# Patient Record
Sex: FEMALE | Race: WHITE | NOT HISPANIC OR LATINO | Employment: UNEMPLOYED | URBAN - NONMETROPOLITAN AREA
[De-identification: names, ages, dates, MRNs, and addresses within clinical notes are randomized per-mention and may not be internally consistent; named-entity substitution may affect disease eponyms.]

---

## 2017-05-22 RX ORDER — CLOPIDOGREL BISULFATE 75 MG/1
TABLET ORAL
Qty: 30 TABLET | Refills: 6 | Status: SHIPPED | OUTPATIENT
Start: 2017-05-22 | End: 2017-12-05 | Stop reason: SDUPTHER

## 2017-08-14 ENCOUNTER — OFFICE VISIT (OUTPATIENT)
Dept: CARDIOLOGY | Facility: CLINIC | Age: 47
End: 2017-08-14

## 2017-08-14 VITALS
WEIGHT: 194 LBS | DIASTOLIC BLOOD PRESSURE: 80 MMHG | BODY MASS INDEX: 32.32 KG/M2 | HEIGHT: 65 IN | HEART RATE: 87 BPM | SYSTOLIC BLOOD PRESSURE: 138 MMHG

## 2017-08-14 DIAGNOSIS — E78.2 MIXED HYPERLIPIDEMIA: ICD-10-CM

## 2017-08-14 DIAGNOSIS — I10 ESSENTIAL HYPERTENSION: ICD-10-CM

## 2017-08-14 DIAGNOSIS — R06.09 DYSPNEA ON EXERTION: ICD-10-CM

## 2017-08-14 DIAGNOSIS — I25.10 CORONARY ARTERY DISEASE INVOLVING NATIVE CORONARY ARTERY OF NATIVE HEART WITHOUT ANGINA PECTORIS: Primary | ICD-10-CM

## 2017-08-14 DIAGNOSIS — I25.2 MYOCARDIAL INFARCTION, OLD: ICD-10-CM

## 2017-08-14 PROBLEM — E10.9 TYPE 1 DIABETES MELLITUS: Status: ACTIVE | Noted: 2017-08-14

## 2017-08-14 PROBLEM — J44.9 COPD (CHRONIC OBSTRUCTIVE PULMONARY DISEASE) (HCC): Status: ACTIVE | Noted: 2017-08-14

## 2017-08-14 PROBLEM — Z90.5 S/P NEPHRECTOMY: Status: ACTIVE | Noted: 2017-08-14

## 2017-08-14 PROCEDURE — 99213 OFFICE O/P EST LOW 20 MIN: CPT | Performed by: INTERNAL MEDICINE

## 2017-08-14 RX ORDER — ZOLPIDEM TARTRATE 10 MG/1
TABLET ORAL
Refills: 0 | COMMUNITY
Start: 2017-08-07 | End: 2019-12-03

## 2017-08-14 RX ORDER — ASPIRIN 325 MG
325 TABLET, DELAYED RELEASE (ENTERIC COATED) ORAL DAILY
Refills: 1 | COMMUNITY
Start: 2017-05-23 | End: 2019-06-03

## 2017-08-14 RX ORDER — POLYETHYLENE GLYCOL 3350 17 G/17G
POWDER, FOR SOLUTION ORAL
Refills: 0 | COMMUNITY
Start: 2017-07-08 | End: 2019-12-03

## 2017-08-14 RX ORDER — NICOTINE POLACRILEX 4 MG/1
GUM, CHEWING ORAL
Refills: 3 | COMMUNITY
Start: 2017-05-25 | End: 2019-12-03

## 2017-08-14 RX ORDER — METOCLOPRAMIDE 10 MG/1
TABLET ORAL
Refills: 1 | COMMUNITY
Start: 2017-07-13 | End: 2018-02-14

## 2017-08-14 RX ORDER — HYDROCODONE BITARTRATE AND ACETAMINOPHEN 7.5; 325 MG/1; MG/1
TABLET ORAL
Refills: 0 | COMMUNITY
Start: 2017-06-06 | End: 2019-06-03 | Stop reason: DRUGHIGH

## 2017-08-14 RX ORDER — ALPRAZOLAM 0.5 MG/1
TABLET ORAL
Refills: 0 | Status: ON HOLD | COMMUNITY
Start: 2017-08-07 | End: 2019-11-02

## 2017-08-14 RX ORDER — CITALOPRAM 40 MG/1
TABLET ORAL
Refills: 3 | Status: ON HOLD | COMMUNITY
Start: 2017-08-02 | End: 2019-11-02

## 2017-08-14 RX ORDER — PEN NEEDLE, DIABETIC 31 GX5/16"
NEEDLE, DISPOSABLE MISCELLANEOUS
Refills: 3 | COMMUNITY
Start: 2017-06-19 | End: 2020-07-02 | Stop reason: SDUPTHER

## 2017-08-14 RX ORDER — ALBUTEROL SULFATE 90 UG/1
2 AEROSOL, METERED RESPIRATORY (INHALATION) 4 TIMES DAILY
Refills: 1 | COMMUNITY
Start: 2017-06-05 | End: 2020-07-02 | Stop reason: SDUPTHER

## 2017-08-14 RX ORDER — FENOFIBRATE 145 MG/1
TABLET, COATED ORAL
Refills: 1 | COMMUNITY
Start: 2017-07-18 | End: 2018-02-14

## 2017-08-14 RX ORDER — GABAPENTIN 300 MG/1
CAPSULE ORAL
Refills: 0 | COMMUNITY
Start: 2017-08-07 | End: 2019-12-03

## 2017-08-14 RX ORDER — ATORVASTATIN CALCIUM 40 MG/1
40 TABLET, FILM COATED ORAL NIGHTLY
Refills: 3 | COMMUNITY
Start: 2017-07-18 | End: 2020-06-17 | Stop reason: DRUGHIGH

## 2017-08-14 RX ORDER — LACTULOSE 10 G/15ML
SOLUTION ORAL
Refills: 0 | COMMUNITY
Start: 2017-07-13 | End: 2018-02-14

## 2017-08-14 RX ORDER — SYRINGE-NEEDLE,INSULIN,0.5 ML 28GX1/2"
SYRINGE, EMPTY DISPOSABLE MISCELLANEOUS
Refills: 3 | COMMUNITY
Start: 2017-05-23 | End: 2020-07-02 | Stop reason: SDUPTHER

## 2017-08-14 RX ORDER — RANOLAZINE 500 MG/1
TABLET, FILM COATED, EXTENDED RELEASE ORAL
Refills: 5 | COMMUNITY
Start: 2017-05-23 | End: 2018-02-14 | Stop reason: SDUPTHER

## 2017-08-14 NOTE — PROGRESS NOTES
Yudi West  46 y.o. female    08/14/2017  1. Coronary artery disease involving native coronary artery of native heart without angina pectoris    2. Myocardial infarction, old    3. Essential hypertension    4. Mixed hyperlipidemia    5. Dyspnea on exertion        History of Present Illness    Mrs. West is being seen by me after very long interval.  I've seen her back in October 2016 when she presented with an episode of chest pain and ruled out for myocardial infarction.  Lexiscan Cardiolite stress test showed no evidence of reversible ischemia.  The patient has done reasonably well on medical management.  Predominant complaint is generalized fatigue and increased somlonence.  She wondered if her cardiac status will was responsible for this.  She denied any chest pain but does have achiness in the back of the neck from time to time.  Blood pressure was in the normal range no signs of congestive heart failure was noted.  Her lipid profiles were checked recently by her primary care physician and understand that data in the normal range.        SUBJECTIVE    Allergies   Allergen Reactions   • Sulfa Antibiotics          Past Medical History:   Diagnosis Date   • Anxiety and depression    • Asthma    • Cancer    • Chronic kidney disease    • COPD (chronic obstructive pulmonary disease)    • Diabetes mellitus    • Hypertension    • Myocardial infarction          Past Surgical History:   Procedure Laterality Date   • CORONARY STENT PLACEMENT     • GALLBLADDER SURGERY     • HYSTERECTOMY     • KIDNEY SURGERY     • REPLACEMENT TOTAL KNEE     • TONSILLECTOMY           Family History   Problem Relation Age of Onset   • Heart disease Mother    • Hypertension Mother    • Heart disease Father    • Hypertension Father          Social History     Social History   • Marital status:      Spouse name: N/A   • Number of children: N/A   • Years of education: N/A     Occupational History   • Not on file.     Social  "History Main Topics   • Smoking status: Current Every Day Smoker     Packs/day: 1.50     Types: Cigarettes   • Smokeless tobacco: Never Used   • Alcohol use No   • Drug use: No   • Sexual activity: Defer     Other Topics Concern   • Not on file     Social History Narrative   • No narrative on file         Current Outpatient Prescriptions   Medication Sig Dispense Refill   • ALPRAZolam (XANAX) 0.5 MG tablet TAKE 1 TABLET BY MOUTH THREE TIMES DAILY  0   • aspirin  MG tablet Take 325 mg by mouth Daily.  1   • atorvastatin (LIPITOR) 40 MG tablet Take 40 mg by mouth Daily.  3   • citalopram (CeleXA) 40 MG tablet TAKE 1 TABLET BY MOUTH ONCE DAILY  3   • clopidogrel (PLAVIX) 75 MG tablet TAKE 1 TABLET BY MOUTH ONCE DAILY 30 tablet 6   • EASY TOUCH INSULIN SYRINGE 28G X 1/2\" 0.5 ML misc USE 4 TIMES DAILY  3   • fenofibrate (TRICOR) 145 MG tablet TAKE 1 TABLET BY MOUTH DAILY  1   • gabapentin (NEURONTIN) 600 MG tablet TAKE 1 TABLET BY MOUTH FOUR TIMES DAILY  0   • HYDROcodone-acetaminophen (NORCO) 7.5-325 MG per tablet TAKE 1 TABLET BY MOUTH EVERY 8 HOURS AS NEEDED FOR PAIN.  0   • lactulose (CHRONULAC) 10 GM/15ML solution TAKE 30 MILLILITERS BY MOUTH ONCE A DAY  0   • metoclopramide (REGLAN) 10 MG tablet TAKE 1 TABLET BY MOUTH BEFORE MEALS AND AT BEDTIME  1   • metoprolol tartrate (LOPRESSOR) 25 MG tablet TAKE 1 TABLET BY MOUTH TWICE A DAY  1   • Omeprazole 20 MG tablet delayed-release TAKE 1 TABLET BY MOUTH ONCE DAILY  3   • polyethylene glycol (MIRALAX) powder MIX 17 GRAMS INTO 4-8 OUNCES OF ANY BEVERAGE TWICE DAILY FOR 7 DAYS  0   • RANEXA 500 MG 12 hr tablet TAKE 1 TABLET BY MOUTH TWICE DAILY  5   • UNIFINE PENTIPS 31G X 8 MM misc USE AS DIRECTED WITH SOLOSTAR ONCE DAILY  3   • VENTOLIN  (90 BASE) MCG/ACT inhaler 2 puffs 4 (Four) Times a Day.  1   • zolpidem (AMBIEN) 10 MG tablet TAKE 1 TABLET BY MOUTH AT BEDTIME  0     No current facility-administered medications for this visit.          OBJECTIVE    BP " "138/80  Pulse 87  Ht 65\" (165.1 cm)  Wt 194 lb (88 kg)  BMI 32.28 kg/m2        Review of Systems     Constitutional:  Denies recent weight loss, weight gain, fever or chills,Fatigue     HENT:  Denies any hearing loss, epistaxis, hoarseness, or difficulty speaking.     Eyes: Wears eyeglasses or contact lenses     Respiratory:  Dyspnea with exertion,no cough, wheezing, or hemoptysis.     Cardiovascular: Negative for palpations, chest pain,  peripheral edema, syncope, or claudication.     Gastrointestinal:  Denies change in bowel habits, dyspepsia, ulcer disease, hematochezia, or melena.     Endocrine: Negative for cold intolerance, heat intolerance, polydipsia, polyphagia and polyuria. Denies any history of weight change, heat/cold intolerance, polydipsia, polyuria     Genitourinary: Negative.      Musculoskeletal: Denies any history of arthritic symptoms or back problems     Skin:  Denies any change in hair or nails, rashes, or skin lesions.     Allergic/Immunologic: Negative.  Negative for environmental allergies, food allergies and immunocompromised state.     Neurological:  Denies any history of recurrent headaches, strokes, TIA, or seizure disorder.     Hematological: Denies any food allergies, seasonal allergies, bleeding disorders, or lymphadenopathy.     Psychiatric/Behavioral: Chronic tobacco use.      Physical Exam     Constitutional: Cooperative, alert and oriented, well-developed, chronically ill-appearing     HENT:   Head: Normocephalic, normal hair patterns, no masses or tenderness.  Ears, Nose, and Throat: No gross abnormalities. No pallor or cyanosis.   Eyes: EOMS intact, PERRL, conjunctivae and lids unremarkable. Fundoscopic exam and visual fields not performed.   Neck: No palpable masses or adenopathy, no thyromegaly, no JVD, carotid pulses are full and equal bilaterally and without  Bruits.     Cardiovascular: Regular rhythm, S1 and S2 normal, no S3 or S4. No murmurs, gallops, or rubs detected. "     Pulmonary/Chest: Chest: normal symmetry, no tenderness to palpation, normal respiratory excursion, no use of accessory muscles.            Pulmonary: Normal breath sounds. No rales or ronchi.    Abdominal: Abdomen soft, bowel sounds normoactive, no masses, no hepatosplenomegaly, non-tender, no bruits.     Musculoskeletal: No deformities, clubbing, cyanosis, erythema, or edema observed.  Normal muscle strength and tone. Pulses full and equal in all extremities, no bruits auscultated.     Neurological: No gross motor or sensory deficits noted, affect appropriate, oriented to time, person, place.     Skin: Warm and dry to the touch, no apparent skin lesions or masses noted.     Psychiatric: She has a normal mood and affect. Her behavior is normal. Judgment and thought content normal.         Procedures      Lab Results   Component Value Date    WBC 7.7 10/28/2016    HGB 14.3 10/28/2016    HCT 40.5 10/28/2016    MCV 82.8 10/28/2016     10/28/2016     Lab Results   Component Value Date    GLUCOSE 141 (H) 10/28/2016    BUN 8 10/28/2016    CREATININE 0.7 10/28/2016    CO2 29 10/28/2016    CALCIUM 8.9 10/28/2016    ALBUMIN 3.5 10/28/2016    AST 25 10/28/2016    ALT 36 10/28/2016     No results found for: CHOL  Lab Results   Component Value Date    TRIG 141 10/28/2016    TRIG 114 05/07/2015     Lab Results   Component Value Date    HDL 31 (L) 10/28/2016    HDL 35 (L) 05/07/2015     Lab Results   Component Value Date    LDLCALC 88 10/28/2016    LDLCALC 137 (H) 05/07/2015     No results found for: LDL  No results found for: HDLLDLRATIO  No components found for: CHOLHDL  Lab Results   Component Value Date    HGBA1C 12.4 (H) 05/08/2015     Lab Results   Component Value Date    TSH 1.36 10/28/2016           ASSESSMENT AND PLAN    Ms. West has no definite evidence of angina and arrhythmia or congestive heart failure.  To further evaluate her dyspnea and fatigue and echocardiogram is being arranged.  I suspect that her  increased somnolence could be related to medications that she is on especially Celexa, Xanax, gabapentin.  She will discuss this should with her primary care physician.  I have continued her present anti-platelet therapy with aspirin and Plavix, lipid-lowering therapy with Lipitor and fenofibrate, antianginal therapy with Ranexa and antihypertensive therapy with metoprolol tartrate have been continued.  Suspicion for ongoing ischemia is low.    Diagnoses and all orders for this visit:    Coronary artery disease involving native coronary artery of native heart without angina pectoris  -     Adult Transthoracic Echo Complete; Future    Myocardial infarction, old  -     Adult Transthoracic Echo Complete; Future    Essential hypertension  -     Adult Transthoracic Echo Complete; Future    Mixed hyperlipidemia  -     Adult Transthoracic Echo Complete; Future    Dyspnea on exertion  -     Adult Transthoracic Echo Complete; Future        Lewis Johnson MD  8/14/2017  4:14 PM

## 2017-12-05 RX ORDER — CLOPIDOGREL BISULFATE 75 MG/1
TABLET ORAL
Qty: 30 TABLET | Refills: 6 | Status: SHIPPED | OUTPATIENT
Start: 2017-12-05 | End: 2018-06-20 | Stop reason: SDUPTHER

## 2018-02-14 ENCOUNTER — OFFICE VISIT (OUTPATIENT)
Dept: CARDIOLOGY | Facility: CLINIC | Age: 48
End: 2018-02-14

## 2018-02-14 VITALS
BODY MASS INDEX: 31.16 KG/M2 | SYSTOLIC BLOOD PRESSURE: 126 MMHG | DIASTOLIC BLOOD PRESSURE: 66 MMHG | HEART RATE: 66 BPM | HEIGHT: 65 IN | WEIGHT: 187 LBS

## 2018-02-14 DIAGNOSIS — I25.10 CORONARY ARTERY DISEASE INVOLVING NATIVE CORONARY ARTERY OF NATIVE HEART WITHOUT ANGINA PECTORIS: Primary | ICD-10-CM

## 2018-02-14 DIAGNOSIS — I25.2 MYOCARDIAL INFARCTION, OLD: ICD-10-CM

## 2018-02-14 DIAGNOSIS — E78.2 MIXED HYPERLIPIDEMIA: ICD-10-CM

## 2018-02-14 DIAGNOSIS — I10 ESSENTIAL HYPERTENSION: ICD-10-CM

## 2018-02-14 PROCEDURE — 93000 ELECTROCARDIOGRAM COMPLETE: CPT | Performed by: INTERNAL MEDICINE

## 2018-02-14 PROCEDURE — 99214 OFFICE O/P EST MOD 30 MIN: CPT | Performed by: INTERNAL MEDICINE

## 2018-02-14 RX ORDER — INSULIN GLARGINE 100 [IU]/ML
50 INJECTION, SOLUTION SUBCUTANEOUS DAILY
COMMUNITY
End: 2020-07-02

## 2018-02-14 RX ORDER — RANOLAZINE 500 MG/1
500 TABLET, FILM COATED, EXTENDED RELEASE ORAL EVERY 12 HOURS SCHEDULED
Qty: 60 TABLET | Refills: 6 | Status: SHIPPED | OUTPATIENT
Start: 2018-02-14 | End: 2018-09-18

## 2018-02-15 ENCOUNTER — TELEPHONE (OUTPATIENT)
Dept: CARDIOLOGY | Facility: CLINIC | Age: 48
End: 2018-02-15

## 2018-02-15 NOTE — PROGRESS NOTES
Yudi West  47 y.o. female    02/14/2018  1. Coronary artery disease involving native coronary artery of native heart without angina pectoris    2. Essential hypertension    3. Mixed hyperlipidemia    4. Myocardial infarction, old        History of Present Illness    Ms. West is here for follow-up of her above stated problems.  She denied any exertional chest discomfort but has had twinges in the left side of the chest from time to time and some degree of pain in the back of the neck.  Stress Cardiolite in November 2016 showed no reversible ischemia.  The patient unfortunately continues to smoke and compliance with medications have not been consistent.  EKG today showed sinus rhythm with no ST-T wave changes diagnostic of ischemia.        SUBJECTIVE    Allergies   Allergen Reactions   • Sulfa Antibiotics          Past Medical History:   Diagnosis Date   • Anxiety and depression    • Asthma    • Cancer    • Chronic kidney disease    • COPD (chronic obstructive pulmonary disease)    • Diabetes mellitus    • Hypertension    • Myocardial infarction          Past Surgical History:   Procedure Laterality Date   • CORONARY STENT PLACEMENT     • GALLBLADDER SURGERY     • HYSTERECTOMY     • KIDNEY SURGERY     • REPLACEMENT TOTAL KNEE     • TONSILLECTOMY           Family History   Problem Relation Age of Onset   • Heart disease Mother    • Hypertension Mother    • Heart disease Father    • Hypertension Father          Social History     Social History   • Marital status:      Spouse name: N/A   • Number of children: N/A   • Years of education: N/A     Occupational History   • Not on file.     Social History Main Topics   • Smoking status: Current Every Day Smoker     Packs/day: 1.50     Types: Cigarettes   • Smokeless tobacco: Never Used   • Alcohol use No   • Drug use: No   • Sexual activity: Defer     Other Topics Concern   • Not on file     Social History Narrative         Current Outpatient  "Prescriptions   Medication Sig Dispense Refill   • ALPRAZolam (XANAX) 0.5 MG tablet TAKE 1 TABLET BY MOUTH THREE TIMES DAILY  0   • aspirin  MG tablet Take 325 mg by mouth Daily.  1   • atorvastatin (LIPITOR) 40 MG tablet Take 40 mg by mouth Daily.  3   • citalopram (CeleXA) 40 MG tablet TAKE 1 TABLET BY MOUTH ONCE DAILY  3   • clopidogrel (PLAVIX) 75 MG tablet TAKE 1 TABLET BY MOUTH ONCE DAILY 30 tablet 6   • EASY TOUCH INSULIN SYRINGE 28G X 1/2\" 0.5 ML misc USE 4 TIMES DAILY  3   • gabapentin (NEURONTIN) 600 MG tablet TAKE 1 TABLET BY MOUTH FOUR TIMES DAILY  0   • HYDROcodone-acetaminophen (NORCO) 7.5-325 MG per tablet TAKE 1 TABLET BY MOUTH EVERY 8 HOURS AS NEEDED FOR PAIN.  0   • insulin glargine (LANTUS) 100 UNIT/ML injection Inject  under the skin Daily.     • metoprolol tartrate (LOPRESSOR) 25 MG tablet TAKE 1 TABLET BY MOUTH TWICE A DAY  1   • Omeprazole 20 MG tablet delayed-release TAKE 1 TABLET BY MOUTH ONCE DAILY  3   • polyethylene glycol (MIRALAX) powder MIX 17 GRAMS INTO 4-8 OUNCES OF ANY BEVERAGE TWICE DAILY FOR 7 DAYS  0   • RANEXA 500 MG 12 hr tablet Take 1 tablet by mouth Every 12 (Twelve) Hours. 60 tablet 6   • UNIFINE PENTIPS 31G X 8 MM misc USE AS DIRECTED WITH SOLOSTAR ONCE DAILY  3   • VENTOLIN  (90 BASE) MCG/ACT inhaler 2 puffs 4 (Four) Times a Day.  1   • zolpidem (AMBIEN) 10 MG tablet TAKE 1 TABLET BY MOUTH AT BEDTIME  0     No current facility-administered medications for this visit.          OBJECTIVE    /66  Pulse 66  Ht 165.1 cm (65\")  Wt 84.8 kg (187 lb)  BMI 31.12 kg/m2        Review of Systems     Constitutional:  Denies recent weight loss, weight gain, fever or chills     HENT:  Denies any hearing loss, epistaxis, hoarseness, or difficulty speaking.     Eyes: Wears eyeglasses or contact lenses     Respiratory:  Denies dyspnea with exertion,no cough, wheezing, or hemoptysis.     Cardiovascular: Negative for palpations. Has atypical chest " pain    Gastrointestinal:  Denies change in bowel habits, dyspepsia, ulcer disease, hematochezia, or melena.     Endocrine: Negative for cold intolerance, heat intolerance, polydipsia, polyphagia and polyuria.    Genitourinary: Negative.      Musculoskeletal: DJD    Skin:  Denies any change in hair or nails, rashes, or skin lesions.     Allergic/Immunologic: Negative.  Negative for environmental allergies, food allergies and immunocompromised state.     Neurological:  Denies any history of recurrent headaches, strokes, TIA, or seizure disorder.     Hematological: Denies any food allergies, seasonal allergies, bleeding disorders, or lymphadenopathy.       Physical Exam     Constitutional: Cooperative, alert and oriented,  in no acute distress.     HENT:   Head: Normocephalic, normal hair patterns, no masses or tenderness.  Ears, Nose, and Throat: No gross abnormalities. No pallor or cyanosis.   Eyes: EOMS intact, PERRL, conjunctivae and lids unremarkable. Fundoscopic exam and visual fields not performed.   Neck: No palpable masses or adenopathy, no thyromegaly, no JVD, carotid pulses are full and equal bilaterally and without  Bruits.     Cardiovascular: Regular rhythm, S1 and S2 normal, no S3 or S4. No murmurs, gallops, or rubs detected.     Pulmonary/Chest: Chest: normal symmetry, no tenderness to palpation,  no use of accessory muscles.            Pulmonary: Normal breath sounds. No rales or ronchi.    Abdominal: Abdomen soft, bowel sounds normoactive, no masses, no hepatosplenomegaly, non-tender, no bruits.     Musculoskeletal: No deformities, clubbing, cyanosis, erythema, or edema observed. There are no spinal abnormalities noted.     Neurological: No gross motor or sensory deficits noted, affect appropriate, oriented to time, person, place.     Skin: Warm and dry to the touch, no apparent skin lesions or masses noted.     Psychiatric: She has a normal mood and affect. Her behavior is normal. Judgment and  thought content normal.           ECG 12 Lead  Date/Time: 2/14/2018 8:18 PM  Performed by: CYRIL NAVARRO  Authorized by: CYRIL NAVARRO   Comparison: not compared with previous ECG   Rhythm: sinus rhythm  Rate: normal  QRS axis: normal  Comments: Sinus rhythm with heart rate of 67 bpm.  Nonspecific ST-T changes.              Lab Results   Component Value Date    WBC 7.7 10/28/2016    HGB 14.3 10/28/2016    HCT 40.5 10/28/2016    MCV 82.8 10/28/2016     10/28/2016     Lab Results   Component Value Date    GLUCOSE 141 (H) 10/28/2016    BUN 8 10/28/2016    CREATININE 0.7 10/28/2016    CO2 29 10/28/2016    CALCIUM 8.9 10/28/2016    ALBUMIN 3.5 10/28/2016    AST 25 10/28/2016    ALT 36 10/28/2016     No results found for: CHOL  Lab Results   Component Value Date    TRIG 141 10/28/2016    TRIG 114 05/07/2015     Lab Results   Component Value Date    HDL 31 (L) 10/28/2016    HDL 35 (L) 05/07/2015     No components found for: LDLCALC  Lab Results   Component Value Date    LDL 88 10/28/2016     (H) 05/07/2015     No results found for: HDLLDLRATIO  No components found for: CHOLHDL  Lab Results   Component Value Date    HGBA1C 12.4 (H) 05/08/2015     Lab Results   Component Value Date    TSH 1.36 10/28/2016           ASSESSMENT AND PLAN  Ms. West has multiple medical issues but no definite evidence of progression of coronary artery disease or angina.  I have restarted Ranexa 500 mg twice a day.  Compliance with medication was once again stressed.  Antiplatelet therapy with aspirin and Plavix, lipid-lowering therapy with atorvastatin, antihypertensive therapy with metoprolol tartrate have been continued.  Smoking cessation was once again stressed.    Addendum on 7/3/2018  I understand that the patient is being considered for epidural injection.  From a cardiac standpoint, she will be okay to go off Plavix for a period of 7 days prior to the procedure.  The medication may be restarted when  appropriate, post procedure.    Yudi was seen today for follow-up.    Diagnoses and all orders for this visit:    Coronary artery disease involving native coronary artery of native heart without angina pectoris    Essential hypertension    Mixed hyperlipidemia    Myocardial infarction, old    Other orders  -     RANEXA 500 MG 12 hr tablet; Take 1 tablet by mouth Every 12 (Twelve) Hours.        Lewis Johnson MD  2/14/2018  8:12 PM

## 2018-04-06 ENCOUNTER — TELEPHONE (OUTPATIENT)
Dept: CARDIOLOGY | Facility: CLINIC | Age: 48
End: 2018-04-06

## 2018-06-20 RX ORDER — CLOPIDOGREL BISULFATE 75 MG/1
TABLET ORAL
Qty: 30 TABLET | Refills: 6 | Status: SHIPPED | OUTPATIENT
Start: 2018-06-20 | End: 2019-12-03

## 2018-08-23 ENCOUNTER — HOSPITAL ENCOUNTER (EMERGENCY)
Facility: HOSPITAL | Age: 48
Discharge: HOME | End: 2018-08-23
Admitting: EMERGENCY MEDICINE

## 2018-08-23 ENCOUNTER — APPOINTMENT (OUTPATIENT)
Dept: GENERAL RADIOLOGY | Facility: HOSPITAL | Age: 48
End: 2018-08-23

## 2018-08-23 VITALS
OXYGEN SATURATION: 96 % | WEIGHT: 190 LBS | BODY MASS INDEX: 31.65 KG/M2 | SYSTOLIC BLOOD PRESSURE: 107 MMHG | DIASTOLIC BLOOD PRESSURE: 80 MMHG | TEMPERATURE: 97.8 F | HEART RATE: 82 BPM | HEIGHT: 65 IN | RESPIRATION RATE: 18 BRPM

## 2018-08-23 DIAGNOSIS — R07.81 RIB PAIN ON RIGHT SIDE: Primary | ICD-10-CM

## 2018-08-23 DIAGNOSIS — S29.011A MUSCLE STRAIN OF CHEST WALL, INITIAL ENCOUNTER: ICD-10-CM

## 2018-08-23 DIAGNOSIS — M94.0 COSTOCHONDRITIS, ACUTE: ICD-10-CM

## 2018-08-23 PROCEDURE — 93005 ELECTROCARDIOGRAM TRACING: CPT

## 2018-08-23 PROCEDURE — 85610 PROTHROMBIN TIME: CPT

## 2018-08-23 PROCEDURE — 25010000002 MORPHINE PER 10 MG: Performed by: NURSE PRACTITIONER

## 2018-08-23 PROCEDURE — 85025 COMPLETE CBC W/AUTO DIFF WBC: CPT

## 2018-08-23 PROCEDURE — 93010 ELECTROCARDIOGRAM REPORT: CPT | Performed by: INTERNAL MEDICINE

## 2018-08-23 PROCEDURE — 71045 X-RAY EXAM CHEST 1 VIEW: CPT

## 2018-08-23 PROCEDURE — 80053 COMPREHEN METABOLIC PANEL: CPT

## 2018-08-23 PROCEDURE — 96375 TX/PRO/DX INJ NEW DRUG ADDON: CPT

## 2018-08-23 PROCEDURE — 99284 EMERGENCY DEPT VISIT MOD MDM: CPT

## 2018-08-23 PROCEDURE — 96374 THER/PROPH/DIAG INJ IV PUSH: CPT

## 2018-08-23 PROCEDURE — 83880 ASSAY OF NATRIURETIC PEPTIDE: CPT

## 2018-08-23 PROCEDURE — 25010000002 KETOROLAC TROMETHAMINE PER 15 MG: Performed by: NURSE PRACTITIONER

## 2018-08-23 PROCEDURE — 84484 ASSAY OF TROPONIN QUANT: CPT

## 2018-08-23 RX ORDER — CYCLOBENZAPRINE HCL 10 MG
10 TABLET ORAL 3 TIMES DAILY PRN
Qty: 15 TABLET | Refills: 0 | Status: SHIPPED | OUTPATIENT
Start: 2018-08-23 | End: 2018-08-28

## 2018-08-23 RX ORDER — SODIUM CHLORIDE 0.9 % (FLUSH) 0.9 %
10 SYRINGE (ML) INJECTION AS NEEDED
Status: DISCONTINUED | OUTPATIENT
Start: 2018-08-23 | End: 2018-08-23 | Stop reason: HOSPADM

## 2018-08-23 RX ORDER — OXYCODONE HYDROCHLORIDE AND ACETAMINOPHEN 5; 325 MG/1; MG/1
1 TABLET ORAL EVERY 6 HOURS PRN
Qty: 15 TABLET | Refills: 0 | Status: SHIPPED | OUTPATIENT
Start: 2018-08-23 | End: 2018-08-26

## 2018-08-23 RX ORDER — KETOROLAC TROMETHAMINE 30 MG/ML
30 INJECTION, SOLUTION INTRAMUSCULAR; INTRAVENOUS ONCE
Status: COMPLETED | OUTPATIENT
Start: 2018-08-23 | End: 2018-08-23

## 2018-08-23 RX ORDER — IBUPROFEN 800 MG/1
800 TABLET ORAL
Qty: 15 TABLET | Refills: 0 | Status: SHIPPED | OUTPATIENT
Start: 2018-08-23 | End: 2018-08-28

## 2018-08-23 RX ORDER — ASPIRIN 325 MG
325 TABLET ORAL ONCE
Status: COMPLETED | OUTPATIENT
Start: 2018-08-23 | End: 2018-08-23

## 2018-08-23 RX ADMIN — MORPHINE SULFATE 4 MG: 4 INJECTION INTRAVENOUS at 15:45

## 2018-08-23 RX ADMIN — KETOROLAC TROMETHAMINE 30 MG: 30 INJECTION, SOLUTION INTRAMUSCULAR at 15:45

## 2018-08-23 RX ADMIN — ASPIRIN 325 MG: 325 TABLET ORAL at 15:44

## 2018-09-18 ENCOUNTER — OFFICE VISIT (OUTPATIENT)
Dept: CARDIOLOGY | Facility: CLINIC | Age: 48
End: 2018-09-18

## 2018-09-18 VITALS
HEART RATE: 98 BPM | HEIGHT: 65 IN | DIASTOLIC BLOOD PRESSURE: 70 MMHG | SYSTOLIC BLOOD PRESSURE: 126 MMHG | BODY MASS INDEX: 31.65 KG/M2 | WEIGHT: 190 LBS | OXYGEN SATURATION: 98 %

## 2018-09-18 DIAGNOSIS — I10 ESSENTIAL HYPERTENSION: ICD-10-CM

## 2018-09-18 DIAGNOSIS — I25.10 CORONARY ARTERY DISEASE INVOLVING NATIVE CORONARY ARTERY OF NATIVE HEART WITHOUT ANGINA PECTORIS: Primary | ICD-10-CM

## 2018-09-18 DIAGNOSIS — E78.2 MIXED HYPERLIPIDEMIA: ICD-10-CM

## 2018-09-18 PROCEDURE — 99214 OFFICE O/P EST MOD 30 MIN: CPT | Performed by: INTERNAL MEDICINE

## 2018-09-18 RX ORDER — CARVEDILOL 6.25 MG/1
6.25 TABLET ORAL 2 TIMES DAILY WITH MEALS
COMMUNITY
End: 2020-06-17 | Stop reason: DRUGHIGH

## 2018-09-18 NOTE — PROGRESS NOTES
Yudi West  47 y.o. female    09/18/2018  1. Coronary artery disease involving native coronary artery of native heart without angina pectoris    2. Essential hypertension    3. Mixed hyperlipidemia        History of Present Illness  Mrs. West is here for follow-up of her above stated problems.  She presented with acute inferior wall microinfarction in May 2015 and underwent cardiac catheterization which showed the following findings:    1.    Critical lesion noted in the right coronary artery which was the        infarct vessel and successful PCI with balloon angioplasty and        subsequent placement of a 3.5 x 23 mm Xience Xpedition stent and        reduction of stenosis to 0%.  2.    Critical lesion noted in one of the small subbranches of the ramus        intermedius vessel. This will be treated medically.  3.    Noncritical disease noted in the left anterior descending coronary        artery and left circumflex coronary artery.  4.    Overall normal contractility of the left ventricle with an        ejection fraction of 50% to 55% with mild inferolateral        hypokinesis.     Patient has generalized aches and pains and has been under a lot of psychological stress secondary to family issues.  She claims compliant with her medications.  She was unable to afford Ranexa.  Blood pressure was in the normal range.  No signs of congestive heart failure was noted.  She has what smoking about 4 months prio    SUBJECTIVE    Allergies   Allergen Reactions   • Sulfa Antibiotics          Past Medical History:   Diagnosis Date   • Anxiety and depression    • Asthma    • Cancer (CMS/[facility])    • Chronic kidney disease    • COPD (chronic obstructive pulmonary disease) (CMS/[facility])    • Diabetes mellitus (CMS/[facility])    • Hypertension    • Myocardial infarction          Past Surgical History:   Procedure Laterality Date   • CORONARY STENT PLACEMENT     • GALLBLADDER SURGERY     • HYSTERECTOMY     • KIDNEY SURGERY     •  "REPLACEMENT TOTAL KNEE     • TONSILLECTOMY           Family History   Problem Relation Age of Onset   • Heart disease Mother    • Hypertension Mother    • Heart disease Father    • Hypertension Father          Social History     Social History   • Marital status:      Spouse name: N/A   • Number of children: N/A   • Years of education: N/A     Occupational History   • Not on file.     Social History Main Topics   • Smoking status: Former Smoker     Packs/day: 1.50     Types: Cigarettes   • Smokeless tobacco: Never Used      Comment: patient states she quit 3.5 months ago.   • Alcohol use No   • Drug use: No   • Sexual activity: Defer     Other Topics Concern   • Not on file     Social History Narrative   • No narrative on file         Current Outpatient Prescriptions   Medication Sig Dispense Refill   • aspirin  MG tablet Take 325 mg by mouth Daily.  1   • atorvastatin (LIPITOR) 40 MG tablet Take 40 mg by mouth Daily.  3   • carvedilol (COREG) 6.25 MG tablet Take 6.25 mg by mouth 2 (Two) Times a Day With Meals.     • citalopram (CeleXA) 40 MG tablet TAKE 1 TABLET BY MOUTH ONCE DAILY  3   • clopidogrel (PLAVIX) 75 MG tablet TAKE 1 TABLET BY MOUTH ONCE DAILY 30 tablet 6   • EASY TOUCH INSULIN SYRINGE 28G X 1/2\" 0.5 ML misc USE 4 TIMES DAILY  3   • gabapentin (NEURONTIN) 600 MG tablet TAKE 1 TABLET BY MOUTH FOUR TIMES DAILY  0   • insulin glargine (LANTUS) 100 UNIT/ML injection Inject  under the skin Daily.     • polyethylene glycol (MIRALAX) powder MIX 17 GRAMS INTO 4-8 OUNCES OF ANY BEVERAGE TWICE DAILY FOR 7 DAYS  0   • UNIFINE PENTIPS 31G X 8 MM misc USE AS DIRECTED WITH SOLOSTAR ONCE DAILY  3   • VENTOLIN  (90 BASE) MCG/ACT inhaler 2 puffs 4 (Four) Times a Day.  1   • ALPRAZolam (XANAX) 0.5 MG tablet TAKE 1 TABLET BY MOUTH THREE TIMES DAILY  0   • HYDROcodone-acetaminophen (NORCO) 7.5-325 MG per tablet TAKE 1 TABLET BY MOUTH EVERY 8 HOURS AS NEEDED FOR PAIN.  0   • Omeprazole 20 MG tablet " "delayed-release TAKE 1 TABLET BY MOUTH ONCE DAILY  3   • RANEXA 500 MG 12 hr tablet Take 1 tablet by mouth Every 12 (Twelve) Hours. 60 tablet 6   • zolpidem (AMBIEN) 10 MG tablet TAKE 1 TABLET BY MOUTH AT BEDTIME  0     No current facility-administered medications for this visit.          OBJECTIVE    /70   Pulse 98   Ht 165.1 cm (65\")   Wt 86.2 kg (190 lb)   SpO2 98%   BMI 31.62 kg/m²         Review of Systems     Constitutional:  Denies recent weight loss, weight gain     HENT:  Denies any hearing loss, epistaxis, hoarseness, or difficulty speaking.     Eyes: Wears eyeglasses or contact lenses     Respiratory:  Denies dyspnea with exertion,no cough, wheezing, or hemoptysis.     Cardiovascular: Negative for palpations, chest pain,     Gastrointestinal:  Denies change in bowel habits, dyspepsia, ulcer disease, hematochezia, or melena.     Endocrine: Negative for cold intolerance, heat intolerance, polydipsia, polyphagia and polyuria.    Genitourinary: s/p nephrectomy      Musculoskeletal: Generalized body ache      Physical Exam     Constitutional: Cooperative, alert and oriented,  in no acute distress.     HENT:   Head: Normocephalic, normal hair patterns, no masses or tenderness.  Ears, Nose, and Throat: No gross abnormalities. No pallor or cyanosis.   Eyes: EOMS intact, PERRL, conjunctivae and lids unremarkable. Fundoscopic exam and visual fields not performed.   Neck: No palpable masses or adenopathy, no thyromegaly, no JVD, carotid pulses are full and equal bilaterally and without  Bruits.     Cardiovascular: Regular rhythm, S1 and S2 normal, no S3 or S4.  No murmurs, gallops, or rubs detected.     Pulmonary/Chest: Chest: normal symmetry, normal respiratory excursion, no intercostal retraction, no use of accessory muscles.            Pulmonary: Normal breath sounds. No rales or ronchi.    Abdominal: Abdomen soft, bowel sounds normoactive, no masses, no hepatosplenomegaly, non-tender, no bruits. "     Musculoskeletal: No deformities, clubbing, cyanosis, erythema, or edema observed.    Neurological: No gross motor or sensory deficits noted, affect appropriate, oriented to time, person, place.     Skin: Warm and dry to the touch, no apparent skin lesions or masses noted.     Psychiatric: She has a normal mood and affect. Her behavior is normal. Judgment and thought content normal.         Procedures      Lab Results   Component Value Date    WBC 8.86 08/23/2018    HGB 14.4 08/23/2018    HCT 41.4 08/23/2018    MCV 85.2 08/23/2018     08/23/2018     Lab Results   Component Value Date    GLUCOSE 293 (H) 08/23/2018    BUN 15 08/23/2018    CREATININE 0.84 08/23/2018    EGFRIFNONA 73 08/23/2018    BCR 17.9 08/23/2018    CO2 25.0 08/23/2018    CALCIUM 9.3 08/23/2018    ALBUMIN 4.30 08/23/2018    AST 21 08/23/2018    ALT 35 08/23/2018     No results found for: CHOL  Lab Results   Component Value Date    TRIG 141 10/28/2016    TRIG 114 05/07/2015     Lab Results   Component Value Date    HDL 31 (L) 10/28/2016    HDL 35 (L) 05/07/2015     No components found for: LDLCALC  Lab Results   Component Value Date    LDL 88 10/28/2016     (H) 05/07/2015     No results found for: HDLLDLRATIO  No components found for: CHOLHDL  Lab Results   Component Value Date    HGBA1C 12.4 (H) 05/08/2015     Lab Results   Component Value Date    TSH 1.36 10/28/2016           ASSESSMENT AND PLAN  Mrs. West is stable regards to her heart with no definite evidence of angina, arrhythmia or congestive heart failure.  Antiplatelet therapy with aspirin and Plavix, antihypertensive therapy with Coreg, and lipid-lowering therapy with atorvastatin has been continued.    Yudi was seen today for follow-up.    Diagnoses and all orders for this visit:    Coronary artery disease involving native coronary artery of native heart without angina pectoris    Essential hypertension    Mixed hyperlipidemia        Lewis Johnson,  MD  9/18/2018  9:20 AM

## 2019-05-15 ENCOUNTER — TRANSCRIBE ORDERS (OUTPATIENT)
Dept: MRI IMAGING | Facility: HOSPITAL | Age: 49
End: 2019-05-15

## 2019-05-15 DIAGNOSIS — M54.5 LOW BACK PAIN, UNSPECIFIED BACK PAIN LATERALITY, UNSPECIFIED CHRONICITY, WITH SCIATICA PRESENCE UNSPECIFIED: Primary | ICD-10-CM

## 2019-05-15 DIAGNOSIS — M43.06 SPONDYLOLYSIS, LUMBAR REGION: ICD-10-CM

## 2019-06-03 ENCOUNTER — HOSPITAL ENCOUNTER (OUTPATIENT)
Dept: MRI IMAGING | Facility: HOSPITAL | Age: 49
Discharge: HOME | End: 2019-06-03
Admitting: PAIN MEDICINE

## 2019-06-03 ENCOUNTER — OFFICE VISIT (OUTPATIENT)
Dept: CARDIOLOGY | Facility: CLINIC | Age: 49
End: 2019-06-03

## 2019-06-03 VITALS
SYSTOLIC BLOOD PRESSURE: 100 MMHG | HEIGHT: 65 IN | WEIGHT: 193 LBS | HEART RATE: 100 BPM | DIASTOLIC BLOOD PRESSURE: 60 MMHG | BODY MASS INDEX: 32.15 KG/M2 | OXYGEN SATURATION: 98 %

## 2019-06-03 DIAGNOSIS — I25.10 CORONARY ARTERY DISEASE INVOLVING NATIVE CORONARY ARTERY OF NATIVE HEART WITHOUT ANGINA PECTORIS: Primary | ICD-10-CM

## 2019-06-03 DIAGNOSIS — M43.06 SPONDYLOLYSIS, LUMBAR REGION: ICD-10-CM

## 2019-06-03 DIAGNOSIS — R06.09 DYSPNEA ON EXERTION: ICD-10-CM

## 2019-06-03 DIAGNOSIS — E78.2 MIXED HYPERLIPIDEMIA: ICD-10-CM

## 2019-06-03 DIAGNOSIS — R07.2 PRECORDIAL PAIN: ICD-10-CM

## 2019-06-03 DIAGNOSIS — M54.5 LOW BACK PAIN, UNSPECIFIED BACK PAIN LATERALITY, UNSPECIFIED CHRONICITY, WITH SCIATICA PRESENCE UNSPECIFIED: ICD-10-CM

## 2019-06-03 DIAGNOSIS — I10 ESSENTIAL HYPERTENSION: ICD-10-CM

## 2019-06-03 PROCEDURE — 72148 MRI LUMBAR SPINE W/O DYE: CPT

## 2019-06-03 PROCEDURE — 99214 OFFICE O/P EST MOD 30 MIN: CPT | Performed by: INTERNAL MEDICINE

## 2019-06-03 RX ORDER — PANTOPRAZOLE SODIUM 20 MG/1
20 TABLET, DELAYED RELEASE ORAL DAILY
Status: ON HOLD | COMMUNITY
End: 2019-11-02

## 2019-06-03 RX ORDER — ASPIRIN 81 MG/1
81 TABLET ORAL DAILY
Status: ON HOLD | COMMUNITY
End: 2019-11-04 | Stop reason: SDUPTHER

## 2019-06-03 RX ORDER — HYDROCODONE BITARTRATE AND ACETAMINOPHEN 5; 325 MG/1; MG/1
1 TABLET ORAL EVERY 6 HOURS PRN
COMMUNITY
End: 2019-11-04 | Stop reason: HOSPADM

## 2019-06-03 RX ORDER — RANOLAZINE 500 MG/1
500 TABLET, EXTENDED RELEASE ORAL 2 TIMES DAILY
Qty: 60 TABLET | Refills: 6 | Status: SHIPPED | OUTPATIENT
Start: 2019-06-03 | End: 2020-07-02 | Stop reason: SDUPTHER

## 2019-06-03 NOTE — PROGRESS NOTES
Yudi West  48 y.o. female    06/03/2019  1. Coronary artery disease involving native coronary artery of native heart without angina pectoris    2. Essential hypertension    3. Mixed hyperlipidemia    4. Dyspnea on exertion    5. Precordial pain        History of Present Illness    Ms. West is here for follow-up of her above-stated problems. She presented with acute inferior wall microinfarction in May 2015 and underwent cardiac catheterization which showed the following findings:     1.    Critical lesion noted in the right coronary artery which was the        infarct vessel and successful PCI with balloon angioplasty and        subsequent placement of a 3.5 x 23 mm Xience Xpedition stent and        reduction of stenosis to 0%.  2.    Critical lesion noted in one of the small subbranches of the ramus        intermedius vessel. This will be treated medically.  3.    Noncritical disease noted in the left anterior descending coronary        artery and left circumflex coronary artery.  4.    Overall normal contractility of the left ventricle with an        ejection fraction of 50% to 55% with mild inferolateral        hypokinesis.     She reports intermittent episodes of sharp chest pain lasting for a few seconds which is unrelated to exertion.  This has been going on for several months.  She has been under a lot of stress.  She quit smoking for a few months but looks like she has restarted.  We had a discussion about smoking cessation.  Clinical exam is unremarkable with no signs of bronchospasm or congestive heart failure.        SUBJECTIVE    Allergies   Allergen Reactions   • Sulfa Antibiotics          Past Medical History:   Diagnosis Date   • Anxiety and depression    • Asthma    • Cancer (CMS/[facility])    • Chronic kidney disease    • COPD (chronic obstructive pulmonary disease) (CMS/[facility])    • Diabetes mellitus (CMS/[facility])    • Hypertension    • Myocardial infarction (CMS/[facility])          Past Surgical  "History:   Procedure Laterality Date   • CORONARY STENT PLACEMENT     • GALLBLADDER SURGERY     • HYSTERECTOMY     • KIDNEY SURGERY     • REPLACEMENT TOTAL KNEE     • TONSILLECTOMY           Family History   Problem Relation Age of Onset   • Heart disease Mother    • Hypertension Mother    • Heart disease Father    • Hypertension Father          Social History     Socioeconomic History   • Marital status:      Spouse name: Not on file   • Number of children: Not on file   • Years of education: Not on file   • Highest education level: Not on file   Tobacco Use   • Smoking status: Former Smoker     Packs/day: 1.50     Types: Cigarettes   • Smokeless tobacco: Never Used   • Tobacco comment: patient states she quit 3.5 months ago.   Substance and Sexual Activity   • Alcohol use: No   • Drug use: No   • Sexual activity: Defer         Current Outpatient Medications   Medication Sig Dispense Refill   • aspirin 81 MG EC tablet Take 81 mg by mouth Daily.     • atorvastatin (LIPITOR) 40 MG tablet Take 40 mg by mouth Daily.  3   • carvedilol (COREG) 6.25 MG tablet Take 6.25 mg by mouth 2 (Two) Times a Day With Meals.     • clopidogrel (PLAVIX) 75 MG tablet TAKE 1 TABLET BY MOUTH ONCE DAILY 30 tablet 6   • EASY TOUCH INSULIN SYRINGE 28G X 1/2\" 0.5 ML misc USE 4 TIMES DAILY  3   • gabapentin (NEURONTIN) 600 MG tablet TAKE 1 TABLET BY MOUTH FOUR TIMES DAILY  0   • HYDROcodone-acetaminophen (NORCO) 5-325 MG per tablet Take 1 tablet by mouth Every 6 (Six) Hours As Needed.     • insulin glargine (LANTUS) 100 UNIT/ML injection Inject  under the skin Daily.     • pantoprazole (PROTONIX) 20 MG EC tablet Take 20 mg by mouth Daily.     • polyethylene glycol (MIRALAX) powder MIX 17 GRAMS INTO 4-8 OUNCES OF ANY BEVERAGE TWICE DAILY FOR 7 DAYS  0   • sertraline (ZOLOFT) 50 MG tablet Take 50 mg by mouth Daily.     • UNIFINE PENTIPS 31G X 8 MM misc USE AS DIRECTED WITH SOLOSTAR ONCE DAILY  3   • VENTOLIN  (90 BASE) MCG/ACT " "inhaler 2 puffs 4 (Four) Times a Day.  1   • zolpidem (AMBIEN) 10 MG tablet TAKE 1 TABLET BY MOUTH AT BEDTIME  0   • ALPRAZolam (XANAX) 0.5 MG tablet TAKE 1 TABLET BY MOUTH THREE TIMES DAILY  0   • citalopram (CeleXA) 40 MG tablet TAKE 1 TABLET BY MOUTH ONCE DAILY  3   • Omeprazole 20 MG tablet delayed-release TAKE 1 TABLET BY MOUTH ONCE DAILY  3   • ranolazine (RANEXA) 500 MG 12 hr tablet Take 1 tablet by mouth 2 (Two) Times a Day. 60 tablet 6     No current facility-administered medications for this visit.          OBJECTIVE    /60   Pulse 100   Ht 165.1 cm (65\")   Wt 87.5 kg (193 lb)   SpO2 98%   BMI 32.12 kg/m²         Review of Systems     Constitutional:  Denies recent weight loss, weight gain, fever or chills     HENT:  Denies any hearing loss, epistaxis, hoarseness, or difficulty speaking.     Eyes: Wears eyeglasses or contact lenses     Respiratory:  Dyspnea with exertion,no cough, wheezing, or hemoptysis.     Cardiovascular: Occasional sharp chest pain, no orthopnea, PND, peripheral edema, syncope, or claudication.     Gastrointestinal:  Denies change in bowel habits, dyspepsia, ulcer disease, hematochezia, or melena.     Endocrine: Negative for cold intolerance, heat intolerance, polydipsia, polyphagia and polyuria.    Genitourinary: History of nephrectomy in the past      Musculoskeletal: DJD    Skin:  Denies any change in hair or nails, rashes, or skin lesions.     Allergic/Immunologic: Negative.  Negative for environmental allergies, food allergies and immunocompromised state.     Neurological:  Denies any history of recurrent headaches, strokes, TIA, or seizure disorder.     Hematological: Denies any food allergies, seasonal allergies, bleeding disorders, or lymphadenopathy.     Psychiatric/Behavioral: history of depression/ anxiety, no substance abuse, or change in cognitive function.         Physical Exam     Constitutional: Cooperative, alert and oriented, in no acute distress.     HENT: "   Head: Normocephalic, normal hair patterns, no masses or tenderness.  Ears, Nose, and Throat: No gross abnormalities. No pallor or cyanosis.   Eyes: EOMS intact, PERRL, conjunctivae and lids unremarkable. Fundoscopic exam and visual fields not performed.   Neck: No palpable masses or adenopathy, no thyromegaly, no JVD, carotid pulses are full and equal bilaterally and without  Bruits.     Cardiovascular: Regular rhythm, S1 and S2 normal, no S3 or S4.  No murmurs, gallops, or rubs detected.     Pulmonary/Chest: Chest: normal symmetry,  normal respiratory excursion, no intercostal retraction, no use of accessory muscles.            Pulmonary: Normal breath sounds. No rales or ronchi.    Abdominal: Abdomen soft, bowel sounds normoactive, no masses, no hepatosplenomegaly, non-tender, no bruits.     Musculoskeletal: No deformities, clubbing, cyanosis, erythema, or edema observed.     Neurological: No gross motor or sensory deficits noted, affect appropriate, oriented to time, person, place.     Skin: Warm and dry to the touch, no apparent skin lesions or masses noted.     Psychiatric: She has a normal mood and affect. Her behavior is normal. Judgment and thought content normal.         Procedures      Lab Results   Component Value Date    WBC 8.86 08/23/2018    HGB 14.4 08/23/2018    HCT 41.4 08/23/2018    MCV 85.2 08/23/2018     08/23/2018     Lab Results   Component Value Date    GLUCOSE 293 (H) 08/23/2018    BUN 15 08/23/2018    CREATININE 0.84 08/23/2018    EGFRIFNONA 73 08/23/2018    BCR 17.9 08/23/2018    CO2 25.0 08/23/2018    CALCIUM 9.3 08/23/2018    ALBUMIN 4.30 08/23/2018    AST 21 08/23/2018    ALT 35 08/23/2018     No results found for: CHOL  Lab Results   Component Value Date    TRIG 141 10/28/2016    TRIG 114 05/07/2015     Lab Results   Component Value Date    HDL 31 (L) 10/28/2016    HDL 35 (L) 05/07/2015     No components found for: LDLCALC  Lab Results   Component Value Date    LDL 88  10/28/2016     (H) 05/07/2015     No results found for: HDLLDLRATIO  No components found for: CHOLHDL  Lab Results   Component Value Date    HGBA1C 12.4 (H) 05/08/2015     Lab Results   Component Value Date    TSH 1.36 10/28/2016           ASSESSMENT AND PLAN  Yudi West has multiple medical issues and her chest pain has atypical features.  Her dyspnea is multifactorial with history of COPD.  LV systolic function has been normal in the past.  She has had nephrectomy in the past.  To assess myocardial perfusion and LV function a Lexiscan Cardiolite stress test and an echocardiogram are being arranged.  I have started her on Ranexa 500 mg twice a day.  Her present medicines including antiplatelet therapy with aspirin and Plavix, lipid-lowering therapy with atorvastatin, antihypertensive therapy with Coreg have been continued.  Smoking cessation was once again stressed.    Yudi was seen today for follow-up.    Diagnoses and all orders for this visit:    Coronary artery disease involving native coronary artery of native heart without angina pectoris  -     Stress Test With Myocardial Perfusion One Day; Future  -     Adult Transthoracic Echo Complete W/ Cont if Necessary Per Protocol; Future    Essential hypertension  -     Stress Test With Myocardial Perfusion One Day; Future  -     Adult Transthoracic Echo Complete W/ Cont if Necessary Per Protocol; Future    Mixed hyperlipidemia  -     Stress Test With Myocardial Perfusion One Day; Future  -     Adult Transthoracic Echo Complete W/ Cont if Necessary Per Protocol; Future    Dyspnea on exertion  -     Stress Test With Myocardial Perfusion One Day; Future  -     Adult Transthoracic Echo Complete W/ Cont if Necessary Per Protocol; Future    Precordial pain  -     Stress Test With Myocardial Perfusion One Day; Future  -     Adult Transthoracic Echo Complete W/ Cont if Necessary Per Protocol; Future    Other orders  -     ranolazine (RANEXA) 500 MG 12 hr  tablet; Take 1 tablet by mouth 2 (Two) Times a Day.        Patient's Body mass index is 32.12 kg/m². BMI is above normal parameters. Recommendations include: exercise counseling and nutrition counseling.      I advised Yudi of the risks of continuing to use tobacco, and I provided her with tobacco cessation educational materials in the After Visit Summary.     During this visit, I spent 3 minutes counseling the patient regarding tobacco cessation.      Lewis Johnson MD  6/3/2019  11:21 AM

## 2019-06-07 ENCOUNTER — HOSPITAL ENCOUNTER (OUTPATIENT)
Dept: NUCLEAR MEDICINE | Facility: HOSPITAL | Age: 49
Discharge: HOME | End: 2019-06-07

## 2019-06-07 ENCOUNTER — HOSPITAL ENCOUNTER (OUTPATIENT)
Dept: CARDIOLOGY | Facility: HOSPITAL | Age: 49
Discharge: HOME | End: 2019-06-07

## 2019-06-07 DIAGNOSIS — I10 ESSENTIAL HYPERTENSION: ICD-10-CM

## 2019-06-07 DIAGNOSIS — R07.2 PRECORDIAL PAIN: ICD-10-CM

## 2019-06-07 DIAGNOSIS — E78.2 MIXED HYPERLIPIDEMIA: ICD-10-CM

## 2019-06-07 DIAGNOSIS — I25.10 CORONARY ARTERY DISEASE INVOLVING NATIVE CORONARY ARTERY OF NATIVE HEART WITHOUT ANGINA PECTORIS: ICD-10-CM

## 2019-06-07 DIAGNOSIS — R06.09 DYSPNEA ON EXERTION: ICD-10-CM

## 2019-06-07 LAB
BH CV STRESS BP STAGE 1: NORMAL
BH CV STRESS COMMENTS STAGE 1: NORMAL
BH CV STRESS DOSE REGADENOSON STAGE 1: 0.4
BH CV STRESS DURATION MIN STAGE 1: 0
BH CV STRESS DURATION SEC STAGE 1: 10
BH CV STRESS HR STAGE 1: 75
BH CV STRESS PROTOCOL 1: NORMAL
BH CV STRESS RECOVERY BP: NORMAL MMHG
BH CV STRESS RECOVERY HR: 102 BPM
BH CV STRESS STAGE 1: 1
LV EF NUC BP: 72 %
MAXIMAL PREDICTED HEART RATE: 172 BPM
PERCENT MAX PREDICTED HR: 62.21 %
STRESS BASELINE BP: NORMAL MMHG
STRESS BASELINE HR: 74 BPM
STRESS PERCENT HR: 73 %
STRESS POST ESTIMATED WORKLOAD: 1 METS
STRESS POST PEAK BP: NORMAL MMHG
STRESS POST PEAK HR: 107 BPM
STRESS TARGET HR: 146 BPM

## 2019-06-07 PROCEDURE — A9500 TC99M SESTAMIBI: HCPCS | Performed by: INTERNAL MEDICINE

## 2019-06-07 PROCEDURE — 78452 HT MUSCLE IMAGE SPECT MULT: CPT | Performed by: INTERNAL MEDICINE

## 2019-06-07 PROCEDURE — 0 TECHNETIUM SESTAMIBI: Performed by: INTERNAL MEDICINE

## 2019-06-07 PROCEDURE — 25010000002 REGADENOSON 0.4 MG/5ML SOLUTION: Performed by: INTERNAL MEDICINE

## 2019-06-07 PROCEDURE — 93017 CV STRESS TEST TRACING ONLY: CPT

## 2019-06-07 PROCEDURE — 93016 CV STRESS TEST SUPVJ ONLY: CPT | Performed by: INTERNAL MEDICINE

## 2019-06-07 PROCEDURE — 78452 HT MUSCLE IMAGE SPECT MULT: CPT

## 2019-06-07 PROCEDURE — 93018 CV STRESS TEST I&R ONLY: CPT | Performed by: INTERNAL MEDICINE

## 2019-06-07 RX ORDER — 0.9 % SODIUM CHLORIDE 0.9 %
10 VIAL (ML) INJECTION AS NEEDED
Status: DISCONTINUED | OUTPATIENT
Start: 2019-06-07 | End: 2019-06-08 | Stop reason: HOSPADM

## 2019-06-07 RX ADMIN — REGADENOSON 0.4 MG: 0.08 INJECTION, SOLUTION INTRAVENOUS at 08:52

## 2019-06-07 RX ADMIN — TECHNETIUM TC 99M SESTAMIBI 1 DOSE: 1 INJECTION INTRAVENOUS at 08:52

## 2019-06-07 RX ADMIN — TECHNETIUM TC 99M SESTAMIBI 1 DOSE: 1 INJECTION INTRAVENOUS at 07:20

## 2019-06-07 RX ADMIN — SODIUM CHLORIDE, PRESERVATIVE FREE 10 ML: 5 INJECTION INTRAVENOUS at 08:52

## 2019-06-11 ENCOUNTER — DOCUMENTATION (OUTPATIENT)
Dept: CARDIOLOGY | Facility: CLINIC | Age: 49
End: 2019-06-11

## 2019-06-21 ENCOUNTER — DOCUMENTATION (OUTPATIENT)
Dept: CARDIOLOGY | Facility: CLINIC | Age: 49
End: 2019-06-21

## 2019-06-24 ENCOUNTER — DOCUMENTATION (OUTPATIENT)
Dept: CARDIOLOGY | Facility: CLINIC | Age: 49
End: 2019-06-24

## 2019-11-02 ENCOUNTER — APPOINTMENT (OUTPATIENT)
Dept: GENERAL RADIOLOGY | Facility: HOSPITAL | Age: 49
End: 2019-11-02

## 2019-11-02 ENCOUNTER — HOSPITAL ENCOUNTER (INPATIENT)
Facility: HOSPITAL | Age: 49
LOS: 2 days | Discharge: HOME | End: 2019-11-04
Attending: FAMILY MEDICINE | Admitting: FAMILY MEDICINE

## 2019-11-02 DIAGNOSIS — Z74.09 IMPAIRED PHYSICAL MOBILITY: ICD-10-CM

## 2019-11-02 DIAGNOSIS — S72.452A CLOSED DISPLACED SUPRACONDYLAR FRACTURE OF DISTAL END OF LEFT FEMUR WITHOUT INTRACONDYLAR EXTENSION, INITIAL ENCOUNTER (HCC): Primary | ICD-10-CM

## 2019-11-02 DIAGNOSIS — Z74.09 IMPAIRED MOBILITY AND ADLS: ICD-10-CM

## 2019-11-02 DIAGNOSIS — Z78.9 IMPAIRED MOBILITY AND ADLS: ICD-10-CM

## 2019-11-02 PROBLEM — S72.90XA FEMUR FRACTURE: Status: ACTIVE | Noted: 2019-11-02

## 2019-11-02 LAB
ALBUMIN SERPL-MCNC: 4 G/DL (ref 3.5–5.2)
ALBUMIN/GLOB SERPL: 1.4 G/DL
ALP SERPL-CCNC: 113 U/L (ref 39–117)
ALT SERPL W P-5'-P-CCNC: 16 U/L (ref 1–33)
ANION GAP SERPL CALCULATED.3IONS-SCNC: 13 MMOL/L (ref 5–15)
AST SERPL-CCNC: 12 U/L (ref 1–32)
BASOPHILS # BLD AUTO: 0.08 10*3/MM3 (ref 0–0.2)
BASOPHILS NFR BLD AUTO: 0.5 % (ref 0–1.5)
BILIRUB SERPL-MCNC: 0.3 MG/DL (ref 0.2–1.2)
BUN BLD-MCNC: 14 MG/DL (ref 6–20)
BUN/CREAT SERPL: 14.3 (ref 7–25)
CALCIUM SPEC-SCNC: 8.5 MG/DL (ref 8.6–10.5)
CHLORIDE SERPL-SCNC: 100 MMOL/L (ref 98–107)
CO2 SERPL-SCNC: 24 MMOL/L (ref 22–29)
CREAT BLD-MCNC: 0.98 MG/DL (ref 0.57–1)
DEPRECATED RDW RBC AUTO: 37.7 FL (ref 37–54)
EOSINOPHIL # BLD AUTO: 0.31 10*3/MM3 (ref 0–0.4)
EOSINOPHIL NFR BLD AUTO: 2 % (ref 0.3–6.2)
ERYTHROCYTE [DISTWIDTH] IN BLOOD BY AUTOMATED COUNT: 11.8 % (ref 12.3–15.4)
GFR SERPL CREATININE-BSD FRML MDRD: 60 ML/MIN/1.73
GLOBULIN UR ELPH-MCNC: 2.8 GM/DL
GLUCOSE BLD-MCNC: 391 MG/DL (ref 65–99)
GLUCOSE BLD-MCNC: 449 MG/DL (ref 65–99)
HCT VFR BLD AUTO: 36.1 % (ref 34–46.6)
HGB BLD-MCNC: 12 G/DL (ref 12–15.9)
IMM GRANULOCYTES # BLD AUTO: 0.08 10*3/MM3 (ref 0–0.05)
IMM GRANULOCYTES NFR BLD AUTO: 0.5 % (ref 0–0.5)
LYMPHOCYTES # BLD AUTO: 2.12 10*3/MM3 (ref 0.7–3.1)
LYMPHOCYTES NFR BLD AUTO: 13.3 % (ref 19.6–45.3)
MCH RBC QN AUTO: 29.5 PG (ref 26.6–33)
MCHC RBC AUTO-ENTMCNC: 33.2 G/DL (ref 31.5–35.7)
MCV RBC AUTO: 88.7 FL (ref 79–97)
MONOCYTES # BLD AUTO: 1.3 10*3/MM3 (ref 0.1–0.9)
MONOCYTES NFR BLD AUTO: 8.2 % (ref 5–12)
NEUTROPHILS # BLD AUTO: 12 10*3/MM3 (ref 1.7–7)
NEUTROPHILS NFR BLD AUTO: 75.5 % (ref 42.7–76)
NRBC BLD AUTO-RTO: 0 /100 WBC (ref 0–0.2)
PLATELET # BLD AUTO: 241 10*3/MM3 (ref 140–450)
PMV BLD AUTO: 10.7 FL (ref 6–12)
POTASSIUM BLD-SCNC: 4.3 MMOL/L (ref 3.5–5.2)
PROT SERPL-MCNC: 6.8 G/DL (ref 6–8.5)
RBC # BLD AUTO: 4.07 10*6/MM3 (ref 3.77–5.28)
SODIUM BLD-SCNC: 137 MMOL/L (ref 136–145)
WBC NRBC COR # BLD: 15.89 10*3/MM3 (ref 3.4–10.8)

## 2019-11-02 PROCEDURE — 82947 ASSAY GLUCOSE BLOOD QUANT: CPT | Performed by: FAMILY MEDICINE

## 2019-11-02 PROCEDURE — 99024 POSTOP FOLLOW-UP VISIT: CPT | Performed by: ORTHOPAEDIC SURGERY

## 2019-11-02 PROCEDURE — 25010000002 ONDANSETRON PER 1 MG: Performed by: FAMILY MEDICINE

## 2019-11-02 PROCEDURE — 80053 COMPREHEN METABOLIC PANEL: CPT | Performed by: FAMILY MEDICINE

## 2019-11-02 PROCEDURE — 86900 BLOOD TYPING SEROLOGIC ABO: CPT | Performed by: ORTHOPAEDIC SURGERY

## 2019-11-02 PROCEDURE — 82962 GLUCOSE BLOOD TEST: CPT

## 2019-11-02 PROCEDURE — 86901 BLOOD TYPING SEROLOGIC RH(D): CPT | Performed by: ORTHOPAEDIC SURGERY

## 2019-11-02 PROCEDURE — 94640 AIRWAY INHALATION TREATMENT: CPT

## 2019-11-02 PROCEDURE — 63710000001 INSULIN DETEMIR PER 5 UNITS: Performed by: FAMILY MEDICINE

## 2019-11-02 PROCEDURE — 73552 X-RAY EXAM OF FEMUR 2/>: CPT

## 2019-11-02 PROCEDURE — 86850 RBC ANTIBODY SCREEN: CPT | Performed by: ORTHOPAEDIC SURGERY

## 2019-11-02 PROCEDURE — 85025 COMPLETE CBC W/AUTO DIFF WBC: CPT | Performed by: FAMILY MEDICINE

## 2019-11-02 PROCEDURE — 63710000001 INSULIN ASPART PER 5 UNITS: Performed by: INTERNAL MEDICINE

## 2019-11-02 RX ORDER — LIDOCAINE HYDROCHLORIDE 10 MG/ML
10 INJECTION, SOLUTION INFILTRATION; PERINEURAL ONCE
Status: COMPLETED | OUTPATIENT
Start: 2019-11-02 | End: 2019-11-02

## 2019-11-02 RX ORDER — RANOLAZINE 500 MG/1
500 TABLET, EXTENDED RELEASE ORAL 2 TIMES DAILY
Status: DISCONTINUED | OUTPATIENT
Start: 2019-11-02 | End: 2019-11-04 | Stop reason: HOSPADM

## 2019-11-02 RX ORDER — CARVEDILOL 6.25 MG/1
6.25 TABLET ORAL 2 TIMES DAILY WITH MEALS
Status: DISCONTINUED | OUTPATIENT
Start: 2019-11-02 | End: 2019-11-04 | Stop reason: HOSPADM

## 2019-11-02 RX ORDER — ATORVASTATIN CALCIUM 40 MG/1
40 TABLET, FILM COATED ORAL NIGHTLY
Status: DISCONTINUED | OUTPATIENT
Start: 2019-11-02 | End: 2019-11-04 | Stop reason: HOSPADM

## 2019-11-02 RX ORDER — PANTOPRAZOLE SODIUM 40 MG/1
40 TABLET, DELAYED RELEASE ORAL EVERY MORNING
Status: DISCONTINUED | OUTPATIENT
Start: 2019-11-03 | End: 2019-11-04 | Stop reason: HOSPADM

## 2019-11-02 RX ORDER — SODIUM CHLORIDE 0.9 % (FLUSH) 0.9 %
10 SYRINGE (ML) INJECTION EVERY 12 HOURS SCHEDULED
Status: DISCONTINUED | OUTPATIENT
Start: 2019-11-02 | End: 2019-11-04 | Stop reason: HOSPADM

## 2019-11-02 RX ORDER — MORPHINE SULFATE 2 MG/ML
1 INJECTION, SOLUTION INTRAMUSCULAR; INTRAVENOUS EVERY 4 HOURS PRN
Status: DISCONTINUED | OUTPATIENT
Start: 2019-11-02 | End: 2019-11-03

## 2019-11-02 RX ORDER — NALOXONE HCL 0.4 MG/ML
0.4 VIAL (ML) INJECTION
Status: DISCONTINUED | OUTPATIENT
Start: 2019-11-02 | End: 2019-11-03 | Stop reason: SDUPTHER

## 2019-11-02 RX ORDER — NICOTINE POLACRILEX 4 MG
15 LOZENGE BUCCAL
Status: DISCONTINUED | OUTPATIENT
Start: 2019-11-02 | End: 2019-11-04 | Stop reason: HOSPADM

## 2019-11-02 RX ORDER — IPRATROPIUM BROMIDE AND ALBUTEROL SULFATE 2.5; .5 MG/3ML; MG/3ML
3 SOLUTION RESPIRATORY (INHALATION)
Status: DISCONTINUED | OUTPATIENT
Start: 2019-11-02 | End: 2019-11-04 | Stop reason: HOSPADM

## 2019-11-02 RX ORDER — HYDROCODONE BITARTRATE AND ACETAMINOPHEN 5; 325 MG/1; MG/1
1 TABLET ORAL EVERY 6 HOURS PRN
Status: DISCONTINUED | OUTPATIENT
Start: 2019-11-02 | End: 2019-11-03

## 2019-11-02 RX ORDER — BUPIVACAINE HYDROCHLORIDE 5 MG/ML
10 INJECTION, SOLUTION EPIDURAL; INTRACAUDAL ONCE
Status: COMPLETED | OUTPATIENT
Start: 2019-11-02 | End: 2019-11-02

## 2019-11-02 RX ORDER — DEXTROSE MONOHYDRATE 25 G/50ML
25 INJECTION, SOLUTION INTRAVENOUS
Status: DISCONTINUED | OUTPATIENT
Start: 2019-11-02 | End: 2019-11-04 | Stop reason: HOSPADM

## 2019-11-02 RX ORDER — BUPIVACAINE HCL/0.9 % NACL/PF 0.1 %
2 PLASTIC BAG, INJECTION (ML) EPIDURAL ONCE
Status: COMPLETED | OUTPATIENT
Start: 2019-11-03 | End: 2019-11-03

## 2019-11-02 RX ORDER — ZOLPIDEM TARTRATE 5 MG/1
10 TABLET ORAL NIGHTLY PRN
Status: DISCONTINUED | OUTPATIENT
Start: 2019-11-02 | End: 2019-11-04 | Stop reason: HOSPADM

## 2019-11-02 RX ORDER — SODIUM CHLORIDE 0.9 % (FLUSH) 0.9 %
10 SYRINGE (ML) INJECTION AS NEEDED
Status: DISCONTINUED | OUTPATIENT
Start: 2019-11-02 | End: 2019-11-04 | Stop reason: HOSPADM

## 2019-11-02 RX ORDER — ONDANSETRON 2 MG/ML
4 INJECTION INTRAMUSCULAR; INTRAVENOUS EVERY 6 HOURS PRN
Status: DISCONTINUED | OUTPATIENT
Start: 2019-11-02 | End: 2019-11-03 | Stop reason: SDUPTHER

## 2019-11-02 RX ORDER — TRANEXAMIC ACID 650 MG/1
1300 TABLET ORAL ONCE
Status: COMPLETED | OUTPATIENT
Start: 2019-11-03 | End: 2019-11-03

## 2019-11-02 RX ADMIN — BUPIVACAINE HYDROCHLORIDE 10 ML: 5 INJECTION, SOLUTION EPIDURAL; INTRACAUDAL; PERINEURAL at 18:46

## 2019-11-02 RX ADMIN — HYDROCODONE BITARTRATE AND ACETAMINOPHEN 1 TABLET: 5; 325 TABLET ORAL at 19:00

## 2019-11-02 RX ADMIN — LIDOCAINE HYDROCHLORIDE 10 ML: 10 INJECTION, SOLUTION EPIDURAL; INFILTRATION; INTRACAUDAL; PERINEURAL at 18:45

## 2019-11-02 RX ADMIN — ATORVASTATIN CALCIUM 40 MG: 40 TABLET, FILM COATED ORAL at 21:14

## 2019-11-02 RX ADMIN — INSULIN ASPART 20 UNITS: 100 INJECTION, SOLUTION INTRAVENOUS; SUBCUTANEOUS at 21:14

## 2019-11-02 RX ADMIN — CARVEDILOL 6.25 MG: 6.25 TABLET, FILM COATED ORAL at 21:14

## 2019-11-02 RX ADMIN — INSULIN DETEMIR 50 UNITS: 100 INJECTION, SOLUTION SUBCUTANEOUS at 21:12

## 2019-11-02 RX ADMIN — ZOLPIDEM TARTRATE 10 MG: 5 TABLET ORAL at 21:14

## 2019-11-02 RX ADMIN — IPRATROPIUM BROMIDE AND ALBUTEROL SULFATE 3 ML: 2.5; .5 SOLUTION RESPIRATORY (INHALATION) at 20:02

## 2019-11-02 RX ADMIN — ONDANSETRON 4 MG: 2 INJECTION INTRAMUSCULAR; INTRAVENOUS at 21:14

## 2019-11-02 RX ADMIN — RANOLAZINE 500 MG: 500 TABLET, FILM COATED, EXTENDED RELEASE ORAL at 21:14

## 2019-11-02 NOTE — PROGRESS NOTES
ORTHOPAEDIC CONSULT     Patient Name:  Yudi West  Admit Date:  11/2/2019  Consult Date:  11/2/2019    Referring Provider: Dr. Grant  Reason for Consultation: Fracture left femur.    Patient Care Team:  Ibeth Masters APRN as PCP - General (Nurse Practitioner)    History of present illness: Mrs. West is 49 years old.  She was descending some stairs earlier today when she sustained a twisting injury to her left leg.  She had prompt onset of pain.  She was seen in the emergency room in [place] and x-rays were performed showing a fracture of the distal shaft of the femur.  Past history is remarkable for a total knee replacement on the left about 9 years ago by Dr. Arrington.  She said she was doing well with regard to her knee prior to her most recent injury.  Patient requested to have care for the fracture in [place].  I spoke by phone with Dr. Ritter and agreed to accept him in transfer.  This was an isolated injury.    Medications include Plavix carvedilol, Neurontin, insulin, Tagamet, sertraline, 10 mg hydrocodone.  She is allergic to sulfa drugs.  She smokes 2 pack/day of cigarettes and denies use of alcohol.    Past medical history is remarkable for coronary artery disease COPD and diabetes.  Previous surgeries include nephrectomy for carcinoma, hysterectomy, tonsillectomy, and cholecystectomy.  There is been no history of anesthetic complication.  She also has a history of chronic back pain.    Family history she is .    Social history she lives in [place] with her  and son.  She is disabled.    Review of Systems is remarkable for pain well localized to the the left knee and distal thigh.  There is been no numbness or tingling in the limb.  Otherwise complete ROS is negative except as mentioned above.    Prior to Admission medications    Medication Sig Start Date End Date Taking? Authorizing Provider   aspirin 81 MG EC tablet Take 81 mg by mouth Daily.   Yes  "Sara Benoit MD   atorvastatin (LIPITOR) 40 MG tablet Take 40 mg by mouth Every Night. 7/18/17  Yes Sara Benoit MD   carvedilol (COREG) 6.25 MG tablet Take 6.25 mg by mouth 2 (Two) Times a Day With Meals.   Yes Sara Benoit MD   clopidogrel (PLAVIX) 75 MG tablet TAKE 1 TABLET BY MOUTH ONCE DAILY 6/20/18  Yes Lewis Johnson MD   gabapentin (NEURONTIN) 300 MG capsule TAKE 1 TABLET BY MOUTH FOUR TIMES DAILY 8/7/17  Yes Sara Benoit MD   HYDROcodone-acetaminophen (NORCO) 5-325 MG per tablet Take 1 tablet by mouth Every 6 (Six) Hours As Needed.   Yes Sara Benoit MD   insulin glargine (LANTUS) 100 UNIT/ML injection Inject 50 Units under the skin into the appropriate area as directed Daily.   Yes Sara Benoit MD   Omeprazole 20 MG tablet delayed-release TAKE 1 TABLET BY MOUTh twice daily 5/25/17  Yes Sara Benoit MD   ranolazine (RANEXA) 500 MG 12 hr tablet Take 1 tablet by mouth 2 (Two) Times a Day. 6/3/19  Yes Lewis Johnson MD   sertraline (ZOLOFT) 50 MG tablet Take 50 mg by mouth Daily.   Yes Sara Benoit MD   VENTOLIN  (90 BASE) MCG/ACT inhaler 2 puffs 4 (Four) Times a Day. 6/5/17  Yes Sara Benoit MD   zolpidem (AMBIEN) 10 MG tablet TAKE 1 TABLET BY MOUTH AT BEDTIME 8/7/17  Yes Sara Benoit MD   EASY TOUCH INSULIN SYRINGE 28G X 1/2\" 0.5 ML misc USE 4 TIMES DAILY 5/23/17   Sara Benoit MD   polyethylene glycol (MIRALAX) powder MIX 17 GRAMS INTO 4-8 OUNCES OF ANY BEVERAGE TWICE DAILY FOR 7 DAYS 7/8/17   Sara Benoit MD UNIFINFRANCESCA PENTIPS 31G X 8 MM misc USE AS DIRECTED WITH SOLOSTAR ONCE DAILY 6/19/17   Sara Benoit MD   ALPRAZolam (XANAX) 0.5 MG tablet TAKE 1 TABLET BY MOUTH THREE TIMES DAILY 8/7/17 11/2/19  Sara Benoit MD   citalopram (CeleXA) 40 MG tablet TAKE 1 TABLET BY MOUTH ONCE DAILY 8/2/17 11/2/19  Provider, MD Sara   pantoprazole (PROTONIX) 20 MG " EC tablet Take 20 mg by mouth Daily.  11/2/19  Provider, Sara, MD     Allergies   Allergen Reactions   • Sulfa Antibiotics Itching     Social History     Socioeconomic History   • Marital status:      Spouse name: Not on file   • Number of children: Not on file   • Years of education: Not on file   • Highest education level: Not on file   Tobacco Use   • Smoking status: Current Every Day Smoker     Packs/day: 2.00     Years: 36.00     Pack years: 72.00     Types: Cigarettes   • Smokeless tobacco: Never Used   Substance and Sexual Activity   • Alcohol use: No   • Drug use: No   • Sexual activity: Defer     Past Medical History:   Diagnosis Date   • Anxiety and depression    • Asthma    • Cancer (CMS/HCC)    • Chronic kidney disease    • COPD (chronic obstructive pulmonary disease) (CMS/HCC)    • Diabetes mellitus (CMS/HCC)    • Hypertension    • Myocardial infarction (CMS/HCC)      Past Surgical History:   Procedure Laterality Date   • CORONARY STENT PLACEMENT     • GALLBLADDER SURGERY     • HYSTERECTOMY     • KIDNEY SURGERY     • NEPHRECTOMY Left    • REPLACEMENT TOTAL KNEE     • TONSILLECTOMY       Family History   Problem Relation Age of Onset   • Heart disease Mother    • Hypertension Mother    • Heart disease Father    • Hypertension Father        Vital Signs   Temp:  [97.4 °F (36.3 °C)] 97.4 °F (36.3 °C)  Heart Rate:  [76] 76  Resp:  [18] 18  BP: (106)/(69) 106/69      11/02/19  1346   Weight: 83 kg (182 lb 15.7 oz)     Body mass index is 30.45 kg/m².    Physical Exam in general she is alert and in intermittent moderate discomfort.  She responds appropriately to questions and commands.  She appears older than her stated age.      HEENT exam showed extraocular movements are intact.  Oropharynx shows teeth are in poor repair.    Exam of the lungs shows scattered inspiratory wheezes.    Cardiac exam shows a regular rhythm normal rate no murmur.    The abdomen is soft obese and nontender with active  bowel sounds.  Genitourinary and rectal exams were not done.    Exam of the extremities is directed to the] left lower extremity.  There are anterior and posterior splints crossing the knee.  Planes were loosened.  There is a well-healed midline anterior scar across the knee.  There is moderate swelling diffusely about the distal thigh and knee but no ecchymosis.  Posterior tibial pulse is strong.  Sensory exam is intact to soft touch.    Radiographs of the knee done earlier today show a slightly comminuted fracture of the distal shaft of the femur with anterior angulation of proximal approaching 45 degrees.  There is a total knee arthroplasty in satisfactory position with no evidence of loosening.      Results Review:  Imaging Results (Last 24 Hours)     ** No results found for the last 24 hours. **        Lab Results (last 24 hours)     ** No results found for the last 24 hours. **          Assessment/Plan 1 fracture distal shaft left femur.  2 well-functioning left knee arthroplasty.  3 diabetes.  4 COPD.    The natural history of the disorder and treatment options were reviewed with the patient and her family.  Discussed both nonoperative and surgical treatment.  I recommended reduction and stabilization of the fracture with a retrograde intramedullary nail.  I think this would result in more certain healing of the fracture and also will allow earlier mobilization and weightbearing.        Anticipated benefits of surgery were reviewed in detail.  Potential complications of surgery and anesthetic were reviewed in detail including but not limited to infection, blood loss, sore throat, pneumonia, brain damage and even death.  Postoperative immobilization and rehabilitation were discussed.  They understand that even with prompt healing of her fracture the knee replacement may not function as well as it did prior to her injury..  The patient and family appear to understand all matters discussed and wish to proceed.       The left thigh was prepped. A hematoma block was performed with a mixture of Marcaine and xylocaine. There were no complications and she reported improvement in her pain.     Howie Campoverde MD  11/02/19  6:53 PM

## 2019-11-02 NOTE — H&P (VIEW-ONLY)
ORTHOPAEDIC CONSULT     Patient Name:  Yudi West  Admit Date:  11/2/2019  Consult Date:  11/2/2019    Referring Provider: Dr. Grant  Reason for Consultation: Fracture left femur.    Patient Care Team:  Ibeth Masters APRN as PCP - General (Nurse Practitioner)    History of present illness: Mrs. West is 49 years old.  She was descending some stairs earlier today when she sustained a twisting injury to her left leg.  She had prompt onset of pain.  She was seen in the emergency room in [place] and x-rays were performed showing a fracture of the distal shaft of the femur.  Past history is remarkable for a total knee replacement on the left about 9 years ago by Dr. Arrington.  She said she was doing well with regard to her knee prior to her most recent injury.  Patient requested to have care for the fracture in [place].  I spoke by phone with Dr. Ritter and agreed to accept him in transfer.  This was an isolated injury.    Medications include Plavix carvedilol, Neurontin, insulin, Tagamet, sertraline, 10 mg hydrocodone.  She is allergic to sulfa drugs.  She smokes 2 pack/day of cigarettes and denies use of alcohol.    Past medical history is remarkable for coronary artery disease COPD and diabetes.  Previous surgeries include nephrectomy for carcinoma, hysterectomy, tonsillectomy, and cholecystectomy.  There is been no history of anesthetic complication.  She also has a history of chronic back pain.    Family history she is .    Social history she lives in [place] with her  and son.  She is disabled.    Review of Systems is remarkable for pain well localized to the the left knee and distal thigh.  There is been no numbness or tingling in the limb.  Otherwise complete ROS is negative except as mentioned above.    Prior to Admission medications    Medication Sig Start Date End Date Taking? Authorizing Provider   aspirin 81 MG EC tablet Take 81 mg by mouth Daily.   Yes  "Sara Benoit MD   atorvastatin (LIPITOR) 40 MG tablet Take 40 mg by mouth Every Night. 7/18/17  Yes Sara Benoit MD   carvedilol (COREG) 6.25 MG tablet Take 6.25 mg by mouth 2 (Two) Times a Day With Meals.   Yes Sara Benoit MD   clopidogrel (PLAVIX) 75 MG tablet TAKE 1 TABLET BY MOUTH ONCE DAILY 6/20/18  Yes Lewis Johnson MD   gabapentin (NEURONTIN) 300 MG capsule TAKE 1 TABLET BY MOUTH FOUR TIMES DAILY 8/7/17  Yes Sara Benoit MD   HYDROcodone-acetaminophen (NORCO) 5-325 MG per tablet Take 1 tablet by mouth Every 6 (Six) Hours As Needed.   Yes Sara Benoit MD   insulin glargine (LANTUS) 100 UNIT/ML injection Inject 50 Units under the skin into the appropriate area as directed Daily.   Yes Sara Benoit MD   Omeprazole 20 MG tablet delayed-release TAKE 1 TABLET BY MOUTh twice daily 5/25/17  Yes Sara Benoit MD   ranolazine (RANEXA) 500 MG 12 hr tablet Take 1 tablet by mouth 2 (Two) Times a Day. 6/3/19  Yes Lewis Johnson MD   sertraline (ZOLOFT) 50 MG tablet Take 50 mg by mouth Daily.   Yes Sara Benoit MD   VENTOLIN  (90 BASE) MCG/ACT inhaler 2 puffs 4 (Four) Times a Day. 6/5/17  Yes Sara Benoit MD   zolpidem (AMBIEN) 10 MG tablet TAKE 1 TABLET BY MOUTH AT BEDTIME 8/7/17  Yes Sara Benoit MD   EASY TOUCH INSULIN SYRINGE 28G X 1/2\" 0.5 ML misc USE 4 TIMES DAILY 5/23/17   Sara Benoit MD   polyethylene glycol (MIRALAX) powder MIX 17 GRAMS INTO 4-8 OUNCES OF ANY BEVERAGE TWICE DAILY FOR 7 DAYS 7/8/17   Sara Benoit MD UNIFINFRANCESCA PENTIPS 31G X 8 MM misc USE AS DIRECTED WITH SOLOSTAR ONCE DAILY 6/19/17   Sara Benoit MD   ALPRAZolam (XANAX) 0.5 MG tablet TAKE 1 TABLET BY MOUTH THREE TIMES DAILY 8/7/17 11/2/19  Sara Benoit MD   citalopram (CeleXA) 40 MG tablet TAKE 1 TABLET BY MOUTH ONCE DAILY 8/2/17 11/2/19  Provider, MD Sara   pantoprazole (PROTONIX) 20 MG " EC tablet Take 20 mg by mouth Daily.  11/2/19  Provider, Sara, MD     Allergies   Allergen Reactions   • Sulfa Antibiotics Itching     Social History     Socioeconomic History   • Marital status:      Spouse name: Not on file   • Number of children: Not on file   • Years of education: Not on file   • Highest education level: Not on file   Tobacco Use   • Smoking status: Current Every Day Smoker     Packs/day: 2.00     Years: 36.00     Pack years: 72.00     Types: Cigarettes   • Smokeless tobacco: Never Used   Substance and Sexual Activity   • Alcohol use: No   • Drug use: No   • Sexual activity: Defer     Past Medical History:   Diagnosis Date   • Anxiety and depression    • Asthma    • Cancer (CMS/HCC)    • Chronic kidney disease    • COPD (chronic obstructive pulmonary disease) (CMS/HCC)    • Diabetes mellitus (CMS/HCC)    • Hypertension    • Myocardial infarction (CMS/HCC)      Past Surgical History:   Procedure Laterality Date   • CORONARY STENT PLACEMENT     • GALLBLADDER SURGERY     • HYSTERECTOMY     • KIDNEY SURGERY     • NEPHRECTOMY Left    • REPLACEMENT TOTAL KNEE     • TONSILLECTOMY       Family History   Problem Relation Age of Onset   • Heart disease Mother    • Hypertension Mother    • Heart disease Father    • Hypertension Father        Vital Signs   Temp:  [97.4 °F (36.3 °C)] 97.4 °F (36.3 °C)  Heart Rate:  [76] 76  Resp:  [18] 18  BP: (106)/(69) 106/69      11/02/19  1346   Weight: 83 kg (182 lb 15.7 oz)     Body mass index is 30.45 kg/m².    Physical Exam in general she is alert and in intermittent moderate discomfort.  She responds appropriately to questions and commands.  She appears older than her stated age.      HEENT exam showed extraocular movements are intact.  Oropharynx shows teeth are in poor repair.    Exam of the lungs shows scattered inspiratory wheezes.    Cardiac exam shows a regular rhythm normal rate no murmur.    The abdomen is soft obese and nontender with active  bowel sounds.  Genitourinary and rectal exams were not done.    Exam of the extremities is directed to the] left lower extremity.  There are anterior and posterior splints crossing the knee.  Planes were loosened.  There is a well-healed midline anterior scar across the knee.  There is moderate swelling diffusely about the distal thigh and knee but no ecchymosis.  Posterior tibial pulse is strong.  Sensory exam is intact to soft touch.    Radiographs of the knee done earlier today show a slightly comminuted fracture of the distal shaft of the femur with anterior angulation of proximal approaching 45 degrees.  There is a total knee arthroplasty in satisfactory position with no evidence of loosening.      Results Review:  Imaging Results (Last 24 Hours)     ** No results found for the last 24 hours. **        Lab Results (last 24 hours)     ** No results found for the last 24 hours. **          Assessment/Plan 1 fracture distal shaft left femur.  2 well-functioning left knee arthroplasty.  3 diabetes.  4 COPD.    The natural history of the disorder and treatment options were reviewed with the patient and her family.  Discussed both nonoperative and surgical treatment.  I recommended reduction and stabilization of the fracture with a retrograde intramedullary nail.  I think this would result in more certain healing of the fracture and also will allow earlier mobilization and weightbearing.        Anticipated benefits of surgery were reviewed in detail.  Potential complications of surgery and anesthetic were reviewed in detail including but not limited to infection, blood loss, sore throat, pneumonia, brain damage and even death.  Postoperative immobilization and rehabilitation were discussed.  They understand that even with prompt healing of her fracture the knee replacement may not function as well as it did prior to her injury..  The patient and family appear to understand all matters discussed and wish to proceed.       The left thigh was prepped. A hematoma block was performed with a mixture of Marcaine and xylocaine. There were no complications and she reported improvement in her pain.     Howie Campoverde MD  11/02/19  6:53 PM

## 2019-11-02 NOTE — H&P
[facility] Medicine Admission      Date of Admission: 11/2/2019      Primary Care Physician: Ibeth Masters APRN      Chief Complaint: left leg pain    HPI:    This is a 49-year-old female with concurrent medical history of COPD diabetes coronary artery disease status post stenting hypertension presenting to the ER after she fell off the stairs and hurt her left leg and she reported having pain in her left knee mainly.  She does have a history of knee replacement done about 9 years ago.  She was at [facility] and she opted to come to [place] and she was transferred here.  Patient reports that she does not have any chest pain but does have some intermittent difficulty breathing and has a history of COPD.    Past Medical History:  has a past medical history of Anxiety and depression, Asthma, Cancer (CMS/[facility]), Chronic kidney disease, COPD (chronic obstructive pulmonary disease) (CMS/[facility]), Diabetes mellitus (CMS/[facility]), Hypertension, and Myocardial infarction (CMS/[facility]).    Past Surgical History:  has a past surgical history that includes Kidney surgery; Replacement total knee; Hysterectomy; Tonsillectomy; Gallbladder surgery; Coronary stent placement; and Nephrectomy (Left).    Family History: family history includes Heart disease in her father and mother; Hypertension in her father and mother.    Social History:  reports that she has been smoking cigarettes.  She has a 72.00 pack-year smoking history. She has never used smokeless tobacco. She reports that she does not drink alcohol or use drugs.    Allergies:   Allergies   Allergen Reactions   • Sulfa Antibiotics Itching       Medications: Scheduled Meds:  bupivacaine (PF) 10 mL Injection Once   ipratropium-albuterol 3 mL Nebulization 4x Daily - RT   lidocaine 10 mL Subcutaneous Once   sodium chloride 10 mL Intravenous Q12H     Continuous Infusions:   PRN Meds:.Morphine **AND** naloxone  •  ondansetron  •   "sodium chloride  No current facility-administered medications on file prior to encounter.      Current Outpatient Medications on File Prior to Encounter   Medication Sig Dispense Refill   • aspirin 81 MG EC tablet Take 81 mg by mouth Daily.     • atorvastatin (LIPITOR) 40 MG tablet Take 40 mg by mouth Every Night.  3   • carvedilol (COREG) 6.25 MG tablet Take 6.25 mg by mouth 2 (Two) Times a Day With Meals.     • clopidogrel (PLAVIX) 75 MG tablet TAKE 1 TABLET BY MOUTH ONCE DAILY 30 tablet 6   • gabapentin (NEURONTIN) 300 MG capsule TAKE 1 TABLET BY MOUTH FOUR TIMES DAILY  0   • HYDROcodone-acetaminophen (NORCO) 5-325 MG per tablet Take 1 tablet by mouth Every 6 (Six) Hours As Needed.     • insulin glargine (LANTUS) 100 UNIT/ML injection Inject 50 Units under the skin into the appropriate area as directed Daily.     • Omeprazole 20 MG tablet delayed-release TAKE 1 TABLET BY MOUTh twice daily  3   • ranolazine (RANEXA) 500 MG 12 hr tablet Take 1 tablet by mouth 2 (Two) Times a Day. 60 tablet 6   • sertraline (ZOLOFT) 50 MG tablet Take 50 mg by mouth Daily.     • VENTOLIN  (90 BASE) MCG/ACT inhaler 2 puffs 4 (Four) Times a Day.  1   • zolpidem (AMBIEN) 10 MG tablet TAKE 1 TABLET BY MOUTH AT BEDTIME  0   • EASY TOUCH INSULIN SYRINGE 28G X 1/2\" 0.5 ML misc USE 4 TIMES DAILY  3   • polyethylene glycol (MIRALAX) powder MIX 17 GRAMS INTO 4-8 OUNCES OF ANY BEVERAGE TWICE DAILY FOR 7 DAYS  0   • UNIFINE PENTIPS 31G X 8 MM misc USE AS DIRECTED WITH SOLOSTAR ONCE DAILY  3   • [DISCONTINUED] ALPRAZolam (XANAX) 0.5 MG tablet TAKE 1 TABLET BY MOUTH THREE TIMES DAILY  0   • [DISCONTINUED] citalopram (CeleXA) 40 MG tablet TAKE 1 TABLET BY MOUTH ONCE DAILY  3   • [DISCONTINUED] pantoprazole (PROTONIX) 20 MG EC tablet Take 20 mg by mouth Daily.         Review of Systems:  Review of Systems   HENT: Positive for congestion.    Respiratory: Positive for shortness of breath.    Cardiovascular: Negative for chest pain. "   Gastrointestinal: Negative for diarrhea.      Otherwise complete ROS is negative except as mentioned above.    Physical Exam:   Temp:  [97.4 °F (36.3 °C)] 97.4 °F (36.3 °C)  Heart Rate:  [76] 76  Resp:  [18] 18  BP: (106)/(69) 106/69  Physical Exam   Constitutional: She appears well-developed and well-nourished. No distress.   HENT:   Head: Normocephalic and atraumatic.   Cardiovascular: Normal rate.   Pulmonary/Chest: Effort normal. No respiratory distress. She has no wheezes.   Abdominal: Soft. She exhibits no distension.   Musculoskeletal: Normal range of motion. She exhibits no edema.   Left knee brace in place   Neurological: She is alert. No cranial nerve deficit.   Skin: Skin is warm and dry. She is not diaphoretic.   Psychiatric: She has a normal mood and affect. Her behavior is normal. Judgment and thought content normal.   Vitals reviewed.        Results Reviewed:  I have personally reviewed current lab, radiology, and data and agree with results.  Lab Results (last 24 hours)     ** No results found for the last 24 hours. **        Imaging Results (Last 24 Hours)     ** No results found for the last 24 hours. **            Assessment:    Active Hospital Problems    Diagnosis   • Femur fracture (CMS/[facility])     Left femur fracture- orthopedic services has been consulted and they are planning on doing surgery in the a.m.  We will continue with pain management    Pain-morphine has been ordered    Hypertension-continue to monitor    Diabetes mellitus-sliding scale insulin    Coronary artery disease-Plavix will be held    COPD- patient does not seem to be in acute exacerbation, nebulizer treatments will be ordered, will order ABG in the morning    DVT prophylaxis-SCD            Nicolas Grant MD  11/02/19  6:33 PM

## 2019-11-03 ENCOUNTER — APPOINTMENT (OUTPATIENT)
Dept: GENERAL RADIOLOGY | Facility: HOSPITAL | Age: 49
End: 2019-11-03

## 2019-11-03 ENCOUNTER — ANESTHESIA EVENT (OUTPATIENT)
Dept: PERIOP | Facility: HOSPITAL | Age: 49
End: 2019-11-03

## 2019-11-03 ENCOUNTER — ANESTHESIA (OUTPATIENT)
Dept: PERIOP | Facility: HOSPITAL | Age: 49
End: 2019-11-03

## 2019-11-03 LAB
ABO GROUP BLD: NORMAL
ANION GAP SERPL CALCULATED.3IONS-SCNC: 10 MMOL/L (ref 5–15)
ARTERIAL PATENCY WRIST A: ABNORMAL
ATMOSPHERIC PRESS: 754 MMHG
BASE EXCESS BLDA CALC-SCNC: -0.4 MMOL/L (ref 0–2)
BASOPHILS # BLD AUTO: 0.04 10*3/MM3 (ref 0–0.2)
BASOPHILS NFR BLD AUTO: 0.4 % (ref 0–1.5)
BDY SITE: ABNORMAL
BILIRUB UR QL STRIP: NEGATIVE
BLD GP AB SCN SERPL QL: NEGATIVE
BUN BLD-MCNC: 12 MG/DL (ref 6–20)
BUN/CREAT SERPL: 14.6 (ref 7–25)
CALCIUM SPEC-SCNC: 8.3 MG/DL (ref 8.6–10.5)
CHLORIDE SERPL-SCNC: 101 MMOL/L (ref 98–107)
CLARITY UR: ABNORMAL
CO2 SERPL-SCNC: 26 MMOL/L (ref 22–29)
COLOR UR: YELLOW
CREAT BLD-MCNC: 0.82 MG/DL (ref 0.57–1)
DEPRECATED RDW RBC AUTO: 37.4 FL (ref 37–54)
EOSINOPHIL # BLD AUTO: 0.16 10*3/MM3 (ref 0–0.4)
EOSINOPHIL NFR BLD AUTO: 1.5 % (ref 0.3–6.2)
ERYTHROCYTE [DISTWIDTH] IN BLOOD BY AUTOMATED COUNT: 11.7 % (ref 12.3–15.4)
GAS FLOW AIRWAY: 3.5 LPM
GFR SERPL CREATININE-BSD FRML MDRD: 74 ML/MIN/1.73
GLUCOSE BLD-MCNC: 193 MG/DL (ref 65–99)
GLUCOSE BLDC GLUCOMTR-MCNC: 160 MG/DL (ref 70–130)
GLUCOSE BLDC GLUCOMTR-MCNC: 186 MG/DL (ref 70–130)
GLUCOSE BLDC GLUCOMTR-MCNC: 204 MG/DL (ref 70–130)
GLUCOSE BLDC GLUCOMTR-MCNC: 209 MG/DL (ref 70–130)
GLUCOSE BLDC GLUCOMTR-MCNC: 236 MG/DL (ref 70–130)
GLUCOSE BLDC GLUCOMTR-MCNC: 353 MG/DL (ref 70–130)
GLUCOSE UR STRIP-MCNC: ABNORMAL MG/DL
HCO3 BLDA-SCNC: 24.6 MMOL/L (ref 20–26)
HCT VFR BLD AUTO: 29 % (ref 34–46.6)
HGB BLD-MCNC: 10 G/DL (ref 12–15.9)
HGB UR QL STRIP.AUTO: NEGATIVE
HOLD SPECIMEN: NORMAL
IMM GRANULOCYTES # BLD AUTO: 0.05 10*3/MM3 (ref 0–0.05)
IMM GRANULOCYTES NFR BLD AUTO: 0.5 % (ref 0–0.5)
KETONES UR QL STRIP: NEGATIVE
LEUKOCYTE ESTERASE UR QL STRIP.AUTO: NEGATIVE
LYMPHOCYTES # BLD AUTO: 2.21 10*3/MM3 (ref 0.7–3.1)
LYMPHOCYTES NFR BLD AUTO: 20.1 % (ref 19.6–45.3)
Lab: ABNORMAL
Lab: NORMAL
MCH RBC QN AUTO: 30 PG (ref 26.6–33)
MCHC RBC AUTO-ENTMCNC: 34.5 G/DL (ref 31.5–35.7)
MCV RBC AUTO: 87.1 FL (ref 79–97)
MODALITY: ABNORMAL
MONOCYTES # BLD AUTO: 1.03 10*3/MM3 (ref 0.1–0.9)
MONOCYTES NFR BLD AUTO: 9.4 % (ref 5–12)
NEUTROPHILS # BLD AUTO: 7.48 10*3/MM3 (ref 1.7–7)
NEUTROPHILS NFR BLD AUTO: 68.1 % (ref 42.7–76)
NITRITE UR QL STRIP: NEGATIVE
NRBC BLD AUTO-RTO: 0 /100 WBC (ref 0–0.2)
PCO2 BLDA: 41.3 MM HG (ref 35–45)
PH BLDA: 7.38 PH UNITS (ref 7.35–7.45)
PH UR STRIP.AUTO: 6 [PH] (ref 5–9)
PLATELET # BLD AUTO: 194 10*3/MM3 (ref 140–450)
PMV BLD AUTO: 10.9 FL (ref 6–12)
PO2 BLDA: 94.2 MM HG (ref 83–108)
POTASSIUM BLD-SCNC: 3.7 MMOL/L (ref 3.5–5.2)
PROT UR QL STRIP: NEGATIVE
RBC # BLD AUTO: 3.33 10*6/MM3 (ref 3.77–5.28)
RH BLD: POSITIVE
SAO2 % BLDCOA: 98.2 % (ref 94–99)
SODIUM BLD-SCNC: 137 MMOL/L (ref 136–145)
SP GR UR STRIP: 1.01 (ref 1–1.03)
T&S EXPIRATION DATE: NORMAL
UROBILINOGEN UR QL STRIP: ABNORMAL
VENTILATOR MODE: ABNORMAL
WBC NRBC COR # BLD: 10.97 10*3/MM3 (ref 3.4–10.8)
WHOLE BLOOD HOLD SPECIMEN: NORMAL
WHOLE BLOOD HOLD SPECIMEN: NORMAL

## 2019-11-03 PROCEDURE — 94799 UNLISTED PULMONARY SVC/PX: CPT

## 2019-11-03 PROCEDURE — 80048 BASIC METABOLIC PNL TOTAL CA: CPT | Performed by: FAMILY MEDICINE

## 2019-11-03 PROCEDURE — 76000 FLUOROSCOPY <1 HR PHYS/QHP: CPT

## 2019-11-03 PROCEDURE — 94760 N-INVAS EAR/PLS OXIMETRY 1: CPT

## 2019-11-03 PROCEDURE — 25010000002 VANCOMYCIN 1 G RECONSTITUTED SOLUTION: Performed by: ORTHOPAEDIC SURGERY

## 2019-11-03 PROCEDURE — 25010000002 HYDROMORPHONE 1 MG/ML SOLUTION: Performed by: NURSE ANESTHETIST, CERTIFIED REGISTERED

## 2019-11-03 PROCEDURE — 25010000002 PHENYLEPHRINE PER 1 ML: Performed by: NURSE ANESTHETIST, CERTIFIED REGISTERED

## 2019-11-03 PROCEDURE — 97162 PT EVAL MOD COMPLEX 30 MIN: CPT

## 2019-11-03 PROCEDURE — 85025 COMPLETE CBC W/AUTO DIFF WBC: CPT | Performed by: FAMILY MEDICINE

## 2019-11-03 PROCEDURE — 25010000002 CEFAZOLIN PER 500 MG: Performed by: ORTHOPAEDIC SURGERY

## 2019-11-03 PROCEDURE — 25010000002 PROPOFOL 10 MG/ML EMULSION: Performed by: NURSE ANESTHETIST, CERTIFIED REGISTERED

## 2019-11-03 PROCEDURE — 25010000002 MORPHINE PER 10 MG: Performed by: FAMILY MEDICINE

## 2019-11-03 PROCEDURE — 25010000002 ONDANSETRON PER 1 MG: Performed by: FAMILY MEDICINE

## 2019-11-03 PROCEDURE — 25010000002 ONDANSETRON PER 1 MG: Performed by: NURSE ANESTHETIST, CERTIFIED REGISTERED

## 2019-11-03 PROCEDURE — C1713 ANCHOR/SCREW BN/BN,TIS/BN: HCPCS | Performed by: ORTHOPAEDIC SURGERY

## 2019-11-03 PROCEDURE — 63710000001 INSULIN DETEMIR PER 5 UNITS: Performed by: FAMILY MEDICINE

## 2019-11-03 PROCEDURE — 82962 GLUCOSE BLOOD TEST: CPT

## 2019-11-03 PROCEDURE — 63710000001 INSULIN ASPART PER 5 UNITS: Performed by: FAMILY MEDICINE

## 2019-11-03 PROCEDURE — 73552 X-RAY EXAM OF FEMUR 2/>: CPT

## 2019-11-03 PROCEDURE — C1769 GUIDE WIRE: HCPCS | Performed by: ORTHOPAEDIC SURGERY

## 2019-11-03 PROCEDURE — 81003 URINALYSIS AUTO W/O SCOPE: CPT | Performed by: ORTHOPAEDIC SURGERY

## 2019-11-03 PROCEDURE — 25010000002 MIDAZOLAM PER 1 MG: Performed by: NURSE ANESTHETIST, CERTIFIED REGISTERED

## 2019-11-03 PROCEDURE — 36600 WITHDRAWAL OF ARTERIAL BLOOD: CPT

## 2019-11-03 PROCEDURE — 25010000002 FENTANYL CITRATE (PF) 100 MCG/2ML SOLUTION: Performed by: NURSE ANESTHETIST, CERTIFIED REGISTERED

## 2019-11-03 PROCEDURE — 0QH936Z INSERTION OF INTRAMEDULLARY INTERNAL FIXATION DEVICE INTO LEFT FEMORAL SHAFT, PERCUTANEOUS APPROACH: ICD-10-PCS | Performed by: ORTHOPAEDIC SURGERY

## 2019-11-03 PROCEDURE — 27511 TREATMENT OF THIGH FRACTURE: CPT | Performed by: ORTHOPAEDIC SURGERY

## 2019-11-03 PROCEDURE — 82803 BLOOD GASES ANY COMBINATION: CPT

## 2019-11-03 PROCEDURE — 97166 OT EVAL MOD COMPLEX 45 MIN: CPT

## 2019-11-03 DEVICE — SCRW LK STRDRV TI 5X40M STRL: Type: IMPLANTABLE DEVICE | Status: FUNCTIONAL

## 2019-11-03 DEVICE — IMPLANTABLE DEVICE: Type: IMPLANTABLE DEVICE | Status: FUNCTIONAL

## 2019-11-03 DEVICE — SCRW LK STRDRV TI 5X70MM STRL: Type: IMPLANTABLE DEVICE | Status: FUNCTIONAL

## 2019-11-03 RX ORDER — SODIUM CHLORIDE 0.9 % (FLUSH) 0.9 %
3 SYRINGE (ML) INJECTION EVERY 12 HOURS SCHEDULED
Status: DISCONTINUED | OUTPATIENT
Start: 2019-11-03 | End: 2019-11-04 | Stop reason: HOSPADM

## 2019-11-03 RX ORDER — HYDROCODONE BITARTRATE AND ACETAMINOPHEN 7.5; 325 MG/1; MG/1
2 TABLET ORAL EVERY 4 HOURS PRN
Status: DISCONTINUED | OUTPATIENT
Start: 2019-11-03 | End: 2019-11-03 | Stop reason: SDUPTHER

## 2019-11-03 RX ORDER — MORPHINE SULFATE 2 MG/ML
6 INJECTION, SOLUTION INTRAMUSCULAR; INTRAVENOUS
Status: CANCELLED | OUTPATIENT
Start: 2019-11-03 | End: 2019-11-13

## 2019-11-03 RX ORDER — HYDROCODONE BITARTRATE AND ACETAMINOPHEN 7.5; 325 MG/1; MG/1
1 TABLET ORAL EVERY 4 HOURS PRN
Status: DISCONTINUED | OUTPATIENT
Start: 2019-11-03 | End: 2019-11-04 | Stop reason: HOSPADM

## 2019-11-03 RX ORDER — OXYCODONE AND ACETAMINOPHEN 7.5; 325 MG/1; MG/1
2 TABLET ORAL EVERY 4 HOURS PRN
Status: DISCONTINUED | OUTPATIENT
Start: 2019-11-03 | End: 2019-11-04 | Stop reason: HOSPADM

## 2019-11-03 RX ORDER — BUPIVACAINE HYDROCHLORIDE 5 MG/ML
INJECTION, SOLUTION EPIDURAL; INTRACAUDAL AS NEEDED
Status: DISCONTINUED | OUTPATIENT
Start: 2019-11-03 | End: 2019-11-03 | Stop reason: HOSPADM

## 2019-11-03 RX ORDER — TRANEXAMIC ACID 100 MG/ML
INJECTION, SOLUTION INTRAVENOUS AS NEEDED
Status: DISCONTINUED | OUTPATIENT
Start: 2019-11-03 | End: 2019-11-03 | Stop reason: HOSPADM

## 2019-11-03 RX ORDER — MIDAZOLAM HYDROCHLORIDE 1 MG/ML
INJECTION INTRAMUSCULAR; INTRAVENOUS AS NEEDED
Status: DISCONTINUED | OUTPATIENT
Start: 2019-11-03 | End: 2019-11-03 | Stop reason: SURG

## 2019-11-03 RX ORDER — LIDOCAINE HYDROCHLORIDE 20 MG/ML
INJECTION, SOLUTION INFILTRATION; PERINEURAL AS NEEDED
Status: DISCONTINUED | OUTPATIENT
Start: 2019-11-03 | End: 2019-11-03 | Stop reason: SURG

## 2019-11-03 RX ORDER — ONDANSETRON 2 MG/ML
4 INJECTION INTRAMUSCULAR; INTRAVENOUS EVERY 6 HOURS PRN
Status: DISCONTINUED | OUTPATIENT
Start: 2019-11-03 | End: 2019-11-04 | Stop reason: HOSPADM

## 2019-11-03 RX ORDER — PROPOFOL 10 MG/ML
VIAL (ML) INTRAVENOUS AS NEEDED
Status: DISCONTINUED | OUTPATIENT
Start: 2019-11-03 | End: 2019-11-03 | Stop reason: SURG

## 2019-11-03 RX ORDER — EPHEDRINE SULFATE 50 MG/ML
INJECTION, SOLUTION INTRAVENOUS AS NEEDED
Status: DISCONTINUED | OUTPATIENT
Start: 2019-11-03 | End: 2019-11-03 | Stop reason: SURG

## 2019-11-03 RX ORDER — DOCUSATE SODIUM 100 MG/1
200 CAPSULE, LIQUID FILLED ORAL 2 TIMES DAILY PRN
Status: DISCONTINUED | OUTPATIENT
Start: 2019-11-03 | End: 2019-11-04 | Stop reason: HOSPADM

## 2019-11-03 RX ORDER — FENTANYL CITRATE 50 UG/ML
INJECTION, SOLUTION INTRAMUSCULAR; INTRAVENOUS AS NEEDED
Status: DISCONTINUED | OUTPATIENT
Start: 2019-11-03 | End: 2019-11-03 | Stop reason: SURG

## 2019-11-03 RX ORDER — ACETAMINOPHEN 500 MG
500 TABLET ORAL EVERY 4 HOURS PRN
Status: DISCONTINUED | OUTPATIENT
Start: 2019-11-03 | End: 2019-11-03 | Stop reason: SDUPTHER

## 2019-11-03 RX ORDER — ONDANSETRON 2 MG/ML
INJECTION INTRAMUSCULAR; INTRAVENOUS AS NEEDED
Status: DISCONTINUED | OUTPATIENT
Start: 2019-11-03 | End: 2019-11-03 | Stop reason: SURG

## 2019-11-03 RX ORDER — VANCOMYCIN HYDROCHLORIDE 1 G/20ML
INJECTION, POWDER, LYOPHILIZED, FOR SOLUTION INTRAVENOUS AS NEEDED
Status: DISCONTINUED | OUTPATIENT
Start: 2019-11-03 | End: 2019-11-03 | Stop reason: HOSPADM

## 2019-11-03 RX ORDER — NALOXONE HCL 0.4 MG/ML
0.4 VIAL (ML) INJECTION
Status: CANCELLED | OUTPATIENT
Start: 2019-11-03

## 2019-11-03 RX ORDER — NALOXONE HCL 0.4 MG/ML
0.4 VIAL (ML) INJECTION
Status: DISCONTINUED | OUTPATIENT
Start: 2019-11-03 | End: 2019-11-04 | Stop reason: HOSPADM

## 2019-11-03 RX ORDER — ONDANSETRON 2 MG/ML
4 INJECTION INTRAMUSCULAR; INTRAVENOUS EVERY 6 HOURS PRN
Status: CANCELLED | OUTPATIENT
Start: 2019-11-03

## 2019-11-03 RX ORDER — SODIUM CHLORIDE 9 MG/ML
100 INJECTION, SOLUTION INTRAVENOUS CONTINUOUS
Status: DISCONTINUED | OUTPATIENT
Start: 2019-11-03 | End: 2019-11-04 | Stop reason: HOSPADM

## 2019-11-03 RX ORDER — TRANEXAMIC ACID 650 MG/1
1300 TABLET ORAL EVERY 4 HOURS
Status: COMPLETED | OUTPATIENT
Start: 2019-11-03 | End: 2019-11-03

## 2019-11-03 RX ORDER — ONDANSETRON 2 MG/ML
4 INJECTION INTRAMUSCULAR; INTRAVENOUS ONCE AS NEEDED
Status: COMPLETED | OUTPATIENT
Start: 2019-11-03 | End: 2019-11-03

## 2019-11-03 RX ORDER — ONDANSETRON 4 MG/1
4 TABLET, FILM COATED ORAL EVERY 6 HOURS PRN
Status: CANCELLED | OUTPATIENT
Start: 2019-11-03

## 2019-11-03 RX ORDER — BUPIVACAINE HCL/0.9 % NACL/PF 0.1 %
2 PLASTIC BAG, INJECTION (ML) EPIDURAL EVERY 8 HOURS
Status: COMPLETED | OUTPATIENT
Start: 2019-11-03 | End: 2019-11-04

## 2019-11-03 RX ORDER — PROMETHAZINE HYDROCHLORIDE 12.5 MG/1
12.5 TABLET ORAL EVERY 6 HOURS PRN
Status: DISCONTINUED | OUTPATIENT
Start: 2019-11-03 | End: 2019-11-03 | Stop reason: SDUPTHER

## 2019-11-03 RX ORDER — SODIUM CHLORIDE, SODIUM GLUCONATE, SODIUM ACETATE, POTASSIUM CHLORIDE, AND MAGNESIUM CHLORIDE 526; 502; 368; 37; 30 MG/100ML; MG/100ML; MG/100ML; MG/100ML; MG/100ML
INJECTION, SOLUTION INTRAVENOUS CONTINUOUS PRN
Status: DISCONTINUED | OUTPATIENT
Start: 2019-11-03 | End: 2019-11-03 | Stop reason: SURG

## 2019-11-03 RX ORDER — MORPHINE SULFATE 2 MG/ML
6 INJECTION, SOLUTION INTRAMUSCULAR; INTRAVENOUS
Status: DISCONTINUED | OUTPATIENT
Start: 2019-11-03 | End: 2019-11-04 | Stop reason: HOSPADM

## 2019-11-03 RX ORDER — SODIUM CHLORIDE 0.9 % (FLUSH) 0.9 %
3-10 SYRINGE (ML) INJECTION AS NEEDED
Status: DISCONTINUED | OUTPATIENT
Start: 2019-11-03 | End: 2019-11-04 | Stop reason: HOSPADM

## 2019-11-03 RX ORDER — PROMETHAZINE HYDROCHLORIDE 12.5 MG/1
12.5 TABLET ORAL EVERY 6 HOURS PRN
Status: DISCONTINUED | OUTPATIENT
Start: 2019-11-03 | End: 2019-11-04 | Stop reason: HOSPADM

## 2019-11-03 RX ORDER — SODIUM CHLORIDE 9 MG/ML
100 INJECTION, SOLUTION INTRAVENOUS CONTINUOUS
Status: DISCONTINUED | OUTPATIENT
Start: 2019-11-03 | End: 2019-11-03 | Stop reason: SDUPTHER

## 2019-11-03 RX ORDER — ONDANSETRON 4 MG/1
4 TABLET, FILM COATED ORAL EVERY 6 HOURS PRN
Status: DISCONTINUED | OUTPATIENT
Start: 2019-11-03 | End: 2019-11-04 | Stop reason: HOSPADM

## 2019-11-03 RX ORDER — ACETAMINOPHEN 500 MG
500 TABLET ORAL EVERY 4 HOURS PRN
Status: DISCONTINUED | OUTPATIENT
Start: 2019-11-03 | End: 2019-11-04 | Stop reason: HOSPADM

## 2019-11-03 RX ADMIN — TRANEXAMIC ACID 1300 MG: 650 TABLET ORAL at 16:26

## 2019-11-03 RX ADMIN — Medication 2 G: at 10:24

## 2019-11-03 RX ADMIN — PHENYLEPHRINE HYDROCHLORIDE 100 MCG: 10 INJECTION INTRAVENOUS at 11:50

## 2019-11-03 RX ADMIN — CARVEDILOL 6.25 MG: 6.25 TABLET, FILM COATED ORAL at 18:11

## 2019-11-03 RX ADMIN — IPRATROPIUM BROMIDE AND ALBUTEROL SULFATE 3 ML: 2.5; .5 SOLUTION RESPIRATORY (INHALATION) at 07:08

## 2019-11-03 RX ADMIN — RANOLAZINE 500 MG: 500 TABLET, FILM COATED, EXTENDED RELEASE ORAL at 20:57

## 2019-11-03 RX ADMIN — HYDROMORPHONE HYDROCHLORIDE 0.5 MG: 1 INJECTION, SOLUTION INTRAMUSCULAR; INTRAVENOUS; SUBCUTANEOUS at 13:10

## 2019-11-03 RX ADMIN — PHENYLEPHRINE HYDROCHLORIDE 100 MCG: 10 INJECTION INTRAVENOUS at 11:10

## 2019-11-03 RX ADMIN — ONDANSETRON 4 MG: 2 INJECTION INTRAMUSCULAR; INTRAVENOUS at 11:50

## 2019-11-03 RX ADMIN — FENTANYL CITRATE 50 MCG: 50 INJECTION, SOLUTION INTRAMUSCULAR; INTRAVENOUS at 10:19

## 2019-11-03 RX ADMIN — SODIUM CHLORIDE 100 ML/HR: 9 INJECTION, SOLUTION INTRAVENOUS at 16:26

## 2019-11-03 RX ADMIN — EPHEDRINE SULFATE 7.5 MG: 50 INJECTION INTRAVENOUS at 11:00

## 2019-11-03 RX ADMIN — SODIUM CHLORIDE, SODIUM GLUCONATE, SODIUM ACETATE, POTASSIUM CHLORIDE, AND MAGNESIUM CHLORIDE: 526; 502; 368; 37; 30 INJECTION, SOLUTION INTRAVENOUS at 11:45

## 2019-11-03 RX ADMIN — INSULIN ASPART 5 UNITS: 100 INJECTION, SOLUTION INTRAVENOUS; SUBCUTANEOUS at 20:57

## 2019-11-03 RX ADMIN — OXYCODONE HYDROCHLORIDE AND ACETAMINOPHEN 2 TABLET: 7.5; 325 TABLET ORAL at 14:36

## 2019-11-03 RX ADMIN — ATORVASTATIN CALCIUM 40 MG: 40 TABLET, FILM COATED ORAL at 20:57

## 2019-11-03 RX ADMIN — PHENYLEPHRINE HYDROCHLORIDE 100 MCG: 10 INJECTION INTRAVENOUS at 10:34

## 2019-11-03 RX ADMIN — SODIUM CHLORIDE 2 G: 9 INJECTION, SOLUTION INTRAVENOUS at 18:11

## 2019-11-03 RX ADMIN — SODIUM CHLORIDE, SODIUM GLUCONATE, SODIUM ACETATE, POTASSIUM CHLORIDE, AND MAGNESIUM CHLORIDE: 526; 502; 368; 37; 30 INJECTION, SOLUTION INTRAVENOUS at 10:19

## 2019-11-03 RX ADMIN — PROPOFOL 150 MG: 10 INJECTION, EMULSION INTRAVENOUS at 10:21

## 2019-11-03 RX ADMIN — PHENYLEPHRINE HYDROCHLORIDE 100 MCG: 10 INJECTION INTRAVENOUS at 10:42

## 2019-11-03 RX ADMIN — FENTANYL CITRATE 50 MCG: 50 INJECTION, SOLUTION INTRAMUSCULAR; INTRAVENOUS at 12:23

## 2019-11-03 RX ADMIN — CARVEDILOL 6.25 MG: 6.25 TABLET, FILM COATED ORAL at 08:10

## 2019-11-03 RX ADMIN — HYDROMORPHONE HYDROCHLORIDE 0.5 MG: 1 INJECTION, SOLUTION INTRAMUSCULAR; INTRAVENOUS; SUBCUTANEOUS at 12:40

## 2019-11-03 RX ADMIN — GLYCOPYRROLATE 0.1 MG: 0.2 INJECTION, SOLUTION INTRAMUSCULAR; INTRAVITREAL at 11:00

## 2019-11-03 RX ADMIN — TRANEXAMIC ACID 1300 MG: 650 TABLET ORAL at 20:57

## 2019-11-03 RX ADMIN — ONDANSETRON 4 MG: 2 INJECTION INTRAMUSCULAR; INTRAVENOUS at 12:40

## 2019-11-03 RX ADMIN — PROPOFOL 20 MG: 10 INJECTION, EMULSION INTRAVENOUS at 11:15

## 2019-11-03 RX ADMIN — MORPHINE SULFATE 1 MG: 2 INJECTION, SOLUTION INTRAMUSCULAR; INTRAVENOUS at 03:45

## 2019-11-03 RX ADMIN — INSULIN ASPART 5 UNITS: 100 INJECTION, SOLUTION INTRAVENOUS; SUBCUTANEOUS at 18:10

## 2019-11-03 RX ADMIN — MORPHINE SULFATE 1 MG: 2 INJECTION, SOLUTION INTRAMUSCULAR; INTRAVENOUS at 08:10

## 2019-11-03 RX ADMIN — OXYCODONE HYDROCHLORIDE AND ACETAMINOPHEN 2 TABLET: 7.5; 325 TABLET ORAL at 18:11

## 2019-11-03 RX ADMIN — PHENYLEPHRINE HYDROCHLORIDE 100 MCG: 10 INJECTION INTRAVENOUS at 10:44

## 2019-11-03 RX ADMIN — ONDANSETRON 4 MG: 2 INJECTION INTRAMUSCULAR; INTRAVENOUS at 08:14

## 2019-11-03 RX ADMIN — FENTANYL CITRATE 50 MCG: 50 INJECTION, SOLUTION INTRAMUSCULAR; INTRAVENOUS at 11:32

## 2019-11-03 RX ADMIN — OXYCODONE HYDROCHLORIDE AND ACETAMINOPHEN 2 TABLET: 7.5; 325 TABLET ORAL at 21:34

## 2019-11-03 RX ADMIN — FENTANYL CITRATE 50 MCG: 50 INJECTION, SOLUTION INTRAMUSCULAR; INTRAVENOUS at 10:21

## 2019-11-03 RX ADMIN — MIDAZOLAM HYDROCHLORIDE 2 MG: 2 INJECTION, SOLUTION INTRAMUSCULAR; INTRAVENOUS at 10:15

## 2019-11-03 RX ADMIN — LIDOCAINE HYDROCHLORIDE 60 MG: 20 INJECTION, SOLUTION INFILTRATION; PERINEURAL at 10:21

## 2019-11-03 RX ADMIN — TRANEXAMIC ACID 1300 MG: 650 TABLET ORAL at 08:09

## 2019-11-03 RX ADMIN — INSULIN DETEMIR 50 UNITS: 100 INJECTION, SOLUTION SUBCUTANEOUS at 20:56

## 2019-11-03 RX ADMIN — HYDROMORPHONE HYDROCHLORIDE 0.5 MG: 1 INJECTION, SOLUTION INTRAMUSCULAR; INTRAVENOUS; SUBCUTANEOUS at 12:55

## 2019-11-03 RX ADMIN — FENTANYL CITRATE 50 MCG: 50 INJECTION, SOLUTION INTRAMUSCULAR; INTRAVENOUS at 11:15

## 2019-11-03 RX ADMIN — ONDANSETRON HYDROCHLORIDE 4 MG: 4 TABLET, FILM COATED ORAL at 18:11

## 2019-11-03 NOTE — PLAN OF CARE
Problem: Patient Care Overview  Goal: Plan of Care Review  Outcome: Ongoing (interventions implemented as appropriate)   11/03/19 0749   Coping/Psychosocial   Plan of Care Reviewed With patient   Plan of Care Review   Progress no change   OTHER   Outcome Summary Sx today; no other changes; will continue to monitor.     Goal: Individualization and Mutuality  Outcome: Ongoing (interventions implemented as appropriate)    Goal: Discharge Needs Assessment  Outcome: Ongoing (interventions implemented as appropriate)    Goal: Interprofessional Rounds/Family Conf  Outcome: Ongoing (interventions implemented as appropriate)      Problem: Fall Risk (Adult)  Goal: Identify Related Risk Factors and Signs and Symptoms  Outcome: Outcome(s) achieved Date Met: 11/03/19    Goal: Absence of Fall  Outcome: Ongoing (interventions implemented as appropriate)      Problem: Skin Injury Risk (Adult)  Goal: Identify Related Risk Factors and Signs and Symptoms  Outcome: Outcome(s) achieved Date Met: 11/03/19    Goal: Skin Health and Integrity  Outcome: Ongoing (interventions implemented as appropriate)      Problem: Pain, Chronic (Adult)  Goal: Identify Related Risk Factors and Signs and Symptoms  Outcome: Outcome(s) achieved Date Met: 11/03/19    Goal: Acceptable Pain/Comfort Level and Functional Ability  Outcome: Ongoing (interventions implemented as appropriate)      Problem: Fracture Orthopaedic (Adult)  Goal: Signs and Symptoms of Listed Potential Problems Will be Absent, Minimized or Managed (Fracture Orthopaedic)  Outcome: Ongoing (interventions implemented as appropriate)

## 2019-11-03 NOTE — ANESTHESIA POSTPROCEDURE EVALUATION
Patient: Yudi West    Procedure Summary     Date:  11/03/19 Room / Location:  [facility] OR 11 / [facility] OR    Anesthesia Start:  1017 Anesthesia Stop:  1228    Procedure:  FEMUR INTRAMEDULLARY NAILING RETROGRADE (Left Thigh) Diagnosis:       Closed displaced supracondylar fracture of distal end of left femur without intracondylar extension, initial encounter (CMS/[facility])      (Closed displaced supracondylar fracture of distal end of left femur without intracondylar extension, initial encounter (CMS/[facility]) [S72.625K])    Surgeon:  Howie Campoverde MD Provider:  Ernesto Riley MD    Anesthesia Type:  general ASA Status:  3          Anesthesia Type: general  Last vitals  BP   130/86 (11/03/19 0345)   Temp   97.6 °F (36.4 °C) (11/03/19 0422)   Pulse   92 (11/03/19 0719)   Resp   20 (11/03/19 0708)     SpO2   96 % (11/03/19 0708)     Post Anesthesia Care and Evaluation    Patient location during evaluation: PACU  Patient participation: complete - patient participated  Level of consciousness: awake  Pain score: 0  Pain management: adequate  Airway patency: patent  Anesthetic complications: No anesthetic complications  PONV Status: none  Cardiovascular status: acceptable and hemodynamically stable  Respiratory status: acceptable  Hydration status: acceptable  Post Neuraxial Block status: Motor and sensory function returned to baseline

## 2019-11-03 NOTE — ANESTHESIA PROCEDURE NOTES
Airway  Urgency: elective    Date/Time: 11/3/2019 10:23 AM  Airway not difficult    General Information and Staff    Patient location during procedure: OR    Indications and Patient Condition  Indications for airway management: airway protection    Preoxygenated: yes  Mask difficulty assessment: 0 - not attempted    Final Airway Details  Final airway type: supraglottic airway      Successful airway: I-gel  Size 4    Number of attempts at approach: 1  Assessment: lips, teeth, and gum same as pre-op and atraumatic intubation

## 2019-11-03 NOTE — PLAN OF CARE
Problem: Patient Care Overview  Goal: Plan of Care Review  Outcome: Ongoing (interventions implemented as appropriate)   11/03/19 9384   Coping/Psychosocial   Plan of Care Reviewed With patient   OTHER   Outcome Summary OT connie completed this date, co-eval with PT. Pt was alert and cooperative but in pain. Pt was min assist for sup to sit. Pt min assist for sit to stand t/f off bed and for mobiltiy with RW with L knee immobilzer present. Pt mod assist for toilet t/f. Pt did not bear much weight through LLE with activity. Pt O2 94% on RA. Pt total assist to attend to clothing for toilet t/f, CGA to stand at sink and wash face. Pt total assist to don left sock and set up in supine HOB up for don right sock. Pt could benefit from further skilled OT to address independence and safety with ADL. REcommend 24/7 care and further OT and PT therapy at d/c.

## 2019-11-03 NOTE — THERAPY EVALUATION
Acute Care - Physical Therapy Initial Evaluation  [facility]     Patient Name: Yudi West  : 1970  MRN: [id number]  Today's Date: 11/3/2019   Onset of Illness/Injury or Date of Surgery: 19  Date of Referral to PT: 19  Referring Physician: Dr. Campoverde       Admit Date: 2019    Visit Dx:     ICD-10-CM ICD-9-CM   1. Closed displaced supracondylar fracture of distal end of left femur without intracondylar extension, initial encounter (CMS/[facility]) S72.452A 821.23   2. Impaired physical mobility Z74.09 781.99     Patient Active Problem List   Diagnosis   • Coronary artery disease involving native coronary artery of native heart without angina pectoris   • Myocardial infarction, old   • Essential hypertension   • Type 1 diabetes mellitus (CMS/[facility])   • Mixed hyperlipidemia   • COPD (chronic obstructive pulmonary disease) (CMS/[facility])   • Dyspnea on exertion   • S/p nephrectomy   • Precordial pain   • Femur fracture (CMS/[facility])   • Closed displaced supracondylar fracture without intracondylar extension of lower end of left femur (CMS/[facility])     Past Medical History:   Diagnosis Date   • Anxiety and depression    • Asthma    • Cancer (CMS/[facility])    • Chronic kidney disease    • COPD (chronic obstructive pulmonary disease) (CMS/[facility])    • Diabetes mellitus (CMS/[facility])    • Hypertension    • Myocardial infarction (CMS/[facility])      Past Surgical History:   Procedure Laterality Date   • CORONARY STENT PLACEMENT     • GALLBLADDER SURGERY     • HYSTERECTOMY     • KIDNEY SURGERY     • NEPHRECTOMY Left    • REPLACEMENT TOTAL KNEE     • TONSILLECTOMY          PT ASSESSMENT (last 12 hours)      Physical Therapy Evaluation     Row Name 19 1515          PT Evaluation Time/Intention    Subjective Information  no complaints  -CZ     Document Type  evaluation  -CZ     Mode of Treatment  co-treatment;physical therapy;occupational therapy  -CZ     Patient Effort  good  -CZ     Symptoms Noted During/After Treatment   increased pain  -CZ     Row Name 11/03/19 1515          General Information    Patient Profile Reviewed?  yes  -CZ     Onset of Illness/Injury or Date of Surgery  11/02/19  -CZ     Referring Physician  CARMINA Jacob MD.   -CZ     Patient Observations  alert;cooperative;agree to therapy  -CZ     Patient/Family Observations  Son's ex-girlfrient present.   -CZ     General Observations of Patient  Supine in bed, O2/CO2, IV, mccoy, L knee immobilizer, no apparent distress.   -CZ     Prior Level of Function  independent:;all household mobility;community mobility  -CZ     Equipment Currently Used at Home  none  -CZ     Pertinent History of Current Functional Problem  To ED after fall on steps at friend's house: L distal femur fx, previous TKA intact; repaired with IM abbi, WBAT.   -CZ     Existing Precautions/Restrictions  fall  -CZ     Benefits Reviewed  patient:;improve function;increase independence;increase strength  -CZ     Row Name 11/03/19 1515          Relationship/Environment    Lives With  spouse;child(rob), adult  -CZ     Concerns About Impact on Relationships  Standard toilet, non-slip coating in tub, tub/shower with steps to enter, no DME. (I) with ADL's.   -CZ     Row Name 11/03/19 1515          Resource/Environmental Concerns    Current Living Arrangements  home/apartment/condo  -CZ     Row Name 11/03/19 1515          Living Environment    Home Accessibility  stairs to enter home  -CZ     Row Name 11/03/19 1515          Home Main Entrance    Number of Stairs, Main Entrance  five  -CZ     Stair Railings, Main Entrance  railings on both sides of stairs  -CZ     Row Name 11/03/19 1515          Cognitive Assessment/Intervention- PT/OT    Affect/Mental Status (Cognitive)  WFL  -CZ     Orientation Status (Cognition)  oriented x 4  -CZ     Row Name 11/03/19 1515          Bed Mobility Assessment/Treatment    Bed Mobility Assessment/Treatment  supine-sit  -CZ     Supine-Sit [place] (Bed Mobility)  minimum assist  (75% patient effort)  -     Assistive Device (Bed Mobility)  bed rails;head of bed elevated  -     Row Name 11/03/19 1515          Transfer Assessment/Treatment    Transfer Assessment/Treatment  sit-stand transfer;stand-sit transfer;toilet transfer  -     Sit-Stand [place] (Transfers)  minimum assist (75% patient effort)  -     Row Name 11/03/19 1515          Gait/Stairs Assessment/Training    [place] Level (Gait)  minimum assist (75% patient effort)  -     Assistive Device (Gait)  walker, front-wheeled  -     Distance in Feet (Gait)  10'x1, 20'x1  -CZ     Comment (Gait/Stairs)  L knee immobilizer, allowed WBAT but does not bear much weight.   -     Row Name 11/03/19 1515          General ROM    GENERAL ROM COMMENTS  RLE: WFL, LLE immobilized.   -     Row Name 11/03/19 1515          MMT (Manual Muscle Testing)    General MMT Comments  RLE: 4/5, LLE: 3/5 grossly  -[facility] Name 11/03/19 1515          Pain Assessment    Additional Documentation  Pain Scale: Numbers Pre/Post-Treatment (Group)  -     Row Name 11/03/19 1515          Pain Scale: Numbers Pre/Post-Treatment    Pain Scale: Numbers, Pretreatment  7/10  -CZ     Pain Scale: Numbers, Post-Treatment  9/10  -CZ     Pain Location - Side  Left  -CZ     Pain Location  knee  -CZ     Pain Intervention(s)  Repositioned;Ambulation/increased activity;Distraction  -     Row Name             Wound 11/03/19 1141 Left leg Incision    Wound - Properties Group Date first assessed: 11/03/19  -NAWAF Time first assessed: 1141  -NAWAF Present on Hospital Admission: N  -NAWAF Side: Left  -NAWAF Location: leg  -NAWAF Primary Wound Type: Incision  -NAWAF    Row Name 11/03/19 1515          Plan of Care Review    Plan of Care Reviewed With  patient  -     Row Name 11/03/19 1515          Physical Therapy Clinical Impression    Date of Referral to PT  11/03/19  -     PT Diagnosis (PT Clinical Impression)  impaired physical mobility  -     Prognosis (PT Clinical  Impression)  good  -CZ     Criteria for Skilled Interventions Met (PT Clinical Impression)  yes;treatment indicated  -CZ     Pathology/Pathophysiology Noted (Describe Specifically for Each System)  musculoskeletal  -CZ     Impairments Found (describe specific impairments)  aerobic capacity/endurance;gait, locomotion, and balance;ventilation and respiration/gas exchange  -CZ     Rehab Potential (PT Clinical Summary)  good, to achieve stated therapy goals  -CZ     Predicted Duration of Therapy (PT)  1-3 days  -CZ     Patient/Family Concerns, Anticipated Discharge Disposition (PT)  Discharging to sister's house so patient will have assistance.   -CZ     Row Name 11/03/19 1515          Vital Signs    Pre Systolic BP Rehab  94  -CZ     Pre Treatment Diastolic BP  60  -CZ     Post Systolic BP Rehab  98  -CZ     Post Treatment Diastolic BP  59  -CZ     Pretreatment Heart Rate (beats/min)  94  -CZ     Posttreatment Heart Rate (beats/min)  101  -CZ     Pre SpO2 (%)  95  -CZ     O2 Delivery Pre Treatment  supplemental O2 3.5 LPM  -CZ     Intra SpO2 (%)  94  -CZ     O2 Delivery Intra Treatment  room air Patient removed supplemental O2, refused to wear.   -CZ     Post SpO2 (%)  94  -CZ     O2 Delivery Post Treatment  room air  -CZ     Pre Patient Position  Supine  -CZ     Post Patient Position  Sitting  -CZ     Row Name 11/03/19 1515          Physical Therapy Goals    Bed Mobility Goal Selection (PT)  bed mobility, PT goal 1  -CZ     Transfer Goal Selection (PT)  transfer, PT goal 1  -CZ     Gait Training Goal Selection (PT)  gait training, PT goal 1  -CZ     Stairs Goal Selection (PT)  stairs, PT goal 1  -CZ     Additional Documentation  Stairs Goal Selection (PT) (Row)  -CZ     Row Name 11/03/19 1515          Bed Mobility Goal 1 (PT)    Activity/Assistive Device (Bed Mobility Goal 1, PT)  sit to supine/supine to sit  -CZ     [place] Level/Cues Needed (Bed Mobility Goal 1, PT)  conditional independence  -CZ     Time Frame  (Bed Mobility Goal 1, PT)  long term goal (LTG);by discharge  -CZ     Barriers (Bed Mobility Goal 1, PT)  L knee immobilizer.   -CZ     Progress/Outcomes (Bed Mobility Goal 1, PT)  goal not met  -CZ     Row Name 11/03/19 1515          Transfer Goal 1 (PT)    Activity/Assistive Device (Transfer Goal 1, PT)  walker, rolling  -CZ     [place] Level/Cues Needed (Transfer Goal 1, PT)  conditional independence  -CZ     Time Frame (Transfer Goal 1, PT)  long term goal (LTG);by discharge  -CZ     Barriers (Transfers Goal 1, PT)  L knee immobilizer.   -CZ     Progress/Outcome (Transfer Goal 1, PT)  goal not met  -CZ     Row Name 11/03/19 1515          Gait Training Goal 1 (PT)    Activity/Assistive Device (Gait Training Goal 1, PT)  walker, rolling  -CZ     [place] Level (Gait Training Goal 1, PT)  conditional independence  -CZ     Distance (Gait Goal 1, PT)  100'x1  -CZ     Time Frame (Gait Training Goal 1, PT)  long term goal (LTG);by discharge  -CZ     Barriers (Gait Training Goal 1, PT)  LLE: WBAT, knee immobilizer.   -CZ     Progress/Outcome (Gait Training Goal 1, PT)  goal not met  -CZ     Row Name 11/03/19 1515          Stairs Goal 1 (PT)    Activity/Assistive Device (Stairs Goal 1, PT)  using handrail, right;using handrail, left  -CZ     [place] Level/Cues Needed (Stairs Goal 1, PT)  supervision required  -CZ     Number of Stairs (Stairs Goal 1, PT)  5 steps, B rails.   -CZ     Time Frame (Stairs Goal 1, PT)  long term goal (LTG);by discharge  -CZ     Barriers (Stairs Goal 1, PT)  LLE: WBAT, knee immobilizer.   -CZ     Progress/Outcome (Stairs Goal 1, PT)  goal not met  -CZ     Row Name 11/03/19 1515          Positioning and Restraints    Pre-Treatment Position  in bed  -CZ     Post Treatment Position  chair  -CZ     In Chair  reclined;call light within reach;encouraged to call for assist;exit alarm on;legs elevated;compression device  -CZ       User Key  (r) = Recorded By, (t) = Taken By, (c) =  "Cosigned By    Initials Name Provider Type    Julienne Goins, RN Registered Nurse    Saurabh Goff, PT Physical Therapist        Physical Therapy Education     Title: PT OT SLP Therapies (In Progress)     Topic: Physical Therapy (In Progress)     Point: Mobility training (Done)     Learning Progress Summary           Patient Acceptance, E, VU by JOEY at 11/3/2019  4:21 PM    Comment:  Educated on proper hand placement with transfers, proper use of FWW and WBAT.                               User Key     Initials Effective Dates Name Provider Type Discipline     04/03/18 -  Saurabh Clifford, PT Physical Therapist PT              PT Recommendation and Plan  Anticipated Discharge Disposition (PT): home with home health  Planned Therapy Interventions (PT Eval): balance training, bed mobility training, gait training, home exercise program, patient/family education, stair training, strengthening, stretching, transfer training  Therapy Frequency (PT Clinical Impression): 2 times/day  Outcome Summary/Treatment Plan (PT)  Anticipated Equipment Needs at Discharge (PT): two wheeled walker  Anticipated Discharge Disposition (PT): home with home health  Patient/Family Concerns, Anticipated Discharge Disposition (PT): Discharging to [facility]'s [place] so patient will have assistance.   Plan of Care Reviewed With: patient  Outcome Summary: Initial PT evaluation complete; co-evaluation with OT.  Patient is alert and cooperative.  Patient reqires min Ax1 with bed mobility, transfers and gait.  She ambulates 10'x1, 20'x1 with FWW, L knee immobilizer; she is allowed WBAT but does not bear much weight through LLE.  SpO2: 94% on RA. Patient would benefit from HHPT upon discharge home to [facility]'s [place]. She will need a FWW with 5\" wheels.  Goals established, continued skilled PT.   Outcome Measures     Row Name 11/03/19 151             How much help from another person do you currently need...    Turning from your back to your " side while in flat bed without using bedrails?  3  -CZ      Moving from lying on back to sitting on the side of a flat bed without bedrails?  3  -CZ      Moving to and from a bed to a chair (including a wheelchair)?  3  -CZ      Standing up from a chair using your arms (e.g., wheelchair, bedside chair)?  3  -CZ      Climbing 3-5 steps with a railing?  3  -CZ      To walk in hospital room?  3  -CZ      AM-PAC 6 Clicks Score (PT)  18  -CZ         Functional Assessment    Outcome Measure Options  AM-PAC 6 Clicks Basic Mobility (PT)  -CZ        User Key  (r) = Recorded By, (t) = Taken By, (c) = Cosigned By    Initials Name Provider Type    Saurabh Goff, PT Physical Therapist         Time Calculation:   PT Charges     Row Name 11/03/19 1629             Time Calculation    Start Time  1515  -CZ      Stop Time  1603  -CZ      Time Calculation (min)  48 min  -CZ      PT Received On  11/03/19  -CZ      PT Goal Re-Cert Due Date  11/16/19  -CZ        User Key  (r) = Recorded By, (t) = Taken By, (c) = Cosigned By    Initials Name Provider Type    Saurabh Goff, PT Physical Therapist        Therapy Charges for Today     Code Description Service Date Service Provider Modifiers Qty    [id number] HC PT EVAL MOD COMPLEXITY 3 11/3/2019 Saurabh Clifford, PT GP 1        Non-skid socks and gait belt in place. Toileting offered. Call light and needs within reach. Patient advised to not get up alone and to call the nurse for assistance.  Chair alarm on.       PT G-Codes  Outcome Measure Options: AM-PAC 6 Clicks Basic Mobility (PT)  AM-PAC 6 Clicks Score (PT): 18      Saurabh Clifford PT  11/3/2019

## 2019-11-03 NOTE — PLAN OF CARE
Problem: Patient Care Overview  Goal: Plan of Care Review  Outcome: Ongoing (interventions implemented as appropriate)      Problem: Fall Risk (Adult)  Goal: Identify Related Risk Factors and Signs and Symptoms  Outcome: Ongoing (interventions implemented as appropriate)    Goal: Absence of Fall  Outcome: Ongoing (interventions implemented as appropriate)      Problem: Pain, Chronic (Adult)  Goal: Identify Related Risk Factors and Signs and Symptoms  Outcome: Ongoing (interventions implemented as appropriate)      Problem: Fracture Orthopaedic (Adult)  Goal: Signs and Symptoms of Listed Potential Problems Will be Absent, Minimized or Managed (Fracture Orthopaedic)  Outcome: Ongoing (interventions implemented as appropriate)

## 2019-11-03 NOTE — THERAPY EVALUATION
Acute Care - Occupational Therapy Initial Evaluation  [facility]     Patient Name: Yudi West  : 1970  MRN: [id number]  Today's Date: 11/3/2019  Onset of Illness/Injury or Date of Surgery: 19  Date of Referral to OT: 19  Referring Physician: Dr. Campoverde     Admit Date: 2019       ICD-10-CM ICD-9-CM   1. Closed displaced supracondylar fracture of distal end of left femur without intracondylar extension, initial encounter (CMS/Formerly [facility]) S72.452A 821.23   2. Impaired physical mobility Z74.09 781.99   3. Impaired mobility and ADLs Z74.09 799.89     Patient Active Problem List   Diagnosis   • Coronary artery disease involving native coronary artery of native heart without angina pectoris   • Myocardial infarction, old   • Essential hypertension   • Type 1 diabetes mellitus (CMS/Formerly [facility])   • Mixed hyperlipidemia   • COPD (chronic obstructive pulmonary disease) (CMS/Formerly [facility])   • Dyspnea on exertion   • S/p nephrectomy   • Precordial pain   • Femur fracture (CMS/Formerly [facility])   • Closed displaced supracondylar fracture without intracondylar extension of lower end of left femur (CMS/Formerly [facility])     Past Medical History:   Diagnosis Date   • Anxiety and depression    • Asthma    • Cancer (CMS/Formerly [facility])    • Chronic kidney disease    • COPD (chronic obstructive pulmonary disease) (CMS/Formerly [facility])    • Diabetes mellitus (CMS/Formerly [facility])    • Hypertension    • Myocardial infarction (CMS/Formerly [facility])      Past Surgical History:   Procedure Laterality Date   • CORONARY STENT PLACEMENT     • GALLBLADDER SURGERY     • HYSTERECTOMY     • KIDNEY SURGERY     • NEPHRECTOMY Left    • REPLACEMENT TOTAL KNEE     • TONSILLECTOMY            OT ASSESSMENT FLOWSHEET (last 12 hours)      Occupational Therapy Evaluation     Row Name 19 1516                   OT Evaluation Time/Intention    Subjective Information  complains of;pain  -        Document Type  evaluation  -        Mode of Treatment  co-treatment;physical therapy;occupational therapy  -         Total Evaluation Minutes, Occupational Therapy  47  -        Patient Effort  good  -        Symptoms Noted During/After Treatment  increased pain  -        Comment  RN informed about session and O2 levels  -           General Information    Patient Profile Reviewed?  yes  -        Onset of Illness/Injury or Date of Surgery  11/02/19  -        Referring Physician  Dr. Campoverde   -        Patient Observations  alert;cooperative;agree to therapy  -        Patient/Family Observations  Son's ex-girlfrient present.   -        General Observations of Patient  pt supine in bed on O2/CO2, IV, mccoy, L knee immobilzer, bed alarm   -        Prior Level of Function  independent:;all household mobility;community mobility;transfer;bed mobility;ADL's;home management  -        Equipment Currently Used at Home  none  -        Pertinent History of Current Functional Problem  To ED after fall on steps at friend's house: L distal femur fx, previous TKA intact; repaired with IM abbi, WBAT.   -        Existing Precautions/Restrictions  fall knee immobilzer until clarification from MD  -        Limitations/Impairments  safety/cognitive  -        Risks Reviewed  patient:;LOB;dizziness;increased discomfort;change in vital signs  -        Benefits Reviewed  patient:;improve function;increase independence;increase strength;increase balance;increase knowledge  -           Relationship/Environment    Lives With  spouse;child(rob), adult son   -        Concerns About Impact on Relationships  Standard toilet, non-slip coating in tub, tub/shower with steps to enter, no DME. (I) with ADL's.   -           Resource/Environmental Concerns    Current Living Arrangements  home/apartment/condo  -           Home Main Entrance    Number of Stairs, Main Entrance  five  -        Stair Railings, Main Entrance  railings on both sides of stairs  -           Cognitive Assessment/Interventions    Additional  Documentation  Cognitive Assessment/Intervention (Group)  -           Cognitive Assessment/Intervention- PT/OT    Affect/Mental Status (Cognitive)  WFL  -        Orientation Status (Cognition)  oriented x 4  -        Follows Commands (Cognition)  follows one step commands;over 90% accuracy;75-90% accuracy;delayed response/completion;increased processing time needed;physical/tactile prompts required;repetition of directions required;verbal cues/prompting required  -        Safety Deficit (Cognitive)  mild deficit;moderate deficit  -        Personal Safety Interventions  fall prevention program maintained;gait belt;nonskid shoes/slippers when out of bed;supervised activity  -           Safety Issues, Functional Mobility    Safety Issues Affecting Function (Mobility)  ability to follow commands;awareness of need for assistance;insight into deficits/self awareness;judgment;positioning of assistive device;problem solving;safety precaution awareness;safety precautions follow-through/compliance;sequencing abilities  -        Impairments Affecting Function (Mobility)  balance;coordination;endurance/activity tolerance;motor control;motor planning;pain;postural/trunk control;range of motion (ROM);shortness of breath;strength  -           Mobility Assessment/Treatment    Extremity Weight-bearing Status  left lower extremity  -        Left Lower Extremity (Weight-bearing Status)  weight-bearing as tolerated (WBAT)  -           Bed Mobility Assessment/Treatment    Bed Mobility Assessment/Treatment  supine-sit  -        Supine-Sit [place] (Bed Mobility)  minimum assist (75% patient effort);nonverbal cues (demo/gesture);verbal cues  -        Bed Mobility, Safety Issues  decreased use of legs for bridging/pushing  -        Assistive Device (Bed Mobility)  bed rails;head of bed elevated  -           Functional Mobility    Functional Mobility- Ind. Level  minimum assist (75% patient effort);nonverbal  cues required (demo/gesture);verbal cues required  -        Functional Mobility- Device  rolling walker  -        Functional Mobility- Comment  pt unsteady, slow pace, decreased stepping pattern and safety   -           Transfer Assessment/Treatment    Transfer Assessment/Treatment  stand-sit transfer;sit-stand transfer;toilet transfer  -        Comment (Transfers)  cues for hand placement   -           Sit-Stand Transfer    Sit-Stand [place] (Transfers)  minimum assist (75% patient effort);nonverbal cues (demo/gesture);verbal cues  -        Assistive Device (Sit-Stand Transfers)  walker, front-wheeled  -           Stand-Sit Transfer    Stand-Sit [place] (Transfers)  minimum assist (75% patient effort);nonverbal cues (demo/gesture);verbal cues  -        Assistive Device (Stand-Sit Transfers)  walker, front-wheeled  -           Toilet Transfer    Type (Toilet Transfer)  stand-sit;sit-stand  -        [place] Level (Toilet Transfer)  moderate assist (50% patient effort);nonverbal cues (demo/gesture);verbal cues  -        Assistive Device (Toilet Transfer)  grab bars/safety frame;walker, front-wheeled  -           ADL Assessment/Intervention    BADL Assessment/Intervention  lower body dressing;toileting;grooming;bathing  -           Bathing Assessment/Intervention    Comment (Bathing)  pt educated that tomorrow OT will work on dressing/bathing   -           Lower Body Dressing Assessment/Training    Comment (Lower Body Dressing)  pt dependent to don left sock, pt set up assist with right sock in bed   -           Grooming Assessment/Training    Comment (Grooming)  pt CGA to wash face at the sink   -           Toileting Assessment/Training    Comment (Toileting)  pt total assist to attend to clothing for toilet t/f   -           BADL Safety/Performance    Impairments, BADL Safety/Performance  balance;endurance/activity tolerance;coordination;pain;range of  motion;strength;trunk/postural control  -           General ROM    GENERAL ROM COMMENTS  BUE WFL fair+ digit to thumb   -           MMT (Manual Muscle Testing)    General MMT Comments  BUE  grossly 4+/5; BUE grossly 4+/5   -           Sensory Assessment/Intervention    Additional Documentation  Hearing Assessment (Group);Vision Assessment/Intervention (Group)  -           Light Touch Sensation Assessment    Left Upper Extremity: Light Touch Sensation Assessment  intact  -        Right Upper Extremity: Light Touch Sensation Assessment  intact  -           Hearing Assessment    Hearing Status  WFL  -           Vision Assessment/Intervention    Visual Impairment/Limitations  corrective lenses for reading  -           Positioning and Restraints    Pre-Treatment Position  in bed  -        Post Treatment Position  chair  -        In Chair  notified nsg;reclined;sitting;call light within reach;encouraged to call for assist;exit alarm on;with family/caregiver  -           Pain Assessment    Additional Documentation  Pain Scale: Numbers Pre/Post-Treatment (Group)  [facility]           Pain Scale: Numbers Pre/Post-Treatment    Pain Scale: Numbers, Pretreatment  7/10  [facility]        Pain Scale: Numbers, Post-Treatment  9/10  [facility]        Pain Location - Side  Left  -        Pain Location - Orientation  lower  -        Pain Location  extremity  -        Pre/Post Treatment Pain Comment  RN stated she had pain meds 15 min before eval   [facility]        Pain Intervention(s)  Repositioned;Ambulation/increased activity;Distraction;Emotional support;Rest  -           Wound 11/03/19 1141 Left leg Incision    Wound - Properties Group Date first assessed: 11/03/19  - Time first assessed: 1141  -NAWAF Present on Hospital Admission: N  -NAWAF Side: Left  - Location: leg  - Primary Wound Type: Incision  -       Plan of Care Review    Plan of Care Reviewed With  patient  -           Clinical Impression (OT)    Date of  Referral to OT  11/03/19  -        OT Diagnosis  Impaired mobility and ADL   -BH        Prognosis (OT Eval)  fair  -BH        Functional Level at Time of Evaluation (OT Eval)  pt decreased strength, balance, endurance, safety and independence with ADL   -BH        Patient/Family Goals Statement (OT Eval)  to go home, pt reports will have 24/7 assist   -        Criteria for Skilled Therapeutic Interventions Met (OT Eval)  yes;treatment indicated  -BH        Rehab Potential (OT Eval)  fair, will monitor progress closely  -        Therapy Frequency (OT Eval)  daily  -BH        Predicted Duration of Therapy Intervention (Therapy Eval)  until d/c   -BH        Care Plan Review (OT)  evaluation/treatment results reviewed;care plan/treatment goals reviewed;risks/benefits reviewed;current/potential barriers reviewed;patient/other agree to care plan  -        Anticipated Discharge Disposition (OT)  home with 24/7 care;home with home health;anticipate therapy at next level of care;inpatient rehabilitation facility  -BH           Vital Signs    Pre Systolic BP Rehab  94  -BH        Pre Treatment Diastolic BP  60  -BH        Post Systolic BP Rehab  95  -BH        Post Treatment Diastolic BP  59  -BH        Pretreatment Heart Rate (beats/min)  94  -BH        Posttreatment Heart Rate (beats/min)  101  -BH        Pre SpO2 (%)  95  -BH        O2 Delivery Pre Treatment  supplemental O2 3.5  -BH        Intra SpO2 (%)  94  -BH        O2 Delivery Intra Treatment  room air Patient removed supplemental O2, refused to wear.   -BH        Post SpO2 (%)  94  -BH        O2 Delivery Post Treatment  room air  -BH        Pre Patient Position  Supine  -BH        Post Patient Position  Sitting  -BH           Planned OT Interventions    Planned Therapy Interventions (OT Eval)  activity tolerance training;adaptive equipment training;BADL retraining;functional balance retraining;IADL retraining;neuromuscular control/coordination  retraining;occupation/activity based interventions;passive ROM/stretching;patient/caregiver education/training;prosthetic fitting/training;strengthening exercise;transfer/mobility retraining  -           OT Goals    Transfer Goal Selection (OT)  transfer, OT goal 1  -        Bathing Goal Selection (OT)  bathing, OT goal 1  -        Dressing Goal Selection (OT)  dressing, OT goal 1  -        Toileting Goal Selection (OT)  toileting, OT goal 1  -        Functional Mobility Goal Selection (OT)  functional mobility, OT goal 1  -        Additional Documentation  Functional Mobility Selection (OT) (Row)  -           Transfer Goal 1 (OT)    Activity/Assistive Device (Transfer Goal 1, OT)  toilet  -        [place] Level/Cues Needed (Transfer Goal 1, OT)  standby assist  -        Time Frame (Transfer Goal 1, OT)  long term goal (LTG);by discharge  -        Progress/Outcome (Transfer Goal 1, OT)  goal not met  -           Bathing Goal 1 (OT)    Activity/Assistive Device (Bathing Goal 1, OT)  bathing skills, all  -        [place] Level/Cues Needed (Bathing Goal 1, OT)  set-up required;verbal cues required;standby assist  -        Time Frame (Bathing Goal 1, OT)  long term goal (LTG);by discharge  -        Progress/Outcomes (Bathing Goal 1, OT)  goal not met  -           Dressing Goal 1 (OT)    Activity/Assistive Device (Dressing Goal 1, OT)  dressing skills, all  -        [place]/Cues Needed (Dressing Goal 1, OT)  set-up required;verbal cues required;standby assist  -        Time Frame (Dressing Goal 1, OT)  long term goal (LTG);by discharge  -        Progress/Outcome (Dressing Goal 1, OT)  goal not met  -           Toileting Goal 1 (OT)    Activity/Device (Toileting Goal 1, OT)  toileting skills, all  -        [place] Level/Cues Needed (Toileting Goal 1, OT)  standby assist  -        Time Frame (Toileting Goal 1, OT)  long term goal (LTG);by discharge  [facility]         Progress/Outcome (Toileting Goal 1, OT)  goal not met  -           Functional Mobility Goal 1 (OT)    Activity/Assistive Device (Functional Mobility Goal 1, OT)  walker, rolling  -        [place] Level/Cues Needed (Functional Mobility Goal 1, OT)  standby assist  -        Distance Goal 1 (Functional Mobility, OT)  for ADL tasks no LOB and good safety   -        Time Frame (Functional Mobility Goal 1, OT)  long term goal (LTG);by discharge  -        Progress/Outcome (Functional Mobility Goal 1, OT)  goal not met  -           Patient Education Goal (OT)    Activity (Patient Education Goal, OT)  Pt will communicate good home safety awareness.   -        [place]/Cues/Accuracy (Memory Goal 2, OT)  verbalizes understanding  -        Time Frame (Patient Education Goal, OT)  long term goal (LTG);by discharge  -        Progress/Outcome (Patient Education Goal, OT)  goal not met  -           Living Environment    Home Accessibility  stairs to enter home;tub/shower is not walk in 2 steps to get into tub   -          User Key  (r) = Recorded By, (t) = Taken By, (c) = Cosigned By    Initials Name Effective Dates     Kassie Riddle, OTR/L 06/08/18 -     Julienne Goins RN 05/09/18 -          Occupational Therapy Education     Title: PT OT SLP Therapies (In Progress)     Topic: Occupational Therapy (In Progress)     Point: ADL training (Done)     Description: Instruct learner(s) on proper safety adaptation and remediation techniques during self care or transfers.   Instruct in proper use of assistive devices.    Learning Progress Summary           Patient Acceptance, E, VU,NR by  at 11/3/2019  4:33 PM    Comment:  Educated about OT and POC. Educated on safety throughout including weight bear status and safety with t/f and mobility. Educated to call for assist for staff to assist pt.                   Point: Precautions (Done)     Description: Instruct learner(s) on prescribed precautions  during self-care and functional transfers.    Learning Progress Summary           Patient Acceptance, E, VU,NR by  at 11/3/2019  4:33 PM    Comment:  Educated about OT and POC. Educated on safety throughout including weight bear status and safety with t/f and mobility. Educated to call for assist for staff to assist pt.                               User Key     Initials Effective Dates Name Provider Type Discipline     06/08/18 -  Kassie Riddle, OTR/L Occupational Therapist OT                  OT Recommendation and Plan  Outcome Summary/Treatment Plan (OT)  Anticipated Discharge Disposition (OT): home with 24/7 care, home with home health, anticipate therapy at next level of care, inpatient rehabilitation facility  Planned Therapy Interventions (OT Eval): activity tolerance training, adaptive equipment training, BADL retraining, functional balance retraining, IADL retraining, neuromuscular control/coordination retraining, occupation/activity based interventions, passive ROM/stretching, patient/caregiver education/training, prosthetic fitting/training, strengthening exercise, transfer/mobility retraining  Therapy Frequency (OT Eval): daily  Plan of Care Review  Plan of Care Reviewed With: patient  Plan of Care Reviewed With: patient  Outcome Summary: OT eval completed this date, co-eval with PT. Pt was alert and cooperative but in pain. Pt was min assist for sup to sit. Pt min assist for sit to stand t/f off bed and for mobiltiy with RW with L knee immobilzer present. Pt mod assist for toilet t/f. Pt did not bear much weight through LLE with activity. Pt O2 94% on RA. Pt total assist to attend to clothing for toilet t/f, CGA to stand at sink and wash face. Pt total assist to don left sock and set up in supine HOB up for don right sock. Pt could benefit from further skilled OT to address independence and safety with ADL. REcommend 24/7 care and further OT and PT therapy at d/c.     Outcome Measures     Row Name  11/03/19 1516 11/03/19 1515          How much help from another person do you currently need...    Turning from your back to your side while in flat bed without using bedrails?  --  3  -CZ     Moving from lying on back to sitting on the side of a flat bed without bedrails?  --  3  -CZ     Moving to and from a bed to a chair (including a wheelchair)?  --  3  -CZ     Standing up from a chair using your arms (e.g., wheelchair, bedside chair)?  --  3  -CZ     Climbing 3-5 steps with a railing?  --  3  -CZ     To walk in hospital room?  --  3  -CZ     AM-PAC 6 Clicks Score (PT)  --  18  -        How much help from another is currently needed...    Putting on and taking off regular lower body clothing?  2  -  --     Bathing (including washing, rinsing, and drying)  2  -  --     Toileting (which includes using toilet bed pan or urinal)  2  -  --     Putting on and taking off regular upper body clothing  3  -  --     Taking care of personal grooming (such as brushing teeth)  3  -  --     Eating meals  4  -  --     AM-PAC 6 Clicks Score (OT)  16  -  --        Functional Assessment    Outcome Measure Options  AM-PAC 6 Clicks Daily Activity (OT)  -[facility]-PAC 6 Clicks Basic Mobility (PT)  -       User Key  (r) = Recorded By, (t) = Taken By, (c) = Cosigned By    Initials Name Provider Type     Kassie Riddle, OTR/L Occupational Therapist    CZ Saurabh Clifford, PT Physical Therapist          Time Calculation:   Time Calculation- OT     Row Name 11/03/19 1636             Time Calculation-     OT Start Time  1515  -      OT Stop Time  1602  -      OT Time Calculation (min)  47 min  -      OT Received On  11/03/19  -      OT Goal Re-Cert Due Date  11/16/19  -        User Key  (r) = Recorded By, (t) = Taken By, (c) = Cosigned By    Initials Name Provider Type     Kassie Riddle, OTR/L Occupational Therapist        Therapy Charges for Today     Code Description Service Date Service Provider  Modifiers Qty    [id number] HC OT EVAL MOD COMPLEXITY 3 11/3/2019 Kassie Riddle, OTR/L GO 1               TADEO Zuñiga/TILA  11/3/2019

## 2019-11-03 NOTE — PLAN OF CARE
"Problem: Patient Care Overview  Goal: Plan of Care Review  Outcome: Ongoing (interventions implemented as appropriate)   11/03/19 1515   Coping/Psychosocial   Plan of Care Reviewed With patient   OTHER   Outcome Summary Initial PT evaluation complete; co-evaluation with OT. Patient is alert and cooperative. Patient reqires min Ax1 with bed mobility, transfers and gait. She ambulates 10'x1, 20'x1 with FWW, L knee immobilizer; she is allowed WBAT but does not bear much weight through LLE. SpO2: 94% on RA. Patient would benefit from HHPT upon discharge home to sister's house. She will need a FWW with 5\" wheels. Goals established, continued skilled PT.      Goal: Discharge Needs Assessment  Outcome: Ongoing (interventions implemented as appropriate)   11/03/19 1515   Discharge Needs Assessment   Equipment Needed After Discharge walker, rolling  (FWW with 5\" wheels)         "

## 2019-11-03 NOTE — INTERVAL H&P NOTE
Hemoglobin today is 10.0.  Radiographs of the left femur show no abnormality proximal to the fracture site.    The history and physical is otherwise unchanged from previous note.

## 2019-11-03 NOTE — ANESTHESIA PREPROCEDURE EVALUATION
Anesthesia Evaluation     Patient summary reviewed   NPO Solid Status: > 8 hours  NPO Liquid Status: > 8 hours           Airway   Mallampati: II  TM distance: <3 FB  Neck ROM: full  Large neck circumference  Dental    (+) poor dentition    Pulmonary - normal exam   (+) COPD, asthma,shortness of breath,   Cardiovascular - normal exam    NYHA Classification: III  ECG reviewed  PT is on anticoagulation therapy    (+) hypertension, past MI , CAD, hyperlipidemia,       Neuro/Psych  (+) psychiatric history Anxiety,     GI/Hepatic/Renal/Endo    (+) obesity,  GERD,  renal disease, diabetes mellitus,     Musculoskeletal     (+) back pain,   Abdominal    Substance History      OB/GYN          Other      history of cancer                    Anesthesia Plan    ASA 3     general     intravenous induction   Anesthetic plan, all risks, benefits, and alternatives have been provided, discussed and informed consent has been obtained with: patient.

## 2019-11-03 NOTE — PROGRESS NOTES
[facility] Medicine Services  INPATIENT PROGRESS NOTE    Length of Stay: 1  Date of Admission: 11/2/2019  Primary Care Physician: Ibeth Masters APRN    Subjective   Chief Complaint: Femur fracture  HPI:    Patient has been admitted for left femur fracture status post repair postop day #1.  She is doing fine after the surgery although she is having some pain.  Reports no difficulty breathing    Review of Systems   Respiratory: Negative for shortness of breath.    Cardiovascular: Negative for chest pain.        All pertinent negatives and positives are as above. All other systems have been reviewed and are negative unless otherwise stated.     Objective    Temp:  [96 °F (35.6 °C)-98.8 °F (37.1 °C)] 96.4 °F (35.8 °C)  Heart Rate:  [] 93  Resp:  [16-22] 18  BP: ()/(60-88) 94/60  Physical Exam   Constitutional: She appears well-developed and well-nourished. No distress.   HENT:   Head: Normocephalic and atraumatic.   Cardiovascular: Normal rate.   Pulmonary/Chest: Effort normal. No respiratory distress. She has no wheezes.   Abdominal: Soft. She exhibits no distension.   Musculoskeletal: Normal range of motion. She exhibits no edema.   Neurological: She is alert. No cranial nerve deficit.   Skin: Skin is warm and dry. She is not diaphoretic.   Psychiatric: She has a normal mood and affect. Her behavior is normal. Judgment and thought content normal.   Vitals reviewed.          Results Review:  I have reviewed the labs, radiology results, and diagnostic studies.    Laboratory Data:   Lab Results (last 24 hours)     Procedure Component Value Units Date/Time    POC Glucose Once [[id number]]  (Abnormal) Collected:  11/03/19 1235    Specimen:  Blood Updated:  11/03/19 1247     Glucose 160 mg/dL      Comment: RN NotifiedOperator: [id number] CEM PATRICIAMeter ID: [id number]       Urinalysis With Culture If Indicated - Urine, Catheter [[id number]]  (Abnormal)  Collected:  11/03/19 0529    Specimen:  Urine, Catheter Updated:  11/03/19 0756     Color, UA Yellow     Appearance, UA Slightly Cloudy     pH, UA 6.0     Specific Gravity, UA 1.008     Comment: Result obtained by Refractometer        Glucose,  mg/dL (1+)     Ketones, UA Negative     Bilirubin, UA Negative     Blood, UA Negative     Protein, UA Negative     Leuk Esterase, UA Negative     Nitrite, UA Negative     Urobilinogen, UA 0.2 E.U./dL    Narrative:       Urine microscopic not indicated.    POC Glucose Once [[id number]]  (Abnormal) Collected:  11/03/19 0733    Specimen:  Blood Updated:  11/03/19 0751     Glucose 204 mg/dL      Comment: RN NotifiedOperator: [id number] [facility]: [id number]       POC Glucose Once [[id number]]  (Abnormal) Collected:  11/02/19 2332    Specimen:  Blood Updated:  11/03/19 0700     Glucose 353 mg/dL      Comment: RN NotifiedOperator: [id number] [facility] ID: [id number]       Basic Metabolic Panel [[id number]]  (Abnormal) Collected:  11/03/19 0516    Specimen:  Blood Updated:  11/03/19 0620     Glucose 193 mg/dL      BUN 12 mg/dL      Creatinine 0.82 mg/dL      Sodium 137 mmol/L      Potassium 3.7 mmol/L      Chloride 101 mmol/L      CO2 26.0 mmol/L      Calcium 8.3 mg/dL      eGFR Non African Amer 74 mL/min/1.73      BUN/Creatinine Ratio 14.6     Anion Gap 10.0 mmol/L     Narrative:       GFR Normal >60  Chronic Kidney Disease <60  Kidney Failure <15    CBC & Differential [[id number]] Collected:  11/03/19 0516    Specimen:  Blood Updated:  11/03/19 0611    Narrative:       The following orders were created for panel order CBC & Differential.  Procedure                               Abnormality         Status                     ---------                               -----------         ------                     CBC Auto Differential[[id number]]        Abnormal            Final result                 Please view results for these tests on the  individual orders.    CBC Auto Differential [[id number]]  (Abnormal) Collected:  11/03/19 0516    Specimen:  Blood Updated:  11/03/19 0611     WBC 10.97 10*3/mm3      RBC 3.33 10*6/mm3      Hemoglobin 10.0 g/dL      Hematocrit 29.0 %      MCV 87.1 fL      MCH 30.0 pg      MCHC 34.5 g/dL      RDW 11.7 %      RDW-SD 37.4 fl      MPV 10.9 fL      Platelets 194 10*3/mm3      Neutrophil % 68.1 %      Lymphocyte % 20.1 %      Monocyte % 9.4 %      Eosinophil % 1.5 %      Basophil % 0.4 %      Immature Grans % 0.5 %      Neutrophils, Absolute 7.48 10*3/mm3      Lymphocytes, Absolute 2.21 10*3/mm3      Monocytes, Absolute 1.03 10*3/mm3      Eosinophils, Absolute 0.16 10*3/mm3      Basophils, Absolute 0.04 10*3/mm3      Immature Grans, Absolute 0.05 10*3/mm3      nRBC 0.0 /100 WBC     Blood Gas, Arterial [[id number]]  (Abnormal) Collected:  11/03/19 0416    Specimen:  Arterial Blood Updated:  11/03/19 0428     Site Left Radial     Miguel's Test N/A     pH, Arterial 7.384 pH units      pCO2, Arterial 41.3 mm Hg      pO2, Arterial 94.2 mm Hg      HCO3, Arterial 24.6 mmol/L      Base Excess, Arterial -0.4 mmol/L      Comment: 84 Value below reference range        O2 Saturation, Arterial 98.2 %      Barometric Pressure for Blood Gas 754 mmHg      Modality Nasal Cannula     Flow Rate 3.5 lpm      Ventilator Mode NA     Collected by KATJA DOMINGUEZ     Comment: Meter: [id number]     :  [id number]       Extra Tubes [[id number]] Collected:  11/02/19 2356    Specimen:  Blood, Venous Line Updated:  11/03/19 0100    Narrative:       The following orders were created for panel order Extra Tubes.  Procedure                               Abnormality         Status                     ---------                               -----------         ------                     Light Blue Top[[id number]]                                   Final result               Lavender Top[[id number]]                                     Final result                Green Top (Gel)[[id number]]                                  Final result                 Please view results for these tests on the individual orders.    Light Blue Top [[id number]] Collected:  11/02/19 2356    Specimen:  Blood Updated:  11/03/19 0100     Extra Tube hold for add-on     Comment: Auto resulted       Lavender Top [[id number]] Collected:  11/02/19 2356    Specimen:  Blood Updated:  11/03/19 0100     Extra Tube hold for add-on     Comment: Auto resulted       Green Top (Gel) [[id number]] Collected:  11/02/19 2356    Specimen:  Blood Updated:  11/03/19 0100     Extra Tube Hold for add-ons.     Comment: Auto resulted.       Glucose, Random [[id number]]  (Abnormal) Collected:  11/02/19 2021    Specimen:  Blood Updated:  11/02/19 2053     Glucose 449 mg/dL     Comprehensive Metabolic Panel [[id number]]  (Abnormal) Collected:  11/02/19 1931    Specimen:  Blood Updated:  11/02/19 1951     Glucose 391 mg/dL      BUN 14 mg/dL      Creatinine 0.98 mg/dL      Sodium 137 mmol/L      Potassium 4.3 mmol/L      Chloride 100 mmol/L      CO2 24.0 mmol/L      Calcium 8.5 mg/dL      Total Protein 6.8 g/dL      Albumin 4.00 g/dL      ALT (SGPT) 16 U/L      AST (SGOT) 12 U/L      Alkaline Phosphatase 113 U/L      Total Bilirubin 0.3 mg/dL      eGFR Non African Amer 60 mL/min/1.73      Globulin 2.8 gm/dL      A/G Ratio 1.4 g/dL      BUN/Creatinine Ratio 14.3     Anion Gap 13.0 mmol/L     Narrative:       GFR Normal >60  Chronic Kidney Disease <60  Kidney Failure <15    CBC & Differential [[id number]] Collected:  11/02/19 1931    Specimen:  Blood Updated:  11/02/19 1934    Narrative:       The following orders were created for panel order CBC & Differential.  Procedure                               Abnormality         Status                     ---------                               -----------         ------                     CBC Auto Differential[[id number]]        Abnormal            Final result                 Please view  results for these tests on the individual orders.    CBC Auto Differential [[id number]]  (Abnormal) Collected:  11/02/19 1931    Specimen:  Blood Updated:  11/02/19 1934     WBC 15.89 10*3/mm3      RBC 4.07 10*6/mm3      Hemoglobin 12.0 g/dL      Hematocrit 36.1 %      MCV 88.7 fL      MCH 29.5 pg      MCHC 33.2 g/dL      RDW 11.8 %      RDW-SD 37.7 fl      MPV 10.7 fL      Platelets 241 10*3/mm3      Neutrophil % 75.5 %      Lymphocyte % 13.3 %      Monocyte % 8.2 %      Eosinophil % 2.0 %      Basophil % 0.5 %      Immature Grans % 0.5 %      Neutrophils, Absolute 12.00 10*3/mm3      Lymphocytes, Absolute 2.12 10*3/mm3      Monocytes, Absolute 1.30 10*3/mm3      Eosinophils, Absolute 0.31 10*3/mm3      Basophils, Absolute 0.08 10*3/mm3      Immature Grans, Absolute 0.08 10*3/mm3      nRBC 0.0 /100 WBC           Culture Data:   No results found for: BLOODCX  No results found for: URINECX  No results found for: RESPCX  No results found for: WOUNDCX  No results found for: STOOLCX  No components found for: BODYFLD    Radiology Data:   Imaging Results (Last 24 Hours)     Procedure Component Value Units Date/Time    XR Femur 2 View Left [[id number]] Collected:  11/03/19 1254     Updated:  11/03/19 1319    Narrative:         Portable two view left femur    HISTORY: Postoperative study. Internal fixation.    Portable AP and crosstable lateral views of the left femur  obtained at 12:44 PM.    COMPARISON: November 2, 2019    FINDINGS:   Intramedullary abbi with one proximal interlocking screw and two  distal interlocking screws now in place transversing the  comminuted minimally displaced fracture distal femoral  diametaphyseal region.  Immediate postoperative intra-articular air.  Surgical skin staples in place.  Previously demonstrated total left knee prosthesis.  No dislocation.        Impression:       CONCLUSION:  Internal fixation of the comminuted fracture of the distal femur.    [id number]    Electronically signed by:   Juve Rico MD  11/3/2019 1:18 PM CST  Workstation: [id number]    FL C Arm During Surgery [[id number]] Updated:  11/03/19 1202    XR Femur 2 View Left [[id number]] Collected:  11/02/19 2055     Updated:  11/02/19 2115    Narrative:       Pain with injury.    Left femur, four views.    Examination revealed severe comminuted fracture of the distal  femur. There is knee prosthesis with femoral and tibial  components.      Impression:       CONCLUSION: Severe comminuted fracture of the distal femur.    Electronically signed by:  Ernesto Sol MD  11/2/2019 9:14  PM CDT Workstation: [email or web address]          I have reviewed the patient's current medications.     Assessment/Plan     Active Hospital Problems    Diagnosis   • **Closed displaced supracondylar fracture without intracondylar extension of lower end of left femur (CMS/HCC)     Added automatically from request for surgery [id number]     • Femur fracture (CMS/HCC)       Left femur fracture-status post repair postop day #1- continue management as per orthopedic surgery    Pain-continue with pain management    Hypertension-continue to monitor    Diabetes mellitus-continue with sliding scale insulin    Coronary artery disease- Plavix will be resumed when orthopedic surgery is okay with that    COPD-continue with nebulizer as needed    DVT prophylaxis-SCD          Nicolas Grant MD   11/03/19   4:49 PM

## 2019-11-03 NOTE — OP NOTE
Procedure(s):  FEMUR INTRAMEDULLARY NAILING RETROGRADE  Procedure Note    Yudi West  11/3/2019    Pre-op Diagnosis: Displaced supracondylar/distal shaft fracture left femur  Closed displaced supracondylar fracture of distal end of left femur without intracondylar extension, initial encounter (CMS/[facility]) [S72.452A]    Post-op Diagnosis:   Same  Post-Op Diagnosis Codes:     * Closed displaced supracondylar fracture of distal end of left femur without intracondylar extension, initial encounter (CMS/[facility]) [S72.300K]    Procedure/CPT® Codes: Closed reduction left femur with retrograde interlocking intramedullary nailing (CPT code 22281).      Procedure(s):  FEMUR INTRAMEDULLARY NAILING RETROGRADE    Surgeon(s):  Howie Campoverde MD    Anesthesia: Choice    Staff:   Circulator: Julienne Weeks RN  Scrub Person: Jenny Florence  Assistant: Radha Mireles CSA    Estimated Blood Loss: 50 to 75 cc.    Specimens: None                     Drains: None   Urethral Catheter 16 Fr. (Active)   Daily Indications Required activity restriction from trauma, surgery, (e.g. unstable spine, fracture, hemodynamics) 11/3/2019  7:28 AM   Site Assessment Clean;Skin intact 11/3/2019  7:28 AM   Collection Container Standard drainage bag 11/3/2019  7:28 AM   Securement Method Securing device 11/3/2019  7:28 AM   Catheter care complete Yes 11/3/2019  7:28 AM   Output (mL) 500 mL 11/3/2019  3:45 AM       Findings: Comminuted displaced fracture distal shaft left humerus.    Complications: None    INDICATIONS : Patient is a 49-year-old female who sustained a fracture of the distal shaft of the left femur above a well-functioning knee arthroplasty.  Treatment options were reviewed and I recommended the above procedure.  Anticipated benefits of surgery as well as potential complications of surgery and anesthetic were reviewed in detail and she and her family appear to understand all matters discussed and wished to  proceed.      Operative Report: Patient was brought to the operating room.  The timeout procedure was followed.  General anesthesia was induced.  C arm was brought into position and the fracture manipulated under fluoroscopic control.  I was not able to completely reduce the posterior displacement the angulation was acted adequately.  The left lower extremity was prepped and drapes applied.  Straight longitudinal incision was made over the anterior aspect of the knee.  Bleeding points were cauterized.  The end of the femoral component was identified.  A plug of bone cement was debrided.  A guidewire was advanced into the distal femur under fluoroscopic control.  Actual reamers were advanced over the guidewire up to a 13 mm in diameter.  A reaming abbi was then advanced across the fracture to the proximal femur.  Fracture was again manipulated with traction.  Bumps have been placed below the thigh prior to prepping to assist in reduction.  A small stab wound was made over the lateral aspect of the distal thigh and an elevator was advanced posterior to the distal fragment to assist in reduction.  Satisfactory fracture alignment was achieved.  I chose as the implant for fixation a retrograde intramedullary nail manufactured by the [employer].  The nail was 11 mm in diameter and 320 mm in length.  All maintaining reduction in the nail was advanced over the guidewire crossing the fracture site.  Distal locking was performed with 2 screws.  A cap Screw was placed at the end of the nail.  Proximal locking was performed using a single screw and a freehand technique.  The final position of the fracture and hardware were satisfactory C arm in multiple planes.  The distal end of the nail was flush with the femoral component.  Multiple cycles of irrigation were performed.  The patellar tendon was repaired using 0 Ethibond suture.  Subcutaneous tissues were closed with Vicryl.  Skin closure was with staples.  Wounds were  infiltrated with Marcaine.  Fracture hematoma was injected with a mixture of tranexamic acid and saline.  This injection also included 1 g of vancomycin and 5 cc of Marcaine.  A portion of this injection was also injected into the knee joint.  A bulky soft dressing was applied followed by the knee immobilizer.    The patient was transported to the recovery room in stable condition.  There were no intraoperative or immediate postoperative complications.  Estimated blood loss was 50 to 100 cc.  Howie Campoverde MD  11/03/19  12:28 PM

## 2019-11-04 VITALS
OXYGEN SATURATION: 93 % | HEIGHT: 65 IN | TEMPERATURE: 98.3 F | SYSTOLIC BLOOD PRESSURE: 98 MMHG | HEART RATE: 96 BPM | RESPIRATION RATE: 20 BRPM | DIASTOLIC BLOOD PRESSURE: 57 MMHG | WEIGHT: 182.4 LBS | BODY MASS INDEX: 30.39 KG/M2

## 2019-11-04 PROBLEM — I10 HYPERTENSION: Chronic | Status: ACTIVE | Noted: 2017-08-14

## 2019-11-04 PROBLEM — D62 ANEMIA, POSTHEMORRHAGIC, ACUTE: Status: ACTIVE | Noted: 2019-11-04

## 2019-11-04 PROBLEM — E10.9 TYPE 1 DIABETES MELLITUS (HCC): Chronic | Status: ACTIVE | Noted: 2017-08-14

## 2019-11-04 PROBLEM — J44.9 COPD (CHRONIC OBSTRUCTIVE PULMONARY DISEASE): Chronic | Status: ACTIVE | Noted: 2017-08-14

## 2019-11-04 LAB
ANION GAP SERPL CALCULATED.3IONS-SCNC: 8 MMOL/L (ref 5–15)
BASOPHILS # BLD AUTO: 0.03 10*3/MM3 (ref 0–0.2)
BASOPHILS NFR BLD AUTO: 0.2 % (ref 0–1.5)
BUN BLD-MCNC: 10 MG/DL (ref 6–20)
BUN/CREAT SERPL: 11.6 (ref 7–25)
CALCIUM SPEC-SCNC: 8.1 MG/DL (ref 8.6–10.5)
CHLORIDE SERPL-SCNC: 102 MMOL/L (ref 98–107)
CO2 SERPL-SCNC: 27 MMOL/L (ref 22–29)
CREAT BLD-MCNC: 0.86 MG/DL (ref 0.57–1)
DEPRECATED RDW RBC AUTO: 38.5 FL (ref 37–54)
EOSINOPHIL # BLD AUTO: 0.22 10*3/MM3 (ref 0–0.4)
EOSINOPHIL NFR BLD AUTO: 1.7 % (ref 0.3–6.2)
ERYTHROCYTE [DISTWIDTH] IN BLOOD BY AUTOMATED COUNT: 11.9 % (ref 12.3–15.4)
GFR SERPL CREATININE-BSD FRML MDRD: 70 ML/MIN/1.73
GLUCOSE BLD-MCNC: 143 MG/DL (ref 65–99)
GLUCOSE BLDC GLUCOMTR-MCNC: 149 MG/DL (ref 70–130)
GLUCOSE BLDC GLUCOMTR-MCNC: 187 MG/DL (ref 70–130)
GLUCOSE BLDC GLUCOMTR-MCNC: 435 MG/DL (ref 70–130)
GLUCOSE BLDC GLUCOMTR-MCNC: 449 MG/DL (ref 70–130)
HCT VFR BLD AUTO: 24.5 % (ref 34–46.6)
HGB BLD-MCNC: 8.3 G/DL (ref 12–15.9)
IMM GRANULOCYTES # BLD AUTO: 0.05 10*3/MM3 (ref 0–0.05)
IMM GRANULOCYTES NFR BLD AUTO: 0.4 % (ref 0–0.5)
LYMPHOCYTES # BLD AUTO: 2.51 10*3/MM3 (ref 0.7–3.1)
LYMPHOCYTES NFR BLD AUTO: 19.6 % (ref 19.6–45.3)
MCH RBC QN AUTO: 29.9 PG (ref 26.6–33)
MCHC RBC AUTO-ENTMCNC: 33.9 G/DL (ref 31.5–35.7)
MCV RBC AUTO: 88.1 FL (ref 79–97)
MONOCYTES # BLD AUTO: 1.2 10*3/MM3 (ref 0.1–0.9)
MONOCYTES NFR BLD AUTO: 9.4 % (ref 5–12)
NEUTROPHILS # BLD AUTO: 8.8 10*3/MM3 (ref 1.7–7)
NEUTROPHILS NFR BLD AUTO: 68.7 % (ref 42.7–76)
NRBC BLD AUTO-RTO: 0 /100 WBC (ref 0–0.2)
PLATELET # BLD AUTO: 168 10*3/MM3 (ref 140–450)
PMV BLD AUTO: 10.9 FL (ref 6–12)
POTASSIUM BLD-SCNC: 3.7 MMOL/L (ref 3.5–5.2)
RBC # BLD AUTO: 2.78 10*6/MM3 (ref 3.77–5.28)
SODIUM BLD-SCNC: 137 MMOL/L (ref 136–145)
WBC NRBC COR # BLD: 12.81 10*3/MM3 (ref 3.4–10.8)

## 2019-11-04 PROCEDURE — 97110 THERAPEUTIC EXERCISES: CPT

## 2019-11-04 PROCEDURE — 85025 COMPLETE CBC W/AUTO DIFF WBC: CPT | Performed by: FAMILY MEDICINE

## 2019-11-04 PROCEDURE — 80048 BASIC METABOLIC PNL TOTAL CA: CPT | Performed by: FAMILY MEDICINE

## 2019-11-04 PROCEDURE — 97535 SELF CARE MNGMENT TRAINING: CPT

## 2019-11-04 PROCEDURE — 25010000002 CEFAZOLIN PER 500 MG: Performed by: ORTHOPAEDIC SURGERY

## 2019-11-04 PROCEDURE — 99024 POSTOP FOLLOW-UP VISIT: CPT | Performed by: ORTHOPAEDIC SURGERY

## 2019-11-04 PROCEDURE — 82962 GLUCOSE BLOOD TEST: CPT

## 2019-11-04 PROCEDURE — 97530 THERAPEUTIC ACTIVITIES: CPT

## 2019-11-04 PROCEDURE — 94799 UNLISTED PULMONARY SVC/PX: CPT

## 2019-11-04 PROCEDURE — 94760 N-INVAS EAR/PLS OXIMETRY 1: CPT

## 2019-11-04 RX ORDER — ASPIRIN 81 MG/1
81 TABLET ORAL 2 TIMES DAILY WITH MEALS
Qty: 60 TABLET | Refills: 0 | Status: SHIPPED | OUTPATIENT
Start: 2019-11-04 | End: 2023-04-04 | Stop reason: SDUPTHER

## 2019-11-04 RX ORDER — OXYCODONE AND ACETAMINOPHEN 7.5; 325 MG/1; MG/1
1 TABLET ORAL EVERY 4 HOURS PRN
Qty: 30 TABLET | Refills: 0 | Status: SHIPPED | OUTPATIENT
Start: 2019-11-04 | End: 2019-11-08 | Stop reason: SDUPTHER

## 2019-11-04 RX ADMIN — PANTOPRAZOLE SODIUM 40 MG: 40 TABLET, DELAYED RELEASE ORAL at 04:53

## 2019-11-04 RX ADMIN — OXYCODONE HYDROCHLORIDE AND ACETAMINOPHEN 2 TABLET: 7.5; 325 TABLET ORAL at 09:38

## 2019-11-04 RX ADMIN — RANOLAZINE 500 MG: 500 TABLET, FILM COATED, EXTENDED RELEASE ORAL at 09:38

## 2019-11-04 RX ADMIN — SODIUM CHLORIDE 2 G: 9 INJECTION, SOLUTION INTRAVENOUS at 01:18

## 2019-11-04 RX ADMIN — SERTRALINE HYDROCHLORIDE 50 MG: 50 TABLET ORAL at 09:38

## 2019-11-04 RX ADMIN — OXYCODONE HYDROCHLORIDE AND ACETAMINOPHEN 2 TABLET: 7.5; 325 TABLET ORAL at 01:18

## 2019-11-04 RX ADMIN — OXYCODONE HYDROCHLORIDE AND ACETAMINOPHEN 2 TABLET: 7.5; 325 TABLET ORAL at 04:53

## 2019-11-04 RX ADMIN — IPRATROPIUM BROMIDE AND ALBUTEROL SULFATE 3 ML: 2.5; .5 SOLUTION RESPIRATORY (INHALATION) at 07:16

## 2019-11-04 NOTE — PAYOR COMM NOTE
"Chantell Malone  [facility]  (P)[phone]  (F)[phone]          Laron West (49 y.o. Female)     Date of Birth Social Security Number Address Home Phone MRN    1970  [address]  [address]   [facility] [id number] [phone] [id number]    Confucianist Marital Status          Roman Catholic        Admission Date Admission Type Admitting Provider Attending Provider Department, Room/Bed    11/2/19 Urgent Nicolas Grant MD Iqbal, Javed Syed, MD [facility], 358/1    Discharge Date Discharge Disposition Discharge Destination                       Attending Provider:  Nicolas Grant MD    Allergies:  Sulfa Antibiotics    Isolation:  None   Infection:  None   Code Status:  CPR    Ht:  165.1 cm (65\")   Wt:  82.7 kg (182 lb 6.4 oz)    Admission Cmt:  None   Principal Problem:  Closed displaced supracondylar fracture without intracondylar extension of lower end of left femur (CMS/[facility]) [S72.452A]                 Active Insurance as of 11/2/2019     Primary Coverage     Payor Plan Insurance Group Employer/Plan Group    HU[employer]NA MEDICAID HU[employer] [id number]     Payor Plan Address Payor Plan Phone Number Payor Plan Fax Number Effective Dates    PO  [phone]  3/18/2019 - None Entered    [facility] [id number]       Subscriber Name Subscriber Birth Date Member ID       LARON WEST 1970 [id number]                 Emergency Contacts      (Rel.) Home Phone Work Phone Mobile Phone    Ricardo West (Spouse) [phone] -- [phone]               History & Physical      Howie Campoverde MD at 11/03/19 0920        Hemoglobin today is 10.0.  Radiographs of the left femur show no abnormality proximal to the fracture site.    The history and physical is otherwise unchanged from previous note.    Electronically signed by Howie Campoverde MD at 11/03/19 0922   Source Note             ORTHOPAEDIC CONSULT     Patient " Name:  Yudi West  Admit Date:  11/2/2019  Consult Date:  11/2/2019    Referring Provider: Dr. Grant  Reason for Consultation: Fracture left femur.    Patient Care Team:  Ibeth Masters APRN as PCP - General (Nurse Practitioner)    History of present illness: Mrs. West is 49 years old.  She was descending some stairs earlier today when she sustained a twisting injury to her left leg.  She had prompt onset of pain.  She was seen in the emergency room in [place] and x-rays were performed showing a fracture of the distal shaft of the femur.  Past history is remarkable for a total knee replacement on the left about 9 years ago by Dr. Arrington.  She said she was doing well with regard to her knee prior to her most recent injury.  Patient requested to have care for the fracture in [place].  I spoke by phone with Dr. Ritter and agreed to accept him in transfer.  This was an isolated injury.    Medications include Plavix carvedilol, Neurontin, insulin, Tagamet, sertraline, 10 mg hydrocodone.  She is allergic to sulfa drugs.  She smokes 2 pack/day of cigarettes and denies use of alcohol.    Past medical history is remarkable for coronary artery disease COPD and diabetes.  Previous surgeries include nephrectomy for carcinoma, hysterectomy, tonsillectomy, and cholecystectomy.  There is been no history of anesthetic complication.  She also has a history of chronic back pain.    Family history she is .    Social history she lives in [place] with her  and son.  She is disabled.    Review of Systems is remarkable for pain well localized to the the left knee and distal thigh.  There is been no numbness or tingling in the limb.  Otherwise complete ROS is negative except as mentioned above.    Prior to Admission medications    Medication Sig Start Date End Date Taking? Authorizing Provider   aspirin 81 MG EC tablet Take 81 mg by mouth Daily.   Yes Provider, MD Sara  "  atorvastatin (LIPITOR) 40 MG tablet Take 40 mg by mouth Every Night. 7/18/17  Yes Sara Benoit MD   carvedilol (COREG) 6.25 MG tablet Take 6.25 mg by mouth 2 (Two) Times a Day With Meals.   Yes Sara Benoit MD   clopidogrel (PLAVIX) 75 MG tablet TAKE 1 TABLET BY MOUTH ONCE DAILY 6/20/18  Yes Lewis Johnson MD   gabapentin (NEURONTIN) 300 MG capsule TAKE 1 TABLET BY MOUTH FOUR TIMES DAILY 8/7/17  Yes Sara Benoit MD   HYDROcodone-acetaminophen (NORCO) 5-325 MG per tablet Take 1 tablet by mouth Every 6 (Six) Hours As Needed.   Yes Sara Benoit MD   insulin glargine (LANTUS) 100 UNIT/ML injection Inject 50 Units under the skin into the appropriate area as directed Daily.   Yes Sara Benoit MD   Omeprazole 20 MG tablet delayed-release TAKE 1 TABLET BY MOUTh twice daily 5/25/17  Yes Sara Benoit MD   ranolazine (RANEXA) 500 MG 12 hr tablet Take 1 tablet by mouth 2 (Two) Times a Day. 6/3/19  Yes Lewis Johnson MD   sertraline (ZOLOFT) 50 MG tablet Take 50 mg by mouth Daily.   Yes Sara Benoit MD   VENTOLIN  (90 BASE) MCG/ACT inhaler 2 puffs 4 (Four) Times a Day. 6/5/17  Yes Sara Benoit MD   zolpidem (AMBIEN) 10 MG tablet TAKE 1 TABLET BY MOUTH AT BEDTIME 8/7/17  Yes Sara Benoit MD   EASY TOUCH INSULIN SYRINGE 28G X 1/2\" 0.5 ML misc USE 4 TIMES DAILY 5/23/17   Sara Benoit MD   polyethylene glycol (MIRALAX) powder MIX 17 GRAMS INTO 4-8 OUNCES OF ANY BEVERAGE TWICE DAILY FOR 7 DAYS 7/8/17   Provider, Historical, MD   UNIFINE PENTIPS 31G X 8 MM misc USE AS DIRECTED WITH SOLOSTAR ONCE DAILY 6/19/17   Sara Benoit MD   ALPRAZolam (XANAX) 0.5 MG tablet TAKE 1 TABLET BY MOUTH THREE TIMES DAILY 8/7/17 11/2/19  Saar Benoit MD   citalopram (CeleXA) 40 MG tablet TAKE 1 TABLET BY MOUTH ONCE DAILY 8/2/17 11/2/19  Provider, MD Sara   pantoprazole (PROTONIX) 20 MG EC tablet Take 20 mg by " mouth Daily.  11/2/19  Provider, Sara, MD     Allergies   Allergen Reactions   • Sulfa Antibiotics Itching     Social History     Socioeconomic History   • Marital status:      Spouse name: Not on file   • Number of children: Not on file   • Years of education: Not on file   • Highest education level: Not on file   Tobacco Use   • Smoking status: Current Every Day Smoker     Packs/day: 2.00     Years: 36.00     Pack years: 72.00     Types: Cigarettes   • Smokeless tobacco: Never Used   Substance and Sexual Activity   • Alcohol use: No   • Drug use: No   • Sexual activity: Defer     Past Medical History:   Diagnosis Date   • Anxiety and depression    • Asthma    • Cancer (CMS/HCC)    • Chronic kidney disease    • COPD (chronic obstructive pulmonary disease) (CMS/HCC)    • Diabetes mellitus (CMS/HCC)    • Hypertension    • Myocardial infarction (CMS/HCC)      Past Surgical History:   Procedure Laterality Date   • CORONARY STENT PLACEMENT     • GALLBLADDER SURGERY     • HYSTERECTOMY     • KIDNEY SURGERY     • NEPHRECTOMY Left    • REPLACEMENT TOTAL KNEE     • TONSILLECTOMY       Family History   Problem Relation Age of Onset   • Heart disease Mother    • Hypertension Mother    • Heart disease Father    • Hypertension Father        Vital Signs   Temp:  [97.4 °F (36.3 °C)] 97.4 °F (36.3 °C)  Heart Rate:  [76] 76  Resp:  [18] 18  BP: (106)/(69) 106/69      11/02/19  1346   Weight: 83 kg (182 lb 15.7 oz)     Body mass index is 30.45 kg/m².    Physical Exam in general she is alert and in intermittent moderate discomfort.  She responds appropriately to questions and commands.  She appears older than her stated age.      HEENT exam showed extraocular movements are intact.  Oropharynx shows teeth are in poor repair.    Exam of the lungs shows scattered inspiratory wheezes.    Cardiac exam shows a regular rhythm normal rate no murmur.    The abdomen is soft obese and nontender with active bowel sounds.   Genitourinary and rectal exams were not done.    Exam of the extremities is directed to the] left lower extremity.  There are anterior and posterior splints crossing the knee.  Planes were loosened.  There is a well-healed midline anterior scar across the knee.  There is moderate swelling diffusely about the distal thigh and knee but no ecchymosis.  Posterior tibial pulse is strong.  Sensory exam is intact to soft touch.    Radiographs of the knee done earlier today show a slightly comminuted fracture of the distal shaft of the femur with anterior angulation of proximal approaching 45 degrees.  There is a total knee arthroplasty in satisfactory position with no evidence of loosening.      Results Review:  Imaging Results (Last 24 Hours)     ** No results found for the last 24 hours. **        Lab Results (last 24 hours)     ** No results found for the last 24 hours. **          Assessment/Plan 1 fracture distal shaft left femur.  2 well-functioning left knee arthroplasty.  3 diabetes.  4 COPD.    The natural history of the disorder and treatment options were reviewed with the patient and her family.  Discussed both nonoperative and surgical treatment.  I recommended reduction and stabilization of the fracture with a retrograde intramedullary nail.  I think this would result in more certain healing of the fracture and also will allow earlier mobilization and weightbearing.        Anticipated benefits of surgery were reviewed in detail.  Potential complications of surgery and anesthetic were reviewed in detail including but not limited to infection, blood loss, sore throat, pneumonia, brain damage and even death.  Postoperative immobilization and rehabilitation were discussed.  They understand that even with prompt healing of her fracture the knee replacement may not function as well as it did prior to her injury..  The patient and family appear to understand all matters discussed and wish to proceed.      The left  thigh was prepped. A hematoma block was performed with a mixture of Marcaine and xylocaine. There were no complications and she reported improvement in her pain.     Howie Campoverde MD  11/02/19  6:53 PM     Electronically signed by Howie Campoverde MD at 11/02/19 2100             Howie Campoverde MD at 11/02/19 6339          ORTHOPAEDIC CONSULT     Patient Name:  Yudi West  Admit Date:  11/2/2019  Consult Date:  11/2/2019    Referring Provider: Dr. Grant  Reason for Consultation: Fracture left femur.    Patient Care Team:  Ibeth Masters APRN as PCP - General (Nurse Practitioner)    History of present illness: Mrs. West is 49 years old.  She was descending some stairs earlier today when she sustained a twisting injury to her left leg.  She had prompt onset of pain.  She was seen in the emergency room in [place] and x-rays were performed showing a fracture of the distal shaft of the femur.  Past history is remarkable for a total knee replacement on the left about 9 years ago by Dr. Arrington.  She said she was doing well with regard to her knee prior to her most recent injury.  Patient requested to have care for the fracture in [place].  I spoke by phone with Dr. Ritter and agreed to accept him in transfer.  This was an isolated injury.    Medications include Plavix carvedilol, Neurontin, insulin, Tagamet, sertraline, 10 mg hydrocodone.  She is allergic to sulfa drugs.  She smokes 2 pack/day of cigarettes and denies use of alcohol.    Past medical history is remarkable for coronary artery disease COPD and diabetes.  Previous surgeries include nephrectomy for carcinoma, hysterectomy, tonsillectomy, and cholecystectomy.  There is been no history of anesthetic complication.  She also has a history of chronic back pain.    Family history she is .    Social history she lives in [place] with her  and son.  She is disabled.    Review of Systems is remarkable  "for pain well localized to the the left knee and distal thigh.  There is been no numbness or tingling in the limb.  Otherwise complete ROS is negative except as mentioned above.    Prior to Admission medications    Medication Sig Start Date End Date Taking? Authorizing Provider   aspirin 81 MG EC tablet Take 81 mg by mouth Daily.   Yes Sara Benoit MD   atorvastatin (LIPITOR) 40 MG tablet Take 40 mg by mouth Every Night. 7/18/17  Yes Sara Benoit MD   carvedilol (COREG) 6.25 MG tablet Take 6.25 mg by mouth 2 (Two) Times a Day With Meals.   Yes Sara Benoit MD   clopidogrel (PLAVIX) 75 MG tablet TAKE 1 TABLET BY MOUTH ONCE DAILY 6/20/18  Yes Lewis Johnson MD   gabapentin (NEURONTIN) 300 MG capsule TAKE 1 TABLET BY MOUTH FOUR TIMES DAILY 8/7/17  Yes Sara Benoit MD   HYDROcodone-acetaminophen (NORCO) 5-325 MG per tablet Take 1 tablet by mouth Every 6 (Six) Hours As Needed.   Yes Sara Benoit MD   insulin glargine (LANTUS) 100 UNIT/ML injection Inject 50 Units under the skin into the appropriate area as directed Daily.   Yes Sara Benoit MD   Omeprazole 20 MG tablet delayed-release TAKE 1 TABLET BY MOUTh twice daily 5/25/17  Yes Sara Benoit MD   ranolazine (RANEXA) 500 MG 12 hr tablet Take 1 tablet by mouth 2 (Two) Times a Day. 6/3/19  Yes Lewis Johnson MD   sertraline (ZOLOFT) 50 MG tablet Take 50 mg by mouth Daily.   Yes Sara Benoit MD   VENTOLIN  (90 BASE) MCG/ACT inhaler 2 puffs 4 (Four) Times a Day. 6/5/17  Yes Sara Benoit MD   zolpidem (AMBIEN) 10 MG tablet TAKE 1 TABLET BY MOUTH AT BEDTIME 8/7/17  Yes Sara Benoit MD   EASY TOUCH INSULIN SYRINGE 28G X 1/2\" 0.5 ML misc USE 4 TIMES DAILY 5/23/17   Sara Benoit MD   polyethylene glycol (MIRALAX) powder MIX 17 GRAMS INTO 4-8 OUNCES OF ANY BEVERAGE TWICE DAILY FOR 7 DAYS 7/8/17   Sara Benoit MD   UNIFINE PENTIPS 31G X 8 MM " misc USE AS DIRECTED WITH SOLOSTAR ONCE DAILY 6/19/17   Sara Benoit MD   ALPRAZolam (XANAX) 0.5 MG tablet TAKE 1 TABLET BY MOUTH THREE TIMES DAILY 8/7/17 11/2/19  Sara Benoit MD   citalopram (CeleXA) 40 MG tablet TAKE 1 TABLET BY MOUTH ONCE DAILY 8/2/17 11/2/19  Sara Benoit MD   pantoprazole (PROTONIX) 20 MG EC tablet Take 20 mg by mouth Daily.  11/2/19  Sara Benoit MD     Allergies   Allergen Reactions   • Sulfa Antibiotics Itching     Social History     Socioeconomic History   • Marital status:      Spouse name: Not on file   • Number of children: Not on file   • Years of education: Not on file   • Highest education level: Not on file   Tobacco Use   • Smoking status: Current Every Day Smoker     Packs/day: 2.00     Years: 36.00     Pack years: 72.00     Types: Cigarettes   • Smokeless tobacco: Never Used   Substance and Sexual Activity   • Alcohol use: No   • Drug use: No   • Sexual activity: Defer     Past Medical History:   Diagnosis Date   • Anxiety and depression    • Asthma    • Cancer (CMS/HCC)    • Chronic kidney disease    • COPD (chronic obstructive pulmonary disease) (CMS/HCC)    • Diabetes mellitus (CMS/HCC)    • Hypertension    • Myocardial infarction (CMS/HCC)      Past Surgical History:   Procedure Laterality Date   • CORONARY STENT PLACEMENT     • GALLBLADDER SURGERY     • HYSTERECTOMY     • KIDNEY SURGERY     • NEPHRECTOMY Left    • REPLACEMENT TOTAL KNEE     • TONSILLECTOMY       Family History   Problem Relation Age of Onset   • Heart disease Mother    • Hypertension Mother    • Heart disease Father    • Hypertension Father        Vital Signs   Temp:  [97.4 °F (36.3 °C)] 97.4 °F (36.3 °C)  Heart Rate:  [76] 76  Resp:  [18] 18  BP: (106)/(69) 106/69      11/02/19  1346   Weight: 83 kg (182 lb 15.7 oz)     Body mass index is 30.45 kg/m².    Physical Exam in general she is alert and in intermittent moderate discomfort.  She responds appropriately to  questions and commands.  She appears older than her stated age.      HEENT exam showed extraocular movements are intact.  Oropharynx shows teeth are in poor repair.    Exam of the lungs shows scattered inspiratory wheezes.    Cardiac exam shows a regular rhythm normal rate no murmur.    The abdomen is soft obese and nontender with active bowel sounds.  Genitourinary and rectal exams were not done.    Exam of the extremities is directed to the] left lower extremity.  There are anterior and posterior splints crossing the knee.  Planes were loosened.  There is a well-healed midline anterior scar across the knee.  There is moderate swelling diffusely about the distal thigh and knee but no ecchymosis.  Posterior tibial pulse is strong.  Sensory exam is intact to soft touch.    Radiographs of the knee done earlier today show a slightly comminuted fracture of the distal shaft of the femur with anterior angulation of proximal approaching 45 degrees.  There is a total knee arthroplasty in satisfactory position with no evidence of loosening.      Results Review:  Imaging Results (Last 24 Hours)     ** No results found for the last 24 hours. **        Lab Results (last 24 hours)     ** No results found for the last 24 hours. **          Assessment/Plan 1 fracture distal shaft left femur.  2 well-functioning left knee arthroplasty.  3 diabetes.  4 COPD.    The natural history of the disorder and treatment options were reviewed with the patient and her family.  Discussed both nonoperative and surgical treatment.  I recommended reduction and stabilization of the fracture with a retrograde intramedullary nail.  I think this would result in more certain healing of the fracture and also will allow earlier mobilization and weightbearing.        Anticipated benefits of surgery were reviewed in detail.  Potential complications of surgery and anesthetic were reviewed in detail including but not limited to infection, blood loss, sore  throat, pneumonia, brain damage and even death.  Postoperative immobilization and rehabilitation were discussed.  They understand that even with prompt healing of her fracture the knee replacement may not function as well as it did prior to her injury..  The patient and family appear to understand all matters discussed and wish to proceed.      The left thigh was prepped. A hematoma block was performed with a mixture of Marcaine and xylocaine. There were no complications and she reported improvement in her pain.     Howie Campoverde MD  11/02/19  6:53 PM    Electronically signed by Howie Campoverde MD at 11/02/19 2100     Nicolas Grant MD at 11/02/19 1833                [facility] Medicine Admission      Date of Admission: 11/2/2019      Primary Care Physician: Ibeth Masters APRN      Chief Complaint: left leg pain    HPI:    This is a 49-year-old female with concurrent medical history of COPD diabetes coronary artery disease status post stenting hypertension presenting to the ER after she fell off the stairs and hurt her left leg and she reported having pain in her left knee mainly.  She does have a history of knee replacement done about 9 years ago.  She was at [facility] and she opted to come to [place] and she was transferred here.  Patient reports that she does not have any chest pain but does have some intermittent difficulty breathing and has a history of COPD.    Past Medical History:  has a past medical history of Anxiety and depression, Asthma, Cancer (CMS/[facility]), Chronic kidney disease, COPD (chronic obstructive pulmonary disease) (CMS/HCC), Diabetes mellitus (CMS/HCC), Hypertension, and Myocardial infarction (CMS/[facility]).    Past Surgical History:  has a past surgical history that includes Kidney surgery; Replacement total knee; Hysterectomy; Tonsillectomy; Gallbladder surgery; Coronary stent placement; and Nephrectomy (Left).    Family  History: family history includes Heart disease in her father and mother; Hypertension in her father and mother.    Social History:  reports that she has been smoking cigarettes.  She has a 72.00 pack-year smoking history. She has never used smokeless tobacco. She reports that she does not drink alcohol or use drugs.    Allergies:   Allergies   Allergen Reactions   • Sulfa Antibiotics Itching       Medications: Scheduled Meds:  bupivacaine (PF) 10 mL Injection Once   ipratropium-albuterol 3 mL Nebulization 4x Daily - RT   lidocaine 10 mL Subcutaneous Once   sodium chloride 10 mL Intravenous Q12H     Continuous Infusions:   PRN Meds:.Morphine **AND** naloxone  •  ondansetron  •  sodium chloride  No current facility-administered medications on file prior to encounter.      Current Outpatient Medications on File Prior to Encounter   Medication Sig Dispense Refill   • aspirin 81 MG EC tablet Take 81 mg by mouth Daily.     • atorvastatin (LIPITOR) 40 MG tablet Take 40 mg by mouth Every Night.  3   • carvedilol (COREG) 6.25 MG tablet Take 6.25 mg by mouth 2 (Two) Times a Day With Meals.     • clopidogrel (PLAVIX) 75 MG tablet TAKE 1 TABLET BY MOUTH ONCE DAILY 30 tablet 6   • gabapentin (NEURONTIN) 300 MG capsule TAKE 1 TABLET BY MOUTH FOUR TIMES DAILY  0   • HYDROcodone-acetaminophen (NORCO) 5-325 MG per tablet Take 1 tablet by mouth Every 6 (Six) Hours As Needed.     • insulin glargine (LANTUS) 100 UNIT/ML injection Inject 50 Units under the skin into the appropriate area as directed Daily.     • Omeprazole 20 MG tablet delayed-release TAKE 1 TABLET BY MOUTh twice daily  3   • ranolazine (RANEXA) 500 MG 12 hr tablet Take 1 tablet by mouth 2 (Two) Times a Day. 60 tablet 6   • sertraline (ZOLOFT) 50 MG tablet Take 50 mg by mouth Daily.     • VENTOLIN  (90 BASE) MCG/ACT inhaler 2 puffs 4 (Four) Times a Day.  1   • zolpidem (AMBIEN) 10 MG tablet TAKE 1 TABLET BY MOUTH AT BEDTIME  0   • EASY TOUCH INSULIN SYRINGE 28G X  "1/2\" 0.5 ML misc USE 4 TIMES DAILY  3   • polyethylene glycol (MIRALAX) powder MIX 17 GRAMS INTO 4-8 OUNCES OF ANY BEVERAGE TWICE DAILY FOR 7 DAYS  0   • UNIFINE PENTIPS 31G X 8 MM misc USE AS DIRECTED WITH SOLOSTAR ONCE DAILY  3   • [DISCONTINUED] ALPRAZolam (XANAX) 0.5 MG tablet TAKE 1 TABLET BY MOUTH THREE TIMES DAILY  0   • [DISCONTINUED] citalopram (CeleXA) 40 MG tablet TAKE 1 TABLET BY MOUTH ONCE DAILY  3   • [DISCONTINUED] pantoprazole (PROTONIX) 20 MG EC tablet Take 20 mg by mouth Daily.         Review of Systems:  Review of Systems   HENT: Positive for congestion.    Respiratory: Positive for shortness of breath.    Cardiovascular: Negative for chest pain.   Gastrointestinal: Negative for diarrhea.      Otherwise complete ROS is negative except as mentioned above.    Physical Exam:   Temp:  [97.4 °F (36.3 °C)] 97.4 °F (36.3 °C)  Heart Rate:  [76] 76  Resp:  [18] 18  BP: (106)/(69) 106/69  Physical Exam   Constitutional: She appears well-developed and well-nourished. No distress.   HENT:   Head: Normocephalic and atraumatic.   Cardiovascular: Normal rate.   Pulmonary/Chest: Effort normal. No respiratory distress. She has no wheezes.   Abdominal: Soft. She exhibits no distension.   Musculoskeletal: Normal range of motion. She exhibits no edema.   Left knee brace in place   Neurological: She is alert. No cranial nerve deficit.   Skin: Skin is warm and dry. She is not diaphoretic.   Psychiatric: She has a normal mood and affect. Her behavior is normal. Judgment and thought content normal.   Vitals reviewed.        Results Reviewed:  I have personally reviewed current lab, radiology, and data and agree with results.  Lab Results (last 24 hours)     ** No results found for the last 24 hours. **        Imaging Results (Last 24 Hours)     ** No results found for the last 24 hours. **            Assessment:    Active Hospital Problems    Diagnosis   • Femur fracture (CMS/[facility])     Left femur fracture- orthopedic " services has been consulted and they are planning on doing surgery in the a.m.  We will continue with pain management    Pain-morphine has been ordered    Hypertension-continue to monitor    Diabetes mellitus-sliding scale insulin    Coronary artery disease-Plavix will be held    COPD- patient does not seem to be in acute exacerbation, nebulizer treatments will be ordered, will order ABG in the morning    DVT prophylaxis-SCD            Nicolas Grant MD  11/02/19  6:33 PM                  Electronically signed by Nicolas Grant MD at 11/02/19 1837       [facility] Medications (all)       Dose Frequency Start End    acetaminophen (TYLENOL) tablet 500 mg 500 mg Every 4 Hours PRN 11/3/2019     Sig - Route: Take 1 tablet by mouth Every 4 (Four) Hours As Needed for Fever (Temperature Greater Than 101F). - Oral    atorvastatin (LIPITOR) tablet 40 mg 40 mg Nightly 11/2/2019     Sig - Route: Take 1 tablet by mouth Every Night. - Oral    bupivacaine (PF) (MARCAINE) 0.5 % injection 10 mL 10 mL Once 11/2/2019 11/2/2019    Sig - Route: Inject 10 mL as directed 1 (One) Time. - Injection    carvedilol (COREG) tablet 6.25 mg 6.25 mg 2 Times Daily With Meals 11/2/2019     Sig - Route: Take 1 tablet by mouth 2 (Two) Times a Day With Meals. - Oral    ceFAZolin in 0.9% normal saline (ANCEF) IVPB solution 2 g 2 g Once 11/3/2019 11/3/2019    Sig - Route: Infuse 100 mL into a venous catheter 1 (One) Time. - Intravenous    ceFAZolin in 0.9% normal saline (ANCEF) IVPB solution 2 g 2 g Every 8 Hours 11/3/2019 11/4/2019    Sig - Route: Infuse 100 mL into a venous catheter Every 8 (Eight) Hours. - Intravenous    dextrose (D50W) 25 g/ 50mL Intravenous Solution 25 g 25 g Every 15 Minutes PRN 11/2/2019     Sig - Route: Infuse 50 mL into a venous catheter Every 15 (Fifteen) Minutes As Needed for Low Blood Sugar (Blood Sugar Less Than 70). - Intravenous    dextrose (GLUTOSE) oral gel 15 g 15 g Every 15 Minutes PRN 11/2/2019     Sig - Route:  Take 15 application by mouth Every 15 (Fifteen) Minutes As Needed for Low Blood Sugar (Blood sugar less than 70). - Oral    docusate sodium (COLACE) capsule 200 mg 200 mg 2 Times Daily PRN 11/3/2019     Sig - Route: Take 2 capsules by mouth 2 (Two) Times a Day As Needed for Constipation. - Oral    glucagon (human recombinant) (GLUCAGEN DIAGNOSTIC) injection 1 mg 1 mg Every 15 Minutes PRN 11/2/2019     Sig - Route: Inject 1 mg under the skin into the appropriate area as directed Every 15 (Fifteen) Minutes As Needed for Low Blood Sugar (Blood Glucose Less Than 70). - Subcutaneous    HYDROcodone-acetaminophen (NORCO) 7.5-325 MG per tablet 1 tablet 1 tablet Every 4 Hours PRN 11/3/2019 11/13/2019    Sig - Route: Take 1 tablet by mouth Every 4 (Four) Hours As Needed for Moderate Pain . - Oral    insulin aspart (novoLOG) injection 0-14 Units 0-14 Units 4 Times Daily Before Meals & Nightly 11/2/2019     Sig - Route: Inject 0-14 Units under the skin into the appropriate area as directed 4 (Four) Times a Day Before Meals & at Bedtime. - Subcutaneous    insulin aspart (novoLOG) injection 20 Units 20 Units Once 11/2/2019 11/2/2019    Sig - Route: Inject 20 Units under the skin into the appropriate area as directed 1 (One) Time. - Subcutaneous    insulin detemir (LEVEMIR) injection 50 Units 50 Units Nightly 11/2/2019     Sig - Route: Inject 50 Units under the skin into the appropriate area as directed Every Night. - Subcutaneous    ipratropium-albuterol (DUO-NEB) nebulizer solution 3 mL 3 mL 4 Times Daily - RT 11/2/2019     Sig - Route: Take 3 mL by nebulization 4 (Four) Times a Day. - Nebulization    lidocaine (XYLOCAINE) 1 % injection 10 mL 10 mL Once 11/2/2019 11/2/2019    Sig - Route: Inject 10 mL under the skin into the appropriate area as directed 1 (One) Time. - Subcutaneous    morphine injection 6 mg 6 mg Every 2 Hours PRN 11/3/2019 11/13/2019    Sig - Route: Infuse 3 mL into a venous catheter Every 2 (Two) Hours As  "Needed for Severe Pain . - Intravenous    Linked Group 1:  \"And\" Linked Group Details        naloxone (NARCAN) injection 0.4 mg 0.4 mg Every 5 Minutes PRN 11/3/2019     Sig - Route: Infuse 1 mL into a venous catheter Every 5 (Five) Minutes As Needed for Respiratory Depression. - Intravenous    Linked Group 1:  \"And\" Linked Group Details        ondansetron (ZOFRAN) injection 4 mg 4 mg Every 6 Hours PRN 11/3/2019     Sig - Route: Infuse 2 mL into a venous catheter Every 6 (Six) Hours As Needed for Nausea or Vomiting. - Intravenous    Linked Group 2:  \"Or\" Linked Group Details        ondansetron (ZOFRAN) injection 4 mg 4 mg Once As Needed 11/3/2019 11/3/2019    Sig - Route: Infuse 2 mL into a venous catheter 1 (One) Time As Needed for Nausea or Vomiting. - Intravenous    ondansetron (ZOFRAN) tablet 4 mg 4 mg Every 6 Hours PRN 11/3/2019     Sig - Route: Take 1 tablet by mouth Every 6 (Six) Hours As Needed for Nausea or Vomiting. - Oral    Linked Group 2:  \"Or\" Linked Group Details        oxyCODONE-acetaminophen (PERCOCET) 7.5-325 MG per tablet 2 tablet 2 tablet Every 4 Hours PRN 11/3/2019 11/13/2019    Sig - Route: Take 2 tablets by mouth Every 4 (Four) Hours As Needed for Severe Pain . - Oral    pantoprazole (PROTONIX) EC tablet 40 mg 40 mg Every Morning 11/3/2019     Sig - Route: Take 1 tablet by mouth Every Morning. - Oral    promethazine (PHENERGAN) tablet 12.5 mg 12.5 mg Every 6 Hours PRN 11/3/2019     Sig - Route: Take 1 tablet by mouth Every 6 (Six) Hours As Needed for Nausea or Vomiting (If BOTH ondansetron (ZOFRAN) and promethazine (PHENERGAN) are ordered use ondansetron first and THEN promethazine IF ondansetron is ineffective.). - Oral    ranolazine (RANEXA) 12 hr tablet 500 mg 500 mg 2 Times Daily 11/2/2019     Sig - Route: Take 1 tablet by mouth 2 (Two) Times a Day. - Oral    sertraline (ZOLOFT) tablet 50 mg 50 mg Daily 11/3/2019     Sig - Route: Take 1 tablet by mouth Daily. - Oral    sodium chloride 0.9 % " flush 10 mL 10 mL Every 12 Hours Scheduled 11/2/2019     Sig - Route: Infuse 10 mL into a venous catheter Every 12 (Twelve) Hours. - Intravenous    sodium chloride 0.9 % flush 10 mL 10 mL As Needed 11/2/2019     Sig - Route: Infuse 10 mL into a venous catheter As Needed for Line Care. - Intravenous    sodium chloride 0.9 % flush 3 mL 3 mL Every 12 Hours Scheduled 11/3/2019     Sig - Route: Infuse 3 mL into a venous catheter Every 12 (Twelve) Hours. - Intravenous    sodium chloride 0.9 % flush 3 mL 3 mL Every 12 Hours Scheduled 11/3/2019     Sig - Route: Infuse 3 mL into a venous catheter Every 12 (Twelve) Hours. - Intravenous    sodium chloride 0.9 % flush 3-10 mL 3-10 mL As Needed 11/3/2019     Sig - Route: Infuse 3-10 mL into a venous catheter As Needed for Line Care. - Intravenous    sodium chloride 0.9 % flush 3-10 mL 3-10 mL As Needed 11/3/2019     Sig - Route: Infuse 3-10 mL into a venous catheter As Needed for Line Care. - Intravenous    sodium chloride 0.9 % infusion 100 mL/hr Continuous 11/3/2019     Sig - Route: Infuse 100 mL/hr into a venous catheter Continuous. - Intravenous    Tranexamic Acid tablet 1,300 mg 1,300 mg Once 11/3/2019 11/3/2019    Sig - Route: Take 2 tablets by mouth 1 (One) Time. - Oral    Tranexamic Acid tablet 1,300 mg 1,300 mg Every 4 Hours 11/3/2019 11/3/2019    Sig - Route: Take 2 tablets by mouth Every 4 (Four) Hours. - Oral    zolpidem (AMBIEN) tablet 10 mg 10 mg Nightly PRN 11/2/2019     Sig - Route: Take 2 tablets by mouth At Night As Needed for Sleep. - Oral    acetaminophen (TYLENOL) tablet 500 mg (Discontinued) 500 mg Every 4 Hours PRN 11/3/2019 11/3/2019    Sig - Route: Take 1 tablet by mouth Every 4 (Four) Hours As Needed for Fever (Temperature Greater Than 101F). - Oral    Reason for Discontinue: Duplicate order    bupivacaine (PF) (MARCAINE) 0.5 % injection (Discontinued)  As Needed 11/3/2019 11/3/2019    Sig: As Needed.    Reason for Discontinue: Patient Discharge     "docusate sodium (COLACE) capsule 250 mg (Discontinued) 250 mg 2 Times Daily PRN 11/3/2019 11/3/2019    Sig - Route: Take 5 capsules by mouth 2 (Two) Times a Day As Needed for Constipation. - Oral    Reason for Discontinue: Duplicate order    HYDROcodone-acetaminophen (NORCO) 5-325 MG per tablet 1 tablet (Discontinued) 1 tablet Every 6 Hours PRN 11/2/2019 11/3/2019    Sig - Route: Take 1 tablet by mouth Every 6 (Six) Hours As Needed for Moderate Pain . - Oral    HYDROcodone-acetaminophen (NORCO) 7.5-325 MG per tablet 2 tablet (Discontinued) 2 tablet Every 4 Hours PRN 11/3/2019 11/3/2019    Sig - Route: Take 2 tablets by mouth Every 4 (Four) Hours As Needed for Severe Pain . - Oral    Reason for Discontinue: Duplicate order    HYDROmorphone (DILAUDID) injection 0.5 mg (Discontinued) 0.5 mg Every 15 Minutes PRN 11/3/2019 11/3/2019    Sig - Route: Infuse 0.5 mL into a venous catheter Every 15 (Fifteen) Minutes As Needed for Severe Pain . - Intravenous    Reason for Discontinue: Patient Transfer    morphine injection 1 mg (Discontinued) 1 mg Every 4 Hours PRN 11/2/2019 11/3/2019    Sig - Route: Infuse 0.5 mL into a venous catheter Every 4 (Four) Hours As Needed for Moderate Pain . - Intravenous    Linked Group 3:  \"And\" Linked Group Details        naloxone (NARCAN) injection 0.4 mg (Discontinued) 0.4 mg Every 5 Minutes PRN 11/2/2019 11/3/2019    Sig - Route: Infuse 1 mL into a venous catheter Every 5 (Five) Minutes As Needed for Respiratory Depression. - Intravenous    Reason for Discontinue: Duplicate order    Linked Group 3:  \"And\" Linked Group Details        ondansetron (ZOFRAN) injection 4 mg (Discontinued) 4 mg Every 6 Hours PRN 11/2/2019 11/3/2019    Sig - Route: Infuse 2 mL into a venous catheter Every 6 (Six) Hours As Needed for Nausea or Vomiting. - Intravenous    Reason for Discontinue: Duplicate order    promethazine (PHENERGAN) tablet 12.5 mg (Discontinued) 12.5 mg Every 6 Hours PRN 11/3/2019 11/3/2019    Sig " - Route: Take 1 tablet by mouth Every 6 (Six) Hours As Needed for Nausea or Vomiting (If BOTH ondansetron (ZOFRAN) and promethazine (PHENERGAN) are ordered use ondansetron first and THEN promethazine IF ondansetron is ineffective.). - Oral    Reason for Discontinue: Duplicate order    sodium chloride 0.9 % infusion (Discontinued) 100 mL/hr Continuous 11/3/2019 11/3/2019    Sig - Route: Infuse 100 mL/hr into a venous catheter Continuous. - Intravenous    Reason for Discontinue: Duplicate order    Tranexamic Acid Sterile Solution (Discontinued)  As Needed 11/3/2019 11/3/2019    Sig: As Needed.    Reason for Discontinue: Patient Discharge    vancomycin (VANCOCIN) injection (Discontinued)  As Needed 11/3/2019 11/3/2019    Sig: As Needed.    Reason for Discontinue: Patient Discharge          Lab Results (last 48 hours)     Procedure Component Value Units Date/Time    POC Glucose Once [[id number]]  (Abnormal) Collected:  11/04/19 0720    Specimen:  Blood Updated:  11/04/19 0739     Glucose 149 mg/dL      Comment: RN NotifiedOperator: [id number] KELLEY GRACEMeter ID: [id number]       Basic Metabolic Panel [[id number]]  (Abnormal) Collected:  11/04/19 0539    Specimen:  Blood Updated:  11/04/19 0637     Glucose 143 mg/dL      BUN 10 mg/dL      Creatinine 0.86 mg/dL      Sodium 137 mmol/L      Potassium 3.7 mmol/L      Chloride 102 mmol/L      CO2 27.0 mmol/L      Calcium 8.1 mg/dL      eGFR Non African Amer 70 mL/min/1.73      BUN/Creatinine Ratio 11.6     Anion Gap 8.0 mmol/L     Narrative:       GFR Normal >60  Chronic Kidney Disease <60  Kidney Failure <15    CBC & Differential [[id number]] Collected:  11/04/19 0539    Specimen:  Blood Updated:  11/04/19 0611    Narrative:       The following orders were created for panel order CBC & Differential.  Procedure                               Abnormality         Status                     ---------                               -----------         ------                     CBC  Auto Differential[[id number]]        Abnormal            Final result                 Please view results for these tests on the individual orders.    CBC Auto Differential [[id number]]  (Abnormal) Collected:  11/04/19 0539    Specimen:  Blood Updated:  11/04/19 0611     WBC 12.81 10*3/mm3      RBC 2.78 10*6/mm3      Hemoglobin 8.3 g/dL      Hematocrit 24.5 %      MCV 88.1 fL      MCH 29.9 pg      MCHC 33.9 g/dL      RDW 11.9 %      RDW-SD 38.5 fl      MPV 10.9 fL      Platelets 168 10*3/mm3      Neutrophil % 68.7 %      Lymphocyte % 19.6 %      Monocyte % 9.4 %      Eosinophil % 1.7 %      Basophil % 0.2 %      Immature Grans % 0.4 %      Neutrophils, Absolute 8.80 10*3/mm3      Lymphocytes, Absolute 2.51 10*3/mm3      Monocytes, Absolute 1.20 10*3/mm3      Eosinophils, Absolute 0.22 10*3/mm3      Basophils, Absolute 0.03 10*3/mm3      Immature Grans, Absolute 0.05 10*3/mm3      nRBC 0.0 /100 WBC     POC Glucose Once [[id number]]  (Abnormal) Collected:  11/03/19 1933    Specimen:  Blood Updated:  11/03/19 1951     Glucose 236 mg/dL      Comment: RN NotifiedOperator: [id number] VALENCIA STEPHANIEMeter ID: [id number]       POC Glucose Once [[id number]]  (Abnormal) Collected:  11/03/19 1642    Specimen:  Blood Updated:  11/03/19 1659     Glucose 209 mg/dL      Comment: RN NotifiedOperator: [id number] MANDI StormWindMeter ID: [id number]       POC Glucose Once [[id number]]  (Abnormal) Collected:  11/03/19 1412    Specimen:  Blood Updated:  11/03/19 1658     Glucose 186 mg/dL      Comment: RN NotifiedOperator: [phone] MANDI StormWindMeter ID: [id number]       POC Glucose Once [[id number]]  (Abnormal) Collected:  11/03/19 1235    Specimen:  Blood Updated:  11/03/19 1247     Glucose 160 mg/dL      Comment: RN NotifiedOperator: [id number] CEM PATRICIAMeter ID: [id number]       Urinalysis With Culture If Indicated - Urine, Catheter [[id number]]  (Abnormal) Collected:  11/03/19 0529    Specimen:  Urine, Catheter Updated:   11/03/19 0756     Color, UA Yellow     Appearance, UA Slightly Cloudy     pH, UA 6.0     Specific Gravity, UA 1.008     Comment: Result obtained by Refractometer        Glucose,  mg/dL (1+)     Ketones, UA Negative     Bilirubin, UA Negative     Blood, UA Negative     Protein, UA Negative     Leuk Esterase, UA Negative     Nitrite, UA Negative     Urobilinogen, UA 0.2 E.U./dL    Narrative:       Urine microscopic not indicated.    POC Glucose Once [[id number]]  (Abnormal) Collected:  11/03/19 0733    Specimen:  Blood Updated:  11/03/19 0751     Glucose 204 mg/dL      Comment: RN NotifiedOperator: [id number] MANDI AYAZMeter ID: [id number]       POC Glucose Once [[id number]]  (Abnormal) Collected:  11/02/19 2332    Specimen:  Blood Updated:  11/03/19 0700     Glucose 353 mg/dL      Comment: RN NotifiedOperator: [id number] FRANK MOLLYMeter ID: [id number]       Basic Metabolic Panel [[id number]]  (Abnormal) Collected:  11/03/19 0516    Specimen:  Blood Updated:  11/03/19 0620     Glucose 193 mg/dL      BUN 12 mg/dL      Creatinine 0.82 mg/dL      Sodium 137 mmol/L      Potassium 3.7 mmol/L      Chloride 101 mmol/L      CO2 26.0 mmol/L      Calcium 8.3 mg/dL      eGFR Non African Amer 74 mL/min/1.73      BUN/Creatinine Ratio 14.6     Anion Gap 10.0 mmol/L     Narrative:       GFR Normal >60  Chronic Kidney Disease <60  Kidney Failure <15    CBC & Differential [[id number]] Collected:  11/03/19 0516    Specimen:  Blood Updated:  11/03/19 0611    Narrative:       The following orders were created for panel order CBC & Differential.  Procedure                               Abnormality         Status                     ---------                               -----------         ------                     CBC Auto Differential[[id number]]        Abnormal            Final result                 Please view results for these tests on the individual orders.    CBC Auto Differential [[id number]]  (Abnormal)  Collected:  11/03/19 0516    Specimen:  Blood Updated:  11/03/19 0611     WBC 10.97 10*3/mm3      RBC 3.33 10*6/mm3      Hemoglobin 10.0 g/dL      Hematocrit 29.0 %      MCV 87.1 fL      MCH 30.0 pg      MCHC 34.5 g/dL      RDW 11.7 %      RDW-SD 37.4 fl      MPV 10.9 fL      Platelets 194 10*3/mm3      Neutrophil % 68.1 %      Lymphocyte % 20.1 %      Monocyte % 9.4 %      Eosinophil % 1.5 %      Basophil % 0.4 %      Immature Grans % 0.5 %      Neutrophils, Absolute 7.48 10*3/mm3      Lymphocytes, Absolute 2.21 10*3/mm3      Monocytes, Absolute 1.03 10*3/mm3      Eosinophils, Absolute 0.16 10*3/mm3      Basophils, Absolute 0.04 10*3/mm3      Immature Grans, Absolute 0.05 10*3/mm3      nRBC 0.0 /100 WBC     Blood Gas, Arterial [[id number]]  (Abnormal) Collected:  11/03/19 0416    Specimen:  Arterial Blood Updated:  11/03/19 0428     Site Left Radial     Miguel's Test N/A     pH, Arterial 7.384 pH units      pCO2, Arterial 41.3 mm Hg      pO2, Arterial 94.2 mm Hg      HCO3, Arterial 24.6 mmol/L      Base Excess, Arterial -0.4 mmol/L      Comment: 84 Value below reference range        O2 Saturation, Arterial 98.2 %      Barometric Pressure for Blood Gas 754 mmHg      Modality Nasal Cannula     Flow Rate 3.5 lpm      Ventilator Mode NA     Collected by KATJA DOMINGUEZ     Comment: Meter: [id number]     :  [id number]       Extra Tubes [[id number]] Collected:  11/02/19 2356    Specimen:  Blood, Venous Line Updated:  11/03/19 0100    Narrative:       The following orders were created for panel order Extra Tubes.  Procedure                               Abnormality         Status                     ---------                               -----------         ------                     Light Blue Top[[id number]]                                   Final result               Lavender Top[[id number]]                                     Final result               Green Top (Gel)[[id number]]                                  Final  result                 Please view results for these tests on the individual orders.    Light Blue Top [[id number]] Collected:  11/02/19 2356    Specimen:  Blood Updated:  11/03/19 0100     Extra Tube hold for add-on     Comment: Auto resulted       Lavender Top [[id number]] Collected:  11/02/19 2356    Specimen:  Blood Updated:  11/03/19 0100     Extra Tube hold for add-on     Comment: Auto resulted       Green Top (Gel) [[id number]] Collected:  11/02/19 2356    Specimen:  Blood Updated:  11/03/19 0100     Extra Tube Hold for add-ons.     Comment: Auto resulted.       Glucose, Random [[id number]]  (Abnormal) Collected:  11/02/19 2021    Specimen:  Blood Updated:  11/02/19 2053     Glucose 449 mg/dL     Comprehensive Metabolic Panel [[id number]]  (Abnormal) Collected:  11/02/19 1931    Specimen:  Blood Updated:  11/02/19 1951     Glucose 391 mg/dL      BUN 14 mg/dL      Creatinine 0.98 mg/dL      Sodium 137 mmol/L      Potassium 4.3 mmol/L      Chloride 100 mmol/L      CO2 24.0 mmol/L      Calcium 8.5 mg/dL      Total Protein 6.8 g/dL      Albumin 4.00 g/dL      ALT (SGPT) 16 U/L      AST (SGOT) 12 U/L      Alkaline Phosphatase 113 U/L      Total Bilirubin 0.3 mg/dL      eGFR Non African Amer 60 mL/min/1.73      Globulin 2.8 gm/dL      A/G Ratio 1.4 g/dL      BUN/Creatinine Ratio 14.3     Anion Gap 13.0 mmol/L     Narrative:       GFR Normal >60  Chronic Kidney Disease <60  Kidney Failure <15    CBC & Differential [[id number]] Collected:  11/02/19 1931    Specimen:  Blood Updated:  11/02/19 1934    Narrative:       The following orders were created for panel order CBC & Differential.  Procedure                               Abnormality         Status                     ---------                               -----------         ------                     CBC Auto Differential[[id number]]        Abnormal            Final result                 Please view results for these tests on the individual orders.    CBC Auto  Differential [[id number]]  (Abnormal) Collected:  11/02/19 1931    Specimen:  Blood Updated:  11/02/19 1934     WBC 15.89 10*3/mm3      RBC 4.07 10*6/mm3      Hemoglobin 12.0 g/dL      Hematocrit 36.1 %      MCV 88.7 fL      MCH 29.5 pg      MCHC 33.2 g/dL      RDW 11.8 %      RDW-SD 37.7 fl      MPV 10.7 fL      Platelets 241 10*3/mm3      Neutrophil % 75.5 %      Lymphocyte % 13.3 %      Monocyte % 8.2 %      Eosinophil % 2.0 %      Basophil % 0.5 %      Immature Grans % 0.5 %      Neutrophils, Absolute 12.00 10*3/mm3      Lymphocytes, Absolute 2.12 10*3/mm3      Monocytes, Absolute 1.30 10*3/mm3      Eosinophils, Absolute 0.31 10*3/mm3      Basophils, Absolute 0.08 10*3/mm3      Immature Grans, Absolute 0.08 10*3/mm3      nRBC 0.0 /100 WBC           Imaging Results (Last 48 Hours)     Procedure Component Value Units Date/Time    FL C Arm During Surgery [[id number]] Resulted:  11/04/19 0814     Updated:  11/04/19 0814    XR Femur 2 View Left [[id number]] Collected:  11/03/19 1254     Updated:  11/03/19 1319    Narrative:         Portable two view left femur    HISTORY: Postoperative study. Internal fixation.    Portable AP and crosstable lateral views of the left femur  obtained at 12:44 PM.    COMPARISON: November 2, 2019    FINDINGS:   Intramedullary abbi with one proximal interlocking screw and two  distal interlocking screws now in place transversing the  comminuted minimally displaced fracture distal femoral  diametaphyseal region.  Immediate postoperative intra-articular air.  Surgical skin staples in place.  Previously demonstrated total left knee prosthesis.  No dislocation.        Impression:       CONCLUSION:  Internal fixation of the comminuted fracture of the distal femur.    [id number]    Electronically signed by:  Juve Rico MD  11/3/2019 1:18 PM [facility]  Workstation: [id number]    XR Femur 2 View Left [[id number]] Collected:  11/02/19 2055     Updated:  11/02/19 2115    Narrative:       Pain with injury.    Left  femur, four views.    Examination revealed severe comminuted fracture of the distal  femur. There is knee prosthesis with femoral and tibial  components.      Impression:       CONCLUSION: Severe comminuted fracture of the distal femur.    Electronically signed by:  Ernesto Sol MD  11/2/2019 9:14  PM CDT Workstation: [employer]        ECG/EMG Results (last 48 hours)     ** No results found for the last 48 hours. **           Operative/Procedure Notes (last 48 hours) (Notes from 11/02/19 0838 through 11/04/19 0838)      Howie Campoverde MD at 11/03/19 0953          Procedure(s):  FEMUR INTRAMEDULLARY NAILING RETROGRADE  Procedure Note    Yudi West  11/3/2019    Pre-op Diagnosis: Displaced supracondylar/distal shaft fracture left femur  Closed displaced supracondylar fracture of distal end of left femur without intracondylar extension, initial encounter (CMS/[facility]) [S72.452A]    Post-op Diagnosis:   Same  Post-Op Diagnosis Codes:     * Closed displaced supracondylar fracture of distal end of left femur without intracondylar extension, initial encounter (CMS/[facility]) [S72.452A]    Procedure/CPT® Codes: Closed reduction left femur with retrograde interlocking intramedullary nailing (CPT code 68031).      Procedure(s):  FEMUR INTRAMEDULLARY NAILING RETROGRADE    Surgeon(s):  Howie Campoverde MD    Anesthesia: Choice    Staff:   Circulator: Julienne Weeks RN  Scrub Person: Jenny Florence  Assistant: Radha Mireles CSA    Estimated Blood Loss: 50 to 75 cc.    Specimens: None                     Drains: None   Urethral Catheter 16 Fr. (Active)   Daily Indications Required activity restriction from trauma, surgery, (e.g. unstable spine, fracture, hemodynamics) 11/3/2019  7:28 AM   Site Assessment Clean;Skin intact 11/3/2019  7:28 AM   Collection Container Standard drainage bag 11/3/2019  7:28 AM   Securement Method Securing device 11/3/2019  7:28 AM   Catheter care complete Yes 11/3/2019   7:28 AM   Output (mL) 500 mL 11/3/2019  3:45 AM       Findings: Comminuted displaced fracture distal shaft left humerus.    Complications: None    INDICATIONS : Patient is a 49-year-old female who sustained a fracture of the distal shaft of the left femur above a well-functioning knee arthroplasty.  Treatment options were reviewed and I recommended the above procedure.  Anticipated benefits of surgery as well as potential complications of surgery and anesthetic were reviewed in detail and she and her family appear to understand all matters discussed and wished to proceed.      Operative Report: Patient was brought to the operating room.  The timeout procedure was followed.  General anesthesia was induced.  C arm was brought into position and the fracture manipulated under fluoroscopic control.  I was not able to completely reduce the posterior displacement the angulation was acted adequately.  The left lower extremity was prepped and drapes applied.  Straight longitudinal incision was made over the anterior aspect of the knee.  Bleeding points were cauterized.  The end of the femoral component was identified.  A plug of bone cement was debrided.  A guidewire was advanced into the distal femur under fluoroscopic control.  Actual reamers were advanced over the guidewire up to a 13 mm in diameter.  A reaming abbi was then advanced across the fracture to the proximal femur.  Fracture was again manipulated with traction.  Bumps have been placed below the thigh prior to prepping to assist in reduction.  A small stab wound was made over the lateral aspect of the distal thigh and an elevator was advanced posterior to the distal fragment to assist in reduction.  Satisfactory fracture alignment was achieved.  I chose as the implant for fixation a retrograde intramedullary nail manufactured by the [employer].  The nail was 11 mm in diameter and 320 mm in length.  All maintaining reduction in the nail was advanced over the  guidewire crossing the fracture site.  Distal locking was performed with 2 screws.  A cap Screw was placed at the end of the nail.  Proximal locking was performed using a single screw and a freehand technique.  The final position of the fracture and hardware were satisfactory C arm in multiple planes.  The distal end of the nail was flush with the femoral component.  Multiple cycles of irrigation were performed.  The patellar tendon was repaired using 0 Ethibond suture.  Subcutaneous tissues were closed with Vicryl.  Skin closure was with staples.  Wounds were infiltrated with Marcaine.  Fracture hematoma was injected with a mixture of tranexamic acid and saline.  This injection also included 1 g of vancomycin and 5 cc of Marcaine.  A portion of this injection was also injected into the knee joint.  A bulky soft dressing was applied followed by the knee immobilizer.    The patient was transported to the recovery room in stable condition.  There were no intraoperative or immediate postoperative complications.  Estimated blood loss was 50 to 100 cc.  Howie Campoverde MD  11/03/19  12:28 PM          Electronically signed by Howie Campoverde MD at 11/03/19 1237          Physician Progress Notes (last 48 hours) (Notes from 11/02/19 0838 through 11/04/19 0838)      Nicolas Grant MD at 11/03/19 1648              [facility] Medicine Services  INPATIENT PROGRESS NOTE    Length of Stay: 1  Date of Admission: 11/2/2019  Primary Care Physician: Ibeth Masters APRN    Subjective   Chief Complaint: Femur fracture  HPI:    Patient has been admitted for left femur fracture status post repair postop day #1.  She is doing fine after the surgery although she is having some pain.  Reports no difficulty breathing    Review of Systems   Respiratory: Negative for shortness of breath.    Cardiovascular: Negative for chest pain.        All pertinent negatives and positives are as  above. All other systems have been reviewed and are negative unless otherwise stated.     Objective    Temp:  [96 °F (35.6 °C)-98.8 °F (37.1 °C)] 96.4 °F (35.8 °C)  Heart Rate:  [] 93  Resp:  [16-22] 18  BP: ()/(60-88) 94/60  Physical Exam   Constitutional: She appears well-developed and well-nourished. No distress.   HENT:   Head: Normocephalic and atraumatic.   Cardiovascular: Normal rate.   Pulmonary/Chest: Effort normal. No respiratory distress. She has no wheezes.   Abdominal: Soft. She exhibits no distension.   Musculoskeletal: Normal range of motion. She exhibits no edema.   Neurological: She is alert. No cranial nerve deficit.   Skin: Skin is warm and dry. She is not diaphoretic.   Psychiatric: She has a normal mood and affect. Her behavior is normal. Judgment and thought content normal.   Vitals reviewed.          Results Review:  I have reviewed the labs, radiology results, and diagnostic studies.    Laboratory Data:   Lab Results (last 24 hours)     Procedure Component Value Units Date/Time    POC Glucose Once [[id number]]  (Abnormal) Collected:  11/03/19 1235    Specimen:  Blood Updated:  11/03/19 1247     Glucose 160 mg/dL      Comment: RN NotifiedOperator: [id number] [place] PATRICIAMeter ID: [id number]       Urinalysis With Culture If Indicated - Urine, Catheter [[id number]]  (Abnormal) Collected:  11/03/19 0529    Specimen:  Urine, Catheter Updated:  11/03/19 0756     Color, UA Yellow     Appearance, UA Slightly Cloudy     pH, UA 6.0     Specific Gravity, UA 1.008     Comment: Result obtained by Refractometer        Glucose,  mg/dL (1+)     Ketones, UA Negative     Bilirubin, UA Negative     Blood, UA Negative     Protein, UA Negative     Leuk Esterase, UA Negative     Nitrite, UA Negative     Urobilinogen, UA 0.2 E.U./dL    Narrative:       Urine microscopic not indicated.    POC Glucose Once [[id number]]  (Abnormal) Collected:  11/03/19 0733    Specimen:  Blood Updated:  11/03/19  0751     Glucose 204 mg/dL      Comment: RN NotifiedOperator: [id number] MANDI Cedillo ID: [id number]       POC Glucose Once [[id number]]  (Abnormal) Collected:  11/02/19 2332    Specimen:  Blood Updated:  11/03/19 0700     Glucose 353 mg/dL      Comment: RN NotifiedOperator: [id number] FRANK Otto ID: [id number]       Basic Metabolic Panel [[id number]]  (Abnormal) Collected:  11/03/19 0516    Specimen:  Blood Updated:  11/03/19 0620     Glucose 193 mg/dL      BUN 12 mg/dL      Creatinine 0.82 mg/dL      Sodium 137 mmol/L      Potassium 3.7 mmol/L      Chloride 101 mmol/L      CO2 26.0 mmol/L      Calcium 8.3 mg/dL      eGFR Non African Amer 74 mL/min/1.73      BUN/Creatinine Ratio 14.6     Anion Gap 10.0 mmol/L     Narrative:       GFR Normal >60  Chronic Kidney Disease <60  Kidney Failure <15    CBC & Differential [[id number]] Collected:  11/03/19 0516    Specimen:  Blood Updated:  11/03/19 0611    Narrative:       The following orders were created for panel order CBC & Differential.  Procedure                               Abnormality         Status                     ---------                               -----------         ------                     CBC Auto Differential[[id number]]        Abnormal            Final result                 Please view results for these tests on the individual orders.    CBC Auto Differential [[id number]]  (Abnormal) Collected:  11/03/19 0516    Specimen:  Blood Updated:  11/03/19 0611     WBC 10.97 10*3/mm3      RBC 3.33 10*6/mm3      Hemoglobin 10.0 g/dL      Hematocrit 29.0 %      MCV 87.1 fL      MCH 30.0 pg      MCHC 34.5 g/dL      RDW 11.7 %      RDW-SD 37.4 fl      MPV 10.9 fL      Platelets 194 10*3/mm3      Neutrophil % 68.1 %      Lymphocyte % 20.1 %      Monocyte % 9.4 %      Eosinophil % 1.5 %      Basophil % 0.4 %      Immature Grans % 0.5 %      Neutrophils, Absolute 7.48 10*3/mm3      Lymphocytes, Absolute 2.21 10*3/mm3      Monocytes, Absolute  1.03 10*3/mm3      Eosinophils, Absolute 0.16 10*3/mm3      Basophils, Absolute 0.04 10*3/mm3      Immature Grans, Absolute 0.05 10*3/mm3      nRBC 0.0 /100 WBC     Blood Gas, Arterial [[id number]]  (Abnormal) Collected:  11/03/19 0416    Specimen:  Arterial Blood Updated:  11/03/19 0428     Site Left Radial     Miguel's Test N/A     pH, Arterial 7.384 pH units      pCO2, Arterial 41.3 mm Hg      pO2, Arterial 94.2 mm Hg      HCO3, Arterial 24.6 mmol/L      Base Excess, Arterial -0.4 mmol/L      Comment: 84 Value below reference range        O2 Saturation, Arterial 98.2 %      Barometric Pressure for Blood Gas 754 mmHg      Modality Nasal Cannula     Flow Rate 3.5 lpm      Ventilator Mode NA     Collected by KATJA DOMINGUEZ     Comment: Meter: [id number]     :  [id number]       Extra Tubes [[id number]] Collected:  11/02/19 2356    Specimen:  Blood, Venous Line Updated:  11/03/19 0100    Narrative:       The following orders were created for panel order Extra Tubes.  Procedure                               Abnormality         Status                     ---------                               -----------         ------                     Light Blue Top[[id number]]                                   Final result               Lavender Top[[id number]]                                     Final result               Green Top (Gel)[[id number]]                                  Final result                 Please view results for these tests on the individual orders.    Light Blue Top [[id number]] Collected:  11/02/19 2356    Specimen:  Blood Updated:  11/03/19 0100     Extra Tube hold for add-on     Comment: Auto resulted       Lavender Top [[id number]] Collected:  11/02/19 2356    Specimen:  Blood Updated:  11/03/19 0100     Extra Tube hold for add-on     Comment: Auto resulted       Green Top (Gel) [[id number]] Collected:  11/02/19 2356    Specimen:  Blood Updated:  11/03/19 0100     Extra Tube Hold for add-ons.     Comment:  Auto resulted.       Glucose, Random [[id number]]  (Abnormal) Collected:  11/02/19 2021    Specimen:  Blood Updated:  11/02/19 2053     Glucose 449 mg/dL     Comprehensive Metabolic Panel [[id number]]  (Abnormal) Collected:  11/02/19 1931    Specimen:  Blood Updated:  11/02/19 1951     Glucose 391 mg/dL      BUN 14 mg/dL      Creatinine 0.98 mg/dL      Sodium 137 mmol/L      Potassium 4.3 mmol/L      Chloride 100 mmol/L      CO2 24.0 mmol/L      Calcium 8.5 mg/dL      Total Protein 6.8 g/dL      Albumin 4.00 g/dL      ALT (SGPT) 16 U/L      AST (SGOT) 12 U/L      Alkaline Phosphatase 113 U/L      Total Bilirubin 0.3 mg/dL      eGFR Non African Amer 60 mL/min/1.73      Globulin 2.8 gm/dL      A/G Ratio 1.4 g/dL      BUN/Creatinine Ratio 14.3     Anion Gap 13.0 mmol/L     Narrative:       GFR Normal >60  Chronic Kidney Disease <60  Kidney Failure <15    CBC & Differential [[id number]] Collected:  11/02/19 1931    Specimen:  Blood Updated:  11/02/19 1934    Narrative:       The following orders were created for panel order CBC & Differential.  Procedure                               Abnormality         Status                     ---------                               -----------         ------                     CBC Auto Differential[[id number]]        Abnormal            Final result                 Please view results for these tests on the individual orders.    CBC Auto Differential [[id number]]  (Abnormal) Collected:  11/02/19 1931    Specimen:  Blood Updated:  11/02/19 1934     WBC 15.89 10*3/mm3      RBC 4.07 10*6/mm3      Hemoglobin 12.0 g/dL      Hematocrit 36.1 %      MCV 88.7 fL      MCH 29.5 pg      MCHC 33.2 g/dL      RDW 11.8 %      RDW-SD 37.7 fl      MPV 10.7 fL      Platelets 241 10*3/mm3      Neutrophil % 75.5 %      Lymphocyte % 13.3 %      Monocyte % 8.2 %      Eosinophil % 2.0 %      Basophil % 0.5 %      Immature Grans % 0.5 %      Neutrophils, Absolute 12.00 10*3/mm3      Lymphocytes, Absolute 2.12  10*3/mm3      Monocytes, Absolute 1.30 10*3/mm3      Eosinophils, Absolute 0.31 10*3/mm3      Basophils, Absolute 0.08 10*3/mm3      Immature Grans, Absolute 0.08 10*3/mm3      nRBC 0.0 /100 WBC           Culture Data:   No results found for: BLOODCX  No results found for: URINECX  No results found for: RESPCX  No results found for: WOUNDCX  No results found for: STOOLCX  No components found for: BODYFLD    Radiology Data:   Imaging Results (Last 24 Hours)     Procedure Component Value Units Date/Time    XR Femur 2 View Left [[id number]] Collected:  11/03/19 1254     Updated:  11/03/19 1319    Narrative:         Portable two view left femur    HISTORY: Postoperative study. Internal fixation.    Portable AP and crosstable lateral views of the left femur  obtained at 12:44 PM.    COMPARISON: November 2, 2019    FINDINGS:   Intramedullary abbi with one proximal interlocking screw and two  distal interlocking screws now in place transversing the  comminuted minimally displaced fracture distal femoral  diametaphyseal region.  Immediate postoperative intra-articular air.  Surgical skin staples in place.  Previously demonstrated total left knee prosthesis.  No dislocation.        Impression:       CONCLUSION:  Internal fixation of the comminuted fracture of the distal femur.    [id number]    Electronically signed by:  Juve Rico MD  11/3/2019 1:18 PM CST  Workstation: [id number]    FL C Arm During Surgery [[id number]] Updated:  11/03/19 1202    XR Femur 2 View Left [[id number]] Collected:  11/02/19 2055     Updated:  11/02/19 2115    Narrative:       Pain with injury.    Left femur, four views.    Examination revealed severe comminuted fracture of the distal  femur. There is knee prosthesis with femoral and tibial  components.      Impression:       CONCLUSION: Severe comminuted fracture of the distal femur.    Electronically signed by:  Ernesto Sol MD  11/2/2019 9:14  PM CDT Workstation: [id number]          I have  reviewed the patient's current medications.     Assessment/Plan     Active Hospital Problems    Diagnosis   • **Closed displaced supracondylar fracture without intracondylar extension of lower end of left femur (CMS/HCC)     Added automatically from request for surgery [id number]     • Femur fracture (CMS/HCC)       Left femur fracture-status post repair postop day #1- continue management as per orthopedic surgery    Pain-continue with pain management    Hypertension-continue to monitor    Diabetes mellitus-continue with sliding scale insulin    Coronary artery disease- Plavix will be resumed when orthopedic surgery is okay with that    COPD-continue with nebulizer as needed    DVT prophylaxis-SCD          Nicolas Grant MD   11/03/19   4:49 PM      Electronically signed by Nicolas Grant MD at 11/03/19 6406     Howie Campoverde MD at 11/02/19 0010          ORTHOPAEDIC CONSULT     Patient Name:  Yudi West  Admit Date:  11/2/2019  Consult Date:  11/2/2019    Referring Provider: Dr. Grant  Reason for Consultation: Fracture left femur.    Patient Care Team:  Ibeth Masters APRN as PCP - General (Nurse Practitioner)    History of present illness: Mrs. West is 49 years old.  She was descending some stairs earlier today when she sustained a twisting injury to her left leg.  She had prompt onset of pain.  She was seen in the emergency room in [place] and x-rays were performed showing a fracture of the distal shaft of the femur.  Past history is remarkable for a total knee replacement on the left about 9 years ago by Dr. Arrington.  She said she was doing well with regard to her knee prior to her most recent injury.  Patient requested to have care for the fracture in [place].  I spoke by phone with Dr. Ritter and agreed to accept him in transfer.  This was an isolated injury.    Medications include Plavix carvedilol, Neurontin, insulin, Tagamet, sertraline, 10 mg hydrocodone.  She  is allergic to sulfa drugs.  She smokes 2 pack/day of cigarettes and denies use of alcohol.    Past medical history is remarkable for coronary artery disease COPD and diabetes.  Previous surgeries include nephrectomy for carcinoma, hysterectomy, tonsillectomy, and cholecystectomy.  There is been no history of anesthetic complication.  She also has a history of chronic back pain.    Family history she is .    Social history she lives in [place] with her  and son.  She is disabled.    Review of Systems is remarkable for pain well localized to the the left knee and distal thigh.  There is been no numbness or tingling in the limb.  Otherwise complete ROS is negative except as mentioned above.    Prior to Admission medications    Medication Sig Start Date End Date Taking? Authorizing Provider   aspirin 81 MG EC tablet Take 81 mg by mouth Daily.   Yes Sara Benoit MD   atorvastatin (LIPITOR) 40 MG tablet Take 40 mg by mouth Every Night. 7/18/17  Yes Sara Benoit MD   carvedilol (COREG) 6.25 MG tablet Take 6.25 mg by mouth 2 (Two) Times a Day With Meals.   Yes Sara Benoit MD   clopidogrel (PLAVIX) 75 MG tablet TAKE 1 TABLET BY MOUTH ONCE DAILY 6/20/18  Yes Lewis Johnson MD   gabapentin (NEURONTIN) 300 MG capsule TAKE 1 TABLET BY MOUTH FOUR TIMES DAILY 8/7/17  Yes Sara Benoit MD   HYDROcodone-acetaminophen (NORCO) 5-325 MG per tablet Take 1 tablet by mouth Every 6 (Six) Hours As Needed.   Yes Sara Benoit MD   insulin glargine (LANTUS) 100 UNIT/ML injection Inject 50 Units under the skin into the appropriate area as directed Daily.   Yes Sara Benoit MD   Omeprazole 20 MG tablet delayed-release TAKE 1 TABLET BY MOUTh twice daily 5/25/17  Yes Sara Benoit MD   ranolazine (RANEXA) 500 MG 12 hr tablet Take 1 tablet by mouth 2 (Two) Times a Day. 6/3/19  Yes Lewis Johnson MD   sertraline (ZOLOFT) 50 MG tablet Take 50 mg  "by mouth Daily.   Yes Sara Benoit MD   VENTOLIN  (90 BASE) MCG/ACT inhaler 2 puffs 4 (Four) Times a Day. 6/5/17  Yes Sara Benoit MD   zolpidem (AMBIEN) 10 MG tablet TAKE 1 TABLET BY MOUTH AT BEDTIME 8/7/17  Yes Sara Benoit MD   EASY TOUCH INSULIN SYRINGE 28G X 1/2\" 0.5 ML misc USE 4 TIMES DAILY 5/23/17   Sara Benoit MD   polyethylene glycol (MIRALAX) powder MIX 17 GRAMS INTO 4-8 OUNCES OF ANY BEVERAGE TWICE DAILY FOR 7 DAYS 7/8/17   Provider, Historical, MD   UNIFINE PENTIPS 31G X 8 MM misc USE AS DIRECTED WITH SOLOSTAR ONCE DAILY 6/19/17   Sara Benoit MD   ALPRAZolam (XANAX) 0.5 MG tablet TAKE 1 TABLET BY MOUTH THREE TIMES DAILY 8/7/17 11/2/19  Sara Benoit MD   citalopram (CeleXA) 40 MG tablet TAKE 1 TABLET BY MOUTH ONCE DAILY 8/2/17 11/2/19  Sara Benoit MD   pantoprazole (PROTONIX) 20 MG EC tablet Take 20 mg by mouth Daily.  11/2/19  Sara Benoit MD     Allergies   Allergen Reactions   • Sulfa Antibiotics Itching     Social History     Socioeconomic History   • Marital status:      Spouse name: Not on file   • Number of children: Not on file   • Years of education: Not on file   • Highest education level: Not on file   Tobacco Use   • Smoking status: Current Every Day Smoker     Packs/day: 2.00     Years: 36.00     Pack years: 72.00     Types: Cigarettes   • Smokeless tobacco: Never Used   Substance and Sexual Activity   • Alcohol use: No   • Drug use: No   • Sexual activity: Defer     Past Medical History:   Diagnosis Date   • Anxiety and depression    • Asthma    • Cancer (CMS/HCC)    • Chronic kidney disease    • COPD (chronic obstructive pulmonary disease) (CMS/[facility])    • Diabetes mellitus (CMS/HCC)    • Hypertension    • Myocardial infarction (CMS/[facility])      Past Surgical History:   Procedure Laterality Date   • CORONARY STENT PLACEMENT     • GALLBLADDER SURGERY     • HYSTERECTOMY     • KIDNEY SURGERY     • NEPHRECTOMY " Left    • REPLACEMENT TOTAL KNEE     • TONSILLECTOMY       Family History   Problem Relation Age of Onset   • Heart disease Mother    • Hypertension Mother    • Heart disease Father    • Hypertension Father        Vital Signs   Temp:  [97.4 °F (36.3 °C)] 97.4 °F (36.3 °C)  Heart Rate:  [76] 76  Resp:  [18] 18  BP: (106)/(69) 106/69      11/02/19  1346   Weight: 83 kg (182 lb 15.7 oz)     Body mass index is 30.45 kg/m².    Physical Exam in general she is alert and in intermittent moderate discomfort.  She responds appropriately to questions and commands.  She appears older than her stated age.      HEENT exam showed extraocular movements are intact.  Oropharynx shows teeth are in poor repair.    Exam of the lungs shows scattered inspiratory wheezes.    Cardiac exam shows a regular rhythm normal rate no murmur.    The abdomen is soft obese and nontender with active bowel sounds.  Genitourinary and rectal exams were not done.    Exam of the extremities is directed to the] left lower extremity.  There are anterior and posterior splints crossing the knee.  Planes were loosened.  There is a well-healed midline anterior scar across the knee.  There is moderate swelling diffusely about the distal thigh and knee but no ecchymosis.  Posterior tibial pulse is strong.  Sensory exam is intact to soft touch.    Radiographs of the knee done earlier today show a slightly comminuted fracture of the distal shaft of the femur with anterior angulation of proximal approaching 45 degrees.  There is a total knee arthroplasty in satisfactory position with no evidence of loosening.      Results Review:  Imaging Results (Last 24 Hours)     ** No results found for the last 24 hours. **        Lab Results (last 24 hours)     ** No results found for the last 24 hours. **          Assessment/Plan 1 fracture distal shaft left femur.  2 well-functioning left knee arthroplasty.  3 diabetes.  4 COPD.    The natural history of the disorder and  treatment options were reviewed with the patient and her family.  Discussed both nonoperative and surgical treatment.  I recommended reduction and stabilization of the fracture with a retrograde intramedullary nail.  I think this would result in more certain healing of the fracture and also will allow earlier mobilization and weightbearing.        Anticipated benefits of surgery were reviewed in detail.  Potential complications of surgery and anesthetic were reviewed in detail including but not limited to infection, blood loss, sore throat, pneumonia, brain damage and even death.  Postoperative immobilization and rehabilitation were discussed.  They understand that even with prompt healing of her fracture the knee replacement may not function as well as it did prior to her injury..  The patient and family appear to understand all matters discussed and wish to proceed.      The left thigh was prepped. A hematoma block was performed with a mixture of Marcaine and xylocaine. There were no complications and she reported improvement in her pain.     Howie Campoverde MD  11/02/19  6:53 PM    Electronically signed by Howie Campoverde MD at 11/02/19 2100       Consult Notes (last 48 hours) (Notes from 11/02/19 0838 through 11/04/19 0838)     No notes of this type exist for this encounter.

## 2019-11-04 NOTE — DISCHARGE PLACEMENT REQUEST
"Laron West (49 y.o. Female)     Date of Birth Social Security Number Address Home Phone MRN    1970  [address]  [address] [place], [place] [id number] [phone] [id number]    Restoration Marital Status          Rastafarian        Admission Date Admission Type Admitting Provider Attending Provider Department, Room/Bed    19 Urgent Nicolas Grant MD Iqbal, Javed Syed, MD [address], [facility]/    Discharge Date Discharge Disposition Discharge Destination         Home or Self Care              Attending Provider:  Nicolas Grant MD    Allergies:  Sulfa Antibiotics    Isolation:  None   Infection:  None   Code Status:  CPR    Ht:  165.1 cm (65\")   Wt:  82.7 kg (182 lb 6.4 oz)    Admission Cmt:  None   Principal Problem:  Closed displaced supracondylar fracture without intracondylar extension of lower end of left femur (CMS/HCC) [S72.452A]                 Active Insurance as of 2019     Primary Coverage     Payor Plan Insurance Group Employer/Plan Group    HUMANA MEDICAID HU[employer] [id number]     Payor Plan Address Payor Plan Phone Number Payor Plan Fax Number Effective Dates    PO  [phone]  3/18/2019 - None Entered    [facility] [id number]       Subscriber Name Subscriber Birth Date Member ID       LARON WEST 1970 [id number]                 Emergency Contacts      (Rel.) Home Phone Work Phone Mobile Phone    Ricardo West (Spouse) [phone] -- [phone]        [address] [address]  Phone:  [phone]  Fax:   Date: 2019      Ambulatory Referral to Physical Therapy Evaluate and treat (She may weight-bear as tolerated.  She was instructed in wound care.  She may begin range of motion of the knee as tolerated.); (WBAT)     Patient:  Laron West MRN:  [id number]   Ventura WINKLER  [address] [place] [id number] :  1970  SSN: "    Phone: [phone] Sex:  F      INSURANCE PAYOR PLAN GROUP # SUBSCRIBER ID   Primary:    HUMANA MEDICAID [id number] [facility] [id number]      Referring Provider Information:  PATRICIA DONALDSON Phone: [phone] Fax:       Referral Information:   # Visits:  1 Referral Type: Therapy [AE1]   Urgency:  Routine Referral Reason: Specialty Services Required   Start Date: Nov 4, 2019 End Date:  To be determined by Insurer   Diagnosis: Impaired physical mobility (Z74.09 [ICD-10-CM] 781.99 [ICD-9-CM])      Refer to Dept:   Refer to Provider:   Refer to Facility:       Specialty needed: Evaluate and treat (She may weight-bear as tolerated.  She was instructed in wound care.  She may begin range of motion of the knee as tolerated.)  Weight Bearing Status:  (WBAT)     This document serves as a request of services and does not constitute Insurance authorization or approval of services.  To determine eligibility, please contact the members Insurance carrier to verify and review coverage.     If you have medical questions regarding this request for services. Please contact [address] at [phone] during normal business hours.       Verbal Order Mode: Telephone with readback   Authorizing Provider: Patricia Donaldson MD  Authorizing Provider's NPI: [id number]     Order Entered By: Vik Edwards RN 11/4/2019 10:14 AM     Electronically signed by:                 History & Physical      Patricia Donaldson MD at 11/03/19 0920        Hemoglobin today is 10.0.  Radiographs of the left femur show no abnormality proximal to the fracture site.    The history and physical is otherwise unchanged from previous note.    Electronically signed by Patricia Donaldson MD at 11/03/19 0922   Source Note             ORTHOPAEDIC CONSULT     Patient Name:  Yudi West  Admit Date:  11/2/2019  Consult Date:  11/2/2019    Referring Provider: Dr. Grant  Reason for Consultation: Fracture left  femur.    Patient Care Team:  Ibeth Masters APRN as PCP - General (Nurse Practitioner)    History of present illness: Mrs. West is 49 years old.  She was descending some stairs earlier today when she sustained a twisting injury to her left leg.  She had prompt onset of pain.  She was seen in the emergency room in [place] and x-rays were performed showing a fracture of the distal shaft of the femur.  Past history is remarkable for a total knee replacement on the left about 9 years ago by Dr. Arrington.  She said she was doing well with regard to her knee prior to her most recent injury.  Patient requested to have care for the fracture in [place].  I spoke by phone with Dr. Ritter and agreed to accept him in transfer.  This was an isolated injury.    Medications include Plavix carvedilol, Neurontin, insulin, Tagamet, sertraline, 10 mg hydrocodone.  She is allergic to sulfa drugs.  She smokes 2 pack/day of cigarettes and denies use of alcohol.    Past medical history is remarkable for coronary artery disease COPD and diabetes.  Previous surgeries include nephrectomy for carcinoma, hysterectomy, tonsillectomy, and cholecystectomy.  There is been no history of anesthetic complication.  She also has a history of chronic back pain.    Family history she is .    Social history she lives in [place] with her  and son.  She is disabled.    Review of Systems is remarkable for pain well localized to the the left knee and distal thigh.  There is been no numbness or tingling in the limb.  Otherwise complete ROS is negative except as mentioned above.    Prior to Admission medications    Medication Sig Start Date End Date Taking? Authorizing Provider   aspirin 81 MG EC tablet Take 81 mg by mouth Daily.   Yes Sara Benoit MD   atorvastatin (LIPITOR) 40 MG tablet Take 40 mg by mouth Every Night. 7/18/17  Yes Sara Benoit MD   carvedilol (COREG) 6.25 MG tablet Take 6.25 mg by  "mouth 2 (Two) Times a Day With Meals.   Yes Sara Benoit MD   clopidogrel (PLAVIX) 75 MG tablet TAKE 1 TABLET BY MOUTH ONCE DAILY 6/20/18  Yes Lewis Johnson MD   gabapentin (NEURONTIN) 300 MG capsule TAKE 1 TABLET BY MOUTH FOUR TIMES DAILY 8/7/17  Yes Sara Benoit MD   HYDROcodone-acetaminophen (NORCO) 5-325 MG per tablet Take 1 tablet by mouth Every 6 (Six) Hours As Needed.   Yes Sara Benoit MD   insulin glargine (LANTUS) 100 UNIT/ML injection Inject 50 Units under the skin into the appropriate area as directed Daily.   Yes Sara Benoit MD   Omeprazole 20 MG tablet delayed-release TAKE 1 TABLET BY MOUTh twice daily 5/25/17  Yes Sara Benoit MD   ranolazine (RANEXA) 500 MG 12 hr tablet Take 1 tablet by mouth 2 (Two) Times a Day. 6/3/19  Yes Lewis Johnson MD   sertraline (ZOLOFT) 50 MG tablet Take 50 mg by mouth Daily.   Yes Sara Benoit MD   VENTOLIN  (90 BASE) MCG/ACT inhaler 2 puffs 4 (Four) Times a Day. 6/5/17  Yes Sara Benoit MD   zolpidem (AMBIEN) 10 MG tablet TAKE 1 TABLET BY MOUTH AT BEDTIME 8/7/17  Yes Sara Benoit MD   EASY TOUCH INSULIN SYRINGE 28G X 1/2\" 0.5 ML misc USE 4 TIMES DAILY 5/23/17   Sara Benoit MD   polyethylene glycol (MIRALAX) powder MIX 17 GRAMS INTO 4-8 OUNCES OF ANY BEVERAGE TWICE DAILY FOR 7 DAYS 7/8/17   Sara Benoit MD UNIFINFRANCESCA PENTIPS 31G X 8 MM misc USE AS DIRECTED WITH SOLOSTAR ONCE DAILY 6/19/17   Sara Benoit MD   ALPRAZolam (XANAX) 0.5 MG tablet TAKE 1 TABLET BY MOUTH THREE TIMES DAILY 8/7/17 11/2/19  Sara Benoit MD   citalopram (CeleXA) 40 MG tablet TAKE 1 TABLET BY MOUTH ONCE DAILY 8/2/17 11/2/19  Sara Benoit MD   pantoprazole (PROTONIX) 20 MG EC tablet Take 20 mg by mouth Daily.  11/2/19  Provider, Historical, MD     Allergies   Allergen Reactions   • Sulfa Antibiotics Itching     Social History     Socioeconomic History   • " Marital status:      Spouse name: Not on file   • Number of children: Not on file   • Years of education: Not on file   • Highest education level: Not on file   Tobacco Use   • Smoking status: Current Every Day Smoker     Packs/day: 2.00     Years: 36.00     Pack years: 72.00     Types: Cigarettes   • Smokeless tobacco: Never Used   Substance and Sexual Activity   • Alcohol use: No   • Drug use: No   • Sexual activity: Defer     Past Medical History:   Diagnosis Date   • Anxiety and depression    • Asthma    • Cancer (CMS/HCC)    • Chronic kidney disease    • COPD (chronic obstructive pulmonary disease) (CMS/HCC)    • Diabetes mellitus (CMS/HCC)    • Hypertension    • Myocardial infarction (CMS/HCC)      Past Surgical History:   Procedure Laterality Date   • CORONARY STENT PLACEMENT     • GALLBLADDER SURGERY     • HYSTERECTOMY     • KIDNEY SURGERY     • NEPHRECTOMY Left    • REPLACEMENT TOTAL KNEE     • TONSILLECTOMY       Family History   Problem Relation Age of Onset   • Heart disease Mother    • Hypertension Mother    • Heart disease Father    • Hypertension Father        Vital Signs   Temp:  [97.4 °F (36.3 °C)] 97.4 °F (36.3 °C)  Heart Rate:  [76] 76  Resp:  [18] 18  BP: (106)/(69) 106/69      11/02/19  1346   Weight: 83 kg (182 lb 15.7 oz)     Body mass index is 30.45 kg/m².    Physical Exam in general she is alert and in intermittent moderate discomfort.  She responds appropriately to questions and commands.  She appears older than her stated age.      HEENT exam showed extraocular movements are intact.  Oropharynx shows teeth are in poor repair.    Exam of the lungs shows scattered inspiratory wheezes.    Cardiac exam shows a regular rhythm normal rate no murmur.    The abdomen is soft obese and nontender with active bowel sounds.  Genitourinary and rectal exams were not done.    Exam of the extremities is directed to the] left lower extremity.  There are anterior and posterior splints crossing the  knee.  Planes were loosened.  There is a well-healed midline anterior scar across the knee.  There is moderate swelling diffusely about the distal thigh and knee but no ecchymosis.  Posterior tibial pulse is strong.  Sensory exam is intact to soft touch.    Radiographs of the knee done earlier today show a slightly comminuted fracture of the distal shaft of the femur with anterior angulation of proximal approaching 45 degrees.  There is a total knee arthroplasty in satisfactory position with no evidence of loosening.      Results Review:  Imaging Results (Last 24 Hours)     ** No results found for the last 24 hours. **        Lab Results (last 24 hours)     ** No results found for the last 24 hours. **          Assessment/Plan 1 fracture distal shaft left femur.  2 well-functioning left knee arthroplasty.  3 diabetes.  4 COPD.    The natural history of the disorder and treatment options were reviewed with the patient and her family.  Discussed both nonoperative and surgical treatment.  I recommended reduction and stabilization of the fracture with a retrograde intramedullary nail.  I think this would result in more certain healing of the fracture and also will allow earlier mobilization and weightbearing.        Anticipated benefits of surgery were reviewed in detail.  Potential complications of surgery and anesthetic were reviewed in detail including but not limited to infection, blood loss, sore throat, pneumonia, brain damage and even death.  Postoperative immobilization and rehabilitation were discussed.  They understand that even with prompt healing of her fracture the knee replacement may not function as well as it did prior to her injury..  The patient and family appear to understand all matters discussed and wish to proceed.      The left thigh was prepped. A hematoma block was performed with a mixture of Marcaine and xylocaine. There were no complications and she reported improvement in her pain.     Howie  Shayla Campoverde MD  11/02/19  6:53 PM     Electronically signed by Howie Campoverde MD at 11/02/19 2100             Howie Campoverde MD at 11/02/19 1858          ORTHOPAEDIC CONSULT     Patient Name:  Yudi West  Admit Date:  11/2/2019  Consult Date:  11/2/2019    Referring Provider: Dr. Grant  Reason for Consultation: Fracture left femur.    Patient Care Team:  Ibeth Masters APRN as PCP - General (Nurse Practitioner)    History of present illness: Mrs. West is 49 years old.  She was descending some stairs earlier today when she sustained a twisting injury to her left leg.  She had prompt onset of pain.  She was seen in the emergency room in [place] and x-rays were performed showing a fracture of the distal shaft of the femur.  Past history is remarkable for a total knee replacement on the left about 9 years ago by Dr. Arrington.  She said she was doing well with regard to her knee prior to her most recent injury.  Patient requested to have care for the fracture in [place].  I spoke by phone with Dr. Ritter and agreed to accept him in transfer.  This was an isolated injury.    Medications include Plavix carvedilol, Neurontin, insulin, Tagamet, sertraline, 10 mg hydrocodone.  She is allergic to sulfa drugs.  She smokes 2 pack/day of cigarettes and denies use of alcohol.    Past medical history is remarkable for coronary artery disease COPD and diabetes.  Previous surgeries include nephrectomy for carcinoma, hysterectomy, tonsillectomy, and cholecystectomy.  There is been no history of anesthetic complication.  She also has a history of chronic back pain.    Family history she is .    Social history she lives in [place] with her  and son.  She is disabled.    Review of Systems is remarkable for pain well localized to the the left knee and distal thigh.  There is been no numbness or tingling in the limb.  Otherwise complete ROS is negative except as mentioned  "above.    Prior to Admission medications    Medication Sig Start Date End Date Taking? Authorizing Provider   aspirin 81 MG EC tablet Take 81 mg by mouth Daily.   Yes Sara Benoit MD   atorvastatin (LIPITOR) 40 MG tablet Take 40 mg by mouth Every Night. 7/18/17  Yes Sara Benoit MD   carvedilol (COREG) 6.25 MG tablet Take 6.25 mg by mouth 2 (Two) Times a Day With Meals.   Yes Sara Benoit MD   clopidogrel (PLAVIX) 75 MG tablet TAKE 1 TABLET BY MOUTH ONCE DAILY 6/20/18  Yes Lewis Johnson MD   gabapentin (NEURONTIN) 300 MG capsule TAKE 1 TABLET BY MOUTH FOUR TIMES DAILY 8/7/17  Yes Sara Benoit MD   HYDROcodone-acetaminophen (NORCO) 5-325 MG per tablet Take 1 tablet by mouth Every 6 (Six) Hours As Needed.   Yes Sara Benoit MD   insulin glargine (LANTUS) 100 UNIT/ML injection Inject 50 Units under the skin into the appropriate area as directed Daily.   Yes Sara Benoit MD   Omeprazole 20 MG tablet delayed-release TAKE 1 TABLET BY MOUTh twice daily 5/25/17  Yes Sara Benoit MD   ranolazine (RANEXA) 500 MG 12 hr tablet Take 1 tablet by mouth 2 (Two) Times a Day. 6/3/19  Yes Lewis Johnson MD   sertraline (ZOLOFT) 50 MG tablet Take 50 mg by mouth Daily.   Yes Sara Benoit MD   VENTOLIN  (90 BASE) MCG/ACT inhaler 2 puffs 4 (Four) Times a Day. 6/5/17  Yes Sara Benoit MD   zolpidem (AMBIEN) 10 MG tablet TAKE 1 TABLET BY MOUTH AT BEDTIME 8/7/17  Yes Sara Benoit MD   EASY TOUCH INSULIN SYRINGE 28G X 1/2\" 0.5 ML misc USE 4 TIMES DAILY 5/23/17   Sara Benoit MD   polyethylene glycol (MIRALAX) powder MIX 17 GRAMS INTO 4-8 OUNCES OF ANY BEVERAGE TWICE DAILY FOR 7 DAYS 7/8/17   Sara Benoit MD UNIFINFRANCESCA PENTIPS 31G X 8 MM misc USE AS DIRECTED WITH SOLOSTAR ONCE DAILY 6/19/17   Sara Benoit MD   ALPRAZolam (XANAX) 0.5 MG tablet TAKE 1 TABLET BY MOUTH THREE TIMES DAILY 8/7/17 11/2/19  " Provider, MD Sara   citalopram (CeleXA) 40 MG tablet TAKE 1 TABLET BY MOUTH ONCE DAILY 8/2/17 11/2/19  Sara Benoit MD   pantoprazole (PROTONIX) 20 MG EC tablet Take 20 mg by mouth Daily.  11/2/19  Sara Benoit MD     Allergies   Allergen Reactions   • Sulfa Antibiotics Itching     Social History     Socioeconomic History   • Marital status:      Spouse name: Not on file   • Number of children: Not on file   • Years of education: Not on file   • Highest education level: Not on file   Tobacco Use   • Smoking status: Current Every Day Smoker     Packs/day: 2.00     Years: 36.00     Pack years: 72.00     Types: Cigarettes   • Smokeless tobacco: Never Used   Substance and Sexual Activity   • Alcohol use: No   • Drug use: No   • Sexual activity: Defer     Past Medical History:   Diagnosis Date   • Anxiety and depression    • Asthma    • Cancer (CMS/HCC)    • Chronic kidney disease    • COPD (chronic obstructive pulmonary disease) (CMS/HCC)    • Diabetes mellitus (CMS/HCC)    • Hypertension    • Myocardial infarction (CMS/HCC)      Past Surgical History:   Procedure Laterality Date   • CORONARY STENT PLACEMENT     • GALLBLADDER SURGERY     • HYSTERECTOMY     • KIDNEY SURGERY     • NEPHRECTOMY Left    • REPLACEMENT TOTAL KNEE     • TONSILLECTOMY       Family History   Problem Relation Age of Onset   • Heart disease Mother    • Hypertension Mother    • Heart disease Father    • Hypertension Father        Vital Signs   Temp:  [97.4 °F (36.3 °C)] 97.4 °F (36.3 °C)  Heart Rate:  [76] 76  Resp:  [18] 18  BP: (106)/(69) 106/69      11/02/19  1346   Weight: 83 kg (182 lb 15.7 oz)     Body mass index is 30.45 kg/m².    Physical Exam in general she is alert and in intermittent moderate discomfort.  She responds appropriately to questions and commands.  She appears older than her stated age.      HEENT exam showed extraocular movements are intact.  Oropharynx shows teeth are in poor repair.    Exam of  the lungs shows scattered inspiratory wheezes.    Cardiac exam shows a regular rhythm normal rate no murmur.    The abdomen is soft obese and nontender with active bowel sounds.  Genitourinary and rectal exams were not done.    Exam of the extremities is directed to the] left lower extremity.  There are anterior and posterior splints crossing the knee.  Planes were loosened.  There is a well-healed midline anterior scar across the knee.  There is moderate swelling diffusely about the distal thigh and knee but no ecchymosis.  Posterior tibial pulse is strong.  Sensory exam is intact to soft touch.    Radiographs of the knee done earlier today show a slightly comminuted fracture of the distal shaft of the femur with anterior angulation of proximal approaching 45 degrees.  There is a total knee arthroplasty in satisfactory position with no evidence of loosening.      Results Review:  Imaging Results (Last 24 Hours)     ** No results found for the last 24 hours. **        Lab Results (last 24 hours)     ** No results found for the last 24 hours. **          Assessment/Plan 1 fracture distal shaft left femur.  2 well-functioning left knee arthroplasty.  3 diabetes.  4 COPD.    The natural history of the disorder and treatment options were reviewed with the patient and her family.  Discussed both nonoperative and surgical treatment.  I recommended reduction and stabilization of the fracture with a retrograde intramedullary nail.  I think this would result in more certain healing of the fracture and also will allow earlier mobilization and weightbearing.        Anticipated benefits of surgery were reviewed in detail.  Potential complications of surgery and anesthetic were reviewed in detail including but not limited to infection, blood loss, sore throat, pneumonia, brain damage and even death.  Postoperative immobilization and rehabilitation were discussed.  They understand that even with prompt healing of her fracture the  knee replacement may not function as well as it did prior to her injury..  The patient and family appear to understand all matters discussed and wish to proceed.      The left thigh was prepped. A hematoma block was performed with a mixture of Marcaine and xylocaine. There were no complications and she reported improvement in her pain.     Howie Campoverde MD  11/02/19  6:53 PM    Electronically signed by Howie Campoverde MD at 11/02/19 2100     Nicolas Grant MD at 11/02/19 1833                [facility] Medicine Admission      Date of Admission: 11/2/2019      Primary Care Physician: Ibeth Masters APRN      Chief Complaint: left leg pain    HPI:    This is a 49-year-old female with concurrent medical history of COPD diabetes coronary artery disease status post stenting hypertension presenting to the ER after she fell off the stairs and hurt her left leg and she reported having pain in her left knee mainly.  She does have a history of knee replacement done about 9 years ago.  She was at [facility] and she opted to come to [place] and she was transferred here.  Patient reports that she does not have any chest pain but does have some intermittent difficulty breathing and has a history of COPD.    Past Medical History:  has a past medical history of Anxiety and depression, Asthma, Cancer (CMS/[facility]), Chronic kidney disease, COPD (chronic obstructive pulmonary disease) (CMS/HCC), Diabetes mellitus (CMS/HCC), Hypertension, and Myocardial infarction (CMS/HCC).    Past Surgical History:  has a past surgical history that includes Kidney surgery; Replacement total knee; Hysterectomy; Tonsillectomy; Gallbladder surgery; Coronary stent placement; and Nephrectomy (Left).    Family History: family history includes Heart disease in her father and mother; Hypertension in her father and mother.    Social History:  reports that she has been smoking cigarettes.  She  "has a 72.00 pack-year smoking history. She has never used smokeless tobacco. She reports that she does not drink alcohol or use drugs.    Allergies:   Allergies   Allergen Reactions   • Sulfa Antibiotics Itching       Medications: Scheduled Meds:  bupivacaine (PF) 10 mL Injection Once   ipratropium-albuterol 3 mL Nebulization 4x Daily - RT   lidocaine 10 mL Subcutaneous Once   sodium chloride 10 mL Intravenous Q12H     Continuous Infusions:   PRN Meds:.Morphine **AND** naloxone  •  ondansetron  •  sodium chloride  No current facility-administered medications on file prior to encounter.      Current Outpatient Medications on File Prior to Encounter   Medication Sig Dispense Refill   • aspirin 81 MG EC tablet Take 81 mg by mouth Daily.     • atorvastatin (LIPITOR) 40 MG tablet Take 40 mg by mouth Every Night.  3   • carvedilol (COREG) 6.25 MG tablet Take 6.25 mg by mouth 2 (Two) Times a Day With Meals.     • clopidogrel (PLAVIX) 75 MG tablet TAKE 1 TABLET BY MOUTH ONCE DAILY 30 tablet 6   • gabapentin (NEURONTIN) 300 MG capsule TAKE 1 TABLET BY MOUTH FOUR TIMES DAILY  0   • HYDROcodone-acetaminophen (NORCO) 5-325 MG per tablet Take 1 tablet by mouth Every 6 (Six) Hours As Needed.     • insulin glargine (LANTUS) 100 UNIT/ML injection Inject 50 Units under the skin into the appropriate area as directed Daily.     • Omeprazole 20 MG tablet delayed-release TAKE 1 TABLET BY MOUTh twice daily  3   • ranolazine (RANEXA) 500 MG 12 hr tablet Take 1 tablet by mouth 2 (Two) Times a Day. 60 tablet 6   • sertraline (ZOLOFT) 50 MG tablet Take 50 mg by mouth Daily.     • VENTOLIN  (90 BASE) MCG/ACT inhaler 2 puffs 4 (Four) Times a Day.  1   • zolpidem (AMBIEN) 10 MG tablet TAKE 1 TABLET BY MOUTH AT BEDTIME  0   • EASY TOUCH INSULIN SYRINGE 28G X 1/2\" 0.5 ML misc USE 4 TIMES DAILY  3   • polyethylene glycol (MIRALAX) powder MIX 17 GRAMS INTO 4-8 OUNCES OF ANY BEVERAGE TWICE DAILY FOR 7 DAYS  0   • UNIFINE PENTIPS 31G X 8 MM " misc USE AS DIRECTED WITH SOLOSTAR ONCE DAILY  3   • [DISCONTINUED] ALPRAZolam (XANAX) 0.5 MG tablet TAKE 1 TABLET BY MOUTH THREE TIMES DAILY  0   • [DISCONTINUED] citalopram (CeleXA) 40 MG tablet TAKE 1 TABLET BY MOUTH ONCE DAILY  3   • [DISCONTINUED] pantoprazole (PROTONIX) 20 MG EC tablet Take 20 mg by mouth Daily.         Review of Systems:  Review of Systems   HENT: Positive for congestion.    Respiratory: Positive for shortness of breath.    Cardiovascular: Negative for chest pain.   Gastrointestinal: Negative for diarrhea.      Otherwise complete ROS is negative except as mentioned above.    Physical Exam:   Temp:  [97.4 °F (36.3 °C)] 97.4 °F (36.3 °C)  Heart Rate:  [76] 76  Resp:  [18] 18  BP: (106)/(69) 106/69  Physical Exam   Constitutional: She appears well-developed and well-nourished. No distress.   HENT:   Head: Normocephalic and atraumatic.   Cardiovascular: Normal rate.   Pulmonary/Chest: Effort normal. No respiratory distress. She has no wheezes.   Abdominal: Soft. She exhibits no distension.   Musculoskeletal: Normal range of motion. She exhibits no edema.   Left knee brace in place   Neurological: She is alert. No cranial nerve deficit.   Skin: Skin is warm and dry. She is not diaphoretic.   Psychiatric: She has a normal mood and affect. Her behavior is normal. Judgment and thought content normal.   Vitals reviewed.        Results Reviewed:  I have personally reviewed current lab, radiology, and data and agree with results.  Lab Results (last 24 hours)     ** No results found for the last 24 hours. **        Imaging Results (Last 24 Hours)     ** No results found for the last 24 hours. **            Assessment:    Active Hospital Problems    Diagnosis   • Femur fracture (CMS/[facility])     Left femur fracture- orthopedic services has been consulted and they are planning on doing surgery in the a.m.  We will continue with pain management    Pain-morphine has been ordered    Hypertension-continue to  "monitor    Diabetes mellitus-sliding scale insulin    Coronary artery disease-Plavix will be held    COPD- patient does not seem to be in acute exacerbation, nebulizer treatments will be ordered, will order ABG in the morning    DVT prophylaxis-SCD            Nicolas Grant MD  11/02/19  6:33 PM                  Electronically signed by Nicolas Grant MD at 11/02/19 4681       Prior to Admission Medications     Prescriptions Last Dose Informant Patient Reported? Taking?    atorvastatin (LIPITOR) 40 MG tablet 11/1/2019  Yes Yes    Take 40 mg by mouth Every Night.    carvedilol (COREG) 6.25 MG tablet 11/2/2019  Yes Yes    Take 6.25 mg by mouth 2 (Two) Times a Day With Meals.    clopidogrel (PLAVIX) 75 MG tablet 11/2/2019  No Yes    TAKE 1 TABLET BY MOUTH ONCE DAILY    gabapentin (NEURONTIN) 300 MG capsule   Yes Yes    TAKE 1 TABLET BY MOUTH FOUR TIMES DAILY    HYDROcodone-acetaminophen (NORCO) 5-325 MG per tablet   Yes Yes    Take 1 tablet by mouth Every 6 (Six) Hours As Needed.    insulin glargine (LANTUS) 100 UNIT/ML injection 11/1/2019  Yes Yes    Inject 50 Units under the skin into the appropriate area as directed Daily.    Omeprazole 20 MG tablet delayed-release   Yes Yes    TAKE 1 TABLET BY MOUTh twice daily    ranolazine (RANEXA) 500 MG 12 hr tablet 11/2/2019  No Yes    Take 1 tablet by mouth 2 (Two) Times a Day.    sertraline (ZOLOFT) 50 MG tablet 11/2/2019  Yes Yes    Take 50 mg by mouth Daily.    VENTOLIN  (90 BASE) MCG/ACT inhaler 11/2/2019  Yes Yes    2 puffs 4 (Four) Times a Day.    zolpidem (AMBIEN) 10 MG tablet 11/1/2019  Yes Yes    TAKE 1 TABLET BY MOUTH AT BEDTIME    aspirin 81 MG EC tablet 11/2/2019  Yes Yes    Take 81 mg by mouth Daily.    EASY TOUCH INSULIN SYRINGE 28G X 1/2\" 0.5 ML misc   Yes No    USE 4 TIMES DAILY    polyethylene glycol (MIRALAX) powder   Yes No    MIX 17 GRAMS INTO 4-8 OUNCES OF ANY BEVERAGE TWICE DAILY FOR 7 DAYS    UNIFINE PENTIPS 31G X 8 MM misc   Yes No    USE " AS DIRECTED WITH SOLOSTAR ONCE DAILY          Hospital Medications (active)       Dose Frequency Start End    acetaminophen (TYLENOL) tablet 500 mg 500 mg Every 4 Hours PRN 11/3/2019     Sig - Route: Take 1 tablet by mouth Every 4 (Four) Hours As Needed for Fever (Temperature Greater Than 101F). - Oral    atorvastatin (LIPITOR) tablet 40 mg 40 mg Nightly 11/2/2019     Sig - Route: Take 1 tablet by mouth Every Night. - Oral    carvedilol (COREG) tablet 6.25 mg 6.25 mg 2 Times Daily With Meals 11/2/2019     Sig - Route: Take 1 tablet by mouth 2 (Two) Times a Day With Meals. - Oral    ceFAZolin in 0.9% normal saline (ANCEF) IVPB solution 2 g 2 g Once 11/3/2019 11/3/2019    Sig - Route: Infuse 100 mL into a venous catheter 1 (One) Time. - Intravenous    ceFAZolin in 0.9% normal saline (ANCEF) IVPB solution 2 g 2 g Every 8 Hours 11/3/2019 11/4/2019    Sig - Route: Infuse 100 mL into a venous catheter Every 8 (Eight) Hours. - Intravenous    dextrose (D50W) 25 g/ 50mL Intravenous Solution 25 g 25 g Every 15 Minutes PRN 11/2/2019     Sig - Route: Infuse 50 mL into a venous catheter Every 15 (Fifteen) Minutes As Needed for Low Blood Sugar (Blood Sugar Less Than 70). - Intravenous    dextrose (GLUTOSE) oral gel 15 g 15 g Every 15 Minutes PRN 11/2/2019     Sig - Route: Take 15 application by mouth Every 15 (Fifteen) Minutes As Needed for Low Blood Sugar (Blood sugar less than 70). - Oral    docusate sodium (COLACE) capsule 200 mg 200 mg 2 Times Daily PRN 11/3/2019     Sig - Route: Take 2 capsules by mouth 2 (Two) Times a Day As Needed for Constipation. - Oral    glucagon (human recombinant) (GLUCAGEN DIAGNOSTIC) injection 1 mg 1 mg Every 15 Minutes PRN 11/2/2019     Sig - Route: Inject 1 mg under the skin into the appropriate area as directed Every 15 (Fifteen) Minutes As Needed for Low Blood Sugar (Blood Glucose Less Than 70). - Subcutaneous    HYDROcodone-acetaminophen (NORCO) 7.5-325 MG per tablet 1 tablet 1 tablet Every 4  "Hours PRN 11/3/2019 11/13/2019    Sig - Route: Take 1 tablet by mouth Every 4 (Four) Hours As Needed for Moderate Pain . - Oral    insulin aspart (novoLOG) injection 0-14 Units 0-14 Units 4 Times Daily Before Meals & Nightly 11/2/2019     Sig - Route: Inject 0-14 Units under the skin into the appropriate area as directed 4 (Four) Times a Day Before Meals & at Bedtime. - Subcutaneous    insulin detemir (LEVEMIR) injection 50 Units 50 Units Nightly 11/2/2019     Sig - Route: Inject 50 Units under the skin into the appropriate area as directed Every Night. - Subcutaneous    ipratropium-albuterol (DUO-NEB) nebulizer solution 3 mL 3 mL 4 Times Daily - RT 11/2/2019     Sig - Route: Take 3 mL by nebulization 4 (Four) Times a Day. - Nebulization    morphine injection 6 mg 6 mg Every 2 Hours PRN 11/3/2019 11/13/2019    Sig - Route: Infuse 3 mL into a venous catheter Every 2 (Two) Hours As Needed for Severe Pain . - Intravenous    Linked Group 1:  \"And\" Linked Group Details        naloxone (NARCAN) injection 0.4 mg 0.4 mg Every 5 Minutes PRN 11/3/2019     Sig - Route: Infuse 1 mL into a venous catheter Every 5 (Five) Minutes As Needed for Respiratory Depression. - Intravenous    Linked Group 1:  \"And\" Linked Group Details        ondansetron (ZOFRAN) injection 4 mg 4 mg Every 6 Hours PRN 11/3/2019     Sig - Route: Infuse 2 mL into a venous catheter Every 6 (Six) Hours As Needed for Nausea or Vomiting. - Intravenous    Linked Group 2:  \"Or\" Linked Group Details        ondansetron (ZOFRAN) injection 4 mg 4 mg Once As Needed 11/3/2019 11/3/2019    Sig - Route: Infuse 2 mL into a venous catheter 1 (One) Time As Needed for Nausea or Vomiting. - Intravenous    ondansetron (ZOFRAN) tablet 4 mg 4 mg Every 6 Hours PRN 11/3/2019     Sig - Route: Take 1 tablet by mouth Every 6 (Six) Hours As Needed for Nausea or Vomiting. - Oral    Linked Group 2:  \"Or\" Linked Group Details        oxyCODONE-acetaminophen (PERCOCET) 7.5-325 MG per tablet " 2 tablet 2 tablet Every 4 Hours PRN 11/3/2019 11/13/2019    Sig - Route: Take 2 tablets by mouth Every 4 (Four) Hours As Needed for Severe Pain . - Oral    pantoprazole (PROTONIX) EC tablet 40 mg 40 mg Every Morning 11/3/2019     Sig - Route: Take 1 tablet by mouth Every Morning. - Oral    promethazine (PHENERGAN) tablet 12.5 mg 12.5 mg Every 6 Hours PRN 11/3/2019     Sig - Route: Take 1 tablet by mouth Every 6 (Six) Hours As Needed for Nausea or Vomiting (If BOTH ondansetron (ZOFRAN) and promethazine (PHENERGAN) are ordered use ondansetron first and THEN promethazine IF ondansetron is ineffective.). - Oral    ranolazine (RANEXA) 12 hr tablet 500 mg 500 mg 2 Times Daily 11/2/2019     Sig - Route: Take 1 tablet by mouth 2 (Two) Times a Day. - Oral    sertraline (ZOLOFT) tablet 50 mg 50 mg Daily 11/3/2019     Sig - Route: Take 1 tablet by mouth Daily. - Oral    sodium chloride 0.9 % flush 10 mL 10 mL Every 12 Hours Scheduled 11/2/2019     Sig - Route: Infuse 10 mL into a venous catheter Every 12 (Twelve) Hours. - Intravenous    sodium chloride 0.9 % flush 10 mL 10 mL As Needed 11/2/2019     Sig - Route: Infuse 10 mL into a venous catheter As Needed for Line Care. - Intravenous    sodium chloride 0.9 % flush 3 mL 3 mL Every 12 Hours Scheduled 11/3/2019     Sig - Route: Infuse 3 mL into a venous catheter Every 12 (Twelve) Hours. - Intravenous    sodium chloride 0.9 % flush 3 mL 3 mL Every 12 Hours Scheduled 11/3/2019     Sig - Route: Infuse 3 mL into a venous catheter Every 12 (Twelve) Hours. - Intravenous    sodium chloride 0.9 % flush 3-10 mL 3-10 mL As Needed 11/3/2019     Sig - Route: Infuse 3-10 mL into a venous catheter As Needed for Line Care. - Intravenous    sodium chloride 0.9 % flush 3-10 mL 3-10 mL As Needed 11/3/2019     Sig - Route: Infuse 3-10 mL into a venous catheter As Needed for Line Care. - Intravenous    sodium chloride 0.9 % infusion 100 mL/hr Continuous 11/3/2019     Sig - Route: Infuse 100 mL/hr  into a venous catheter Continuous. - Intravenous    Tranexamic Acid tablet 1,300 mg 1,300 mg Every 4 Hours 11/3/2019 11/3/2019    Sig - Route: Take 2 tablets by mouth Every 4 (Four) Hours. - Oral    zolpidem (AMBIEN) tablet 10 mg 10 mg Nightly PRN 11/2/2019     Sig - Route: Take 2 tablets by mouth At Night As Needed for Sleep. - Oral    acetaminophen (TYLENOL) tablet 500 mg (Discontinued) 500 mg Every 4 Hours PRN 11/3/2019 11/3/2019    Sig - Route: Take 1 tablet by mouth Every 4 (Four) Hours As Needed for Fever (Temperature Greater Than 101F). - Oral    Reason for Discontinue: Duplicate order    bupivacaine (PF) (MARCAINE) 0.5 % injection (Discontinued)  As Needed 11/3/2019 11/3/2019    Sig: As Needed.    Reason for Discontinue: Patient Discharge    docusate sodium (COLACE) capsule 250 mg (Discontinued) 250 mg 2 Times Daily PRN 11/3/2019 11/3/2019    Sig - Route: Take 5 capsules by mouth 2 (Two) Times a Day As Needed for Constipation. - Oral    Reason for Discontinue: Duplicate order    electrolyte-148 (PLASMALYTE) solution (Discontinued)  Continuous PRN 11/3/2019 11/3/2019    Sig: Continuous As Needed.    Reason for Discontinue: Anesthesia Stop    ePHEDrine injection (Discontinued)  As Needed 11/3/2019 11/3/2019    Sig - Route: Infuse  into a venous catheter As Needed. - Intravenous    Reason for Discontinue: Anesthesia Stop    fentaNYL citrate (PF) (SUBLIMAZE) injection (Discontinued)  As Needed 11/3/2019 11/3/2019    Sig - Route: Infuse  into a venous catheter As Needed. - Intravenous    Reason for Discontinue: Anesthesia Stop    glycopyrrolate PF (ROBINUL) injection (Discontinued)  As Needed 11/3/2019 11/3/2019    Sig: As Needed.    Reason for Discontinue: Anesthesia Stop    HYDROcodone-acetaminophen (NORCO) 5-325 MG per tablet 1 tablet (Discontinued) 1 tablet Every 6 Hours PRN 11/2/2019 11/3/2019    Sig - Route: Take 1 tablet by mouth Every 6 (Six) Hours As Needed for Moderate Pain . - Oral     "HYDROcodone-acetaminophen (NORCO) 7.5-325 MG per tablet 2 tablet (Discontinued) 2 tablet Every 4 Hours PRN 11/3/2019 11/3/2019    Sig - Route: Take 2 tablets by mouth Every 4 (Four) Hours As Needed for Severe Pain . - Oral    Reason for Discontinue: Duplicate order    HYDROmorphone (DILAUDID) injection 0.5 mg (Discontinued) 0.5 mg Every 15 Minutes PRN 11/3/2019 11/3/2019    Sig - Route: Infuse 0.5 mL into a venous catheter Every 15 (Fifteen) Minutes As Needed for Severe Pain . - Intravenous    Reason for Discontinue: Patient Transfer    lidocaine (XYLOCAINE) 2% injection (Discontinued)  As Needed 11/3/2019 11/3/2019    Sig - Route: Infuse  into a venous catheter As Needed. - Intravenous    Reason for Discontinue: Anesthesia Stop    midazolam (VERSED) injection (Discontinued)  As Needed 11/3/2019 11/3/2019    Sig - Route: Infuse  into a venous catheter As Needed. - Intravenous    Reason for Discontinue: Anesthesia Stop    morphine injection 1 mg (Discontinued) 1 mg Every 4 Hours PRN 11/2/2019 11/3/2019    Sig - Route: Infuse 0.5 mL into a venous catheter Every 4 (Four) Hours As Needed for Moderate Pain . - Intravenous    Linked Group 3:  \"And\" Linked Group Details        naloxone (NARCAN) injection 0.4 mg (Discontinued) 0.4 mg Every 5 Minutes PRN 11/2/2019 11/3/2019    Sig - Route: Infuse 1 mL into a venous catheter Every 5 (Five) Minutes As Needed for Respiratory Depression. - Intravenous    Reason for Discontinue: Duplicate order    Linked Group 3:  \"And\" Linked Group Details        ondansetron (ZOFRAN) injection 4 mg (Discontinued) 4 mg Every 6 Hours PRN 11/2/2019 11/3/2019    Sig - Route: Infuse 2 mL into a venous catheter Every 6 (Six) Hours As Needed for Nausea or Vomiting. - Intravenous    Reason for Discontinue: Duplicate order    ondansetron (ZOFRAN) injection (Discontinued)  As Needed 11/3/2019 11/3/2019    Sig - Route: Infuse  into a venous catheter As Needed. - Intravenous    Reason for Discontinue: " Anesthesia Stop    phenylephrine (MALU-SYNEPHRINE) injection (Discontinued)  As Needed 11/3/2019 11/3/2019    Sig: As Needed.    Reason for Discontinue: Anesthesia Stop    promethazine (PHENERGAN) tablet 12.5 mg (Discontinued) 12.5 mg Every 6 Hours PRN 11/3/2019 11/3/2019    Sig - Route: Take 1 tablet by mouth Every 6 (Six) Hours As Needed for Nausea or Vomiting (If BOTH ondansetron (ZOFRAN) and promethazine (PHENERGAN) are ordered use ondansetron first and THEN promethazine IF ondansetron is ineffective.). - Oral    Reason for Discontinue: Duplicate order    Propofol (DIPRIVAN) injection (Discontinued)  As Needed 11/3/2019 11/3/2019    Sig - Route: Infuse  into a venous catheter As Needed. - Intravenous    Reason for Discontinue: Anesthesia Stop    sodium chloride 0.9 % infusion (Discontinued) 100 mL/hr Continuous 11/3/2019 11/3/2019    Sig - Route: Infuse 100 mL/hr into a venous catheter Continuous. - Intravenous    Reason for Discontinue: Duplicate order    Tranexamic Acid Sterile Solution (Discontinued)  As Needed 11/3/2019 11/3/2019    Sig: As Needed.    Reason for Discontinue: Patient Discharge    vancomycin (VANCOCIN) injection (Discontinued)  As Needed 11/3/2019 11/3/2019    Sig: As Needed.    Reason for Discontinue: Patient Discharge             Physician Progress Notes (last 24 hours) (Notes from 11/03/19 1021 through 11/04/19 1021)      Howie Campoverde MD at 11/04/19 0857          ORTHOPEDIC PROGRESS NOTE:    Name:  Yudi West  Date:    11/4/2019  Date of admission:  11/2/2019    Post op day:  1 Day Post-Op  Procedure:    Procedure(s) (LRB):  FEMUR INTRAMEDULLARY NAILING RETROGRADE (Left)    Subjective: Pain is been well controlled.  She has been up with physical therapy and denies any dizziness.    Vitals:    Vitals:    11/04/19 0722   BP:    Pulse: 95   Resp: 20   Temp:    SpO2:        Exam: She is alert cheerful and in no apparent distress.  Her dressings were removed.  Her incisions  were dry with no evidence of infection.  The foot was warm.  Sensory exam is intact to soft touch.  New dressings were applied.    Hemoglobin today is 8.5.    Lab Results (last 24 hours)     Procedure Component Value Units Date/Time    POC Glucose Once [[id number]]  (Abnormal) Collected:  11/04/19 0720    Specimen:  Blood Updated:  11/04/19 0739     Glucose 149 mg/dL      Comment: RN NotifiedOperator: [id number] KELLEY GRACEMeter ID: [id number]       Basic Metabolic Panel [[id number]]  (Abnormal) Collected:  11/04/19 0539    Specimen:  Blood Updated:  11/04/19 0637     Glucose 143 mg/dL      BUN 10 mg/dL      Creatinine 0.86 mg/dL      Sodium 137 mmol/L      Potassium 3.7 mmol/L      Chloride 102 mmol/L      CO2 27.0 mmol/L      Calcium 8.1 mg/dL      eGFR Non African Amer 70 mL/min/1.73      BUN/Creatinine Ratio 11.6     Anion Gap 8.0 mmol/L     Narrative:       GFR Normal >60  Chronic Kidney Disease <60  Kidney Failure <15    CBC & Differential [[id number]] Collected:  11/04/19 0539    Specimen:  Blood Updated:  11/04/19 0611    Narrative:       The following orders were created for panel order CBC & Differential.  Procedure                               Abnormality         Status                     ---------                               -----------         ------                     CBC Auto Differential[[id number]]        Abnormal            Final result                 Please view results for these tests on the individual orders.    CBC Auto Differential [[id number]]  (Abnormal) Collected:  11/04/19 0539    Specimen:  Blood Updated:  11/04/19 0611     WBC 12.81 10*3/mm3      RBC 2.78 10*6/mm3      Hemoglobin 8.3 g/dL      Hematocrit 24.5 %      MCV 88.1 fL      MCH 29.9 pg      MCHC 33.9 g/dL      RDW 11.9 %      RDW-SD 38.5 fl      MPV 10.9 fL      Platelets 168 10*3/mm3      Neutrophil % 68.7 %      Lymphocyte % 19.6 %      Monocyte % 9.4 %      Eosinophil % 1.7 %      Basophil % 0.2 %      Immature Grans % 0.4  %      Neutrophils, Absolute 8.80 10*3/mm3      Lymphocytes, Absolute 2.51 10*3/mm3      Monocytes, Absolute 1.20 10*3/mm3      Eosinophils, Absolute 0.22 10*3/mm3      Basophils, Absolute 0.03 10*3/mm3      Immature Grans, Absolute 0.05 10*3/mm3      nRBC 0.0 /100 WBC     POC Glucose Once [[id number]]  (Abnormal) Collected:  11/03/19 1933    Specimen:  Blood Updated:  11/03/19 1951     Glucose 236 mg/dL      Comment: RN NotifiedOperator: [id number] VALENCIA STEPHANIEMeter ID: [id number]       POC Glucose Once [[id number]]  (Abnormal) Collected:  11/03/19 1642    Specimen:  Blood Updated:  11/03/19 1659     Glucose 209 mg/dL      Comment: RN NotifiedOperator: [id number] MANDI HAYLEYMeter ID: [id number]       POC Glucose Once [[id number]]  (Abnormal) Collected:  11/03/19 1412    Specimen:  Blood Updated:  11/03/19 1658     Glucose 186 mg/dL      Comment: RN NotifiedOperator: [id number] MANDI HAYLEYMeter ID: [id number]       POC Glucose Once [[id number]]  (Abnormal) Collected:  11/03/19 1235    Specimen:  Blood Updated:  11/03/19 1247     Glucose 160 mg/dL      Comment: RN NotifiedOperator: [id number] [place] PATRICIAMeter ID: [id number]             ASSESSMENT: Acute blood loss anemia.  I expect little additional blood loss.  Principal Problem:    Closed displaced supracondylar fracture without intracondylar extension of lower end of left femur (CMS/HCC)  Active Problems:    Hypertension    Type 1 diabetes mellitus (CMS/HCC)    COPD (chronic obstructive pulmonary disease) (CMS/[facility])    Anemia, posthemorrhagic, acute        PLAN: Continue physical therapy until safe for home.  From my standpoint she can be discharged as early as this afternoon.  She was instructed in care of her dressings.  She may begin range of motion as tolerated and also may weight-bear as tolerated.  Return to see me next week for x-rays and staple removal.  Resume Plavix for DVT prophylaxis.    Howie Campoverde MD  11/04/19  8:57  AM              Electronically signed by Howie Campoverde MD at 11/04/19 0901     Nicolas Grant MD at 11/03/19 1642              [facility] Medicine Services  INPATIENT PROGRESS NOTE    Length of Stay: 1  Date of Admission: 11/2/2019  Primary Care Physician: Ibeth Masters APRN    Subjective   Chief Complaint: Femur fracture  HPI:    Patient has been admitted for left femur fracture status post repair postop day #1.  She is doing fine after the surgery although she is having some pain.  Reports no difficulty breathing    Review of Systems   Respiratory: Negative for shortness of breath.    Cardiovascular: Negative for chest pain.        All pertinent negatives and positives are as above. All other systems have been reviewed and are negative unless otherwise stated.     Objective    Temp:  [96 °F (35.6 °C)-98.8 °F (37.1 °C)] 96.4 °F (35.8 °C)  Heart Rate:  [] 93  Resp:  [16-22] 18  BP: ()/(60-88) 94/60  Physical Exam   Constitutional: She appears well-developed and well-nourished. No distress.   HENT:   Head: Normocephalic and atraumatic.   Cardiovascular: Normal rate.   Pulmonary/Chest: Effort normal. No respiratory distress. She has no wheezes.   Abdominal: Soft. She exhibits no distension.   Musculoskeletal: Normal range of motion. She exhibits no edema.   Neurological: She is alert. No cranial nerve deficit.   Skin: Skin is warm and dry. She is not diaphoretic.   Psychiatric: She has a normal mood and affect. Her behavior is normal. Judgment and thought content normal.   Vitals reviewed.          Results Review:  I have reviewed the labs, radiology results, and diagnostic studies.    Laboratory Data:   Lab Results (last 24 hours)     Procedure Component Value Units Date/Time    POC Glucose Once [[id number]]  (Abnormal) Collected:  11/03/19 1235    Specimen:  Blood Updated:  11/03/19 1247     Glucose 160 mg/dL      Comment: RN NotifiedOperator:  [id number] [place] PATRICIAMeter ID: [id number]       Urinalysis With Culture If Indicated - Urine, Catheter [[id number]]  (Abnormal) Collected:  11/03/19 0529    Specimen:  Urine, Catheter Updated:  11/03/19 0756     Color, UA Yellow     Appearance, UA Slightly Cloudy     pH, UA 6.0     Specific Gravity, UA 1.008     Comment: Result obtained by Refractometer        Glucose,  mg/dL (1+)     Ketones, UA Negative     Bilirubin, UA Negative     Blood, UA Negative     Protein, UA Negative     Leuk Esterase, UA Negative     Nitrite, UA Negative     Urobilinogen, UA 0.2 E.U./dL    Narrative:       Urine microscopic not indicated.    POC Glucose Once [[id number]]  (Abnormal) Collected:  11/03/19 0733    Specimen:  Blood Updated:  11/03/19 0751     Glucose 204 mg/dL      Comment: RN NotifiedOperator: [id number] MANDI ABIGAILLEYMeter ID: [id number]       POC Glucose Once [[id number]]  (Abnormal) Collected:  11/02/19 2332    Specimen:  Blood Updated:  11/03/19 0700     Glucose 353 mg/dL      Comment: RN NotifiedOperator: [id number] [facility]: [id number]       Basic Metabolic Panel [[id number]]  (Abnormal) Collected:  11/03/19 0516    Specimen:  Blood Updated:  11/03/19 0620     Glucose 193 mg/dL      BUN 12 mg/dL      Creatinine 0.82 mg/dL      Sodium 137 mmol/L      Potassium 3.7 mmol/L      Chloride 101 mmol/L      CO2 26.0 mmol/L      Calcium 8.3 mg/dL      eGFR Non African Amer 74 mL/min/1.73      BUN/Creatinine Ratio 14.6     Anion Gap 10.0 mmol/L     Narrative:       GFR Normal >60  Chronic Kidney Disease <60  Kidney Failure <15    CBC & Differential [[id number]] Collected:  11/03/19 0516    Specimen:  Blood Updated:  11/03/19 0611    Narrative:       The following orders were created for panel order CBC & Differential.  Procedure                               Abnormality         Status                     ---------                               -----------         ------                     CBC  Auto Differential[[id number]]        Abnormal            Final result                 Please view results for these tests on the individual orders.    CBC Auto Differential [[id number]]  (Abnormal) Collected:  11/03/19 0516    Specimen:  Blood Updated:  11/03/19 0611     WBC 10.97 10*3/mm3      RBC 3.33 10*6/mm3      Hemoglobin 10.0 g/dL      Hematocrit 29.0 %      MCV 87.1 fL      MCH 30.0 pg      MCHC 34.5 g/dL      RDW 11.7 %      RDW-SD 37.4 fl      MPV 10.9 fL      Platelets 194 10*3/mm3      Neutrophil % 68.1 %      Lymphocyte % 20.1 %      Monocyte % 9.4 %      Eosinophil % 1.5 %      Basophil % 0.4 %      Immature Grans % 0.5 %      Neutrophils, Absolute 7.48 10*3/mm3      Lymphocytes, Absolute 2.21 10*3/mm3      Monocytes, Absolute 1.03 10*3/mm3      Eosinophils, Absolute 0.16 10*3/mm3      Basophils, Absolute 0.04 10*3/mm3      Immature Grans, Absolute 0.05 10*3/mm3      nRBC 0.0 /100 WBC     Blood Gas, Arterial [[id number]]  (Abnormal) Collected:  11/03/19 0416    Specimen:  Arterial Blood Updated:  11/03/19 0428     Site Left Radial     Miguel's Test N/A     pH, Arterial 7.384 pH units      pCO2, Arterial 41.3 mm Hg      pO2, Arterial 94.2 mm Hg      HCO3, Arterial 24.6 mmol/L      Base Excess, Arterial -0.4 mmol/L      Comment: 84 Value below reference range        O2 Saturation, Arterial 98.2 %      Barometric Pressure for Blood Gas 754 mmHg      Modality Nasal Cannula     Flow Rate 3.5 lpm      Ventilator Mode NA     Collected by KATJA DOMINGUEZ     Comment: Meter: [id number]     :  [id number]       Extra Tubes [[id number]] Collected:  11/02/19 2356    Specimen:  Blood, Venous Line Updated:  11/03/19 0100    Narrative:       The following orders were created for panel order Extra Tubes.  Procedure                               Abnormality         Status                     ---------                               -----------         ------                     Light Blue Top[[id number]]                                    Final result               Lavender Top[[id number]]                                     Final result               Green Top (Gel)[[id number]]                                  Final result                 Please view results for these tests on the individual orders.    Light Blue Top [[id number]] Collected:  11/02/19 2356    Specimen:  Blood Updated:  11/03/19 0100     Extra Tube hold for add-on     Comment: Auto resulted       Lavender Top [[id number]] Collected:  11/02/19 2356    Specimen:  Blood Updated:  11/03/19 0100     Extra Tube hold for add-on     Comment: Auto resulted       Green Top (Gel) [[id number]] Collected:  11/02/19 2356    Specimen:  Blood Updated:  11/03/19 0100     Extra Tube Hold for add-ons.     Comment: Auto resulted.       Glucose, Random [[id number]]  (Abnormal) Collected:  11/02/19 2021    Specimen:  Blood Updated:  11/02/19 2053     Glucose 449 mg/dL     Comprehensive Metabolic Panel [[id number]]  (Abnormal) Collected:  11/02/19 1931    Specimen:  Blood Updated:  11/02/19 1951     Glucose 391 mg/dL      BUN 14 mg/dL      Creatinine 0.98 mg/dL      Sodium 137 mmol/L      Potassium 4.3 mmol/L      Chloride 100 mmol/L      CO2 24.0 mmol/L      Calcium 8.5 mg/dL      Total Protein 6.8 g/dL      Albumin 4.00 g/dL      ALT (SGPT) 16 U/L      AST (SGOT) 12 U/L      Alkaline Phosphatase 113 U/L      Total Bilirubin 0.3 mg/dL      eGFR Non African Amer 60 mL/min/1.73      Globulin 2.8 gm/dL      A/G Ratio 1.4 g/dL      BUN/Creatinine Ratio 14.3     Anion Gap 13.0 mmol/L     Narrative:       GFR Normal >60  Chronic Kidney Disease <60  Kidney Failure <15    CBC & Differential [[id number]] Collected:  11/02/19 1931    Specimen:  Blood Updated:  11/02/19 1934    Narrative:       The following orders were created for panel order CBC & Differential.  Procedure                               Abnormality         Status                     ---------                               -----------          ------                     CBC Auto Differential[[id number]]        Abnormal            Final result                 Please view results for these tests on the individual orders.    CBC Auto Differential [[id number]]  (Abnormal) Collected:  11/02/19 1931    Specimen:  Blood Updated:  11/02/19 1934     WBC 15.89 10*3/mm3      RBC 4.07 10*6/mm3      Hemoglobin 12.0 g/dL      Hematocrit 36.1 %      MCV 88.7 fL      MCH 29.5 pg      MCHC 33.2 g/dL      RDW 11.8 %      RDW-SD 37.7 fl      MPV 10.7 fL      Platelets 241 10*3/mm3      Neutrophil % 75.5 %      Lymphocyte % 13.3 %      Monocyte % 8.2 %      Eosinophil % 2.0 %      Basophil % 0.5 %      Immature Grans % 0.5 %      Neutrophils, Absolute 12.00 10*3/mm3      Lymphocytes, Absolute 2.12 10*3/mm3      Monocytes, Absolute 1.30 10*3/mm3      Eosinophils, Absolute 0.31 10*3/mm3      Basophils, Absolute 0.08 10*3/mm3      Immature Grans, Absolute 0.08 10*3/mm3      nRBC 0.0 /100 WBC           Culture Data:   No results found for: BLOODCX  No results found for: URINECX  No results found for: RESPCX  No results found for: WOUNDCX  No results found for: STOOLCX  No components found for: BODYFLD    Radiology Data:   Imaging Results (Last 24 Hours)     Procedure Component Value Units Date/Time    XR Femur 2 View Left [[id number]] Collected:  11/03/19 1254     Updated:  11/03/19 1319    Narrative:         Portable two view left femur    HISTORY: Postoperative study. Internal fixation.    Portable AP and crosstable lateral views of the left femur  obtained at 12:44 PM.    COMPARISON: November 2, 2019    FINDINGS:   Intramedullary abbi with one proximal interlocking screw and two  distal interlocking screws now in place transversing the  comminuted minimally displaced fracture distal femoral  diametaphyseal region.  Immediate postoperative intra-articular air.  Surgical skin staples in place.  Previously demonstrated total left knee prosthesis.  No dislocation.        Impression:        CONCLUSION:  Internal fixation of the comminuted fracture of the distal femur.    [address]    Electronically signed by:  Juve Rico MD  11/3/2019 1:18 PM CST  Workstation: [id number]    FL C Arm During Surgery [[id number]] Updated:  19 1202    XR Femur 2 View Left [[id number]] Collected:  19     Updated:  19    Narrative:       Pain with injury.    Left femur, four views.    Examination revealed severe comminuted fracture of the distal  femur. There is knee prosthesis with femoral and tibial  components.      Impression:       CONCLUSION: Severe comminuted fracture of the distal femur.    Electronically signed by:  Ernesto Sol MD  2019 9:14  PM CDT Workstation: [employer]          I have reviewed the patient's current medications.     Assessment/Plan     Active Hospital Problems    Diagnosis   • **Closed displaced supracondylar fracture without intracondylar extension of lower end of left femur (CMS/HCC)     Added automatically from request for surgery [id number]     • Femur fracture (CMS/HCC)       Left femur fracture-status post repair postop day #1- continue management as per orthopedic surgery    Pain-continue with pain management    Hypertension-continue to monitor    Diabetes mellitus-continue with sliding scale insulin    Coronary artery disease- Plavix will be resumed when orthopedic surgery is okay with that    COPD-continue with nebulizer as needed    DVT prophylaxis-SCD          Nicolas Grant MD   19   4:49 PM      Electronically signed by Nicolas Grant MD at 19 1651          Discharge Summary      Howie Campoverde MD at 19 0907          ORTHOPEDIC DISCHARGE SUMMARY    NAME: Yudi West   PHYSICIAN: Howie Campoverde MD  : 1970  MRN: [id number]    ADMITTED: 2019   DISCHARGED:     DISCHARGE DIAGNOSES:     Closed displaced supracondylar fracture without intracondylar extension of lower end of left femur  (CMS/[facility])    Hypertension    Type 1 diabetes mellitus (CMS/[facility])    COPD (chronic obstructive pulmonary disease) (CMS/[facility])    Anemia, posthemorrhagic, acute      SERVICE: ORTHOPEDIC: Attending, Howie Campoverde MD    CONSULTS: Dr. Grant    PROCEDURES: Procedure(s):  FEMUR INTRAMEDULLARY NAILING RETROGRADE    LABS:   Lab Results (last 24 hours)     Procedure Component Value Units Date/Time    POC Glucose Once [[id number]]  (Abnormal) Collected:  11/04/19 0720    Specimen:  Blood Updated:  11/04/19 0739     Glucose 149 mg/dL      Comment: RN NotifiedOperator: [id number] KELLEY GRACEMeter ID: [id number]       Basic Metabolic Panel [[id number]]  (Abnormal) Collected:  11/04/19 0539    Specimen:  Blood Updated:  11/04/19 0637     Glucose 143 mg/dL      BUN 10 mg/dL      Creatinine 0.86 mg/dL      Sodium 137 mmol/L      Potassium 3.7 mmol/L      Chloride 102 mmol/L      CO2 27.0 mmol/L      Calcium 8.1 mg/dL      eGFR Non African Amer 70 mL/min/1.73      BUN/Creatinine Ratio 11.6     Anion Gap 8.0 mmol/L     Narrative:       GFR Normal >60  Chronic Kidney Disease <60  Kidney Failure <15    CBC & Differential [[id number]] Collected:  11/04/19 0539    Specimen:  Blood Updated:  11/04/19 0611    Narrative:       The following orders were created for panel order CBC & Differential.  Procedure                               Abnormality         Status                     ---------                               -----------         ------                     CBC Auto Differential[[id number]]        Abnormal            Final result                 Please view results for these tests on the individual orders.    CBC Auto Differential [[id number]]  (Abnormal) Collected:  11/04/19 0539    Specimen:  Blood Updated:  11/04/19 0611     WBC 12.81 10*3/mm3      RBC 2.78 10*6/mm3      Hemoglobin 8.3 g/dL      Hematocrit 24.5 %      MCV 88.1 fL      MCH 29.9 pg      MCHC 33.9 g/dL      RDW 11.9 %      RDW-SD 38.5 fl      MPV 10.9 fL       Platelets 168 10*3/mm3      Neutrophil % 68.7 %      Lymphocyte % 19.6 %      Monocyte % 9.4 %      Eosinophil % 1.7 %      Basophil % 0.2 %      Immature Grans % 0.4 %      Neutrophils, Absolute 8.80 10*3/mm3      Lymphocytes, Absolute 2.51 10*3/mm3      Monocytes, Absolute 1.20 10*3/mm3      Eosinophils, Absolute 0.22 10*3/mm3      Basophils, Absolute 0.03 10*3/mm3      Immature Grans, Absolute 0.05 10*3/mm3      nRBC 0.0 /100 WBC     POC Glucose Once [[id number]]  (Abnormal) Collected:  11/03/19 1933    Specimen:  Blood Updated:  11/03/19 1951     Glucose 236 mg/dL      Comment: RN NotifiedOperator: [id number] VALENCIA STEPHANIEMeter ID: [id number]       POC Glucose Once [[id number]]  (Abnormal) Collected:  11/03/19 1642    Specimen:  Blood Updated:  11/03/19 1659     Glucose 209 mg/dL      Comment: RN NotifiedOperator: [id number] MANDI HAYLEYMeter ID: [id number]       POC Glucose Once [[id number]]  (Abnormal) Collected:  11/03/19 1412    Specimen:  Blood Updated:  11/03/19 1658     Glucose 186 mg/dL      Comment: RN NotifiedOperator: [id number] MANDI HAYLEYMeter ID: [id number]       POC Glucose Once [[id number]]  (Abnormal) Collected:  11/03/19 1235    Specimen:  Blood Updated:  11/03/19 1247     Glucose 160 mg/dL      Comment: RN NotifiedOperator: [id number] [facility] ComplexRICIAMeter ID: [id number]             SUMMARY: The patient is a 49-year-old female who sustained a twisting injury to her left knee on the day of admission.  She was initially seen in the emergency room in [place] and was referred for further evaluation and treatment.  Past medical history is remarkable for coronary artery disease diabetes and COPD.  Medications include aspirin omeprazole sertraline insulin and hydrocodone among others.    Exam on admission was remarkable for painfully restricted motion of the left knee.  Neurologic exam was normal.  Radiographs showed a slightly comminuted angulated fracture of the distal shaft of  "the femur above a well fixed knee arthroplasty.    On the second hospital day she was taken to the operating room where under general anesthetic the above-noted procedure was performed.  There were no complications.  Pain control was satisfactory.  Hemoglobin fell as low as 8.5.  Physical therapy was begun for gait and transfer training she had satisfactory progress with this.    DISPOSITION: She was discharged to home.  She was given a walker for home.  She may weight-bear as tolerated.  She was instructed in wound care.  She may begin range of motion of the knee as tolerated.    DISCHARGE MEDICATIONS HE will resume her home medications and was also given a prescription for Percocet to take as needed for pain.     Discharge Medications      New Medications      Instructions Start Date   oxyCODONE-acetaminophen 7.5-325 MG per tablet  Commonly known as:  PERCOCET   1 tablet, Oral, Every 4 Hours PRN         Changes to Medications      Instructions Start Date   aspirin 81 MG EC tablet  What changed:  when to take this   81 mg, Oral, 2 Times Daily With Meals         Continue These Medications      Instructions Start Date   atorvastatin 40 MG tablet  Commonly known as:  LIPITOR   40 mg, Oral, Nightly      carvedilol 6.25 MG tablet  Commonly known as:  COREG   6.25 mg, Oral, 2 Times Daily With Meals      clopidogrel 75 MG tablet  Commonly known as:  PLAVIX   TAKE 1 TABLET BY MOUTH ONCE DAILY      EASY TOUCH INSULIN SYRINGE 28G X 1/2\" 0.5 ML misc  Generic drug:  Insulin Syringe/Needle   USE 4 TIMES DAILY      gabapentin 300 MG capsule  Commonly known as:  NEURONTIN   TAKE 1 TABLET BY MOUTH FOUR TIMES DAILY      insulin glargine 100 UNIT/ML injection  Commonly known as:  LANTUS   50 Units, Subcutaneous, Daily      Omeprazole 20 MG tablet delayed-release   TAKE 1 TABLET BY MOUTh twice daily      polyethylene glycol powder  Commonly known as:  MIRALAX   MIX 17 GRAMS INTO 4-8 OUNCES OF ANY BEVERAGE TWICE DAILY FOR 7 DAYS    "   ranolazine 500 MG 12 hr tablet  Commonly known as:  RANEXA   500 mg, Oral, 2 Times Daily      sertraline 50 MG tablet  Commonly known as:  ZOLOFT   50 mg, Oral, Daily      UNIFINE PENTIPS 31G X 8 MM misc  Generic drug:  Insulin Pen Needle   USE AS DIRECTED WITH SOLOSTAR ONCE DAILY      VENTOLIN  (90 Base) MCG/ACT inhaler  Generic drug:  albuterol sulfate HFA   2 puffs, 4 Times Daily      zolpidem 10 MG tablet  Commonly known as:  AMBIEN   TAKE 1 TABLET BY MOUTH AT BEDTIME         Stop These Medications    HYDROcodone-acetaminophen 5-325 MG per tablet  Commonly known as:  NORCO            INSTRUCTIONS:  Activity: Ad shavonne. weightbearing as tolerated.  Diet: Diabetic diet  Diet Instructions     Advance Diet as Tolerated          Condition: Stable  Special instructions: Patient instructed to call office or call physician through hospital  [phone].    FOLLOW UP:   Additional Instructions for the Follow-ups that You Need to Schedule     Discharge Follow-up with Specified Provider: dominic; 1 Week   As directed      To:  dominic    Follow Up:  1 Week           Follow-up Information     Ibeth Masters APRN .    Specialty:  Nurse Practitioner  Contact information:  [address] [facility] [address]  [phone]                   Howie Campoverde MD  11/04/19  9:07 AM                Electronically signed by Howie Campoverde MD at 11/04/19 5474

## 2019-11-04 NOTE — PROGRESS NOTES
ORTHOPEDIC PROGRESS NOTE:    Name:  Yudi West  Date:    11/4/2019  Date of admission:  11/2/2019    Post op day:  1 Day Post-Op  Procedure:    Procedure(s) (LRB):  FEMUR INTRAMEDULLARY NAILING RETROGRADE (Left)    Subjective: Pain is been well controlled.  She has been up with physical therapy and denies any dizziness.    Vitals:    Vitals:    11/04/19 0722   BP:    Pulse: 95   Resp: 20   Temp:    SpO2:        Exam: She is alert cheerful and in no apparent distress.  Her dressings were removed.  Her incisions were dry with no evidence of infection.  The foot was warm.  Sensory exam is intact to soft touch.  New dressings were applied.    Hemoglobin today is 8.5.    Lab Results (last 24 hours)     Procedure Component Value Units Date/Time    POC Glucose Once [[id number]]  (Abnormal) Collected:  11/04/19 0720    Specimen:  Blood Updated:  11/04/19 0739     Glucose 149 mg/dL      Comment: RN NotifiedOperator: [id number] KELLEY GRACEMeter ID: [id number]       Basic Metabolic Panel [[id number]]  (Abnormal) Collected:  11/04/19 0539    Specimen:  Blood Updated:  11/04/19 0637     Glucose 143 mg/dL      BUN 10 mg/dL      Creatinine 0.86 mg/dL      Sodium 137 mmol/L      Potassium 3.7 mmol/L      Chloride 102 mmol/L      CO2 27.0 mmol/L      Calcium 8.1 mg/dL      eGFR Non African Amer 70 mL/min/1.73      BUN/Creatinine Ratio 11.6     Anion Gap 8.0 mmol/L     Narrative:       GFR Normal >60  Chronic Kidney Disease <60  Kidney Failure <15    CBC & Differential [[id number]] Collected:  11/04/19 0539    Specimen:  Blood Updated:  11/04/19 0611    Narrative:       The following orders were created for panel order CBC & Differential.  Procedure                               Abnormality         Status                     ---------                               -----------         ------                     CBC Auto Differential[[id number]]        Abnormal            Final result                 Please view results for  these tests on the individual orders.    CBC Auto Differential [[id number]]  (Abnormal) Collected:  11/04/19 0539    Specimen:  Blood Updated:  11/04/19 0611     WBC 12.81 10*3/mm3      RBC 2.78 10*6/mm3      Hemoglobin 8.3 g/dL      Hematocrit 24.5 %      MCV 88.1 fL      MCH 29.9 pg      MCHC 33.9 g/dL      RDW 11.9 %      RDW-SD 38.5 fl      MPV 10.9 fL      Platelets 168 10*3/mm3      Neutrophil % 68.7 %      Lymphocyte % 19.6 %      Monocyte % 9.4 %      Eosinophil % 1.7 %      Basophil % 0.2 %      Immature Grans % 0.4 %      Neutrophils, Absolute 8.80 10*3/mm3      Lymphocytes, Absolute 2.51 10*3/mm3      Monocytes, Absolute 1.20 10*3/mm3      Eosinophils, Absolute 0.22 10*3/mm3      Basophils, Absolute 0.03 10*3/mm3      Immature Grans, Absolute 0.05 10*3/mm3      nRBC 0.0 /100 WBC     POC Glucose Once [[id number]]  (Abnormal) Collected:  11/03/19 1933    Specimen:  Blood Updated:  11/03/19 1951     Glucose 236 mg/dL      Comment: RN NotifiedOperator: [id number] VALENCIA STEPHANIEMeter ID: [id number]       POC Glucose Once [[id number]]  (Abnormal) Collected:  11/03/19 1642    Specimen:  Blood Updated:  11/03/19 1659     Glucose 209 mg/dL      Comment: RN NotifiedOperator: [id number] MANDI HAYLEYMeter ID: [id number]       POC Glucose Once [[id number]]  (Abnormal) Collected:  11/03/19 1412    Specimen:  Blood Updated:  11/03/19 1658     Glucose 186 mg/dL      Comment: RN NotifiedOperator: [id number] MANDI HAYLEYMeter ID: [id number]       POC Glucose Once [[id number]]  (Abnormal) Collected:  11/03/19 1235    Specimen:  Blood Updated:  11/03/19 1247     Glucose 160 mg/dL      Comment: RN NotifiedOperator: [id number] Weston PATRICIAMeter ID: [id number]             ASSESSMENT: Acute blood loss anemia.  I expect little additional blood loss.  Principal Problem:    Closed displaced supracondylar fracture without intracondylar extension of lower end of left femur (CMS/HCC)  Active Problems:    Hypertension     Type 1 diabetes mellitus (CMS/[facility])    COPD (chronic obstructive pulmonary disease) (CMS/[facility])    Anemia, posthemorrhagic, acute        PLAN: Continue physical therapy until safe for home.  From my standpoint she can be discharged as early as this afternoon.  She was instructed in care of her dressings.  She may begin range of motion as tolerated and also may weight-bear as tolerated.  Return to see me next week for x-rays and staple removal.  Resume Plavix for DVT prophylaxis.    Howie Campoverde MD  11/04/19  8:57 AM

## 2019-11-04 NOTE — DISCHARGE SUMMARY
Progress Note  Dhaval Davies MD  Hospitalist    Date of visit: 11/4/2019     LOS: 2 days   Patient Care Team:  Ibeth Masters APRN as PCP - General (Nurse Practitioner)    Chief Complaint: L knee pain    Subjective     Interval History:     Patient Complaints: L knee pain - improved    History taken from: patient    Medication Review:   Current Facility-Administered Medications   Medication Dose Route Frequency Provider Last Rate Last Dose   • acetaminophen (TYLENOL) tablet 500 mg  500 mg Oral Q4H PRN Howie Campoverde MD       • atorvastatin (LIPITOR) tablet 40 mg  40 mg Oral Nightly Nicolas Grant MD   40 mg at 11/03/19 2057   • carvedilol (COREG) tablet 6.25 mg  6.25 mg Oral BID With Meals Nicolas Grant MD   6.25 mg at 11/03/19 1811   • dextrose (D50W) 25 g/ 50mL Intravenous Solution 25 g  25 g Intravenous Q15 Min PRN Nicolas Grant MD       • dextrose (GLUTOSE) oral gel 15 g  15 g Oral Q15 Min PRN Nicolas Grant MD       • docusate sodium (COLACE) capsule 200 mg  200 mg Oral BID PRN Howie Campoverde MD       • glucagon (human recombinant) (GLUCAGEN DIAGNOSTIC) injection 1 mg  1 mg Subcutaneous Q15 Min PRN Nicolas Grant MD       • HYDROcodone-acetaminophen (NORCO) 7.5-325 MG per tablet 1 tablet  1 tablet Oral Q4H PRN Howie Campoverde MD       • insulin aspart (novoLOG) injection 0-14 Units  0-14 Units Subcutaneous 4x Daily AC & at Bedtime Nicolas Grant MD   5 Units at 11/03/19 2057   • insulin detemir (LEVEMIR) injection 50 Units  50 Units Subcutaneous Nightly Nicolas Grant MD   50 Units at 11/03/19 2056   • ipratropium-albuterol (DUO-NEB) nebulizer solution 3 mL  3 mL Nebulization 4x Daily - RT Nicolas Grant MD   3 mL at 11/04/19 0716   • morphine injection 6 mg  6 mg Intravenous Q2H PRN Howie Campoverde MD        And   • naloxone (NARCAN) injection 0.4 mg  0.4 mg Intravenous Q5 Min PRN Howie Campoverde MD       • ondansetron (ZOFRAN) tablet 4 mg   4 mg Oral Q6H PRN Howie Campoverde MD   4 mg at 11/03/19 1811    Or   • ondansetron (ZOFRAN) injection 4 mg  4 mg Intravenous Q6H PRN Howie Campoverde MD       • oxyCODONE-acetaminophen (PERCOCET) 7.5-325 MG per tablet 2 tablet  2 tablet Oral Q4H PRN Howie Campoverde MD   2 tablet at 11/04/19 0938   • pantoprazole (PROTONIX) EC tablet 40 mg  40 mg Oral QAM Nicolas Grant MD   40 mg at 11/04/19 0453   • promethazine (PHENERGAN) tablet 12.5 mg  12.5 mg Oral Q6H PRN Howie Campoverde MD       • ranolazine (RANEXA) 12 hr tablet 500 mg  500 mg Oral BID Nicolas Grant MD   500 mg at 11/04/19 0938   • sertraline (ZOLOFT) tablet 50 mg  50 mg Oral Daily Nicolas Grant MD   50 mg at 11/04/19 0938   • sodium chloride 0.9 % flush 10 mL  10 mL Intravenous Q12H Nicolas Grant MD       • sodium chloride 0.9 % flush 10 mL  10 mL Intravenous PRN Nicolas Grant MD       • sodium chloride 0.9 % flush 3 mL  3 mL Intravenous Q12H Howie Campoverde MD       • sodium chloride 0.9 % flush 3 mL  3 mL Intravenous Q12H Howie Campoverde MD       • sodium chloride 0.9 % flush 3-10 mL  3-10 mL Intravenous PRN Howie Campoverde MD       • sodium chloride 0.9 % flush 3-10 mL  3-10 mL Intravenous CHEPEN Howie Campoverde MD       • sodium chloride 0.9 % infusion  100 mL/hr Intravenous Continuous Howie Campoverde  mL/hr at 11/03/19 1626 100 mL/hr at 11/03/19 1626   • zolpidem (AMBIEN) tablet 10 mg  10 mg Oral Nightly PRN Nicolas Grant MD   10 mg at 11/02/19 2114       Review of Systems:   Review of Systems   Constitutional: Positive for fatigue. Negative for fever.   Respiratory: Negative for shortness of breath and wheezing.    Cardiovascular: Negative for chest pain, palpitations and leg swelling.   Gastrointestinal: Negative for abdominal distention, anal bleeding, diarrhea, nausea and vomiting.   Genitourinary: Negative for frequency, hematuria, menstrual problem, urgency, vaginal  bleeding and vaginal pain.   Musculoskeletal: Positive for arthralgias (L knee). Negative for back pain, gait problem and joint swelling.   Skin: Positive for pallor. Negative for color change.   Neurological: Positive for weakness. Negative for tremors, syncope, speech difficulty and numbness.   Psychiatric/Behavioral: Negative for agitation, behavioral problems and confusion.   All other systems reviewed and are negative.      Objective     Vital Signs  Temp:  [96 °F (35.6 °C)-98.4 °F (36.9 °C)] 98.3 °F (36.8 °C)  Heart Rate:  [] 96  Resp:  [16-20] 20  BP: ()/(56-88) 98/57    Physical Exam:  Physical Exam   Constitutional: She is oriented to person, place, and time. She appears well-developed and well-nourished. No distress.   HENT:   Head: Normocephalic and atraumatic.   Eyes: EOM are normal. Pupils are equal, round, and reactive to light. No scleral icterus.   Neck: Normal range of motion. Neck supple.   Cardiovascular: Normal rate and regular rhythm.   Pulmonary/Chest: Effort normal and breath sounds normal. No stridor. No respiratory distress. She has no wheezes.   Abdominal: Soft. Bowel sounds are normal. She exhibits no distension. There is no tenderness. There is no guarding.   Musculoskeletal: She exhibits tenderness (L knee). She exhibits no edema.   Neurological: She is alert and oriented to person, place, and time. She displays normal reflexes. No cranial nerve deficit. Coordination normal.   Skin: Skin is warm and dry. No rash noted. There is pallor.   Psychiatric: She has a normal mood and affect. Her behavior is normal.   Vitals reviewed.       Results Review:    Lab Results (last 24 hours)     Procedure Component Value Units Date/Time    POC Glucose Once [[id number]]  (Abnormal) Collected:  11/04/19 0720    Specimen:  Blood Updated:  11/04/19 0739     Glucose 149 mg/dL      Comment: RN NotifiedOperator: [id number] KELLEY GRACEMeter ID: [id number]       Basic Metabolic Panel [[id number]]   (Abnormal) Collected:  11/04/19 0539    Specimen:  Blood Updated:  11/04/19 0637     Glucose 143 mg/dL      BUN 10 mg/dL      Creatinine 0.86 mg/dL      Sodium 137 mmol/L      Potassium 3.7 mmol/L      Chloride 102 mmol/L      CO2 27.0 mmol/L      Calcium 8.1 mg/dL      eGFR Non African Amer 70 mL/min/1.73      BUN/Creatinine Ratio 11.6     Anion Gap 8.0 mmol/L     Narrative:       GFR Normal >60  Chronic Kidney Disease <60  Kidney Failure <15    CBC & Differential [[id number]] Collected:  11/04/19 0539    Specimen:  Blood Updated:  11/04/19 0611    Narrative:       The following orders were created for panel order CBC & Differential.  Procedure                               Abnormality         Status                     ---------                               -----------         ------                     CBC Auto Differential[[id number]]        Abnormal            Final result                 Please view results for these tests on the individual orders.    CBC Auto Differential [[id number]]  (Abnormal) Collected:  11/04/19 0539    Specimen:  Blood Updated:  11/04/19 0611     WBC 12.81 10*3/mm3      RBC 2.78 10*6/mm3      Hemoglobin 8.3 g/dL      Hematocrit 24.5 %      MCV 88.1 fL      MCH 29.9 pg      MCHC 33.9 g/dL      RDW 11.9 %      RDW-SD 38.5 fl      MPV 10.9 fL      Platelets 168 10*3/mm3      Neutrophil % 68.7 %      Lymphocyte % 19.6 %      Monocyte % 9.4 %      Eosinophil % 1.7 %      Basophil % 0.2 %      Immature Grans % 0.4 %      Neutrophils, Absolute 8.80 10*3/mm3      Lymphocytes, Absolute 2.51 10*3/mm3      Monocytes, Absolute 1.20 10*3/mm3      Eosinophils, Absolute 0.22 10*3/mm3      Basophils, Absolute 0.03 10*3/mm3      Immature Grans, Absolute 0.05 10*3/mm3      nRBC 0.0 /100 WBC     POC Glucose Once [[id number]]  (Abnormal) Collected:  11/03/19 1933    Specimen:  Blood Updated:  11/03/19 1951     Glucose 236 mg/dL      Comment: RN NotifiedOperator: [id number] ANNIE Hogan ID:  [id number]       POC Glucose Once [[id number]]  (Abnormal) Collected:  11/03/19 1642    Specimen:  Blood Updated:  11/03/19 1659     Glucose 209 mg/dL      Comment: RN NotifiedOperator: [id number] MANDI HAYESLEYMeter ID: [id number]       POC Glucose Once [[id number]]  (Abnormal) Collected:  11/03/19 1412    Specimen:  Blood Updated:  11/03/19 1658     Glucose 186 mg/dL      Comment: RN NotifiedOperator: [id number] MANDI HAYESLEYMeter ID: [id number]       POC Glucose Once [[id number]]  (Abnormal) Collected:  11/03/19 1235    Specimen:  Blood Updated:  11/03/19 1247     Glucose 160 mg/dL      Comment: RN NotifiedOperator: [id number] CEM PATRICIAMeter ID: [id number]             Imaging Results (Last 24 Hours)     Procedure Component Value Units Date/Time    FL C Arm During Surgery [[id number]] Resulted:  11/04/19 0814     Updated:  11/04/19 0814    XR Femur 2 View Left [[id number]] Collected:  11/03/19 1254     Updated:  11/03/19 1319    Narrative:         Portable two view left femur    HISTORY: Postoperative study. Internal fixation.    Portable AP and crosstable lateral views of the left femur  obtained at 12:44 PM.    COMPARISON: November 2, 2019    FINDINGS:   Intramedullary abbi with one proximal interlocking screw and two  distal interlocking screws now in place transversing the  comminuted minimally displaced fracture distal femoral  diametaphyseal region.  Immediate postoperative intra-articular air.  Surgical skin staples in place.  Previously demonstrated total left knee prosthesis.  No dislocation.        Impression:       CONCLUSION:  Internal fixation of the comminuted fracture of the distal femur.    [id number]    Electronically signed by:  Juve Rico MD  11/3/2019 1:18 PM CST  Workstation: [id number]          Assessment/Plan       Closed displaced supracondylar fracture without intracondylar extension of lower end of left femur (CMS/HCC)    Hypertension    Type 1 diabetes mellitus (CMS/HCC)    Anemia,  posthemorrhagic, acute    COPD (chronic obstructive pulmonary disease) (CMS/HCC)    Can be discharged home and follow up with PCP/Orhopedic Surgery.    Dhaval Davies MD  11/04/19  12:19 PM

## 2019-11-04 NOTE — PLAN OF CARE
Problem: Patient Care Overview  Goal: Plan of Care Review  Outcome: Ongoing (interventions implemented as appropriate)      Problem: Knee Arthroplasty (Total, Partial) (Adult)  Goal: Signs and Symptoms of Listed Potential Problems Will be Absent, Minimized or Managed (Knee Arthroplasty)  Outcome: Ongoing (interventions implemented as appropriate)

## 2019-11-04 NOTE — PLAN OF CARE
Problem: Patient Care Overview  Goal: Plan of Care Review  Outcome: Ongoing (interventions implemented as appropriate)   11/04/19 0993   Coping/Psychosocial   Plan of Care Reviewed With patient   OTHER   Outcome Summary PT tx completed. Pt performing sit<>supine with CGA and sit<>stand with CGA and FW Walker. Pt ambulating 20ftx2 with CGA and FW Walker and with bettter weight bearing through LLE. Pt performing BLE therex in supine. Pt educated on how to ascend/ descend stairs with B HR as pt declining to perform stairs at this time. No new goals met. Cont PT POC.

## 2019-11-04 NOTE — DISCHARGE SUMMARY
ORTHOPEDIC DISCHARGE SUMMARY    NAME: Yudi West   PHYSICIAN: Howie Campoverde MD  : 1970  MRN: [id number]    ADMITTED: 2019   DISCHARGED:     DISCHARGE DIAGNOSES:     Closed displaced supracondylar fracture without intracondylar extension of lower end of left femur (CMS/[facility])    Hypertension    Type 1 diabetes mellitus (CMS/[facility])    COPD (chronic obstructive pulmonary disease) (CMS/[facility])    Anemia, posthemorrhagic, acute      SERVICE: ORTHOPEDIC: Attending, Howie Campoverde MD    CONSULTS: Dr. Grant    PROCEDURES: Procedure(s):  FEMUR INTRAMEDULLARY NAILING RETROGRADE    LABS:   Lab Results (last 24 hours)     Procedure Component Value Units Date/Time    POC Glucose Once [[id number]]  (Abnormal) Collected:  19    Specimen:  Blood Updated:  19     Glucose 149 mg/dL      Comment: RN NotifiedOperator: [id number] KELLEY GRACEMeter ID: [id number]       Basic Metabolic Panel [[id number]]  (Abnormal) Collected:  19    Specimen:  Blood Updated:  19     Glucose 143 mg/dL      BUN 10 mg/dL      Creatinine 0.86 mg/dL      Sodium 137 mmol/L      Potassium 3.7 mmol/L      Chloride 102 mmol/L      CO2 27.0 mmol/L      Calcium 8.1 mg/dL      eGFR Non African Amer 70 mL/min/1.73      BUN/Creatinine Ratio 11.6     Anion Gap 8.0 mmol/L     Narrative:       GFR Normal >60  Chronic Kidney Disease <60  Kidney Failure <15    CBC & Differential [[id number]] Collected:  19    Specimen:  Blood Updated:  19    Narrative:       The following orders were created for panel order CBC & Differential.  Procedure                               Abnormality         Status                     ---------                               -----------         ------                     CBC Auto Differential[[id number]]        Abnormal            Final result                 Please view results for these tests on the individual orders.    CBC Auto Differential  [[id number]]  (Abnormal) Collected:  11/04/19 0539    Specimen:  Blood Updated:  11/04/19 0611     WBC 12.81 10*3/mm3      RBC 2.78 10*6/mm3      Hemoglobin 8.3 g/dL      Hematocrit 24.5 %      MCV 88.1 fL      MCH 29.9 pg      MCHC 33.9 g/dL      RDW 11.9 %      RDW-SD 38.5 fl      MPV 10.9 fL      Platelets 168 10*3/mm3      Neutrophil % 68.7 %      Lymphocyte % 19.6 %      Monocyte % 9.4 %      Eosinophil % 1.7 %      Basophil % 0.2 %      Immature Grans % 0.4 %      Neutrophils, Absolute 8.80 10*3/mm3      Lymphocytes, Absolute 2.51 10*3/mm3      Monocytes, Absolute 1.20 10*3/mm3      Eosinophils, Absolute 0.22 10*3/mm3      Basophils, Absolute 0.03 10*3/mm3      Immature Grans, Absolute 0.05 10*3/mm3      nRBC 0.0 /100 WBC     POC Glucose Once [[id number]]  (Abnormal) Collected:  11/03/19 1933    Specimen:  Blood Updated:  11/03/19 1951     Glucose 236 mg/dL      Comment: RN NotifiedOperator: [id number] VALENCIA STEPHANIEMeter ID: [id number]       POC Glucose Once [[id number]]  (Abnormal) Collected:  11/03/19 1642    Specimen:  Blood Updated:  11/03/19 1659     Glucose 209 mg/dL      Comment: RN NotifiedOperator: [id number] MANDI ImmuMetrixLEYMeter ID: [id number]       POC Glucose Once [[id number]]  (Abnormal) Collected:  11/03/19 1412    Specimen:  Blood Updated:  11/03/19 1658     Glucose 186 mg/dL      Comment: RN NotifiedOperator: [id number] MANDI HAYLEYMeter ID: [id number]       POC Glucose Once [[id number]]  (Abnormal) Collected:  11/03/19 1235    Specimen:  Blood Updated:  11/03/19 1247     Glucose 160 mg/dL      Comment: RN NotifiedOperator: [id number] CEM PATRICIAMeter ID: [id number]             SUMMARY: The patient is a 49-year-old female who sustained a twisting injury to her left knee on the day of admission.  She was initially seen in the emergency room in [place] and was referred for further evaluation and treatment.  Past medical history is remarkable for coronary artery disease diabetes  "and COPD.  Medications include aspirin omeprazole sertraline insulin and hydrocodone among others.    Exam on admission was remarkable for painfully restricted motion of the left knee.  Neurologic exam was normal.  Radiographs showed a slightly comminuted angulated fracture of the distal shaft of the femur above a well fixed knee arthroplasty.    On the second hospital day she was taken to the operating room where under general anesthetic the above-noted procedure was performed.  There were no complications.  Pain control was satisfactory.  Hemoglobin fell as low as 8.5.  Physical therapy was begun for gait and transfer training she had satisfactory progress with this.    DISPOSITION: She was discharged to home.  She was given a walker for home.  She may weight-bear as tolerated.  She was instructed in wound care.  She may begin range of motion of the knee as tolerated.    DISCHARGE MEDICATIONS HE will resume her home medications and was also given a prescription for Percocet to take as needed for pain.     Discharge Medications      New Medications      Instructions Start Date   oxyCODONE-acetaminophen 7.5-325 MG per tablet  Commonly known as:  PERCOCET   1 tablet, Oral, Every 4 Hours PRN         Changes to Medications      Instructions Start Date   aspirin 81 MG EC tablet  What changed:  when to take this   81 mg, Oral, 2 Times Daily With Meals         Continue These Medications      Instructions Start Date   atorvastatin 40 MG tablet  Commonly known as:  LIPITOR   40 mg, Oral, Nightly      carvedilol 6.25 MG tablet  Commonly known as:  COREG   6.25 mg, Oral, 2 Times Daily With Meals      clopidogrel 75 MG tablet  Commonly known as:  PLAVIX   TAKE 1 TABLET BY MOUTH ONCE DAILY      EASY TOUCH INSULIN SYRINGE 28G X 1/2\" 0.5 ML misc  Generic drug:  Insulin Syringe/Needle   USE 4 TIMES DAILY      gabapentin 300 MG capsule  Commonly known as:  NEURONTIN   TAKE 1 TABLET BY MOUTH FOUR TIMES DAILY      insulin glargine " 100 UNIT/ML injection  Commonly known as:  LANTUS   50 Units, Subcutaneous, Daily      Omeprazole 20 MG tablet delayed-release   TAKE 1 TABLET BY MOUTh twice daily      polyethylene glycol powder  Commonly known as:  MIRALAX   MIX 17 GRAMS INTO 4-8 OUNCES OF ANY BEVERAGE TWICE DAILY FOR 7 DAYS      ranolazine 500 MG 12 hr tablet  Commonly known as:  RANEXA   500 mg, Oral, 2 Times Daily      sertraline 50 MG tablet  Commonly known as:  ZOLOFT   50 mg, Oral, Daily      UNIFINE PENTIPS 31G X 8 MM misc  Generic drug:  Insulin Pen Needle   USE AS DIRECTED WITH SOLOSTAR ONCE DAILY      VENTOLIN  (90 Base) MCG/ACT inhaler  Generic drug:  albuterol sulfate HFA   2 puffs, 4 Times Daily      zolpidem 10 MG tablet  Commonly known as:  AMBIEN   TAKE 1 TABLET BY MOUTH AT BEDTIME         Stop These Medications    HYDROcodone-acetaminophen 5-325 MG per tablet  Commonly known as:  NORCO            INSTRUCTIONS:  Activity: Ad shavonne. weightbearing as tolerated.  Diet: Diabetic diet  Diet Instructions     Advance Diet as Tolerated          Condition: Stable  Special instructions: Patient instructed to call office or call physician through hospital  [phone].    FOLLOW UP:   Additional Instructions for the Follow-ups that You Need to Schedule     Discharge Follow-up with Specified Provider: dominic; 1 Week   As directed      To:  dominic    Follow Up:  1 Week           Follow-up Information     Ibeth Masters APRN .    Specialty:  Nurse Practitioner  Contact information:  [address] [facility] [address]  [phone]                   Howie Campoverde MD  11/04/19  9:07 AM

## 2019-11-04 NOTE — PAYOR COMM NOTE
"Chantell Malone  [facility]  (P)[phone]  (F)[phone]          Laron West (49 y.o. Female)     Date of Birth Social Security Number Address Home Phone MRN    1970  [address]  [address]   [facility] [id number] [phone] [id number]    Confucianist Marital Status          Alevism        Admission Date Admission Type Admitting Provider Attending Provider Department, Room/Bed    11/2/19 Urgent Nicolas Grant MD Iqbal, Javed Syed, MD [facility] 3 EAST, 358/1    Discharge Date Discharge Disposition Discharge Destination                       Attending Provider:  Nicolas Grant MD    Allergies:  Sulfa Antibiotics    Isolation:  None   Infection:  None   Code Status:  CPR    Ht:  165.1 cm (65\")   Wt:  82.7 kg (182 lb 6.4 oz)    Admission Cmt:  None   Principal Problem:  Closed displaced supracondylar fracture without intracondylar extension of lower end of left femur (CMS/[facility]) [S72.452A]                 Active Insurance as of 11/2/2019     Primary Coverage     Payor Plan Insurance Group Employer/Plan Group    HUMANA MEDICAID HU[employer] [id number]     Payor Plan Address Payor Plan Phone Number Payor Plan Fax Number Effective Dates    PO  [phone]  3/18/2019 - None Entered    [facility] [id number]       Subscriber Name Subscriber Birth Date Member ID       LARON WEST 1970 [id number]                 Emergency Contacts      (Rel.) Home Phone Work Phone Mobile Phone    Ricardo West (Spouse) [phone] -- [phone]               History & Physical      Howie Campoverde MD at 11/03/19 0920        Hemoglobin today is 10.0.  Radiographs of the left femur show no abnormality proximal to the fracture site.    The history and physical is otherwise unchanged from previous note.    Electronically signed by Howie Campoverde MD at 11/03/19 0922   Source Note             ORTHOPAEDIC CONSULT     Patient " Name:  Yudi West  Admit Date:  11/2/2019  Consult Date:  11/2/2019    Referring Provider: Dr. Grant  Reason for Consultation: Fracture left femur.    Patient Care Team:  Ibeth Masters APRN as PCP - General (Nurse Practitioner)    History of present illness: Mrs. West is 49 years old.  She was descending some stairs earlier today when she sustained a twisting injury to her left leg.  She had prompt onset of pain.  She was seen in the emergency room in [place] and x-rays were performed showing a fracture of the distal shaft of the femur.  Past history is remarkable for a total knee replacement on the left about 9 years ago by Dr. Arrington.  She said she was doing well with regard to her knee prior to her most recent injury.  Patient requested to have care for the fracture in [place].  I spoke by phone with Dr. Ritter and agreed to accept him in transfer.  This was an isolated injury.    Medications include Plavix carvedilol, Neurontin, insulin, Tagamet, sertraline, 10 mg hydrocodone.  She is allergic to sulfa drugs.  She smokes 2 pack/day of cigarettes and denies use of alcohol.    Past medical history is remarkable for coronary artery disease COPD and diabetes.  Previous surgeries include nephrectomy for carcinoma, hysterectomy, tonsillectomy, and cholecystectomy.  There is been no history of anesthetic complication.  She also has a history of chronic back pain.    Family history she is .    Social history she lives in [place] with her  and son.  She is disabled.    Review of Systems is remarkable for pain well localized to the the left knee and distal thigh.  There is been no numbness or tingling in the limb.  Otherwise complete ROS is negative except as mentioned above.    Prior to Admission medications    Medication Sig Start Date End Date Taking? Authorizing Provider   aspirin 81 MG EC tablet Take 81 mg by mouth Daily.   Yes Provider, MD Sara    "  atorvastatin (LIPITOR) 40 MG tablet Take 40 mg by mouth Every Night. 7/18/17  Yes Sara Benoit MD   carvedilol (COREG) 6.25 MG tablet Take 6.25 mg by mouth 2 (Two) Times a Day With Meals.   Yes Sara Benoit MD   clopidogrel (PLAVIX) 75 MG tablet TAKE 1 TABLET BY MOUTH ONCE DAILY 6/20/18  Yes Lewis Johnson MD   gabapentin (NEURONTIN) 300 MG capsule TAKE 1 TABLET BY MOUTH FOUR TIMES DAILY 8/7/17  Yes Sara Benoit MD   HYDROcodone-acetaminophen (NORCO) 5-325 MG per tablet Take 1 tablet by mouth Every 6 (Six) Hours As Needed.   Yes Sara Benoit MD   insulin glargine (LANTUS) 100 UNIT/ML injection Inject 50 Units under the skin into the appropriate area as directed Daily.   Yes Sara Benoit MD   Omeprazole 20 MG tablet delayed-release TAKE 1 TABLET BY MOUTh twice daily 5/25/17  Yes Sara Benoit MD   ranolazine (RANEXA) 500 MG 12 hr tablet Take 1 tablet by mouth 2 (Two) Times a Day. 6/3/19  Yes Lewis Johnson MD   sertraline (ZOLOFT) 50 MG tablet Take 50 mg by mouth Daily.   Yes Sara Benoit MD   VENTOLIN  (90 BASE) MCG/ACT inhaler 2 puffs 4 (Four) Times a Day. 6/5/17  Yes Sara Benoit MD   zolpidem (AMBIEN) 10 MG tablet TAKE 1 TABLET BY MOUTH AT BEDTIME 8/7/17  Yes Sara Benoit MD   EASY TOUCH INSULIN SYRINGE 28G X 1/2\" 0.5 ML misc USE 4 TIMES DAILY 5/23/17   Sara Benoit MD   polyethylene glycol (MIRALAX) powder MIX 17 GRAMS INTO 4-8 OUNCES OF ANY BEVERAGE TWICE DAILY FOR 7 DAYS 7/8/17   Provider, Historical, MD   UNIFINE PENTIPS 31G X 8 MM misc USE AS DIRECTED WITH SOLOSTAR ONCE DAILY 6/19/17   Sara Benoit MD   ALPRAZolam (XANAX) 0.5 MG tablet TAKE 1 TABLET BY MOUTH THREE TIMES DAILY 8/7/17 11/2/19  Sara Benoit MD   citalopram (CeleXA) 40 MG tablet TAKE 1 TABLET BY MOUTH ONCE DAILY 8/2/17 11/2/19  Provider, MD Sara   pantoprazole (PROTONIX) 20 MG EC tablet Take 20 mg by " mouth Daily.  11/2/19  Provider, Sara, MD     Allergies   Allergen Reactions   • Sulfa Antibiotics Itching     Social History     Socioeconomic History   • Marital status:      Spouse name: Not on file   • Number of children: Not on file   • Years of education: Not on file   • Highest education level: Not on file   Tobacco Use   • Smoking status: Current Every Day Smoker     Packs/day: 2.00     Years: 36.00     Pack years: 72.00     Types: Cigarettes   • Smokeless tobacco: Never Used   Substance and Sexual Activity   • Alcohol use: No   • Drug use: No   • Sexual activity: Defer     Past Medical History:   Diagnosis Date   • Anxiety and depression    • Asthma    • Cancer (CMS/HCC)    • Chronic kidney disease    • COPD (chronic obstructive pulmonary disease) (CMS/HCC)    • Diabetes mellitus (CMS/HCC)    • Hypertension    • Myocardial infarction (CMS/HCC)      Past Surgical History:   Procedure Laterality Date   • CORONARY STENT PLACEMENT     • GALLBLADDER SURGERY     • HYSTERECTOMY     • KIDNEY SURGERY     • NEPHRECTOMY Left    • REPLACEMENT TOTAL KNEE     • TONSILLECTOMY       Family History   Problem Relation Age of Onset   • Heart disease Mother    • Hypertension Mother    • Heart disease Father    • Hypertension Father        Vital Signs   Temp:  [97.4 °F (36.3 °C)] 97.4 °F (36.3 °C)  Heart Rate:  [76] 76  Resp:  [18] 18  BP: (106)/(69) 106/69      11/02/19  1346   Weight: 83 kg (182 lb 15.7 oz)     Body mass index is 30.45 kg/m².    Physical Exam in general she is alert and in intermittent moderate discomfort.  She responds appropriately to questions and commands.  She appears older than her stated age.      HEENT exam showed extraocular movements are intact.  Oropharynx shows teeth are in poor repair.    Exam of the lungs shows scattered inspiratory wheezes.    Cardiac exam shows a regular rhythm normal rate no murmur.    The abdomen is soft obese and nontender with active bowel sounds.   Genitourinary and rectal exams were not done.    Exam of the extremities is directed to the] left lower extremity.  There are anterior and posterior splints crossing the knee.  Planes were loosened.  There is a well-healed midline anterior scar across the knee.  There is moderate swelling diffusely about the distal thigh and knee but no ecchymosis.  Posterior tibial pulse is strong.  Sensory exam is intact to soft touch.    Radiographs of the knee done earlier today show a slightly comminuted fracture of the distal shaft of the femur with anterior angulation of proximal approaching 45 degrees.  There is a total knee arthroplasty in satisfactory position with no evidence of loosening.      Results Review:  Imaging Results (Last 24 Hours)     ** No results found for the last 24 hours. **        Lab Results (last 24 hours)     ** No results found for the last 24 hours. **          Assessment/Plan 1 fracture distal shaft left femur.  2 well-functioning left knee arthroplasty.  3 diabetes.  4 COPD.    The natural history of the disorder and treatment options were reviewed with the patient and her family.  Discussed both nonoperative and surgical treatment.  I recommended reduction and stabilization of the fracture with a retrograde intramedullary nail.  I think this would result in more certain healing of the fracture and also will allow earlier mobilization and weightbearing.        Anticipated benefits of surgery were reviewed in detail.  Potential complications of surgery and anesthetic were reviewed in detail including but not limited to infection, blood loss, sore throat, pneumonia, brain damage and even death.  Postoperative immobilization and rehabilitation were discussed.  They understand that even with prompt healing of her fracture the knee replacement may not function as well as it did prior to her injury..  The patient and family appear to understand all matters discussed and wish to proceed.      The left  thigh was prepped. A hematoma block was performed with a mixture of Marcaine and xylocaine. There were no complications and she reported improvement in her pain.     Howie Campoverde MD  11/02/19  6:53 PM     Electronically signed by Howie Campoverde MD at 11/02/19 2100             Howie Campoverde MD at 11/02/19 8536          ORTHOPAEDIC CONSULT     Patient Name:  Yudi West  Admit Date:  11/2/2019  Consult Date:  11/2/2019    Referring Provider: Dr. Grant  Reason for Consultation: Fracture left femur.    Patient Care Team:  Ibeth Masters APRN as PCP - General (Nurse Practitioner)    History of present illness: Mrs. West is 49 years old.  She was descending some stairs earlier today when she sustained a twisting injury to her left leg.  She had prompt onset of pain.  She was seen in the emergency room in [place] and x-rays were performed showing a fracture of the distal shaft of the femur.  Past history is remarkable for a total knee replacement on the left about 9 years ago by Dr. Arrington.  She said she was doing well with regard to her knee prior to her most recent injury.  Patient requested to have care for the fracture in [place].  I spoke by phone with Dr. Ritter and agreed to accept him in transfer.  This was an isolated injury.    Medications include Plavix carvedilol, Neurontin, insulin, Tagamet, sertraline, 10 mg hydrocodone.  She is allergic to sulfa drugs.  She smokes 2 pack/day of cigarettes and denies use of alcohol.    Past medical history is remarkable for coronary artery disease COPD and diabetes.  Previous surgeries include nephrectomy for carcinoma, hysterectomy, tonsillectomy, and cholecystectomy.  There is been no history of anesthetic complication.  She also has a history of chronic back pain.    Family history she is .    Social history she lives in [place] with her  and son.  She is disabled.    Review of Systems is remarkable  "for pain well localized to the the left knee and distal thigh.  There is been no numbness or tingling in the limb.  Otherwise complete ROS is negative except as mentioned above.    Prior to Admission medications    Medication Sig Start Date End Date Taking? Authorizing Provider   aspirin 81 MG EC tablet Take 81 mg by mouth Daily.   Yes Sara Benoit MD   atorvastatin (LIPITOR) 40 MG tablet Take 40 mg by mouth Every Night. 7/18/17  Yes Sara Benoit MD   carvedilol (COREG) 6.25 MG tablet Take 6.25 mg by mouth 2 (Two) Times a Day With Meals.   Yes Sara Benoit MD   clopidogrel (PLAVIX) 75 MG tablet TAKE 1 TABLET BY MOUTH ONCE DAILY 6/20/18  Yes Lewis Johnson MD   gabapentin (NEURONTIN) 300 MG capsule TAKE 1 TABLET BY MOUTH FOUR TIMES DAILY 8/7/17  Yes Sara Benoit MD   HYDROcodone-acetaminophen (NORCO) 5-325 MG per tablet Take 1 tablet by mouth Every 6 (Six) Hours As Needed.   Yes Sara Benoit MD   insulin glargine (LANTUS) 100 UNIT/ML injection Inject 50 Units under the skin into the appropriate area as directed Daily.   Yes Sara Benoit MD   Omeprazole 20 MG tablet delayed-release TAKE 1 TABLET BY MOUTh twice daily 5/25/17  Yes Sara Benoit MD   ranolazine (RANEXA) 500 MG 12 hr tablet Take 1 tablet by mouth 2 (Two) Times a Day. 6/3/19  Yes Lewis Johnson MD   sertraline (ZOLOFT) 50 MG tablet Take 50 mg by mouth Daily.   Yes Sara Benoit MD   VENTOLIN  (90 BASE) MCG/ACT inhaler 2 puffs 4 (Four) Times a Day. 6/5/17  Yes Sara Benoit MD   zolpidem (AMBIEN) 10 MG tablet TAKE 1 TABLET BY MOUTH AT BEDTIME 8/7/17  Yes Sara Benoit MD   EASY TOUCH INSULIN SYRINGE 28G X 1/2\" 0.5 ML misc USE 4 TIMES DAILY 5/23/17   Sara Benoit MD   polyethylene glycol (MIRALAX) powder MIX 17 GRAMS INTO 4-8 OUNCES OF ANY BEVERAGE TWICE DAILY FOR 7 DAYS 7/8/17   Sara Benoit MD   UNIFINE PENTIPS 31G X 8 MM " misc USE AS DIRECTED WITH SOLOSTAR ONCE DAILY 6/19/17   Sara Benoit MD   ALPRAZolam (XANAX) 0.5 MG tablet TAKE 1 TABLET BY MOUTH THREE TIMES DAILY 8/7/17 11/2/19  Sara Benoit MD   citalopram (CeleXA) 40 MG tablet TAKE 1 TABLET BY MOUTH ONCE DAILY 8/2/17 11/2/19  Sara Benoit MD   pantoprazole (PROTONIX) 20 MG EC tablet Take 20 mg by mouth Daily.  11/2/19  Sara Benoit MD     Allergies   Allergen Reactions   • Sulfa Antibiotics Itching     Social History     Socioeconomic History   • Marital status:      Spouse name: Not on file   • Number of children: Not on file   • Years of education: Not on file   • Highest education level: Not on file   Tobacco Use   • Smoking status: Current Every Day Smoker     Packs/day: 2.00     Years: 36.00     Pack years: 72.00     Types: Cigarettes   • Smokeless tobacco: Never Used   Substance and Sexual Activity   • Alcohol use: No   • Drug use: No   • Sexual activity: Defer     Past Medical History:   Diagnosis Date   • Anxiety and depression    • Asthma    • Cancer (CMS/HCC)    • Chronic kidney disease    • COPD (chronic obstructive pulmonary disease) (CMS/HCC)    • Diabetes mellitus (CMS/HCC)    • Hypertension    • Myocardial infarction (CMS/HCC)      Past Surgical History:   Procedure Laterality Date   • CORONARY STENT PLACEMENT     • GALLBLADDER SURGERY     • HYSTERECTOMY     • KIDNEY SURGERY     • NEPHRECTOMY Left    • REPLACEMENT TOTAL KNEE     • TONSILLECTOMY       Family History   Problem Relation Age of Onset   • Heart disease Mother    • Hypertension Mother    • Heart disease Father    • Hypertension Father        Vital Signs   Temp:  [97.4 °F (36.3 °C)] 97.4 °F (36.3 °C)  Heart Rate:  [76] 76  Resp:  [18] 18  BP: (106)/(69) 106/69      11/02/19  1346   Weight: 83 kg (182 lb 15.7 oz)     Body mass index is 30.45 kg/m².    Physical Exam in general she is alert and in intermittent moderate discomfort.  She responds appropriately to  questions and commands.  She appears older than her stated age.      HEENT exam showed extraocular movements are intact.  Oropharynx shows teeth are in poor repair.    Exam of the lungs shows scattered inspiratory wheezes.    Cardiac exam shows a regular rhythm normal rate no murmur.    The abdomen is soft obese and nontender with active bowel sounds.  Genitourinary and rectal exams were not done.    Exam of the extremities is directed to the] left lower extremity.  There are anterior and posterior splints crossing the knee.  Planes were loosened.  There is a well-healed midline anterior scar across the knee.  There is moderate swelling diffusely about the distal thigh and knee but no ecchymosis.  Posterior tibial pulse is strong.  Sensory exam is intact to soft touch.    Radiographs of the knee done earlier today show a slightly comminuted fracture of the distal shaft of the femur with anterior angulation of proximal approaching 45 degrees.  There is a total knee arthroplasty in satisfactory position with no evidence of loosening.      Results Review:  Imaging Results (Last 24 Hours)     ** No results found for the last 24 hours. **        Lab Results (last 24 hours)     ** No results found for the last 24 hours. **          Assessment/Plan 1 fracture distal shaft left femur.  2 well-functioning left knee arthroplasty.  3 diabetes.  4 COPD.    The natural history of the disorder and treatment options were reviewed with the patient and her family.  Discussed both nonoperative and surgical treatment.  I recommended reduction and stabilization of the fracture with a retrograde intramedullary nail.  I think this would result in more certain healing of the fracture and also will allow earlier mobilization and weightbearing.        Anticipated benefits of surgery were reviewed in detail.  Potential complications of surgery and anesthetic were reviewed in detail including but not limited to infection, blood loss, sore  throat, pneumonia, brain damage and even death.  Postoperative immobilization and rehabilitation were discussed.  They understand that even with prompt healing of her fracture the knee replacement may not function as well as it did prior to her injury..  The patient and family appear to understand all matters discussed and wish to proceed.      The left thigh was prepped. A hematoma block was performed with a mixture of Marcaine and xylocaine. There were no complications and she reported improvement in her pain.     Howie Campoverde MD  11/02/19  6:53 PM    Electronically signed by Howie Campoverde MD at 11/02/19 2100     Nicolas Grant MD at 11/02/19 1833                [facility] Medicine Admission      Date of Admission: 11/2/2019      Primary Care Physician: Ibeth Masters APRN      Chief Complaint: left leg pain    HPI:    This is a 49-year-old female with concurrent medical history of COPD diabetes coronary artery disease status post stenting hypertension presenting to the ER after she fell off the stairs and hurt her left leg and she reported having pain in her left knee mainly.  She does have a history of knee replacement done about 9 years ago.  She was at [facility] and she opted to come to [place] and she was transferred here.  Patient reports that she does not have any chest pain but does have some intermittent difficulty breathing and has a history of COPD.    Past Medical History:  has a past medical history of Anxiety and depression, Asthma, Cancer (CMS/[facility]), Chronic kidney disease, COPD (chronic obstructive pulmonary disease) (CMS/HCC), Diabetes mellitus (CMS/HCC), Hypertension, and Myocardial infarction (CMS/[facility]).    Past Surgical History:  has a past surgical history that includes Kidney surgery; Replacement total knee; Hysterectomy; Tonsillectomy; Gallbladder surgery; Coronary stent placement; and Nephrectomy (Left).    Family  History: family history includes Heart disease in her father and mother; Hypertension in her father and mother.    Social History:  reports that she has been smoking cigarettes.  She has a 72.00 pack-year smoking history. She has never used smokeless tobacco. She reports that she does not drink alcohol or use drugs.    Allergies:   Allergies   Allergen Reactions   • Sulfa Antibiotics Itching       Medications: Scheduled Meds:  bupivacaine (PF) 10 mL Injection Once   ipratropium-albuterol 3 mL Nebulization 4x Daily - RT   lidocaine 10 mL Subcutaneous Once   sodium chloride 10 mL Intravenous Q12H     Continuous Infusions:   PRN Meds:.Morphine **AND** naloxone  •  ondansetron  •  sodium chloride  No current facility-administered medications on file prior to encounter.      Current Outpatient Medications on File Prior to Encounter   Medication Sig Dispense Refill   • aspirin 81 MG EC tablet Take 81 mg by mouth Daily.     • atorvastatin (LIPITOR) 40 MG tablet Take 40 mg by mouth Every Night.  3   • carvedilol (COREG) 6.25 MG tablet Take 6.25 mg by mouth 2 (Two) Times a Day With Meals.     • clopidogrel (PLAVIX) 75 MG tablet TAKE 1 TABLET BY MOUTH ONCE DAILY 30 tablet 6   • gabapentin (NEURONTIN) 300 MG capsule TAKE 1 TABLET BY MOUTH FOUR TIMES DAILY  0   • HYDROcodone-acetaminophen (NORCO) 5-325 MG per tablet Take 1 tablet by mouth Every 6 (Six) Hours As Needed.     • insulin glargine (LANTUS) 100 UNIT/ML injection Inject 50 Units under the skin into the appropriate area as directed Daily.     • Omeprazole 20 MG tablet delayed-release TAKE 1 TABLET BY MOUTh twice daily  3   • ranolazine (RANEXA) 500 MG 12 hr tablet Take 1 tablet by mouth 2 (Two) Times a Day. 60 tablet 6   • sertraline (ZOLOFT) 50 MG tablet Take 50 mg by mouth Daily.     • VENTOLIN  (90 BASE) MCG/ACT inhaler 2 puffs 4 (Four) Times a Day.  1   • zolpidem (AMBIEN) 10 MG tablet TAKE 1 TABLET BY MOUTH AT BEDTIME  0   • EASY TOUCH INSULIN SYRINGE 28G X  "1/2\" 0.5 ML misc USE 4 TIMES DAILY  3   • polyethylene glycol (MIRALAX) powder MIX 17 GRAMS INTO 4-8 OUNCES OF ANY BEVERAGE TWICE DAILY FOR 7 DAYS  0   • UNIFINE PENTIPS 31G X 8 MM misc USE AS DIRECTED WITH SOLOSTAR ONCE DAILY  3   • [DISCONTINUED] ALPRAZolam (XANAX) 0.5 MG tablet TAKE 1 TABLET BY MOUTH THREE TIMES DAILY  0   • [DISCONTINUED] citalopram (CeleXA) 40 MG tablet TAKE 1 TABLET BY MOUTH ONCE DAILY  3   • [DISCONTINUED] pantoprazole (PROTONIX) 20 MG EC tablet Take 20 mg by mouth Daily.         Review of Systems:  Review of Systems   HENT: Positive for congestion.    Respiratory: Positive for shortness of breath.    Cardiovascular: Negative for chest pain.   Gastrointestinal: Negative for diarrhea.      Otherwise complete ROS is negative except as mentioned above.    Physical Exam:   Temp:  [97.4 °F (36.3 °C)] 97.4 °F (36.3 °C)  Heart Rate:  [76] 76  Resp:  [18] 18  BP: (106)/(69) 106/69  Physical Exam   Constitutional: She appears well-developed and well-nourished. No distress.   HENT:   Head: Normocephalic and atraumatic.   Cardiovascular: Normal rate.   Pulmonary/Chest: Effort normal. No respiratory distress. She has no wheezes.   Abdominal: Soft. She exhibits no distension.   Musculoskeletal: Normal range of motion. She exhibits no edema.   Left knee brace in place   Neurological: She is alert. No cranial nerve deficit.   Skin: Skin is warm and dry. She is not diaphoretic.   Psychiatric: She has a normal mood and affect. Her behavior is normal. Judgment and thought content normal.   Vitals reviewed.        Results Reviewed:  I have personally reviewed current lab, radiology, and data and agree with results.  Lab Results (last 24 hours)     ** No results found for the last 24 hours. **        Imaging Results (Last 24 Hours)     ** No results found for the last 24 hours. **            Assessment:    Active Hospital Problems    Diagnosis   • Femur fracture (CMS/[facility])     Left femur fracture– orthopedic " services has been consulted and they are planning on doing surgery in the a.m.  We will continue with pain management    Pain–morphine has been ordered    Hypertension–continue to monitor    Diabetes mellitus–sliding scale insulin    Coronary artery disease–Plavix will be held    COPD– patient does not seem to be in acute exacerbation, nebulizer treatments will be ordered, will order ABG in the morning    DVT prophylaxis–SCD            Nicolas Grant MD  11/02/19  6:33 PM                  Electronically signed by Nicolas Grant MD at 11/02/19 2417       Emergency Department Notes     No notes of this type exist for this encounter.        Hospital Medications (all)       Dose Frequency Start End    acetaminophen (TYLENOL) tablet 500 mg 500 mg Every 4 Hours PRN 11/3/2019     Sig - Route: Take 1 tablet by mouth Every 4 (Four) Hours As Needed for Fever (Temperature Greater Than 101F). - Oral    atorvastatin (LIPITOR) tablet 40 mg 40 mg Nightly 11/2/2019     Sig - Route: Take 1 tablet by mouth Every Night. - Oral    bupivacaine (PF) (MARCAINE) 0.5 % injection 10 mL 10 mL Once 11/2/2019 11/2/2019    Sig - Route: Inject 10 mL as directed 1 (One) Time. - Injection    carvedilol (COREG) tablet 6.25 mg 6.25 mg 2 Times Daily With Meals 11/2/2019     Sig - Route: Take 1 tablet by mouth 2 (Two) Times a Day With Meals. - Oral    ceFAZolin in 0.9% normal saline (ANCEF) IVPB solution 2 g 2 g Once 11/3/2019 11/3/2019    Sig - Route: Infuse 100 mL into a venous catheter 1 (One) Time. - Intravenous    ceFAZolin in 0.9% normal saline (ANCEF) IVPB solution 2 g 2 g Every 8 Hours 11/3/2019 11/4/2019    Sig - Route: Infuse 100 mL into a venous catheter Every 8 (Eight) Hours. - Intravenous    dextrose (D50W) 25 g/ 50mL Intravenous Solution 25 g 25 g Every 15 Minutes PRN 11/2/2019     Sig - Route: Infuse 50 mL into a venous catheter Every 15 (Fifteen) Minutes As Needed for Low Blood Sugar (Blood Sugar Less Than 70). - Intravenous     dextrose (GLUTOSE) oral gel 15 g 15 g Every 15 Minutes PRN 11/2/2019     Sig - Route: Take 15 application by mouth Every 15 (Fifteen) Minutes As Needed for Low Blood Sugar (Blood sugar less than 70). - Oral    docusate sodium (COLACE) capsule 200 mg 200 mg 2 Times Daily PRN 11/3/2019     Sig - Route: Take 2 capsules by mouth 2 (Two) Times a Day As Needed for Constipation. - Oral    glucagon (human recombinant) (GLUCAGEN DIAGNOSTIC) injection 1 mg 1 mg Every 15 Minutes PRN 11/2/2019     Sig - Route: Inject 1 mg under the skin into the appropriate area as directed Every 15 (Fifteen) Minutes As Needed for Low Blood Sugar (Blood Glucose Less Than 70). - Subcutaneous    HYDROcodone-acetaminophen (NORCO) 7.5-325 MG per tablet 1 tablet 1 tablet Every 4 Hours PRN 11/3/2019 11/13/2019    Sig - Route: Take 1 tablet by mouth Every 4 (Four) Hours As Needed for Moderate Pain . - Oral    insulin aspart (novoLOG) injection 0-14 Units 0-14 Units 4 Times Daily Before Meals & Nightly 11/2/2019     Sig - Route: Inject 0-14 Units under the skin into the appropriate area as directed 4 (Four) Times a Day Before Meals & at Bedtime. - Subcutaneous    insulin aspart (novoLOG) injection 20 Units 20 Units Once 11/2/2019 11/2/2019    Sig - Route: Inject 20 Units under the skin into the appropriate area as directed 1 (One) Time. - Subcutaneous    insulin detemir (LEVEMIR) injection 50 Units 50 Units Nightly 11/2/2019     Sig - Route: Inject 50 Units under the skin into the appropriate area as directed Every Night. - Subcutaneous    ipratropium-albuterol (DUO-NEB) nebulizer solution 3 mL 3 mL 4 Times Daily - RT 11/2/2019     Sig - Route: Take 3 mL by nebulization 4 (Four) Times a Day. - Nebulization    lidocaine (XYLOCAINE) 1 % injection 10 mL 10 mL Once 11/2/2019 11/2/2019    Sig - Route: Inject 10 mL under the skin into the appropriate area as directed 1 (One) Time. - Subcutaneous    morphine injection 6 mg 6 mg Every 2 Hours PRN 11/3/2019  "11/13/2019    Sig - Route: Infuse 3 mL into a venous catheter Every 2 (Two) Hours As Needed for Severe Pain . - Intravenous    Linked Group 1:  \"And\" Linked Group Details        naloxone (NARCAN) injection 0.4 mg 0.4 mg Every 5 Minutes PRN 11/3/2019     Sig - Route: Infuse 1 mL into a venous catheter Every 5 (Five) Minutes As Needed for Respiratory Depression. - Intravenous    Linked Group 1:  \"And\" Linked Group Details        ondansetron (ZOFRAN) injection 4 mg 4 mg Every 6 Hours PRN 11/3/2019     Sig - Route: Infuse 2 mL into a venous catheter Every 6 (Six) Hours As Needed for Nausea or Vomiting. - Intravenous    Linked Group 2:  \"Or\" Linked Group Details        ondansetron (ZOFRAN) injection 4 mg 4 mg Once As Needed 11/3/2019 11/3/2019    Sig - Route: Infuse 2 mL into a venous catheter 1 (One) Time As Needed for Nausea or Vomiting. - Intravenous    ondansetron (ZOFRAN) tablet 4 mg 4 mg Every 6 Hours PRN 11/3/2019     Sig - Route: Take 1 tablet by mouth Every 6 (Six) Hours As Needed for Nausea or Vomiting. - Oral    Linked Group 2:  \"Or\" Linked Group Details        oxyCODONE-acetaminophen (PERCOCET) 7.5-325 MG per tablet 2 tablet 2 tablet Every 4 Hours PRN 11/3/2019 11/13/2019    Sig - Route: Take 2 tablets by mouth Every 4 (Four) Hours As Needed for Severe Pain . - Oral    pantoprazole (PROTONIX) EC tablet 40 mg 40 mg Every Morning 11/3/2019     Sig - Route: Take 1 tablet by mouth Every Morning. - Oral    promethazine (PHENERGAN) tablet 12.5 mg 12.5 mg Every 6 Hours PRN 11/3/2019     Sig - Route: Take 1 tablet by mouth Every 6 (Six) Hours As Needed for Nausea or Vomiting (If BOTH ondansetron (ZOFRAN) and promethazine (PHENERGAN) are ordered use ondansetron first and THEN promethazine IF ondansetron is ineffective.). - Oral    ranolazine (RANEXA) 12 hr tablet 500 mg 500 mg 2 Times Daily 11/2/2019     Sig - Route: Take 1 tablet by mouth 2 (Two) Times a Day. - Oral    sertraline (ZOLOFT) tablet 50 mg 50 mg Daily " 11/3/2019     Sig - Route: Take 1 tablet by mouth Daily. - Oral    sodium chloride 0.9 % flush 10 mL 10 mL Every 12 Hours Scheduled 11/2/2019     Sig - Route: Infuse 10 mL into a venous catheter Every 12 (Twelve) Hours. - Intravenous    sodium chloride 0.9 % flush 10 mL 10 mL As Needed 11/2/2019     Sig - Route: Infuse 10 mL into a venous catheter As Needed for Line Care. - Intravenous    sodium chloride 0.9 % flush 3 mL 3 mL Every 12 Hours Scheduled 11/3/2019     Sig - Route: Infuse 3 mL into a venous catheter Every 12 (Twelve) Hours. - Intravenous    sodium chloride 0.9 % flush 3 mL 3 mL Every 12 Hours Scheduled 11/3/2019     Sig - Route: Infuse 3 mL into a venous catheter Every 12 (Twelve) Hours. - Intravenous    sodium chloride 0.9 % flush 3-10 mL 3-10 mL As Needed 11/3/2019     Sig - Route: Infuse 3-10 mL into a venous catheter As Needed for Line Care. - Intravenous    sodium chloride 0.9 % flush 3-10 mL 3-10 mL As Needed 11/3/2019     Sig - Route: Infuse 3-10 mL into a venous catheter As Needed for Line Care. - Intravenous    sodium chloride 0.9 % infusion 100 mL/hr Continuous 11/3/2019     Sig - Route: Infuse 100 mL/hr into a venous catheter Continuous. - Intravenous    Tranexamic Acid tablet 1,300 mg 1,300 mg Once 11/3/2019 11/3/2019    Sig - Route: Take 2 tablets by mouth 1 (One) Time. - Oral    Tranexamic Acid tablet 1,300 mg 1,300 mg Every 4 Hours 11/3/2019 11/3/2019    Sig - Route: Take 2 tablets by mouth Every 4 (Four) Hours. - Oral    zolpidem (AMBIEN) tablet 10 mg 10 mg Nightly PRN 11/2/2019     Sig - Route: Take 2 tablets by mouth At Night As Needed for Sleep. - Oral    acetaminophen (TYLENOL) tablet 500 mg (Discontinued) 500 mg Every 4 Hours PRN 11/3/2019 11/3/2019    Sig - Route: Take 1 tablet by mouth Every 4 (Four) Hours As Needed for Fever (Temperature Greater Than 101F). - Oral    Reason for Discontinue: Duplicate order    bupivacaine (PF) (MARCAINE) 0.5 % injection (Discontinued)  As Needed  "11/3/2019 11/3/2019    Sig: As Needed.    Reason for Discontinue: Patient Discharge    docusate sodium (COLACE) capsule 250 mg (Discontinued) 250 mg 2 Times Daily PRN 11/3/2019 11/3/2019    Sig - Route: Take 5 capsules by mouth 2 (Two) Times a Day As Needed for Constipation. - Oral    Reason for Discontinue: Duplicate order    HYDROcodone-acetaminophen (NORCO) 5-325 MG per tablet 1 tablet (Discontinued) 1 tablet Every 6 Hours PRN 11/2/2019 11/3/2019    Sig - Route: Take 1 tablet by mouth Every 6 (Six) Hours As Needed for Moderate Pain . - Oral    HYDROcodone-acetaminophen (NORCO) 7.5-325 MG per tablet 2 tablet (Discontinued) 2 tablet Every 4 Hours PRN 11/3/2019 11/3/2019    Sig - Route: Take 2 tablets by mouth Every 4 (Four) Hours As Needed for Severe Pain . - Oral    Reason for Discontinue: Duplicate order    HYDROmorphone (DILAUDID) injection 0.5 mg (Discontinued) 0.5 mg Every 15 Minutes PRN 11/3/2019 11/3/2019    Sig - Route: Infuse 0.5 mL into a venous catheter Every 15 (Fifteen) Minutes As Needed for Severe Pain . - Intravenous    Reason for Discontinue: Patient Transfer    morphine injection 1 mg (Discontinued) 1 mg Every 4 Hours PRN 11/2/2019 11/3/2019    Sig - Route: Infuse 0.5 mL into a venous catheter Every 4 (Four) Hours As Needed for Moderate Pain . - Intravenous    Linked Group 3:  \"And\" Linked Group Details        naloxone (NARCAN) injection 0.4 mg (Discontinued) 0.4 mg Every 5 Minutes PRN 11/2/2019 11/3/2019    Sig - Route: Infuse 1 mL into a venous catheter Every 5 (Five) Minutes As Needed for Respiratory Depression. - Intravenous    Reason for Discontinue: Duplicate order    Linked Group 3:  \"And\" Linked Group Details        ondansetron (ZOFRAN) injection 4 mg (Discontinued) 4 mg Every 6 Hours PRN 11/2/2019 11/3/2019    Sig - Route: Infuse 2 mL into a venous catheter Every 6 (Six) Hours As Needed for Nausea or Vomiting. - Intravenous    Reason for Discontinue: Duplicate order    promethazine " (PHENERGAN) tablet 12.5 mg (Discontinued) 12.5 mg Every 6 Hours PRN 11/3/2019 11/3/2019    Sig - Route: Take 1 tablet by mouth Every 6 (Six) Hours As Needed for Nausea or Vomiting (If BOTH ondansetron (ZOFRAN) and promethazine (PHENERGAN) are ordered use ondansetron first and THEN promethazine IF ondansetron is ineffective.). - Oral    Reason for Discontinue: Duplicate order    sodium chloride 0.9 % infusion (Discontinued) 100 mL/hr Continuous 11/3/2019 11/3/2019    Sig - Route: Infuse 100 mL/hr into a venous catheter Continuous. - Intravenous    Reason for Discontinue: Duplicate order    Tranexamic Acid Sterile Solution (Discontinued)  As Needed 11/3/2019 11/3/2019    Sig: As Needed.    Reason for Discontinue: Patient Discharge    vancomycin (VANCOCIN) injection (Discontinued)  As Needed 11/3/2019 11/3/2019    Sig: As Needed.    Reason for Discontinue: Patient Discharge          Lab Results (last 72 hours)     Procedure Component Value Units Date/Time    POC Glucose Once [[id number]]  (Abnormal) Collected:  11/04/19 0720    Specimen:  Blood Updated:  11/04/19 0739     Glucose 149 mg/dL      Comment: RN NotifiedOperator: [id number] KELLEY GRACEMeter ID: [id number]       Basic Metabolic Panel [[id number]]  (Abnormal) Collected:  11/04/19 0539    Specimen:  Blood Updated:  11/04/19 0637     Glucose 143 mg/dL      BUN 10 mg/dL      Creatinine 0.86 mg/dL      Sodium 137 mmol/L      Potassium 3.7 mmol/L      Chloride 102 mmol/L      CO2 27.0 mmol/L      Calcium 8.1 mg/dL      eGFR Non African Amer 70 mL/min/1.73      BUN/Creatinine Ratio 11.6     Anion Gap 8.0 mmol/L     Narrative:       GFR Normal >60  Chronic Kidney Disease <60  Kidney Failure <15    CBC & Differential [[id number]] Collected:  11/04/19 0539    Specimen:  Blood Updated:  11/04/19 0611    Narrative:       The following orders were created for panel order CBC & Differential.  Procedure                               Abnormality         Status                      ---------                               -----------         ------                     CBC Auto Differential[[id number]]        Abnormal            Final result                 Please view results for these tests on the individual orders.    CBC Auto Differential [[id number]]  (Abnormal) Collected:  11/04/19 0539    Specimen:  Blood Updated:  11/04/19 0611     WBC 12.81 10*3/mm3      RBC 2.78 10*6/mm3      Hemoglobin 8.3 g/dL      Hematocrit 24.5 %      MCV 88.1 fL      MCH 29.9 pg      MCHC 33.9 g/dL      RDW 11.9 %      RDW-SD 38.5 fl      MPV 10.9 fL      Platelets 168 10*3/mm3      Neutrophil % 68.7 %      Lymphocyte % 19.6 %      Monocyte % 9.4 %      Eosinophil % 1.7 %      Basophil % 0.2 %      Immature Grans % 0.4 %      Neutrophils, Absolute 8.80 10*3/mm3      Lymphocytes, Absolute 2.51 10*3/mm3      Monocytes, Absolute 1.20 10*3/mm3      Eosinophils, Absolute 0.22 10*3/mm3      Basophils, Absolute 0.03 10*3/mm3      Immature Grans, Absolute 0.05 10*3/mm3      nRBC 0.0 /100 WBC     POC Glucose Once [[id number]]  (Abnormal) Collected:  11/03/19 1933    Specimen:  Blood Updated:  11/03/19 1951     Glucose 236 mg/dL      Comment: RN NotifiedOperator: [id number] ANNIE STEPHANIEMeter ID: [id number]       POC Glucose Once [[id number]]  (Abnormal) Collected:  11/03/19 1642    Specimen:  Blood Updated:  11/03/19 1659     Glucose 209 mg/dL      Comment: RN NotifiedOperator: [id number] MANDI Sparo LabsMeter ID: [id number]       POC Glucose Once [[id number]]  (Abnormal) Collected:  11/03/19 1412    Specimen:  Blood Updated:  11/03/19 1658     Glucose 186 mg/dL      Comment: RN NotifiedOperator: [id number] MANDI Sparo LabsMeter ID: [id number]       POC Glucose Once [[id number]]  (Abnormal) Collected:  11/03/19 1235    Specimen:  Blood Updated:  11/03/19 1247     Glucose 160 mg/dL      Comment: RN NotifiedOperator: [id number] CEM TIPTONRICIAMeter ID: [id number]       Urinalysis With Culture If Indicated - Urine,  Catheter [[id number]]  (Abnormal) Collected:  11/03/19 0529    Specimen:  Urine, Catheter Updated:  11/03/19 0756     Color, UA Yellow     Appearance, UA Slightly Cloudy     pH, UA 6.0     Specific Gravity, UA 1.008     Comment: Result obtained by Refractometer        Glucose,  mg/dL (1+)     Ketones, UA Negative     Bilirubin, UA Negative     Blood, UA Negative     Protein, UA Negative     Leuk Esterase, UA Negative     Nitrite, UA Negative     Urobilinogen, UA 0.2 E.U./dL    Narrative:       Urine microscopic not indicated.    POC Glucose Once [[id number]]  (Abnormal) Collected:  11/03/19 0733    Specimen:  Blood Updated:  11/03/19 0751     Glucose 204 mg/dL      Comment: RN NotifiedOperator: [id number] [facility] ID: [id number]       POC Glucose Once [[id number]]  (Abnormal) Collected:  11/02/19 2332    Specimen:  Blood Updated:  11/03/19 0700     Glucose 353 mg/dL      Comment: RN NotifiedOperator: [id number] [facility] ID: [id number]       Basic Metabolic Panel [[id number]]  (Abnormal) Collected:  11/03/19 0516    Specimen:  Blood Updated:  11/03/19 0620     Glucose 193 mg/dL      BUN 12 mg/dL      Creatinine 0.82 mg/dL      Sodium 137 mmol/L      Potassium 3.7 mmol/L      Chloride 101 mmol/L      CO2 26.0 mmol/L      Calcium 8.3 mg/dL      eGFR Non African Amer 74 mL/min/1.73      BUN/Creatinine Ratio 14.6     Anion Gap 10.0 mmol/L     Narrative:       GFR Normal >60  Chronic Kidney Disease <60  Kidney Failure <15    CBC & Differential [[id number]] Collected:  11/03/19 0516    Specimen:  Blood Updated:  11/03/19 0611    Narrative:       The following orders were created for panel order CBC & Differential.  Procedure                               Abnormality         Status                     ---------                               -----------         ------                     CBC Auto Differential[[id number]]        Abnormal            Final result                 Please view results  for these tests on the individual orders.    CBC Auto Differential [[id number]]  (Abnormal) Collected:  11/03/19 0516    Specimen:  Blood Updated:  11/03/19 0611     WBC 10.97 10*3/mm3      RBC 3.33 10*6/mm3      Hemoglobin 10.0 g/dL      Hematocrit 29.0 %      MCV 87.1 fL      MCH 30.0 pg      MCHC 34.5 g/dL      RDW 11.7 %      RDW-SD 37.4 fl      MPV 10.9 fL      Platelets 194 10*3/mm3      Neutrophil % 68.1 %      Lymphocyte % 20.1 %      Monocyte % 9.4 %      Eosinophil % 1.5 %      Basophil % 0.4 %      Immature Grans % 0.5 %      Neutrophils, Absolute 7.48 10*3/mm3      Lymphocytes, Absolute 2.21 10*3/mm3      Monocytes, Absolute 1.03 10*3/mm3      Eosinophils, Absolute 0.16 10*3/mm3      Basophils, Absolute 0.04 10*3/mm3      Immature Grans, Absolute 0.05 10*3/mm3      nRBC 0.0 /100 WBC     Blood Gas, Arterial [[id number]]  (Abnormal) Collected:  11/03/19 0416    Specimen:  Arterial Blood Updated:  11/03/19 0428     Site Left Radial     Miguel's Test N/A     pH, Arterial 7.384 pH units      pCO2, Arterial 41.3 mm Hg      pO2, Arterial 94.2 mm Hg      HCO3, Arterial 24.6 mmol/L      Base Excess, Arterial -0.4 mmol/L      Comment: 84 Value below reference range        O2 Saturation, Arterial 98.2 %      Barometric Pressure for Blood Gas 754 mmHg      Modality Nasal Cannula     Flow Rate 3.5 lpm      Ventilator Mode NA     Collected by KATJA DOMINGUEZ     Comment: Meter: [id number]     :  [id number]       Extra Tubes [[id number]] Collected:  11/02/19 2356    Specimen:  Blood, Venous Line Updated:  11/03/19 0100    Narrative:       The following orders were created for panel order Extra Tubes.  Procedure                               Abnormality         Status                     ---------                               -----------         ------                     Light Blue Top[[id number]]                                   Final result               Lavender Top[[id number]]                                      Final result               Green Top (Gel)[[id number]]                                  Final result                 Please view results for these tests on the individual orders.    Light Blue Top [[id number]] Collected:  11/02/19 2356    Specimen:  Blood Updated:  11/03/19 0100     Extra Tube hold for add-on     Comment: Auto resulted       Lavender Top [[id number]] Collected:  11/02/19 2356    Specimen:  Blood Updated:  11/03/19 0100     Extra Tube hold for add-on     Comment: Auto resulted       Green Top (Gel) [[id number]] Collected:  11/02/19 2356    Specimen:  Blood Updated:  11/03/19 0100     Extra Tube Hold for add-ons.     Comment: Auto resulted.       Glucose, Random [[id number]]  (Abnormal) Collected:  11/02/19 2021    Specimen:  Blood Updated:  11/02/19 2053     Glucose 449 mg/dL     Comprehensive Metabolic Panel [[id number]]  (Abnormal) Collected:  11/02/19 1931    Specimen:  Blood Updated:  11/02/19 1951     Glucose 391 mg/dL      BUN 14 mg/dL      Creatinine 0.98 mg/dL      Sodium 137 mmol/L      Potassium 4.3 mmol/L      Chloride 100 mmol/L      CO2 24.0 mmol/L      Calcium 8.5 mg/dL      Total Protein 6.8 g/dL      Albumin 4.00 g/dL      ALT (SGPT) 16 U/L      AST (SGOT) 12 U/L      Alkaline Phosphatase 113 U/L      Total Bilirubin 0.3 mg/dL      eGFR Non African Amer 60 mL/min/1.73      Globulin 2.8 gm/dL      A/G Ratio 1.4 g/dL      BUN/Creatinine Ratio 14.3     Anion Gap 13.0 mmol/L     Narrative:       GFR Normal >60  Chronic Kidney Disease <60  Kidney Failure <15    CBC & Differential [[id number]] Collected:  11/02/19 1931    Specimen:  Blood Updated:  11/02/19 1934    Narrative:       The following orders were created for panel order CBC & Differential.  Procedure                               Abnormality         Status                     ---------                               -----------         ------                     CBC Auto Differential[[id number]]        Abnormal            Final result                  Please view results for these tests on the individual orders.    CBC Auto Differential [[id number]]  (Abnormal) Collected:  11/02/19 1931    Specimen:  Blood Updated:  11/02/19 1934     WBC 15.89 10*3/mm3      RBC 4.07 10*6/mm3      Hemoglobin 12.0 g/dL      Hematocrit 36.1 %      MCV 88.7 fL      MCH 29.5 pg      MCHC 33.2 g/dL      RDW 11.8 %      RDW-SD 37.7 fl      MPV 10.7 fL      Platelets 241 10*3/mm3      Neutrophil % 75.5 %      Lymphocyte % 13.3 %      Monocyte % 8.2 %      Eosinophil % 2.0 %      Basophil % 0.5 %      Immature Grans % 0.5 %      Neutrophils, Absolute 12.00 10*3/mm3      Lymphocytes, Absolute 2.12 10*3/mm3      Monocytes, Absolute 1.30 10*3/mm3      Eosinophils, Absolute 0.31 10*3/mm3      Basophils, Absolute 0.08 10*3/mm3      Immature Grans, Absolute 0.08 10*3/mm3      nRBC 0.0 /100 WBC           Imaging Results (Last 48 Hours)     Procedure Component Value Units Date/Time    FL C Arm During Surgery [[id number]] Resulted:  11/04/19 0814     Updated:  11/04/19 0814    XR Femur 2 View Left [[id number]] Collected:  11/03/19 1254     Updated:  11/03/19 1319    Narrative:         Portable two view left femur    HISTORY: Postoperative study. Internal fixation.    Portable AP and crosstable lateral views of the left femur  obtained at 12:44 PM.    COMPARISON: November 2, 2019    FINDINGS:   Intramedullary abbi with one proximal interlocking screw and two  distal interlocking screws now in place transversing the  comminuted minimally displaced fracture distal femoral  diametaphyseal region.  Immediate postoperative intra-articular air.  Surgical skin staples in place.  Previously demonstrated total left knee prosthesis.  No dislocation.        Impression:       CONCLUSION:  Internal fixation of the comminuted fracture of the distal femur.    [id number]    Electronically signed by:  Juve Rico MD  11/3/2019 1:18 PM [facility]  Workstation: [id number]    XR Femur 2 View Left [[id number]] Collected:   11/02/19 2055     Updated:  11/02/19 2115    Narrative:       Pain with injury.    Left femur, four views.    Examination revealed severe comminuted fracture of the distal  femur. There is knee prosthesis with femoral and tibial  components.      Impression:       CONCLUSION: Severe comminuted fracture of the distal femur.    Electronically signed by:  Ernesto Sol MD  11/2/2019 9:14  PM CDT Workstation: [employer]        ECG/EMG Results (last 48 hours)     ** No results found for the last 48 hours. **           Physician Progress Notes (last 48 hours) (Notes from 11/02/19 0837 through 11/04/19 0837)      Nicolas Grant MD at 11/03/19 1648              [facility]  INPATIENT PROGRESS NOTE    Length of Stay: 1  Date of Admission: 11/2/2019  Primary Care Physician: Ibeth Masters APRN    Subjective   Chief Complaint: Femur fracture  HPI:    Patient has been admitted for left femur fracture status post repair postop day #1.  She is doing fine after the surgery although she is having some pain.  Reports no difficulty breathing    Review of Systems   Respiratory: Negative for shortness of breath.    Cardiovascular: Negative for chest pain.        All pertinent negatives and positives are as above. All other systems have been reviewed and are negative unless otherwise stated.     Objective    Temp:  [96 °F (35.6 °C)-98.8 °F (37.1 °C)] 96.4 °F (35.8 °C)  Heart Rate:  [] 93  Resp:  [16-22] 18  BP: ()/(60-88) 94/60  Physical Exam   Constitutional: She appears well-developed and well-nourished. No distress.   HENT:   Head: Normocephalic and atraumatic.   Cardiovascular: Normal rate.   Pulmonary/Chest: Effort normal. No respiratory distress. She has no wheezes.   Abdominal: Soft. She exhibits no distension.   Musculoskeletal: Normal range of motion. She exhibits no edema.   Neurological: She is alert. No cranial nerve deficit.   Skin: Skin  is warm and dry. She is not diaphoretic.   Psychiatric: She has a normal mood and affect. Her behavior is normal. Judgment and thought content normal.   Vitals reviewed.          Results Review:  I have reviewed the labs, radiology results, and diagnostic studies.    Laboratory Data:   Lab Results (last 24 hours)     Procedure Component Value Units Date/Time    POC Glucose Once [[id number]]  (Abnormal) Collected:  11/03/19 1235    Specimen:  Blood Updated:  11/03/19 1247     Glucose 160 mg/dL      Comment: RN NotifiedOperator: [id number] [facility]: [id number]       Urinalysis With Culture If Indicated - Urine, Catheter [[id number]]  (Abnormal) Collected:  11/03/19 0529    Specimen:  Urine, Catheter Updated:  11/03/19 0756     Color, UA Yellow     Appearance, UA Slightly Cloudy     pH, UA 6.0     Specific Gravity, UA 1.008     Comment: Result obtained by Refractometer        Glucose,  mg/dL (1+)     Ketones, UA Negative     Bilirubin, UA Negative     Blood, UA Negative     Protein, UA Negative     Leuk Esterase, UA Negative     Nitrite, UA Negative     Urobilinogen, UA 0.2 E.U./dL    Narrative:       Urine microscopic not indicated.    POC Glucose Once [[id number]]  (Abnormal) Collected:  11/03/19 0733    Specimen:  Blood Updated:  11/03/19 0751     Glucose 204 mg/dL      Comment: RN NotifiedOperator: [id number] [facility] [id number]       POC Glucose Once [[id number]]  (Abnormal) Collected:  11/02/19 2332    Specimen:  Blood Updated:  11/03/19 0700     Glucose 353 mg/dL      Comment: RN NotifiedOperator: [id number] [facility] ID: [id number]       Basic Metabolic Panel [[id number]]  (Abnormal) Collected:  11/03/19 0516    Specimen:  Blood Updated:  11/03/19 0620     Glucose 193 mg/dL      BUN 12 mg/dL      Creatinine 0.82 mg/dL      Sodium 137 mmol/L      Potassium 3.7 mmol/L      Chloride 101 mmol/L      CO2 26.0 mmol/L      Calcium 8.3 mg/dL      eGFR Non African Amer  74 mL/min/1.73      BUN/Creatinine Ratio 14.6     Anion Gap 10.0 mmol/L     Narrative:       GFR Normal >60  Chronic Kidney Disease <60  Kidney Failure <15    CBC & Differential [[id number]] Collected:  11/03/19 0516    Specimen:  Blood Updated:  11/03/19 0611    Narrative:       The following orders were created for panel order CBC & Differential.  Procedure                               Abnormality         Status                     ---------                               -----------         ------                     CBC Auto Differential[[id number]]        Abnormal            Final result                 Please view results for these tests on the individual orders.    CBC Auto Differential [[id number]]  (Abnormal) Collected:  11/03/19 0516    Specimen:  Blood Updated:  11/03/19 0611     WBC 10.97 10*3/mm3      RBC 3.33 10*6/mm3      Hemoglobin 10.0 g/dL      Hematocrit 29.0 %      MCV 87.1 fL      MCH 30.0 pg      MCHC 34.5 g/dL      RDW 11.7 %      RDW-SD 37.4 fl      MPV 10.9 fL      Platelets 194 10*3/mm3      Neutrophil % 68.1 %      Lymphocyte % 20.1 %      Monocyte % 9.4 %      Eosinophil % 1.5 %      Basophil % 0.4 %      Immature Grans % 0.5 %      Neutrophils, Absolute 7.48 10*3/mm3      Lymphocytes, Absolute 2.21 10*3/mm3      Monocytes, Absolute 1.03 10*3/mm3      Eosinophils, Absolute 0.16 10*3/mm3      Basophils, Absolute 0.04 10*3/mm3      Immature Grans, Absolute 0.05 10*3/mm3      nRBC 0.0 /100 WBC     Blood Gas, Arterial [[id number]]  (Abnormal) Collected:  11/03/19 0416    Specimen:  Arterial Blood Updated:  11/03/19 0428     Site Left Radial     Miguel's Test N/A     pH, Arterial 7.384 pH units      pCO2, Arterial 41.3 mm Hg      pO2, Arterial 94.2 mm Hg      HCO3, Arterial 24.6 mmol/L      Base Excess, Arterial -0.4 mmol/L      Comment: 84 Value below reference range        O2 Saturation, Arterial 98.2 %      Barometric Pressure for Blood Gas 754 mmHg      Modality Nasal Cannula     Flow Rate 3.5  lpm      Ventilator Mode NA     Collected by KATJA DOMINGUEZ     Comment: Meter: [id number]     :  [id number]       Extra Tubes [[id number]] Collected:  11/02/19 2356    Specimen:  Blood, Venous Line Updated:  11/03/19 0100    Narrative:       The following orders were created for panel order Extra Tubes.  Procedure                               Abnormality         Status                     ---------                               -----------         ------                     Light Blue Top[[id number]]                                   Final result               Lavender Top[[id number]]                                     Final result               Green Top (Gel)[[id number]]                                  Final result                 Please view results for these tests on the individual orders.    Light Blue Top [[id number]] Collected:  11/02/19 2356    Specimen:  Blood Updated:  11/03/19 0100     Extra Tube hold for add-on     Comment: Auto resulted       Lavender Top [[id number]] Collected:  11/02/19 2356    Specimen:  Blood Updated:  11/03/19 0100     Extra Tube hold for add-on     Comment: Auto resulted       Green Top (Gel) [[id number]] Collected:  11/02/19 2356    Specimen:  Blood Updated:  11/03/19 0100     Extra Tube Hold for add-ons.     Comment: Auto resulted.       Glucose, Random [[id number]]  (Abnormal) Collected:  11/02/19 2021    Specimen:  Blood Updated:  11/02/19 2053     Glucose 449 mg/dL     Comprehensive Metabolic Panel [[id number]]  (Abnormal) Collected:  11/02/19 1931    Specimen:  Blood Updated:  11/02/19 1951     Glucose 391 mg/dL      BUN 14 mg/dL      Creatinine 0.98 mg/dL      Sodium 137 mmol/L      Potassium 4.3 mmol/L      Chloride 100 mmol/L      CO2 24.0 mmol/L      Calcium 8.5 mg/dL      Total Protein 6.8 g/dL      Albumin 4.00 g/dL      ALT (SGPT) 16 U/L      AST (SGOT) 12 U/L      Alkaline Phosphatase 113 U/L      Total Bilirubin 0.3 mg/dL      eGFR Non African Amer 60 mL/min/1.73        Globulin 2.8 gm/dL      A/G Ratio 1.4 g/dL      BUN/Creatinine Ratio 14.3     Anion Gap 13.0 mmol/L     Narrative:       GFR Normal >60  Chronic Kidney Disease <60  Kidney Failure <15    CBC & Differential [[id number]] Collected:  11/02/19 1931    Specimen:  Blood Updated:  11/02/19 1934    Narrative:       The following orders were created for panel order CBC & Differential.  Procedure                               Abnormality         Status                     ---------                               -----------         ------                     CBC Auto Differential[[id number]]        Abnormal            Final result                 Please view results for these tests on the individual orders.    CBC Auto Differential [[id number]]  (Abnormal) Collected:  11/02/19 1931    Specimen:  Blood Updated:  11/02/19 1934     WBC 15.89 10*3/mm3      RBC 4.07 10*6/mm3      Hemoglobin 12.0 g/dL      Hematocrit 36.1 %      MCV 88.7 fL      MCH 29.5 pg      MCHC 33.2 g/dL      RDW 11.8 %      RDW-SD 37.7 fl      MPV 10.7 fL      Platelets 241 10*3/mm3      Neutrophil % 75.5 %      Lymphocyte % 13.3 %      Monocyte % 8.2 %      Eosinophil % 2.0 %      Basophil % 0.5 %      Immature Grans % 0.5 %      Neutrophils, Absolute 12.00 10*3/mm3      Lymphocytes, Absolute 2.12 10*3/mm3      Monocytes, Absolute 1.30 10*3/mm3      Eosinophils, Absolute 0.31 10*3/mm3      Basophils, Absolute 0.08 10*3/mm3      Immature Grans, Absolute 0.08 10*3/mm3      nRBC 0.0 /100 WBC           Culture Data:   No results found for: BLOODCX  No results found for: URINECX  No results found for: RESPCX  No results found for: WOUNDCX  No results found for: STOOLCX  No components found for: BODYFLD    Radiology Data:   Imaging Results (Last 24 Hours)     Procedure Component Value Units Date/Time    XR Femur 2 View Left [[id number]] Collected:  11/03/19 1254     Updated:  11/03/19 1319    Narrative:         Portable two view left femur    HISTORY:  Postoperative study. Internal fixation.    Portable AP and crosstable lateral views of the left femur  obtained at 12:44 PM.    COMPARISON: November 2, 2019    FINDINGS:   Intramedullary abbi with one proximal interlocking screw and two  distal interlocking screws now in place transversing the  comminuted minimally displaced fracture distal femoral  diametaphyseal region.  Immediate postoperative intra-articular air.  Surgical skin staples in place.  Previously demonstrated total left knee prosthesis.  No dislocation.        Impression:       CONCLUSION:  Internal fixation of the comminuted fracture of the distal femur.    [id number]    Electronically signed by:  Juve Rico MD  11/3/2019 1:18 PM CST  Workstation: [id number]    FL C Arm During Surgery [[id number]] Updated:  11/03/19 1202    XR Femur 2 View Left [[id number]] Collected:  11/02/19 2055     Updated:  11/02/19 2115    Narrative:       Pain with injury.    Left femur, four views.    Examination revealed severe comminuted fracture of the distal  femur. There is knee prosthesis with femoral and tibial  components.      Impression:       CONCLUSION: Severe comminuted fracture of the distal femur.    Electronically signed by:  Ernesto Sol MD  11/2/2019 9:14  PM CDT Workstation: [id number]          I have reviewed the patient's current medications.     Assessment/Plan     Active Hospital Problems    Diagnosis   • **Closed displaced supracondylar fracture without intracondylar extension of lower end of left femur (CMS/HCC)     Added automatically from request for surgery [id number]     • Femur fracture (CMS/HCC)       Left femur fracture–status post repair postop day #1– continue management as per orthopedic surgery    Pain–continue with pain management    Hypertension–continue to monitor    Diabetes mellitus–continue with sliding scale insulin    Coronary artery disease– Plavix will be resumed when orthopedic surgery is okay with that    COPD–continue with  nebulizer as needed    DVT prophylaxis–SCD          Nicolas Grant MD   11/03/19   4:49 PM      Electronically signed by Nicolas Grant MD at 11/03/19 1869     Howie Campoverde MD at 11/02/19 4681          ORTHOPAEDIC CONSULT     Patient Name:  Yudi West  Admit Date:  11/2/2019  Consult Date:  11/2/2019    Referring Provider: Dr. Grant  Reason for Consultation: Fracture left femur.    Patient Care Team:  Ibeth Masters APRN as PCP - General (Nurse Practitioner)    History of present illness: Mrs. West is 49 years old.  She was descending some stairs earlier today when she sustained a twisting injury to her left leg.  She had prompt onset of pain.  She was seen in the emergency room in [place] and x-rays were performed showing a fracture of the distal shaft of the femur.  Past history is remarkable for a total knee replacement on the left about 9 years ago by Dr. Arrington.  She said she was doing well with regard to her knee prior to her most recent injury.  Patient requested to have care for the fracture in [place].  I spoke by phone with Dr. Ritter and agreed to accept him in transfer.  This was an isolated injury.    Medications include Plavix carvedilol, Neurontin, insulin, Tagamet, sertraline, 10 mg hydrocodone.  She is allergic to sulfa drugs.  She smokes 2 pack/day of cigarettes and denies use of alcohol.    Past medical history is remarkable for coronary artery disease COPD and diabetes.  Previous surgeries include nephrectomy for carcinoma, hysterectomy, tonsillectomy, and cholecystectomy.  There is been no history of anesthetic complication.  She also has a history of chronic back pain.    Family history she is .    Social history she lives in [place] with her  and son.  She is disabled.    Review of Systems is remarkable for pain well localized to the the left knee and distal thigh.  There is been no numbness or tingling in the  "limb.  Otherwise complete ROS is negative except as mentioned above.    Prior to Admission medications    Medication Sig Start Date End Date Taking? Authorizing Provider   aspirin 81 MG EC tablet Take 81 mg by mouth Daily.   Yes Sara Benoit MD   atorvastatin (LIPITOR) 40 MG tablet Take 40 mg by mouth Every Night. 7/18/17  Yes Sara Benoit MD   carvedilol (COREG) 6.25 MG tablet Take 6.25 mg by mouth 2 (Two) Times a Day With Meals.   Yes Sara Benoit MD   clopidogrel (PLAVIX) 75 MG tablet TAKE 1 TABLET BY MOUTH ONCE DAILY 6/20/18  Yes Lewis Johnson MD   gabapentin (NEURONTIN) 300 MG capsule TAKE 1 TABLET BY MOUTH FOUR TIMES DAILY 8/7/17  Yes Sara Benoit MD   HYDROcodone-acetaminophen (NORCO) 5-325 MG per tablet Take 1 tablet by mouth Every 6 (Six) Hours As Needed.   Yes Sara Benoit MD   insulin glargine (LANTUS) 100 UNIT/ML injection Inject 50 Units under the skin into the appropriate area as directed Daily.   Yes Sara Benoit MD   Omeprazole 20 MG tablet delayed-release TAKE 1 TABLET BY MOUTh twice daily 5/25/17  Yes Sara Benoit MD   ranolazine (RANEXA) 500 MG 12 hr tablet Take 1 tablet by mouth 2 (Two) Times a Day. 6/3/19  Yes Lewis Johnson MD   sertraline (ZOLOFT) 50 MG tablet Take 50 mg by mouth Daily.   Yes Sara Beniot MD   VENTOLIN  (90 BASE) MCG/ACT inhaler 2 puffs 4 (Four) Times a Day. 6/5/17  Yes Sara Benoit MD   zolpidem (AMBIEN) 10 MG tablet TAKE 1 TABLET BY MOUTH AT BEDTIME 8/7/17  Yes Sara Benoit MD   EASY TOUCH INSULIN SYRINGE 28G X 1/2\" 0.5 ML misc USE 4 TIMES DAILY 5/23/17   Sara Benoit MD   polyethylene glycol (MIRALAX) powder MIX 17 GRAMS INTO 4-8 OUNCES OF ANY BEVERAGE TWICE DAILY FOR 7 DAYS 7/8/17   Sara Benoit MD UNIFINFRANCESCA PENTIPS 31G X 8 MM misc USE AS DIRECTED WITH SOLOSTAR ONCE DAILY 6/19/17   Sara Benoit MD   ALPRAZolam (XANAX) 0.5 MG " tablet TAKE 1 TABLET BY MOUTH THREE TIMES DAILY 8/7/17 11/2/19  Sara Benoit MD   citalopram (CeleXA) 40 MG tablet TAKE 1 TABLET BY MOUTH ONCE DAILY 8/2/17 11/2/19  Sara Benoit MD   pantoprazole (PROTONIX) 20 MG EC tablet Take 20 mg by mouth Daily.  11/2/19  Sara Benoit MD     Allergies   Allergen Reactions   • Sulfa Antibiotics Itching     Social History     Socioeconomic History   • Marital status:      Spouse name: Not on file   • Number of children: Not on file   • Years of education: Not on file   • Highest education level: Not on file   Tobacco Use   • Smoking status: Current Every Day Smoker     Packs/day: 2.00     Years: 36.00     Pack years: 72.00     Types: Cigarettes   • Smokeless tobacco: Never Used   Substance and Sexual Activity   • Alcohol use: No   • Drug use: No   • Sexual activity: Defer     Past Medical History:   Diagnosis Date   • Anxiety and depression    • Asthma    • Cancer (CMS/HCC)    • Chronic kidney disease    • COPD (chronic obstructive pulmonary disease) (CMS/HCC)    • Diabetes mellitus (CMS/HCC)    • Hypertension    • Myocardial infarction (CMS/HCC)      Past Surgical History:   Procedure Laterality Date   • CORONARY STENT PLACEMENT     • GALLBLADDER SURGERY     • HYSTERECTOMY     • KIDNEY SURGERY     • NEPHRECTOMY Left    • REPLACEMENT TOTAL KNEE     • TONSILLECTOMY       Family History   Problem Relation Age of Onset   • Heart disease Mother    • Hypertension Mother    • Heart disease Father    • Hypertension Father        Vital Signs   Temp:  [97.4 °F (36.3 °C)] 97.4 °F (36.3 °C)  Heart Rate:  [76] 76  Resp:  [18] 18  BP: (106)/(69) 106/69      11/02/19  1346   Weight: 83 kg (182 lb 15.7 oz)     Body mass index is 30.45 kg/m².    Physical Exam in general she is alert and in intermittent moderate discomfort.  She responds appropriately to questions and commands.  She appears older than her stated age.      HEENT exam showed extraocular movements  are intact.  Oropharynx shows teeth are in poor repair.    Exam of the lungs shows scattered inspiratory wheezes.    Cardiac exam shows a regular rhythm normal rate no murmur.    The abdomen is soft obese and nontender with active bowel sounds.  Genitourinary and rectal exams were not done.    Exam of the extremities is directed to the] left lower extremity.  There are anterior and posterior splints crossing the knee.  Planes were loosened.  There is a well-healed midline anterior scar across the knee.  There is moderate swelling diffusely about the distal thigh and knee but no ecchymosis.  Posterior tibial pulse is strong.  Sensory exam is intact to soft touch.    Radiographs of the knee done earlier today show a slightly comminuted fracture of the distal shaft of the femur with anterior angulation of proximal approaching 45 degrees.  There is a total knee arthroplasty in satisfactory position with no evidence of loosening.      Results Review:  Imaging Results (Last 24 Hours)     ** No results found for the last 24 hours. **        Lab Results (last 24 hours)     ** No results found for the last 24 hours. **          Assessment/Plan 1 fracture distal shaft left femur.  2 well-functioning left knee arthroplasty.  3 diabetes.  4 COPD.    The natural history of the disorder and treatment options were reviewed with the patient and her family.  Discussed both nonoperative and surgical treatment.  I recommended reduction and stabilization of the fracture with a retrograde intramedullary nail.  I think this would result in more certain healing of the fracture and also will allow earlier mobilization and weightbearing.        Anticipated benefits of surgery were reviewed in detail.  Potential complications of surgery and anesthetic were reviewed in detail including but not limited to infection, blood loss, sore throat, pneumonia, brain damage and even death.  Postoperative immobilization and rehabilitation were discussed.   They understand that even with prompt healing of her fracture the knee replacement may not function as well as it did prior to her injury..  The patient and family appear to understand all matters discussed and wish to proceed.      The left thigh was prepped. A hematoma block was performed with a mixture of Marcaine and xylocaine. There were no complications and she reported improvement in her pain.     Howie Campoverde MD  11/02/19  6:53 PM    Electronically signed by Howie Campoverde MD at 11/02/19 2100       Consult Notes (last 48 hours) (Notes from 11/02/19 0837 through 11/04/19 0837)     No notes of this type exist for this encounter.

## 2019-11-04 NOTE — DISCHARGE INSTR - APPOINTMENTS
Ibeth Masters APRN  PCP - General Nurse Practitioner [phone] [id number] [facility] [place] [address]     Next Steps: Follow up      Instructions: November 13, 2019 @ 9:30 Am

## 2019-11-04 NOTE — NURSING NOTE
Spoke with dr alfaro about RX for percocet.   He spoke with patient she reports 2 tabs was too much.  Inpatient she was getting 2 tabs and went  Home with 1 tablet q4hrs prn

## 2019-11-04 NOTE — THERAPY TREATMENT NOTE
Acute Care - Physical Therapy Treatment Note  [facility]     Patient Name: Yudi West  : 1970  MRN: [id number]  Today's Date: 2019  Onset of Illness/Injury or Date of Surgery: 19  Date of Referral to PT: 19  Referring Physician: Dr. Campoverde     Admit Date: 2019    Visit Dx:    ICD-10-CM ICD-9-CM   1. Closed displaced supracondylar fracture of distal end of left femur without intracondylar extension, initial encounter (CMS/[facility]) S72.452A 821.23   2. Impaired physical mobility Z74.09 781.99   3. Impaired mobility and ADLs Z74.09 799.89     Patient Active Problem List   Diagnosis   • Coronary artery disease involving native coronary artery of native heart without angina pectoris   • Myocardial infarction, old   • Hypertension   • Type 1 diabetes mellitus (CMS/[facility])   • Mixed hyperlipidemia   • COPD (chronic obstructive pulmonary disease) (CMS/[facility])   • Dyspnea on exertion   • S/p nephrectomy   • Precordial pain   • Femur fracture (CMS/[facility])   • Closed displaced supracondylar fracture without intracondylar extension of lower end of left femur (CMS/[facility])   • Anemia, posthemorrhagic, acute       Therapy Treatment    Rehabilitation Treatment Summary     Row Name 19 0950             Treatment Time/Intention    Discipline  physical therapist  -KW      Document Type  therapy note (daily note)  -KW      Subjective Information  complains of;fatigue;pain  -KW      Mode of Treatment  physical therapy;individual therapy  -KW      Patient/Family Observations  son's ex-gf present  -KW      Care Plan Review  evaluation/treatment results reviewed;care plan/treatment goals reviewed;risks/benefits reviewed;current/potential barriers reviewed;patient/other agree to care plan  -KW      Therapy Frequency (PT Clinical Impression)  2 times/day  -KW      Patient Effort  good  -KW      Recorded by [KW] Chiqui Larson, PT 19 1035      Row Name 19 0950             Vital Signs    Pre  Systolic BP Rehab  104  -KW      Pre Treatment Diastolic BP  57  -KW      Post Systolic BP Rehab  106  -KW      Post Treatment Diastolic BP  65  -KW      Pretreatment Heart Rate (beats/min)  106  -KW      Posttreatment Heart Rate (beats/min)  93  -KW      Pre SpO2 (%)  93  -KW      O2 Delivery Pre Treatment  room air  -KW      Post SpO2 (%)  93  -KW      O2 Delivery Post Treatment  room air  -KW      Pre Patient Position  Supine  -KW      Intra Patient Position  Standing  -KW      Post Patient Position  Supine  -KW      Recorded by [KW] Chiqui Larson, PT 11/04/19 1035      Row Name 11/04/19 0950             Cognitive Assessment/Intervention- PT/OT    Affect/Mental Status (Cognitive)  WFL  -KW      Orientation Status (Cognition)  oriented x 4  -KW      Personal Safety Interventions  fall prevention program maintained;gait belt;nonskid shoes/slippers when out of bed;supervised activity  -KW      Recorded by [KW] Chiqui Larson, PT 11/04/19 1035      Row Name 11/04/19 0950             Bed Mobility Assessment/Treatment    Bed Mobility Assessment/Treatment  supine-sit;sit-supine  -KW      Supine-Sit [place] (Bed Mobility)  contact guard  -KW      Sit-Supine [place] (Bed Mobility)  minimum assist (75% patient effort)  -KW      Bed Mobility, Safety Issues  decreased use of legs for bridging/pushing  -KW      Assistive Device (Bed Mobility)  bed rails;head of bed elevated  -KW      Recorded by [KW] Chiqui Larson, PT 11/04/19 1035      Row Name 11/04/19 0950             Transfer Assessment/Treatment    Transfer Assessment/Treatment  sit-stand transfer;stand-sit transfer  -KW      Recorded by [KW] Chiqui Larson, PT 11/04/19 1035      Row Name 11/04/19 0950             Sit-Stand Transfer    Sit-Stand [place] (Transfers)  contact guard  -KW      Assistive Device (Sit-Stand Transfers)  walker, front-wheeled  -KW      Recorded by [KW] Chiqui Larson, PT 11/04/19 1035      Row Name 11/04/19 0950             Stand-Sit  Transfer    Stand-Sit [place] (Transfers)  contact guard  -KW      Assistive Device (Stand-Sit Transfers)  walker, front-wheeled  -KW      Recorded by [KW] Chiqui Larson, PT 11/04/19 1035      Row Name 11/04/19 0950             Gait/Stairs Assessment/Training    [place] Level (Gait)  contact guard  -KW      Assistive Device (Gait)  walker, front-wheeled  -KW      Distance in Feet (Gait)  20'x2  -KW      Pattern (Gait)  step-to  -KW      Deviations/Abnormal Patterns (Gait)  antalgic;gait speed decreased  -KW      Comment (Gait/Stairs)  L knee immobilizer on and worn throughout session. Increased weight on LLE compared to eval; educated on how to ascend/ descend stairs with B HR d/t pt declining to perform at this time  -KW2      Recorded by [KW] Chiqui Larson, PT 11/04/19 1035  [KW2] Chiqui Larson, PT 11/04/19 1127      Row Name 11/04/19 0950             Motor Skills Assessment/Interventions    Additional Documentation  Therapeutic Exercise (Group);Therapeutic Exercise Interventions (Group)  -KW      Recorded by [KW] Chiqui Larson, PT 11/04/19 1127      Row Name 11/04/19 0950             Therapeutic Exercise    Lower Extremity (Therapeutic Exercise)  gluteal sets;heel slides, right;SLR (straight leg raise), bilateral  -KW      Lower Extremity Range of Motion (Therapeutic Exercise)  hip abduction/adduction, right;ankle dorsiflexion/plantar flexion, bilateral  -KW      Exercise Type (Therapeutic Exercise)  AAROM (active assistive range of motion);AROM (active range of motion)  -KW      Position (Therapeutic Exercise)  supine  -KW      Sets/Reps (Therapeutic Exercise)  1*10  -KW      Expected Outcome (Therapeutic Exercise)  improve performance, gait skills;improve performance, transfer skills  -KW      Recorded by [KW] Chiqui Larson, PT 11/04/19 1127      Row Name 11/04/19 0950             Positioning and Restraints    Pre-Treatment Position  in bed  -KW      Post Treatment Position  bed  -KW      In Bed   supine;call light within reach;encouraged to call for assist;exit alarm on;side rails up x2;with family/caregiver  -KW2      Recorded by [KW] Chiqui Larson, PT 11/04/19 1035  [KW2] Chiqui Larson, PT 11/04/19 1127      Row Name 11/04/19 0950             Pain Scale: Numbers Pre/Post-Treatment    Pain Scale: Numbers, Pretreatment  3/10  -KW      Pain Scale: Numbers, Post-Treatment  3/10  -KW      Pain Location - Side  Left  -KW      Pain Location - Orientation  lower  -KW      Pain Location  extremity  -KW      Pain Intervention(s)  Ambulation/increased activity;Repositioned;Rest  -KW      Recorded by [KW] Chiqui Larson, PT 11/04/19 1127      Row Name                Wound 11/03/19 1141 Left leg Incision    Wound - Properties Group Date first assessed: 11/03/19 [NAWAF] Time first assessed: 1141 [NAWAF] Present on Hospital Admission: N [NAAWF] Side: Left [NAWAF] Location: leg [NAWAF] Primary Wound Type: Incision [NAWAF] Recorded by:  [NAWAF] Julienne Weeks, RN 11/03/19 1142    Row Name 11/04/19 0950             Plan of Care Review    Plan of Care Reviewed With  patient  -KW      Recorded by [KW] Chiqui Larson, PT 11/04/19 1127      Row Name 11/04/19 0950             Outcome Summary/Treatment Plan (PT)    Daily Summary of Progress (PT)  progress toward functional goals as expected  -KW      Plan for Continued Treatment (PT)  cont PT POC; stairs, ambulation  -KW      Anticipated Discharge Disposition (PT)  home with OP services  -KW      Recorded by [KW] Chiqui Larson, PT 11/04/19 1127        User Key  (r) = Recorded By, (t) = Taken By, (c) = Cosigned By    Initials Name Effective Dates Discipline    NAWAF Julienne Weeks, RN 05/09/18 -  Nurse    Chiqui Andersen, PT 07/23/18 -  PT          Wound 11/03/19 1141 Left leg Incision (Active)   Dressing Appearance dry;intact 11/4/2019  8:00 AM   Closure ROMAN 11/4/2019  8:00 AM   Drainage Amount none 11/4/2019  8:00 AM   Care, Wound cleansed with;sterile normal saline 11/3/2019 12:09 PM   Dressing  Care, Wound dressing applied 11/3/2019 12:09 PM       Rehab Goal Summary     Row Name 11/04/19 0950             Physical Therapy Goals    Bed Mobility Goal Selection (PT)  bed mobility, PT goal 1  -KW      Transfer Goal Selection (PT)  transfer, PT goal 1  -KW      Gait Training Goal Selection (PT)  gait training, PT goal 1  -KW      Stairs Goal Selection (PT)  stairs, PT goal 1  -KW         Bed Mobility Goal 1 (PT)    Activity/Assistive Device (Bed Mobility Goal 1, PT)  sit to supine/supine to sit  -KW      [place] Level/Cues Needed (Bed Mobility Goal 1, PT)  conditional independence  -KW      Time Frame (Bed Mobility Goal 1, PT)  long term goal (LTG);by discharge  -KW      Barriers (Bed Mobility Goal 1, PT)  L knee immobilizer.   -KW      Progress/Outcomes (Bed Mobility Goal 1, PT)  goal not met  -KW         Transfer Goal 1 (PT)    Activity/Assistive Device (Transfer Goal 1, PT)  walker, rolling  -KW      [place] Level/Cues Needed (Transfer Goal 1, PT)  conditional independence  -KW      Time Frame (Transfer Goal 1, PT)  long term goal (LTG);by discharge  -KW      Barriers (Transfers Goal 1, PT)  L knee immobilizer.   -KW      Progress/Outcome (Transfer Goal 1, PT)  goal not met  -KW         Gait Training Goal 1 (PT)    Activity/Assistive Device (Gait Training Goal 1, PT)  walker, rolling  -KW      [place] Level (Gait Training Goal 1, PT)  conditional independence  -KW      Distance (Gait Goal 1, PT)  100'x1  -KW      Time Frame (Gait Training Goal 1, PT)  long term goal (LTG);by discharge  -KW      Barriers (Gait Training Goal 1, PT)  LLE: WBAT, knee immobilizer.   -KW      Progress/Outcome (Gait Training Goal 1, PT)  goal not met  -KW         Stairs Goal 1 (PT)    Activity/Assistive Device (Stairs Goal 1, PT)  using handrail, right;using handrail, left  -KW      [place] Level/Cues Needed (Stairs Goal 1, PT)  supervision required  -KW      Number of Stairs (Stairs Goal 1, PT)  5 steps, B  rails.   -KW      Time Frame (Stairs Goal 1, PT)  long term goal (LTG);by discharge  -KW      Barriers (Stairs Goal 1, PT)  LLE: WBAT, knee immobilizer.   -KW      Progress/Outcome (Stairs Goal 1, PT)  goal not met  -KW        User Key  (r) = Recorded By, (t) = Taken By, (c) = Cosigned By    Initials Name Provider Type Discipline    Chiqui Andersen, PT Physical Therapist PT          Physical Therapy Education     Title: PT OT SLP Therapies (In Progress)     Topic: Physical Therapy (In Progress)     Point: Mobility training (Done)     Learning Progress Summary           Patient Acceptance, E, VU by GILSON at 11/4/2019 11:27 AM    Comment:  ascending/ descending stairs with step to pattern with B HR and knee immobilizer on LLE; recommended having someone nearby when performing for safety    Acceptance, E, VU by JOEY at 11/3/2019  4:21 PM    Comment:  Educated on proper hand placement with transfers, proper use of FWW and WBAT.                               User Key     Initials Effective Dates Name Provider Type Discipline    JOEY 04/03/18 -  Saurabh Clifford, PT Physical Therapist PT    GILSON 07/23/18 -  Chiqui Larson, MILLICENT Physical Therapist PT                PT Recommendation and Plan  Anticipated Discharge Disposition (PT): home with OP services  Therapy Frequency (PT Clinical Impression): 2 times/day  Outcome Summary/Treatment Plan (PT)  Daily Summary of Progress (PT): progress toward functional goals as expected  Plan for Continued Treatment (PT): cont PT POC; stairs, ambulation  Anticipated Discharge Disposition (PT): home with OP services  Plan of Care Reviewed With: patient  Outcome Summary: PT tx completed. Pt performing sit<>supine with CGA and sit<>stand with CGA and FW Walker. Pt ambulating 20ftx2 with CGA and FW Walker and with bettter weight bearing through LLE. Pt performing BLE therex in supine. Pt educated on how to ascend/ descend stairs with B HR as pt declining to perform stairs at this time. No new goals  met. Cont PT POC.   Outcome Measures     Row Name 11/04/19 0950 11/03/19 1516 11/03/19 1515       How much help from another person do you currently need...    Turning from your back to your side while in flat bed without using bedrails?  4  -KW  --  3  -CZ    Moving from lying on back to sitting on the side of a flat bed without bedrails?  3  -KW  --  3  -CZ    Moving to and from a bed to a chair (including a wheelchair)?  3  -KW  --  3  -CZ    Standing up from a chair using your arms (e.g., wheelchair, bedside chair)?  3  -KW  --  3  -CZ    Climbing 3-5 steps with a railing?  3  -KW  --  3  -CZ    To walk in hospital room?  3  -KW  --  3  -CZ    AM-PAC 6 Clicks Score (PT)  19  -KW  --  18  -CZ       How much help from another is currently needed...    Putting on and taking off regular lower body clothing?  --  2  -BH  --    Bathing (including washing, rinsing, and drying)  --  2  -BH  --    Toileting (which includes using toilet bed pan or urinal)  --  2  -BH  --    Putting on and taking off regular upper body clothing  --  3  -BH  --    Taking care of personal grooming (such as brushing teeth)  --  3  -BH  --    Eating meals  --  4  -BH  --    AM-PAC 6 Clicks Score (OT)  --  16  -BH  --       Functional Assessment    Outcome Measure Options  AM-PAC 6 Clicks Basic Mobility (PT)  -KW  AM-PAC 6 Clicks Daily Activity (OT)  -  AM-PAC 6 Clicks Basic Mobility (PT)  -CZ      User Key  (r) = Recorded By, (t) = Taken By, (c) = Cosigned By    Initials Name Provider Type    BH Kassie Riddle, OTR/L Occupational Therapist    Saurabh Goff, PT Physical Therapist    KW Chiqui Larson, MILLICENT Physical Therapist         Time Calculation:   PT Charges     Row Name 11/04/19 1130             Time Calculation    Start Time  0950  -KW      Stop Time  1028  -KW      Time Calculation (min)  38 min  -KW      PT Received On  11/04/19  -KW        User Key  (r) = Recorded By, (t) = Taken By, (c) = Cosigned By    Initials Name Provider  Type    KW Chiqui Larson, PT Physical Therapist        Therapy Charges for Today     Code Description Service Date Service Provider Modifiers Qty    62774849397 HC PT THERAPEUTIC ACT EA 15 MIN 11/4/2019 Chiqui Larson, PT GP 1    86308420660 HC PT THER PROC EA 15 MIN 11/4/2019 Chiqui Larson, PT GP 2          PT G-Codes  Outcome Measure Options: AM-PAC 6 Clicks Basic Mobility (PT)  AM-PAC 6 Clicks Score (PT): 19  AM-PAC 6 Clicks Score (OT): 16    Chiqui Larson PT  11/4/2019

## 2019-11-04 NOTE — DISCHARGE PLACEMENT REQUEST
"Laron West (49 y.o. Female)     Date of Birth Social Security Number Address Home Phone MRN    1970  [address]  [address] [place], [place] [id number] [phone] [id number]    Oriental orthodox Marital Status          Orthodoxy        Admission Date Admission Type Admitting Provider Attending Provider Department, Room/Bed    19 Urgent Nicolas Grant MD Iqbal, Javed Syed, MD [address], [facility]/    Discharge Date Discharge Disposition Discharge Destination         Home or Self Care              Attending Provider:  Nicolas Grant MD    Allergies:  Sulfa Antibiotics    Isolation:  None   Infection:  None   Code Status:  CPR    Ht:  165.1 cm (65\")   Wt:  82.7 kg (182 lb 6.4 oz)    Admission Cmt:  None   Principal Problem:  Closed displaced supracondylar fracture without intracondylar extension of lower end of left femur (CMS/HCC) [S72.452A]                 Active Insurance as of 2019     Primary Coverage     Payor Plan Insurance Group Employer/Plan Group    HUMANA MEDICAID HU[employer] [id number]     Payor Plan Address Payor Plan Phone Number Payor Plan Fax Number Effective Dates    PO  [phone]  3/18/2019 - None Entered    [facility] [id number]       Subscriber Name Subscriber Birth Date Member ID       LARON WEST 1970 [id number]                 Emergency Contacts      (Rel.) Home Phone Work Phone Mobile Phone    Ricardo West (Spouse) [phone] -- [phone]        [address] [address]  Dept. Phone:  [phone]  Dept. Fax:   Date Ordered: 2019         Patient:  Laron West MRN:  [id number]   [address]  [address] [place] [id number] :  1970  SSN:    Phone: [phone] Sex:  F     Weight: 82.7 kg (182 lb 6.4 oz)         Ht Readings from Last 1 Encounters:   19 165.1 cm (65\")         Walker              "  (Order ID: [id number])    Diagnosis:  Impaired mobility and ADLs (Z74.09 [ICD-10-CM] 799.89 [ICD-9-CM])   Quantity:  1     Equipment:  Walker Folding with Wheels  Length of Need (99 Months = Lifetime): 99 Months = Lifetime        Authorizing Provider's Phone: [phone]   Verbal Order Mode: Verbal with readback   Authorizing Provider: Howie Campoverde MD  Authorizing Provider's NPI: [id number]     Order Entered By: Vik Edwards RN 11/4/2019  9:39 AM     Electronically signed by: Howie Campoverde MD 11/4/2019  9:51 AM               History & Physical      Howie Campoverde MD at 11/03/19 0920        Hemoglobin today is 10.0.  Radiographs of the left femur show no abnormality proximal to the fracture site.    The history and physical is otherwise unchanged from previous note.    Electronically signed by Howie Campoverde MD at 11/03/19 0922   Source Note             ORTHOPAEDIC CONSULT     Patient Name:  Yudi West  Admit Date:  11/2/2019  Consult Date:  11/2/2019    Referring Provider: Dr. Grant  Reason for Consultation: Fracture left femur.    Patient Care Team:  Ibeth Masters APRN as PCP - General (Nurse Practitioner)    History of present illness: Mrs. West is 49 years old.  She was descending some stairs earlier today when she sustained a twisting injury to her left leg.  She had prompt onset of pain.  She was seen in the emergency room in [place] and x-rays were performed showing a fracture of the distal shaft of the femur.  Past history is remarkable for a total knee replacement on the left about 9 years ago by Dr. Arrington.  She said she was doing well with regard to her knee prior to her most recent injury.  Patient requested to have care for the fracture in [place].  I spoke by phone with Dr. Ritter and agreed to accept him in transfer.  This was an isolated injury.    Medications include Plavix carvedilol, Neurontin, insulin, Tagamet, sertraline,  10 mg hydrocodone.  She is allergic to sulfa drugs.  She smokes 2 pack/day of cigarettes and denies use of alcohol.    Past medical history is remarkable for coronary artery disease COPD and diabetes.  Previous surgeries include nephrectomy for carcinoma, hysterectomy, tonsillectomy, and cholecystectomy.  There is been no history of anesthetic complication.  She also has a history of chronic back pain.    Family history she is .    Social history she lives in [place] with her  and son.  She is disabled.    Review of Systems is remarkable for pain well localized to the the left knee and distal thigh.  There is been no numbness or tingling in the limb.  Otherwise complete ROS is negative except as mentioned above.    Prior to Admission medications    Medication Sig Start Date End Date Taking? Authorizing Provider   aspirin 81 MG EC tablet Take 81 mg by mouth Daily.   Yes Sara Benoit MD   atorvastatin (LIPITOR) 40 MG tablet Take 40 mg by mouth Every Night. 7/18/17  Yes Sara Benoit MD   carvedilol (COREG) 6.25 MG tablet Take 6.25 mg by mouth 2 (Two) Times a Day With Meals.   Yes Sara Benoit MD   clopidogrel (PLAVIX) 75 MG tablet TAKE 1 TABLET BY MOUTH ONCE DAILY 6/20/18  Yes Lewis Johnson MD   gabapentin (NEURONTIN) 300 MG capsule TAKE 1 TABLET BY MOUTH FOUR TIMES DAILY 8/7/17  Yes Sara Benoit MD   HYDROcodone-acetaminophen (NORCO) 5-325 MG per tablet Take 1 tablet by mouth Every 6 (Six) Hours As Needed.   Yes Sara Benoit MD   insulin glargine (LANTUS) 100 UNIT/ML injection Inject 50 Units under the skin into the appropriate area as directed Daily.   Yes Sara Benoit MD   Omeprazole 20 MG tablet delayed-release TAKE 1 TABLET BY MOUTh twice daily 5/25/17  Yes Sara Benoit MD   ranolazine (RANEXA) 500 MG 12 hr tablet Take 1 tablet by mouth 2 (Two) Times a Day. 6/3/19  Yes Lewis Johnson MD   sertraline (ZOLOFT)  "50 MG tablet Take 50 mg by mouth Daily.   Yes Sara Benoit MD   VENTOLIN  (90 BASE) MCG/ACT inhaler 2 puffs 4 (Four) Times a Day. 6/5/17  Yes Sara Benoit MD   zolpidem (AMBIEN) 10 MG tablet TAKE 1 TABLET BY MOUTH AT BEDTIME 8/7/17  Yes Sara Benoit MD   EASY TOUCH INSULIN SYRINGE 28G X 1/2\" 0.5 ML misc USE 4 TIMES DAILY 5/23/17   Sara Benoit MD   polyethylene glycol (MIRALAX) powder MIX 17 GRAMS INTO 4-8 OUNCES OF ANY BEVERAGE TWICE DAILY FOR 7 DAYS 7/8/17   Sara Benoit MD   UNIFINFRANCESCA PENTIPS 31G X 8 MM misc USE AS DIRECTED WITH SOLOSTAR ONCE DAILY 6/19/17   Sara Benoit MD   ALPRAZolam (XANAX) 0.5 MG tablet TAKE 1 TABLET BY MOUTH THREE TIMES DAILY 8/7/17 11/2/19  Sara Benoit MD   citalopram (CeleXA) 40 MG tablet TAKE 1 TABLET BY MOUTH ONCE DAILY 8/2/17 11/2/19  Sara Benoit MD   pantoprazole (PROTONIX) 20 MG EC tablet Take 20 mg by mouth Daily.  11/2/19  Sara Benoit MD     Allergies   Allergen Reactions   • Sulfa Antibiotics Itching     Social History     Socioeconomic History   • Marital status:      Spouse name: Not on file   • Number of children: Not on file   • Years of education: Not on file   • Highest education level: Not on file   Tobacco Use   • Smoking status: Current Every Day Smoker     Packs/day: 2.00     Years: 36.00     Pack years: 72.00     Types: Cigarettes   • Smokeless tobacco: Never Used   Substance and Sexual Activity   • Alcohol use: No   • Drug use: No   • Sexual activity: Defer     Past Medical History:   Diagnosis Date   • Anxiety and depression    • Asthma    • Cancer (CMS/HCC)    • Chronic kidney disease    • COPD (chronic obstructive pulmonary disease) (CMS/[facility])    • Diabetes mellitus (CMS/HCC)    • Hypertension    • Myocardial infarction (CMS/HCC)      Past Surgical History:   Procedure Laterality Date   • CORONARY STENT PLACEMENT     • GALLBLADDER SURGERY     • HYSTERECTOMY     • KIDNEY " SURGERY     • NEPHRECTOMY Left    • REPLACEMENT TOTAL KNEE     • TONSILLECTOMY       Family History   Problem Relation Age of Onset   • Heart disease Mother    • Hypertension Mother    • Heart disease Father    • Hypertension Father        Vital Signs   Temp:  [97.4 °F (36.3 °C)] 97.4 °F (36.3 °C)  Heart Rate:  [76] 76  Resp:  [18] 18  BP: (106)/(69) 106/69      11/02/19  1346   Weight: 83 kg (182 lb 15.7 oz)     Body mass index is 30.45 kg/m².    Physical Exam in general she is alert and in intermittent moderate discomfort.  She responds appropriately to questions and commands.  She appears older than her stated age.      HEENT exam showed extraocular movements are intact.  Oropharynx shows teeth are in poor repair.    Exam of the lungs shows scattered inspiratory wheezes.    Cardiac exam shows a regular rhythm normal rate no murmur.    The abdomen is soft obese and nontender with active bowel sounds.  Genitourinary and rectal exams were not done.    Exam of the extremities is directed to the] left lower extremity.  There are anterior and posterior splints crossing the knee.  Planes were loosened.  There is a well-healed midline anterior scar across the knee.  There is moderate swelling diffusely about the distal thigh and knee but no ecchymosis.  Posterior tibial pulse is strong.  Sensory exam is intact to soft touch.    Radiographs of the knee done earlier today show a slightly comminuted fracture of the distal shaft of the femur with anterior angulation of proximal approaching 45 degrees.  There is a total knee arthroplasty in satisfactory position with no evidence of loosening.      Results Review:  Imaging Results (Last 24 Hours)     ** No results found for the last 24 hours. **        Lab Results (last 24 hours)     ** No results found for the last 24 hours. **          Assessment/Plan 1 fracture distal shaft left femur.  2 well-functioning left knee arthroplasty.  3 diabetes.  4 COPD.    The natural history  of the disorder and treatment options were reviewed with the patient and her family.  Discussed both nonoperative and surgical treatment.  I recommended reduction and stabilization of the fracture with a retrograde intramedullary nail.  I think this would result in more certain healing of the fracture and also will allow earlier mobilization and weightbearing.        Anticipated benefits of surgery were reviewed in detail.  Potential complications of surgery and anesthetic were reviewed in detail including but not limited to infection, blood loss, sore throat, pneumonia, brain damage and even death.  Postoperative immobilization and rehabilitation were discussed.  They understand that even with prompt healing of her fracture the knee replacement may not function as well as it did prior to her injury..  The patient and family appear to understand all matters discussed and wish to proceed.      The left thigh was prepped. A hematoma block was performed with a mixture of Marcaine and xylocaine. There were no complications and she reported improvement in her pain.     Howie Campoverde MD  11/02/19  6:53 PM     Electronically signed by Howie Campoverde MD at 11/02/19 2100             Howie Campoverde MD at 11/02/19 2243          ORTHOPAEDIC CONSULT     Patient Name:  Yudi West  Admit Date:  11/2/2019  Consult Date:  11/2/2019    Referring Provider: Dr. Grant  Reason for Consultation: Fracture left femur.    Patient Care Team:  Ibeth Masters APRN as PCP - General (Nurse Practitioner)    History of present illness: Mrs. West is 49 years old.  She was descending some stairs earlier today when she sustained a twisting injury to her left leg.  She had prompt onset of pain.  She was seen in the emergency room in [place] and x-rays were performed showing a fracture of the distal shaft of the femur.  Past history is remarkable for a total knee replacement on the left about 9 years ago by  Dr. Arrington.  She said she was doing well with regard to her knee prior to her most recent injury.  Patient requested to have care for the fracture in [place].  I spoke by phone with Dr. Ritter and agreed to accept him in transfer.  This was an isolated injury.    Medications include Plavix carvedilol, Neurontin, insulin, Tagamet, sertraline, 10 mg hydrocodone.  She is allergic to sulfa drugs.  She smokes 2 pack/day of cigarettes and denies use of alcohol.    Past medical history is remarkable for coronary artery disease COPD and diabetes.  Previous surgeries include nephrectomy for carcinoma, hysterectomy, tonsillectomy, and cholecystectomy.  There is been no history of anesthetic complication.  She also has a history of chronic back pain.    Family history she is .    Social history she lives in [place] with her  and son.  She is disabled.    Review of Systems is remarkable for pain well localized to the the left knee and distal thigh.  There is been no numbness or tingling in the limb.  Otherwise complete ROS is negative except as mentioned above.    Prior to Admission medications    Medication Sig Start Date End Date Taking? Authorizing Provider   aspirin 81 MG EC tablet Take 81 mg by mouth Daily.   Yes Sara Benoit MD   atorvastatin (LIPITOR) 40 MG tablet Take 40 mg by mouth Every Night. 7/18/17  Yes Sara Benoit MD   carvedilol (COREG) 6.25 MG tablet Take 6.25 mg by mouth 2 (Two) Times a Day With Meals.   Yes Sara Benoit MD   clopidogrel (PLAVIX) 75 MG tablet TAKE 1 TABLET BY MOUTH ONCE DAILY 6/20/18  Yes Lewis Johnson MD   gabapentin (NEURONTIN) 300 MG capsule TAKE 1 TABLET BY MOUTH FOUR TIMES DAILY 8/7/17  Yes Sara Benoit MD   HYDROcodone-acetaminophen (NORCO) 5-325 MG per tablet Take 1 tablet by mouth Every 6 (Six) Hours As Needed.   Yes Sara Benoit MD   insulin glargine (LANTUS) 100 UNIT/ML injection Inject 50 Units  "under the skin into the appropriate area as directed Daily.   Yes Sara Benoit MD   Omeprazole 20 MG tablet delayed-release TAKE 1 TABLET BY MOUTh twice daily 5/25/17  Yes Sara Benoit MD   ranolazine (RANEXA) 500 MG 12 hr tablet Take 1 tablet by mouth 2 (Two) Times a Day. 6/3/19  Yes Lewis Johnson MD   sertraline (ZOLOFT) 50 MG tablet Take 50 mg by mouth Daily.   Yes Sara Benoit MD   VENTOLIN  (90 BASE) MCG/ACT inhaler 2 puffs 4 (Four) Times a Day. 6/5/17  Yes Sara Benoit MD   zolpidem (AMBIEN) 10 MG tablet TAKE 1 TABLET BY MOUTH AT BEDTIME 8/7/17  Yes Sara Benoit MD   EASY TOUCH INSULIN SYRINGE 28G X 1/2\" 0.5 ML misc USE 4 TIMES DAILY 5/23/17   Sara Benoit MD   polyethylene glycol (MIRALAX) powder MIX 17 GRAMS INTO 4-8 OUNCES OF ANY BEVERAGE TWICE DAILY FOR 7 DAYS 7/8/17   Sara Benoit MD   UNIFINFRANCESCA PENTIPS 31G X 8 MM misc USE AS DIRECTED WITH SOLOSTAR ONCE DAILY 6/19/17   Sara Benoit MD   ALPRAZolam (XANAX) 0.5 MG tablet TAKE 1 TABLET BY MOUTH THREE TIMES DAILY 8/7/17 11/2/19  Sara Benoit MD   citalopram (CeleXA) 40 MG tablet TAKE 1 TABLET BY MOUTH ONCE DAILY 8/2/17 11/2/19  Sara Benoit MD   pantoprazole (PROTONIX) 20 MG EC tablet Take 20 mg by mouth Daily.  11/2/19  Sara Benoit MD     Allergies   Allergen Reactions   • Sulfa Antibiotics Itching     Social History     Socioeconomic History   • Marital status:      Spouse name: Not on file   • Number of children: Not on file   • Years of education: Not on file   • Highest education level: Not on file   Tobacco Use   • Smoking status: Current Every Day Smoker     Packs/day: 2.00     Years: 36.00     Pack years: 72.00     Types: Cigarettes   • Smokeless tobacco: Never Used   Substance and Sexual Activity   • Alcohol use: No   • Drug use: No   • Sexual activity: Defer     Past Medical History:   Diagnosis Date   • Anxiety and depression "    • Asthma    • Cancer (CMS/HCC)    • Chronic kidney disease    • COPD (chronic obstructive pulmonary disease) (CMS/HCC)    • Diabetes mellitus (CMS/HCC)    • Hypertension    • Myocardial infarction (CMS/HCC)      Past Surgical History:   Procedure Laterality Date   • CORONARY STENT PLACEMENT     • GALLBLADDER SURGERY     • HYSTERECTOMY     • KIDNEY SURGERY     • NEPHRECTOMY Left    • REPLACEMENT TOTAL KNEE     • TONSILLECTOMY       Family History   Problem Relation Age of Onset   • Heart disease Mother    • Hypertension Mother    • Heart disease Father    • Hypertension Father        Vital Signs   Temp:  [97.4 °F (36.3 °C)] 97.4 °F (36.3 °C)  Heart Rate:  [76] 76  Resp:  [18] 18  BP: (106)/(69) 106/69      11/02/19  1346   Weight: 83 kg (182 lb 15.7 oz)     Body mass index is 30.45 kg/m².    Physical Exam in general she is alert and in intermittent moderate discomfort.  She responds appropriately to questions and commands.  She appears older than her stated age.      HEENT exam showed extraocular movements are intact.  Oropharynx shows teeth are in poor repair.    Exam of the lungs shows scattered inspiratory wheezes.    Cardiac exam shows a regular rhythm normal rate no murmur.    The abdomen is soft obese and nontender with active bowel sounds.  Genitourinary and rectal exams were not done.    Exam of the extremities is directed to the] left lower extremity.  There are anterior and posterior splints crossing the knee.  Planes were loosened.  There is a well-healed midline anterior scar across the knee.  There is moderate swelling diffusely about the distal thigh and knee but no ecchymosis.  Posterior tibial pulse is strong.  Sensory exam is intact to soft touch.    Radiographs of the knee done earlier today show a slightly comminuted fracture of the distal shaft of the femur with anterior angulation of proximal approaching 45 degrees.  There is a total knee arthroplasty in satisfactory position with no evidence  of loosening.      Results Review:  Imaging Results (Last 24 Hours)     ** No results found for the last 24 hours. **        Lab Results (last 24 hours)     ** No results found for the last 24 hours. **          Assessment/Plan 1 fracture distal shaft left femur.  2 well-functioning left knee arthroplasty.  3 diabetes.  4 COPD.    The natural history of the disorder and treatment options were reviewed with the patient and her family.  Discussed both nonoperative and surgical treatment.  I recommended reduction and stabilization of the fracture with a retrograde intramedullary nail.  I think this would result in more certain healing of the fracture and also will allow earlier mobilization and weightbearing.        Anticipated benefits of surgery were reviewed in detail.  Potential complications of surgery and anesthetic were reviewed in detail including but not limited to infection, blood loss, sore throat, pneumonia, brain damage and even death.  Postoperative immobilization and rehabilitation were discussed.  They understand that even with prompt healing of her fracture the knee replacement may not function as well as it did prior to her injury..  The patient and family appear to understand all matters discussed and wish to proceed.      The left thigh was prepped. A hematoma block was performed with a mixture of Marcaine and xylocaine. There were no complications and she reported improvement in her pain.     Howie Campoverde MD  11/02/19  6:53 PM    Electronically signed by Howie Campoverde MD at 11/02/19 2100     Nicolas Grant MD at 11/02/19 1833                [facility] Medicine Admission      Date of Admission: 11/2/2019      Primary Care Physician: Ibeth Masters APRN      Chief Complaint: left leg pain    HPI:    This is a 49-year-old female with concurrent medical history of COPD diabetes coronary artery disease status post stenting hypertension  presenting to the ER after she fell off the stairs and hurt her left leg and she reported having pain in her left knee mainly.  She does have a history of knee replacement done about 9 years ago.  She was at [facility] and she opted to come to [place] and she was transferred here.  Patient reports that she does not have any chest pain but does have some intermittent difficulty breathing and has a history of COPD.    Past Medical History:  has a past medical history of Anxiety and depression, Asthma, Cancer (CMS/[facility]), Chronic kidney disease, COPD (chronic obstructive pulmonary disease) (CMS/[facility]), Diabetes mellitus (CMS/[facility]), Hypertension, and Myocardial infarction (CMS/[facility]).    Past Surgical History:  has a past surgical history that includes Kidney surgery; Replacement total knee; Hysterectomy; Tonsillectomy; Gallbladder surgery; Coronary stent placement; and Nephrectomy (Left).    Family History: family history includes Heart disease in her father and mother; Hypertension in her father and mother.    Social History:  reports that she has been smoking cigarettes.  She has a 72.00 pack-year smoking history. She has never used smokeless tobacco. She reports that she does not drink alcohol or use drugs.    Allergies:   Allergies   Allergen Reactions   • Sulfa Antibiotics Itching       Medications: Scheduled Meds:  bupivacaine (PF) 10 mL Injection Once   ipratropium-albuterol 3 mL Nebulization 4x Daily - RT   lidocaine 10 mL Subcutaneous Once   sodium chloride 10 mL Intravenous Q12H     Continuous Infusions:   PRN Meds:.Morphine **AND** naloxone  •  ondansetron  •  sodium chloride  No current facility-administered medications on file prior to encounter.      Current Outpatient Medications on File Prior to Encounter   Medication Sig Dispense Refill   • aspirin 81 MG EC tablet Take 81 mg by mouth Daily.     • atorvastatin (LIPITOR) 40 MG tablet Take 40 mg by mouth Every Night.  3   • carvedilol (COREG) 6.25 MG  "tablet Take 6.25 mg by mouth 2 (Two) Times a Day With Meals.     • clopidogrel (PLAVIX) 75 MG tablet TAKE 1 TABLET BY MOUTH ONCE DAILY 30 tablet 6   • gabapentin (NEURONTIN) 300 MG capsule TAKE 1 TABLET BY MOUTH FOUR TIMES DAILY  0   • HYDROcodone-acetaminophen (NORCO) 5-325 MG per tablet Take 1 tablet by mouth Every 6 (Six) Hours As Needed.     • insulin glargine (LANTUS) 100 UNIT/ML injection Inject 50 Units under the skin into the appropriate area as directed Daily.     • Omeprazole 20 MG tablet delayed-release TAKE 1 TABLET BY MOUTh twice daily  3   • ranolazine (RANEXA) 500 MG 12 hr tablet Take 1 tablet by mouth 2 (Two) Times a Day. 60 tablet 6   • sertraline (ZOLOFT) 50 MG tablet Take 50 mg by mouth Daily.     • VENTOLIN  (90 BASE) MCG/ACT inhaler 2 puffs 4 (Four) Times a Day.  1   • zolpidem (AMBIEN) 10 MG tablet TAKE 1 TABLET BY MOUTH AT BEDTIME  0   • EASY TOUCH INSULIN SYRINGE 28G X 1/2\" 0.5 ML misc USE 4 TIMES DAILY  3   • polyethylene glycol (MIRALAX) powder MIX 17 GRAMS INTO 4-8 OUNCES OF ANY BEVERAGE TWICE DAILY FOR 7 DAYS  0   • UNIFINE PENTIPS 31G X 8 MM misc USE AS DIRECTED WITH SOLOSTAR ONCE DAILY  3   • [DISCONTINUED] ALPRAZolam (XANAX) 0.5 MG tablet TAKE 1 TABLET BY MOUTH THREE TIMES DAILY  0   • [DISCONTINUED] citalopram (CeleXA) 40 MG tablet TAKE 1 TABLET BY MOUTH ONCE DAILY  3   • [DISCONTINUED] pantoprazole (PROTONIX) 20 MG EC tablet Take 20 mg by mouth Daily.         Review of Systems:  Review of Systems   HENT: Positive for congestion.    Respiratory: Positive for shortness of breath.    Cardiovascular: Negative for chest pain.   Gastrointestinal: Negative for diarrhea.      Otherwise complete ROS is negative except as mentioned above.    Physical Exam:   Temp:  [97.4 °F (36.3 °C)] 97.4 °F (36.3 °C)  Heart Rate:  [76] 76  Resp:  [18] 18  BP: (106)/(69) 106/69  Physical Exam   Constitutional: She appears well-developed and well-nourished. No distress.   HENT:   Head: Normocephalic and " atraumatic.   Cardiovascular: Normal rate.   Pulmonary/Chest: Effort normal. No respiratory distress. She has no wheezes.   Abdominal: Soft. She exhibits no distension.   Musculoskeletal: Normal range of motion. She exhibits no edema.   Left knee brace in place   Neurological: She is alert. No cranial nerve deficit.   Skin: Skin is warm and dry. She is not diaphoretic.   Psychiatric: She has a normal mood and affect. Her behavior is normal. Judgment and thought content normal.   Vitals reviewed.        Results Reviewed:  I have personally reviewed current lab, radiology, and data and agree with results.  Lab Results (last 24 hours)     ** No results found for the last 24 hours. **        Imaging Results (Last 24 Hours)     ** No results found for the last 24 hours. **            Assessment:    Active Hospital Problems    Diagnosis   • Femur fracture (CMS/[facility])     Left femur fracture- orthopedic services has been consulted and they are planning on doing surgery in the a.m.  We will continue with pain management    Pain-morphine has been ordered    Hypertension-continue to monitor    Diabetes mellitus-sliding scale insulin    Coronary artery disease-Plavix will be held    COPD- patient does not seem to be in acute exacerbation, nebulizer treatments will be ordered, will order ABG in the morning    DVT prophylaxis-SCD            Nicolas Grant MD  11/02/19  6:33 PM                  Electronically signed by Nicolas Grant MD at 11/02/19 7556       Vital Signs (last day)     Date/Time   Temp   Temp src   Pulse   Resp   BP   Patient Position   SpO2    11/04/19 0900   98.3 (36.8)   Oral   96   20   98/57   Lying   93    11/04/19 0722   --   --   95   20   --   --   --    11/04/19 0716   --   --   102   20   --   --   92    11/04/19 0330   97.9 (36.6)   Oral   108   18   111/56   Lying   96    11/03/19 2300   98.4 (36.9)   Oral   114   20   106/65   Lying   95    11/03/19 2058   --   --   88   20   --   --   94     11/03/19 2055   98.1 (36.7)   --   101   18   103/60   --   94    11/03/19 1444   96.4 (35.8)   Axillary   93   18   94/60   Lying   95    11/03/19 1414   96 (35.6)   Axillary   101   18   105/66   Sitting   96    11/03/19 1344   96.8 (36)   Axillary   93   18   98/64   Lying   94    11/03/19 1311   97.6 (36.4)   Temporal   93   16   111/66   Lying   94    11/03/19 1256   --   --   92   20   122/74   Lying   94    11/03/19 1241   --   --   93   20   124/79   Lying   94    11/03/19 1227   97.5 (36.4)   Temporal   90   16   134/88   Lying   98    11/03/19 0719   --   --   92   --   --   --   --    11/03/19 0708   --   --   91   20   --   --   96    11/03/19 0422   97.6 (36.4)   --   --   18   --   --   98    11/03/19 0345   96.4 (35.8)   --   83   22   130/86   --   100              Prior to Admission Medications     Prescriptions Last Dose Informant Patient Reported? Taking?    atorvastatin (LIPITOR) 40 MG tablet 11/1/2019  Yes Yes    Take 40 mg by mouth Every Night.    carvedilol (COREG) 6.25 MG tablet 11/2/2019  Yes Yes    Take 6.25 mg by mouth 2 (Two) Times a Day With Meals.    clopidogrel (PLAVIX) 75 MG tablet 11/2/2019  No Yes    TAKE 1 TABLET BY MOUTH ONCE DAILY    gabapentin (NEURONTIN) 300 MG capsule   Yes Yes    TAKE 1 TABLET BY MOUTH FOUR TIMES DAILY    HYDROcodone-acetaminophen (NORCO) 5-325 MG per tablet   Yes Yes    Take 1 tablet by mouth Every 6 (Six) Hours As Needed.    insulin glargine (LANTUS) 100 UNIT/ML injection 11/1/2019  Yes Yes    Inject 50 Units under the skin into the appropriate area as directed Daily.    Omeprazole 20 MG tablet delayed-release   Yes Yes    TAKE 1 TABLET BY MOUTh twice daily    ranolazine (RANEXA) 500 MG 12 hr tablet 11/2/2019  No Yes    Take 1 tablet by mouth 2 (Two) Times a Day.    sertraline (ZOLOFT) 50 MG tablet 11/2/2019  Yes Yes    Take 50 mg by mouth Daily.    VENTOLIN  (90 BASE) MCG/ACT inhaler 11/2/2019  Yes Yes    2 puffs 4 (Four) Times a Day.    zolpidem  "(AMBIEN) 10 MG tablet 11/1/2019  Yes Yes    TAKE 1 TABLET BY MOUTH AT BEDTIME    aspirin 81 MG EC tablet 11/2/2019  Yes Yes    Take 81 mg by mouth Daily.    EASY TOUCH INSULIN SYRINGE 28G X 1/2\" 0.5 ML misc   Yes No    USE 4 TIMES DAILY    polyethylene glycol (MIRALAX) powder   Yes No    MIX 17 GRAMS INTO 4-8 OUNCES OF ANY BEVERAGE TWICE DAILY FOR 7 DAYS    UNIFINE PENTIPS 31G X 8 MM misc   Yes No    USE AS DIRECTED WITH SOLOSTAR ONCE DAILY          Hospital Medications (active)       Dose Frequency Start End    acetaminophen (TYLENOL) tablet 500 mg 500 mg Every 4 Hours PRN 11/3/2019     Sig - Route: Take 1 tablet by mouth Every 4 (Four) Hours As Needed for Fever (Temperature Greater Than 101F). - Oral    atorvastatin (LIPITOR) tablet 40 mg 40 mg Nightly 11/2/2019     Sig - Route: Take 1 tablet by mouth Every Night. - Oral    carvedilol (COREG) tablet 6.25 mg 6.25 mg 2 Times Daily With Meals 11/2/2019     Sig - Route: Take 1 tablet by mouth 2 (Two) Times a Day With Meals. - Oral    ceFAZolin in 0.9% normal saline (ANCEF) IVPB solution 2 g 2 g Once 11/3/2019 11/3/2019    Sig - Route: Infuse 100 mL into a venous catheter 1 (One) Time. - Intravenous    ceFAZolin in 0.9% normal saline (ANCEF) IVPB solution 2 g 2 g Every 8 Hours 11/3/2019 11/4/2019    Sig - Route: Infuse 100 mL into a venous catheter Every 8 (Eight) Hours. - Intravenous    dextrose (D50W) 25 g/ 50mL Intravenous Solution 25 g 25 g Every 15 Minutes PRN 11/2/2019     Sig - Route: Infuse 50 mL into a venous catheter Every 15 (Fifteen) Minutes As Needed for Low Blood Sugar (Blood Sugar Less Than 70). - Intravenous    dextrose (GLUTOSE) oral gel 15 g 15 g Every 15 Minutes PRN 11/2/2019     Sig - Route: Take 15 application by mouth Every 15 (Fifteen) Minutes As Needed for Low Blood Sugar (Blood sugar less than 70). - Oral    docusate sodium (COLACE) capsule 200 mg 200 mg 2 Times Daily PRN 11/3/2019     Sig - Route: Take 2 capsules by mouth 2 (Two) Times a Day " "As Needed for Constipation. - Oral    glucagon (human recombinant) (GLUCAGEN DIAGNOSTIC) injection 1 mg 1 mg Every 15 Minutes PRN 11/2/2019     Sig - Route: Inject 1 mg under the skin into the appropriate area as directed Every 15 (Fifteen) Minutes As Needed for Low Blood Sugar (Blood Glucose Less Than 70). - Subcutaneous    HYDROcodone-acetaminophen (NORCO) 7.5-325 MG per tablet 1 tablet 1 tablet Every 4 Hours PRN 11/3/2019 11/13/2019    Sig - Route: Take 1 tablet by mouth Every 4 (Four) Hours As Needed for Moderate Pain . - Oral    insulin aspart (novoLOG) injection 0-14 Units 0-14 Units 4 Times Daily Before Meals & Nightly 11/2/2019     Sig - Route: Inject 0-14 Units under the skin into the appropriate area as directed 4 (Four) Times a Day Before Meals & at Bedtime. - Subcutaneous    insulin detemir (LEVEMIR) injection 50 Units 50 Units Nightly 11/2/2019     Sig - Route: Inject 50 Units under the skin into the appropriate area as directed Every Night. - Subcutaneous    ipratropium-albuterol (DUO-NEB) nebulizer solution 3 mL 3 mL 4 Times Daily - RT 11/2/2019     Sig - Route: Take 3 mL by nebulization 4 (Four) Times a Day. - Nebulization    morphine injection 6 mg 6 mg Every 2 Hours PRN 11/3/2019 11/13/2019    Sig - Route: Infuse 3 mL into a venous catheter Every 2 (Two) Hours As Needed for Severe Pain . - Intravenous    Linked Group 1:  \"And\" Linked Group Details        naloxone (NARCAN) injection 0.4 mg 0.4 mg Every 5 Minutes PRN 11/3/2019     Sig - Route: Infuse 1 mL into a venous catheter Every 5 (Five) Minutes As Needed for Respiratory Depression. - Intravenous    Linked Group 1:  \"And\" Linked Group Details        ondansetron (ZOFRAN) injection 4 mg 4 mg Every 6 Hours PRN 11/3/2019     Sig - Route: Infuse 2 mL into a venous catheter Every 6 (Six) Hours As Needed for Nausea or Vomiting. - Intravenous    Linked Group 2:  \"Or\" Linked Group Details        ondansetron (ZOFRAN) injection 4 mg 4 mg Once As Needed " "11/3/2019 11/3/2019    Sig - Route: Infuse 2 mL into a venous catheter 1 (One) Time As Needed for Nausea or Vomiting. - Intravenous    ondansetron (ZOFRAN) tablet 4 mg 4 mg Every 6 Hours PRN 11/3/2019     Sig - Route: Take 1 tablet by mouth Every 6 (Six) Hours As Needed for Nausea or Vomiting. - Oral    Linked Group 2:  \"Or\" Linked Group Details        oxyCODONE-acetaminophen (PERCOCET) 7.5-325 MG per tablet 2 tablet 2 tablet Every 4 Hours PRN 11/3/2019 11/13/2019    Sig - Route: Take 2 tablets by mouth Every 4 (Four) Hours As Needed for Severe Pain . - Oral    pantoprazole (PROTONIX) EC tablet 40 mg 40 mg Every Morning 11/3/2019     Sig - Route: Take 1 tablet by mouth Every Morning. - Oral    promethazine (PHENERGAN) tablet 12.5 mg 12.5 mg Every 6 Hours PRN 11/3/2019     Sig - Route: Take 1 tablet by mouth Every 6 (Six) Hours As Needed for Nausea or Vomiting (If BOTH ondansetron (ZOFRAN) and promethazine (PHENERGAN) are ordered use ondansetron first and THEN promethazine IF ondansetron is ineffective.). - Oral    ranolazine (RANEXA) 12 hr tablet 500 mg 500 mg 2 Times Daily 11/2/2019     Sig - Route: Take 1 tablet by mouth 2 (Two) Times a Day. - Oral    sertraline (ZOLOFT) tablet 50 mg 50 mg Daily 11/3/2019     Sig - Route: Take 1 tablet by mouth Daily. - Oral    sodium chloride 0.9 % flush 10 mL 10 mL Every 12 Hours Scheduled 11/2/2019     Sig - Route: Infuse 10 mL into a venous catheter Every 12 (Twelve) Hours. - Intravenous    sodium chloride 0.9 % flush 10 mL 10 mL As Needed 11/2/2019     Sig - Route: Infuse 10 mL into a venous catheter As Needed for Line Care. - Intravenous    sodium chloride 0.9 % flush 3 mL 3 mL Every 12 Hours Scheduled 11/3/2019     Sig - Route: Infuse 3 mL into a venous catheter Every 12 (Twelve) Hours. - Intravenous    sodium chloride 0.9 % flush 3 mL 3 mL Every 12 Hours Scheduled 11/3/2019     Sig - Route: Infuse 3 mL into a venous catheter Every 12 (Twelve) Hours. - Intravenous    sodium " chloride 0.9 % flush 3-10 mL 3-10 mL As Needed 11/3/2019     Sig - Route: Infuse 3-10 mL into a venous catheter As Needed for Line Care. - Intravenous    sodium chloride 0.9 % flush 3-10 mL 3-10 mL As Needed 11/3/2019     Sig - Route: Infuse 3-10 mL into a venous catheter As Needed for Line Care. - Intravenous    sodium chloride 0.9 % infusion 100 mL/hr Continuous 11/3/2019     Sig - Route: Infuse 100 mL/hr into a venous catheter Continuous. - Intravenous    Tranexamic Acid tablet 1,300 mg 1,300 mg Every 4 Hours 11/3/2019 11/3/2019    Sig - Route: Take 2 tablets by mouth Every 4 (Four) Hours. - Oral    zolpidem (AMBIEN) tablet 10 mg 10 mg Nightly PRN 11/2/2019     Sig - Route: Take 2 tablets by mouth At Night As Needed for Sleep. - Oral    acetaminophen (TYLENOL) tablet 500 mg (Discontinued) 500 mg Every 4 Hours PRN 11/3/2019 11/3/2019    Sig - Route: Take 1 tablet by mouth Every 4 (Four) Hours As Needed for Fever (Temperature Greater Than 101F). - Oral    Reason for Discontinue: Duplicate order    bupivacaine (PF) (MARCAINE) 0.5 % injection (Discontinued)  As Needed 11/3/2019 11/3/2019    Sig: As Needed.    Reason for Discontinue: Patient Discharge    docusate sodium (COLACE) capsule 250 mg (Discontinued) 250 mg 2 Times Daily PRN 11/3/2019 11/3/2019    Sig - Route: Take 5 capsules by mouth 2 (Two) Times a Day As Needed for Constipation. - Oral    Reason for Discontinue: Duplicate order    electrolyte-148 (PLASMALYTE) solution (Discontinued)  Continuous PRN 11/3/2019 11/3/2019    Sig: Continuous As Needed.    Reason for Discontinue: Anesthesia Stop    ePHEDrine injection (Discontinued)  As Needed 11/3/2019 11/3/2019    Sig - Route: Infuse  into a venous catheter As Needed. - Intravenous    Reason for Discontinue: Anesthesia Stop    fentaNYL citrate (PF) (SUBLIMAZE) injection (Discontinued)  As Needed 11/3/2019 11/3/2019    Sig - Route: Infuse  into a venous catheter As Needed. - Intravenous    Reason for  "Discontinue: Anesthesia Stop    glycopyrrolate PF (ROBINUL) injection (Discontinued)  As Needed 11/3/2019 11/3/2019    Sig: As Needed.    Reason for Discontinue: Anesthesia Stop    HYDROcodone-acetaminophen (NORCO) 5-325 MG per tablet 1 tablet (Discontinued) 1 tablet Every 6 Hours PRN 11/2/2019 11/3/2019    Sig - Route: Take 1 tablet by mouth Every 6 (Six) Hours As Needed for Moderate Pain . - Oral    HYDROcodone-acetaminophen (NORCO) 7.5-325 MG per tablet 2 tablet (Discontinued) 2 tablet Every 4 Hours PRN 11/3/2019 11/3/2019    Sig - Route: Take 2 tablets by mouth Every 4 (Four) Hours As Needed for Severe Pain . - Oral    Reason for Discontinue: Duplicate order    HYDROmorphone (DILAUDID) injection 0.5 mg (Discontinued) 0.5 mg Every 15 Minutes PRN 11/3/2019 11/3/2019    Sig - Route: Infuse 0.5 mL into a venous catheter Every 15 (Fifteen) Minutes As Needed for Severe Pain . - Intravenous    Reason for Discontinue: Patient Transfer    lidocaine (XYLOCAINE) 2% injection (Discontinued)  As Needed 11/3/2019 11/3/2019    Sig - Route: Infuse  into a venous catheter As Needed. - Intravenous    Reason for Discontinue: Anesthesia Stop    midazolam (VERSED) injection (Discontinued)  As Needed 11/3/2019 11/3/2019    Sig - Route: Infuse  into a venous catheter As Needed. - Intravenous    Reason for Discontinue: Anesthesia Stop    morphine injection 1 mg (Discontinued) 1 mg Every 4 Hours PRN 11/2/2019 11/3/2019    Sig - Route: Infuse 0.5 mL into a venous catheter Every 4 (Four) Hours As Needed for Moderate Pain . - Intravenous    Linked Group 3:  \"And\" Linked Group Details        naloxone (NARCAN) injection 0.4 mg (Discontinued) 0.4 mg Every 5 Minutes PRN 11/2/2019 11/3/2019    Sig - Route: Infuse 1 mL into a venous catheter Every 5 (Five) Minutes As Needed for Respiratory Depression. - Intravenous    Reason for Discontinue: Duplicate order    Linked Group 3:  \"And\" Linked Group Details        ondansetron (ZOFRAN) injection 4 mg " (Discontinued) 4 mg Every 6 Hours PRN 11/2/2019 11/3/2019    Sig - Route: Infuse 2 mL into a venous catheter Every 6 (Six) Hours As Needed for Nausea or Vomiting. - Intravenous    Reason for Discontinue: Duplicate order    ondansetron (ZOFRAN) injection (Discontinued)  As Needed 11/3/2019 11/3/2019    Sig - Route: Infuse  into a venous catheter As Needed. - Intravenous    Reason for Discontinue: Anesthesia Stop    phenylephrine (MALU-SYNEPHRINE) injection (Discontinued)  As Needed 11/3/2019 11/3/2019    Sig: As Needed.    Reason for Discontinue: Anesthesia Stop    promethazine (PHENERGAN) tablet 12.5 mg (Discontinued) 12.5 mg Every 6 Hours PRN 11/3/2019 11/3/2019    Sig - Route: Take 1 tablet by mouth Every 6 (Six) Hours As Needed for Nausea or Vomiting (If BOTH ondansetron (ZOFRAN) and promethazine (PHENERGAN) are ordered use ondansetron first and THEN promethazine IF ondansetron is ineffective.). - Oral    Reason for Discontinue: Duplicate order    Propofol (DIPRIVAN) injection (Discontinued)  As Needed 11/3/2019 11/3/2019    Sig - Route: Infuse  into a venous catheter As Needed. - Intravenous    Reason for Discontinue: Anesthesia Stop    sodium chloride 0.9 % infusion (Discontinued) 100 mL/hr Continuous 11/3/2019 11/3/2019    Sig - Route: Infuse 100 mL/hr into a venous catheter Continuous. - Intravenous    Reason for Discontinue: Duplicate order    Tranexamic Acid Sterile Solution (Discontinued)  As Needed 11/3/2019 11/3/2019    Sig: As Needed.    Reason for Discontinue: Patient Discharge    vancomycin (VANCOCIN) injection (Discontinued)  As Needed 11/3/2019 11/3/2019    Sig: As Needed.    Reason for Discontinue: Patient Discharge             Physician Progress Notes (last 24 hours) (Notes from 11/03/19 1003 through 11/04/19 1003)      Howie Campoverde MD at 11/04/19 0857          ORTHOPEDIC PROGRESS NOTE:    Name:  Yudi West  Date:    11/4/2019  Date of admission:  11/2/2019    Post op day:  1 Day  Post-Op  Procedure:    Procedure(s) (LRB):  FEMUR INTRAMEDULLARY NAILING RETROGRADE (Left)    Subjective: Pain is been well controlled.  She has been up with physical therapy and denies any dizziness.    Vitals:    Vitals:    11/04/19 0722   BP:    Pulse: 95   Resp: 20   Temp:    SpO2:        Exam: She is alert cheerful and in no apparent distress.  Her dressings were removed.  Her incisions were dry with no evidence of infection.  The foot was warm.  Sensory exam is intact to soft touch.  New dressings were applied.    Hemoglobin today is 8.5.    Lab Results (last 24 hours)     Procedure Component Value Units Date/Time    POC Glucose Once [[id number]]  (Abnormal) Collected:  11/04/19 0720    Specimen:  Blood Updated:  11/04/19 0739     Glucose 149 mg/dL      Comment: RN NotifiedOperator: [id number] KELLEY GRACEMeter ID: [id number]       Basic Metabolic Panel [[id number]]  (Abnormal) Collected:  11/04/19 0539    Specimen:  Blood Updated:  11/04/19 0637     Glucose 143 mg/dL      BUN 10 mg/dL      Creatinine 0.86 mg/dL      Sodium 137 mmol/L      Potassium 3.7 mmol/L      Chloride 102 mmol/L      CO2 27.0 mmol/L      Calcium 8.1 mg/dL      eGFR Non African Amer 70 mL/min/1.73      BUN/Creatinine Ratio 11.6     Anion Gap 8.0 mmol/L     Narrative:       GFR Normal >60  Chronic Kidney Disease <60  Kidney Failure <15    CBC & Differential [[id number]] Collected:  11/04/19 0539    Specimen:  Blood Updated:  11/04/19 0611    Narrative:       The following orders were created for panel order CBC & Differential.  Procedure                               Abnormality         Status                     ---------                               -----------         ------                     CBC Auto Differential[[id number]]        Abnormal            Final result                 Please view results for these tests on the individual orders.    CBC Auto Differential [[id number]]  (Abnormal) Collected:  11/04/19 0539    Specimen:  Blood  Updated:  11/04/19 0611     WBC 12.81 10*3/mm3      RBC 2.78 10*6/mm3      Hemoglobin 8.3 g/dL      Hematocrit 24.5 %      MCV 88.1 fL      MCH 29.9 pg      MCHC 33.9 g/dL      RDW 11.9 %      RDW-SD 38.5 fl      MPV 10.9 fL      Platelets 168 10*3/mm3      Neutrophil % 68.7 %      Lymphocyte % 19.6 %      Monocyte % 9.4 %      Eosinophil % 1.7 %      Basophil % 0.2 %      Immature Grans % 0.4 %      Neutrophils, Absolute 8.80 10*3/mm3      Lymphocytes, Absolute 2.51 10*3/mm3      Monocytes, Absolute 1.20 10*3/mm3      Eosinophils, Absolute 0.22 10*3/mm3      Basophils, Absolute 0.03 10*3/mm3      Immature Grans, Absolute 0.05 10*3/mm3      nRBC 0.0 /100 WBC     POC Glucose Once [[id number]]  (Abnormal) Collected:  11/03/19 1933    Specimen:  Blood Updated:  11/03/19 1951     Glucose 236 mg/dL      Comment: RN NotifiedOperator: [id number] VALENCIA STEPHANIEMeter ID: [id number]       POC Glucose Once [[id number]]  (Abnormal) Collected:  11/03/19 1642    Specimen:  Blood Updated:  11/03/19 1659     Glucose 209 mg/dL      Comment: RN NotifiedOperator: [id number] MANDI [employer]eter ID: [id number]       POC Glucose Once [[id number]]  (Abnormal) Collected:  11/03/19 1412    Specimen:  Blood Updated:  11/03/19 1658     Glucose 186 mg/dL      Comment: RN NotifiedOperator: [id number] MANDI HAYLEYMeter ID: [id number]       POC Glucose Once [[id number]]  (Abnormal) Collected:  11/03/19 1235    Specimen:  Blood Updated:  11/03/19 1247     Glucose 160 mg/dL      Comment: RN NotifiedOperator: [id number] [facility] ID: [id number]             ASSESSMENT: Acute blood loss anemia.  I expect little additional blood loss.  Principal Problem:    Closed displaced supracondylar fracture without intracondylar extension of lower end of left femur (CMS/HCC)  Active Problems:    Hypertension    Type 1 diabetes mellitus (CMS/HCC)    COPD (chronic obstructive pulmonary disease) (CMS/HCC)    Anemia, posthemorrhagic,  acute        PLAN: Continue physical therapy until safe for home.  From my standpoint she can be discharged as early as this afternoon.  She was instructed in care of her dressings.  She may begin range of motion as tolerated and also may weight-bear as tolerated.  Return to see me next week for x-rays and staple removal.  Resume Plavix for DVT prophylaxis.    Howie Campoverde MD  11/04/19  8:57 AM              Electronically signed by Howie Campoverde MD at 11/04/19 0901     Nicolas Grant MD at 11/03/19 1648              [facility] Medicine Services  INPATIENT PROGRESS NOTE    Length of Stay: 1  Date of Admission: 11/2/2019  Primary Care Physician: Ibeth Masters APRN    Subjective   Chief Complaint: Femur fracture  HPI:    Patient has been admitted for left femur fracture status post repair postop day #1.  She is doing fine after the surgery although she is having some pain.  Reports no difficulty breathing    Review of Systems   Respiratory: Negative for shortness of breath.    Cardiovascular: Negative for chest pain.        All pertinent negatives and positives are as above. All other systems have been reviewed and are negative unless otherwise stated.     Objective    Temp:  [96 °F (35.6 °C)-98.8 °F (37.1 °C)] 96.4 °F (35.8 °C)  Heart Rate:  [] 93  Resp:  [16-22] 18  BP: ()/(60-88) 94/60  Physical Exam   Constitutional: She appears well-developed and well-nourished. No distress.   HENT:   Head: Normocephalic and atraumatic.   Cardiovascular: Normal rate.   Pulmonary/Chest: Effort normal. No respiratory distress. She has no wheezes.   Abdominal: Soft. She exhibits no distension.   Musculoskeletal: Normal range of motion. She exhibits no edema.   Neurological: She is alert. No cranial nerve deficit.   Skin: Skin is warm and dry. She is not diaphoretic.   Psychiatric: She has a normal mood and affect. Her behavior is normal. Judgment and  thought content normal.   Vitals reviewed.          Results Review:  I have reviewed the labs, radiology results, and diagnostic studies.    Laboratory Data:   Lab Results (last 24 hours)     Procedure Component Value Units Date/Time    POC Glucose Once [[id number]]  (Abnormal) Collected:  11/03/19 1235    Specimen:  Blood Updated:  11/03/19 1247     Glucose 160 mg/dL      Comment: RN NotifiedOperator: [id number] CEM TIPTONRICIAMeter ID: [id number]       Urinalysis With Culture If Indicated - Urine, Catheter [[id number]]  (Abnormal) Collected:  11/03/19 0529    Specimen:  Urine, Catheter Updated:  11/03/19 0756     Color, UA Yellow     Appearance, UA Slightly Cloudy     pH, UA 6.0     Specific Gravity, UA 1.008     Comment: Result obtained by Refractometer        Glucose,  mg/dL (1+)     Ketones, UA Negative     Bilirubin, UA Negative     Blood, UA Negative     Protein, UA Negative     Leuk Esterase, UA Negative     Nitrite, UA Negative     Urobilinogen, UA 0.2 E.U./dL    Narrative:       Urine microscopic not indicated.    POC Glucose Once [[id number]]  (Abnormal) Collected:  11/03/19 0733    Specimen:  Blood Updated:  11/03/19 0751     Glucose 204 mg/dL      Comment: RN NotifiedOperator: [id number] MANDI [facility] ID: [id number]       POC Glucose Once [[id number]]  (Abnormal) Collected:  11/02/19 2332    Specimen:  Blood Updated:  11/03/19 0700     Glucose 353 mg/dL      Comment: RN NotifiedOperator: [id number] [facility] ID: [id number]       Basic Metabolic Panel [[id number]]  (Abnormal) Collected:  11/03/19 0516    Specimen:  Blood Updated:  11/03/19 0620     Glucose 193 mg/dL      BUN 12 mg/dL      Creatinine 0.82 mg/dL      Sodium 137 mmol/L      Potassium 3.7 mmol/L      Chloride 101 mmol/L      CO2 26.0 mmol/L      Calcium 8.3 mg/dL      eGFR Non African Amer 74 mL/min/1.73      BUN/Creatinine Ratio 14.6     Anion Gap 10.0 mmol/L     Narrative:       GFR Normal >60  Chronic Kidney  Disease <60  Kidney Failure <15    CBC & Differential [[id number]] Collected:  11/03/19 0516    Specimen:  Blood Updated:  11/03/19 0611    Narrative:       The following orders were created for panel order CBC & Differential.  Procedure                               Abnormality         Status                     ---------                               -----------         ------                     CBC Auto Differential[[id number]]        Abnormal            Final result                 Please view results for these tests on the individual orders.    CBC Auto Differential [[id number]]  (Abnormal) Collected:  11/03/19 0516    Specimen:  Blood Updated:  11/03/19 0611     WBC 10.97 10*3/mm3      RBC 3.33 10*6/mm3      Hemoglobin 10.0 g/dL      Hematocrit 29.0 %      MCV 87.1 fL      MCH 30.0 pg      MCHC 34.5 g/dL      RDW 11.7 %      RDW-SD 37.4 fl      MPV 10.9 fL      Platelets 194 10*3/mm3      Neutrophil % 68.1 %      Lymphocyte % 20.1 %      Monocyte % 9.4 %      Eosinophil % 1.5 %      Basophil % 0.4 %      Immature Grans % 0.5 %      Neutrophils, Absolute 7.48 10*3/mm3      Lymphocytes, Absolute 2.21 10*3/mm3      Monocytes, Absolute 1.03 10*3/mm3      Eosinophils, Absolute 0.16 10*3/mm3      Basophils, Absolute 0.04 10*3/mm3      Immature Grans, Absolute 0.05 10*3/mm3      nRBC 0.0 /100 WBC     Blood Gas, Arterial [[id number]]  (Abnormal) Collected:  11/03/19 0416    Specimen:  Arterial Blood Updated:  11/03/19 0428     Site Left Radial     Miguel's Test N/A     pH, Arterial 7.384 pH units      pCO2, Arterial 41.3 mm Hg      pO2, Arterial 94.2 mm Hg      HCO3, Arterial 24.6 mmol/L      Base Excess, Arterial -0.4 mmol/L      Comment: 84 Value below reference range        O2 Saturation, Arterial 98.2 %      Barometric Pressure for Blood Gas 754 mmHg      Modality Nasal Cannula     Flow Rate 3.5 lpm      Ventilator Mode NA     Collected by KATJA DOMINGUEZ     Comment: Meter: [id number]     :  [id number]        Extra Tubes [[id number]] Collected:  11/02/19 2356    Specimen:  Blood, Venous Line Updated:  11/03/19 0100    Narrative:       The following orders were created for panel order Extra Tubes.  Procedure                               Abnormality         Status                     ---------                               -----------         ------                     Light Blue Top[[id number]]                                   Final result               Lavender Top[[id number]]                                     Final result               Green Top (Gel)[[id number]]                                  Final result                 Please view results for these tests on the individual orders.    Light Blue Top [[id number]] Collected:  11/02/19 2356    Specimen:  Blood Updated:  11/03/19 0100     Extra Tube hold for add-on     Comment: Auto resulted       Lavender Top [[id number]] Collected:  11/02/19 2356    Specimen:  Blood Updated:  11/03/19 0100     Extra Tube hold for add-on     Comment: Auto resulted       Green Top (Gel) [[id number]] Collected:  11/02/19 2356    Specimen:  Blood Updated:  11/03/19 0100     Extra Tube Hold for add-ons.     Comment: Auto resulted.       Glucose, Random [[id number]]  (Abnormal) Collected:  11/02/19 2021    Specimen:  Blood Updated:  11/02/19 2053     Glucose 449 mg/dL     Comprehensive Metabolic Panel [[id number]]  (Abnormal) Collected:  11/02/19 1931    Specimen:  Blood Updated:  11/02/19 1951     Glucose 391 mg/dL      BUN 14 mg/dL      Creatinine 0.98 mg/dL      Sodium 137 mmol/L      Potassium 4.3 mmol/L      Chloride 100 mmol/L      CO2 24.0 mmol/L      Calcium 8.5 mg/dL      Total Protein 6.8 g/dL      Albumin 4.00 g/dL      ALT (SGPT) 16 U/L      AST (SGOT) 12 U/L      Alkaline Phosphatase 113 U/L      Total Bilirubin 0.3 mg/dL      eGFR Non African Amer 60 mL/min/1.73      Globulin 2.8 gm/dL      A/G Ratio 1.4 g/dL      BUN/Creatinine Ratio 14.3     Anion Gap 13.0 mmol/L     Narrative:        GFR Normal >60  Chronic Kidney Disease <60  Kidney Failure <15    CBC & Differential [[id number]] Collected:  11/02/19 1931    Specimen:  Blood Updated:  11/02/19 1934    Narrative:       The following orders were created for panel order CBC & Differential.  Procedure                               Abnormality         Status                     ---------                               -----------         ------                     CBC Auto Differential[[id number]]        Abnormal            Final result                 Please view results for these tests on the individual orders.    CBC Auto Differential [[id number]]  (Abnormal) Collected:  11/02/19 1931    Specimen:  Blood Updated:  11/02/19 1934     WBC 15.89 10*3/mm3      RBC 4.07 10*6/mm3      Hemoglobin 12.0 g/dL      Hematocrit 36.1 %      MCV 88.7 fL      MCH 29.5 pg      MCHC 33.2 g/dL      RDW 11.8 %      RDW-SD 37.7 fl      MPV 10.7 fL      Platelets 241 10*3/mm3      Neutrophil % 75.5 %      Lymphocyte % 13.3 %      Monocyte % 8.2 %      Eosinophil % 2.0 %      Basophil % 0.5 %      Immature Grans % 0.5 %      Neutrophils, Absolute 12.00 10*3/mm3      Lymphocytes, Absolute 2.12 10*3/mm3      Monocytes, Absolute 1.30 10*3/mm3      Eosinophils, Absolute 0.31 10*3/mm3      Basophils, Absolute 0.08 10*3/mm3      Immature Grans, Absolute 0.08 10*3/mm3      nRBC 0.0 /100 WBC           Culture Data:   No results found for: BLOODCX  No results found for: URINECX  No results found for: RESPCX  No results found for: WOUNDCX  No results found for: STOOLCX  No components found for: BODYFLD    Radiology Data:   Imaging Results (Last 24 Hours)     Procedure Component Value Units Date/Time    XR Femur 2 View Left [[id number]] Collected:  11/03/19 1254     Updated:  11/03/19 1319    Narrative:         Portable two view left femur    HISTORY: Postoperative study. Internal fixation.    Portable AP and crosstable lateral views of the left femur  obtained at 12:44  PM.    COMPARISON: November 2, 2019    FINDINGS:   Intramedullary abbi with one proximal interlocking screw and two  distal interlocking screws now in place transversing the  comminuted minimally displaced fracture distal femoral  diametaphyseal region.  Immediate postoperative intra-articular air.  Surgical skin staples in place.  Previously demonstrated total left knee prosthesis.  No dislocation.        Impression:       CONCLUSION:  Internal fixation of the comminuted fracture of the distal femur.    [id number]    Electronically signed by:  Juve Rico MD  11/3/2019 1:18 PM CST  Workstation: [id number]    FL C Arm During Surgery [[id number]] Updated:  11/03/19 1202    XR Femur 2 View Left [[id number]] Collected:  11/02/19 2055     Updated:  11/02/19 2115    Narrative:       Pain with injury.    Left femur, four views.    Examination revealed severe comminuted fracture of the distal  femur. There is knee prosthesis with femoral and tibial  components.      Impression:       CONCLUSION: Severe comminuted fracture of the distal femur.    Electronically signed by:  Ernesto Sol MD  11/2/2019 9:14  PM CDT Workstation: [id number]          I have reviewed the patient's current medications.     Assessment/Plan     Active Hospital Problems    Diagnosis   • **Closed displaced supracondylar fracture without intracondylar extension of lower end of left femur (CMS/HCC)     Added automatically from request for surgery [id number]     • Femur fracture (CMS/HCC)       Left femur fracture-status post repair postop day #1- continue management as per orthopedic surgery    Pain-continue with pain management    Hypertension-continue to monitor    Diabetes mellitus-continue with sliding scale insulin    Coronary artery disease- Plavix will be resumed when orthopedic surgery is okay with that    COPD-continue with nebulizer as needed    DVT prophylaxis-SCD          Nicolas Grant MD   11/03/19   4:49 PM      Electronically signed by  Nicolas Grant MD at 19 1651          Discharge Summary      Howie Campoverde MD at 19 0907          ORTHOPEDIC DISCHARGE SUMMARY    NAME: Yudi West   PHYSICIAN: Howie Campoverde MD  : 1970  MRN: [id number]    ADMITTED: 2019   DISCHARGED:     DISCHARGE DIAGNOSES:     Closed displaced supracondylar fracture without intracondylar extension of lower end of left femur (CMS/[facility])    Hypertension    Type 1 diabetes mellitus (CMS/[facility])    COPD (chronic obstructive pulmonary disease) (CMS/[facility])    Anemia, posthemorrhagic, acute      SERVICE: ORTHOPEDIC: Attending, Howie Campoverde MD    CONSULTS: Dr. Grant    PROCEDURES: Procedure(s):  FEMUR INTRAMEDULLARY NAILING RETROGRADE    LABS:   Lab Results (last 24 hours)     Procedure Component Value Units Date/Time    POC Glucose Once [[id number]]  (Abnormal) Collected:  19    Specimen:  Blood Updated:  19     Glucose 149 mg/dL      Comment: RN NotifiedOperator: [id number] KELLEY GRACEMeter ID: [id number]       Basic Metabolic Panel [[id number]]  (Abnormal) Collected:  19    Specimen:  Blood Updated:  19     Glucose 143 mg/dL      BUN 10 mg/dL      Creatinine 0.86 mg/dL      Sodium 137 mmol/L      Potassium 3.7 mmol/L      Chloride 102 mmol/L      CO2 27.0 mmol/L      Calcium 8.1 mg/dL      eGFR Non African Amer 70 mL/min/1.73      BUN/Creatinine Ratio 11.6     Anion Gap 8.0 mmol/L     Narrative:       GFR Normal >60  Chronic Kidney Disease <60  Kidney Failure <15    CBC & Differential [[id number]] Collected:  19    Specimen:  Blood Updated:  19    Narrative:       The following orders were created for panel order CBC & Differential.  Procedure                               Abnormality         Status                     ---------                               -----------         ------                     CBC Auto Differential[[id number]]        Abnormal            Final  result                 Please view results for these tests on the individual orders.    CBC Auto Differential [[id number]]  (Abnormal) Collected:  11/04/19 0539    Specimen:  Blood Updated:  11/04/19 0611     WBC 12.81 10*3/mm3      RBC 2.78 10*6/mm3      Hemoglobin 8.3 g/dL      Hematocrit 24.5 %      MCV 88.1 fL      MCH 29.9 pg      MCHC 33.9 g/dL      RDW 11.9 %      RDW-SD 38.5 fl      MPV 10.9 fL      Platelets 168 10*3/mm3      Neutrophil % 68.7 %      Lymphocyte % 19.6 %      Monocyte % 9.4 %      Eosinophil % 1.7 %      Basophil % 0.2 %      Immature Grans % 0.4 %      Neutrophils, Absolute 8.80 10*3/mm3      Lymphocytes, Absolute 2.51 10*3/mm3      Monocytes, Absolute 1.20 10*3/mm3      Eosinophils, Absolute 0.22 10*3/mm3      Basophils, Absolute 0.03 10*3/mm3      Immature Grans, Absolute 0.05 10*3/mm3      nRBC 0.0 /100 WBC     POC Glucose Once [[id number]]  (Abnormal) Collected:  11/03/19 1933    Specimen:  Blood Updated:  11/03/19 1951     Glucose 236 mg/dL      Comment: RN NotifiedOperator: [id number] VALENCIA STEPHANIEMeter ID: [id number]       POC Glucose Once [[id number]]  (Abnormal) Collected:  11/03/19 1642    Specimen:  Blood Updated:  11/03/19 1659     Glucose 209 mg/dL      Comment: RN NotifiedOperator: [id number] MANDI [employer] GroupLEYMeter ID: [id number]       POC Glucose Once [[id number]]  (Abnormal) Collected:  11/03/19 1412    Specimen:  Blood Updated:  11/03/19 1658     Glucose 186 mg/dL      Comment: RN NotifiedOperator: [id number] MANDI HAYLEYMeter ID: [id number]       POC Glucose Once [[id number]]  (Abnormal) Collected:  11/03/19 1235    Specimen:  Blood Updated:  11/03/19 1247     Glucose 160 mg/dL      Comment: RN NotifiedOperator: [id number] [place] PATRICIAMeter ID: [id number]             SUMMARY: The patient is a 49-year-old female who sustained a twisting injury to her left knee on the day of admission.  She was initially seen in the emergency room in [place] and was referred  "for further evaluation and treatment.  Past medical history is remarkable for coronary artery disease diabetes and COPD.  Medications include aspirin omeprazole sertraline insulin and hydrocodone among others.    Exam on admission was remarkable for painfully restricted motion of the left knee.  Neurologic exam was normal.  Radiographs showed a slightly comminuted angulated fracture of the distal shaft of the femur above a well fixed knee arthroplasty.    On the second hospital day she was taken to the operating room where under general anesthetic the above-noted procedure was performed.  There were no complications.  Pain control was satisfactory.  Hemoglobin fell as low as 8.5.  Physical therapy was begun for gait and transfer training she had satisfactory progress with this.    DISPOSITION: She was discharged to home.  She was given a walker for home.  She may weight-bear as tolerated.  She was instructed in wound care.  She may begin range of motion of the knee as tolerated.    DISCHARGE MEDICATIONS HE will resume her home medications and was also given a prescription for Percocet to take as needed for pain.     Discharge Medications      New Medications      Instructions Start Date   oxyCODONE-acetaminophen 7.5-325 MG per tablet  Commonly known as:  PERCOCET   1 tablet, Oral, Every 4 Hours PRN         Changes to Medications      Instructions Start Date   aspirin 81 MG EC tablet  What changed:  when to take this   81 mg, Oral, 2 Times Daily With Meals         Continue These Medications      Instructions Start Date   atorvastatin 40 MG tablet  Commonly known as:  LIPITOR   40 mg, Oral, Nightly      carvedilol 6.25 MG tablet  Commonly known as:  COREG   6.25 mg, Oral, 2 Times Daily With Meals      clopidogrel 75 MG tablet  Commonly known as:  PLAVIX   TAKE 1 TABLET BY MOUTH ONCE DAILY      EASY TOUCH INSULIN SYRINGE 28G X 1/2\" 0.5 ML misc  Generic drug:  Insulin Syringe/Needle   USE 4 TIMES DAILY      gabapentin " 300 MG capsule  Commonly known as:  NEURONTIN   TAKE 1 TABLET BY MOUTH FOUR TIMES DAILY      insulin glargine 100 UNIT/ML injection  Commonly known as:  LANTUS   50 Units, Subcutaneous, Daily      Omeprazole 20 MG tablet delayed-release   TAKE 1 TABLET BY MOUTh twice daily      polyethylene glycol powder  Commonly known as:  MIRALAX   MIX 17 GRAMS INTO 4-8 OUNCES OF ANY BEVERAGE TWICE DAILY FOR 7 DAYS      ranolazine 500 MG 12 hr tablet  Commonly known as:  RANEXA   500 mg, Oral, 2 Times Daily      sertraline 50 MG tablet  Commonly known as:  ZOLOFT   50 mg, Oral, Daily      UNIFINE PENTIPS 31G X 8 MM misc  Generic drug:  Insulin Pen Needle   USE AS DIRECTED WITH SOLOSTAR ONCE DAILY      VENTOLIN  (90 Base) MCG/ACT inhaler  Generic drug:  albuterol sulfate HFA   2 puffs, 4 Times Daily      zolpidem 10 MG tablet  Commonly known as:  AMBIEN   TAKE 1 TABLET BY MOUTH AT BEDTIME         Stop These Medications    HYDROcodone-acetaminophen 5-325 MG per tablet  Commonly known as:  NORCO            INSTRUCTIONS:  Activity: Ad shavonne. weightbearing as tolerated.  Diet: Diabetic diet  Diet Instructions     Advance Diet as Tolerated          Condition: Stable  Special instructions: Patient instructed to call office or call physician through hospital  [phone].    FOLLOW UP:   Additional Instructions for the Follow-ups that You Need to Schedule     Discharge Follow-up with Specified Provider: dominic; 1 Week   As directed      To:  dominic    Follow Up:  1 Week           Follow-up Information     Ibeth Masters APRN .    Specialty:  Nurse Practitioner  Contact information:  [address] [facility] [id number] [phone]                   Howie Campoverde MD  11/04/19  9:07 AM                Electronically signed by Howie Campoverde MD at 11/04/19 5513

## 2019-11-04 NOTE — THERAPY TREATMENT NOTE
Acute Care - Occupational Therapy Treatment Note  [facility]     Patient Name: Yudi West  : 1970  MRN: [id number]  Today's Date: 2019  Onset of Illness/Injury or Date of Surgery: 19  Date of Referral to OT: 19  Referring Physician: Dr. Campoverde     Admit Date: 2019       ICD-10-CM ICD-9-CM   1. Closed displaced supracondylar fracture of distal end of left femur without intracondylar extension, initial encounter (CMS/[facility]) S72.452A 821.23   2. Impaired physical mobility Z74.09 781.99   3. Impaired mobility and ADLs Z74.09 799.89     Patient Active Problem List   Diagnosis   • Coronary artery disease involving native coronary artery of native heart without angina pectoris   • Myocardial infarction, old   • Hypertension   • Type 1 diabetes mellitus (CMS/[facility])   • Mixed hyperlipidemia   • COPD (chronic obstructive pulmonary disease) (CMS/[facility])   • Dyspnea on exertion   • S/p nephrectomy   • Precordial pain   • Femur fracture (CMS/[facility])   • Closed displaced supracondylar fracture without intracondylar extension of lower end of left femur (CMS/[facility])   • Anemia, posthemorrhagic, acute     Past Medical History:   Diagnosis Date   • Anxiety and depression    • Asthma    • Cancer (CMS/[facility])    • Chronic kidney disease    • COPD (chronic obstructive pulmonary disease) (CMS/[facility])    • Diabetes mellitus (CMS/[facility])    • Hypertension    • Myocardial infarction (CMS/[facility])      Past Surgical History:   Procedure Laterality Date   • CORONARY STENT PLACEMENT     • GALLBLADDER SURGERY     • HYSTERECTOMY     • KIDNEY SURGERY     • NEPHRECTOMY Left    • REPLACEMENT TOTAL KNEE     • TONSILLECTOMY         Therapy Treatment    Rehabilitation Treatment Summary     Row Name 19 0950 19 0902          Treatment Time/Intention    Discipline  physical therapist  -KW  occupational therapy assistant  -BB     Document Type  therapy note (daily note)  -KW  therapy note (daily note)  -BB     Subjective  Information  complains of;fatigue;pain  -KW  complains of;pain  -BB     Mode of Treatment  physical therapy;individual therapy  -KW  individual therapy;occupational therapy  -BB     Patient/Family Observations  son's ex-gf present  -KW  family in room  -BB     Care Plan Review  evaluation/treatment results reviewed;care plan/treatment goals reviewed;risks/benefits reviewed;current/potential barriers reviewed;patient/other agree to care plan  -KW  --     Therapy Frequency (PT Clinical Impression)  2 times/day  -KW  --     Total Minutes, Occupational Therapy Treatment  --  24  -BB     Therapy Frequency (OT Eval)  --  daily  -BB     Patient Effort  good  -KW  good  -BB     Existing Precautions/Restrictions  --  fall knee immobilizer until clarification with MD  KarolineBB     Recorded by [KW] Chiqui Larson, PT 11/04/19 1035 [BB] Jenny Zelaya COTA/L 11/04/19 1501     Row Name 11/04/19 0950 11/04/19 0902          Vital Signs    Pre Systolic BP Rehab  104  -KW  --     Pre Treatment Diastolic BP  57  -KW  --     Post Systolic BP Rehab  106  -KW  --     Post Treatment Diastolic BP  65  -KW  --     Pretreatment Heart Rate (beats/min)  106  -KW  92  -BB     Posttreatment Heart Rate (beats/min)  93  -KW  --     Pre SpO2 (%)  93  -KW  94  -BB     O2 Delivery Pre Treatment  room air  -KW  room air  -BB     Post SpO2 (%)  93  -KW  --     O2 Delivery Post Treatment  room air  -KW  --     Pre Patient Position  Supine  -KW  Supine  -BB     Intra Patient Position  Standing  -KW  --     Post Patient Position  Supine  -KW  --     Recorded by [KW] Chiqui Larson, PT 11/04/19 1035 [BB] Jenny Zelaya COTA/L 11/04/19 1501     Row Name 11/04/19 0950 11/04/19 0902          Cognitive Assessment/Intervention- PT/OT    Affect/Mental Status (Cognitive)  WFL  -KW  WFL  -BB     Orientation Status (Cognition)  oriented x 4  -KW  oriented x 4  -BB     Follows Commands (Cognition)  --  follows one step commands  -BB     Personal Safety  Interventions  fall prevention program maintained;gait belt;nonskid shoes/slippers when out of bed;supervised activity  -KW  fall prevention program maintained;gait belt;nonskid shoes/slippers when out of bed;supervised activity  -BB     Recorded by [KW] Chiqui Larson, PT 11/04/19 1035 [BB] Jenny Zelaya COTA/L 11/04/19 1501     Row Name 11/04/19 0902             Safety Issues, Functional Mobility    Comment, Safety Issues/Impairments (Mobility)  Discussed home safety with pt  -BB      Recorded by [BB] Jenny Zelaya COTA/L 11/04/19 1502      Row Name 11/04/19 0950 11/04/19 0902          Bed Mobility Assessment/Treatment    Bed Mobility Assessment/Treatment  supine-sit;sit-supine  -KW  --     Supine-Sit [place] (Bed Mobility)  contact guard  -KW  contact guard  -BB     Sit-Supine [place] (Bed Mobility)  minimum assist (75% patient effort)  -KW  minimum assist (75% patient effort)  -BB     Bed Mobility, Safety Issues  decreased use of legs for bridging/pushing  -KW  decreased use of legs for bridging/pushing  -BB     Assistive Device (Bed Mobility)  bed rails;head of bed elevated  -KW  bed rails  -BB     Recorded by [KW] Chiqui Larson, PT 11/04/19 1035 [BB] Jenny Zelaya COTA/L 11/04/19 1501     Row Name 11/04/19 0950             Transfer Assessment/Treatment    Transfer Assessment/Treatment  sit-stand transfer;stand-sit transfer  -KW      Recorded by [KW] Chiqui Larson, PT 11/04/19 1035      Row Name 11/04/19 0950 11/04/19 0902          Sit-Stand Transfer    Sit-Stand [place] (Transfers)  contact guard  -KW  contact guard  -BB     Assistive Device (Sit-Stand Transfers)  walker, front-wheeled  -KW  walker, front-wheeled  -BB     Recorded by [KW] Chiqui Larson, PT 11/04/19 1035 [BB] Jenny Zelaya COTA/L 11/04/19 1501     Row Name 11/04/19 0950 11/04/19 0902          Stand-Sit Transfer    Stand-Sit [place] (Transfers)  contact guard  -KW  contact guard  -BB     Assistive  Device (Stand-Sit Transfers)  walker, front-wheeled  -KW  walker, front-wheeled  -BB     Recorded by [KW] Chiqui Larson, PT 11/04/19 1035 [BB] Jenny Zelaya COTA/L 11/04/19 1501     Row Name 11/04/19 0950             Gait/Stairs Assessment/Training    Switchback Level (Gait)  contact guard  -KW      Assistive Device (Gait)  walker, front-wheeled  -KW      Distance in Feet (Gait)  20'x2  -KW      Pattern (Gait)  step-to  -KW      Deviations/Abnormal Patterns (Gait)  antalgic;gait speed decreased  -KW      Comment (Gait/Stairs)  L knee immobilizer on and worn throughout session. Increased weight on LLE compared to eval; educated on how to ascend/ descend stairs with B HR d/t pt declining to perform at this time  -KW2      Recorded by [KW] Chiqui Larson, PT 11/04/19 1035  [KW2] Chiqui Larson, PT 11/04/19 1127      Row Name 11/04/19 0902             Upper Body Dressing Assessment/Training    Upper Body Dressing Switchback Level  don;doff;pull-over garment;conditional independence  -BB      Upper Body Dressing Position  edge of bed sitting  -BB      Recorded by [BB] Jenny Zelaya COTA/L 11/04/19 1501      Row Name 11/04/19 0902             Lower Body Dressing Assessment/Training    Lower Body Dressing Switchback Level  doff;don;pants/bottoms;socks;set up;minimum assist (75% patient effort)  -BB      Lower Body Dressing Position  edge of bed sitting  -BB      Recorded by [BB] Jenny Zelaya COTA/L 11/04/19 1501      Row Name 11/04/19 0902             Grooming Assessment/Training    Switchback Level (Grooming)  hair care, combing/brushing;oral care regimen;wash face, hands;set up;conditional independence  -BB      Grooming Position  long sitting  -BB      Recorded by [BB] Jenny Zelaya COTA/L 11/04/19 1501      Row Name 11/04/19 0950             Motor Skills Assessment/Interventions    Additional Documentation  Therapeutic Exercise (Group);Therapeutic Exercise Interventions (Group)  -KW       Recorded by [KW] Chiqui Larson, PT 11/04/19 1127      Row Name 11/04/19 0950             Therapeutic Exercise    Lower Extremity (Therapeutic Exercise)  gluteal sets;heel slides, right;SLR (straight leg raise), bilateral  -KW      Lower Extremity Range of Motion (Therapeutic Exercise)  hip abduction/adduction, right;ankle dorsiflexion/plantar flexion, bilateral  -KW      Exercise Type (Therapeutic Exercise)  AAROM (active assistive range of motion);AROM (active range of motion)  -KW      Position (Therapeutic Exercise)  supine  -KW      Sets/Reps (Therapeutic Exercise)  1*10  -KW      Expected Outcome (Therapeutic Exercise)  improve performance, gait skills;improve performance, transfer skills  -KW      Recorded by [KW] Chiqui Larson, PT 11/04/19 1127      Row Name 11/04/19 0950 11/04/19 0902          Positioning and Restraints    Pre-Treatment Position  in bed  -KW  in bed  -BB     Post Treatment Position  bed  -KW  bed  -BB     In Bed  supine;call light within reach;encouraged to call for assist;exit alarm on;side rails up x2;with family/caregiver  -KW2  fowlers;exit alarm on;call light within reach;encouraged to call for assist  -BB     Recorded by [KW] Chiqui Larson, PT 11/04/19 1035  [KW2] Chiqui Larson, PT 11/04/19 1127 [BB] Jenny Zelaya COTA/L 11/04/19 1501     Row Name 11/04/19 0902             Pain Assessment    Additional Documentation  Pain Scale: Numbers Pre/Post-Treatment (Group)  -BB      Recorded by [BB] Jenny Zelaya COTA/L 11/04/19 1501      Row Name 11/04/19 0950 11/04/19 0902          Pain Scale: Numbers Pre/Post-Treatment    Pain Scale: Numbers, Pretreatment  3/10  -KW  5/10  -BB     Pain Scale: Numbers, Post-Treatment  3/10  -KW  3/10  -BB     Pain Location - Side  Left  -KW  Left  -BB     Pain Location - Orientation  lower  -KW  lower  -BB     Pain Location  extremity  -KW  extremity  -BB     Pre/Post Treatment Pain Comment  --  nsg aware  -BB     Pain Intervention(s)   Ambulation/increased activity;Repositioned;Rest  -KW  --     Recorded by [KW] Chiqui Larson, PT 11/04/19 1127 [BB] Jenny Zelaya COTA/L 11/04/19 1501     Row Name                Wound 11/03/19 1141 Left leg Incision    Wound - Properties Group Date first assessed: 11/03/19 [NAWAF] Time first assessed: 1141 [NAWAF] Present on Hospital Admission: N [NAWAF] Side: Left [NAWAF] Location: leg [NAWAF] Primary Wound Type: Incision [NAWAF] Recorded by:  [NAWAF] Julienne Weeks, RN 11/03/19 1142    Row Name 11/04/19 0950 11/04/19 0902          Plan of Care Review    Plan of Care Reviewed With  patient  -KW  patient  -BB     Recorded by [KW] Chiqui Larson, PT 11/04/19 1127 [BB] Jenny Zelaya COTA/L 11/04/19 1501     Row Name 11/04/19 0902             Outcome Summary/Treatment Plan (OT)    Daily Summary of Progress (OT)  progress toward functional goals as expected  -BB      Plan for Continued Treatment (OT)  continue POC  -BB      Anticipated Discharge Disposition (OT)  anticipate therapy at next level of care  -BB      Recorded by [BB] Jenny Zelaya COTA/L 11/04/19 1501      Row Name 11/04/19 0950             Outcome Summary/Treatment Plan (PT)    Daily Summary of Progress (PT)  progress toward functional goals as expected  -KW      Plan for Continued Treatment (PT)  cont PT POC; stairs, ambulation  -KW      Anticipated Discharge Disposition (PT)  home with OP services  -KW      Recorded by [KW] Chiqui Larson, PT 11/04/19 1127        User Key  (r) = Recorded By, (t) = Taken By, (c) = Cosigned By    Initials Name Effective Dates Discipline    BB Jenny Zelaya COTA/L 03/07/18 -  OT    Julienne Goins, RN 05/09/18 -  Nurse    Chiqui Andersen, PT 07/23/18 -  PT        Wound 11/03/19 1141 Left leg Incision (Active)   Dressing Appearance dry;intact 11/4/2019  8:00 AM   Closure ROMAN 11/4/2019  8:00 AM   Drainage Amount none 11/4/2019  8:00 AM     Rehab Goal Summary     Row Name 11/04/19 0950 11/04/19 0902           Physical Therapy Goals    Bed Mobility Goal Selection (PT)  bed mobility, PT goal 1  -KW  --     Transfer Goal Selection (PT)  transfer, PT goal 1  -KW  --     Gait Training Goal Selection (PT)  gait training, PT goal 1  -KW  --     Stairs Goal Selection (PT)  stairs, PT goal 1  -KW  --        Bed Mobility Goal 1 (PT)    Activity/Assistive Device (Bed Mobility Goal 1, PT)  sit to supine/supine to sit  -KW  --     [place] Level/Cues Needed (Bed Mobility Goal 1, PT)  conditional independence  -KW  --     Time Frame (Bed Mobility Goal 1, PT)  long term goal (LTG);by discharge  -KW  --     Barriers (Bed Mobility Goal 1, PT)  L knee immobilizer.   -KW  --     Progress/Outcomes (Bed Mobility Goal 1, PT)  goal not met  -KW  --        Transfer Goal 1 (PT)    Activity/Assistive Device (Transfer Goal 1, PT)  walker, rolling  -KW  --     [place] Level/Cues Needed (Transfer Goal 1, PT)  conditional independence  -KW  --     Time Frame (Transfer Goal 1, PT)  long term goal (LTG);by discharge  -KW  --     Barriers (Transfers Goal 1, PT)  L knee immobilizer.   -KW  --     Progress/Outcome (Transfer Goal 1, PT)  goal not met  -KW  --        Gait Training Goal 1 (PT)    Activity/Assistive Device (Gait Training Goal 1, PT)  walker, rolling  -KW  --     [place] Level (Gait Training Goal 1, PT)  conditional independence  -KW  --     Distance (Gait Goal 1, PT)  100'x1  -KW  --     Time Frame (Gait Training Goal 1, PT)  long term goal (LTG);by discharge  -KW  --     Barriers (Gait Training Goal 1, PT)  LLE: WBAT, knee immobilizer.   -KW  --     Progress/Outcome (Gait Training Goal 1, PT)  goal not met  -KW  --        Stairs Goal 1 (PT)    Activity/Assistive Device (Stairs Goal 1, PT)  using handrail, right;using handrail, left  -KW  --     [place] Level/Cues Needed (Stairs Goal 1, PT)  supervision required  -KW  --     Number of Stairs (Stairs Goal 1, PT)  5 steps, B rails.   -KW  --     Time Frame (Stairs Goal 1, PT)   long term goal (LTG);by discharge  -KW  --     Barriers (Stairs Goal 1, PT)  LLE: WBAT, knee immobilizer.   -KW  --     Progress/Outcome (Stairs Goal 1, PT)  goal not met  -KW  --        Occupational Therapy Goals    Transfer Goal Selection (OT)  --  transfer, OT goal 1  -BB     Bathing Goal Selection (OT)  --  bathing, OT goal 1  -BB     Dressing Goal Selection (OT)  --  dressing, OT goal 1  -BB     Toileting Goal Selection (OT)  --  toileting, OT goal 1  -BB     Functional Mobility Goal Selection (OT)  --  functional mobility, OT goal 1  -BB        Transfer Goal 1 (OT)    Activity/Assistive Device (Transfer Goal 1, OT)  --  toilet  -BB     [place] Level/Cues Needed (Transfer Goal 1, OT)  --  standby assist  -BB     Time Frame (Transfer Goal 1, OT)  --  long term goal (LTG);by discharge  -BB     Progress/Outcome (Transfer Goal 1, OT)  --  goal not met  -BB        Bathing Goal 1 (OT)    Activity/Assistive Device (Bathing Goal 1, OT)  --  bathing skills, all  -BB     [place] Level/Cues Needed (Bathing Goal 1, OT)  --  set-up required;verbal cues required;standby assist  -BB     Time Frame (Bathing Goal 1, OT)  --  long term goal (LTG);by discharge  -BB     Progress/Outcomes (Bathing Goal 1, OT)  --  goal not met  -BB        Dressing Goal 1 (OT)    Activity/Assistive Device (Dressing Goal 1, OT)  --  dressing skills, all  -BB     [place]/Cues Needed (Dressing Goal 1, OT)  --  set-up required;verbal cues required;standby assist  -BB     Time Frame (Dressing Goal 1, OT)  --  long term goal (LTG);by discharge  -BB     Progress/Outcome (Dressing Goal 1, OT)  --  goal not met  -BB        Toileting Goal 1 (OT)    Activity/Device (Toileting Goal 1, OT)  --  toileting skills, all  -BB     [place] Level/Cues Needed (Toileting Goal 1, OT)  --  standby assist  -BB     Time Frame (Toileting Goal 1, OT)  --  long term goal (LTG);by discharge  -BB     Progress/Outcome (Toileting Goal 1, OT)  --  goal not met   -BB        Functional Mobility Goal 1 (OT)    Activity/Assistive Device (Functional Mobility Goal 1, OT)  --  walker, rolling  -BB     [place] Level/Cues Needed (Functional Mobility Goal 1, OT)  --  standby assist  -BB     Distance Goal 1 (Functional Mobility, OT)  --  for ADL tasks no LOB and good safety   -BB     Time Frame (Functional Mobility Goal 1, OT)  --  long term goal (LTG);by discharge  -BB     Progress/Outcome (Functional Mobility Goal 1, OT)  --  goal not met  -BB        Patient Education Goal (OT)    Activity (Patient Education Goal, OT)  --  Pt will communicate good home safety awareness.   -BB     [place]/Cues/Accuracy (Memory Goal 2, OT)  --  verbalizes understanding  -BB     Time Frame (Patient Education Goal, OT)  --  long term goal (LTG);by discharge  -BB     Progress/Outcome (Patient Education Goal, OT)  --  goal met  -BB       User Key  (r) = Recorded By, (t) = Taken By, (c) = Cosigned By    Initials Name Provider Type Discipline    BB Jenny Zelaya COTA/L Occupational Therapy Assistant OT    Chiqui Andersen, PT Physical Therapist PT        Occupational Therapy Education     Title: PT OT SLP Therapies (In Progress)     Topic: Occupational Therapy (In Progress)     Point: ADL training (Done)     Description: Instruct learner(s) on proper safety adaptation and remediation techniques during self care or transfers.   Instruct in proper use of assistive devices.    Learning Progress Summary           Patient Acceptance, E, VU,NR by  at 11/3/2019  4:33 PM    Comment:  Educated about OT and POC. Educated on safety throughout including weight bear status and safety with t/f and mobility. Educated to call for assist for staff to assist pt.                   Point: Precautions (Done)     Description: Instruct learner(s) on prescribed precautions during self-care and functional transfers.    Learning Progress Summary           Patient Acceptance, E, VU,NR by  at 11/3/2019  4:33 PM     Comment:  Educated about OT and POC. Educated on safety throughout including weight bear status and safety with t/f and mobility. Educated to call for assist for staff to assist pt.                               User Key     Initials Effective Dates Name Provider Type Discipline     06/08/18 -  Kassie Riddle, OTR/L Occupational Therapist OT            Non-skid socks and gait belt in place. Toileting offered. Call light and needs within reach. Pt advised to not get up alone and call the nurse for assistance.  Bed alarm on.       OT Recommendation and Plan  Outcome Summary/Treatment Plan (OT)  Daily Summary of Progress (OT): progress toward functional goals as expected  Plan for Continued Treatment (OT): continue POC  Anticipated Discharge Disposition (OT): anticipate therapy at next level of care  Therapy Frequency (OT Eval): daily  Daily Summary of Progress (OT): progress toward functional goals as expected  Plan of Care Review  Plan of Care Reviewed With: patient  Plan of Care Reviewed With: patient  Outcome Measures     Row Name 11/04/19 0950 11/03/19 1516 11/03/19 1515       How much help from another person do you currently need...    Turning from your back to your side while in flat bed without using bedrails?  4  -KW  --  3  -CZ    Moving from lying on back to sitting on the side of a flat bed without bedrails?  3  -KW  --  3  -CZ    Moving to and from a bed to a chair (including a wheelchair)?  3  -KW  --  3  -CZ    Standing up from a chair using your arms (e.g., wheelchair, bedside chair)?  3  -KW  --  3  -CZ    Climbing 3-5 steps with a railing?  3  -KW  --  3  -CZ    To walk in hospital room?  3  -KW  --  3  -CZ    AM-PAC 6 Clicks Score (PT)  19  -KW  --  18  -CZ       How much help from another is currently needed...    Putting on and taking off regular lower body clothing?  --  2  -BH  --    Bathing (including washing, rinsing, and drying)  --  2  -BH  --    Toileting (which includes using toilet bed  pan or urinal)  --  2  -BH  --    Putting on and taking off regular upper body clothing  --  3  -BH  --    Taking care of personal grooming (such as brushing teeth)  --  3  -BH  --    Eating meals  --  4  -BH  --    AM-PAC 6 Clicks Score (OT)  --  16  -BH  --       Functional Assessment    Outcome Measure Options  AM-PAC 6 Clicks Basic Mobility (PT)  -KW  AM-PAC 6 Clicks Daily Activity (OT)  -  AM-PAC 6 Clicks Basic Mobility (PT)  -CZ      User Key  (r) = Recorded By, (t) = Taken By, (c) = Cosigned By    Initials Name Provider Type     Kassie Riddle, OTR/L Occupational Therapist    CZ Saurabh Clifford, PT Physical Therapist    KW Chiqui Larson, MILLICENT Physical Therapist           Time Calculation:   Time Calculation- OT     Row Name 11/04/19 1502             Time Calculation- OT    OT Start Time  0902  -BB      OT Stop Time  0926  -BB      OT Time Calculation (min)  24 min  -BB      Total Timed Code Minutes- OT  24 minute(s)  -BB      OT Received On  11/04/19  -BB        User Key  (r) = Recorded By, (t) = Taken By, (c) = Cosigned By    Initials Name Provider Type     Jenny Zelaya COTA/L Occupational Therapy Assistant        Therapy Charges for Today     Code Description Service Date Service Provider Modifiers Qty    [id number]  OT SELF CARE/MGMT/TRAIN EA 15 MIN 11/4/2019 Jenny Zelaya COTA/L GO 2               YARELIS Martinez  11/4/2019

## 2019-11-05 ENCOUNTER — READMISSION MANAGEMENT (OUTPATIENT)
Dept: CALL CENTER | Facility: HOSPITAL | Age: 49
End: 2019-11-05

## 2019-11-05 NOTE — PAYOR COMM NOTE
"Chantell Malone  [facility]  (P)[phone]  (F)[phone]          Laron West (49 y.o. Female)     Date of Birth Social Security Number Address Home Phone MRN    1970  [address]  [address]   [facility] [id number] [phone] [id number]    Hinduism Marital Status          Evangelical        Admission Date Admission Type Admitting Provider Attending Provider Department, Room/Bed    19 Urgent Nicolas Grant MD  [facility] 3 EAST, 358/1    Discharge Date Discharge Disposition Discharge Destination        2019 Home or Self Care              Attending Provider:  (none)   Allergies:  Sulfa Antibiotics    Isolation:  None   Infection:  None   Code Status:  Prior    Ht:  165.1 cm (65\")   Wt:  82.7 kg (182 lb 6.4 oz)    Admission Cmt:  None   Principal Problem:  Closed displaced supracondylar fracture without intracondylar extension of lower end of left femur (CMS/HCC) [S72.452A]                 Active Insurance as of 2019     Primary Coverage     Payor Plan Insurance Group Employer/Plan Group    HUMANA MEDICAID HU[employer] [id number]     Payor Plan Address Payor Plan Phone Number Payor Plan Fax Number Effective Dates    PO  [phone]  3/18/2019 - None Entered    [facility] [id number]       Subscriber Name Subscriber Birth Date Member ID       LARON WEST 1970 [id number]                 Emergency Contacts      (Rel.) Home Phone Work Phone Mobile Phone    Ricardo West (Spouse) [phone] -- [phone]               Discharge Summary      Howie Campoverde MD at 19 0907          ORTHOPEDIC DISCHARGE SUMMARY    NAME: Laron Fritzee Brett   PHYSICIAN: Howie Campoverde MD  : 1970  MRN: [id number]    ADMITTED: 2019   DISCHARGED:     DISCHARGE DIAGNOSES:     Closed displaced supracondylar fracture without intracondylar extension of lower end of left femur (CMS/HCC)    " Hypertension    Type 1 diabetes mellitus (CMS/[facility])    COPD (chronic obstructive pulmonary disease) (CMS/[facility])    Anemia, posthemorrhagic, acute      SERVICE: ORTHOPEDIC: Attending, Howie Campoverde MD    CONSULTS: Dr. Grant    PROCEDURES: Procedure(s):  FEMUR INTRAMEDULLARY NAILING RETROGRADE    LABS:   Lab Results (last 24 hours)     Procedure Component Value Units Date/Time    POC Glucose Once [[id number]]  (Abnormal) Collected:  11/04/19 0720    Specimen:  Blood Updated:  11/04/19 0739     Glucose 149 mg/dL      Comment: RN NotifiedOperator: [id number] KELLEY GRACEMeter ID: [id number]       Basic Metabolic Panel [[id number]]  (Abnormal) Collected:  11/04/19 0539    Specimen:  Blood Updated:  11/04/19 0637     Glucose 143 mg/dL      BUN 10 mg/dL      Creatinine 0.86 mg/dL      Sodium 137 mmol/L      Potassium 3.7 mmol/L      Chloride 102 mmol/L      CO2 27.0 mmol/L      Calcium 8.1 mg/dL      eGFR Non African Amer 70 mL/min/1.73      BUN/Creatinine Ratio 11.6     Anion Gap 8.0 mmol/L     Narrative:       GFR Normal >60  Chronic Kidney Disease <60  Kidney Failure <15    CBC & Differential [[id number]] Collected:  11/04/19 0539    Specimen:  Blood Updated:  11/04/19 0611    Narrative:       The following orders were created for panel order CBC & Differential.  Procedure                               Abnormality         Status                     ---------                               -----------         ------                     CBC Auto Differential[[id number]]        Abnormal            Final result                 Please view results for these tests on the individual orders.    CBC Auto Differential [[id number]]  (Abnormal) Collected:  11/04/19 0539    Specimen:  Blood Updated:  11/04/19 0611     WBC 12.81 10*3/mm3      RBC 2.78 10*6/mm3      Hemoglobin 8.3 g/dL      Hematocrit 24.5 %      MCV 88.1 fL      MCH 29.9 pg      MCHC 33.9 g/dL      RDW 11.9 %      RDW-SD 38.5 fl      MPV 10.9 fL      Platelets 168  10*3/mm3      Neutrophil % 68.7 %      Lymphocyte % 19.6 %      Monocyte % 9.4 %      Eosinophil % 1.7 %      Basophil % 0.2 %      Immature Grans % 0.4 %      Neutrophils, Absolute 8.80 10*3/mm3      Lymphocytes, Absolute 2.51 10*3/mm3      Monocytes, Absolute 1.20 10*3/mm3      Eosinophils, Absolute 0.22 10*3/mm3      Basophils, Absolute 0.03 10*3/mm3      Immature Grans, Absolute 0.05 10*3/mm3      nRBC 0.0 /100 WBC     POC Glucose Once [[id number]]  (Abnormal) Collected:  11/03/19 1933    Specimen:  Blood Updated:  11/03/19 1951     Glucose 236 mg/dL      Comment: RN NotifiedOperator: [id number] VALENCIA STEPHANIEMeter ID: [id number]       POC Glucose Once [[id number]]  (Abnormal) Collected:  11/03/19 1642    Specimen:  Blood Updated:  11/03/19 1659     Glucose 209 mg/dL      Comment: RN NotifiedOperator: [id number] MANDI ABIGAILLEYMeter ID: [id number]       POC Glucose Once [[id number]]  (Abnormal) Collected:  11/03/19 1412    Specimen:  Blood Updated:  11/03/19 1658     Glucose 186 mg/dL      Comment: RN NotifiedOperator: [id number] MANDI HAYLEYMeter ID: [id number]       POC Glucose Once [[id number]]  (Abnormal) Collected:  11/03/19 1235    Specimen:  Blood Updated:  11/03/19 1247     Glucose 160 mg/dL      Comment: RN NotifiedOperator: [id number] [facility] HospitalRICIAMeter ID: [id number]             SUMMARY: The patient is a 49-year-old female who sustained a twisting injury to her left knee on the day of admission.  She was initially seen in the emergency room in [place] and was referred for further evaluation and treatment.  Past medical history is remarkable for coronary artery disease diabetes and COPD.  Medications include aspirin omeprazole sertraline insulin and hydrocodone among others.    Exam on admission was remarkable for painfully restricted motion of the left knee.  Neurologic exam was normal.  Radiographs showed a slightly comminuted angulated fracture of the distal shaft of the femur above  "a well fixed knee arthroplasty.    On the second hospital day she was taken to the operating room where under general anesthetic the above-noted procedure was performed.  There were no complications.  Pain control was satisfactory.  Hemoglobin fell as low as 8.5.  Physical therapy was begun for gait and transfer training she had satisfactory progress with this.    DISPOSITION: She was discharged to home.  She was given a walker for home.  She may weight-bear as tolerated.  She was instructed in wound care.  She may begin range of motion of the knee as tolerated.    DISCHARGE MEDICATIONS HE will resume her home medications and was also given a prescription for Percocet to take as needed for pain.     Discharge Medications      New Medications      Instructions Start Date   oxyCODONE-acetaminophen 7.5-325 MG per tablet  Commonly known as:  PERCOCET   1 tablet, Oral, Every 4 Hours PRN         Changes to Medications      Instructions Start Date   aspirin 81 MG EC tablet  What changed:  when to take this   81 mg, Oral, 2 Times Daily With Meals         Continue These Medications      Instructions Start Date   atorvastatin 40 MG tablet  Commonly known as:  LIPITOR   40 mg, Oral, Nightly      carvedilol 6.25 MG tablet  Commonly known as:  COREG   6.25 mg, Oral, 2 Times Daily With Meals      clopidogrel 75 MG tablet  Commonly known as:  PLAVIX   TAKE 1 TABLET BY MOUTH ONCE DAILY      EASY TOUCH INSULIN SYRINGE 28G X 1/2\" 0.5 ML misc  Generic drug:  Insulin Syringe/Needle   USE 4 TIMES DAILY      gabapentin 300 MG capsule  Commonly known as:  NEURONTIN   TAKE 1 TABLET BY MOUTH FOUR TIMES DAILY      insulin glargine 100 UNIT/ML injection  Commonly known as:  LANTUS   50 Units, Subcutaneous, Daily      Omeprazole 20 MG tablet delayed-release   TAKE 1 TABLET BY MOUTh twice daily      polyethylene glycol powder  Commonly known as:  MIRALAX   MIX 17 GRAMS INTO 4-8 OUNCES OF ANY BEVERAGE TWICE DAILY FOR 7 DAYS      ranolazine 500 " MG 12 hr tablet  Commonly known as:  RANEXA   500 mg, Oral, 2 Times Daily      sertraline 50 MG tablet  Commonly known as:  ZOLOFT   50 mg, Oral, Daily      UNIFINE PENTIPS 31G X 8 MM misc  Generic drug:  Insulin Pen Needle   USE AS DIRECTED WITH SOLOSTAR ONCE DAILY      VENTOLIN  (90 Base) MCG/ACT inhaler  Generic drug:  albuterol sulfate HFA   2 puffs, 4 Times Daily      zolpidem 10 MG tablet  Commonly known as:  AMBIEN   TAKE 1 TABLET BY MOUTH AT BEDTIME         Stop These Medications    HYDROcodone-acetaminophen 5-325 MG per tablet  Commonly known as:  NORCO            INSTRUCTIONS:  Activity: Ad shavonne. weightbearing as tolerated.  Diet: Diabetic diet  Diet Instructions     Advance Diet as Tolerated          Condition: Stable  Special instructions: Patient instructed to call office or call physician through hospital  [phone].    FOLLOW UP:   Additional Instructions for the Follow-ups that You Need to Schedule     Discharge Follow-up with Specified Provider: dominic; 1 Week   As directed      To:  dominic    Follow Up:  1 Week           Follow-up Information     Ibeth Masters APRN .    Specialty:  Nurse Practitioner  Contact information:  [address] [facility] [address]  [phone]                   Howie Campoverde MD  11/04/19  9:07 AM                Electronically signed by Howie Campoverde MD at 11/04/19 0911     Dhavla Davies MD at 11/04/19 0943          Progress Note  Dhaval Davies MD  Hospitalist    Date of visit: 11/4/2019     LOS: 2 days   Patient Care Team:  Ibeth Masters APRN as PCP - General (Nurse Practitioner)    Chief Complaint: L knee pain    Subjective     Interval History:     Patient Complaints: L knee pain - improved    History taken from: patient    Medication Review:   Current Facility-Administered Medications   Medication Dose Route Frequency Provider Last Rate Last Dose   • acetaminophen (TYLENOL) tablet 500 mg  500 mg Oral Q4H PRN  Howie Campoverde MD       • atorvastatin (LIPITOR) tablet 40 mg  40 mg Oral Nightly Nicolas Grant MD   40 mg at 11/03/19 2057   • carvedilol (COREG) tablet 6.25 mg  6.25 mg Oral BID With Meals Nicolas Grant MD   6.25 mg at 11/03/19 1811   • dextrose (D50W) 25 g/ 50mL Intravenous Solution 25 g  25 g Intravenous Q15 Min PRN Nicolas Grant MD       • dextrose (GLUTOSE) oral gel 15 g  15 g Oral Q15 Min PRN Nicolas Grant MD       • docusate sodium (COLACE) capsule 200 mg  200 mg Oral BID PRN Howie Campoverde MD       • glucagon (human recombinant) (GLUCAGEN DIAGNOSTIC) injection 1 mg  1 mg Subcutaneous Q15 Min PRN Nicolas Grant MD       • HYDROcodone-acetaminophen (NORCO) 7.5-325 MG per tablet 1 tablet  1 tablet Oral Q4H PRN Howie Campoverde MD       • insulin aspart (novoLOG) injection 0-14 Units  0-14 Units Subcutaneous 4x Daily AC & at Bedtime Nicolas Grant MD   5 Units at 11/03/19 2057   • insulin detemir (LEVEMIR) injection 50 Units  50 Units Subcutaneous Nightly Nicolas Grant MD   50 Units at 11/03/19 2056   • ipratropium-albuterol (DUO-NEB) nebulizer solution 3 mL  3 mL Nebulization 4x Daily - RT Nicolas Grant MD   3 mL at 11/04/19 0716   • morphine injection 6 mg  6 mg Intravenous Q2H PRN Howie Campoverde MD        And   • naloxone (NARCAN) injection 0.4 mg  0.4 mg Intravenous Q5 Min PRN Howie Campoverde MD       • ondansetron (ZOFRAN) tablet 4 mg  4 mg Oral Q6H PRN Howie Campoverde MD   4 mg at 11/03/19 1811    Or   • ondansetron (ZOFRAN) injection 4 mg  4 mg Intravenous Q6H PRN Howie Campoverde MD       • oxyCODONE-acetaminophen (PERCOCET) 7.5-325 MG per tablet 2 tablet  2 tablet Oral Q4H PRN Howie Campoverde MD   2 tablet at 11/04/19 0952   • pantoprazole (PROTONIX) EC tablet 40 mg  40 mg Oral Nicolas Martinez MD   40 mg at 11/04/19 0453   • promethazine (PHENERGAN) tablet 12.5 mg  12.5 mg Oral Q6H PRN Howie Campoverde MD       •  ranolazine (RANEXA) 12 hr tablet 500 mg  500 mg Oral BID Nicolas Grant MD   500 mg at 11/04/19 0938   • sertraline (ZOLOFT) tablet 50 mg  50 mg Oral Daily Nicolas Grant MD   50 mg at 11/04/19 0938   • sodium chloride 0.9 % flush 10 mL  10 mL Intravenous Q12H Nicolas Grant MD       • sodium chloride 0.9 % flush 10 mL  10 mL Intravenous PRN Nicolas Grant MD       • sodium chloride 0.9 % flush 3 mL  3 mL Intravenous Q12H Howie Campoverde MD       • sodium chloride 0.9 % flush 3 mL  3 mL Intravenous Q12H Howie Campoverde MD       • sodium chloride 0.9 % flush 3-10 mL  3-10 mL Intravenous PRN Howie Campoverde MD       • sodium chloride 0.9 % flush 3-10 mL  3-10 mL Intravenous PRN Howie Campoverde MD       • sodium chloride 0.9 % infusion  100 mL/hr Intravenous Continuous Howie Campoverde  mL/hr at 11/03/19 1626 100 mL/hr at 11/03/19 1626   • zolpidem (AMBIEN) tablet 10 mg  10 mg Oral Nightly PRN Nicolas Grant MD   10 mg at 11/02/19 2114       Review of Systems:   Review of Systems   Constitutional: Positive for fatigue. Negative for fever.   Respiratory: Negative for shortness of breath and wheezing.    Cardiovascular: Negative for chest pain, palpitations and leg swelling.   Gastrointestinal: Negative for abdominal distention, anal bleeding, diarrhea, nausea and vomiting.   Genitourinary: Negative for frequency, hematuria, menstrual problem, urgency, vaginal bleeding and vaginal pain.   Musculoskeletal: Positive for arthralgias (L knee). Negative for back pain, gait problem and joint swelling.   Skin: Positive for pallor. Negative for color change.   Neurological: Positive for weakness. Negative for tremors, syncope, speech difficulty and numbness.   Psychiatric/Behavioral: Negative for agitation, behavioral problems and confusion.   All other systems reviewed and are negative.      Objective     Vital Signs  Temp:  [96 °F (35.6 °C)-98.4 °F (36.9 °C)] 98.3 °F (36.8  °C)  Heart Rate:  [] 96  Resp:  [16-20] 20  BP: ()/(56-88) 98/57    Physical Exam:  Physical Exam   Constitutional: She is oriented to person, place, and time. She appears well-developed and well-nourished. No distress.   HENT:   Head: Normocephalic and atraumatic.   Eyes: EOM are normal. Pupils are equal, round, and reactive to light. No scleral icterus.   Neck: Normal range of motion. Neck supple.   Cardiovascular: Normal rate and regular rhythm.   Pulmonary/Chest: Effort normal and breath sounds normal. No stridor. No respiratory distress. She has no wheezes.   Abdominal: Soft. Bowel sounds are normal. She exhibits no distension. There is no tenderness. There is no guarding.   Musculoskeletal: She exhibits tenderness (L knee). She exhibits no edema.   Neurological: She is alert and oriented to person, place, and time. She displays normal reflexes. No cranial nerve deficit. Coordination normal.   Skin: Skin is warm and dry. No rash noted. There is pallor.   Psychiatric: She has a normal mood and affect. Her behavior is normal.   Vitals reviewed.       Results Review:    Lab Results (last 24 hours)     Procedure Component Value Units Date/Time    POC Glucose Once [[id number]]  (Abnormal) Collected:  11/04/19 0720    Specimen:  Blood Updated:  11/04/19 0739     Glucose 149 mg/dL      Comment: RN NotifiedOperator: [id number] KELLEY GRACEMeter ID: [id number]       Basic Metabolic Panel [[id number]]  (Abnormal) Collected:  11/04/19 0539    Specimen:  Blood Updated:  11/04/19 0637     Glucose 143 mg/dL      BUN 10 mg/dL      Creatinine 0.86 mg/dL      Sodium 137 mmol/L      Potassium 3.7 mmol/L      Chloride 102 mmol/L      CO2 27.0 mmol/L      Calcium 8.1 mg/dL      eGFR Non African Amer 70 mL/min/1.73      BUN/Creatinine Ratio 11.6     Anion Gap 8.0 mmol/L     Narrative:       GFR Normal >60  Chronic Kidney Disease <60  Kidney Failure <15    CBC & Differential [[id number]] Collected:  11/04/19 0539     Specimen:  Blood Updated:  11/04/19 0611    Narrative:       The following orders were created for panel order CBC & Differential.  Procedure                               Abnormality         Status                     ---------                               -----------         ------                     CBC Auto Differential[[id number]]        Abnormal            Final result                 Please view results for these tests on the individual orders.    CBC Auto Differential [[id number]]  (Abnormal) Collected:  11/04/19 0539    Specimen:  Blood Updated:  11/04/19 0611     WBC 12.81 10*3/mm3      RBC 2.78 10*6/mm3      Hemoglobin 8.3 g/dL      Hematocrit 24.5 %      MCV 88.1 fL      MCH 29.9 pg      MCHC 33.9 g/dL      RDW 11.9 %      RDW-SD 38.5 fl      MPV 10.9 fL      Platelets 168 10*3/mm3      Neutrophil % 68.7 %      Lymphocyte % 19.6 %      Monocyte % 9.4 %      Eosinophil % 1.7 %      Basophil % 0.2 %      Immature Grans % 0.4 %      Neutrophils, Absolute 8.80 10*3/mm3      Lymphocytes, Absolute 2.51 10*3/mm3      Monocytes, Absolute 1.20 10*3/mm3      Eosinophils, Absolute 0.22 10*3/mm3      Basophils, Absolute 0.03 10*3/mm3      Immature Grans, Absolute 0.05 10*3/mm3      nRBC 0.0 /100 WBC     POC Glucose Once [[id number]]  (Abnormal) Collected:  11/03/19 1933    Specimen:  Blood Updated:  11/03/19 1951     Glucose 236 mg/dL      Comment: RN NotifiedOperator: [id number] VALENCIA STEPHANIEMeter ID: [id number]       POC Glucose Once [[id number]]  (Abnormal) Collected:  11/03/19 1642    Specimen:  Blood Updated:  11/03/19 1659     Glucose 209 mg/dL      Comment: RN NotifiedOperator: [id number] MANDI Attila ResourcesMeter ID: [id number]       POC Glucose Once [[id number]]  (Abnormal) Collected:  11/03/19 1412    Specimen:  Blood Updated:  11/03/19 1658     Glucose 186 mg/dL      Comment: RN NotifiedOperator: [id number] MANDI Attila ResourcesMeter ID: [id number]       POC Glucose Once [[id number]]  (Abnormal) Collected:   11/03/19 1235    Specimen:  Blood Updated:  11/03/19 1247     Glucose 160 mg/dL      Comment: RN NotifiedOperator: [id number] CEM PATRICIAMeter ID: [id number]             Imaging Results (Last 24 Hours)     Procedure Component Value Units Date/Time    FL C Arm During Surgery [[id number]] Resulted:  11/04/19 0814     Updated:  11/04/19 0814    XR Femur 2 View Left [[id number]] Collected:  11/03/19 1254     Updated:  11/03/19 1319    Narrative:         Portable two view left femur    HISTORY: Postoperative study. Internal fixation.    Portable AP and crosstable lateral views of the left femur  obtained at 12:44 PM.    COMPARISON: November 2, 2019    FINDINGS:   Intramedullary abbi with one proximal interlocking screw and two  distal interlocking screws now in place transversing the  comminuted minimally displaced fracture distal femoral  diametaphyseal region.  Immediate postoperative intra-articular air.  Surgical skin staples in place.  Previously demonstrated total left knee prosthesis.  No dislocation.        Impression:       CONCLUSION:  Internal fixation of the comminuted fracture of the distal femur.    [id number]    Electronically signed by:  Juve Rico MD  11/3/2019 1:18 PM CST  Workstation: [id number]          Assessment/Plan       Closed displaced supracondylar fracture without intracondylar extension of lower end of left femur (CMS/HCC)    Hypertension    Type 1 diabetes mellitus (CMS/HCC)    Anemia, posthemorrhagic, acute    COPD (chronic obstructive pulmonary disease) (CMS/HCC)    Can be discharged home and follow up with PCP/Orhopedic Surgery.    Dhaval Davies MD  11/04/19  12:19 PM        Electronically signed by Dhaval Davies MD at 11/04/19 1219       Discharge Order (From admission, onward)    Start     Ordered    11/04/19 0903  Discharge patient  Once     Expected Discharge Date:  11/04/19    Expected Discharge Time:  Midday    Discharge Disposition:  Home or Self Care    Physician of Record for  Attribution - Please select from Treatment Team:  PATRICIA DONALDSON [[id number]]    Review needed by CMO to determine Physician of Record:  No       Question Answer Comment   Physician of Record for Attribution - Please select from Treatment Team PATRICIA DONALDSON    Review needed by CMO to determine Physician of Record No        11/04/19 0906

## 2019-11-05 NOTE — OUTREACH NOTE
Prep Survey      Responses   Facility patient discharged from?  [facility]   Is patient eligible?  Yes   Discharge diagnosis  Closed displaced supraacondylar fracture w/o intracondylar extension of lower end of left femur, s/p Femur intramedullary nailing retrograde   Does the patient have one of the following disease processes/diagnoses(primary or secondary)?  General Surgery   Does the patient have Home health ordered?  No   What is the Home health agency?   [facility] to go to [facility]   Is there a DME ordered?  Yes   What DME was ordered?  [employer] for walker with wheels   Comments regarding appointments  See AVS   Prep survey completed?  Yes          Beatrice Jefferson RN

## 2019-11-06 ENCOUNTER — READMISSION MANAGEMENT (OUTPATIENT)
Dept: CALL CENTER | Facility: HOSPITAL | Age: 49
End: 2019-11-06

## 2019-11-06 NOTE — OUTREACH NOTE
General Surgery Week 1 Survey      Responses   Facility patient discharged from?  [facility]   Does the patient have one of the following disease processes/diagnoses(primary or secondary)?  General Surgery   Is there a successful TCM telephone encounter documented?  No   Week 1 attempt successful?  Yes   Call start time  1541   Call end time  1557   Discharge diagnosis  Closed displaced supraacondylar fracture w/o intracondylar extension of lower end of left femur, s/p Femur intramedullary nailing retrograde   Is patient permission given to speak with other caregiver?  Yes   List who call center can speak with     Meds reviewed with patient/caregiver?  Yes   Is the patient having any side effects they believe may be caused by any medication additions or changes?  No   Does the patient have all medications related to this admission filled (includes all antibiotics, pain medications, etc.)  Yes   Is the patient taking all medications as directed (includes completed medication regime)?  Yes   Medication comments  Pain at worst since DC 10/10 with the pain med 3/10. Pt also taking carafate for acid.    Does the patient have a follow up appointment scheduled with their surgeon?  Yes   Has the patient kept scheduled appointments due by today?  N/A   What is the Home health agency?   [facility] go to [facility].   Has home health visited the patient within 72 hours of discharge?  Yes   What DME was ordered?  [employer] for walker with wheels   Psychosocial issues?  No   Comments  Pt walking with walker. 3 incisions are covered with large bandaids. No s/sx of inf. Appetite is minimal, having nausea. Glucose this morning 249. No issue urinating.    Did the patient receive a copy of their discharge instructions?  Yes   Nursing interventions  Reviewed instructions with patient   What is the patient's perception of their health status since discharge?  Improving   Is the patient /caregiver able to teach back  basic post-op care?  Continue use of incentive spirometry at least 1 week post discharge, Take showers only when approved by MD-sponge bathe until then, Keep incision areas clean,dry and protected   Is the patient/caregiver able to teach back signs and symptoms of incisional infection?  Increased drainage or bleeding, Pus or odor from incision   Is the patient/caregiver able to teach back steps to recovery at home?  Set small, achievable goals for return to baseline health, Practice good oral hygiene, Eat a well-balance diet   Is the patient/caregiver able to teach back the hierarchy of who to call/visit for symptoms/problems? PCP, Specialist, Home health nurse, Urgent Care, ED, 911  Yes   Week 1 call completed?  Yes          Cherise Reyes RN

## 2019-11-08 DIAGNOSIS — S72.322D CLOSED DISPLACED TRANSVERSE FRACTURE OF SHAFT OF LEFT FEMUR WITH ROUTINE HEALING, SUBSEQUENT ENCOUNTER: Primary | ICD-10-CM

## 2019-11-08 NOTE — TELEPHONE ENCOUNTER
MENDOZA      Pt CALLED REQUESTING A REFILL OF   OXYCODONE(PERCOCET) 7.5  LAST FILL 11/04/19    OK to refill

## 2019-11-11 ENCOUNTER — TELEPHONE (OUTPATIENT)
Dept: ORTHOPEDIC SURGERY | Facility: CLINIC | Age: 49
End: 2019-11-11

## 2019-11-11 RX ORDER — OXYCODONE AND ACETAMINOPHEN 7.5; 325 MG/1; MG/1
1 TABLET ORAL EVERY 4 HOURS PRN
Qty: 30 TABLET | Refills: 0 | Status: SHIPPED | OUTPATIENT
Start: 2019-11-11 | End: 2019-11-20

## 2019-11-11 NOTE — TELEPHONE ENCOUNTER
MENDOZA      Pt CALLED CONCERNED BECAUSE SHE IS HAVING A LOT OF PAIN    SHE RAN OUT OF HER PAIN MEDICATION Saturday   SHE STATED HER KNEE GAVE OUT ON HER AND SHE ALMOST FELL BUT CAUGHT HERSELF WITH HER WALKER    SHE STATED HER KNEE IS 4IN BIGGER THEN THE OTHER ONE BECAUSE OF SWELLING      SHE STATES SHE CANT PUT ANY WEIGHT ON THAT KNEE WITHOUT IT GIVING AWAY     SHE HAS A FOLLOW UP WITH YOU TOMORROW 11/12/19@ 2PM

## 2019-11-12 ENCOUNTER — OFFICE VISIT (OUTPATIENT)
Dept: ORTHOPEDIC SURGERY | Facility: CLINIC | Age: 49
End: 2019-11-12

## 2019-11-12 VITALS — WEIGHT: 182 LBS | HEIGHT: 65 IN | BODY MASS INDEX: 30.32 KG/M2

## 2019-11-12 DIAGNOSIS — S72.452D CLOSED DISPLACED SUPRACONDYLAR FRACTURE OF DISTAL END OF LEFT FEMUR WITHOUT INTRACONDYLAR EXTENSION WITH ROUTINE HEALING, SUBSEQUENT ENCOUNTER: Primary | ICD-10-CM

## 2019-11-12 PROCEDURE — 99024 POSTOP FOLLOW-UP VISIT: CPT | Performed by: ORTHOPAEDIC SURGERY

## 2019-11-12 NOTE — PROGRESS NOTES
"Yudi West is a 49 y.o. female is s/p       Chief Complaint   Patient presents with   • Left Femur - Post-op       HISTORY OF PRESENT ILLNESS: postop left femur. DOI 11/2/2019    This is the first office follow-up for a fracture of the left femur.    Mrs. West is a 49 years old.  She sustained a displaced fracture of the distal shaft of the left femur and underwent retrograde intramedullary nailing on 3 November.  She has had a previous total knee arthroplasty in place on the left.  She said that prior to her fracture she had incomplete extension of the left knee.  She is having the expected amount of pain.    11/03/19 (9d) Howie Campoverde MD     FEMUR INTRAMEDULLARY NAILING RETROGRADE - Left            Allergies   Allergen Reactions   • Sulfa Antibiotics Itching         Current Outpatient Medications:   •  aspirin 81 MG EC tablet, Take 1 tablet by mouth 2 (Two) Times a Day With Meals., Disp: 60 tablet, Rfl: 0  •  atorvastatin (LIPITOR) 40 MG tablet, Take 40 mg by mouth Every Night., Disp: , Rfl: 3  •  carvedilol (COREG) 6.25 MG tablet, Take 6.25 mg by mouth 2 (Two) Times a Day With Meals., Disp: , Rfl:   •  clopidogrel (PLAVIX) 75 MG tablet, TAKE 1 TABLET BY MOUTH ONCE DAILY, Disp: 30 tablet, Rfl: 6  •  EASY TOUCH INSULIN SYRINGE 28G X 1/2\" 0.5 ML misc, USE 4 TIMES DAILY, Disp: , Rfl: 3  •  gabapentin (NEURONTIN) 300 MG capsule, TAKE 1 TABLET BY MOUTH FOUR TIMES DAILY, Disp: , Rfl: 0  •  insulin glargine (LANTUS) 100 UNIT/ML injection, Inject 50 Units under the skin into the appropriate area as directed Daily., Disp: , Rfl:   •  Omeprazole 20 MG tablet delayed-release, TAKE 1 TABLET BY MOUTh twice daily, Disp: , Rfl: 3  •  oxyCODONE-acetaminophen (PERCOCET) 7.5-325 MG per tablet, Take 1 tablet by mouth Every 4 (Four) Hours As Needed for Severe Pain  for up to 9 days., Disp: 30 tablet, Rfl: 0  •  polyethylene glycol (MIRALAX) powder, MIX 17 GRAMS INTO 4-8 OUNCES OF ANY BEVERAGE TWICE DAILY FOR 7 " DAYS, Disp: , Rfl: 0  •  ranolazine (RANEXA) 500 MG 12 hr tablet, Take 1 tablet by mouth 2 (Two) Times a Day., Disp: 60 tablet, Rfl: 6  •  sertraline (ZOLOFT) 50 MG tablet, Take 50 mg by mouth Daily., Disp: , Rfl:   •  UNIFINE PENTIPS 31G X 8 MM misc, USE AS DIRECTED WITH SOLOSTAR ONCE DAILY, Disp: , Rfl: 3  •  VENTOLIN  (90 BASE) MCG/ACT inhaler, 2 puffs 4 (Four) Times a Day., Disp: , Rfl: 1  •  zolpidem (AMBIEN) 10 MG tablet, TAKE 1 TABLET BY MOUTH AT BEDTIME, Disp: , Rfl: 0    No fevers or chills.  No nausea or vomiting.      PHYSICAL EXAMINATION:       Yudi West is a 49 y.o. female    She is alert and in apparent mild discomfort.    GAIT:     []  Normal  []  Antalgic    Assistive device: []  None  [x]  Walker     []  Crutches  []  Cane     []  Wheelchair  []  Stretcher    Ortho Exam the incisions on the thigh and knee are clean and dry with no evidence of infection.  She has resolving ecchymosis over the lateral aspect of the proximal to mid thigh.  The thigh and calf are soft.    Xr Knee 1 Or 2 View Left    Result Date: 11/12/2019  Ordering Provider:  Howie Campoverde MD Ordering Diagnosis/Indication:  Closed displaced supracondylar fracture of distal end of left femur without intracondylar extension with routine healing, subsequent encounter Procedure:  XR KNEE 1 OR 2 VW LEFT Exam Date:  11/12/19 COMPARISON: ExAm of the femur dated 3 November 2019.      Radiographs of the left knee AP and lateral views done today show a retrograde intramedullary nail in place stabilizing a comminuted fracture of the distal femoral shaft.  There is a total knee arthroplasty in satisfactory position. Howie Campoverde MD 11/12/19        Xr Femur 2 View Left    Result Date: 11/3/2019  Narrative: Portable two view left femur HISTORY: Postoperative study. Internal fixation. Portable AP and crosstable lateral views of the left femur obtained at 12:44 PM. COMPARISON: November 2, 2019 FINDINGS: Intramedullary  abbi with one proximal interlocking screw and two distal interlocking screws now in place transversing the comminuted minimally displaced fracture distal femoral diametaphyseal region. Immediate postoperative intra-articular air. Surgical skin staples in place. Previously demonstrated total left knee prosthesis. No dislocation.     Impression: CONCLUSION: Internal fixation of the comminuted fracture of the distal femur. [id number] Electronically signed by:  Juve Rico MD  11/3/2019 1:18 PM CST Workstation: [id number]    Xr Femur 2 View Left    Result Date: 11/2/2019  Narrative: Pain with injury. Left femur, four views. Examination revealed severe comminuted fracture of the distal femur. There is knee prosthesis with femoral and tibial components.     Impression: CONCLUSION: Severe comminuted fracture of the distal femur. Electronically signed by:  Ernesto Sol MD  11/2/2019 9:14 PM CDT Workstation: [id number]    Xr Knee 1 Or 2 View Left    Result Date: 11/12/2019  Narrative: Ordering Provider:  Howie Campoverde MD Ordering Diagnosis/Indication:  Closed displaced supracondylar fracture of distal end of left femur without intracondylar extension with routine healing, subsequent encounter Procedure:  XR KNEE 1 OR 2 VW LEFT Exam Date:  11/12/19 COMPARISON: ExAm of the femur dated 3 November 2019.     Impression:  Radiographs of the left knee AP and lateral views done today show a retrograde intramedullary nail in place stabilizing a comminuted fracture of the distal femoral shaft.  There is a total knee arthroplasty in satisfactory position. Howie Campoverde MD 11/12/19          ASSESSMENT: Satisfactory progress following intramedullary nailing left femur.    Her staples were removed and Steri-Strips placed.  She was encouraged in range of motion exercises for the knee especially concentrating on active active assisted and passive extension.  She may begin weightbearing as tolerated.    Return here in 3 weeks  for exam and x-rays of the knee.  Diagnoses and all orders for this visit:    Closed displaced supracondylar fracture of distal end of left femur without intracondylar extension with routine healing, subsequent encounter  -     XR Knee 1 or 2 View Left          PLAN    Return in about 3 weeks (around 12/3/2019).    Howie Campoverde MD

## 2019-11-14 ENCOUNTER — READMISSION MANAGEMENT (OUTPATIENT)
Dept: CALL CENTER | Facility: HOSPITAL | Age: 49
End: 2019-11-14

## 2019-11-14 NOTE — OUTREACH NOTE
General Surgery Week 2 Survey      Responses   Facility patient discharged from?  [facility]   Does the patient have one of the following disease processes/diagnoses(primary or secondary)?  General Surgery   Week 2 attempt successful?  Yes   Call start time  1450   Rescheduled  Rescheduled-pt requested   Call end time  1450   Discharge diagnosis  Closed displaced supraacondylar fracture w/o intracondylar extension of lower end of left femur, s/p Femur intramedullary nailing retrograde          Glenis Aviles RN

## 2019-11-18 ENCOUNTER — READMISSION MANAGEMENT (OUTPATIENT)
Dept: CALL CENTER | Facility: HOSPITAL | Age: 49
End: 2019-11-18

## 2019-11-18 NOTE — OUTREACH NOTE
General Surgery Week 2 Survey      Responses   Facility patient discharged from?  [facility]   Does the patient have one of the following disease processes/diagnoses(primary or secondary)?  General Surgery   Week 2 attempt successful?  Yes   Call start time  1458   Call end time  1504   Meds reviewed with patient/caregiver?  Yes   Is the patient having any side effects they believe may be caused by any medication additions or changes?  No   Does the patient have all medications related to this admission filled (includes all antibiotics, pain medications, etc.)  Yes   Is the patient taking all medications as directed (includes completed medication regime)?  Yes   Does the patient have a follow up appointment scheduled with their surgeon?  Yes   Has the patient kept scheduled appointments due by today?  Yes   Has home health visited the patient within 72 hours of discharge?  N/A   Home health comments  States will be [facility].   Has all DME been delivered?  Yes   Psychosocial issues?  No   Did the patient receive a copy of their discharge instructions?  Yes   Nursing interventions  Reviewed instructions with patient, Educated on MyChart   What is the patient's perception of their health status since discharge?  Improving   Nursing interventions  Nurse provided patient education   Is the patient /caregiver able to teach back basic post-op care?  Drive as instructed by MD in discharge instructions, Take showers only when approved by MD-sponge bathe until then, No tub bath, swimming, or hot tub until instructed by MD, Keep incision areas clean,dry and protected, Lifting as instructed by MD in discharge instructions   Is the patient/caregiver able to teach back signs and symptoms of incisional infection?  Increased redness, swelling or pain at the incisonal site, Increased drainage or bleeding, Incisional warmth, Pus or odor from incision, Fever   Is the patient/caregiver able to teach back steps  to recovery at home?  Set small, achievable goals for return to baseline health, Rest and rebuild strength, gradually increase activity, Practice good oral hygiene, Eat a well-balance diet, Make a list of questions for surgeon's appointment   If the patient is a current smoker, are they able to teach back resources for cessation?  Smoking cessation medications, Smoking cessation classes, Smoking cessation support groups, [id number] [States will start Chantix soon.]   Is the patient/caregiver able to teach back the hierarchy of who to call/visit for symptoms/problems? PCP, Specialist, Home health nurse, Urgent Care, ED, 911  Yes   Week 2 call completed?  Yes   Wrap up additional comments  States is feeling better-states staples have been removed. Denies any s/s of infection. States appetite improved. States BS improved-189 this morning. Denies any problems today.          Cara Vazquez RN

## 2019-11-21 ENCOUNTER — TELEPHONE (OUTPATIENT)
Dept: ORTHOPEDIC SURGERY | Facility: CLINIC | Age: 49
End: 2019-11-21

## 2019-11-21 DIAGNOSIS — S72.452D CLOSED DISPLACED SUPRACONDYLAR FRACTURE OF DISTAL END OF LEFT FEMUR WITHOUT INTRACONDYLAR EXTENSION WITH ROUTINE HEALING, SUBSEQUENT ENCOUNTER: Primary | ICD-10-CM

## 2019-11-21 RX ORDER — OXYCODONE AND ACETAMINOPHEN 7.5; 325 MG/1; MG/1
TABLET ORAL
Qty: 40 TABLET | Refills: 0 | Status: SHIPPED | OUTPATIENT
Start: 2019-11-21 | End: 2019-12-03

## 2019-11-21 NOTE — TELEPHONE ENCOUNTER
ADELINAON        PATIENT IS WANTING TO KNOW IF IT IS NORMAL TO STILL BE SORE AND HURTING AT THIS MOMENT, SHE IS STILL HAVING TO TAKE QUITE A BIT OF PAIN MEDICATION WOULD LIKE TO SPEAK WITH NURSE.

## 2019-11-21 NOTE — TELEPHONE ENCOUNTER
Spoke with patient and advised her that it is normal for her to have some pain .  She has requested a refill of her pain medication and this was approved by Dr. Campoverde. Patient was notified RX ready

## 2019-11-25 ENCOUNTER — READMISSION MANAGEMENT (OUTPATIENT)
Dept: CALL CENTER | Facility: HOSPITAL | Age: 49
End: 2019-11-25

## 2019-11-25 NOTE — OUTREACH NOTE
General Surgery Week 3 Survey      Responses   Facility patient discharged from?  [facility]   Does the patient have one of the following disease processes/diagnoses(primary or secondary)?  General Surgery   Week 3 attempt successful?  No   Unsuccessful attempts  Attempt 1          Cherise Reyes RN

## 2019-11-26 ENCOUNTER — READMISSION MANAGEMENT (OUTPATIENT)
Dept: CALL CENTER | Facility: HOSPITAL | Age: 49
End: 2019-11-26

## 2019-11-26 NOTE — OUTREACH NOTE
General Surgery Week 3 Survey      Responses   Facility patient discharged from?  [facility]   Does the patient have one of the following disease processes/diagnoses(primary or secondary)?  General Surgery   Week 3 attempt successful?  No   Unsuccessful attempts  Attempt 2          Izzy Mar RN

## 2019-12-02 ENCOUNTER — TELEPHONE (OUTPATIENT)
Dept: ORTHOPEDIC SURGERY | Facility: CLINIC | Age: 49
End: 2019-12-02

## 2019-12-02 DIAGNOSIS — S72.452D CLOSED DISPLACED SUPRACONDYLAR FRACTURE OF DISTAL END OF LEFT FEMUR WITHOUT INTRACONDYLAR EXTENSION WITH ROUTINE HEALING, SUBSEQUENT ENCOUNTER: Primary | ICD-10-CM

## 2019-12-02 NOTE — TELEPHONE ENCOUNTER
DR DONALDSON.  PATIENT CALLED STATING SHE HAD KNEE SURGERY ON 11/03/19.  LAST Wednesday SHE NOTICED A KNOT ON HER KNEE AND STARTED HAVING SOME PAIN SO SHE STARTED BABYING IT.  OVER THE WEEKEND SHE WENT TO [facility] AND HAD AN XRAY THAT SHOWED THE TOP PIN WAS COMING OUT AND IT IS ABOUT READY TO COME THROUGH THE SKIN.  SHE HAS AN APPOINTMENT TOMORROW MORNING.    I think it is OK to delay followup until tomorrow

## 2019-12-03 ENCOUNTER — OFFICE VISIT (OUTPATIENT)
Dept: ORTHOPEDIC SURGERY | Facility: CLINIC | Age: 49
End: 2019-12-03

## 2019-12-03 ENCOUNTER — TELEPHONE (OUTPATIENT)
Dept: ORTHOPEDIC SURGERY | Facility: CLINIC | Age: 49
End: 2019-12-03

## 2019-12-03 ENCOUNTER — PREP FOR SURGERY (OUTPATIENT)
Dept: OTHER | Facility: HOSPITAL | Age: 49
End: 2019-12-03

## 2019-12-03 VITALS — HEIGHT: 65 IN | WEIGHT: 182 LBS | BODY MASS INDEX: 30.32 KG/M2

## 2019-12-03 DIAGNOSIS — T84.84XA PAINFUL ORTHOPAEDIC HARDWARE (HCC): ICD-10-CM

## 2019-12-03 DIAGNOSIS — S72.452D CLOSED DISPLACED SUPRACONDYLAR FRACTURE OF DISTAL END OF LEFT FEMUR WITHOUT INTRACONDYLAR EXTENSION WITH ROUTINE HEALING, SUBSEQUENT ENCOUNTER: Primary | ICD-10-CM

## 2019-12-03 DIAGNOSIS — T84.84XA PAINFUL ORTHOPAEDIC HARDWARE (HCC): Primary | ICD-10-CM

## 2019-12-03 DIAGNOSIS — S72.452A CLOSED DISPLACED SUPRACONDYLAR FRACTURE OF DISTAL END OF LEFT FEMUR WITHOUT INTRACONDYLAR EXTENSION, INITIAL ENCOUNTER (HCC): ICD-10-CM

## 2019-12-03 PROCEDURE — 99024 POSTOP FOLLOW-UP VISIT: CPT | Performed by: ORTHOPAEDIC SURGERY

## 2019-12-03 RX ORDER — OMEPRAZOLE 20 MG/1
20 CAPSULE, DELAYED RELEASE ORAL 2 TIMES DAILY
COMMUNITY
End: 2020-06-17 | Stop reason: DRUGHIGH

## 2019-12-03 RX ORDER — BUPIVACAINE HCL/0.9 % NACL/PF 0.1 %
2 PLASTIC BAG, INJECTION (ML) EPIDURAL ONCE
Status: CANCELLED | OUTPATIENT
Start: 2019-12-04 | End: 2019-12-03

## 2019-12-03 RX ORDER — HYDROCODONE BITARTRATE AND ACETAMINOPHEN 7.5; 325 MG/1; MG/1
TABLET ORAL
Qty: 30 TABLET | Refills: 0 | Status: SHIPPED | OUTPATIENT
Start: 2019-12-03 | End: 2019-12-03

## 2019-12-03 RX ORDER — POLYETHYLENE GLYCOL 3350 17 G/17G
17 POWDER, FOR SOLUTION ORAL DAILY
COMMUNITY
End: 2020-07-02

## 2019-12-03 RX ORDER — CLOPIDOGREL BISULFATE 75 MG/1
75 TABLET ORAL DAILY
COMMUNITY
End: 2020-07-02 | Stop reason: SDUPTHER

## 2019-12-03 RX ORDER — ZOLPIDEM TARTRATE 10 MG/1
10 TABLET ORAL NIGHTLY
COMMUNITY
End: 2020-06-17

## 2019-12-03 RX ORDER — HYDROCODONE BITARTRATE AND ACETAMINOPHEN 7.5; 325 MG/1; MG/1
1 TABLET ORAL EVERY 8 HOURS PRN
COMMUNITY
End: 2019-12-11

## 2019-12-03 RX ORDER — GABAPENTIN 400 MG/1
400 CAPSULE ORAL 3 TIMES DAILY
COMMUNITY
End: 2020-06-17 | Stop reason: DRUGHIGH

## 2019-12-03 NOTE — PROGRESS NOTES
"Postop Follow-up    Name:  Yudi West  Date:  12/3/2019  :  1970    Chief Complaint:    Chief Complaint   Patient presents with   • Left Femur - Follow-up     Date of surgery:         19 (30d) Howie Campoverde MD    FEMUR INTRAMEDULLARY NAILING RETROGRADE - Left       Procedure:    History of Present Illness:follow up left femur.  [facility].  Pain scale today 10/10    Mrs. Wets is now 1 month since surgery.  She said she was doing fairly well until last week when she began having worsening of her pain especially over the medial aspect of the knee.  She had x-rays done in [place] and was told the screw had migrated.  I asked her about her home pain medication she said she was taking 10 mg Percocet although I previously prescribed her 7.5 mg Percocet.  She had been taking hydrocodone chronically at home prior to her injury.        Current Outpatient Medications:   •  aspirin 81 MG EC tablet, Take 1 tablet by mouth 2 (Two) Times a Day With Meals., Disp: 60 tablet, Rfl: 0  •  atorvastatin (LIPITOR) 40 MG tablet, Take 40 mg by mouth Every Night., Disp: , Rfl: 3  •  carvedilol (COREG) 6.25 MG tablet, Take 6.25 mg by mouth 2 (Two) Times a Day With Meals., Disp: , Rfl:   •  clopidogrel (PLAVIX) 75 MG tablet, TAKE 1 TABLET BY MOUTH ONCE DAILY, Disp: 30 tablet, Rfl: 6  •  EASY TOUCH INSULIN SYRINGE 28G X 1/2\" 0.5 ML misc, USE 4 TIMES DAILY, Disp: , Rfl: 3  •  gabapentin (NEURONTIN) 300 MG capsule, TAKE 1 TABLET BY MOUTH FOUR TIMES DAILY, Disp: , Rfl: 0  •  HYDROcodone-acetaminophen (NORCO) 7.5-325 MG per tablet, 1-2 Oral TID PRN severe pain, Disp: 30 tablet, Rfl: 0  •  insulin glargine (LANTUS) 100 UNIT/ML injection, Inject 50 Units under the skin into the appropriate area as directed Daily., Disp: , Rfl:   •  Omeprazole 20 MG tablet delayed-release, TAKE 1 TABLET BY MOUTh twice daily, Disp: , Rfl: 3  •  oxyCODONE-acetaminophen (PERCOCET) 7.5-325 MG per tablet, 1-2 " Oral 3 times daily PRN severe pain, Disp: 40 tablet, Rfl: 0  •  polyethylene glycol (MIRALAX) powder, MIX 17 GRAMS INTO 4-8 OUNCES OF ANY BEVERAGE TWICE DAILY FOR 7 DAYS, Disp: , Rfl: 0  •  ranolazine (RANEXA) 500 MG 12 hr tablet, Take 1 tablet by mouth 2 (Two) Times a Day., Disp: 60 tablet, Rfl: 6  •  sertraline (ZOLOFT) 50 MG tablet, Take 50 mg by mouth Daily., Disp: , Rfl:   •  UNIFINE PENTIPS 31G X 8 MM misc, USE AS DIRECTED WITH SOLOSTAR ONCE DAILY, Disp: , Rfl: 3  •  VENTOLIN  (90 BASE) MCG/ACT inhaler, 2 puffs 4 (Four) Times a Day., Disp: , Rfl: 1  •  zolpidem (AMBIEN) 10 MG tablet, TAKE 1 TABLET BY MOUTH AT BEDTIME, Disp: , Rfl: 0    Allergies   Allergen Reactions   • Sulfa Antibiotics Itching         Exam: She is alert and apparent mild to moderate discomfort.    Damage directed to the left lower extremity.  The thigh and calf are soft.  There was moderate swelling over the medial aspect of the knee but no ecchymosis.  There was a tender prominence over the medial aspect of the distal thigh.  Knee range of motion was not evaluated.    Radiographs of the knee done recently were reviewed.  Most proximal of her distal locking screws has migrated medially and fracture alignment remains satisfactory.      Need for hardware removal was discussed with the patient and her .  I gave her the option of attempted hardware removal in the office.  She was agreeable with this.    Skin and subcutaneous tissues were infiltrated really over her prominent hardware as well as over the lateral aspect of the knee about the incision used for placement of her proximal distal screw.  The limb was prepped and drapes applied.  Stab wound was made over the medial aspect of the knee overlying the palpable screw.  A clamp was then placed on the screw.  I was unable to translate the screw either medial or lateral.  The screw was then rotated with partial retraction of the screw.  Incision was then made over the lateral  "aspect of the knee.  The lateral cortex of the femur was palpated but I was unable to feel or visualize the screw.  After multiple attempts the procedure was terminated.  The wounds were irrigated.  Skin closure was with interrupted nylon.  A bulky soft dressing was applied.  Vitals:    12/03/19 1026   Weight: 82.6 kg (182 lb)   Height: 165.1 cm (65\")               Xr Knee 1 Or 2 View Left    Result Date: 11/12/2019  Narrative: Ordering Provider:  Howie Campoverde MD Ordering Diagnosis/Indication:  Closed displaced supracondylar fracture of distal end of left femur without intracondylar extension with routine healing, subsequent encounter Procedure:  XR KNEE 1 OR 2 VW LEFT Exam Date:  11/12/19 COMPARISON: ExAm of the femur dated 3 November 2019.     Impression:  Radiographs of the left knee AP and lateral views done today show a retrograde intramedullary nail in place stabilizing a comminuted fracture of the distal femoral shaft.  There is a total knee arthroplasty in satisfactory position. Howie Campoverde MD 11/12/19        Assessment: 1 malposition fracture left femur.  2 status post retrograde nailing left femur.    I think the screw will most certainly retract again causing continued symptoms.  I recommended removal of the screw in the operating room for the benefit of fluoroscopic assistance.  Anticipated benefits of the procedure as well as potential complications of surgery and anesthetic were reviewed in detail and she and her  appear to understand all matters discussed and wished to proceed.    She requested a refill of her pain medication.  She was given a prescription for 30 hydrocodone 7.5 mg each.      Diagnoses and all orders for this visit:    Closed displaced supracondylar fracture of distal end of left femur without intracondylar extension with routine healing, subsequent encounter    Closed displaced supracondylar fracture of distal end of left femur without intracondylar extension, " initial encounter (CMS/[facility])    Painful orthopaedic hardware (CMS/[facility])  -     HYDROcodone-acetaminophen (NORCO) 7.5-325 MG per tablet; 1-2 Oral TID PRN severe pain          Plan:        Return if symptoms worsen or fail to improve.      12/03/19 at 10:35 AM by Howie Campoverde MD

## 2019-12-03 NOTE — H&P (VIEW-ONLY)
ORTHOPAEDIC CONSULT     Patient Name:  Yudi West  Admit Date:  (Not on file)  Consult Date:  12/3/2019    Referring Provider: Dr. Grant  Reason for Consultation: Fracture left femur.    Patient Care Team:  Ibeth Masters APRN as PCP - General (Nurse Practitioner)    History of present illness: Mrs. West is 49 years old.  He fell on 2 November sustaining a displaced supracondylar fracture of the left femur above a well-functioning knee arthroplasty.  She underwent retrograde intramedullary nailing of the femur.  Initially she was doing well.  However last week she began having worsening of her pain and noticed a prominence over the medial aspect of her knee.  Strays showed medial migration of 1 of her distal locking screws.  I attempted to remove her screw in the office but this proved unsuccessful.  Treatment options reviewed with the patient and her  and I recommended removal of the screw in the operating room.  Medications include Plavix carvedilol, Neurontin, insulin, Tagamet, sertraline, 10 mg hydrocodone.  She is allergic to sulfa drugs.  She smokes 2 pack/day of cigarettes and denies use of alcohol.    Past medical history is remarkable for coronary artery disease COPD and diabetes.  Previous surgeries include nephrectomy for carcinoma, hysterectomy, tonsillectomy, and cholecystectomy.  There is been no history of anesthetic complication.  She also has a history of chronic back pain.    Family history she is .    Social history she lives in [place] with her  and son.  She is disabled.    Review of Systems is remarkable for pain well localized to the the left knee and distal thigh.  There is been no numbness or tingling in the limb.  Otherwise complete ROS is negative except as mentioned above.    Prior to Admission medications    Medication Sig Start Date End Date Taking? Authorizing Provider   aspirin 81 MG EC tablet Take 81 mg by mouth Daily.   Yes Provider,  "MD Sara   atorvastatin (LIPITOR) 40 MG tablet Take 40 mg by mouth Every Night. 7/18/17  Yes Sara eBnoit MD   carvedilol (COREG) 6.25 MG tablet Take 6.25 mg by mouth 2 (Two) Times a Day With Meals.   Yes Sara Benoit MD   clopidogrel (PLAVIX) 75 MG tablet TAKE 1 TABLET BY MOUTH ONCE DAILY 6/20/18  Yes Lewis Johnson MD   gabapentin (NEURONTIN) 300 MG capsule TAKE 1 TABLET BY MOUTH FOUR TIMES DAILY 8/7/17  Yes Sara Benoit MD   HYDROcodone-acetaminophen (NORCO) 5-325 MG per tablet Take 1 tablet by mouth Every 6 (Six) Hours As Needed.   Yes Sara Benoit MD   insulin glargine (LANTUS) 100 UNIT/ML injection Inject 50 Units under the skin into the appropriate area as directed Daily.   Yes Sara Benoit MD   Omeprazole 20 MG tablet delayed-release TAKE 1 TABLET BY MOUTh twice daily 5/25/17  Yes Sara Benoit MD   ranolazine (RANEXA) 500 MG 12 hr tablet Take 1 tablet by mouth 2 (Two) Times a Day. 6/3/19  Yes Lewis Johnson MD   sertraline (ZOLOFT) 50 MG tablet Take 50 mg by mouth Daily.   Yes Sara Benoit MD   VENTOLIN  (90 BASE) MCG/ACT inhaler 2 puffs 4 (Four) Times a Day. 6/5/17  Yes Sara Benoit MD   zolpidem (AMBIEN) 10 MG tablet TAKE 1 TABLET BY MOUTH AT BEDTIME 8/7/17  Yes Sara Benoit MD   EASY TOUCH INSULIN SYRINGE 28G X 1/2\" 0.5 ML misc USE 4 TIMES DAILY 5/23/17   Sara Benoit MD   polyethylene glycol (MIRALAX) powder MIX 17 GRAMS INTO 4-8 OUNCES OF ANY BEVERAGE TWICE DAILY FOR 7 DAYS 7/8/17   Sara Benoit MD UNIFINFRANCESCA PENTIPS 31G X 8 MM misc USE AS DIRECTED WITH SOLOSTAR ONCE DAILY 6/19/17   Sara Benoit MD   ALPRAZolam (XANAX) 0.5 MG tablet TAKE 1 TABLET BY MOUTH THREE TIMES DAILY 8/7/17 11/2/19  Sara Benoit MD   citalopram (CeleXA) 40 MG tablet TAKE 1 TABLET BY MOUTH ONCE DAILY 8/2/17 11/2/19  Provider, MD Sara   pantoprazole (PROTONIX) 20 MG EC tablet " Take 20 mg by mouth Daily.  11/2/19  Provider, MD Sara     Allergies   Allergen Reactions   • Sulfa Antibiotics Itching     Social History     Socioeconomic History   • Marital status:      Spouse name: Not on file   • Number of children: Not on file   • Years of education: Not on file   • Highest education level: Not on file   Tobacco Use   • Smoking status: Current Every Day Smoker     Packs/day: 2.00     Years: 36.00     Pack years: 72.00     Types: Cigarettes   • Smokeless tobacco: Never Used   Substance and Sexual Activity   • Alcohol use: No   • Drug use: No   • Sexual activity: Defer     Past Medical History:   Diagnosis Date   • Anxiety and depression    • Arthritis    • Asthma    • Cancer of kidney (CMS/HCC)     left   • Chronic kidney disease    • COPD (chronic obstructive pulmonary disease) (CMS/[facility])    • Coronary artery disease     1 stent.  is followed by jaden   • Diabetes mellitus (CMS/[facility])    • GERD (gastroesophageal reflux disease)    • Hyperlipidemia    • Hypertension    • Myocardial infarction (CMS/[facility])    • Sleep apnea      Past Surgical History:   Procedure Laterality Date   • CORONARY STENT PLACEMENT     • FEMUR IM NAILING RETROGRADE Left 11/3/2019    Procedure: FEMUR INTRAMEDULLARY NAILING RETROGRADE;  Surgeon: Howie Campoverde MD;  Location: [facility];  Service: Orthopedics   • GALLBLADDER SURGERY     • HYSTERECTOMY     • NEPHRECTOMY Left    • REPLACEMENT TOTAL KNEE Left    • TONSILLECTOMY       Family History   Problem Relation Age of Onset   • Heart disease Mother    • Hypertension Mother    • Heart disease Father    • Hypertension Father        Vital Signs    Temperature is 97.4 pulse 76 and regular respirations 18 blood pressure 106/69.  Weight is 182 pounds with BMI 30.4.    Physical Exam in general she is alert and in intermittent moderate discomfort.  She responds appropriately to questions and commands.  She appears older than her stated age.      HEENT exam  showed extraocular movements are intact.  Oropharynx shows teeth are in poor repair.    Exam of the lungs shows scattered inspiratory wheezes.    Cardiac exam shows a regular rhythm normal rate no murmur.    The abdomen is soft obese and nontender with active bowel sounds.  Genitourinary and rectal exams were not done.    Exam of the extremities is directed to the] left lower extremity.  There is a well-healed anterior scar across the knee as well as well-healed scars over the lateral aspect of the knee and distal thigh.  There is tender fullness over the medial aspect of the distal thigh and knee with hardware.  Posterior tibial pulses strong.  Sensory exam is intact to soft touch.    Radiographs of the knee done recently show a comminuted supracondylar fracture of the femur in satisfactory position.  There is a retrograde intramedullary nail in place.  The most proximal of her distal locking screws has migrated medially.          Results Review:  Imaging Results (Last 24 Hours)     ** No results found for the last 24 hours. **        Lab Results (last 24 hours)     ** No results found for the last 24 hours. **          Assessment/Plan 1 fracture distal shaft left femur.  2 well-functioning left knee arthroplasty.  3 diabetes.  4 COPD.5.  Malposition hardware left femur.    The natural history of the disorder and treatment options were reviewed with the patient and her family.   Prominent screw is currently not providing any fracture stability.  I recommended removal of the screw.  I think this should be done in the operating room for the benefit of fluoroscopic imaging.  Think this can be done as an outpatient procedure.      Anticipated benefits of surgery were reviewed in detail.  Potential complications of surgery and anesthetic were reviewed in detail including but not limited to infection, blood loss, sore throat, pneumonia, brain damage and even death.  Postoperative immobilization and rehabilitation were  discussed.  They understand that even with prompt healing of her fracture the knee replacement may not function as well as it did prior to her injury..  The patient and family appear to understand all matters discussed and wish to proceed.           Howie Campoverde MD  12/03/19  2:02 PM

## 2019-12-03 NOTE — H&P
ORTHOPAEDIC CONSULT     Patient Name:  Yudi West  Admit Date:  (Not on file)  Consult Date:  12/3/2019    Referring Provider: Dr. Grant  Reason for Consultation: Fracture left femur.    Patient Care Team:  Ibeth Masters APRN as PCP - General (Nurse Practitioner)    History of present illness: Mrs. West is 49 years old.  He fell on 2 November sustaining a displaced supracondylar fracture of the left femur above a well-functioning knee arthroplasty.  She underwent retrograde intramedullary nailing of the femur.  Initially she was doing well.  However last week she began having worsening of her pain and noticed a prominence over the medial aspect of her knee.  Strays showed medial migration of 1 of her distal locking screws.  I attempted to remove her screw in the office but this proved unsuccessful.  Treatment options reviewed with the patient and her  and I recommended removal of the screw in the operating room.  Medications include Plavix carvedilol, Neurontin, insulin, Tagamet, sertraline, 10 mg hydrocodone.  She is allergic to sulfa drugs.  She smokes 2 pack/day of cigarettes and denies use of alcohol.    Past medical history is remarkable for coronary artery disease COPD and diabetes.  Previous surgeries include nephrectomy for carcinoma, hysterectomy, tonsillectomy, and cholecystectomy.  There is been no history of anesthetic complication.  She also has a history of chronic back pain.    Family history she is .    Social history she lives in [place] with her  and son.  She is disabled.    Review of Systems is remarkable for pain well localized to the the left knee and distal thigh.  There is been no numbness or tingling in the limb.  Otherwise complete ROS is negative except as mentioned above.    Prior to Admission medications    Medication Sig Start Date End Date Taking? Authorizing Provider   aspirin 81 MG EC tablet Take 81 mg by mouth Daily.   Yes Provider,  "MD Sara   atorvastatin (LIPITOR) 40 MG tablet Take 40 mg by mouth Every Night. 7/18/17  Yes Sara Benoit MD   carvedilol (COREG) 6.25 MG tablet Take 6.25 mg by mouth 2 (Two) Times a Day With Meals.   Yes Sara Benoit MD   clopidogrel (PLAVIX) 75 MG tablet TAKE 1 TABLET BY MOUTH ONCE DAILY 6/20/18  Yes Lewis Johnson MD   gabapentin (NEURONTIN) 300 MG capsule TAKE 1 TABLET BY MOUTH FOUR TIMES DAILY 8/7/17  Yes Sara Benoit MD   HYDROcodone-acetaminophen (NORCO) 5-325 MG per tablet Take 1 tablet by mouth Every 6 (Six) Hours As Needed.   Yes Sara Benoit MD   insulin glargine (LANTUS) 100 UNIT/ML injection Inject 50 Units under the skin into the appropriate area as directed Daily.   Yes Sara Benoit MD   Omeprazole 20 MG tablet delayed-release TAKE 1 TABLET BY MOUTh twice daily 5/25/17  Yes Sara Benoit MD   ranolazine (RANEXA) 500 MG 12 hr tablet Take 1 tablet by mouth 2 (Two) Times a Day. 6/3/19  Yes Lewis Johnson MD   sertraline (ZOLOFT) 50 MG tablet Take 50 mg by mouth Daily.   Yes Sara Benoit MD   VENTOLIN  (90 BASE) MCG/ACT inhaler 2 puffs 4 (Four) Times a Day. 6/5/17  Yes Sara Benoit MD   zolpidem (AMBIEN) 10 MG tablet TAKE 1 TABLET BY MOUTH AT BEDTIME 8/7/17  Yes Sara Benoit MD   EASY TOUCH INSULIN SYRINGE 28G X 1/2\" 0.5 ML misc USE 4 TIMES DAILY 5/23/17   Sara Benoit MD   polyethylene glycol (MIRALAX) powder MIX 17 GRAMS INTO 4-8 OUNCES OF ANY BEVERAGE TWICE DAILY FOR 7 DAYS 7/8/17   Sara Benoit MD UNIFINFRANCESCA PENTIPS 31G X 8 MM misc USE AS DIRECTED WITH SOLOSTAR ONCE DAILY 6/19/17   Sara Benoit MD   ALPRAZolam (XANAX) 0.5 MG tablet TAKE 1 TABLET BY MOUTH THREE TIMES DAILY 8/7/17 11/2/19  Sara Benoit MD   citalopram (CeleXA) 40 MG tablet TAKE 1 TABLET BY MOUTH ONCE DAILY 8/2/17 11/2/19  Provider, MD Sara   pantoprazole (PROTONIX) 20 MG EC tablet " Take 20 mg by mouth Daily.  11/2/19  Provider, MD Sara     Allergies   Allergen Reactions   • Sulfa Antibiotics Itching     Social History     Socioeconomic History   • Marital status:      Spouse name: Not on file   • Number of children: Not on file   • Years of education: Not on file   • Highest education level: Not on file   Tobacco Use   • Smoking status: Current Every Day Smoker     Packs/day: 2.00     Years: 36.00     Pack years: 72.00     Types: Cigarettes   • Smokeless tobacco: Never Used   Substance and Sexual Activity   • Alcohol use: No   • Drug use: No   • Sexual activity: Defer     Past Medical History:   Diagnosis Date   • Anxiety and depression    • Arthritis    • Asthma    • Cancer of kidney (CMS/HCC)     left   • Chronic kidney disease    • COPD (chronic obstructive pulmonary disease) (CMS/[facility])    • Coronary artery disease     1 stent.  is followed by jaden   • Diabetes mellitus (CMS/[facility])    • GERD (gastroesophageal reflux disease)    • Hyperlipidemia    • Hypertension    • Myocardial infarction (CMS/[facility])    • Sleep apnea      Past Surgical History:   Procedure Laterality Date   • CORONARY STENT PLACEMENT     • FEMUR IM NAILING RETROGRADE Left 11/3/2019    Procedure: FEMUR INTRAMEDULLARY NAILING RETROGRADE;  Surgeon: Howie Campoverde MD;  Location: [facility];  Service: Orthopedics   • GALLBLADDER SURGERY     • HYSTERECTOMY     • NEPHRECTOMY Left    • REPLACEMENT TOTAL KNEE Left    • TONSILLECTOMY       Family History   Problem Relation Age of Onset   • Heart disease Mother    • Hypertension Mother    • Heart disease Father    • Hypertension Father        Vital Signs    Temperature is 97.4 pulse 76 and regular respirations 18 blood pressure 106/69.  Weight is 182 pounds with BMI 30.4.    Physical Exam in general she is alert and in intermittent moderate discomfort.  She responds appropriately to questions and commands.  She appears older than her stated age.      HEENT exam  showed extraocular movements are intact.  Oropharynx shows teeth are in poor repair.    Exam of the lungs shows scattered inspiratory wheezes.    Cardiac exam shows a regular rhythm normal rate no murmur.    The abdomen is soft obese and nontender with active bowel sounds.  Genitourinary and rectal exams were not done.    Exam of the extremities is directed to the] left lower extremity.  There is a well-healed anterior scar across the knee as well as well-healed scars over the lateral aspect of the knee and distal thigh.  There is tender fullness over the medial aspect of the distal thigh and knee with hardware.  Posterior tibial pulses strong.  Sensory exam is intact to soft touch.    Radiographs of the knee done recently show a comminuted supracondylar fracture of the femur in satisfactory position.  There is a retrograde intramedullary nail in place.  The most proximal of her distal locking screws has migrated medially.          Results Review:  Imaging Results (Last 24 Hours)     ** No results found for the last 24 hours. **        Lab Results (last 24 hours)     ** No results found for the last 24 hours. **          Assessment/Plan 1 fracture distal shaft left femur.  2 well-functioning left knee arthroplasty.  3 diabetes.  4 COPD.5.  Malposition hardware left femur.    The natural history of the disorder and treatment options were reviewed with the patient and her family.   Prominent screw is currently not providing any fracture stability.  I recommended removal of the screw.  I think this should be done in the operating room for the benefit of fluoroscopic imaging.  Think this can be done as an outpatient procedure.      Anticipated benefits of surgery were reviewed in detail.  Potential complications of surgery and anesthetic were reviewed in detail including but not limited to infection, blood loss, sore throat, pneumonia, brain damage and even death.  Postoperative immobilization and rehabilitation were  discussed.  They understand that even with prompt healing of her fracture the knee replacement may not function as well as it did prior to her injury..  The patient and family appear to understand all matters discussed and wish to proceed.           Howie Campoverde MD  12/03/19  2:02 PM

## 2019-12-03 NOTE — TELEPHONE ENCOUNTER
MENDOZA        PATIENT IS ON BLOOD THINNERS AND SHE HAS ALREADY BLED THROUGH HER BANDAGES FROM TODAYS VISIT, SHE WOULD LIKE TO KNOW WHAT TO DO FROM HERE.

## 2019-12-04 ENCOUNTER — HOSPITAL ENCOUNTER (OUTPATIENT)
Facility: HOSPITAL | Age: 49
Setting detail: HOSPITAL OUTPATIENT SURGERY
Discharge: HOME | End: 2019-12-04
Attending: ORTHOPAEDIC SURGERY | Admitting: ORTHOPAEDIC SURGERY

## 2019-12-04 ENCOUNTER — APPOINTMENT (OUTPATIENT)
Dept: GENERAL RADIOLOGY | Facility: HOSPITAL | Age: 49
End: 2019-12-04

## 2019-12-04 ENCOUNTER — ANESTHESIA (OUTPATIENT)
Dept: PERIOP | Facility: HOSPITAL | Age: 49
End: 2019-12-04

## 2019-12-04 ENCOUNTER — ANESTHESIA EVENT (OUTPATIENT)
Dept: PERIOP | Facility: HOSPITAL | Age: 49
End: 2019-12-04

## 2019-12-04 VITALS
SYSTOLIC BLOOD PRESSURE: 112 MMHG | DIASTOLIC BLOOD PRESSURE: 71 MMHG | HEART RATE: 86 BPM | HEIGHT: 65 IN | WEIGHT: 185.19 LBS | TEMPERATURE: 96.7 F | RESPIRATION RATE: 18 BRPM | BODY MASS INDEX: 30.85 KG/M2 | OXYGEN SATURATION: 96 %

## 2019-12-04 DIAGNOSIS — T84.84XA PAINFUL ORTHOPAEDIC HARDWARE (HCC): ICD-10-CM

## 2019-12-04 LAB
ANION GAP SERPL CALCULATED.3IONS-SCNC: 10 MMOL/L (ref 5–15)
BUN BLD-MCNC: 10 MG/DL (ref 6–20)
BUN/CREAT SERPL: 14.1 (ref 7–25)
CALCIUM SPEC-SCNC: 9.7 MG/DL (ref 8.6–10.5)
CHLORIDE SERPL-SCNC: 101 MMOL/L (ref 98–107)
CO2 SERPL-SCNC: 27 MMOL/L (ref 22–29)
CREAT BLD-MCNC: 0.71 MG/DL (ref 0.57–1)
GFR SERPL CREATININE-BSD FRML MDRD: 87 ML/MIN/1.73
GLUCOSE BLD-MCNC: 271 MG/DL (ref 65–99)
GLUCOSE BLDC GLUCOMTR-MCNC: 266 MG/DL (ref 70–130)
POTASSIUM BLD-SCNC: 4.1 MMOL/L (ref 3.5–5.2)
SODIUM BLD-SCNC: 138 MMOL/L (ref 136–145)

## 2019-12-04 PROCEDURE — 20680 REMOVAL OF IMPLANT DEEP: CPT | Performed by: ORTHOPAEDIC SURGERY

## 2019-12-04 PROCEDURE — 87205 SMEAR GRAM STAIN: CPT | Performed by: ORTHOPAEDIC SURGERY

## 2019-12-04 PROCEDURE — 80048 BASIC METABOLIC PNL TOTAL CA: CPT | Performed by: ANESTHESIOLOGY

## 2019-12-04 PROCEDURE — 82962 GLUCOSE BLOOD TEST: CPT

## 2019-12-04 PROCEDURE — 93005 ELECTROCARDIOGRAM TRACING: CPT | Performed by: ANESTHESIOLOGY

## 2019-12-04 PROCEDURE — 25010000002 ONDANSETRON PER 1 MG: Performed by: NURSE ANESTHETIST, CERTIFIED REGISTERED

## 2019-12-04 PROCEDURE — 25010000002 PROPOFOL 10 MG/ML EMULSION: Performed by: NURSE ANESTHETIST, CERTIFIED REGISTERED

## 2019-12-04 PROCEDURE — 25010000002 FENTANYL CITRATE (PF) 100 MCG/2ML SOLUTION: Performed by: NURSE ANESTHETIST, CERTIFIED REGISTERED

## 2019-12-04 PROCEDURE — 76000 FLUOROSCOPY <1 HR PHYS/QHP: CPT

## 2019-12-04 PROCEDURE — 25010000002 HYDROMORPHONE 1 MG/ML SOLUTION: Performed by: NURSE ANESTHETIST, CERTIFIED REGISTERED

## 2019-12-04 PROCEDURE — 63710000001 INSULIN REGULAR HUMAN PER 5 UNITS: Performed by: ANESTHESIOLOGY

## 2019-12-04 PROCEDURE — 93010 ELECTROCARDIOGRAM REPORT: CPT | Performed by: INTERNAL MEDICINE

## 2019-12-04 PROCEDURE — 87070 CULTURE OTHR SPECIMN AEROBIC: CPT | Performed by: ORTHOPAEDIC SURGERY

## 2019-12-04 PROCEDURE — 25010000002 PHENYLEPHRINE PER 1 ML: Performed by: NURSE ANESTHETIST, CERTIFIED REGISTERED

## 2019-12-04 RX ORDER — ONDANSETRON 2 MG/ML
INJECTION INTRAMUSCULAR; INTRAVENOUS AS NEEDED
Status: DISCONTINUED | OUTPATIENT
Start: 2019-12-04 | End: 2019-12-04 | Stop reason: SURG

## 2019-12-04 RX ORDER — FLUMAZENIL 0.1 MG/ML
0.2 INJECTION INTRAVENOUS AS NEEDED
Status: DISCONTINUED | OUTPATIENT
Start: 2019-12-04 | End: 2019-12-04 | Stop reason: HOSPADM

## 2019-12-04 RX ORDER — PROMETHAZINE HYDROCHLORIDE 25 MG/1
25 SUPPOSITORY RECTAL ONCE AS NEEDED
Status: DISCONTINUED | OUTPATIENT
Start: 2019-12-04 | End: 2019-12-04 | Stop reason: HOSPADM

## 2019-12-04 RX ORDER — SODIUM CHLORIDE 9 MG/ML
1000 INJECTION, SOLUTION INTRAVENOUS CONTINUOUS
Status: DISCONTINUED | OUTPATIENT
Start: 2019-12-04 | End: 2019-12-04 | Stop reason: HOSPADM

## 2019-12-04 RX ORDER — HYDROCODONE BITARTRATE AND ACETAMINOPHEN 7.5; 325 MG/1; MG/1
2 TABLET ORAL ONCE AS NEEDED
Status: DISCONTINUED | OUTPATIENT
Start: 2019-12-04 | End: 2019-12-04 | Stop reason: HOSPADM

## 2019-12-04 RX ORDER — DIPHENHYDRAMINE HYDROCHLORIDE 50 MG/ML
12.5 INJECTION INTRAMUSCULAR; INTRAVENOUS
Status: DISCONTINUED | OUTPATIENT
Start: 2019-12-04 | End: 2019-12-04 | Stop reason: HOSPADM

## 2019-12-04 RX ORDER — BUPIVACAINE HYDROCHLORIDE 5 MG/ML
INJECTION, SOLUTION EPIDURAL; INTRACAUDAL AS NEEDED
Status: DISCONTINUED | OUTPATIENT
Start: 2019-12-04 | End: 2019-12-04 | Stop reason: HOSPADM

## 2019-12-04 RX ORDER — PROMETHAZINE HYDROCHLORIDE 25 MG/1
25 TABLET ORAL ONCE AS NEEDED
Status: DISCONTINUED | OUTPATIENT
Start: 2019-12-04 | End: 2019-12-04 | Stop reason: HOSPADM

## 2019-12-04 RX ORDER — FENTANYL CITRATE 50 UG/ML
INJECTION, SOLUTION INTRAMUSCULAR; INTRAVENOUS AS NEEDED
Status: DISCONTINUED | OUTPATIENT
Start: 2019-12-04 | End: 2019-12-04 | Stop reason: SURG

## 2019-12-04 RX ORDER — PROMETHAZINE HYDROCHLORIDE 25 MG/ML
12.5 INJECTION, SOLUTION INTRAMUSCULAR; INTRAVENOUS ONCE AS NEEDED
Status: DISCONTINUED | OUTPATIENT
Start: 2019-12-04 | End: 2019-12-04 | Stop reason: HOSPADM

## 2019-12-04 RX ORDER — LIDOCAINE HYDROCHLORIDE 20 MG/ML
INJECTION, SOLUTION INFILTRATION; PERINEURAL AS NEEDED
Status: DISCONTINUED | OUTPATIENT
Start: 2019-12-04 | End: 2019-12-04 | Stop reason: SURG

## 2019-12-04 RX ORDER — ACETAMINOPHEN 650 MG/1
650 SUPPOSITORY RECTAL ONCE AS NEEDED
Status: DISCONTINUED | OUTPATIENT
Start: 2019-12-04 | End: 2019-12-04 | Stop reason: HOSPADM

## 2019-12-04 RX ORDER — PROPOFOL 10 MG/ML
VIAL (ML) INTRAVENOUS AS NEEDED
Status: DISCONTINUED | OUTPATIENT
Start: 2019-12-04 | End: 2019-12-04 | Stop reason: SURG

## 2019-12-04 RX ORDER — NALOXONE HCL 0.4 MG/ML
0.4 VIAL (ML) INJECTION AS NEEDED
Status: DISCONTINUED | OUTPATIENT
Start: 2019-12-04 | End: 2019-12-04 | Stop reason: HOSPADM

## 2019-12-04 RX ORDER — HYDROCODONE BITARTRATE AND ACETAMINOPHEN 7.5; 325 MG/1; MG/1
1-2 TABLET ORAL EVERY 6 HOURS PRN
Qty: 30 TABLET | Refills: 0 | Status: SHIPPED | OUTPATIENT
Start: 2019-12-04 | End: 2019-12-11

## 2019-12-04 RX ORDER — LABETALOL HYDROCHLORIDE 5 MG/ML
5 INJECTION, SOLUTION INTRAVENOUS
Status: DISCONTINUED | OUTPATIENT
Start: 2019-12-04 | End: 2019-12-04 | Stop reason: HOSPADM

## 2019-12-04 RX ORDER — BUPIVACAINE HCL/0.9 % NACL/PF 0.1 %
2 PLASTIC BAG, INJECTION (ML) EPIDURAL ONCE
Status: COMPLETED | OUTPATIENT
Start: 2019-12-04 | End: 2019-12-04

## 2019-12-04 RX ORDER — EPHEDRINE SULFATE 50 MG/ML
5 INJECTION, SOLUTION INTRAVENOUS ONCE AS NEEDED
Status: DISCONTINUED | OUTPATIENT
Start: 2019-12-04 | End: 2019-12-04 | Stop reason: HOSPADM

## 2019-12-04 RX ORDER — ACETAMINOPHEN 325 MG/1
650 TABLET ORAL ONCE AS NEEDED
Status: DISCONTINUED | OUTPATIENT
Start: 2019-12-04 | End: 2019-12-04 | Stop reason: HOSPADM

## 2019-12-04 RX ORDER — ONDANSETRON 2 MG/ML
4 INJECTION INTRAMUSCULAR; INTRAVENOUS ONCE AS NEEDED
Status: COMPLETED | OUTPATIENT
Start: 2019-12-04 | End: 2019-12-04

## 2019-12-04 RX ADMIN — HYDROMORPHONE HYDROCHLORIDE 0.5 MG: 1 INJECTION, SOLUTION INTRAMUSCULAR; INTRAVENOUS; SUBCUTANEOUS at 10:40

## 2019-12-04 RX ADMIN — LIDOCAINE HYDROCHLORIDE 50 MG: 20 INJECTION, SOLUTION INFILTRATION; PERINEURAL at 09:11

## 2019-12-04 RX ADMIN — PROPOFOL 20 MG: 10 INJECTION, EMULSION INTRAVENOUS at 09:25

## 2019-12-04 RX ADMIN — SODIUM CHLORIDE 1000 ML: 900 INJECTION, SOLUTION INTRAVENOUS at 08:29

## 2019-12-04 RX ADMIN — FENTANYL CITRATE 50 MCG: 50 INJECTION, SOLUTION INTRAMUSCULAR; INTRAVENOUS at 09:35

## 2019-12-04 RX ADMIN — HUMAN INSULIN 10 UNITS: 100 INJECTION, SOLUTION SUBCUTANEOUS at 09:18

## 2019-12-04 RX ADMIN — Medication 2 G: at 09:10

## 2019-12-04 RX ADMIN — PROPOFOL 30 MG: 10 INJECTION, EMULSION INTRAVENOUS at 09:19

## 2019-12-04 RX ADMIN — FENTANYL CITRATE 50 MCG: 50 INJECTION, SOLUTION INTRAMUSCULAR; INTRAVENOUS at 09:37

## 2019-12-04 RX ADMIN — FENTANYL CITRATE 50 MCG: 50 INJECTION, SOLUTION INTRAMUSCULAR; INTRAVENOUS at 09:39

## 2019-12-04 RX ADMIN — ONDANSETRON 4 MG: 2 INJECTION INTRAMUSCULAR; INTRAVENOUS at 09:44

## 2019-12-04 RX ADMIN — HYDROMORPHONE HYDROCHLORIDE 0.5 MG: 1 INJECTION, SOLUTION INTRAMUSCULAR; INTRAVENOUS; SUBCUTANEOUS at 10:24

## 2019-12-04 RX ADMIN — FENTANYL CITRATE 50 MCG: 50 INJECTION, SOLUTION INTRAMUSCULAR; INTRAVENOUS at 09:19

## 2019-12-04 RX ADMIN — PHENYLEPHRINE HYDROCHLORIDE 100 MCG: 10 INJECTION INTRAVENOUS at 09:26

## 2019-12-04 RX ADMIN — ONDANSETRON 4 MG: 2 INJECTION INTRAMUSCULAR; INTRAVENOUS at 10:18

## 2019-12-04 RX ADMIN — PROPOFOL 150 MG: 10 INJECTION, EMULSION INTRAVENOUS at 09:10

## 2019-12-04 NOTE — ANESTHESIA PREPROCEDURE EVALUATION
Anesthesia Evaluation     no history of anesthetic complications:  NPO Solid Status: > 8 hours  NPO Liquid Status: > 8 hours           Airway   Mallampati: II  TM distance: >3 FB  Neck ROM: full  Possible difficult intubation  Dental    (+) poor dentition        Pulmonary     breath sounds clear to auscultation  (+) a smoker (2 ppd) Current Smoked day of surgery, COPD mild, asthma,shortness of breath, sleep apnea,   Cardiovascular   Exercise tolerance: poor (<4 METS)    PT is on anticoagulation therapy  Patient on routine beta blocker  Rhythm: regular  Rate: normal    (+) hypertension, past MI  >12 months, CAD, cardiac stents more than 12 months ago hyperlipidemia,   (-) valvular problems/murmurs, dysrhythmias, angina, DVT    ROS comment:   · Estimated EF = 58%.  · Left ventricular systolic function is normal.  · Left ventricular diastolic dysfunction (grade I) consistent with impaired relaxation.     NSR    Neuro/Psych  (+) numbness (zainab neuropathy feet and hands), psychiatric history Anxiety and Depression,     (-) seizures, TIA, CVA, headaches  GI/Hepatic/Renal/Endo    (+)  GERD,  renal disease CRI, diabetes mellitus ( and will give 10unit REG Insulin IV) type 2 poorly controlled using insulin,   (-) hepatitis, liver disease, no thyroid disorder    Musculoskeletal     (+) arthralgias, back pain, chronic pain,   Abdominal    Substance History   (-) alcohol use, drug use     OB/GYN    (-)  Pregnant        Other   arthritis,    history of cancer (renal cell)    ROS/Med Hx Other: anemia                  Anesthesia Plan    ASA 3     general   (Ordered insulin for glu 266)  intravenous induction     Anesthetic plan, all risks, benefits, and alternatives have been provided, discussed and informed consent has been obtained with: patient and spouse/significant other.

## 2019-12-04 NOTE — ANESTHESIA POSTPROCEDURE EVALUATION
Patient: Yudi West    Procedure Summary     Date:  12/04/19 Room / Location:  [facility] OR  / [facility] OR    Anesthesia Start:  0907 Anesthesia Stop:  1011    Procedure:  HARDWARE REMOVAL LEFT FEMUR        (C-ARM#3) (Left ) Diagnosis:       Painful orthopaedic hardware (CMS/HCC)      (Painful orthopaedic hardware (CMS/HCC) [T84.84XA])    Surgeon:  Howie Campoverde MD Provider:  Reynaldo Field MD    Anesthesia Type:  general ASA Status:  3          Anesthesia Type: general  Last vitals  BP   137/78 (12/04/19 0824)   Temp   97.8 °F (36.6 °C) (12/04/19 0824)   Pulse   95 (12/04/19 0824)   Resp   18 (12/04/19 0824)     SpO2   99 % (12/04/19 0824)     Post Anesthesia Care and Evaluation    Patient location during evaluation: PACU  Patient participation: waiting for patient participation  Level of consciousness: obtunded/minimal responses  Pain score: 0  Pain management: adequate  Airway patency: patent  Anesthetic complications: No anesthetic complications  PONV Status: none  Cardiovascular status: acceptable  Respiratory status: acceptable, spontaneous ventilation, unassisted and face mask (LMA remains in place, to be pulled by PACU RN)  Hydration status: acceptable

## 2019-12-04 NOTE — OP NOTE
Procedure(s):  HARDWARE REMOVAL LEFT FEMUR        (C-ARM#3)  Procedure Note    Yudi West  12/4/2019    Pre-op Diagnosis: Malposition fracture left femur  Painful orthopaedic hardware (CMS/HCC) [T84.84XA]    Post-op Diagnosis:   Same  Post-Op Diagnosis Codes:     * Painful orthopaedic hardware (CMS/HCC) [T84.84XA]    Procedure/CPT® Codes: Removal of hardware left femur (CPT code 21418).      Procedure(s):  HARDWARE REMOVAL LEFT FEMUR        (C-ARM#3)    Surgeon(s):  Howie Campoverde MD    Anesthesia: Choice    Staff:   Circulator: Donaldo Butler RN  Radiology Technologist: Raheel Wen  Scrub Person: Margo Chen  Assistant: Radha Mireles CSA    Estimated Blood Loss: 10 cc    Specimens:       1 screw         ID Type Source Tests Collected by Time   1 : OLD HARDWARE Wound Leg, Left WOUND CULTURE Howie Campoverde MD 12/4/2019 0948         Drains: None    Findings: Malposition screw distal femur, fracture alignment remains satisfactory.    Complications: None    INDICATIONS : The patient is a 49-year-old female who had previously undergone reduction and stabilization of the supracondylar fracture of the left femur with a retrograde intramedullary nail.    SHe recently presented with and felt to be related to a malposition of a distal locking screw.  She underwent unsuccessful attempt at screw removal in the office.  I recommended removal in the operating room.  Anticipated benefits of surgery as well as potential complications of surgery and anesthetic were reviewed with the patient and her family and they appear to understand all matters discussed and wished to proceed.      Operative Report: The patient was brought to the operating room.  The timeout procedure was followed.  Wounds were clean and dry with no evidence of infection.  The left lower extremity was prepped and drapes applied.  Sutures removed from the medial wound.  A hunter was advanced fluoroscopic control in the distal  end of the screw clamped.  The screw was rotated in an attempt to translate the screw laterally.  This was partially successful.  The suture was removed from the lateral wound.  The wound was extended both proximal and distal.  Bleeding points were cauterized.  The head of the screw was identified and removed with little difficulty.  The Alyssa was again brought into position confirming removal of the Involved screw as well as satisfactory alignment of the fracture.    Wounds were irrigated and bleeding points cauterized.  The anatomic closure was performed using Vicryl.  Skin closure was with staples.  The wounds were infiltrated with Marcaine.  A bulky soft dressing was applied.  The patient was transported to the recovery room in stable condition.  There were no intraoperative or immediate postoperative complications.  Estimated blood loss less than 10 cc.    Howie Campoverde MD  12/04/19  9:56 AM

## 2019-12-04 NOTE — ANESTHESIA PROCEDURE NOTES
Airway  Urgency: elective    Date/Time: 12/4/2019 9:13 AM    General Information and Staff    Patient location during procedure: OR    Indications and Patient Condition  Indications for airway management: airway protection    Preoxygenated: yes  Mask difficulty assessment: 1 - vent by mask    Final Airway Details  Final airway type: supraglottic airway      Successful airway: I-gel  Size 4    Cormack-Lehane Classification: grade IIa - partial view of glottis  Number of attempts at approach: 1  Assessment: lips, teeth, and gum same as pre-op and atraumatic intubation

## 2019-12-05 LAB — GLUCOSE BLDC GLUCOMTR-MCNC: 96 MG/DL (ref 70–130)

## 2019-12-07 LAB
BACTERIA SPEC AEROBE CULT: NORMAL
GRAM STN SPEC: NORMAL
GRAM STN SPEC: NORMAL

## 2019-12-11 ENCOUNTER — OFFICE VISIT (OUTPATIENT)
Dept: ORTHOPEDIC SURGERY | Facility: CLINIC | Age: 49
End: 2019-12-11

## 2019-12-11 VITALS — BODY MASS INDEX: 30.91 KG/M2 | HEIGHT: 65 IN | WEIGHT: 185.5 LBS

## 2019-12-11 DIAGNOSIS — S72.452A CLOSED DISPLACED SUPRACONDYLAR FRACTURE OF DISTAL END OF LEFT FEMUR WITHOUT INTRACONDYLAR EXTENSION, INITIAL ENCOUNTER (HCC): ICD-10-CM

## 2019-12-11 DIAGNOSIS — S72.452D CLOSED DISPLACED SUPRACONDYLAR FRACTURE OF DISTAL END OF LEFT FEMUR WITHOUT INTRACONDYLAR EXTENSION WITH ROUTINE HEALING, SUBSEQUENT ENCOUNTER: Primary | ICD-10-CM

## 2019-12-11 DIAGNOSIS — T84.84XA PAINFUL ORTHOPAEDIC HARDWARE (HCC): ICD-10-CM

## 2019-12-11 DIAGNOSIS — S72.322D CLOSED DISPLACED TRANSVERSE FRACTURE OF SHAFT OF LEFT FEMUR WITH ROUTINE HEALING, SUBSEQUENT ENCOUNTER: ICD-10-CM

## 2019-12-11 PROBLEM — S72.323D: Status: ACTIVE | Noted: 2019-12-11

## 2019-12-11 PROCEDURE — 99024 POSTOP FOLLOW-UP VISIT: CPT | Performed by: ORTHOPAEDIC SURGERY

## 2019-12-11 RX ORDER — HYDROCODONE BITARTRATE AND ACETAMINOPHEN 10; 325 MG/1; MG/1
1 TABLET ORAL 2 TIMES DAILY PRN
Qty: 30 TABLET | Refills: 0 | Status: SHIPPED | OUTPATIENT
Start: 2019-12-11 | End: 2019-12-26

## 2019-12-11 NOTE — PROGRESS NOTES
"Yudi West is a 49 y.o. female is s/p       Chief Complaint   Patient presents with   • Left Femur - Post-op       HISTORY OF PRESENT ILLNESS:  Postop left femur, patient had XRAYS done today, patient had surgery on 12/4/19, patient states pain score is at a 7 today,     Mrs. West had her initial surgery on 3 November and removal of her a distal locking screw a week ago.  SHe is having the expected amount of pain.  She said that she has been having to take 2 of the 7.5 mg Po hydrocodone at a time and was used taking 10 mg twice a day prior to her injury.    Allergies   Allergen Reactions   • Sulfa Antibiotics Itching         Current Outpatient Medications:   •  aspirin 81 MG EC tablet, Take 1 tablet by mouth 2 (Two) Times a Day With Meals., Disp: 60 tablet, Rfl: 0  •  atorvastatin (LIPITOR) 40 MG tablet, Take 40 mg by mouth Every Night., Disp: , Rfl: 3  •  carvedilol (COREG) 6.25 MG tablet, Take 6.25 mg by mouth 2 (Two) Times a Day With Meals., Disp: , Rfl:   •  clopidogrel (PLAVIX) 75 MG tablet, Take 75 mg by mouth Daily., Disp: , Rfl:   •  EASY TOUCH INSULIN SYRINGE 28G X 1/2\" 0.5 ML misc, USE 4 TIMES DAILY, Disp: , Rfl: 3  •  gabapentin (NEURONTIN) 400 MG capsule, Take 400 mg by mouth 3 (Three) Times a Day., Disp: , Rfl:   •  insulin glargine (LANTUS) 100 UNIT/ML injection, Inject 50 Units under the skin into the appropriate area as directed Daily., Disp: , Rfl:   •  omeprazole (priLOSEC) 20 MG capsule, Take 20 mg by mouth 2 (Two) Times a Day., Disp: , Rfl:   •  polyethylene glycol (MIRALAX) packet, Take 17 g by mouth Daily., Disp: , Rfl:   •  ranolazine (RANEXA) 500 MG 12 hr tablet, Take 1 tablet by mouth 2 (Two) Times a Day., Disp: 60 tablet, Rfl: 6  •  sertraline (ZOLOFT) 50 MG tablet, Take 50 mg by mouth Daily., Disp: , Rfl:   •  UNIFINE PENTIPS 31G X 8 MM misc, USE AS DIRECTED WITH SOLOSTAR ONCE DAILY, Disp: , Rfl: 3  •  VENTOLIN  (90 BASE) MCG/ACT inhaler, 2 puffs 4 (Four) Times a Day., " Disp: , Rfl: 1  •  zolpidem (AMBIEN) 10 MG tablet, Take 10 mg by mouth Every Night., Disp: , Rfl:   •  HYDROcodone-acetaminophen (NORCO)  MG per tablet, Take 1 tablet by mouth 2 (Two) Times a Day As Needed for Moderate Pain ., Disp: 30 tablet, Rfl: 0    No fevers or chills.  No nausea or vomiting.      PHYSICAL EXAMINATION:       Yudi West is a 49 y.o. female    Patient is awake and alert, answers questions appropriately and is in no apparent distress.    GAIT:     []  Normal  [x]  Antalgic    Assistive device: []  None  [x]  Walker     []  Crutches  []  Cane     []  Wheelchair  []  Stretcher    Ortho Exam is alert and in apparent mild discomfort.  Her incisions are dry with no evidence of infection.  The thigh and calf are soft.  The motion of the knee is smooth from about 5 to 45 degrees.    Xr Knee 1 Or 2 View Left    Result Date: 12/11/2019  Narrative: Ordering Provider:  Howie Campoverde MD Ordering Diagnosis/Indication:  Closed displaced supracondylar fracture of distal end of left femur without intracondylar extension with routine healing, subsequent encounter Procedure:  XR KNEE 1 OR 2 VW LEFT Exam Date:  12/11/19 COMPARISON: Exam of the knee dated 12 November 2019.     Impression:  Radiographs of the left knee AP and lateral views done today show a comminuted fracture of the distal femur in the supracondylar region.  1 of the distal screws is no longer present.  Fracture alignment remains satisfactory.  There is a suggestion of early callus.  There is a total knee arthroplasty in place. Howie Campoverde MD 12/11/19    Xr Knee 1 Or 2 View Left    Result Date: 11/12/2019  Narrative: Ordering Provider:  Howie Campoverde MD Ordering Diagnosis/Indication:  Closed displaced supracondylar fracture of distal end of left femur without intracondylar extension with routine healing, subsequent encounter Procedure:  XR KNEE 1 OR 2 VW LEFT Exam Date:  11/12/19 COMPARISON: ExAm of the femur  dated 3 November 2019.     Impression:  Radiographs of the left knee AP and lateral views done today show a retrograde intramedullary nail in place stabilizing a comminuted fracture of the distal femoral shaft.  There is a total knee arthroplasty in satisfactory position. Howie Campoverde MD 11/12/19          ASSESSMENT: Healing supracondylar fracture left femur.  She was encouraged in range of motion exercises.  She may weight-bear as tolerated.    She was given a prescription for 30 hydrocodone 10 mg each to take 1 pill twice a day as needed for pain.  After this prescription she should return to her chronic pain management.    Return here in 1 month for exam and x-rays of the left knee.    Diagnoses and all orders for this visit:    Closed displaced supracondylar fracture of distal end of left femur without intracondylar extension with routine healing, subsequent encounter  -     HYDROcodone-acetaminophen (NORCO)  MG per tablet; Take 1 tablet by mouth 2 (Two) Times a Day As Needed for Moderate Pain .    Closed displaced supracondylar fracture of distal end of left femur without intracondylar extension, initial encounter (CMS/HCC)    Painful orthopaedic hardware (CMS/HCC)    Closed displaced transverse fracture of shaft of left femur with routine healing, subsequent encounter          PLAN    Return in about 4 weeks (around 1/8/2020).    Howie Campoverde MD

## 2019-12-23 DIAGNOSIS — S72.452D CLOSED DISPLACED SUPRACONDYLAR FRACTURE OF DISTAL END OF LEFT FEMUR WITHOUT INTRACONDYLAR EXTENSION WITH ROUTINE HEALING, SUBSEQUENT ENCOUNTER: Primary | ICD-10-CM

## 2019-12-26 ENCOUNTER — OFFICE VISIT (OUTPATIENT)
Dept: ORTHOPEDIC SURGERY | Facility: CLINIC | Age: 49
End: 2019-12-26

## 2019-12-26 ENCOUNTER — TELEPHONE (OUTPATIENT)
Dept: ORTHOPEDIC SURGERY | Facility: CLINIC | Age: 49
End: 2019-12-26

## 2019-12-26 VITALS
OXYGEN SATURATION: 98 % | HEART RATE: 83 BPM | TEMPERATURE: 97.8 F | SYSTOLIC BLOOD PRESSURE: 110 MMHG | BODY MASS INDEX: 29.99 KG/M2 | HEIGHT: 65 IN | WEIGHT: 180 LBS | DIASTOLIC BLOOD PRESSURE: 60 MMHG

## 2019-12-26 DIAGNOSIS — S72.452D CLOSED DISPLACED SUPRACONDYLAR FRACTURE OF DISTAL END OF LEFT FEMUR WITHOUT INTRACONDYLAR EXTENSION WITH ROUTINE HEALING, SUBSEQUENT ENCOUNTER: Primary | ICD-10-CM

## 2019-12-26 DIAGNOSIS — S72.322D CLOSED DISPLACED TRANSVERSE FRACTURE OF SHAFT OF LEFT FEMUR WITH ROUTINE HEALING, SUBSEQUENT ENCOUNTER: ICD-10-CM

## 2019-12-26 DIAGNOSIS — S72.452A CLOSED DISPLACED SUPRACONDYLAR FRACTURE OF DISTAL END OF LEFT FEMUR WITHOUT INTRACONDYLAR EXTENSION, INITIAL ENCOUNTER (HCC): ICD-10-CM

## 2019-12-26 PROCEDURE — 99024 POSTOP FOLLOW-UP VISIT: CPT | Performed by: ORTHOPAEDIC SURGERY

## 2019-12-26 RX ORDER — HYDROCODONE BITARTRATE AND ACETAMINOPHEN 7.5; 325 MG/1; MG/1
TABLET ORAL
Qty: 40 TABLET | Refills: 0 | Status: SHIPPED | OUTPATIENT
Start: 2019-12-26 | End: 2020-06-17 | Stop reason: ALTCHOICE

## 2019-12-26 NOTE — TELEPHONE ENCOUNTER
[facility]  [phone]      PHARMACY WANTED TO ADVISE PATIENT IS RECEIVING PERCOCET AND TRAMADOL FROM ANOTHER PROVIDER CURRENTLY.

## 2019-12-26 NOTE — TELEPHONE ENCOUNTER
[facility]      Pt CALLED BACK TO CHECK TO SEE IF YOU HAVE CALLED THE PHARMACY   I INFORMED THE Pt THIS MESSAGE IS STILL PENDING THAT YOU HAVE Pts IN OFFICE AND SHE WOULD RECEIVE  CALL AS SOON AS IT HAD BEEN RESPONDED TO     Brad was interrogated.   I last saw her on the 12th of member and prescribed her 10 mg hydrocodone.  2 days thereafter she received a prescription for 120 Percocet 10 mg each from another provider..  I will prescribe her no more controlled substances.

## 2019-12-26 NOTE — TELEPHONE ENCOUNTER
DR DONALDSON.  PATIENT CALLED BACK CHECKING ON THE NORCO PRESCRIPTION AFTER THE PHARMACY WOULD NOT FILL IT.  SHE SAID SHE HAD BEEN SEEING A PAIN CLINIC THAT HAS MOVED BACK TO [place] AND SHE IS NO LONGER SEEING THEM OR TAKING PERCOCET OR TRAMADOL.

## 2019-12-26 NOTE — PROGRESS NOTES
"Yudi West is a 49 y.o. female returns for     Chief Complaint   Patient presents with   • Left Femur - Post-op       HISTORY OF PRESENT ILLNESS: Patient presents here today for a post op on her left femur.     Mrs. West had her injury about 6 weeks ago and her hardware removal about 2 weeks ago.  She fell recently and had worsening of her pain.     CONCURRENT MEDICAL HISTORY:    The following portions of the patient's history were reviewed and updated as appropriate: allergies, current medications, past family history, past medical history, past social history, past surgical history and problem list.     ROS  No fevers or chills.  No chest pain or shortness of air.  No GI or  disturbances.    PHYSICAL EXAMINATION:       /60   Pulse 83   Temp 97.8 °F (36.6 °C)   Ht 165.1 cm (65\")   Wt 81.6 kg (180 lb)   SpO2 98%   BMI 29.95 kg/m²     Physical Exam she is alert pleasant and in no apparent distress at rest.      GAIT:     []  Normal  [x]  Antalgic    Assistive device: []  None  [x]  Walker     []  Crutches  []  Cane     []  Wheelchair  []  Stretcher    Ortho Exam she walks with a moderately antalgic gait favoring the left.  Left knee motion is smooth with full extension and flexion to about 80 degrees.  Her wounds are well-healed with no evidence of infection.    Xr Knee 1 Or 2 View Left    Result Date: 12/26/2019  Narrative: Ordering Provider:  Howie Campoverde MD Ordering Diagnosis/Indication:  Closed displaced supracondylar fracture of distal end of left femur without intracondylar extension with routine healing, subsequent encounter Procedure:  XR KNEE 1 OR 2 VW LEFT Exam Date:  12/26/19 COMPARISON: Exam of the knee dated Orth of December 2019.     Impression:  Radiographs of the left knee AP and lateral views done today show a supracondylar fracture of the femur stabilized with a retrograde nail.  Position of the fracture is unchanged from previous studies.  There is blurring of the " fracture.  Hardware alignment remains satisfactory. Howie Campoverde MD 12/26/19    Xr Knee 1 Or 2 View Left    Result Date: 12/11/2019  Narrative: Ordering Provider:  Howie Campoverde MD Ordering Diagnosis/Indication:  Closed displaced supracondylar fracture of distal end of left femur without intracondylar extension with routine healing, subsequent encounter Procedure:  XR KNEE 1 OR 2 VW LEFT Exam Date:  12/11/19 COMPARISON: Exam of the knee dated 12 November 2019.     Impression:  Radiographs of the left knee AP and lateral views done today show a comminuted fracture of the distal femur in the supracondylar region.  1 of the distal screws is no longer present.  Fracture alignment remains satisfactory.  There is a suggestion of early callus.  There is a total knee arthroplasty in place. Howie Campoverde MD 12/11/19            ASSESSMENT: Healing supracondylar fracture left femur.  She was encouraged in range of motion exercises.  She may weight-bear as tolerated.    ReTurn here in 1 month for x-rays of the knee.    The patient said that she was previously followed at a pain clinic which has closed.  She said she cannot afford to go to a pain clinic in [place].  We again discussed pain management.  She understands that I will not prescribe her narcotics long-term.  She was given a prescription for 40 hydrocodone 7.5 mg each to take 1 twice a day as needed for pain.    Diagnoses and all orders for this visit:    Closed displaced supracondylar fracture of distal end of left femur without intracondylar extension with routine healing, subsequent encounter  -     HYDROcodone-acetaminophen (NORCO) 7.5-325 MG per tablet; 1 by mouth twice daily PRN severe pain    Closed displaced supracondylar fracture of distal end of left femur without intracondylar extension, initial encounter (CMS/[facility])    Closed displaced transverse fracture of shaft of left femur with routine healing, subsequent encounter          PLAN  Patient's Body mass index is 29.95 kg/m². BMI is within normal parameters. No follow-up required..      Return in about 4 weeks (around 1/23/2020).    Howie Campoverde MD

## 2019-12-27 NOTE — TELEPHONE ENCOUNTER
Pt called asking about her refill.  Pt was informed Dr. Campoverde will no longer give her refill for pain medication due to receiving them from another Provider per Dr. Hutchinson's response.

## 2019-12-31 ENCOUNTER — OFFICE VISIT (OUTPATIENT)
Dept: CARDIOLOGY | Facility: CLINIC | Age: 49
End: 2019-12-31

## 2019-12-31 VITALS
OXYGEN SATURATION: 99 % | SYSTOLIC BLOOD PRESSURE: 120 MMHG | HEART RATE: 92 BPM | BODY MASS INDEX: 29.66 KG/M2 | DIASTOLIC BLOOD PRESSURE: 80 MMHG | WEIGHT: 178 LBS | HEIGHT: 65 IN

## 2019-12-31 DIAGNOSIS — I25.10 CORONARY ARTERY DISEASE INVOLVING NATIVE CORONARY ARTERY OF NATIVE HEART WITHOUT ANGINA PECTORIS: Primary | ICD-10-CM

## 2019-12-31 DIAGNOSIS — I10 ESSENTIAL HYPERTENSION: Chronic | ICD-10-CM

## 2019-12-31 DIAGNOSIS — E78.2 MIXED HYPERLIPIDEMIA: ICD-10-CM

## 2019-12-31 PROCEDURE — 99214 OFFICE O/P EST MOD 30 MIN: CPT | Performed by: INTERNAL MEDICINE

## 2019-12-31 NOTE — PROGRESS NOTES
Yudi West  49 y.o. female    06/03/2019  1. Coronary artery disease involving native coronary artery of native heart without angina pectoris    2. Essential hypertension    3. Mixed hyperlipidemia        History of Present Illness  Ms. West is here for follow-up of her above-stated problems. She presented with acute inferior wall microinfarction in May 2015 and underwent cardiac catheterization which showed the following findings:     1.    Critical lesion noted in the right coronary artery which was the        infarct vessel and successful PCI with balloon angioplasty and        subsequent placement of a 3.5 x 23 mm Xience Xpedition stent and        reduction of stenosis to 0%.  2.    Critical lesion noted in one of the small subbranches of the ramus        intermedius vessel. This will be treated medically.  3.    Noncritical disease noted in the left anterior descending coronary        artery and left circumflex coronary artery.  4.    Overall normal contractility of the left ventricle with an        ejection fraction of 50% to 55% with mild inferolateral        hypokinesis.     She was evaluated for intermittent episodes of atypical chest pain in June 2019.  A Lexiscan Cardiolite stress test showed no reversible ischemia.  · Findings consistent with an equivocal ECG stress test.  · Left ventricular ejection fraction is hyperdynamic (Calculated EF > 70%).  · Myocardial perfusion imaging indicates a normal myocardial perfusion study with no evidence of ischemia.  · Impressions are consistent with a low risk study.  Echocardiogram showed:  · Estimated EF = 58%.  · Left ventricular systolic function is normal.  · Left ventricular diastolic dysfunction (grade I) consistent with impaired     She underwent surgery to her left femur following a fracture and is recovering from this.  She was noted to be anemic during the perioperative period.  I believe that she would benefit from supplemental iron and folic  acid.    SUBJECTIVE    Allergies   Allergen Reactions   • Sulfa Antibiotics Itching         Past Medical History:   Diagnosis Date   • Anxiety and depression    • Arthritis    • Asthma    • Cancer of kidney (CMS/HCC)     left   • Chronic kidney disease    • COPD (chronic obstructive pulmonary disease) (CMS/[facility])    • Coronary artery disease     1 stent.  is followed by jaden   • Diabetes mellitus (CMS/[facility])    • GERD (gastroesophageal reflux disease)    • Hyperlipidemia    • Hypertension    • Myocardial infarction (CMS/[facility])    • Sleep apnea          Past Surgical History:   Procedure Laterality Date   • CORONARY STENT PLACEMENT     • FEMUR IM NAILING RETROGRADE Left 11/3/2019    Procedure: FEMUR INTRAMEDULLARY NAILING RETROGRADE;  Surgeon: Howie Campoverde MD;  Location: [facility];  Service: Orthopedics   • GALLBLADDER SURGERY     • HARDWARE REMOVAL Left 12/4/2019    Procedure: HARDWARE REMOVAL LEFT FEMUR        (C-ARM#3);  Surgeon: Howie Campoverde MD;  Location: [facility];  Service: Orthopedics   • HYSTERECTOMY     • NEPHRECTOMY Left    • REPLACEMENT TOTAL KNEE Left    • TONSILLECTOMY           Family History   Problem Relation Age of Onset   • Heart disease Mother    • Hypertension Mother    • Heart disease Father    • Hypertension Father          Social History     Socioeconomic History   • Marital status:      Spouse name: Not on file   • Number of children: Not on file   • Years of education: Not on file   • Highest education level: Not on file   Tobacco Use   • Smoking status: Current Every Day Smoker     Packs/day: 2.00     Years: 36.00     Pack years: 72.00     Types: Cigarettes   • Smokeless tobacco: Never Used   Substance and Sexual Activity   • Alcohol use: No   • Drug use: No   • Sexual activity: Defer         Current Outpatient Medications   Medication Sig Dispense Refill   • aspirin 81 MG EC tablet Take 1 tablet by mouth 2 (Two) Times a Day With Meals. 60 tablet 0   • atorvastatin  "(LIPITOR) 40 MG tablet Take 40 mg by mouth Every Night.  3   • carvedilol (COREG) 6.25 MG tablet Take 6.25 mg by mouth 2 (Two) Times a Day With Meals.     • clopidogrel (PLAVIX) 75 MG tablet Take 75 mg by mouth Daily.     • EASY TOUCH INSULIN SYRINGE 28G X 1/2\" 0.5 ML misc USE 4 TIMES DAILY  3   • gabapentin (NEURONTIN) 400 MG capsule Take 400 mg by mouth 3 (Three) Times a Day.     • HYDROcodone-acetaminophen (NORCO) 7.5-325 MG per tablet 1 by mouth twice daily PRN severe pain 40 tablet 0   • insulin glargine (LANTUS) 100 UNIT/ML injection Inject 50 Units under the skin into the appropriate area as directed Daily.     • omeprazole (priLOSEC) 20 MG capsule Take 20 mg by mouth 2 (Two) Times a Day.     • polyethylene glycol (MIRALAX) packet Take 17 g by mouth Daily.     • ranolazine (RANEXA) 500 MG 12 hr tablet Take 1 tablet by mouth 2 (Two) Times a Day. 60 tablet 6   • sertraline (ZOLOFT) 50 MG tablet Take 50 mg by mouth Daily.     • UNIFINE PENTIPS 31G X 8 MM misc USE AS DIRECTED WITH SOLOSTAR ONCE DAILY  3   • VENTOLIN  (90 BASE) MCG/ACT inhaler 2 puffs 4 (Four) Times a Day.  1   • zolpidem (AMBIEN) 10 MG tablet Take 10 mg by mouth Every Night.       No current facility-administered medications for this visit.          OBJECTIVE    /80   Pulse 92   Ht 165.1 cm (65\")   Wt 80.7 kg (178 lb)   SpO2 99%   BMI 29.62 kg/m²         Review of Systems     Constitutional:  Denies recent weight loss, weight gain, fever or chills     HENT:  Denies any hearing loss, epistaxis, hoarseness, or difficulty speaking.     Eyes: Wears eyeglasses or contact lenses     Respiratory:  Dyspnea with exertion,no cough, wheezing, or hemoptysis.     Cardiovascular: Occasional sharp chest pain, no orthopnea, PND, peripheral edema, syncope, or claudication.     Gastrointestinal:  Denies change in bowel habits, dyspepsia, ulcer disease, hematochezia, or melena.     Endocrine: Negative for cold intolerance, heat intolerance, " polydipsia, polyphagia and polyuria.    Genitourinary: History of nephrectomy in the past      Musculoskeletal: DJD.  Recent surgery left femur    Skin:  Denies any change in hair or nails, rashes, or skin lesions.     Allergic/Immunologic: Negative.  Negative for environmental allergies, food allergies and immunocompromised state.     Neurological:  Denies any history of recurrent headaches, strokes, TIA, or seizure disorder.     Hematological: Denies any food allergies, seasonal allergies, bleeding disorders, or lymphadenopathy.     Psychiatric/Behavioral: history of depression/ anxiety, no substance abuse, or change in cognitive function.         Physical Exam     Constitutional: Cooperative, alert and oriented, in no acute distress.     HENT:   Head: Normocephalic, normal hair patterns, no masses or tenderness.  Ears, Nose, and Throat: No gross abnormalities. No pallor or cyanosis.   Eyes: EOMS intact, PERRL, conjunctivae and lids unremarkable. Fundoscopic exam and visual fields not performed.   Neck: No palpable masses or adenopathy, no thyromegaly, no JVD, carotid pulses are full and equal bilaterally and without  Bruits.     Cardiovascular: Regular rhythm, S1 and S2 normal, no S3 or S4.  No murmurs, gallops, or rubs detected.     Pulmonary/Chest: Chest: normal symmetry,  normal respiratory excursion, no intercostal retraction, no use of accessory muscles.            Pulmonary: Normal breath sounds. No rales or ronchi.    Abdominal: Abdomen soft, bowel sounds normoactive, no masses, no hepatosplenomegaly, non-tender, no bruits.     Musculoskeletal: No deformities, clubbing, cyanosis, erythema, or edema observed.     Neurological: No gross motor or sensory deficits noted, affect appropriate, oriented to time, person, place.     Skin: Warm and dry to the touch, no apparent skin lesions or masses noted.     Psychiatric: She has a normal mood and affect. Her behavior is normal. Judgment and thought content normal.          Procedures      Lab Results   Component Value Date    WBC 12.81 (H) 11/04/2019    HGB 8.3 (L) 11/04/2019    HCT 24.5 (L) 11/04/2019    MCV 88.1 11/04/2019     11/04/2019     Lab Results   Component Value Date    GLUCOSE 271 (H) 12/04/2019    BUN 10 12/04/2019    CREATININE 0.71 12/04/2019    EGFRIFNONA 87 12/04/2019    BCR 14.1 12/04/2019    CO2 27.0 12/04/2019    CALCIUM 9.7 12/04/2019    ALBUMIN 4.00 11/02/2019    AST 12 11/02/2019    ALT 16 11/02/2019     No results found for: CHOL  Lab Results   Component Value Date    TRIG 141 10/28/2016    TRIG 114 05/07/2015     Lab Results   Component Value Date    HDL 31 (L) 10/28/2016    HDL 35 (L) 05/07/2015     No components found for: LDLCALC  Lab Results   Component Value Date    LDL 88 10/28/2016     (H) 05/07/2015     No results found for: HDLLDLRATIO  No components found for: CHOLHDL  Lab Results   Component Value Date    HGBA1C 12.4 (H) 05/08/2015     Lab Results   Component Value Date    TSH 1.36 10/28/2016           ASSESSMENT AND PLAN  Yudi West has multiple medical issues and no clinical evidence of angina, arrhythmia or congestive heart failure. Her present medicines including antiplatelet therapy with aspirin and Plavix, antianginal therapy with Ranexa, lipid-lowering therapy with atorvastatin, antihypertensive therapy with Coreg have been continued.  Smoking cessation was once again stressed.    Yudi was seen today for follow-up.    Diagnoses and all orders for this visit:    Coronary artery disease involving native coronary artery of native heart without angina pectoris    Essential hypertension    Mixed hyperlipidemia        Patient's Body mass index is 29.62 kg/m². BMI is above normal parameters. Recommendations include: exercise counseling and nutrition counseling.      I advised Yudi of the risks of continuing to use tobacco, and I provided her with tobacco cessation educational materials in the After Visit Summary.      During this visit, I spent 3 minutes counseling the patient regarding tobacco cessation.      Lewis Johnson MD  12/31/2019  9:55 AM

## 2020-01-22 DIAGNOSIS — S72.452D CLOSED DISPLACED SUPRACONDYLAR FRACTURE OF DISTAL END OF LEFT FEMUR WITHOUT INTRACONDYLAR EXTENSION WITH ROUTINE HEALING, SUBSEQUENT ENCOUNTER: Primary | ICD-10-CM

## 2020-01-23 ENCOUNTER — OFFICE VISIT (OUTPATIENT)
Dept: ORTHOPEDIC SURGERY | Facility: CLINIC | Age: 50
End: 2020-01-23

## 2020-01-23 VITALS
HEART RATE: 96 BPM | WEIGHT: 178.9 LBS | TEMPERATURE: 98.5 F | BODY MASS INDEX: 29.81 KG/M2 | OXYGEN SATURATION: 97 % | HEIGHT: 65 IN

## 2020-01-23 DIAGNOSIS — S72.452D CLOSED DISPLACED SUPRACONDYLAR FRACTURE OF DISTAL END OF LEFT FEMUR WITHOUT INTRACONDYLAR EXTENSION WITH ROUTINE HEALING, SUBSEQUENT ENCOUNTER: Primary | ICD-10-CM

## 2020-01-23 PROCEDURE — 99024 POSTOP FOLLOW-UP VISIT: CPT | Performed by: ORTHOPAEDIC SURGERY

## 2020-01-23 NOTE — PROGRESS NOTES
"Yudi West is a 49 y.o. female is s/p  Femur Intramedullary Nailing Retrograde - Left     Chief Complaint   Patient presents with   • Left Femur - Follow-up       HISTORY OF PRESENT ILLNESS: Patient is here today s/p Femur Intramedullary Nailing Retrograde - Left on 11/3/19. She was sent to xray upon arrival. She states that her pain is 6/10.       Allergies   Allergen Reactions   • Sulfa Antibiotics Itching         Current Outpatient Medications:   •  aspirin 81 MG EC tablet, Take 1 tablet by mouth 2 (Two) Times a Day With Meals., Disp: 60 tablet, Rfl: 0  •  atorvastatin (LIPITOR) 40 MG tablet, Take 40 mg by mouth Every Night., Disp: , Rfl: 3  •  carvedilol (COREG) 6.25 MG tablet, Take 6.25 mg by mouth 2 (Two) Times a Day With Meals., Disp: , Rfl:   •  clopidogrel (PLAVIX) 75 MG tablet, Take 75 mg by mouth Daily., Disp: , Rfl:   •  EASY TOUCH INSULIN SYRINGE 28G X 1/2\" 0.5 ML misc, USE 4 TIMES DAILY, Disp: , Rfl: 3  •  gabapentin (NEURONTIN) 400 MG capsule, Take 400 mg by mouth 3 (Three) Times a Day., Disp: , Rfl:   •  HYDROcodone-acetaminophen (NORCO) 7.5-325 MG per tablet, 1 by mouth twice daily PRN severe pain, Disp: 40 tablet, Rfl: 0  •  insulin glargine (LANTUS) 100 UNIT/ML injection, Inject 50 Units under the skin into the appropriate area as directed Daily., Disp: , Rfl:   •  omeprazole (priLOSEC) 20 MG capsule, Take 20 mg by mouth 2 (Two) Times a Day., Disp: , Rfl:   •  polyethylene glycol (MIRALAX) packet, Take 17 g by mouth Daily., Disp: , Rfl:   •  ranolazine (RANEXA) 500 MG 12 hr tablet, Take 1 tablet by mouth 2 (Two) Times a Day., Disp: 60 tablet, Rfl: 6  •  sertraline (ZOLOFT) 50 MG tablet, Take 50 mg by mouth Daily., Disp: , Rfl:   •  UNIFINE PENTIPS 31G X 8 MM misc, USE AS DIRECTED WITH SOLOSTAR ONCE DAILY, Disp: , Rfl: 3  •  VENTOLIN  (90 BASE) MCG/ACT inhaler, 2 puffs 4 (Four) Times a Day., Disp: , Rfl: 1  •  zolpidem (AMBIEN) 10 MG tablet, Take 10 mg by mouth Every Night., Disp: " , Rfl:     No fevers or chills.  No nausea or vomiting.      PHYSICAL EXAMINATION:       Yudi West is a 49 y.o. female     Mrs. West had her initial surgery on 3 November.  Pain is slowly improving.  She said she is now receiving pain management from Dr. Bravo only.        GAIT:     []  Normal  [x]  Antalgic    Assistive device: [x]  None  []  Walker     []  Crutches  []  Cane     []  Wheelchair  []  Stretcher    Ortho Exam she is alert and in no apparent distress at rest.  She is cheerful.  Motion of the left knee is smooth but painfully restricted from about 15 to 85 degrees.  Passive extension of the knee is to 5 to 10 degrees of flexion.  Xr Knee 1 Or 2 View Left    Result Date: 1/23/2020  Narrative: Ordering Provider:  Howie Campoverde MD Ordering Diagnosis/Indication:  Closed displaced supracondylar fracture of distal end of left femur without intracondylar extension with routine healing, subsequent encounter Procedure:  XR KNEE 1 OR 2 VW LEFT Exam Date:  1/23/20 COMPARISON: Exam of the knee dated 26 December 2019.     Impression:  Radiographs of the left knee AP and lateral views done today show a total knee arthroplasty in satisfactory position.  There is an intramedullary nail in place table stabilizing a supracondylar fracture of the femur.  Fracture position is unchanged from previous studies.  There is suggestion of early callus formation. Howie Campoverde MD 1/23/20    Xr Knee 1 Or 2 View Left    Result Date: 12/26/2019  Narrative: Ordering Provider:  Howie Campoverde MD Ordering Diagnosis/Indication:  Closed displaced supracondylar fracture of distal end of left femur without intracondylar extension with routine healing, subsequent encounter Procedure:  XR KNEE 1 OR 2 VW LEFT Exam Date:  12/26/19 COMPARISON: Exam of the knee dated Orth of December 2019.     Impression:  Radiographs of the left knee AP and lateral views done today show a supracondylar fracture of the femur  stabilized with a retrograde nail.  Position of the fracture is unchanged from previous studies.  There is blurring of the fracture.  Hardware alignment remains satisfactory. Howie Campoverde MD 12/26/19          ASSESSMENT: Healing supracondylar fracture left femur.  Sounds like she is been doing her home exercise only occasionally.  She was again encouraged in her home exercise concentrating on passive extension of the knee.    Return here in 6 weeks for x-rays of the knee AP and lateral views.    Diagnoses and all orders for this visit:    Closed displaced supracondylar fracture of distal end of left femur without intracondylar extension with routine healing, subsequent encounter        Patient's Body mass index is 29.77 kg/m². BMI is within normal parameters. No follow-up required..  PLAN    Return in about 6 weeks (around 3/5/2020).    Howie Campoverde MD

## 2020-02-28 DIAGNOSIS — S72.452D CLOSED DISPLACED SUPRACONDYLAR FRACTURE OF DISTAL END OF LEFT FEMUR WITHOUT INTRACONDYLAR EXTENSION WITH ROUTINE HEALING, SUBSEQUENT ENCOUNTER: Primary | ICD-10-CM

## 2020-03-03 ENCOUNTER — OFFICE VISIT (OUTPATIENT)
Dept: ORTHOPEDIC SURGERY | Facility: CLINIC | Age: 50
End: 2020-03-03

## 2020-03-03 VITALS
SYSTOLIC BLOOD PRESSURE: 116 MMHG | HEIGHT: 65 IN | OXYGEN SATURATION: 97 % | DIASTOLIC BLOOD PRESSURE: 74 MMHG | TEMPERATURE: 96 F | WEIGHT: 179.5 LBS | BODY MASS INDEX: 29.91 KG/M2 | HEART RATE: 85 BPM

## 2020-03-03 DIAGNOSIS — M70.62 TROCHANTERIC BURSITIS OF LEFT HIP: ICD-10-CM

## 2020-03-03 DIAGNOSIS — S72.322D CLOSED DISPLACED TRANSVERSE FRACTURE OF SHAFT OF LEFT FEMUR WITH ROUTINE HEALING, SUBSEQUENT ENCOUNTER: ICD-10-CM

## 2020-03-03 DIAGNOSIS — S72.452D CLOSED DISPLACED SUPRACONDYLAR FRACTURE OF DISTAL END OF LEFT FEMUR WITHOUT INTRACONDYLAR EXTENSION WITH ROUTINE HEALING, SUBSEQUENT ENCOUNTER: Primary | ICD-10-CM

## 2020-03-03 DIAGNOSIS — S72.452A CLOSED DISPLACED SUPRACONDYLAR FRACTURE OF DISTAL END OF LEFT FEMUR WITHOUT INTRACONDYLAR EXTENSION, INITIAL ENCOUNTER (HCC): ICD-10-CM

## 2020-03-03 PROCEDURE — 99024 POSTOP FOLLOW-UP VISIT: CPT | Performed by: ORTHOPAEDIC SURGERY

## 2020-03-03 NOTE — PROGRESS NOTES
"Yudi West is a 49 y.o. female returns for     Chief Complaint   Patient presents with   • Left Femur - Follow-up       HISTORY OF PRESENT ILLNESS: Follow up on left femur. Pain level of 6/10.  Mrs. West had her injury 4 months ago.  Her pain is slowly improving.  Primary complaint is that of pain over the lateral aspect of the hip which she says is been present since her fracture.  The pain is decreased by crossing her legs and worse when lying on her left side.  She is no longer using a cane or walker.       CONCURRENT MEDICAL HISTORY:    The following portions of the patient's history were reviewed and updated as appropriate: allergies, current medications, past family history, past medical history, past social history, past surgical history and problem list.         PHYSICAL EXAMINATION:       /74 (BP Location: Left arm, Patient Position: Sitting, Cuff Size: Adult)   Pulse 85   Temp 96 °F (35.6 °C) (Temporal)   Ht 165.1 cm (65\")   Wt 81.4 kg (179 lb 8 oz)   SpO2 97%   BMI 29.87 kg/m²     Physical Exam she is alert cheerful and in no apparent distress.    GAIT:     []  Normal  [x]  Antalgic    Assistive device: [x]  None  []  Walker     []  Crutches  []  Cane     []  Wheelchair  []  Stretcher    Ortho Exam she walks with a moderately antalgic gait favoring the left.  Left knee motion is smooth from about 5 to 105 degrees.  There is no warmth erythema about the limb.  There is moderate tenderness diffusely over the lateral aspect of the pelvis and proximal to mid thigh most prominent over the greater trochanter.    Xr Knee 1 Or 2 View Left    Result Date: 3/3/2020  Ordering Provider:  Howie Campoverde MD Ordering Diagnosis/Indication:  Closed displaced supracondylar fracture of distal end of left femur without intracondylar extension with routine healing, subsequent encounter Procedure:  XR KNEE 1 OR 2 VW LEFT Exam Date:  3/3/20 COMPARISON:  Exam of the knee dated 23 Jan 2020      " Radiographs of the knee AP and lateral views done today show stable position of her fracture and hardware.  There has been increase in callus formation. Howie Campoverde MD 3/3/20      Xr Knee 1 Or 2 View Left    Result Date: 3/3/2020  Narrative: Ordering Provider:  Howie Campoverde MD Ordering Diagnosis/Indication:  Closed displaced supracondylar fracture of distal end of left femur without intracondylar extension with routine healing, subsequent encounter Procedure:  XR KNEE 1 OR 2 VW LEFT Exam Date:  3/3/20 COMPARISON:  Exam of the knee dated 23 Jan 2020     Impression:  Radiographs of the knee AP and lateral views done today show stable position of her fracture and hardware.  There has been increase in callus formation. Howie Campoverde MD 3/3/20            ASSESSMENT: 1 healing fracture distal left femur status post retrograde nailing.  2 left trochanteric bursitis.    The natural history of the disorders and treatment options were reviewed.  She may continue unrestricted activities.  She was instructed in touching exercises for the iliotibial band and use of oral anti-inflammatory medications.  She understands if her hip symptoms do not improve injection therapy may be of benefit.    No routine follow-up is needed for her femur fracture.  Return here as needed for continued  hip symptoms.    Diagnoses and all orders for this visit:    Closed displaced supracondylar fracture of distal end of left femur without intracondylar extension with routine healing, subsequent encounter    Closed displaced transverse fracture of shaft of left femur with routine healing, subsequent encounter    Closed displaced supracondylar fracture of distal end of left femur without intracondylar extension, initial encounter ([facility])    Trochanteric bursitis of left hip          PLAN    Patient's Body mass index is 29.87 kg/m². BMI is .    Return if symptoms worsen or fail to improve.    Howie Campoverde MD

## 2020-06-09 NOTE — PROGRESS NOTES
"   Subjective:  Yudi West is a 49 y.o. female who presents for       Patient Active Problem List   Diagnosis   • Coronary artery disease involving native coronary artery of native heart without angina pectoris   • Myocardial infarction, old   • Hypertension   • Type 1 diabetes mellitus (CMS/[facility])   • Mixed hyperlipidemia   • COPD (chronic obstructive pulmonary disease) (CMS/[facility])   • Dyspnea on exertion   • S/p nephrectomy   • Precordial pain   • Femur fracture (CMS/[facility])   • Closed displaced supracondylar fracture without intracondylar extension of lower end of left femur (CMS/[facility])   • Anemia, posthemorrhagic, acute   • Painful orthopaedic hardware (CMS/[facility])   • Closed displaced transverse fracture of shaft of femur with routine healing   • Overweight   • Diabetic neuropathy (CMS/[facility])   • Tobacco abuse counseling   • Tobacco user   • Class 1 obesity with serious comorbidity and body mass index (BMI) of 30.0 to 30.9 in adult   • High risk medication use           Current Outpatient Medications:   •  aspirin 81 MG EC tablet, Take 1 tablet by mouth 2 (Two) Times a Day With Meals., Disp: 60 tablet, Rfl: 0  •  atorvastatin (LIPITOR) 40 MG tablet, Take 40 mg by mouth Every Night., Disp: , Rfl: 3  •  carvedilol (COREG) 6.25 MG tablet, Take 6.25 mg by mouth 2 (Two) Times a Day With Meals., Disp: , Rfl:   •  clopidogrel (PLAVIX) 75 MG tablet, Take 75 mg by mouth Daily., Disp: , Rfl:   •  EASY TOUCH INSULIN SYRINGE 28G X 1/2\" 0.5 ML misc, USE 4 TIMES DAILY, Disp: , Rfl: 3  •  gabapentin (NEURONTIN) 400 MG capsule, Take 400 mg by mouth 3 (Three) Times a Day., Disp: , Rfl:   •  HYDROcodone-acetaminophen (NORCO) 7.5-325 MG per tablet, 1 by mouth twice daily PRN severe pain, Disp: 40 tablet, Rfl: 0  •  insulin glargine (LANTUS) 100 UNIT/ML injection, Inject 50 Units under the skin into the appropriate area as directed Daily., Disp: , Rfl:   •  omeprazole (priLOSEC) 20 MG capsule, Take 20 mg by mouth 2 (Two) Times a " Day., Disp: , Rfl:   •  polyethylene glycol (MIRALAX) packet, Take 17 g by mouth Daily., Disp: , Rfl:   •  ranolazine (RANEXA) 500 MG 12 hr tablet, Take 1 tablet by mouth 2 (Two) Times a Day., Disp: 60 tablet, Rfl: 6  •  sertraline (ZOLOFT) 50 MG tablet, Take 50 mg by mouth Daily., Disp: , Rfl:   •  UNIFINE PENTIPS 31G X 8 MM misc, USE AS DIRECTED WITH SOLOSTAR ONCE DAILY, Disp: , Rfl: 3  •  VENTOLIN  (90 BASE) MCG/ACT inhaler, 2 puffs 4 (Four) Times a Day., Disp: , Rfl: 1  •  zolpidem (AMBIEN) 10 MG tablet, Take 10 mg by mouth Every Night., Disp: , Rfl:           Pt is 50 yo female with management of her being overweight, DM type 12 HLP, HTN, diabetic neuropathy, insomnia, major depression CAD sp stent  Echocardiogram on 6/3/19 (grade 1 diastolic dysfunction) ,  History of MI COPD, history of fracture of femur, sp surgery on left femur, sp cholecystectomy, sp hysterectomy, sp left nephrectomy,  History of renal cell carcnoma sp left total knee replacement surgery, sp tonsillectomy , diabetic neuropathy, DELFIN    6/17/20 pt is here to establish.   She is from [place]. Pt is former pt of [facility] has a CMH of MD type 1 and is on insulin. For HTN pt is taking coreg 6.125 mg PO BID. She is also on aspirin  81 mg daily plavix 75 mg daily,and ranexa 500 mg  PO q daily. or CAD. She does see Dr. Dover at [facility] as her Cadriologist  For GERD pt is taking prilosec 20 mg PO BID.  For insomnia pt is taking ambien 10 mg PO qhs for sleep, pt does smoke tobacco. She has had several surgeries see above.  She has a family history of hypertension and heart disease. Her son was diagnosed with Non-Hodgkin's lymphoma at age 12. currenlty he is in remission  Earlier this year pt suffered distal left femur fracture and is seeing Dr Campoverde as her Orthopedic surgeron. Pt also has COPD she has not seen Pulmonlogist. She only use albuterol PRN.. She ihappily   For 11 years. She is disabled due to underlying  physical and mental health.. She has smoked tobacco since 13 years of age.  She smokes currently 2 ppds. She does want to quit. She has tried wellbutrin. Sh was prescred chantix but pt has yet to try it  She also states she had history of left renal carcinoma diagnosed at age 38.  Do no have records here.  Had Surgery done with Dr. Agarwal.  She currently does not see Oncologist.  She also is due for mammogram. She also suffers from nausea/vomiting       Diabetes   She presents for her initial diabetic visit. She has type 2 diabetes mellitus. Her disease course has been stable. Hypoglycemia symptoms include tremors. Pertinent negatives for hypoglycemia include no confusion, dizziness, headaches, hunger, mood changes, nervousness/anxiousness, pallor, seizures, sleepiness, speech difficulty or sweats. Associated symptoms include fatigue, polyuria and weakness. Pertinent negatives for diabetes include no blurred vision, no chest pain, no foot paresthesias and no weight loss. Pertinent negatives for hypoglycemia complications include no blackouts, no hospitalization, no nocturnal hypoglycemia, no required assistance and no required glucagon injection. Symptoms are stable. Pertinent negatives for diabetic complications include no CVA, impotence or retinopathy. Risk factors for coronary artery disease include diabetes mellitus, dyslipidemia, sedentary lifestyle and tobacco exposure. She is compliant with treatment all of the time. Her weight is stable. She is following a generally unhealthy diet. She does not see a podiatrist.Eye exam is not current.   COPD   She complains of cough and shortness of breath. There is no chest tightness, difficulty breathing, frequent throat clearing, hemoptysis, hoarse voice or wheezing. This is a chronic problem. The current episode started more than 1 year ago. The problem occurs constantly. The problem has been unchanged. Associated symptoms include dyspnea on exertion. Pertinent negatives  include no appetite change, chest pain, ear congestion, ear pain, fever, headaches, heartburn, malaise/fatigue, myalgias, nasal congestion, orthopnea, PND, postnasal drip, rhinorrhea, sneezing, sore throat, sweats, trouble swallowing or weight loss. Her symptoms are alleviated by nothing. Her past medical history is significant for COPD. There is no history of asthma, bronchiectasis, bronchitis, emphysema or pneumonia.   Hypertension   This is a chronic problem. The current episode started more than 1 year ago. The problem has been waxing and waning since onset. The problem is uncontrolled. Associated symptoms include shortness of breath. Pertinent negatives include no anxiety, blurred vision, chest pain, headaches, malaise/fatigue, palpitations, PND or sweats. Risk factors for coronary artery disease include diabetes mellitus, dyslipidemia, sedentary lifestyle and smoking/tobacco exposure. Past treatments include beta blockers. Current antihypertension treatment includes beta blockers. The current treatment provides no improvement. There is no history of angina, kidney disease, CAD/MI, CVA, heart failure, left ventricular hypertrophy or retinopathy. There is no history of chronic renal disease, coarctation of the aorta, hyperaldosteronism, hypercortisolism, hyperparathyroidism, a hypertension causing med, pheochromocytoma, renovascular disease, sleep apnea or a thyroid problem.   Depression   Visit Type: initial  Onset of symptoms: at an unknown time  Patient presents with the following symptoms: compulsions, decreased concentration, depressed mood, excessive worry and shortness of breath.  Patient is not experiencing: anhedonia, chest pain, choking sensation, confusion, dizziness, dry mouth, fatigue, feelings of hopelessness, feelings of worthlessness, hypersomnia, hyperventilation, impotence, insomnia, irritability, malaise, memory impairment, muscle tension, nausea, nervousness/anxiety, obsessions, palpitations,  panic, psychomotor agitation, psychomotor retardation, restlessness, suicidal ideas, suicidal planning, thoughts of death, weight gain and weight loss.  Patient has a history of: depression  No history of: anemia, anxiety/panic attacks, arrhythmia, asthma, bipolar disorder, CAD, CHF, chronic lung disease, fibromyalgia, hyperthyroidism, suicide attempt, mental illness and substance abuse  Treatment tried: SSRI         Review of Systems  Review of Systems   Constitutional: Positive for activity change and fatigue. Negative for appetite change, chills, diaphoresis, fever, irritability, malaise/fatigue, weight gain and weight loss.   HENT: Negative for congestion, ear pain, hoarse voice, postnasal drip, rhinorrhea, sinus pressure, sinus pain, sneezing, sore throat, trouble swallowing and voice change.    Eyes: Negative for blurred vision.   Respiratory: Positive for cough and shortness of breath. Negative for hemoptysis, choking, chest tightness, wheezing and stridor.    Cardiovascular: Positive for dyspnea on exertion. Negative for chest pain, palpitations and PND.   Gastrointestinal: Positive for nausea and vomiting. Negative for diarrhea and heartburn.   Endocrine: Positive for polyuria.   Genitourinary: Negative for impotence.   Musculoskeletal: Positive for arthralgias. Negative for myalgias.   Skin: Negative for pallor.   Neurological: Positive for tremors and weakness. Negative for dizziness, seizures, speech difficulty and headaches.   Psychiatric/Behavioral: Positive for agitation, decreased concentration and sleep disturbance. Negative for confusion, substance abuse and suicidal ideas. The patient is not nervous/anxious and does not have insomnia.         Depressed mood        Patient Active Problem List   Diagnosis   • Coronary artery disease involving native coronary artery of native heart without angina pectoris   • Myocardial infarction, old   • Hypertension   • Type 1 diabetes mellitus (CMS/[facility])   • Mixed  hyperlipidemia   • COPD (chronic obstructive pulmonary disease) (CMS/[facility])   • Dyspnea on exertion   • S/p nephrectomy   • Precordial pain   • Femur fracture (CMS/[facility])   • Closed displaced supracondylar fracture without intracondylar extension of lower end of left femur (CMS/[facility])   • Anemia, posthemorrhagic, acute   • Painful orthopaedic hardware (CMS/[facility])   • Closed displaced transverse fracture of shaft of femur with routine healing   • Overweight   • Diabetic neuropathy (CMS/[facility])   • Tobacco abuse counseling   • Tobacco user   • Class 1 obesity with serious comorbidity and body mass index (BMI) of 30.0 to 30.9 in adult   • High risk medication use     Past Surgical History:   Procedure Laterality Date   • CORONARY STENT PLACEMENT     • FEMUR IM NAILING RETROGRADE Left 11/3/2019    Procedure: FEMUR INTRAMEDULLARY NAILING RETROGRADE;  Surgeon: Howie Campoverde MD;  Location: [facility];  Service: Orthopedics   • GALLBLADDER SURGERY     • HARDWARE REMOVAL Left 12/4/2019    Procedure: HARDWARE REMOVAL LEFT FEMUR        (C-ARM#3);  Surgeon: Howie Campoverde MD;  Location: [facility];  Service: Orthopedics   • HYSTERECTOMY     • NEPHRECTOMY Left    • REPLACEMENT TOTAL KNEE Left    • TONSILLECTOMY       Social History     Socioeconomic History   • Marital status:      Spouse name: Not on file   • Number of children: Not on file   • Years of education: Not on file   • Highest education level: Not on file   Tobacco Use   • Smoking status: Current Every Day Smoker     Packs/day: 2.00     Years: 36.00     Pack years: 72.00     Types: Cigarettes   • Smokeless tobacco: Never Used   Substance and Sexual Activity   • Alcohol use: No   • Drug use: No   • Sexual activity: Defer     Family History   Problem Relation Age of Onset   • Heart disease Mother    • Hypertension Mother    • Heart disease Father    • Hypertension Father      No visits with results within 6 Month(s) from this visit.   Latest known visit with  results is:   Admission on 12/04/2019, Discharged on 12/04/2019   Component Date Value Ref Range Status   • Glucose 12/04/2019 266* 70 - 130 mg/dL Final    Notify DoctorOperator: [id number] ANGY ERINMeter ID: [id number]   • Glucose 12/04/2019 271* 65 - 99 mg/dL Final   • BUN 12/04/2019 10  6 - 20 mg/dL Final   • Creatinine 12/04/2019 0.71  0.57 - 1.00 mg/dL Final   • Sodium 12/04/2019 138  136 - 145 mmol/L Final   • Potassium 12/04/2019 4.1  3.5 - 5.2 mmol/L Final   • Chloride 12/04/2019 101  98 - 107 mmol/L Final   • CO2 12/04/2019 27.0  22.0 - 29.0 mmol/L Final   • Calcium 12/04/2019 9.7  8.6 - 10.5 mg/dL Final   • eGFR Non  Amer 12/04/2019 87  >60 mL/min/1.73 Final   • BUN/Creatinine Ratio 12/04/2019 14.1  7.0 - 25.0 Final   • Anion Gap 12/04/2019 10.0  5.0 - 15.0 mmol/L Final   • Hardware / Foreign Body Culture 12/04/2019 No growth at 3 days   Final   • Gram Stain 12/04/2019 Few (2+) WBCs seen   Final   • Gram Stain 12/04/2019 No organisms seen   Final   • Glucose 12/04/2019 96  70 - 130 mg/dL Final    : [id number] SHANON KEBEDEMeter ID: [id number]      XR Knee 1 or 2 View Left  Narrative: Ordering Provider:  Howie Campoverde MD  Ordering Diagnosis/Indication:  Closed displaced supracondylar fracture of   distal end of left femur without intracondylar extension with routine   healing, subsequent encounter    Procedure:  XR KNEE 1 OR 2 VW LEFT  Exam Date:  3/3/20    COMPARISON:  Exam of the knee dated 23 Jan 2020  Impression:  Radiographs of the knee AP and lateral views done today show   stable position of her fracture and hardware.  There has been increase in   callus formation.    Howie Campoverde MD  3/3/20    [unfilled]    There is no immunization history on file for this patient.    The following portions of the patient's history were reviewed and updated as appropriate: allergies, current medications, past family history, past medical history, past social history, past  "surgical history and problem list.        Physical Exam  /70 (BP Location: Right arm, Patient Position: Sitting, Cuff Size: Adult)   Pulse 84   Temp 98.1 °F (36.7 °C) (Temporal)   Ht 165.1 cm (65\")   Wt 81.8 kg (180 lb 6.4 oz)   SpO2 98%   BMI 30.02 kg/m²     Physical Exam   Constitutional: She is oriented to person, place, and time. She appears well-developed and well-nourished.   HENT:   Head: Normocephalic and atraumatic.   Right Ear: External ear normal.   Eyes: Pupils are equal, round, and reactive to light. Conjunctivae and EOM are normal.   Neck: Normal range of motion. Neck supple.   Cardiovascular: Normal rate, regular rhythm and normal heart sounds.   No murmur heard.  Pulmonary/Chest: No respiratory distress.   Decreased breath sounds    Abdominal: Soft. Bowel sounds are normal. She exhibits no distension. There is no tenderness.   Musculoskeletal: Normal range of motion. She exhibits tenderness. She exhibits no edema or deformity.   Neurological: She is alert and oriented to person, place, and time. No cranial nerve deficit.   Skin: Skin is warm. No rash noted. She is not diaphoretic. No erythema. No pallor.   Psychiatric: She has a normal mood and affect. Her behavior is normal.   Nursing note and vitals reviewed.      Assessment/Plan    Diagnosis Plan   1. Type 1 diabetes mellitus with other specified complication (CMS/[facility])  CBC Auto Differential    Comprehensive Metabolic Panel    Hemoglobin A1c    Lipid Panel    TSH    T4, Free    T3, Free    Vitamin D 25 Hydroxy    Vitamin B12    Microalbumin / Creatinine Urine Ratio - Urine, Clean Catch   2. Coronary artery disease involving native coronary artery of native heart without angina pectoris  CBC Auto Differential    Comprehensive Metabolic Panel    Hemoglobin A1c    Lipid Panel    TSH    T4, Free    T3, Free    Vitamin D 25 Hydroxy    Vitamin B12    Microalbumin / Creatinine Urine Ratio - Urine, Clean Catch   3. Chronic obstructive " pulmonary disease, unspecified COPD type (CMS/[facility])  CBC Auto Differential    Comprehensive Metabolic Panel    Hemoglobin A1c    Lipid Panel    TSH    T4, Free    T3, Free    Vitamin D 25 Hydroxy    Vitamin B12    Microalbumin / Creatinine Urine Ratio - Urine, Clean Catch   4. Essential hypertension  CBC Auto Differential    Comprehensive Metabolic Panel    Hemoglobin A1c    Lipid Panel    TSH    T4, Free    T3, Free    Vitamin D 25 Hydroxy    Vitamin B12    Microalbumin / Creatinine Urine Ratio - Urine, Clean Catch   5. Mixed hyperlipidemia  CBC Auto Differential    Comprehensive Metabolic Panel    Hemoglobin A1c    Lipid Panel    TSH    T4, Free    T3, Free    Vitamin D 25 Hydroxy    Vitamin B12    Microalbumin / Creatinine Urine Ratio - Urine, Clean Catch   6. Other diabetic neurological complication associated with type 1 diabetes mellitus (CMS/[facility])  CBC Auto Differential    Comprehensive Metabolic Panel    Hemoglobin A1c    Lipid Panel    TSH    T4, Free    T3, Free    Vitamin D 25 Hydroxy    Vitamin B12    Microalbumin / Creatinine Urine Ratio - Urine, Clean Catch   7. Encounter for screening for endocrine disorder  CBC Auto Differential    Comprehensive Metabolic Panel    Hemoglobin A1c    Lipid Panel    TSH    T4, Free    T3, Free    Vitamin D 25 Hydroxy    Vitamin B12    Microalbumin / Creatinine Urine Ratio - Urine, Clean Catch   8. General medical examination     9. Annual physical exam  CBC Auto Differential    Comprehensive Metabolic Panel    Hemoglobin A1c    Lipid Panel    TSH    T4, Free    T3, Free    Vitamin D 25 Hydroxy    Vitamin B12    Microalbumin / Creatinine Urine Ratio - Urine, Clean Catch   10. Tobacco user     11. Tobacco abuse counseling     12. Class 1 obesity with serious comorbidity and body mass index (BMI) of 30.0 to 30.9 in adult, unspecified obesity type     13. High risk medication use            -recommend labwork  -annual physical exam today  -recommend mammogram screening  -HTN  -   Stable On coreg 6.25 mg PO BID  -HLP - on lipitor 40 mg PO qhs  -diabetic neuropathy - on neurontin 400 mg PO TID   -allergic rhinitis - 10 mg daily.  -chronic pain - pt sees Pain Managmeent on Percoet 7.5/325 mg every 6 hours PRN along with neurontin 300 mg PO TID  -tobacco user -counseled quit smoking >5 minutes. Start on wellbutrin  mg daily. Star nicotine patch   -nausea/vomiting - possible gastroparesis. Consider EGD/colonsocopy   -sp  Left nephrectomy/history of renal cell carcinoma-  Consider  Oncology referral  -GERD - on prilosec 20 mg PO BID  -CAD sp stent /grade 1 diastolic dysfunction - Cardiology following on aspirin 81 mg PO BID, plavix 75 mg PO q daily. Coreg 6.25 mg PO BID, lipitor 40 mg PO qhs, ranexa 500 mg every 12 hours   -DM type 2  - was on Lantus 40 units daily. On januvia 100 mg columb[place];u      -COPD - on albuterol inhaler. adivsed to quit smoking. Will get chest x-ray.  Refer to Pulmonlogy for PFTs.    -diabetic neuropathy - on neurontin 400 mg pO TID  -major depression - on zoloft 50 mg PO q daily.  Stop zoloft start on wellbutrin   -insomnia - was on ambien. Advised pt to not take ambien anymore due to potential side effects with Percocet and neuroton. Recommend pt use  Melatonin  -obesity  - counseled weight loss >5 minutes  BMI at 30.2   -advised pt to be safe and call with questions and concerns  -advised pt to go to ER or call 911 if symptoms worrisome or severe  -advised pt to followup with specialist  And referrals  -advsied pt to be safe during COVID-19 pandemic  -total time with pt >25 minutes   -recheck in  2 weeks         This document has been electronically signed by Quintin Flores MD on June 17, 2020 13:46

## 2020-06-14 PROBLEM — E66.3 OVERWEIGHT: Status: ACTIVE | Noted: 2020-06-14

## 2020-06-14 PROBLEM — E11.40 DIABETIC NEUROPATHY: Status: ACTIVE | Noted: 2020-06-14

## 2020-06-17 ENCOUNTER — OFFICE VISIT (OUTPATIENT)
Dept: FAMILY MEDICINE CLINIC | Facility: CLINIC | Age: 50
End: 2020-06-17

## 2020-06-17 ENCOUNTER — LAB (OUTPATIENT)
Dept: LAB | Facility: HOSPITAL | Age: 50
End: 2020-06-17

## 2020-06-17 VITALS
HEART RATE: 84 BPM | HEIGHT: 65 IN | TEMPERATURE: 98.1 F | WEIGHT: 180.4 LBS | DIASTOLIC BLOOD PRESSURE: 70 MMHG | SYSTOLIC BLOOD PRESSURE: 108 MMHG | OXYGEN SATURATION: 98 % | BODY MASS INDEX: 30.06 KG/M2

## 2020-06-17 DIAGNOSIS — Z12.31 SCREENING MAMMOGRAM, ENCOUNTER FOR: ICD-10-CM

## 2020-06-17 DIAGNOSIS — Z00.00 GENERAL MEDICAL EXAMINATION: ICD-10-CM

## 2020-06-17 DIAGNOSIS — Z11.59 NEED FOR HEPATITIS C SCREENING TEST: ICD-10-CM

## 2020-06-17 DIAGNOSIS — Z00.00 ANNUAL PHYSICAL EXAM: ICD-10-CM

## 2020-06-17 DIAGNOSIS — J44.9 CHRONIC OBSTRUCTIVE PULMONARY DISEASE, UNSPECIFIED COPD TYPE (HCC): Chronic | ICD-10-CM

## 2020-06-17 DIAGNOSIS — Z72.0 TOBACCO USER: ICD-10-CM

## 2020-06-17 DIAGNOSIS — E11.8 CONTROLLED TYPE 2 DIABETES MELLITUS WITH COMPLICATION, WITH LONG-TERM CURRENT USE OF INSULIN (HCC): ICD-10-CM

## 2020-06-17 DIAGNOSIS — I10 ESSENTIAL HYPERTENSION: Chronic | ICD-10-CM

## 2020-06-17 DIAGNOSIS — E10.49 OTHER DIABETIC NEUROLOGICAL COMPLICATION ASSOCIATED WITH TYPE 1 DIABETES MELLITUS (HCC): ICD-10-CM

## 2020-06-17 DIAGNOSIS — Z13.29 ENCOUNTER FOR SCREENING FOR ENDOCRINE DISORDER: ICD-10-CM

## 2020-06-17 DIAGNOSIS — G47.00 INSOMNIA, UNSPECIFIED TYPE: ICD-10-CM

## 2020-06-17 DIAGNOSIS — E78.2 MIXED HYPERLIPIDEMIA: ICD-10-CM

## 2020-06-17 DIAGNOSIS — Z79.899 HIGH RISK MEDICATION USE: ICD-10-CM

## 2020-06-17 DIAGNOSIS — Z79.4 CONTROLLED TYPE 2 DIABETES MELLITUS WITH COMPLICATION, WITH LONG-TERM CURRENT USE OF INSULIN (HCC): ICD-10-CM

## 2020-06-17 DIAGNOSIS — I25.10 CORONARY ARTERY DISEASE INVOLVING NATIVE CORONARY ARTERY OF NATIVE HEART WITHOUT ANGINA PECTORIS: ICD-10-CM

## 2020-06-17 DIAGNOSIS — Z71.6 TOBACCO ABUSE COUNSELING: ICD-10-CM

## 2020-06-17 DIAGNOSIS — Z11.59 NEED FOR HEPATITIS C SCREENING TEST: Primary | ICD-10-CM

## 2020-06-17 DIAGNOSIS — R06.00 DYSPNEA, UNSPECIFIED TYPE: ICD-10-CM

## 2020-06-17 DIAGNOSIS — E66.9 CLASS 1 OBESITY WITH SERIOUS COMORBIDITY AND BODY MASS INDEX (BMI) OF 30.0 TO 30.9 IN ADULT, UNSPECIFIED OBESITY TYPE: ICD-10-CM

## 2020-06-17 PROBLEM — E66.811 CLASS 1 OBESITY WITH SERIOUS COMORBIDITY AND BODY MASS INDEX (BMI) OF 30.0 TO 30.9 IN ADULT: Status: ACTIVE | Noted: 2020-06-17

## 2020-06-17 PROCEDURE — 84681 ASSAY OF C-PEPTIDE: CPT

## 2020-06-17 PROCEDURE — 83036 HEMOGLOBIN GLYCOSYLATED A1C: CPT

## 2020-06-17 PROCEDURE — 80061 LIPID PANEL: CPT

## 2020-06-17 PROCEDURE — 84481 FREE ASSAY (FT-3): CPT

## 2020-06-17 PROCEDURE — 86803 HEPATITIS C AB TEST: CPT

## 2020-06-17 PROCEDURE — 84439 ASSAY OF FREE THYROXINE: CPT

## 2020-06-17 PROCEDURE — 99204 OFFICE O/P NEW MOD 45 MIN: CPT | Performed by: FAMILY MEDICINE

## 2020-06-17 PROCEDURE — 82570 ASSAY OF URINE CREATININE: CPT

## 2020-06-17 PROCEDURE — 85025 COMPLETE CBC W/AUTO DIFF WBC: CPT

## 2020-06-17 PROCEDURE — 82043 UR ALBUMIN QUANTITATIVE: CPT

## 2020-06-17 PROCEDURE — 82306 VITAMIN D 25 HYDROXY: CPT

## 2020-06-17 PROCEDURE — 84443 ASSAY THYROID STIM HORMONE: CPT

## 2020-06-17 PROCEDURE — 80053 COMPREHEN METABOLIC PANEL: CPT

## 2020-06-17 PROCEDURE — 82607 VITAMIN B-12: CPT

## 2020-06-17 RX ORDER — SITAGLIPTIN 100 MG/1
100 TABLET, FILM COATED ORAL DAILY
COMMUNITY
Start: 2020-04-23 | End: 2020-07-02 | Stop reason: SDUPTHER

## 2020-06-17 RX ORDER — MELOXICAM 15 MG/1
15 TABLET ORAL DAILY
COMMUNITY
Start: 2020-06-10 | End: 2021-01-26

## 2020-06-17 RX ORDER — ATORVASTATIN CALCIUM 80 MG/1
80 TABLET, FILM COATED ORAL NIGHTLY
COMMUNITY
Start: 2020-05-20 | End: 2020-07-01 | Stop reason: ALTCHOICE

## 2020-06-17 RX ORDER — INSULIN GLARGINE 100 [IU]/ML
100 INJECTION, SOLUTION SUBCUTANEOUS DAILY
COMMUNITY
End: 2020-09-09 | Stop reason: DRUGHIGH

## 2020-06-17 RX ORDER — ERGOCALCIFEROL 1.25 MG/1
50000 CAPSULE ORAL WEEKLY
COMMUNITY
Start: 2020-04-20 | End: 2020-07-02 | Stop reason: SDUPTHER

## 2020-06-17 RX ORDER — OXYCODONE AND ACETAMINOPHEN 7.5; 325 MG/1; MG/1
1 TABLET ORAL 3 TIMES DAILY
COMMUNITY
Start: 2020-06-15 | End: 2022-07-18

## 2020-06-17 RX ORDER — BUPROPION HYDROCHLORIDE 150 MG/1
150 TABLET ORAL DAILY
Qty: 30 TABLET | Refills: 3 | Status: SHIPPED | OUTPATIENT
Start: 2020-06-17 | End: 2020-07-02 | Stop reason: SDUPTHER

## 2020-06-17 RX ORDER — NICOTINE 21 MG/24HR
1 PATCH, TRANSDERMAL 24 HOURS TRANSDERMAL EVERY 24 HOURS
Qty: 30 PATCH | Refills: 3 | Status: SHIPPED | OUTPATIENT
Start: 2020-06-17 | End: 2020-07-02 | Stop reason: SDUPTHER

## 2020-06-17 RX ORDER — ONDANSETRON 8 MG/1
8 TABLET, ORALLY DISINTEGRATING ORAL EVERY 8 HOURS PRN
Qty: 90 TABLET | Refills: 3 | Status: SHIPPED | OUTPATIENT
Start: 2020-06-17 | End: 2020-07-02 | Stop reason: SDUPTHER

## 2020-06-17 RX ORDER — DOXYCYCLINE 100 MG/1
CAPSULE ORAL
COMMUNITY
Start: 2020-05-04 | End: 2020-06-17

## 2020-06-17 RX ORDER — CETIRIZINE HYDROCHLORIDE 10 MG/1
10 TABLET ORAL DAILY
COMMUNITY
Start: 2020-03-30 | End: 2020-07-02 | Stop reason: SDUPTHER

## 2020-06-17 RX ORDER — SERTRALINE HYDROCHLORIDE 100 MG/1
100 TABLET, FILM COATED ORAL DAILY
COMMUNITY
Start: 2020-05-20 | End: 2020-06-17

## 2020-06-17 RX ORDER — DICYCLOMINE HCL 20 MG
20 TABLET ORAL 3 TIMES DAILY PRN
COMMUNITY
Start: 2020-05-04 | End: 2020-07-02 | Stop reason: SDUPTHER

## 2020-06-17 RX ORDER — FAMOTIDINE 20 MG/1
20 TABLET, FILM COATED ORAL DAILY
COMMUNITY
Start: 2020-05-04 | End: 2020-07-02 | Stop reason: SDUPTHER

## 2020-06-17 RX ORDER — CARVEDILOL 3.12 MG/1
3.12 TABLET ORAL DAILY
COMMUNITY
Start: 2020-05-20 | End: 2020-07-02 | Stop reason: SDUPTHER

## 2020-06-17 RX ORDER — GABAPENTIN 300 MG/1
300 CAPSULE ORAL 4 TIMES DAILY
COMMUNITY
Start: 2020-06-10 | End: 2022-07-18

## 2020-06-17 NOTE — PATIENT INSTRUCTIONS
Stop zoloft     Start on wellbutrin 150 mg daily for depression  Start on chantix starting pack  Start on nicotine patch daily    followup with Cardiologist in July     Will send to lung doctor for COPD  Cut back on smoking  Get chest x-ray  Get labwork    Will get mammogram screening    recheckin  2 weeks     Nicotine skin patches  What is this medicine?  NICOTINE (MADELIN oh teen) helps people stop smoking. The patches replace the nicotine found in cigarettes and help to decrease withdrawal effects. They are most effective when used in combination with a stop-smoking program.  This medicine may be used for other purposes; ask your health care provider or pharmacist if you have questions.  COMMON BRAND NAME(S): Habitrol, Nicoderm CQ, Nicotrol  What should I tell my health care provider before I take this medicine?  They need to know if you have any of these conditions:  · diabetes  · heart disease, angina, irregular heartbeat or previous heart attack  · high blood pressure  · lung disease, including asthma  · overactive thyroid  · pheochromocytoma  · seizures or a history of seizures  · skin problems, like eczema  · stomach problems or ulcers  · an unusual or allergic reaction to nicotine, adhesives, other medicines, foods, dyes, or preservatives  · pregnant or trying to get pregnant  · breast-feeding  How should I use this medicine?  This medicine is for use on the skin. Follow the directions that come with the patches. Find an area of skin on your upper arm, chest, or back that is clean, dry, greaseless, undamaged and hairless. Wash hands with plain soap and water. Do not use anything that contains aloe, lanolin or glycerin as these may prevent the patch from sticking. Dry thoroughly. Remove the patch from the sealed pouch. Do not try to cut or trim the patch. Using your palm, press the patch firmly in place for 10 seconds to make sure that there is good contact with your skin. After applying the patch, wash your  hands. Change the patch every day, keeping to a regular schedule. When you apply a new patch, use a new area of skin. Wait at least 1 week before using the same area again.  Talk to your pediatrician regarding the use of this medicine in children. Special care may be needed.  Overdosage: If you think you have taken too much of this medicine contact a poison control center or emergency room at once.  NOTE: This medicine is only for you. Do not share this medicine with others.  What if I miss a dose?  If you forget to replace a patch, use it as soon as you can. Only use one patch at a time and do not leave on the skin for longer than directed. If a patch falls off, you can replace it, but keep to your schedule and remove the patch at the right time.  What may interact with this medicine?  · medicines for asthma  · medicines for blood pressure  · medicines for mental depression  This list may not describe all possible interactions. Give your health care provider a list of all the medicines, herbs, non-prescription drugs, or dietary supplements you use. Also tell them if you smoke, drink alcohol, or use illegal drugs. Some items may interact with your medicine.  What should I watch for while using this medicine?  You should begin using the nicotine patch the day you stop smoking. It is okay if you do not succeed at your attempt to quit and have a cigarette. You can still continue your quit attempt and keep using the product as directed. Just throw away your cigarettes and get back to your quit plan.  You can keep the patch in place during swimming, bathing, and showering. If your patch falls off during these activities, replace it.  When you first apply the patch, your skin may itch or burn. This should go away soon. When you remove a patch, the skin may look red, but this should only last for a few days. Call your doctor or health care professional if skin redness does not go away after 4 days, if your skin swells, or  if you get a rash.  If you are a diabetic and you quit smoking, the effects of insulin may be increased and you may need to reduce your insulin dose. Check with your doctor or health care professional about how you should adjust your insulin dose.  If you are going to have a magnetic resonance imaging (MRI) procedure, tell your MRI technician if you have this patch on your body. It must be removed before a MRI.  What side effects may I notice from receiving this medicine?  Side effects that you should report to your doctor or health care professional as soon as possible:  · allergic reactions like skin rash, itching or hives, swelling of the face, lips, or tongue  · breathing problems  · changes in hearing  · changes in vision  · chest pain  · cold sweats  · confusion  · fast, irregular heartbeat  · feeling faint or lightheaded, falls  · headache  · increased saliva  · skin redness that lasts more than 4 days  · stomach pain  · signs and symptoms of nicotine overdose like nausea; vomiting; dizziness; weakness; and rapid heartbeat  Side effects that usually do not require medical attention (report to your doctor or health care professional if they continue or are bothersome):  · diarrhea  · dry mouth  · hiccups  · irritability  · nervousness or restlessness  · trouble sleeping or vivid dreams  This list may not describe all possible side effects. Call your doctor for medical advice about side effects. You may report side effects to FDA at 7-167-FDA-4120.  Where should I keep my medicine?  Keep out of the reach of children.  Store at room temperature between 20 and 25 degrees C (68 and 77 degrees F). Protect from heat and light. Store in manufacturers packaging until ready to use. Throw away unused medicine after the expiration date. When you remove a patch, fold with sticky sides together; put in an empty opened pouch and throw away.  NOTE: This sheet is a summary. It may not cover all possible information. If you  have questions about this medicine, talk to your doctor, pharmacist, or health care provider.  © 2020 [employer] (2015-11-16 15:46:21)  Nicotine skin patches  What is this medicine?  NICOTINE (MADELIN oh teen) helps people stop smoking. The patches replace the nicotine found in cigarettes and help to decrease withdrawal effects. They are most effective when used in combination with a stop-smoking program.  This medicine may be used for other purposes; ask your health care provider or pharmacist if you have questions.  COMMON BRAND NAME(S): Habitrol, Nicoderm CQ, Nicotrol  What should I tell my health care provider before I take this medicine?  They need to know if you have any of these conditions:  · diabetes  · heart disease, angina, irregular heartbeat or previous heart attack  · high blood pressure  · lung disease, including asthma  · overactive thyroid  · pheochromocytoma  · seizures or a history of seizures  · skin problems, like eczema  · stomach problems or ulcers  · an unusual or allergic reaction to nicotine, adhesives, other medicines, foods, dyes, or preservatives  · pregnant or trying to get pregnant  · breast-feeding  How should I use this medicine?  This medicine is for use on the skin. Follow the directions that come with the patches. Find an area of skin on your upper arm, chest, or back that is clean, dry, greaseless, undamaged and hairless. Wash hands with plain soap and water. Do not use anything that contains aloe, lanolin or glycerin as these may prevent the patch from sticking. Dry thoroughly. Remove the patch from the sealed pouch. Do not try to cut or trim the patch. Using your palm, press the patch firmly in place for 10 seconds to make sure that there is good contact with your skin. After applying the patch, wash your hands. Change the patch every day, keeping to a regular schedule. When you apply a new patch, use a new area of skin. Wait at least 1 week before using the same area  again.  Talk to your pediatrician regarding the use of this medicine in children. Special care may be needed.  Overdosage: If you think you have taken too much of this medicine contact a poison control center or emergency room at once.  NOTE: This medicine is only for you. Do not share this medicine with others.  What if I miss a dose?  If you forget to replace a patch, use it as soon as you can. Only use one patch at a time and do not leave on the skin for longer than directed. If a patch falls off, you can replace it, but keep to your schedule and remove the patch at the right time.  What may interact with this medicine?  · medicines for asthma  · medicines for blood pressure  · medicines for mental depression  This list may not describe all possible interactions. Give your health care provider a list of all the medicines, herbs, non-prescription drugs, or dietary supplements you use. Also tell them if you smoke, drink alcohol, or use illegal drugs. Some items may interact with your medicine.  What should I watch for while using this medicine?  You should begin using the nicotine patch the day you stop smoking. It is okay if you do not succeed at your attempt to quit and have a cigarette. You can still continue your quit attempt and keep using the product as directed. Just throw away your cigarettes and get back to your quit plan.  You can keep the patch in place during swimming, bathing, and showering. If your patch falls off during these activities, replace it.  When you first apply the patch, your skin may itch or burn. This should go away soon. When you remove a patch, the skin may look red, but this should only last for a few days. Call your doctor or health care professional if skin redness does not go away after 4 days, if your skin swells, or if you get a rash.  If you are a diabetic and you quit smoking, the effects of insulin may be increased and you may need to reduce your insulin dose. Check with your  doctor or health care professional about how you should adjust your insulin dose.  If you are going to have a magnetic resonance imaging (MRI) procedure, tell your MRI technician if you have this patch on your body. It must be removed before a MRI.  What side effects may I notice from receiving this medicine?  Side effects that you should report to your doctor or health care professional as soon as possible:  · allergic reactions like skin rash, itching or hives, swelling of the face, lips, or tongue  · breathing problems  · changes in hearing  · changes in vision  · chest pain  · cold sweats  · confusion  · fast, irregular heartbeat  · feeling faint or lightheaded, falls  · headache  · increased saliva  · skin redness that lasts more than 4 days  · stomach pain  · signs and symptoms of nicotine overdose like nausea; vomiting; dizziness; weakness; and rapid heartbeat  Side effects that usually do not require medical attention (report to your doctor or health care professional if they continue or are bothersome):  · diarrhea  · dry mouth  · hiccups  · irritability  · nervousness or restlessness  · trouble sleeping or vivid dreams  This list may not describe all possible side effects. Call your doctor for medical advice about side effects. You may report side effects to FDA at 2-125-FDA-1134.  Where should I keep my medicine?  Keep out of the reach of children.  Store at room temperature between 20 and 25 degrees C (68 and 77 degrees F). Protect from heat and light. Store in manufacturers packaging until ready to use. Throw away unused medicine after the expiration date. When you remove a patch, fold with sticky sides together; put in an empty opened pouch and throw away.  NOTE: This sheet is a summary. It may not cover all possible information. If you have questions about this medicine, talk to your doctor, pharmacist, or health care provider.  © 2020 [employer] Standard (2015-11-16 15:46:21)  Bupropion tablets  (Depression/Mood Disorders)  What is this medicine?  BUPROPION (byoo PROE pee on) is used to treat depression.  This medicine may be used for other purposes; ask your health care provider or pharmacist if you have questions.  COMMON BRAND NAME(S): Wellbutrin  What should I tell my health care provider before I take this medicine?  They need to know if you have any of these conditions:  · an eating disorder, such as anorexia or bulimia  · bipolar disorder or psychosis  · diabetes or high blood sugar, treated with medication  · glaucoma  · heart disease, previous heart attack, or irregular heart beat  · head injury or brain tumor  · high blood pressure  · kidney or liver disease  · seizures  · suicidal thoughts or a previous suicide attempt  · Tourette's syndrome  · weight loss  · an unusual or allergic reaction to bupropion, other medicines, foods, dyes, or preservatives  · breast-feeding  · pregnant or trying to become pregnant  How should I use this medicine?  Take this medicine by mouth with a glass of water. Follow the directions on the prescription label. You can take it with or without food. If it upsets your stomach, take it with food. Take your medicine at regular intervals. Do not take your medicine more often than directed. Do not stop taking this medicine suddenly except upon the advice of your doctor. Stopping this medicine too quickly may cause serious side effects or your condition may worsen.  A special MedGuide will be given to you by the pharmacist with each prescription and refill. Be sure to read this information carefully each time.  Talk to your pediatrician regarding the use of this medicine in children. Special care may be needed.  Overdosage: If you think you have taken too much of this medicine contact a poison control center or emergency room at once.  NOTE: This medicine is only for you. Do not share this medicine with others.  What if I miss a dose?  If you miss a dose, take it as soon as  you can. If it is less than four hours to your next dose, take only that dose and skip the missed dose. Do not take double or extra doses.  What may interact with this medicine?  Do not take this medicine with any of the following medications:  · linezolid  · MAOIs like Azilect, Carbex, Eldepryl, Marplan, Nardil, and Parnate  · methylene blue (injected into a vein)  · other medicines that contain bupropion like Zyban  This medicine may also interact with the following medications:  · alcohol  · certain medicines for anxiety or sleep  · certain medicines for blood pressure like metoprolol, propranolol  · certain medicines for depression or psychotic disturbances  · certain medicines for HIV or AIDS like efavirenz, lopinavir, nelfinavir, ritonavir  · certain medicines for irregular heart beat like propafenone, flecainide  · certain medicines for Parkinson's disease like amantadine, levodopa  · certain medicines for seizures like carbamazepine, phenytoin, phenobarbital  · cimetidine  · clopidogrel  · cyclophosphamide  · digoxin  · furazolidone  · isoniazid  · nicotine  · orphenadrine  · procarbazine  · steroid medicines like prednisone or cortisone  · stimulant medicines for attention disorders, weight loss, or to stay awake  · tamoxifen  · theophylline  · thiotepa  · ticlopidine  · tramadol  · warfarin  This list may not describe all possible interactions. Give your health care provider a list of all the medicines, herbs, non-prescription drugs, or dietary supplements you use. Also tell them if you smoke, drink alcohol, or use illegal drugs. Some items may interact with your medicine.  What should I watch for while using this medicine?  Tell your doctor if your symptoms do not get better or if they get worse. Visit your doctor or healthcare provider for regular checks on your progress. Because it may take several weeks to see the full effects of this medicine, it is important to continue your treatment as prescribed  by your doctor.  This medicine may cause serious skin reactions. They can happen weeks to months after starting the medicine. Contact your healthcare provider right away if you notice fevers or flu-like symptoms with a rash. The rash may be red or purple and then turn into blisters or peeling of the skin. Or, you might notice a red rash with swelling of the face, lips or lymph nodes in your neck or under your arms.  Patients and their families should watch out for new or worsening thoughts of suicide or depression. Also watch out for sudden changes in feelings such as feeling anxious, agitated, panicky, irritable, hostile, aggressive, impulsive, severely restless, overly excited and hyperactive, or not being able to sleep. If this happens, especially at the beginning of treatment or after a change in dose, call your healthcare provider.  Avoid alcoholic drinks while taking this medicine. Drinking excessive alcoholic beverages, using sleeping or anxiety medicines, or quickly stopping the use of these agents while taking this medicine may increase your risk for a seizure.  Do not drive or use heavy machinery until you know how this medicine affects you. This medicine can impair your ability to perform these tasks.  Do not take this medicine close to bedtime. It may prevent you from sleeping.  Your mouth may get dry. Chewing sugarless gum or sucking hard candy, and drinking plenty of water may help. Contact your doctor if the problem does not go away or is severe.  What side effects may I notice from receiving this medicine?  Side effects that you should report to your doctor or health care professional as soon as possible:  · allergic reactions like skin rash, itching or hives, swelling of the face, lips, or tongue  · breathing problems  · changes in vision  · confusion  · elevated mood, decreased need for sleep, racing thoughts, impulsive behavior  · fast or irregular heartbeat  · hallucinations, loss of contact with  reality  · increased blood pressure  · rash, fever, and swollen lymph nodes  · redness, blistering, peeling, or loosening of the skin, including inside the mouth  · seizures  · suicidal thoughts or other mood changes  · unusually weak or tired  · vomiting  Side effects that usually do not require medical attention (report to your doctor or health care professional if they continue or are bothersome):  · constipation  · headache  · loss of appetite  · nausea  · tremors  · weight loss  This list may not describe all possible side effects. Call your doctor for medical advice about side effects. You may report side effects to FDA at 6-285-RSY-7209.  Where should I keep my medicine?  Keep out of the reach of children.  Store at room temperature between 20 and 25 degrees C (68 and 77 degrees F), away from direct sunlight and moisture. Keep tightly closed. Throw away any unused medicine after the expiration date.  NOTE: This sheet is a summary. It may not cover all possible information. If you have questions about this medicine, talk to your doctor, pharmacist, or health care provider.  © 2020 [employer] (2020-03-12 14:02:47)

## 2020-06-18 LAB
25(OH)D3 SERPL-MCNC: 41.5 NG/ML (ref 30–100)
ALBUMIN SERPL-MCNC: 4.2 G/DL (ref 3.5–5.2)
ALBUMIN UR-MCNC: 3.7 MG/DL
ALBUMIN/GLOB SERPL: 1.7 G/DL
ALP SERPL-CCNC: 127 U/L (ref 39–117)
ALT SERPL W P-5'-P-CCNC: 13 U/L (ref 1–33)
ANION GAP SERPL CALCULATED.3IONS-SCNC: 14.2 MMOL/L (ref 5–15)
AST SERPL-CCNC: 10 U/L (ref 1–32)
BASOPHILS # BLD AUTO: 0.1 10*3/MM3 (ref 0–0.2)
BASOPHILS NFR BLD AUTO: 1.4 % (ref 0–1.5)
BILIRUB SERPL-MCNC: 0.2 MG/DL (ref 0.2–1.2)
BUN BLD-MCNC: 12 MG/DL (ref 6–20)
BUN/CREAT SERPL: 10.6 (ref 7–25)
CALCIUM SPEC-SCNC: 9.2 MG/DL (ref 8.6–10.5)
CHLORIDE SERPL-SCNC: 97 MMOL/L (ref 98–107)
CHOLEST SERPL-MCNC: 171 MG/DL (ref 0–200)
CO2 SERPL-SCNC: 20.8 MMOL/L (ref 22–29)
CREAT BLD-MCNC: 1.13 MG/DL (ref 0.57–1)
CREAT UR-MCNC: 272.2 MG/DL
DEPRECATED RDW RBC AUTO: 38.2 FL (ref 37–54)
EOSINOPHIL # BLD AUTO: 0.51 10*3/MM3 (ref 0–0.4)
EOSINOPHIL NFR BLD AUTO: 7.1 % (ref 0.3–6.2)
ERYTHROCYTE [DISTWIDTH] IN BLOOD BY AUTOMATED COUNT: 11.9 % (ref 12.3–15.4)
GFR SERPL CREATININE-BSD FRML MDRD: 51 ML/MIN/1.73
GLOBULIN UR ELPH-MCNC: 2.5 GM/DL
GLUCOSE BLD-MCNC: 390 MG/DL (ref 65–99)
HBA1C MFR BLD: 11.3 % (ref 4.8–5.6)
HCT VFR BLD AUTO: 39.3 % (ref 34–46.6)
HCV AB SER DONR QL: NORMAL
HDLC SERPL-MCNC: 38 MG/DL (ref 40–60)
HGB BLD-MCNC: 13.3 G/DL (ref 12–15.9)
IMM GRANULOCYTES # BLD AUTO: 0.13 10*3/MM3 (ref 0–0.05)
IMM GRANULOCYTES NFR BLD AUTO: 1.8 % (ref 0–0.5)
LDLC SERPL CALC-MCNC: 104 MG/DL (ref 0–100)
LDLC/HDLC SERPL: 2.74 {RATIO}
LYMPHOCYTES # BLD AUTO: 2.55 10*3/MM3 (ref 0.7–3.1)
LYMPHOCYTES NFR BLD AUTO: 35.4 % (ref 19.6–45.3)
MCH RBC QN AUTO: 29.6 PG (ref 26.6–33)
MCHC RBC AUTO-ENTMCNC: 33.8 G/DL (ref 31.5–35.7)
MCV RBC AUTO: 87.3 FL (ref 79–97)
MICROALBUMIN/CREAT UR: 13.6 MG/G
MONOCYTES # BLD AUTO: 0.5 10*3/MM3 (ref 0.1–0.9)
MONOCYTES NFR BLD AUTO: 6.9 % (ref 5–12)
NEUTROPHILS # BLD AUTO: 3.42 10*3/MM3 (ref 1.7–7)
NEUTROPHILS NFR BLD AUTO: 47.4 % (ref 42.7–76)
NRBC BLD AUTO-RTO: 0 /100 WBC (ref 0–0.2)
PLATELET # BLD AUTO: 249 10*3/MM3 (ref 140–450)
PMV BLD AUTO: 11.2 FL (ref 6–12)
POTASSIUM BLD-SCNC: 5.2 MMOL/L (ref 3.5–5.2)
PROT SERPL-MCNC: 6.7 G/DL (ref 6–8.5)
RBC # BLD AUTO: 4.5 10*6/MM3 (ref 3.77–5.28)
SODIUM BLD-SCNC: 132 MMOL/L (ref 136–145)
T3FREE SERPL-MCNC: 2.87 PG/ML (ref 2–4.4)
T4 FREE SERPL-MCNC: 1.16 NG/DL (ref 0.93–1.7)
TRIGL SERPL-MCNC: 145 MG/DL (ref 0–150)
TSH SERPL DL<=0.05 MIU/L-ACNC: 0.82 UIU/ML (ref 0.27–4.2)
VIT B12 BLD-MCNC: 494 PG/ML (ref 211–946)
VLDLC SERPL-MCNC: 29 MG/DL (ref 5–40)
WBC NRBC COR # BLD: 7.21 10*3/MM3 (ref 3.4–10.8)

## 2020-06-19 LAB — C PEPTIDE SERPL-MCNC: 4.8 NG/ML (ref 1.1–4.4)

## 2020-07-01 ENCOUNTER — TELEPHONE (OUTPATIENT)
Dept: FAMILY MEDICINE CLINIC | Facility: CLINIC | Age: 50
End: 2020-07-01

## 2020-07-01 DIAGNOSIS — I25.10 CORONARY ARTERY DISEASE INVOLVING NATIVE CORONARY ARTERY OF NATIVE HEART WITHOUT ANGINA PECTORIS: Primary | ICD-10-CM

## 2020-07-01 RX ORDER — ROSUVASTATIN CALCIUM 20 MG/1
20 TABLET, COATED ORAL NIGHTLY
Qty: 30 TABLET | Refills: 3 | Status: SHIPPED | OUTPATIENT
Start: 2020-07-01 | End: 2020-10-19

## 2020-07-01 NOTE — TELEPHONE ENCOUNTER
----- Message from Quintin Flores MD sent at 6/20/2020 11:19 PM CDT -----  Please call pt    In regards to diabetes sugars very uncontrolled with hga1c at 11.30 and goal <7.0 please verify how much basaglar pt is currently taking Whatever she is taking now. She is to go up by 3 units every 3 days until her morning sugars before breakfast run . She is to check am sugars daily along with sugars 2 hours after lunch or dinner. Goal of after meal sugars <180.   Also will likely stop januvia and switch to trulicity or bydureon. Also will consider starting on jardiance medication     On CBC hemoglobin stable     On lipid panel LDL is high and not at goal <70. IF pt truly has been taking lipitor 80 mg pO qhs for a while go up to crestor 20 mg PO qhs with 30 pills and 3 refills.  Take with Coenzyme Q10 OTC to help reduce side effects     On CMP her sodium and chloride are low likely due to high sugars. Also alkaline phosphatase elevated and suspect fatty liver. IF pt agreeable would recommend US of liver at imaging center. Let me know and I will order this    On CMP GFR at 51 and she may have chronic kidney disease stage 3  Or acute kidney injury due to uncontrolled Diabetes and hypertension. Will discuss next visit     Recheck on next visit. Thanks

## 2020-07-01 NOTE — TELEPHONE ENCOUNTER
----- Message from Quintin Flores MD sent at 6/17/2020  6:14 PM CDT -----  Please junie pt    Chest x-ray shows benign non cancerous granulomas in lungs which are prior areas of infection    No active disease    Proceed with referral to Pulmonologist    Recheck on next visit. Thanks

## 2020-07-02 ENCOUNTER — OFFICE VISIT (OUTPATIENT)
Dept: FAMILY MEDICINE CLINIC | Facility: CLINIC | Age: 50
End: 2020-07-02

## 2020-07-02 VITALS — HEIGHT: 65 IN | BODY MASS INDEX: 30.02 KG/M2

## 2020-07-02 DIAGNOSIS — N28.9 RENAL IMPAIRMENT: Primary | ICD-10-CM

## 2020-07-02 DIAGNOSIS — E66.9 CLASS 1 OBESITY WITH SERIOUS COMORBIDITY AND BODY MASS INDEX (BMI) OF 30.0 TO 30.9 IN ADULT, UNSPECIFIED OBESITY TYPE: ICD-10-CM

## 2020-07-02 DIAGNOSIS — Z72.0 TOBACCO USER: ICD-10-CM

## 2020-07-02 DIAGNOSIS — N18.30 STAGE 3 CHRONIC KIDNEY DISEASE (HCC): ICD-10-CM

## 2020-07-02 DIAGNOSIS — I10 ESSENTIAL HYPERTENSION: Chronic | ICD-10-CM

## 2020-07-02 DIAGNOSIS — I25.10 CORONARY ARTERY DISEASE INVOLVING NATIVE CORONARY ARTERY OF NATIVE HEART WITHOUT ANGINA PECTORIS: ICD-10-CM

## 2020-07-02 DIAGNOSIS — J44.9 CHRONIC OBSTRUCTIVE PULMONARY DISEASE, UNSPECIFIED COPD TYPE (HCC): Chronic | ICD-10-CM

## 2020-07-02 DIAGNOSIS — E11.65 UNCONTROLLED TYPE 2 DIABETES MELLITUS WITH HYPERGLYCEMIA (HCC): ICD-10-CM

## 2020-07-02 PROCEDURE — 99214 OFFICE O/P EST MOD 30 MIN: CPT | Performed by: FAMILY MEDICINE

## 2020-07-02 RX ORDER — SYRINGE-NEEDLE,INSULIN,0.5 ML 28GX1/2"
SYRINGE, EMPTY DISPOSABLE MISCELLANEOUS
Qty: 100 EACH | Refills: 3 | Status: SHIPPED | OUTPATIENT
Start: 2020-07-02 | End: 2020-11-02

## 2020-07-02 RX ORDER — INSULIN GLARGINE 100 [IU]/ML
50 INJECTION, SOLUTION SUBCUTANEOUS DAILY
Status: CANCELLED | OUTPATIENT
Start: 2020-07-02

## 2020-07-02 RX ORDER — CARVEDILOL 3.12 MG/1
3.12 TABLET ORAL 2 TIMES DAILY WITH MEALS
Qty: 30 TABLET | Refills: 3 | Status: SHIPPED | OUTPATIENT
Start: 2020-07-02 | End: 2020-08-21

## 2020-07-02 RX ORDER — SITAGLIPTIN 100 MG/1
100 TABLET, FILM COATED ORAL DAILY
Qty: 30 TABLET | Refills: 3 | Status: SHIPPED | OUTPATIENT
Start: 2020-07-02 | End: 2020-08-05

## 2020-07-02 RX ORDER — INSULIN GLARGINE 100 [IU]/ML
INJECTION, SOLUTION SUBCUTANEOUS
Qty: 9 ML | Refills: 3 | Status: SHIPPED | OUTPATIENT
Start: 2020-07-02 | End: 2020-09-09

## 2020-07-02 RX ORDER — ONDANSETRON 8 MG/1
8 TABLET, ORALLY DISINTEGRATING ORAL EVERY 8 HOURS PRN
Qty: 90 TABLET | Refills: 3 | Status: SHIPPED | OUTPATIENT
Start: 2020-07-02 | End: 2020-10-05

## 2020-07-02 RX ORDER — ERGOCALCIFEROL 1.25 MG/1
50000 CAPSULE ORAL
Qty: 4 CAPSULE | Refills: 3 | Status: SHIPPED | OUTPATIENT
Start: 2020-07-02 | End: 2021-08-02 | Stop reason: SDUPTHER

## 2020-07-02 RX ORDER — BUSPIRONE HYDROCHLORIDE 10 MG/1
10 TABLET ORAL 3 TIMES DAILY
Qty: 90 TABLET | Refills: 3 | Status: SHIPPED | OUTPATIENT
Start: 2020-07-02 | End: 2020-08-05

## 2020-07-02 RX ORDER — FAMOTIDINE 20 MG/1
20 TABLET, FILM COATED ORAL 2 TIMES DAILY
Qty: 60 TABLET | Refills: 3 | Status: SHIPPED | OUTPATIENT
Start: 2020-07-02 | End: 2020-10-21

## 2020-07-02 RX ORDER — DICYCLOMINE HCL 20 MG
20 TABLET ORAL EVERY 6 HOURS
Qty: 120 TABLET | Refills: 3 | Status: SHIPPED | OUTPATIENT
Start: 2020-07-02 | End: 2023-02-02 | Stop reason: SDUPTHER

## 2020-07-02 RX ORDER — BUPROPION HYDROCHLORIDE 150 MG/1
150 TABLET ORAL DAILY
Qty: 30 TABLET | Refills: 3 | Status: SHIPPED | OUTPATIENT
Start: 2020-07-02 | End: 2020-08-05

## 2020-07-02 RX ORDER — CLOPIDOGREL BISULFATE 75 MG/1
75 TABLET ORAL DAILY
Qty: 30 TABLET | Refills: 3 | Status: SHIPPED | OUTPATIENT
Start: 2020-07-02 | End: 2020-10-21

## 2020-07-02 RX ORDER — NICOTINE 21 MG/24HR
1 PATCH, TRANSDERMAL 24 HOURS TRANSDERMAL EVERY 24 HOURS
Qty: 30 PATCH | Refills: 3 | Status: SHIPPED | OUTPATIENT
Start: 2020-07-02 | End: 2020-09-29

## 2020-07-02 RX ORDER — CETIRIZINE HYDROCHLORIDE 10 MG/1
10 TABLET ORAL DAILY
Qty: 30 TABLET | Refills: 3 | Status: SHIPPED | OUTPATIENT
Start: 2020-07-02 | End: 2020-10-21

## 2020-07-02 RX ORDER — INSULIN GLARGINE 100 [IU]/ML
100 INJECTION, SOLUTION SUBCUTANEOUS DAILY
Status: CANCELLED | OUTPATIENT
Start: 2020-07-02

## 2020-07-02 RX ORDER — BLOOD-GLUCOSE METER
KIT MISCELLANEOUS
Qty: 1 EACH | Refills: 1 | Status: SHIPPED | OUTPATIENT
Start: 2020-07-02 | End: 2020-07-08

## 2020-07-02 RX ORDER — PEN NEEDLE, DIABETIC 31 GX5/16"
NEEDLE, DISPOSABLE MISCELLANEOUS
Qty: 100 EACH | Refills: 3 | Status: SHIPPED | OUTPATIENT
Start: 2020-07-02 | End: 2020-11-02

## 2020-07-02 RX ORDER — RANOLAZINE 500 MG/1
500 TABLET, EXTENDED RELEASE ORAL 2 TIMES DAILY
Qty: 60 TABLET | Refills: 6 | Status: SHIPPED | OUTPATIENT
Start: 2020-07-02 | End: 2021-01-18

## 2020-07-02 RX ORDER — ALBUTEROL SULFATE 90 UG/1
2 AEROSOL, METERED RESPIRATORY (INHALATION)
Qty: 1 INHALER | Refills: 3 | Status: SHIPPED | OUTPATIENT
Start: 2020-07-02 | End: 2020-10-21

## 2020-07-08 RX ORDER — BLOOD-GLUCOSE METER
KIT MISCELLANEOUS
Qty: 1 EACH | Refills: 0 | Status: SHIPPED | OUTPATIENT
Start: 2020-07-08 | End: 2020-07-14

## 2020-07-14 RX ORDER — BLOOD-GLUCOSE METER
KIT MISCELLANEOUS
Qty: 1 EACH | Refills: 0 | Status: SHIPPED | OUTPATIENT
Start: 2020-07-14 | End: 2023-02-02 | Stop reason: SDUPTHER

## 2020-07-15 RX ORDER — BLOOD-GLUCOSE METER
KIT MISCELLANEOUS
Refills: 0 | OUTPATIENT
Start: 2020-07-15

## 2020-08-03 PROBLEM — F17.210 CIGARETTE NICOTINE DEPENDENCE, UNCOMPLICATED: Status: ACTIVE | Noted: 2020-08-03

## 2020-08-05 ENCOUNTER — OFFICE VISIT (OUTPATIENT)
Dept: FAMILY MEDICINE CLINIC | Facility: CLINIC | Age: 50
End: 2020-08-05

## 2020-08-05 VITALS
HEIGHT: 65 IN | HEART RATE: 94 BPM | BODY MASS INDEX: 30.22 KG/M2 | SYSTOLIC BLOOD PRESSURE: 122 MMHG | DIASTOLIC BLOOD PRESSURE: 80 MMHG | OXYGEN SATURATION: 95 % | WEIGHT: 181.4 LBS | TEMPERATURE: 97.5 F

## 2020-08-05 DIAGNOSIS — J44.9 CHRONIC OBSTRUCTIVE PULMONARY DISEASE, UNSPECIFIED COPD TYPE (HCC): Chronic | ICD-10-CM

## 2020-08-05 DIAGNOSIS — E11.65 UNCONTROLLED TYPE 2 DIABETES MELLITUS WITH HYPERGLYCEMIA (HCC): ICD-10-CM

## 2020-08-05 DIAGNOSIS — Z72.0 TOBACCO USER: ICD-10-CM

## 2020-08-05 DIAGNOSIS — N18.30 STAGE 3 CHRONIC KIDNEY DISEASE (HCC): ICD-10-CM

## 2020-08-05 DIAGNOSIS — G47.00 INSOMNIA, UNSPECIFIED TYPE: ICD-10-CM

## 2020-08-05 DIAGNOSIS — G89.29 CHRONIC LEFT SHOULDER PAIN: Primary | ICD-10-CM

## 2020-08-05 DIAGNOSIS — E66.09 CLASS 1 OBESITY DUE TO EXCESS CALORIES WITH SERIOUS COMORBIDITY AND BODY MASS INDEX (BMI) OF 30.0 TO 30.9 IN ADULT: ICD-10-CM

## 2020-08-05 DIAGNOSIS — M25.512 CHRONIC LEFT SHOULDER PAIN: Primary | ICD-10-CM

## 2020-08-05 DIAGNOSIS — J42 CHRONIC BRONCHITIS, UNSPECIFIED CHRONIC BRONCHITIS TYPE (HCC): ICD-10-CM

## 2020-08-05 DIAGNOSIS — I10 ESSENTIAL HYPERTENSION: Chronic | ICD-10-CM

## 2020-08-05 DIAGNOSIS — E78.2 MIXED HYPERLIPIDEMIA: ICD-10-CM

## 2020-08-05 PROCEDURE — 99214 OFFICE O/P EST MOD 30 MIN: CPT | Performed by: FAMILY MEDICINE

## 2020-08-05 RX ORDER — DOXEPIN HYDROCHLORIDE 25 MG/1
25 CAPSULE ORAL NIGHTLY
Qty: 30 CAPSULE | Refills: 3 | Status: SHIPPED | OUTPATIENT
Start: 2020-08-05 | End: 2020-09-22

## 2020-08-05 RX ORDER — AZITHROMYCIN 250 MG/1
TABLET, FILM COATED ORAL
Qty: 6 TABLET | Refills: 0 | Status: SHIPPED | OUTPATIENT
Start: 2020-08-05 | End: 2020-09-09

## 2020-08-05 RX ORDER — BUPROPION HYDROCHLORIDE 300 MG/1
300 TABLET ORAL DAILY
Qty: 30 TABLET | Refills: 3 | Status: SHIPPED | OUTPATIENT
Start: 2020-08-05 | End: 2020-09-22

## 2020-08-05 RX ORDER — BUSPIRONE HYDROCHLORIDE 15 MG/1
15 TABLET ORAL 3 TIMES DAILY
Qty: 90 TABLET | Refills: 3 | Status: SHIPPED | OUTPATIENT
Start: 2020-08-05 | End: 2020-09-22

## 2020-08-05 NOTE — PATIENT INSTRUCTIONS
Can go up on basaglar by 3 units every 3 days until morning sugars running .     After meal sugars check 2 hours after <180     Stop zoloft   Stop januvia    Continue on wellbutrin XL but go up  From 150 to 300 mg dialy.      Chronic Bronchitis, Adult  Chronic bronchitis is long-lasting inflammation of the tubes that carry air into your lungs (bronchial tubes). This is inflammation that occurs:  · On most days of the week.  · For at least three months at a time.  · Over a period of two years in a row.  When the bronchial tubes are inflamed, they start to produce mucus. The inflammation and buildup of mucus make it more difficult to breathe. Chronic bronchitis is usually a permanent problem. It is one type of chronic obstructive pulmonary disease (COPD). People with chronic bronchitis are more likely to get frequent colds or respiratory infections.  What are the causes?  Chronic bronchitis most often occurs in people who:  · Have chronic, severe asthma.  · Have a history of smoking.  · Have asthma and smoke.  · Have certain lung diseases.  · Have had long-term exposure to certain irritating fumes or chemicals.  What are the signs or symptoms?  Symptoms of chronic bronchitis may include:  · A cough that brings up mucus (productive cough).  · Shortness of breath.  · Loud breathing (wheezing).  · Chest discomfort.  · Frequent (recurring) colds or respiratory infections.  Certain things can trigger chronic bronchitis symptoms or make them worse, such as:  · Infections.  · Stopping certain medicines.  · Smoking.  · Exposure to chemicals.  How is this diagnosed?  This condition may be diagnosed based on:  · Your symptoms and medical history.  · A physical exam.  · A chest X-ray.  · Lung (pulmonary) function tests.  How is this treated?  There is no cure for chronic bronchitis, but treatment can help control your symptoms. Treatment may include:  · Using a cool mist vaporizer or humidifier to make it easier to  breathe.  · Drinking more fluids. Drinking more makes your mucus thinner, which may make it easier to breathe.  · Lifestyle changes, such as eating a healthier diet and getting more exercise.  · Medicines, such as:  ? Inhalers to improve air flow in and out of your lungs.  ? Antibiotics to treat any bacterial infections you have, such as:  § Lung infection (pneumonia).  § Sinus infection.  § A sudden, severe (acute) episode of bronchitis.  · Oxygen therapy.  · Preventing infections by keeping up to date on vaccinations, including the pneumonia and flu vaccines.  · Pulmonary rehabilitation. This is a program that helps you manage your breathing problems and improve your quality of life. It may last for up to 4-12 weeks and may include exercise programs, education, counseling, and treatment support.  Follow these instructions at home:  Medicines  · Take over-the-counter and prescription medicines only as told by your health care provider.  · If you were prescribed an antibiotic medicine, take it as told by your health care provider. Do not stop taking the antibiotic even if you start to feel better.  Preventing infections  · Get vaccinations as told by your health care provider. Make sure you get a flu shot (influenza vaccine) every year.  · Wash your hands often with soap and water. If soap and water are not available, use hand .  · Avoid contact with people who have symptoms of a cold or the flu.  Managing symptoms    · Do not smoke, and avoid secondhand smoke. Exposure to cigarette smoke or irritating chemicals will make bronchitis worse. If you smoke and you need help quitting, ask your health care provider. Quitting smoking will help your lungs heal faster.  · Use an inhaler, cool mist vaporizer, or humidifier as told by your health care provider.  · Avoid pollen, dust, animal dander, molds, smoke, and other things that cause shortness of breath or wheezing attacks.  · Use oxygen therapy at home as  directed. Follow instructions from your health care provider about how to use oxygen safely and take precautions to prevent fire. Make sure you never smoke while using oxygen or allow others to smoke in your home.  · Do not wait to get medical care if you have any concerning symptoms or trouble breathing. Waiting could cause permanent injury and may be life threatening.  General instructions  · Talk with your health care provider about what activities are safe for you and about possible exercise routines. Regular exercise is very important to help you feel better.  · Drink enough fluids to keep your urine pale yellow.  · Keep all follow-up visits as told by your health care provider. This is important.  Contact a health care provider if:  · You have coughing or shortness of breath that gets worse.  · You have muscle aches.  · You have chest pain.  · Your mucus seems to get thicker.  · Your mucus changes from clear or white to yellow, green, gray, or bloody.  Get help right away if:  · Your usual medicines do not stop your wheezing.  · You have severe difficulty breathing.  These symptoms may represent a serious problem that is an emergency. Do not wait to see if the symptoms will go away. Get medical help right away. Call your local emergency services (911 in the [place] Do not drive yourself to the hospital.  Summary  · Chronic bronchitis is long-lasting inflammation of the tubes that carry air into your lungs (bronchial tubes).  · Chronic bronchitis is usually a permanent problem. It is one type of chronic obstructive pulmonary disease (COPD).  · There is no cure for chronic bronchitis, but treatment can help control your symptoms.  · Do not smoke, and avoid secondhand smoke. Exposure to cigarette smoke or irritating chemicals will make bronchitis worse.  This information is not intended to replace advice given to you by your health care provider. Make sure you discuss any questions you have with your health care  provider.  Document Released: 10/05/2007 Document Revised: 10/10/2019 Document Reviewed: 11/07/2018  Elsevier Patient Education © 2020 [employer]

## 2020-08-06 ENCOUNTER — OFFICE VISIT (OUTPATIENT)
Dept: CARDIOLOGY | Facility: CLINIC | Age: 50
End: 2020-08-06

## 2020-08-06 VITALS
DIASTOLIC BLOOD PRESSURE: 70 MMHG | OXYGEN SATURATION: 98 % | BODY MASS INDEX: 30.82 KG/M2 | HEIGHT: 65 IN | WEIGHT: 185 LBS | HEART RATE: 87 BPM | SYSTOLIC BLOOD PRESSURE: 108 MMHG

## 2020-08-06 DIAGNOSIS — E78.2 MIXED HYPERLIPIDEMIA: ICD-10-CM

## 2020-08-06 DIAGNOSIS — I25.10 CORONARY ARTERY DISEASE INVOLVING NATIVE CORONARY ARTERY OF NATIVE HEART WITHOUT ANGINA PECTORIS: ICD-10-CM

## 2020-08-06 DIAGNOSIS — I10 ESSENTIAL HYPERTENSION: Primary | Chronic | ICD-10-CM

## 2020-08-06 DIAGNOSIS — Z72.0 TOBACCO USER: ICD-10-CM

## 2020-08-06 DIAGNOSIS — I73.9 CLAUDICATION OF LOWER EXTREMITY (HCC): ICD-10-CM

## 2020-08-06 PROCEDURE — 99214 OFFICE O/P EST MOD 30 MIN: CPT | Performed by: INTERNAL MEDICINE

## 2020-08-06 NOTE — PROGRESS NOTES
Yudi West  49 y.o. female    1. Essential hypertension    2. Coronary artery disease involving native coronary artery of native heart without angina pectoris    3. Mixed hyperlipidemia    4. Tobacco user    5. Claudication of lower extremity (CMS/[facility])        History of Present Illness:  Ms. West is here for follow-up of her above-stated problems.  The patient apparently changed her primary care physician and establish with Dr. Flores recently.  She ran out of her diabetes medications and her glucose was not under control.  She has restarted antidiabetic medications.  Extensive lab work was performed and results of these were reviewed.  LDL was 104.  Hemoglobin A1c was 11.3.    She has done well from a cardiac standpoint and denied any chest pain, shortness of breath, palpitation, dizziness or syncope.  Her predominant complaint relates to pain in the lower extremities at times on ambulation and she does have some tingling and numbness in the legs.  Though diabetes related neuropathy is likely, claudication cannot be ruled out.   She unfortunately continues to smoke, though less than a pack of cigarettes per day.  I have strongly advised her to quit.    She presented with acute inferior wall Myocardial Infarction in May 2015 and underwent cardiac catheterization which showed the following findings:     1.    Critical lesion noted in the right coronary artery which was the        infarct vessel and successful PCI with balloon angioplasty and        subsequent placement of a 3.5 x 23 mm Xience Xpedition stent and        reduction of stenosis to 0%.  2.    Critical lesion noted in one of the small subbranches of the ramus        intermedius vessel. This will be treated medically.  3.    Noncritical disease noted in the left anterior descending coronary        artery and left circumflex coronary artery.  4.    Overall normal contractility of the left ventricle with an        ejection fraction of 50% to 55%  with mild inferolateral        hypokinesis.     She was evaluated for intermittent episodes of atypical chest pain in June 2019.  A Lexiscan Cardiolite stress test showed no reversible ischemia.  · Findings consistent with an equivocal ECG stress test.  · Left ventricular ejection fraction is hyperdynamic (Calculated EF > 70%).  · Myocardial perfusion imaging indicates a normal myocardial perfusion study with no evidence of ischemia.  · Impressions are consistent with a low risk study.  Echocardiogram showed:  · Estimated EF = 58%.  · Left ventricular systolic function is normal.  · Left ventricular diastolic dysfunction (grade I) consistent with impaired       SUBJECTIVE    Allergies   Allergen Reactions   • Sulfa Antibiotics Itching         Past Medical History:   Diagnosis Date   • Anxiety and depression    • Arthritis    • Asthma    • Cancer of kidney (CMS/HCC)     left   • Chronic kidney disease    • COPD (chronic obstructive pulmonary disease) (CMS/[facility])    • Coronary artery disease     1 stent.  is followed by jaden   • Diabetes mellitus (CMS/[facility])    • GERD (gastroesophageal reflux disease)    • Hyperlipidemia    • Hypertension    • Myocardial infarction (CMS/[facility])    • Sleep apnea          Past Surgical History:   Procedure Laterality Date   • CORONARY STENT PLACEMENT     • FEMUR IM NAILING RETROGRADE Left 11/3/2019    Procedure: FEMUR INTRAMEDULLARY NAILING RETROGRADE;  Surgeon: Howie Campoverde MD;  Location: [facility] OR;  Service: Orthopedics   • GALLBLADDER SURGERY     • HARDWARE REMOVAL Left 12/4/2019    Procedure: HARDWARE REMOVAL LEFT FEMUR        (C-ARM#3);  Surgeon: Howie Campoverde MD;  Location: [facility];  Service: Orthopedics   • HYSTERECTOMY     • NEPHRECTOMY Left    • REPLACEMENT TOTAL KNEE Left    • TONSILLECTOMY           Family History   Problem Relation Age of Onset   • Heart disease Mother    • Hypertension Mother    • Cancer Mother    • Diabetes Mother    • Heart disease Father   "  • Hypertension Father          Social History     Socioeconomic History   • Marital status:      Spouse name: Not on file   • Number of children: Not on file   • Years of education: Not on file   • Highest education level: Not on file   Tobacco Use   • Smoking status: Current Every Day Smoker     Packs/day: 2.00     Years: 36.00     Pack years: 72.00     Types: Cigarettes   • Smokeless tobacco: Never Used   Substance and Sexual Activity   • Alcohol use: Not Currently   • Drug use: Not Currently   • Sexual activity: Defer         Current Outpatient Medications   Medication Sig Dispense Refill   • aspirin 81 MG EC tablet Take 1 tablet by mouth 2 (Two) Times a Day With Meals. 60 tablet 0   • azithromycin (Zithromax Z-Jose E) 250 MG tablet Take 2 tablets the first day, then 1 tablet daily for 4 days. 6 tablet 0   • Blood Glucose Monitoring Suppl (FREESTYLE FREEDOM LITE) w/Device kit USE TO TEST BLOOD SUGAR TWO TO THREE TI MES DAILY 1 each 0   • buPROPion XL (Wellbutrin XL) 300 MG 24 hr tablet Take 1 tablet by mouth Daily. 30 tablet 3   • busPIRone (BUSPAR) 15 MG tablet Take 1 tablet by mouth 3 (Three) Times a Day. 90 tablet 3   • carvedilol (COREG) 3.125 MG tablet Take 1 tablet by mouth 2 (Two) Times a Day With Meals. 30 tablet 3   • cetirizine (zyrTEC) 10 MG tablet Take 1 tablet by mouth Daily. 30 tablet 3   • clopidogrel (PLAVIX) 75 MG tablet Take 1 tablet by mouth Daily. 30 tablet 3   • dicyclomine (BENTYL) 20 MG tablet Take 1 tablet by mouth Every 6 (Six) Hours. 120 tablet 3   • doxepin (SINEquan) 25 MG capsule Take 1 capsule by mouth Every Night. 30 capsule 3   • EASY TOUCH INSULIN SYRINGE 28G X 1/2\" 0.5 ML misc Use at bedtime with insulin 100 each 3   • famotidine (PEPCID) 20 MG tablet Take 1 tablet by mouth 2 (Two) Times a Day. 60 tablet 3   • Fluticasone Furoate-Vilanterol (Breo Ellipta) 100-25 MCG/INH inhaler Inhale 1 puff Daily. 28 each 3   • gabapentin (NEURONTIN) 300 MG capsule Take 300 mg by mouth 4 " "(Four) Times a Day.     • glucose blood test strip Check sugars before breakfast and 2 hours after meal. Also give lancets 600 each 12   • Insulin Glargine (BASAGLAR KWIKPEN) 100 UNIT/ML injection pen Inject 100 Units under the skin into the appropriate area as directed Daily.     • Insulin Glargine (BASAGLAR KWIKPEN) 100 UNIT/ML injection pen Take 25 units at bedtime can go up by 2-3 unites every 3 days until am sugars running  9 mL 3   • meloxicam (MOBIC) 15 MG tablet Take 15 mg by mouth Daily.     • nicotine (Nicoderm CQ) 21 MG/24HR patch Place 1 patch on the skin as directed by provider Daily. 30 patch 3   • ondansetron ODT (Zofran ODT) 8 MG disintegrating tablet Place 1 tablet on the tongue Every 8 (Eight) Hours As Needed for Nausea or Vomiting. 90 tablet 3   • oxyCODONE-acetaminophen (PERCOCET) 7.5-325 MG per tablet Take 1 tablet by mouth 4 (Four) Times a Day.     • ranolazine (Ranexa) 500 MG 12 hr tablet Take 1 tablet by mouth 2 (Two) Times a Day. 60 tablet 6   • rosuvastatin (Crestor) 20 MG tablet Take 1 tablet by mouth Every Night. 30 tablet 3   • UNIFINE PENTIPS 31G X 8 MM misc Check sugars 2-3 times a day 100 each 3   • VENTOLIN  (90 Base) MCG/ACT inhaler Inhale 2 puffs 4 (Four) Times a Day. 1 inhaler 3   • vitamin D (ERGOCALCIFEROL) 1.25 MG (66194 UT) capsule capsule Take 1 capsule by mouth Every 7 (Seven) Days. 4 capsule 3     No current facility-administered medications for this visit.          OBJECTIVE    /70 (BP Location: Left arm, Patient Position: Sitting, Cuff Size: Adult)   Pulse 87   Ht 165.1 cm (65\")   Wt 83.9 kg (185 lb)   SpO2 98%   BMI 30.79 kg/m²         Review of Systems     Constitutional:  Denies recent weight loss, weight gain, fever or chills     HENT:  Denies any hearing loss, epistaxis, hoarseness, or difficulty speaking.     Eyes: Wears eyeglasses or contact lenses     Respiratory:  Dyspnea with exertion,no cough, wheezing, or hemoptysis.     Cardiovascular: " Occasional sharp chest pain, no orthopnea, PND, peripheral edema, syncope, or claudication.     Gastrointestinal:  Denies change in bowel habits, dyspepsia, ulcer disease, hematochezia, or melena.     Endocrine: Negative for cold intolerance, heat intolerance, polydipsia, polyphagia and polyuria.    Genitourinary: History of nephrectomy in the past      Musculoskeletal: DJD.  Recent surgery left femur    Skin:  Denies any change in hair or nails, rashes, or skin lesions.     Allergic/Immunologic: Negative.  Negative for environmental allergies, food allergies and immunocompromised state.     Neurological:  Denies any history of recurrent headaches, strokes, TIA, or seizure disorder.     Hematological: Denies any food allergies, seasonal allergies, bleeding disorders, or lymphadenopathy.     Psychiatric/Behavioral: history of depression/ anxiety, no substance abuse, or change in cognitive function.         Physical Exam     Constitutional: Cooperative, alert and oriented, in no acute distress.     HENT:   Head: Normocephalic, normal hair patterns, no masses or tenderness.  Ears, Nose, and Throat: No gross abnormalities. No pallor or cyanosis.   Eyes: EOMS intact, PERRL, conjunctivae and lids unremarkable. Fundoscopic exam and visual fields not performed.   Neck: No palpable masses or adenopathy, no thyromegaly, no JVD, carotid pulses are full and equal bilaterally and without  Bruits.     Cardiovascular: Regular rhythm, S1 and S2 normal, no S3 or S4.  No murmurs, gallops, or rubs detected.     Pulmonary/Chest: Chest: normal symmetry,  normal respiratory excursion, no intercostal retraction, no use of accessory muscles.            Pulmonary: Normal breath sounds. No rales or ronchi.    Abdominal: Abdomen soft, bowel sounds normoactive, no masses, no hepatosplenomegaly, non-tender, no bruits.     Musculoskeletal: No deformities, clubbing, cyanosis, erythema, or edema observed.     Neurological: No gross motor or  sensory deficits noted, affect appropriate, oriented to time, person, place.     Skin: Warm and dry to the touch, no apparent skin lesions or masses noted.     Psychiatric: She has a normal mood and affect. Her behavior is normal. Judgment and thought content normal.         Procedures      Lab Results   Component Value Date    WBC 7.21 06/17/2020    HGB 13.3 06/17/2020    HCT 39.3 06/17/2020    MCV 87.3 06/17/2020     06/17/2020     Lab Results   Component Value Date    GLUCOSE 390 (H) 06/17/2020    BUN 12 06/17/2020    CREATININE 1.13 (H) 06/17/2020    EGFRIFNONA 51 (L) 06/17/2020    BCR 10.6 06/17/2020    CO2 20.8 (L) 06/17/2020    CALCIUM 9.2 06/17/2020    ALBUMIN 4.20 06/17/2020    AST 10 06/17/2020    ALT 13 06/17/2020     Lab Results   Component Value Date    CHOL 171 06/17/2020     Lab Results   Component Value Date    TRIG 145 06/17/2020    TRIG 141 10/28/2016    TRIG 114 05/07/2015     Lab Results   Component Value Date    HDL 38 (L) 06/17/2020    HDL 31 (L) 10/28/2016    HDL 35 (L) 05/07/2015     No components found for: LDLCALC  Lab Results   Component Value Date     (H) 06/17/2020    LDL 88 10/28/2016     (H) 05/07/2015     No results found for: HDLLDLRATIO  No components found for: CHOLHDL  Lab Results   Component Value Date    HGBA1C 11.30 (H) 06/17/2020     Lab Results   Component Value Date    TSH 0.821 06/17/2020           ASSESSMENT AND PLAN  Yudi West has multiple medical issues but is stable from a cardiac standpoint with no clinical evidence of angina, arrhythmia or congestive heart failure. Her present medicines including antiplatelet therapy with aspirin and Plavix, antianginal therapy with Ranexa, lipid-lowering therapy with atorvastatin, antihypertensive therapy with Coreg have been continued.  Smoking cessation was once again stressed.    Yudi was seen today for follow-up.    Diagnoses and all orders for this visit:    Essential hypertension  -     Doppler  Arterial Multi Level Lower Extremity - Bilateral CAR; Future    Coronary artery disease involving native coronary artery of native heart without angina pectoris  -     Doppler Arterial Multi Level Lower Extremity - Bilateral CAR; Future    Mixed hyperlipidemia  -     Doppler Arterial Multi Level Lower Extremity - Bilateral CAR; Future    Tobacco user  -     Doppler Arterial Multi Level Lower Extremity - Bilateral CAR; Future    Claudication of lower extremity (CMS/HCC)  -     Doppler Arterial Multi Level Lower Extremity - Bilateral CAR; Future        Patient's Body mass index is 30.79 kg/m². BMI is above normal parameters. Recommendations include: exercise counseling and nutrition counseling.      I advised Yudi of the risks of continuing to use tobacco, and I provided her with tobacco cessation educational materials in the After Visit Summary.     During this visit, I spent 3 minutes counseling the patient regarding tobacco cessation.      Lewis Johnson MD  8/6/2020  13:25

## 2020-08-07 ENCOUNTER — TELEPHONE (OUTPATIENT)
Dept: FAMILY MEDICINE CLINIC | Facility: CLINIC | Age: 50
End: 2020-08-07

## 2020-08-07 NOTE — TELEPHONE ENCOUNTER
----- Message from Quintin Flores MD sent at 8/5/2020 12:58 PM CDT -----  Please call pt    Has degenerative changes of left AC joint proceed with Orthopedic referral    Recheck on next visit. Thanks

## 2020-08-21 RX ORDER — CARVEDILOL 3.12 MG/1
TABLET ORAL
Qty: 30 TABLET | Refills: 3 | Status: SHIPPED | OUTPATIENT
Start: 2020-08-21 | End: 2020-10-21

## 2020-09-02 ENCOUNTER — DOCUMENTATION (OUTPATIENT)
Dept: CARDIOLOGY | Facility: CLINIC | Age: 50
End: 2020-09-02

## 2020-09-08 ENCOUNTER — OFFICE VISIT (OUTPATIENT)
Dept: ORTHOPEDIC SURGERY | Facility: CLINIC | Age: 50
End: 2020-09-08

## 2020-09-08 VITALS
TEMPERATURE: 98.1 F | BODY MASS INDEX: 30.32 KG/M2 | HEIGHT: 65 IN | HEART RATE: 76 BPM | WEIGHT: 182 LBS | DIASTOLIC BLOOD PRESSURE: 92 MMHG | OXYGEN SATURATION: 98 % | RESPIRATION RATE: 18 BRPM | SYSTOLIC BLOOD PRESSURE: 153 MMHG

## 2020-09-08 DIAGNOSIS — M75.52 SUBACROMIAL BURSITIS OF LEFT SHOULDER JOINT: ICD-10-CM

## 2020-09-08 DIAGNOSIS — M25.512 LEFT SHOULDER PAIN, UNSPECIFIED CHRONICITY: Primary | ICD-10-CM

## 2020-09-08 PROCEDURE — 20610 DRAIN/INJ JOINT/BURSA W/O US: CPT | Performed by: ORTHOPAEDIC SURGERY

## 2020-09-08 PROCEDURE — 99214 OFFICE O/P EST MOD 30 MIN: CPT | Performed by: ORTHOPAEDIC SURGERY

## 2020-09-08 RX ORDER — TRIAMCINOLONE ACETONIDE 40 MG/ML
80 INJECTION, SUSPENSION INTRA-ARTICULAR; INTRAMUSCULAR
Status: COMPLETED | OUTPATIENT
Start: 2020-09-08 | End: 2020-09-08

## 2020-09-08 RX ORDER — LIDOCAINE HYDROCHLORIDE AND EPINEPHRINE 10; 10 MG/ML; UG/ML
2 INJECTION, SOLUTION INFILTRATION; PERINEURAL
Status: COMPLETED | OUTPATIENT
Start: 2020-09-08 | End: 2020-09-08

## 2020-09-08 RX ORDER — BUPIVACAINE HYDROCHLORIDE 5 MG/ML
3 INJECTION, SOLUTION PERINEURAL
Status: COMPLETED | OUTPATIENT
Start: 2020-09-08 | End: 2020-09-08

## 2020-09-08 RX ADMIN — BUPIVACAINE HYDROCHLORIDE 3 ML: 5 INJECTION, SOLUTION PERINEURAL at 08:57

## 2020-09-08 RX ADMIN — LIDOCAINE HYDROCHLORIDE AND EPINEPHRINE 2 ML: 10; 10 INJECTION, SOLUTION INFILTRATION; PERINEURAL at 08:57

## 2020-09-08 RX ADMIN — TRIAMCINOLONE ACETONIDE 80 MG: 40 INJECTION, SUSPENSION INTRA-ARTICULAR; INTRAMUSCULAR at 08:57

## 2020-09-09 ENCOUNTER — OFFICE VISIT (OUTPATIENT)
Dept: FAMILY MEDICINE CLINIC | Facility: CLINIC | Age: 50
End: 2020-09-09

## 2020-09-09 VITALS
HEIGHT: 65 IN | SYSTOLIC BLOOD PRESSURE: 128 MMHG | DIASTOLIC BLOOD PRESSURE: 86 MMHG | BODY MASS INDEX: 30.29 KG/M2 | HEART RATE: 91 BPM

## 2020-09-09 DIAGNOSIS — I10 ESSENTIAL HYPERTENSION: Chronic | ICD-10-CM

## 2020-09-09 DIAGNOSIS — N18.30 STAGE 3 CHRONIC KIDNEY DISEASE (HCC): ICD-10-CM

## 2020-09-09 DIAGNOSIS — E11.65 UNCONTROLLED TYPE 2 DIABETES MELLITUS WITH HYPERGLYCEMIA (HCC): ICD-10-CM

## 2020-09-09 DIAGNOSIS — J44.9 CHRONIC OBSTRUCTIVE PULMONARY DISEASE, UNSPECIFIED COPD TYPE (HCC): Chronic | ICD-10-CM

## 2020-09-09 DIAGNOSIS — Z72.0 TOBACCO USER: Primary | ICD-10-CM

## 2020-09-09 PROCEDURE — 99213 OFFICE O/P EST LOW 20 MIN: CPT | Performed by: FAMILY MEDICINE

## 2020-09-09 RX ORDER — ALPHA LIPOIC ACID 300 MG
CAPSULE ORAL
COMMUNITY
Start: 2020-08-28

## 2020-09-09 RX ORDER — BUPROPION HYDROCHLORIDE 150 MG/1
150 TABLET ORAL DAILY
COMMUNITY
Start: 2020-08-13 | End: 2020-09-09 | Stop reason: DRUGHIGH

## 2020-09-09 RX ORDER — BUSPIRONE HYDROCHLORIDE 10 MG/1
10 TABLET ORAL 3 TIMES DAILY
COMMUNITY
Start: 2020-08-28 | End: 2020-09-09 | Stop reason: DRUGHIGH

## 2020-09-09 RX ORDER — LORATADINE 10 MG/1
10 TABLET ORAL DAILY
COMMUNITY
Start: 2020-08-28 | End: 2021-02-08 | Stop reason: SDUPTHER

## 2020-09-09 RX ORDER — INSULIN GLARGINE 100 [IU]/ML
INJECTION, SOLUTION SUBCUTANEOUS
COMMUNITY
Start: 2020-07-02 | End: 2020-09-09 | Stop reason: DRUGHIGH

## 2020-09-09 RX ORDER — INSULIN GLARGINE 100 [IU]/ML
INJECTION, SOLUTION SUBCUTANEOUS
Qty: 9 ML | Refills: 3 | Status: SHIPPED | OUTPATIENT
Start: 2020-09-09 | End: 2021-02-17

## 2020-09-10 NOTE — PROGRESS NOTES
Subjective:  Yudi West is a 49 y.o. female who presents for       Patient Active Problem List   Diagnosis   • Coronary artery disease involving native coronary artery of native heart without angina pectoris   • Myocardial infarction, old   • Hypertension   • Type 1 diabetes mellitus (CMS/[facility])   • Mixed hyperlipidemia   • COPD (chronic obstructive pulmonary disease) (CMS/[facility])   • Dyspnea on exertion   • S/p nephrectomy   • Precordial pain   • Femur fracture (CMS/[facility])   • Closed displaced supracondylar fracture without intracondylar extension of lower end of left femur (CMS/[facility])   • Anemia, posthemorrhagic, acute   • Painful orthopaedic hardware (CMS/[facility])   • Closed displaced transverse fracture of shaft of femur with routine healing   • Overweight   • Diabetic neuropathy (CMS/[facility])   • Tobacco abuse counseling   • Tobacco user   • Class 1 obesity with serious comorbidity and body mass index (BMI) of 30.0 to 30.9 in adult   • High risk medication use   • Insomnia   • Dyspnea   • Renal impairment   • Uncontrolled type 2 diabetes mellitus with hyperglycemia (CMS/[facility])   • Cigarette nicotine dependence, uncomplicated   • Stage 3 chronic kidney disease (CMS/[facility])   • Chronic left shoulder pain   • Chronic bronchitis (CMS/[facility])   • Claudication of lower extremity (CMS/[facility])   • Left shoulder pain   • Severe episode of recurrent major depressive disorder, without psychotic features (CMS/[facility])   • KRISTOFER (generalized anxiety disorder)           Current Outpatient Medications:   •  Alpha-Lipoic Acid 300 MG capsule, TAKE ONE TO TWO CAPSULES BY MOUTH TWICE DAILY, Disp: , Rfl:   •  aspirin 81 MG EC tablet, Take 1 tablet by mouth 2 (Two) Times a Day With Meals., Disp: 60 tablet, Rfl: 0  •  Blood Glucose Monitoring Suppl (FREESTYLE FREEDOM LITE) w/Device kit, USE TO TEST BLOOD SUGAR TWO TO THREE TI MES DAILY, Disp: 1 each, Rfl: 0  •  buPROPion XL (Wellbutrin XL) 300 MG 24 hr tablet, Take 1 tablet by mouth Daily., Disp: 30  "tablet, Rfl: 3  •  busPIRone (BUSPAR) 15 MG tablet, Take 1 tablet by mouth 3 (Three) Times a Day., Disp: 90 tablet, Rfl: 3  •  carvedilol (COREG) 3.125 MG tablet, TAKE ONE TABLET BY MOUTH TWICE DAILY WITH MEALS, Disp: 30 tablet, Rfl: 3  •  cetirizine (zyrTEC) 10 MG tablet, Take 1 tablet by mouth Daily., Disp: 30 tablet, Rfl: 3  •  clopidogrel (PLAVIX) 75 MG tablet, Take 1 tablet by mouth Daily., Disp: 30 tablet, Rfl: 3  •  dicyclomine (BENTYL) 20 MG tablet, Take 1 tablet by mouth Every 6 (Six) Hours., Disp: 120 tablet, Rfl: 3  •  doxepin (SINEquan) 25 MG capsule, Take 1 capsule by mouth Every Night., Disp: 30 capsule, Rfl: 3  •  Dulaglutide (Trulicity) 0.75 MG/0.5ML solution pen-injector, Inject 0.75 mg under the skin into the appropriate area as directed 1 (One) Time Per Week., Disp: 4 pen, Rfl: 3  •  EASY TOUCH INSULIN SYRINGE 28G X 1/2\" 0.5 ML misc, Use at bedtime with insulin, Disp: 100 each, Rfl: 3  •  famotidine (PEPCID) 20 MG tablet, Take 1 tablet by mouth 2 (Two) Times a Day., Disp: 60 tablet, Rfl: 3  •  gabapentin (NEURONTIN) 300 MG capsule, Take 300 mg by mouth 4 (Four) Times a Day., Disp: , Rfl:   •  glucose blood test strip, Check sugars before breakfast and 2 hours after meal. Also give lancets, Disp: 600 each, Rfl: 12  •  Insulin Glargine (BASAGLAR KWIKPEN) 100 UNIT/ML injection pen, Take 39 units at bedtime can go up by 2-3 unites every 3 days until am sugars running , Disp: 9 mL, Rfl: 3  •  loratadine (CLARITIN) 10 MG tablet, Take 10 mg by mouth Daily., Disp: , Rfl:   •  meloxicam (MOBIC) 15 MG tablet, Take 15 mg by mouth Daily., Disp: , Rfl:   •  nicotine (Nicoderm CQ) 21 MG/24HR patch, Place 1 patch on the skin as directed by provider Daily., Disp: 30 patch, Rfl: 3  •  ondansetron ODT (Zofran ODT) 8 MG disintegrating tablet, Place 1 tablet on the tongue Every 8 (Eight) Hours As Needed for Nausea or Vomiting., Disp: 90 tablet, Rfl: 3  •  oxyCODONE-acetaminophen (PERCOCET) 7.5-325 MG per " tablet, Take 1 tablet by mouth 4 (Four) Times a Day., Disp: , Rfl:   •  ranolazine (Ranexa) 500 MG 12 hr tablet, Take 1 tablet by mouth 2 (Two) Times a Day., Disp: 60 tablet, Rfl: 6  •  rosuvastatin (Crestor) 20 MG tablet, Take 1 tablet by mouth Every Night., Disp: 30 tablet, Rfl: 3  •  UNIFINE PENTIPS 31G X 8 MM misc, Check sugars 2-3 times a day, Disp: 100 each, Rfl: 3  •  VENTOLIN  (90 Base) MCG/ACT inhaler, Inhale 2 puffs 4 (Four) Times a Day., Disp: 1 inhaler, Rfl: 3  •  vitamin D (ERGOCALCIFEROL) 1.25 MG (92192 UT) capsule capsule, Take 1 capsule by mouth Every 7 (Seven) Days., Disp: 4 capsule, Rfl: 3  •  Fluticasone Furoate-Vilanterol (Breo Ellipta) 200-25 MCG/INH inhaler, Inhale 1 puff Daily., Disp: 1 inhaler, Rfl: 3      Pt is 50 yo female with management of her being overweight, DM type 2 HLP, HTN, diabetic neuropathy, insomnia, major depression CAD sp stent  Echocardiogram on 6/3/19 (grade 1 diastolic dysfunction) ,  History of MI COPD, history of fracture of femur, sp surgery on left femur, sp cholecystectomy, sp hysterectomy, sp left nephrectomy,  History of renal cell carcnoma sp left total knee replacement surgery, sp tonsillectomy , diabetic neuropathy, DELFIN        9/9/20 telemedicin visit for recheck on pt's above medical issues. Since last visit pt has seen Cardiology Dr. Dover on 8/6/20 and had artierial lower exttremity doppler and RANDI of left and right leg was normal increased indices suggest calcification . She saw Orthopedic on 98/20 and has upcoming appt with Pulmonology and Cardiology later this month. Saw Orthopedic yesterday with Dr. Overton and received injection in left shoulder with kenalog and xylocaine.   BP has been stable. In regards to sugars she is now on Basaglar 39 units at bedtime. She states sugars run in 150s-170s in morning while taking this.  She has yet to try trulicity samples given on last visit . Breathing has improved with Breo inhaler given last visit. She  continues to smoke 1/2 ppd but has gradually cut back. She will have appt with Pulmonlogy soon later in September 9/16/20 telemedicine  visit for recheck on pt's above medical issues , pt is due for new labwork and colonoscopy screening.   She has been having URI  And was tested for COVID-19 and per patient she is doing better. She continues to have issues with cough/ congestion. She does have upcoming appt with Pulmonologist on 9/28/20. Her left shoulder is still hurting despite getting injection in shoulder with Orthopedic. BP has been stable. Sugars have  Been running 160-200 in morning .  She is now on 43 units of Basaglar. sghe is also taking trulicity 0.75 sub mg weekly. She is due for mammogram colonoscopy screening. She also has cut down to 1/2 ppd. Her anxiety and depression have been worse. She is not seeing counseling. States she is stressed with family and  drinking alcohol. He is verbally abusive       Anxiety  Presents for follow-up visit. Symptoms include depressed mood, excessive worry, insomnia, irritability, nervous/anxious behavior and shortness of breath. Patient reports no chest pain, compulsions, confusion, decreased concentration, dizziness, dry mouth, feeling of choking, hyperventilation, impotence, malaise, muscle tension, nausea, obsessions, palpitations, panic or restlessness. The quality of sleep is fair. Nighttime awakenings: none.        Diabetes   She presents for her initial diabetic visit. She has type 2 diabetes mellitus. Her disease course has been waxing and waning  Hypoglycemia symptoms include tremors. Pertinent negatives for hypoglycemia include no confusion, dizziness, headaches, hunger, mood changes, nervousness/anxiousness, pallor, seizures, sleepiness, speech difficulty or sweats. Associated symptoms include fatigue, polyuria and weakness. Pertinent negatives for diabetes include no blurred vision, no chest pain, no foot paresthesias and no weight loss.  Pertinent negatives for hypoglycemia complications include no blackouts, no hospitalization, no nocturnal hypoglycemia, no required assistance and no required glucagon injection. Symptoms are stable. Pertinent negatives for diabetic complications include no CVA, impotence or retinopathy. Risk factors for coronary artery disease include diabetes mellitus, dyslipidemia, sedentary lifestyle and tobacco exposure. She is compliant with treatment all of the time. Her weight is stable. She is following a generally unhealthy diet. She does not see a podiatrist.Eye exam is not current.   COPD   She complains of cough and shortness of breath. There is no chest tightness, difficulty breathing, frequent throat clearing, hemoptysis, hoarse voice or wheezing. This is a chronic problem. The current episode started more than 1 year ago. The problem occurs constantly. The problem has beenimproving  Associated symptoms include dyspnea on exertion. Pertinent negatives include no appetite change, chest pain, ear congestion, ear pain, fever, headaches, heartburn, malaise/fatigue, myalgias, nasal congestion, orthopnea, PND, postnasal drip, rhinorrhea, sneezing, sore throat, sweats, trouble swallowing or weight loss. Her symptoms are alleviated by nothing. Her past medical history is significant for COPD. There is no history of asthma, bronchiectasis, bronchitis, emphysema or pneumonia.   Hypertension   This is a chronic problem. The current episode started more than 1 year ago. The problem has been waxing and waning since onset. The problem is uncontrolled. Associated symptoms include shortness of breath. Pertinent negatives include no anxiety, blurred vision, chest pain, headaches, malaise/fatigue, palpitations, PND or sweats. Risk factors for coronary artery disease include diabetes mellitus, dyslipidemia, sedentary lifestyle and smoking/tobacco exposure. Past treatments include beta blockers. Current antihypertension treatment  includes beta blockers. The current treatment provides no improvement. There is no history of angina, kidney disease, CAD/MI, CVA, heart failure, left ventricular hypertrophy or retinopathy. There is no history of chronic renal disease, coarctation of the aorta, hyperaldosteronism, hypercortisolism, hyperparathyroidism, a hypertension causing med, pheochromocytoma, renovascular disease, sleep apnea or a thyroid problem.   Depression   Visit Type: followup  Onset of symptoms: at an unknown time  Patient presents with the following symptoms: compulsions, decreased concentration, depressed mood, excessive worry and shortness of breath.  Patient is not experiencing: anhedonia, chest pain, choking sensation, confusion, dizziness, dry mouth, fatigue, feelings of hopelessness, feelings of worthlessness, hypersomnia, hyperventilation, impotence, insomnia, irritability, malaise, memory impairment, muscle tension, nausea, nervousness/anxiety, obsessions, palpitations, panic, psychomotor agitation, psychomotor retardation, restlessness, suicidal ideas, suicidal planning, thoughts of death, weight gain and weight loss.  Patient has a history of: depression  No history of: anemia, anxiety/panic attacks, arrhythmia, asthma, bipolar disorder, CAD, CHF, chronic lung disease, fibromyalgia, hyperthyroidism, suicide attempt, mental illness and substance abuse  Treatment tried: SSRI, wellbutrin     Review of Systems  Review of Systems   Constitutional: Positive for activity change, fatigue and irritability. Negative for appetite change, chills, diaphoresis and fever.   HENT: Negative for congestion, postnasal drip, rhinorrhea, sinus pressure, sinus pain, sneezing, sore throat, trouble swallowing and voice change.    Respiratory: Positive for cough and shortness of breath. Negative for choking, chest tightness, wheezing and stridor.    Cardiovascular: Negative for chest pain and palpitations.   Gastrointestinal: Negative for diarrhea,  nausea and vomiting.   Genitourinary: Negative for impotence.   Musculoskeletal: Positive for arthralgias.   Neurological: Positive for weakness and numbness. Negative for dizziness and headaches.   Psychiatric/Behavioral: Negative for confusion and decreased concentration. The patient is nervous/anxious and has insomnia.         Depressed mood        Patient Active Problem List   Diagnosis   • Coronary artery disease involving native coronary artery of native heart without angina pectoris   • Myocardial infarction, old   • Hypertension   • Type 1 diabetes mellitus (CMS/[facility])   • Mixed hyperlipidemia   • COPD (chronic obstructive pulmonary disease) (CMS/[facility])   • Dyspnea on exertion   • S/p nephrectomy   • Precordial pain   • Femur fracture (CMS/[facility])   • Closed displaced supracondylar fracture without intracondylar extension of lower end of left femur (CMS/[facility])   • Anemia, posthemorrhagic, acute   • Painful orthopaedic hardware (CMS/[facility])   • Closed displaced transverse fracture of shaft of femur with routine healing   • Overweight   • Diabetic neuropathy (CMS/[facility])   • Tobacco abuse counseling   • Tobacco user   • Class 1 obesity with serious comorbidity and body mass index (BMI) of 30.0 to 30.9 in adult   • High risk medication use   • Insomnia   • Dyspnea   • Renal impairment   • Uncontrolled type 2 diabetes mellitus with hyperglycemia (CMS/[facility])   • Cigarette nicotine dependence, uncomplicated   • Stage 3 chronic kidney disease (CMS/[facility])   • Chronic left shoulder pain   • Chronic bronchitis (CMS/[facility])   • Claudication of lower extremity (CMS/[facility])   • Left shoulder pain   • Severe episode of recurrent major depressive disorder, without psychotic features (CMS/[facility])   • KRISTOFER (generalized anxiety disorder)     Past Surgical History:   Procedure Laterality Date   • CORONARY STENT PLACEMENT     • FEMUR IM NAILING RETROGRADE Left 11/3/2019    Procedure: FEMUR INTRAMEDULLARY NAILING RETROGRADE;  Surgeon: Howie Campoverde,  MD;  Location: [facility];  Service: Orthopedics   • GALLBLADDER SURGERY     • HARDWARE REMOVAL Left 12/4/2019    Procedure: HARDWARE REMOVAL LEFT FEMUR        (C-ARM#3);  Surgeon: Howie Campoverde MD;  Location: [facility];  Service: Orthopedics   • HYSTERECTOMY     • NEPHRECTOMY Left    • REPLACEMENT TOTAL KNEE Left    • TONSILLECTOMY       Social History     Socioeconomic History   • Marital status:      Spouse name: Not on file   • Number of children: Not on file   • Years of education: Not on file   • Highest education level: Not on file   Tobacco Use   • Smoking status: Current Every Day Smoker     Packs/day: 2.00     Years: 36.00     Pack years: 72.00     Types: Cigarettes   • Smokeless tobacco: Never Used   Substance and Sexual Activity   • Alcohol use: Not Currently   • Drug use: Not Currently   • Sexual activity: Defer     Family History   Problem Relation Age of Onset   • Heart disease Mother    • Hypertension Mother    • Cancer Mother    • Diabetes Mother    • Heart disease Father    • Hypertension Father      Hospital Outpatient Visit on 08/31/2020   Component Date Value Ref Range Status   • RIGHT LOW THIGH SYS MAX 08/31/2020 160  mmHg Final   • RIGHT POPLITEAL SYS MAX 08/31/2020 151  mmHg Final   • RIGHT DORSALIS PEDIS SYS MAX 08/31/2020 141  mmHg Final   • RIGHT POST TIBIAL SYS MAX 08/31/2020 147  mmHg Final   • RIGHT RANDI RATIO 08/31/2020 1.32   Final   • LEFT LOW THIGH SYS MAX 08/31/2020 159  mmHg Final   • LEFT POPLITEAL SYS MAX 08/31/2020 137  mmHg Final   • LEFT DORSALIS PEDIS SYS MAX 08/31/2020 137  mmHg Final   • LEFT POST TIBIAL SYS MAX 08/31/2020 146  mmHg Final   • LEFT RANDI RATIO 08/31/2020 1.32   Final   • Upper arterial right arm brachial * 08/31/2020 108  mmHg Final   • Upper arterial left arm brachial s* 08/31/2020 111  mmHg Final   Lab on 06/17/2020   Component Date Value Ref Range Status   • WBC 06/17/2020 7.21  3.40 - 10.80 10*3/mm3 Final   • RBC 06/17/2020 4.50  3.77 - 5.28  10*6/mm3 Final   • Hemoglobin 06/17/2020 13.3  12.0 - 15.9 g/dL Final   • Hematocrit 06/17/2020 39.3  34.0 - 46.6 % Final   • MCV 06/17/2020 87.3  79.0 - 97.0 fL Final   • MCH 06/17/2020 29.6  26.6 - 33.0 pg Final   • MCHC 06/17/2020 33.8  31.5 - 35.7 g/dL Final   • RDW 06/17/2020 11.9* 12.3 - 15.4 % Final   • RDW-SD 06/17/2020 38.2  37.0 - 54.0 fl Final   • MPV 06/17/2020 11.2  6.0 - 12.0 fL Final   • Platelets 06/17/2020 249  140 - 450 10*3/mm3 Final   • Neutrophil % 06/17/2020 47.4  42.7 - 76.0 % Final   • Lymphocyte % 06/17/2020 35.4  19.6 - 45.3 % Final   • Monocyte % 06/17/2020 6.9  5.0 - 12.0 % Final   • Eosinophil % 06/17/2020 7.1* 0.3 - 6.2 % Final   • Basophil % 06/17/2020 1.4  0.0 - 1.5 % Final   • Immature Grans % 06/17/2020 1.8* 0.0 - 0.5 % Final   • Neutrophils, Absolute 06/17/2020 3.42  1.70 - 7.00 10*3/mm3 Final   • Lymphocytes, Absolute 06/17/2020 2.55  0.70 - 3.10 10*3/mm3 Final   • Monocytes, Absolute 06/17/2020 0.50  0.10 - 0.90 10*3/mm3 Final   • Eosinophils, Absolute 06/17/2020 0.51* 0.00 - 0.40 10*3/mm3 Final   • Basophils, Absolute 06/17/2020 0.10  0.00 - 0.20 10*3/mm3 Final   • Immature Grans, Absolute 06/17/2020 0.13* 0.00 - 0.05 10*3/mm3 Final   • nRBC 06/17/2020 0.0  0.0 - 0.2 /100 WBC Final   • Glucose 06/17/2020 390* 65 - 99 mg/dL Final   • BUN 06/17/2020 12  6 - 20 mg/dL Final   • Creatinine 06/17/2020 1.13* 0.57 - 1.00 mg/dL Final   • Sodium 06/17/2020 132* 136 - 145 mmol/L Final   • Potassium 06/17/2020 5.2  3.5 - 5.2 mmol/L Final   • Chloride 06/17/2020 97* 98 - 107 mmol/L Final   • CO2 06/17/2020 20.8* 22.0 - 29.0 mmol/L Final   • Calcium 06/17/2020 9.2  8.6 - 10.5 mg/dL Final   • Total Protein 06/17/2020 6.7  6.0 - 8.5 g/dL Final   • Albumin 06/17/2020 4.20  3.50 - 5.20 g/dL Final   • ALT (SGPT) 06/17/2020 13  1 - 33 U/L Final   • AST (SGOT) 06/17/2020 10  1 - 32 U/L Final   • Alkaline Phosphatase 06/17/2020 127* 39 - 117 U/L Final   • Total Bilirubin 06/17/2020 0.2  0.2 - 1.2  mg/dL Final   • eGFR Non African Amer 06/17/2020 51* >60 mL/min/1.73 Final   • Globulin 06/17/2020 2.5  gm/dL Final   • A/G Ratio 06/17/2020 1.7  g/dL Final   • BUN/Creatinine Ratio 06/17/2020 10.6  7.0 - 25.0 Final   • Anion Gap 06/17/2020 14.2  5.0 - 15.0 mmol/L Final   • Hemoglobin A1C 06/17/2020 11.30* 4.80 - 5.60 % Final   • Total Cholesterol 06/17/2020 171  0 - 200 mg/dL Final   • Triglycerides 06/17/2020 145  0 - 150 mg/dL Final   • HDL Cholesterol 06/17/2020 38* 40 - 60 mg/dL Final   • LDL Cholesterol  06/17/2020 104* 0 - 100 mg/dL Final   • VLDL Cholesterol 06/17/2020 29  5 - 40 mg/dL Final   • LDL/HDL Ratio 06/17/2020 2.74   Final   • TSH 06/17/2020 0.821  0.270 - 4.200 uIU/mL Final   • Free T4 06/17/2020 1.16  0.93 - 1.70 ng/dL Final   • T3, Free 06/17/2020 2.87  2.00 - 4.40 pg/mL Final   • 25 Hydroxy, Vitamin D 06/17/2020 41.5  30.0 - 100.0 ng/ml Final   • Vitamin B-12 06/17/2020 494  211 - 946 pg/mL Final   • Microalbumin/Creatinine Ratio 06/17/2020 13.6  mg/g Final   • Creatinine, Urine 06/17/2020 272.2  mg/dL Final   • Microalbumin, Urine 06/17/2020 3.7  mg/dL Final   • C-Peptide 06/17/2020 4.8* 1.1 - 4.4 ng/mL Final    C-Peptide reference interval is for fasting patients.   • Hepatitis C Ab 06/17/2020 Non-Reactive  Non-Reactive Final      Doppler Arterial Multi Level Lower Extremity - Bilateral CAR  · Right Conclusion: The right RANDI appears normal.  · Left Conclusion: The left RANDI appears normal.  · Increased indice suggests calcification  · VPRs obtained       [unfilled]  Immunization History   Administered Date(s) Administered   • Flu Vaccine Split Quad 09/30/2017   • Influenza Quad Vaccine (Inpatient) 10/07/2016   • Pneumococcal Polysaccharide (PPSV23) 02/24/2010       The following portions of the patient's history were reviewed and updated as appropriate: allergies, current medications, past family history, past medical history, past social history, past surgical history and problem  "list.        Physical Exam  /80 (BP Location: Left arm, Patient Position: Sitting)   Ht 165.1 cm (65\")   BMI 30.29 kg/m²     Physical Exam   Neurological: She is alert.   Psychiatric: Her behavior is normal. Mood normal.   Nursing note and vitals reviewed.      Assessment/Plan    Diagnosis Plan   1. Severe episode of recurrent major depressive disorder, without psychotic features (CMS/[facility])  Ambulatory Referral to Behavioral Health   2. Encounter for screening colonoscopy  Ambulatory Referral to Gastroenterology   3. Encounter for eye exam  Ambulatory Referral for Diabetic Eye Exam-Ophthalmology   4. Uncontrolled type 2 diabetes mellitus with hyperglycemia (CMS/[facility])     5. Stage 3 chronic kidney disease (CMS/[facility])     6. Tobacco user     7. Chronic obstructive pulmonary disease, unspecified COPD type (CMS/[facility])     8. Coronary artery disease involving native coronary artery of native heart without angina pectoris     9. Mixed hyperlipidemia     10. KRISTOFER (generalized anxiety disorder)  Ambulatory Referral to Behavioral Health           -labwork before next visit    -recommend mammogram screening  -recommend pap smear  -recommend colonoscopy screening - will refer to Gastroenterology   -recommend influenza vaccination   -recommend Tdap vaccination   -recommend diabetic eye exam  - will refer to Dr. Manjarrez   -HTN -   Stable On coreg 6.25 mg PO BID  -renal impairment/CKD stage 2-3 - Nephrology following. Continue to monitor. Advised blood sugar and blood pressure management. Has US of liver   -left shoulder pain/subacromial bursitis of left shoulder  - Orthopedic following. Recently received steriod injection.      -HLP - on lipitor 40 mg PO qhs  -diabetic neuropathy - on neurontin 400 mg PO TID   -allergic rhinitis -  zyrtec 10 mg daily.  -chronic pain - pt sees Pain Managmeent on Percoet 7.5/325 mg every 6 hours PRN along with neurontin 300 mg PO TID  -insomnia - doxepin 25 mg at bedtime  -tobacco user -counseled " quit smoking >5 minutes. Start on wellbutrin  mg daily. Star nicotine patch   -nausea/vomiting - possible gastroparesis. Consider EGD/colonsocopy   -sp  Left nephrectomy/history of renal cell carcinoma-  Consider  Oncology referral  -GERD - on prilosec 20 mg PO BID  -CAD sp stent /grade 1 diastolic dysfunction - Cardiology following on aspirin 81 mg PO BID, plavix 75 mg PO q daily. Coreg 6.25 mg PO BID, lipitor 40 mg PO qhs, ranexa 500 mg every 12 hours   -DM type 2  -now on basaglar 43  Go up to 46 unites  units at bedtime advised pt to go bup by 2-3 unties every 3 days until morning sugars at goal. Go up on truliicty from 0.75 to 1.5 mg sub q weekly.      -COPD/URI/chronic bronchitis - on albuterol inhaler. adivsed to quit smoking. Will get chest x-ray.  Refer to Pulmonlogy for PFTs.  go up on Breo from 100 to 200 mcg 1 puff daily.   -diabetic neuropathy - on neurontin 400 mg pO TID  -major depression/KRISTOFER  - will go up to wellbutrin XL from 150 to 300 mg  Daily. On buspar  15 mg PO TID.  Recommend telehealth with  will refer   -insomnia - was on ambien. Advised pt to not take ambien anymore due to potential side effects with Percocet and neuroton. Recommend pt use  Melatonin  -obesity  - counseled weight loss >5 minutes  BMI at 30.29   -advised pt to be safe and call with questions and concerns  -advised pt to go to ER or call 911 if symptoms worrisome or severe  -advised pt to followup with specialist  And referrals  -advsied pt to be safe during COVID-19 pandemic  -total time with pt >25 minutes  This visit has been rescheduled as a phone visit to comply with patient safety concerns in accordance with CDC recommendations. Total time of discussion was 25  minutes.  -recheck in 6 weeks         This document has been electronically signed by Quintin Flores MD on September 16, 2020 11:14 CDT

## 2020-09-16 ENCOUNTER — OFFICE VISIT (OUTPATIENT)
Dept: FAMILY MEDICINE CLINIC | Facility: CLINIC | Age: 50
End: 2020-09-16

## 2020-09-16 VITALS — BODY MASS INDEX: 30.29 KG/M2 | SYSTOLIC BLOOD PRESSURE: 128 MMHG | HEIGHT: 65 IN | DIASTOLIC BLOOD PRESSURE: 80 MMHG

## 2020-09-16 DIAGNOSIS — Z12.11 ENCOUNTER FOR SCREENING COLONOSCOPY: ICD-10-CM

## 2020-09-16 DIAGNOSIS — E11.65 UNCONTROLLED TYPE 2 DIABETES MELLITUS WITH HYPERGLYCEMIA (HCC): Primary | ICD-10-CM

## 2020-09-16 DIAGNOSIS — E78.2 MIXED HYPERLIPIDEMIA: ICD-10-CM

## 2020-09-16 DIAGNOSIS — F33.2 SEVERE EPISODE OF RECURRENT MAJOR DEPRESSIVE DISORDER, WITHOUT PSYCHOTIC FEATURES (HCC): ICD-10-CM

## 2020-09-16 DIAGNOSIS — I25.10 CORONARY ARTERY DISEASE INVOLVING NATIVE CORONARY ARTERY OF NATIVE HEART WITHOUT ANGINA PECTORIS: ICD-10-CM

## 2020-09-16 DIAGNOSIS — F41.1 GAD (GENERALIZED ANXIETY DISORDER): ICD-10-CM

## 2020-09-16 DIAGNOSIS — N18.30 STAGE 3 CHRONIC KIDNEY DISEASE (HCC): ICD-10-CM

## 2020-09-16 DIAGNOSIS — Z72.0 TOBACCO USER: ICD-10-CM

## 2020-09-16 DIAGNOSIS — Z01.00 ENCOUNTER FOR EYE EXAM: ICD-10-CM

## 2020-09-16 DIAGNOSIS — J44.9 CHRONIC OBSTRUCTIVE PULMONARY DISEASE, UNSPECIFIED COPD TYPE (HCC): Chronic | ICD-10-CM

## 2020-09-16 PROCEDURE — 99213 OFFICE O/P EST LOW 20 MIN: CPT | Performed by: FAMILY MEDICINE

## 2020-09-16 RX ORDER — DULAGLUTIDE 1.5 MG/.5ML
1.5 INJECTION, SOLUTION SUBCUTANEOUS WEEKLY
Qty: 4 PEN | Refills: 3 | Status: SHIPPED | OUTPATIENT
Start: 2020-09-16 | End: 2020-12-29 | Stop reason: SDUPTHER

## 2020-09-18 ENCOUNTER — TELEPHONE (OUTPATIENT)
Dept: FAMILY MEDICINE CLINIC | Facility: CLINIC | Age: 50
End: 2020-09-18

## 2020-09-18 NOTE — TELEPHONE ENCOUNTER
[facility] stating that it did not need a PA but that it did not need one. The pharmacy was trying to fill for 4 ml and not 4 pens. Called pharmacy and they said they would take care of it.

## 2020-09-22 ENCOUNTER — TELEMEDICINE (OUTPATIENT)
Dept: PSYCHIATRY | Facility: CLINIC | Age: 50
End: 2020-09-22

## 2020-09-22 DIAGNOSIS — F43.10 POST TRAUMATIC STRESS DISORDER (PTSD): ICD-10-CM

## 2020-09-22 DIAGNOSIS — F41.1 GENERALIZED ANXIETY DISORDER: ICD-10-CM

## 2020-09-22 DIAGNOSIS — F33.2 SEVERE EPISODE OF RECURRENT MAJOR DEPRESSIVE DISORDER, WITHOUT PSYCHOTIC FEATURES (HCC): Primary | ICD-10-CM

## 2020-09-22 PROCEDURE — 90792 PSYCH DIAG EVAL W/MED SRVCS: CPT | Performed by: NURSE PRACTITIONER

## 2020-09-22 RX ORDER — PRAZOSIN HYDROCHLORIDE 1 MG/1
1 CAPSULE ORAL NIGHTLY
Qty: 30 CAPSULE | Refills: 0 | Status: SHIPPED | OUTPATIENT
Start: 2020-09-22 | End: 2020-10-07 | Stop reason: SDUPTHER

## 2020-09-22 RX ORDER — QUETIAPINE FUMARATE 25 MG/1
25 TABLET, FILM COATED ORAL NIGHTLY
Qty: 30 TABLET | Refills: 0 | Status: SHIPPED | OUTPATIENT
Start: 2020-09-22 | End: 2020-10-07 | Stop reason: SDUPTHER

## 2020-09-22 RX ORDER — VENLAFAXINE HYDROCHLORIDE 37.5 MG/1
37.5 CAPSULE, EXTENDED RELEASE ORAL DAILY
Qty: 30 CAPSULE | Refills: 0 | Status: SHIPPED | OUTPATIENT
Start: 2020-09-22 | End: 2020-10-07 | Stop reason: SDUPTHER
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
## 2020-09-24 ENCOUNTER — TELEMEDICINE (OUTPATIENT)
Dept: PSYCHIATRY | Facility: CLINIC | Age: 50
End: 2020-09-24

## 2020-09-24 DIAGNOSIS — F41.1 GENERALIZED ANXIETY DISORDER: ICD-10-CM

## 2020-09-24 DIAGNOSIS — F33.2 SEVERE EPISODE OF RECURRENT MAJOR DEPRESSIVE DISORDER, WITHOUT PSYCHOTIC FEATURES (HCC): Primary | ICD-10-CM

## 2020-09-24 PROCEDURE — 90791 PSYCH DIAGNOSTIC EVALUATION: CPT | Performed by: COUNSELOR
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
## 2020-09-25 NOTE — PROGRESS NOTES
Pulmonary Consultation    Quintin Flores MD,    Thank you for asking me to see Yudi West for   Chief Complaint   Patient presents with   • Abnormal Chest X-ray   .      History of Present Illness  Yudi West is a 49 y.o. female with a PMH significant for COPD, tobacco use, CAD, hypertension, and diabetes mellitus who presents for evaluation of dyspnea.    9/28/2020: Pt states her PCP had CXR as part of her physical and was told she had a spot on her right lung disease. She denies prior lung nodules. She reports she was diagnosed with COPD by Dr. Ireland several years ago. Pt takes Breo once daily and her albuterol 2-3x/ day. She states she gets out of breath with exertion and has back pain associated. She admits to a chronic cough productive of yellow sputum. Pt also admits to wheezing and burning in her chest. She admits to heartburn for which she takes tums and pepcid with no benefit. Pt has taken prilosec but it did not help. She states her chest discomfort does radiate to both sides and is worse with greasy foods. Pt smokes 1ppd. She wants to quit but she has not been able to quit despite trying NRTs. Pt has orked in convenience stores. Her sisters and parents had lung disease.       Tobacco use history:  Type: cigarettes  Amount: 1 ppd  Duration: 30 years  Cessation: N/A   Willing to quit: Yes      Review of Systems: History obtained from chart review and the patient.  Review of Systems   Constitutional: Positive for appetite change, chills, fatigue and unexpected weight change.        Snoring, EDS   HENT: Positive for hearing loss and trouble swallowing. Negative for congestion.    Respiratory: Positive for cough, shortness of breath and wheezing.    Cardiovascular: Positive for chest pain. Negative for leg swelling.   Gastrointestinal: Positive for nausea.        Heartburn   Genitourinary: Positive for frequency.   Musculoskeletal: Positive for arthralgias and back pain.      Neurological: Positive for weakness and headaches.   Psychiatric/Behavioral: Positive for dysphoric mood. The patient is nervous/anxious.      As described in the HPI. Otherwise, remainder of ROS (14 systems) were negative.    Patient Active Problem List   Diagnosis   • Coronary artery disease involving native coronary artery of native heart without angina pectoris   • Myocardial infarction, old   • Hypertension   • Type 1 diabetes mellitus (CMS/HCC)   • Mixed hyperlipidemia   • Stage 1 mild COPD by GOLD classification (CMS/[facility])   • Dyspnea on exertion   • S/p nephrectomy   • Precordial pain   • Femur fracture (CMS/[facility])   • Closed displaced supracondylar fracture without intracondylar extension of lower end of left femur (CMS/[facility])   • Anemia, posthemorrhagic, acute   • Painful orthopaedic hardware (CMS/[facility])   • Closed displaced transverse fracture of shaft of femur with routine healing   • Overweight   • Diabetic neuropathy (CMS/[facility])   • Tobacco abuse counseling   • Tobacco user   • Class 1 obesity with serious comorbidity and body mass index (BMI) of 30.0 to 30.9 in adult   • High risk medication use   • Insomnia   • Dyspnea   • Renal impairment   • Uncontrolled type 2 diabetes mellitus with hyperglycemia (CMS/[facility])   • Cigarette nicotine dependence, uncomplicated   • Stage 3 chronic kidney disease (CMS/[facility])   • Chronic left shoulder pain   • Chronic bronchitis (CMS/[facility])   • Claudication of lower extremity (CMS/[facility])   • Left shoulder pain   • Severe episode of recurrent major depressive disorder, without psychotic features (CMS/[facility])   • KRISTOFER (generalized anxiety disorder)         Current Outpatient Medications:   •  Alpha-Lipoic Acid 300 MG capsule, TAKE ONE TO TWO CAPSULES BY MOUTH TWICE DAILY, Disp: , Rfl:   •  aspirin 81 MG EC tablet, Take 1 tablet by mouth 2 (Two) Times a Day With Meals., Disp: 60 tablet, Rfl: 0  •  Blood Glucose Monitoring Suppl (FREESTYLE FREEDOM LITE) w/Device kit, USE TO TEST BLOOD SUGAR  "TWO TO THREE TI MES DAILY, Disp: 1 each, Rfl: 0  •  carvedilol (COREG) 3.125 MG tablet, TAKE ONE TABLET BY MOUTH TWICE DAILY WITH MEALS, Disp: 30 tablet, Rfl: 3  •  cetirizine (zyrTEC) 10 MG tablet, Take 1 tablet by mouth Daily., Disp: 30 tablet, Rfl: 3  •  clopidogrel (PLAVIX) 75 MG tablet, Take 1 tablet by mouth Daily., Disp: 30 tablet, Rfl: 3  •  dicyclomine (BENTYL) 20 MG tablet, Take 1 tablet by mouth Every 6 (Six) Hours., Disp: 120 tablet, Rfl: 3  •  Dulaglutide (Trulicity) 0.75 MG/0.5ML solution pen-injector, Inject 0.75 mg under the skin into the appropriate area as directed 1 (One) Time Per Week., Disp: 4 pen, Rfl: 3  •  EASY TOUCH INSULIN SYRINGE 28G X 1/2\" 0.5 ML misc, Use at bedtime with insulin, Disp: 100 each, Rfl: 3  •  famotidine (PEPCID) 20 MG tablet, Take 1 tablet by mouth 2 (Two) Times a Day., Disp: 60 tablet, Rfl: 3  •  Fluticasone Furoate-Vilanterol (Breo Ellipta) 200-25 MCG/INH inhaler, Inhale 1 puff Daily., Disp: 1 inhaler, Rfl: 3  •  gabapentin (NEURONTIN) 300 MG capsule, Take 300 mg by mouth 4 (Four) Times a Day., Disp: , Rfl:   •  glucose blood test strip, Check sugars before breakfast and 2 hours after meal. Also give lancets, Disp: 600 each, Rfl: 12  •  Insulin Glargine (BASAGLAR KWIKPEN) 100 UNIT/ML injection pen, Take 39 units at bedtime can go up by 2-3 unites every 3 days until am sugars running , Disp: 9 mL, Rfl: 3  •  loratadine (CLARITIN) 10 MG tablet, Take 10 mg by mouth Daily., Disp: , Rfl:   •  meloxicam (MOBIC) 15 MG tablet, Take 15 mg by mouth Daily., Disp: , Rfl:   •  nicotine (Nicoderm CQ) 21 MG/24HR patch, Place 1 patch on the skin as directed by provider Daily., Disp: 30 patch, Rfl: 3  •  ondansetron ODT (Zofran ODT) 8 MG disintegrating tablet, Place 1 tablet on the tongue Every 8 (Eight) Hours As Needed for Nausea or Vomiting., Disp: 90 tablet, Rfl: 3  •  oxyCODONE-acetaminophen (PERCOCET) 7.5-325 MG per tablet, Take 1 tablet by mouth 4 (Four) Times a Day., Disp: , " Rfl:   •  prazosin (Minipress) 1 MG capsule, Take 1 capsule by mouth Every Night., Disp: 30 capsule, Rfl: 0  •  ranolazine (Ranexa) 500 MG 12 hr tablet, Take 1 tablet by mouth 2 (Two) Times a Day., Disp: 60 tablet, Rfl: 6  •  rosuvastatin (Crestor) 20 MG tablet, Take 1 tablet by mouth Every Night., Disp: 30 tablet, Rfl: 3  •  UNIFINE PENTIPS 31G X 8 MM misc, Check sugars 2-3 times a day, Disp: 100 each, Rfl: 3  •  venlafaxine XR (Effexor XR) 37.5 MG 24 hr capsule, Take 1 capsule by mouth Daily., Disp: 30 capsule, Rfl: 0  •  VENTOLIN  (90 Base) MCG/ACT inhaler, Inhale 2 puffs 4 (Four) Times a Day., Disp: 1 inhaler, Rfl: 3  •  vitamin D (ERGOCALCIFEROL) 1.25 MG (20150 UT) capsule capsule, Take 1 capsule by mouth Every 7 (Seven) Days., Disp: 4 capsule, Rfl: 3  •  Dulaglutide (Trulicity) 1.5 MG/0.5ML solution pen-injector, Inject 1.5 mg under the skin into the appropriate area as directed 1 (One) Time Per Week., Disp: 4 pen, Rfl: 3  •  QUEtiapine (SEROquel) 25 MG tablet, Take 1 tablet by mouth Every Night., Disp: 30 tablet, Rfl: 0    Allergies   Allergen Reactions   • Sulfa Antibiotics Itching       Past Medical History:   Diagnosis Date   • Anxiety and depression    • Arthritis    • Asthma    • Cancer of kidney (CMS/HCC)     left   • Chronic kidney disease    • COPD (chronic obstructive pulmonary disease) (CMS/HCC)    • Coronary artery disease     1 stent.  is followed by jaden   • Diabetes mellitus (CMS/HCC)    • GERD (gastroesophageal reflux disease)    • Hyperlipidemia    • Hypertension    • Myocardial infarction (CMS/HCC)    • PTSD (post-traumatic stress disorder)    • Sleep apnea    • Substance abuse (CMS/HCC)    • Suicide attempt (CMS/HCC)      Past Surgical History:   Procedure Laterality Date   • CORONARY STENT PLACEMENT     • FEMUR IM NAILING RETROGRADE Left 11/3/2019    Procedure: FEMUR INTRAMEDULLARY NAILING RETROGRADE;  Surgeon: Howie Campoverde MD;  Location: [facility];  Service: Orthopedics    • GALLBLADDER SURGERY     • HARDWARE REMOVAL Left 12/4/2019    Procedure: HARDWARE REMOVAL LEFT FEMUR        (C-ARM#3);  Surgeon: Howie Campoverde MD;  Location: [facility];  Service: Orthopedics   • HYSTERECTOMY     • NEPHRECTOMY Left    • REPLACEMENT TOTAL KNEE Left    • TONSILLECTOMY       Social History     Socioeconomic History   • Marital status:      Spouse name: Not on file   • Number of children: Not on file   • Years of education: Not on file   • Highest education level: Not on file   Tobacco Use   • Smoking status: Current Every Day Smoker     Packs/day: 1.00     Years: 36.00     Pack years: 36.00     Types: Cigarettes   • Smokeless tobacco: Never Used   Substance and Sexual Activity   • Alcohol use: Not Currently     Comment: passed use for yrs including cases and 2 fifths daily; quit 20 yrs ago   • Drug use: Not Currently     Types: Methamphetamines, Cocaine(coke)   • Sexual activity: Defer     Family History   Problem Relation Age of Onset   • Heart disease Mother    • Hypertension Mother    • Cancer Mother    • Diabetes Mother    • Alcohol abuse Mother    • Heart disease Father    • Hypertension Father    • Alcohol abuse Father    • Alcohol abuse Sister    • Drug abuse Sister    • Depression Sister    • Anxiety disorder Sister    • Drug abuse Brother    • Alcohol abuse Brother    • Suicide Attempts Brother           Objective     Blood pressure 120/74, pulse 88, weight 80.7 kg (178 lb), SpO2 95 %.  Physical Exam  Vitals signs and nursing note reviewed.   Constitutional:       Appearance: Normal appearance. She is well-developed. She is obese.   HENT:      Head: Normocephalic and atraumatic.      Nose: Nose normal.      Mouth/Throat:      Pharynx: Uvula midline.      Comments: Mallampati 3  Eyes:      General: Lids are normal.      Conjunctiva/sclera: Conjunctivae normal.      Pupils: Pupils are equal, round, and reactive to light.   Neck:      Musculoskeletal: Normal range of motion.       Thyroid: No thyroid mass.      Trachea: Trachea normal. No tracheal tenderness.   Cardiovascular:      Rate and Rhythm: Normal rate and regular rhythm.      Chest Wall: PMI is not displaced.      Heart sounds: Normal heart sounds. No murmur. No gallop.    Pulmonary:      Effort: Pulmonary effort is normal. No respiratory distress.      Breath sounds: Normal breath sounds. No decreased breath sounds, wheezing or rhonchi.   Chest:      Chest wall: No tenderness.   Abdominal:      General: Bowel sounds are normal.      Palpations: Abdomen is soft. There is no hepatomegaly.      Tenderness: There is no abdominal tenderness.   Musculoskeletal:      Comments: Normal gait, no extremity edema   Lymphadenopathy:      Head:      Right side of head: No submandibular adenopathy.      Left side of head: No submandibular adenopathy.      Cervical: No cervical adenopathy.      Upper Body:      Right upper body: No supraclavicular adenopathy.      Left upper body: No supraclavicular adenopathy.   Skin:     General: Skin is warm and dry.      Findings: No rash.      Nails: There is no clubbing.     Neurological:      Mental Status: She is alert and oriented to person, place, and time.   Psychiatric:         Speech: Speech normal.         Behavior: Behavior normal.         Judgment: Judgment normal.         PFTs: 9/28/20 (independently reviewed and interpreted by me)  Ratio 79  FVC 3.17/ 87%  FEV1 2.5/ 86%  TLC 4.23/ 81%  DLCO 18.45/ 72%  Normal spirometry.  Significant bronchodilator response in mid expiratory flows.  Normal lung volumes.  Evidence of air trapping.  Mildly reduced diffusion capacity.  No comparative data available.    Radiology (independently reviewed and interpreted by me): CXR 6/17/20 showed NACPD, numerous calcified granulomas bilateral farida and lungs       Assessment/Plan     Yudi was seen today for abnormal chest x-ray.    Diagnoses and all orders for this visit:    Dyspnea on exertion  -     Pulmonary  Function Test    Stage 1 mild COPD by GOLD classification (CMS/[facility])  -     FluLaval Quad >6 Months (3035-7535)    Cigarette nicotine dependence, uncomplicated    Class 1 obesity due to excess calories with serious comorbidity and body mass index (BMI) of 30.0 to 30.9 in adult         Discussion/ Recommendations:   PFTs were significant for a reduction in midexpiratory flows with significant bronchodilator response.  I personally reviewed her chest x-ray which showed calcified granulomas but no acute disease.  I counseled her that the benign granulomas are a benign finding and do not require additional work-up.  She is already on dual bronchodilator she does have some continued symptoms.  I think a lot of it is secondary to her ongoing tobacco use so I spent quite a bit of time counseling her methods of complete cessation.    -Continue Breo daily.  -Use albuterol MDI only as needed for dyspnea or wheeze  -Yudi West  reports that she has been smoking cigarettes. She has a 36.00 pack-year smoking history. She has never used smokeless tobacco.. I have educated her on the risk of diseases from using tobacco products such as cancer, COPD and heart diease.  I advised her to quit and she is willing to quit. We have discussed the following method/s for tobacco cessation:  Education Material Counseling [place] OTC Cessation Products.  She is not ready to set a quit date.  She will follow up with me in 3 months or sooner to check on her progress. I spent 5 minutes counseling the patient.  -Up-to-date with Pneumovax 23.  Annual influenza vaccination.    Patient's Body mass index is 29.62 kg/m². BMI is above normal parameters. Recommendations include: exercise counseling.           Return in about 3 months (around 12/28/2020) for Recheck COPD.      Thank you for allowing me to participate in the care of Yudi West. Please do not hesitate to contact me with any questions.         This document has been  electronically signed by Radha Rico MD on September 28, 2020 13:23 CDT      Dictated using Dragon

## 2020-09-28 ENCOUNTER — OFFICE VISIT (OUTPATIENT)
Dept: PULMONOLOGY | Facility: CLINIC | Age: 50
End: 2020-09-28

## 2020-09-28 ENCOUNTER — PROCEDURE VISIT (OUTPATIENT)
Dept: PULMONOLOGY | Facility: CLINIC | Age: 50
End: 2020-09-28

## 2020-09-28 VITALS
SYSTOLIC BLOOD PRESSURE: 120 MMHG | OXYGEN SATURATION: 95 % | DIASTOLIC BLOOD PRESSURE: 74 MMHG | WEIGHT: 178 LBS | BODY MASS INDEX: 29.62 KG/M2 | HEART RATE: 88 BPM

## 2020-09-28 DIAGNOSIS — R06.09 DYSPNEA ON EXERTION: Primary | ICD-10-CM

## 2020-09-28 DIAGNOSIS — F17.210 CIGARETTE NICOTINE DEPENDENCE, UNCOMPLICATED: ICD-10-CM

## 2020-09-28 DIAGNOSIS — J44.9 CHRONIC OBSTRUCTIVE PULMONARY DISEASE, UNSPECIFIED COPD TYPE (HCC): Primary | ICD-10-CM

## 2020-09-28 DIAGNOSIS — E66.09 CLASS 1 OBESITY DUE TO EXCESS CALORIES WITH SERIOUS COMORBIDITY AND BODY MASS INDEX (BMI) OF 30.0 TO 30.9 IN ADULT: ICD-10-CM

## 2020-09-28 DIAGNOSIS — J44.9 STAGE 1 MILD COPD BY GOLD CLASSIFICATION (HCC): ICD-10-CM

## 2020-09-28 PROCEDURE — 90471 IMMUNIZATION ADMIN: CPT | Performed by: INTERNAL MEDICINE

## 2020-09-28 PROCEDURE — 99204 OFFICE O/P NEW MOD 45 MIN: CPT | Performed by: INTERNAL MEDICINE

## 2020-09-28 PROCEDURE — 90686 IIV4 VACC NO PRSV 0.5 ML IM: CPT | Performed by: INTERNAL MEDICINE

## 2020-09-28 PROCEDURE — 94729 DIFFUSING CAPACITY: CPT | Performed by: INTERNAL MEDICINE

## 2020-09-28 PROCEDURE — 94060 EVALUATION OF WHEEZING: CPT | Performed by: INTERNAL MEDICINE

## 2020-09-28 PROCEDURE — 94727 GAS DIL/WSHOT DETER LNG VOL: CPT | Performed by: INTERNAL MEDICINE

## 2020-09-28 NOTE — PROCEDURES
Pulmonary Function Test  Performed by: Radha Rico MD  Authorized by: Radha Rico MD      Pre Drug    FVC: 83%   FEV1: 79%   FEF 25-75%: 65%   FEV1/FVC: 76%   T%   RV: 175%   DLCO: 72%     Post Drug    FVC: 87%   FEV1: 86%   FEF 25-75%: 93%   FEV1/FVC: 79%      Change in % (calculated):    FVC: 5   FEV1: 9   FEF 25-75%: 43    Interpretation   Spirometry   Spirometry shows normal results. The patient's MIDFLOW response to bronchodilators has significant change.   Review of FVL curve   Effort is normal.   Lung Volume Measurements  Measurements show: normal results and elevated residual volume consistent with gas trapping.   Diffusion Capacity  The patient's diffusion capacity is mildly reduced.

## 2020-10-05 RX ORDER — BUPROPION HYDROCHLORIDE 150 MG/1
TABLET ORAL
Qty: 30 TABLET | Refills: 3 | Status: SHIPPED | OUTPATIENT
Start: 2020-10-05 | End: 2020-10-07

## 2020-10-05 RX ORDER — ONDANSETRON 8 MG/1
TABLET, ORALLY DISINTEGRATING ORAL
Qty: 90 TABLET | Refills: 3 | Status: SHIPPED | OUTPATIENT
Start: 2020-10-05 | End: 2021-02-03

## 2020-10-06 ENCOUNTER — OFFICE VISIT (OUTPATIENT)
Dept: GASTROENTEROLOGY | Facility: CLINIC | Age: 50
End: 2020-10-06

## 2020-10-06 VITALS
DIASTOLIC BLOOD PRESSURE: 77 MMHG | WEIGHT: 179.6 LBS | SYSTOLIC BLOOD PRESSURE: 115 MMHG | HEIGHT: 65 IN | BODY MASS INDEX: 29.92 KG/M2 | HEART RATE: 88 BPM | OXYGEN SATURATION: 96 %

## 2020-10-06 DIAGNOSIS — R10.84 GENERALIZED ABDOMINAL PAIN: ICD-10-CM

## 2020-10-06 DIAGNOSIS — K59.09 OTHER CONSTIPATION: Primary | ICD-10-CM

## 2020-10-06 DIAGNOSIS — R63.4 WEIGHT LOSS: ICD-10-CM

## 2020-10-06 DIAGNOSIS — R14.0 BLOATING: ICD-10-CM

## 2020-10-06 DIAGNOSIS — R11.0 NAUSEA: ICD-10-CM

## 2020-10-06 PROCEDURE — 99204 OFFICE O/P NEW MOD 45 MIN: CPT | Performed by: INTERNAL MEDICINE

## 2020-10-06 RX ORDER — POLYETHYLENE GLYCOL 3350 17 G/17G
17 POWDER, FOR SOLUTION ORAL DAILY
Qty: 30 EACH | Refills: 5 | Status: SHIPPED | OUTPATIENT
Start: 2020-10-06 | End: 2021-03-02

## 2020-10-06 RX ORDER — DEXTROSE AND SODIUM CHLORIDE 5; .45 G/100ML; G/100ML
30 INJECTION, SOLUTION INTRAVENOUS CONTINUOUS PRN
Status: CANCELLED | OUTPATIENT
Start: 2020-11-13

## 2020-10-06 RX ORDER — OMEPRAZOLE 40 MG/1
40 CAPSULE, DELAYED RELEASE ORAL DAILY
Qty: 30 CAPSULE | Refills: 11 | Status: SHIPPED | OUTPATIENT
Start: 2020-10-06 | End: 2021-10-05 | Stop reason: SDUPTHER

## 2020-10-06 RX ORDER — LUBIPROSTONE 24 UG/1
24 CAPSULE ORAL 2 TIMES DAILY WITH MEALS
Qty: 60 CAPSULE | Refills: 5 | Status: SHIPPED | OUTPATIENT
Start: 2020-10-06 | End: 2020-10-08 | Stop reason: SDUPTHER

## 2020-10-06 NOTE — PROGRESS NOTES
[facility]      Chief Complaint:   Chief Complaint   Patient presents with   • Colon Cancer Screening       Subjective     HPI:   Ms. West is a 49-year-old  female with past medical history of anxiety, depression, arthritis, asthma, renal cell carcinoma status post left nephrectomy, PTSD, hypertension, hyperlipidemia, sleep apnea, substance abuse, MI, COPD, chronic renal sufficiency presenting for evaluation of abdominal pain.  She has intermittent bouts of generalized abdominal pain associated with constipation and bloating for the past 12 years since she underwent nephrectomy.  Symptoms have been worse in the past few months and she has intermittent bouts of nausea, vomiting or belching.  Pain is worse with food intake and she lost 45 pounds weight in the past few months.  She tried Linzess without much help.  Takes Mobic daily.    Past Medical History:   Past Medical History:   Diagnosis Date   • Anxiety and depression    • Arthritis    • Asthma    • Cancer of kidney (CMS/HCC)     left   • Chronic kidney disease    • COPD (chronic obstructive pulmonary disease) (CMS/HCC)    • Coronary artery disease     1 stent.  is followed by jaden   • Diabetes mellitus (CMS/HCC)    • GERD (gastroesophageal reflux disease)    • Hyperlipidemia    • Hypertension    • Myocardial infarction (CMS/HCC)    • PTSD (post-traumatic stress disorder)    • Sleep apnea    • Substance abuse (CMS/HCC)    • Suicide attempt (CMS/HCC)        Past Surgical History:  Past Surgical History:   Procedure Laterality Date   • CORONARY STENT PLACEMENT     • FEMUR IM NAILING RETROGRADE Left 11/3/2019    Procedure: FEMUR INTRAMEDULLARY NAILING RETROGRADE;  Surgeon: Howie Campoverde MD;  Location: [facility];  Service: Orthopedics   • GALLBLADDER SURGERY     • HARDWARE REMOVAL Left 12/4/2019    Procedure: HARDWARE REMOVAL LEFT FEMUR        (C-ARM#3);  Surgeon: Howie Campoverde MD;  Location: [facility] OR;  Service:  Orthopedics   • HYSTERECTOMY     • NEPHRECTOMY Left    • REPLACEMENT TOTAL KNEE Left    • TONSILLECTOMY         Family History:  Family History   Problem Relation Age of Onset   • Heart disease Mother    • Hypertension Mother    • Cancer Mother    • Diabetes Mother    • Alcohol abuse Mother    • Heart disease Father    • Hypertension Father    • Alcohol abuse Father    • Alcohol abuse Sister    • Drug abuse Sister    • Depression Sister    • Anxiety disorder Sister    • Drug abuse Brother    • Alcohol abuse Brother    • Suicide Attempts Brother        Social History:   reports that she has been smoking cigarettes. She has a 36.00 pack-year smoking history. She has never used smokeless tobacco. She reports previous alcohol use. She reports previous drug use. Drugs: Methamphetamines and Cocaine(coke).    Medications:   Prior to Admission medications    Medication Sig Start Date End Date Taking? Authorizing Provider   Alpha-Lipoic Acid 300 MG capsule TAKE ONE TO TWO CAPSULES BY MOUTH TWICE DAILY 8/28/20  Yes ProviderSara MD   aspirin 81 MG EC tablet Take 1 tablet by mouth 2 (Two) Times a Day With Meals. 11/4/19  Yes Howie Campoverde MD   Blood Glucose Monitoring Suppl (FREESTYLE FREEDOM LITE) w/Device kit USE TO TEST BLOOD SUGAR TWO TO THREE TI MES DAILY 7/14/20  Yes Quintin Flores MD   buPROPion XL (WELLBUTRIN XL) 150 MG 24 hr tablet TAKE ONE TABLET BY MOUTH EVERY DAY 10/5/20  Yes Quintin Flores MD   carvedilol (COREG) 3.125 MG tablet TAKE ONE TABLET BY MOUTH TWICE DAILY WITH MEALS 8/21/20  Yes Quintin Flores MD   cetirizine (zyrTEC) 10 MG tablet Take 1 tablet by mouth Daily. 7/2/20  Yes Quintin Flores MD   clopidogrel (PLAVIX) 75 MG tablet Take 1 tablet by mouth Daily. 7/2/20  Yes Quintin Flores MD   dicyclomine (BENTYL) 20 MG tablet Take 1 tablet by mouth Every 6 (Six) Hours. 7/2/20  Yes Quintin Flores MD   Dulaglutide (Trulicity) 0.75 MG/0.5ML solution pen-injector Inject 0.75 mg under the  "skin into the appropriate area as directed 1 (One) Time Per Week. 9/9/20  Yes Quintin Flores MD   Dulaglutide (Trulicity) 1.5 MG/0.5ML solution pen-injector Inject 1.5 mg under the skin into the appropriate area as directed 1 (One) Time Per Week. 9/16/20  Yes Quintin Flores MD   EASY TOUCH INSULIN SYRINGE 28G X 1/2\" 0.5 ML misc Use at bedtime with insulin 7/2/20  Yes Quintin Flores MD   famotidine (PEPCID) 20 MG tablet Take 1 tablet by mouth 2 (Two) Times a Day. 7/2/20  Yes Quintin Flores MD   Fluticasone Furoate-Vilanterol (Breo Ellipta) 200-25 MCG/INH inhaler Inhale 1 puff Daily. 9/16/20  Yes Quintin Flores MD   gabapentin (NEURONTIN) 300 MG capsule Take 300 mg by mouth 4 (Four) Times a Day. 6/10/20  Yes Sara Benoit MD   glucose blood test strip Check sugars before breakfast and 2 hours after meal. Also give lancets 7/2/20  Yes Quintin Flores MD   Insulin Glargine (BASAGLAR KWIKPEN) 100 UNIT/ML injection pen Take 39 units at bedtime can go up by 2-3 unites every 3 days until am sugars running  9/9/20  Yes Quintin Flores MD   loratadine (CLARITIN) 10 MG tablet Take 10 mg by mouth Daily. 8/28/20  Yes Sara Benoit MD   meloxicam (MOBIC) 15 MG tablet Take 15 mg by mouth Daily. 6/10/20  Yes Sara Benoit MD   Nicotine Step 1 21 MG/24HR patch APPLY ONE PATCH DAILY 9/29/20  Yes Quintin Flores MD   ondansetron ODT (ZOFRAN-ODT) 8 MG disintegrating tablet DISSOLVE ONE TABLET ON THE TONGUE EVERY 8 HOURS AS NEEDED FOR NAUSEA OR VOMITING 10/5/20  Yes Quintin Flores MD   oxyCODONE-acetaminophen (PERCOCET) 7.5-325 MG per tablet Take 1 tablet by mouth 4 (Four) Times a Day. 6/15/20  Yes Sara Benoit MD   prazosin (Minipress) 1 MG capsule Take 1 capsule by mouth Every Night. 9/22/20  Yes Lashell Dueñas APRN   QUEtiapine (SEROquel) 25 MG tablet Take 1 tablet by mouth Every Night. 9/22/20  Yes Lashell Dueñas APRN   ranolazine (Ranexa) 500 MG 12 hr tablet " Take 1 tablet by mouth 2 (Two) Times a Day. 7/2/20  Yes Quintin Flores MD   rosuvastatin (Crestor) 20 MG tablet Take 1 tablet by mouth Every Night. 7/1/20  Yes Quintin Flores MD   UNIFINE PENTIPS 31G X 8 MM misc Check sugars 2-3 times a day 7/2/20  Yes Quintin Flores MD   venlafaxine XR (Effexor XR) 37.5 MG 24 hr capsule Take 1 capsule by mouth Daily. 9/22/20  Yes Lashell Dueñas APRN   VENTOLIN  (90 Base) MCG/ACT inhaler Inhale 2 puffs 4 (Four) Times a Day. 7/2/20  Yes Quintin Flores MD   vitamin D (ERGOCALCIFEROL) 1.25 MG (31801 UT) capsule capsule Take 1 capsule by mouth Every 7 (Seven) Days. 7/2/20  Yes Quintin Flores MD   lubiprostone (Amitiza) 24 MCG capsule Take 1 capsule by mouth 2 (Two) Times a Day With Meals for 30 days. 10/6/20 11/5/20  Juwan Wilkes MD   omeprazole (PrilOSEC) 40 MG capsule Take 1 capsule by mouth Daily. 10/6/20   Juwan Wilkes MD   polyethylene glycol (MIRALAX) 17 g packet Take 17 g by mouth Daily. 10/6/20   Juwan Wilkes MD       Allergies:  Sulfa antibiotics    ROS:    Review of Systems   Constitutional: Positive for unexpected weight change. Negative for chills, fatigue and fever.   HENT: Negative for congestion, ear discharge, hearing loss, nosebleeds and sore throat.    Eyes: Negative for pain, discharge and redness.   Respiratory: Negative for cough, chest tightness, shortness of breath and wheezing.    Cardiovascular: Negative for chest pain and palpitations.   Gastrointestinal: Positive for abdominal pain, constipation, nausea and vomiting. Negative for abdominal distention, blood in stool and diarrhea.   Endocrine: Negative for cold intolerance, polydipsia, polyphagia and polyuria.   Genitourinary: Negative for dysuria, flank pain, frequency, hematuria and urgency.   Musculoskeletal: Negative for arthralgias, back pain, joint swelling and myalgias.   Skin: Negative for color change, pallor and rash.   Neurological: Negative for tremors,  "seizures, syncope, weakness and headaches.   Hematological: Negative for adenopathy. Does not bruise/bleed easily.   Psychiatric/Behavioral: Negative for behavioral problems, confusion, dysphoric mood, hallucinations and suicidal ideas. The patient is not nervous/anxious.      Objective     Blood pressure 115/77, pulse 88, height 165.1 cm (65\"), weight 81.5 kg (179 lb 9.6 oz), SpO2 96 %.    Physical Exam  Constitutional:       Appearance: She is well-developed.   HENT:      Head: Normocephalic and atraumatic.   Eyes:      Conjunctiva/sclera: Conjunctivae normal.      Pupils: Pupils are equal, round, and reactive to light.   Neck:      Musculoskeletal: Normal range of motion and neck supple.      Thyroid: No thyromegaly.   Cardiovascular:      Rate and Rhythm: Normal rate and regular rhythm.      Heart sounds: Normal heart sounds. No murmur.   Pulmonary:      Effort: Pulmonary effort is normal.      Breath sounds: Normal breath sounds. No wheezing.   Abdominal:      General: Bowel sounds are normal. There is no distension.      Palpations: Abdomen is soft. There is no mass.      Tenderness: There is no abdominal tenderness.      Hernia: No hernia is present.   Genitourinary:     Comments: No lesions noted  Musculoskeletal: Normal range of motion.         General: No tenderness.   Lymphadenopathy:      Cervical: No cervical adenopathy.   Skin:     General: Skin is warm and dry.      Findings: No rash.   Neurological:      Mental Status: She is alert and oriented to person, place, and time.      Cranial Nerves: No cranial nerve deficit.   Psychiatric:         Thought Content: Thought content normal.        Extremities: No edema, cyanosis or clubbing.    Assessment/Plan    1.  Abdominal pain with constipation and weight loss in patient with history of renal cell carcinoma status post nephrectomy rule out colorectal neoplasia, inflammatory bowel disease and adhesions.  Add Amitiza and MiraLAX daily.  Add high-fiber diet. "  Schedule colonoscopy for further evaluation.  CT abdomen and pelvis if colonoscopy is unremarkable.  2.  Abdominal pain with nausea, vomiting and weight loss rule out peptic ulcer disease and neoplasia.  Add Prilosec 40 mg p.o. daily.  Schedule EGD for further evaluation.  3.  High BMI with recent weight loss, continue observation.  4.  Tobacco usage, recommend cessation.  5.  History of drug usage, off currently.  Yudi was seen today for colon cancer screening.    Diagnoses and all orders for this visit:    Other constipation  -     Case Request; Standing  -     Case Request    Generalized abdominal pain  -     Case Request; Standing  -     Case Request    Nausea  -     Case Request; Standing  -     Case Request    Bloating  -     Case Request; Standing  -     Case Request    Weight loss  -     Case Request; Standing  -     Case Request    Other orders  -     Follow Anesthesia Guidelines / Standing Orders; Future  -     Obtain Informed Consent; Future  -     polyethylene glycol (MIRALAX) 17 g packet; Take 17 g by mouth Daily.  -     lubiprostone (Amitiza) 24 MCG capsule; Take 1 capsule by mouth 2 (Two) Times a Day With Meals for 30 days.  -     omeprazole (PrilOSEC) 40 MG capsule; Take 1 capsule by mouth Daily.        ESOPHAGOGASTRODUODENOSCOPY (N/A), COLONOSCOPY (N/A)     Diagnosis Plan   1. Other constipation  Case Request    dextrose 5 % and sodium chloride 0.45 % infusion    Case Request   2. Generalized abdominal pain  Case Request    dextrose 5 % and sodium chloride 0.45 % infusion    Case Request   3. Nausea  Case Request    dextrose 5 % and sodium chloride 0.45 % infusion    Case Request   4. Bloating  Case Request    dextrose 5 % and sodium chloride 0.45 % infusion    Case Request   5. Weight loss  Case Request    dextrose 5 % and sodium chloride 0.45 % infusion    Case Request       Anticipated Surgical Procedure:  Orders Placed This Encounter   Procedures   • Follow Anesthesia Guidelines / Standing  Orders     Standing Status:   Future   • Obtain Informed Consent     Standing Status:   Future     Order Specific Question:   Informed Consent Given For     Answer:   egd and colonoscopy       The risks, benefits, and alternatives of this procedure have been discussed with the patient or the responsible party- the patient understands and agrees to proceed.            This document has been electronically signed by Juwan Wilkes MD on October 6, 2020 15:22 CDT

## 2020-10-06 NOTE — PATIENT INSTRUCTIONS

## 2020-10-07 ENCOUNTER — TELEMEDICINE (OUTPATIENT)
Dept: PSYCHIATRY | Facility: CLINIC | Age: 50
End: 2020-10-07

## 2020-10-07 DIAGNOSIS — F33.2 SEVERE EPISODE OF RECURRENT MAJOR DEPRESSIVE DISORDER, WITHOUT PSYCHOTIC FEATURES (HCC): ICD-10-CM

## 2020-10-07 DIAGNOSIS — F43.10 POST TRAUMATIC STRESS DISORDER (PTSD): ICD-10-CM

## 2020-10-07 DIAGNOSIS — F41.1 GENERALIZED ANXIETY DISORDER: ICD-10-CM

## 2020-10-07 PROCEDURE — 99214 OFFICE O/P EST MOD 30 MIN: CPT | Performed by: NURSE PRACTITIONER

## 2020-10-07 RX ORDER — QUETIAPINE FUMARATE 50 MG/1
50 TABLET, FILM COATED ORAL NIGHTLY
Qty: 30 TABLET | Refills: 0 | Status: SHIPPED | OUTPATIENT
Start: 2020-10-07 | End: 2020-10-29 | Stop reason: SDUPTHER

## 2020-10-07 RX ORDER — PRAZOSIN HYDROCHLORIDE 1 MG/1
1 CAPSULE ORAL NIGHTLY
Qty: 30 CAPSULE | Refills: 0 | Status: SHIPPED | OUTPATIENT
Start: 2020-10-07 | End: 2020-10-29 | Stop reason: SDUPTHER

## 2020-10-07 RX ORDER — VENLAFAXINE HYDROCHLORIDE 75 MG/1
75 CAPSULE, EXTENDED RELEASE ORAL DAILY
Qty: 30 CAPSULE | Refills: 0 | Status: SHIPPED | OUTPATIENT
Start: 2020-10-07 | End: 2020-10-29 | Stop reason: SDUPTHER

## 2020-10-07 NOTE — PROGRESS NOTES
This provider is located at [facility], [address] [place] [address].The Patient is seen remotely [facility], [address] [place] [address]. Patient is being seen via telehealth and confirm that they are in a secure environment for this session. The patient's condition being diagnosed/treated is appropriate for telemedicine. The provider identified himself/herself: herself as well as her credentials.   The patient gave consent to be seen remotely, and when consent is given they understand that the consent allows for patient identifiable information to be sent to a third party as needed.   They may refuse to be seen remotely at any time. The electronic data is encrypted and password protected, and the patient has been advised of the potential risks to privacy not withstanding such measures.    You have chosen to receive care through a telehealth visit.  Do you consent to use a video/audio connection for your medical care today? Yes     Subjective   Yudi West is a 49 y.o. female who is here today for medication management follow up.    Chief Complaint:  Follow-up depression/anxiety     History of Present Illness  Patient presents today via my chart late for her appointment.  Patient states that she has felt much better with the medication as she is currently off the Wellbutrin and the doxepin.  Patient states that she has not been as depressed or anxious and her mood has improved however this past week she has started noticing some more agitation and difficulty with her sleep.  Patient states when she first started the Seroquel she was sleeping 8 to 10 hours and now she is getting roughly 6 hours and waking up in the middle of the night not going back to sleep.  Patient states with the Minipress that is helpful for her nightmares it made her dizzy the first week but now is has subsided.  The patient denied any other side effects to the medications.   Patient reports however this week she feels the medication is not working as good as it could be or is wearing off and would like an increase as she does feel it has been helpful for her.  Patient reports her depression at 3 and anxiety is 6-7 on a scale 0-10 with 10 being the worst.  Patient states that she has been going to therapy and enjoys it.  Patient denies any SI/HI/AH/VH.        The following portions of the patient's history were reviewed and updated as appropriate: allergies, current medications, past family history, past medical history, past social history, past surgical history and problem list.    Review of Systems   Gastrointestinal: Positive for constipation.   Psychiatric/Behavioral: Positive for agitation and dysphoric mood. The patient is nervous/anxious.        Objective   Physical Exam  Constitutional:       Appearance: Normal appearance.   Neurological:      Mental Status: She is alert.   Psychiatric:         Attention and Perception: Attention and perception normal.         Mood and Affect: Affect normal. Mood is anxious.         Speech: Speech normal.         Behavior: Behavior normal. Behavior is cooperative.         Thought Content: Thought content normal.         Cognition and Memory: Cognition and memory normal.         Judgment: Judgment normal.       There were no vitals taken for this visit. Due to extenuating circumstances and possible current health risks associated with the patient being present in a clinical setting (with current health restrictions in place in regards to possible COVID 19 transmission/exposure), the patient was seen remotely today via a MyChart Video Visit through [facility].  Unable to obtain vital signs due to nature of remote visit.  Height stated at 65 inches.  Weight stated at 179 pounds.      Allergies   Allergen Reactions   • Sulfa Antibiotics Itching       Recent Results (from the past 2016 hour(s))   Doppler Arterial Multi Level Lower Extremity - Bilateral  "CAR    Collection Time: 08/31/20  3:45 PM   Result Value Ref Range    RIGHT LOW THIGH SYS  mmHg    RIGHT POPLITEAL SYS  mmHg    RIGHT DORSALIS PEDIS SYS  mmHg    RIGHT POST TIBIAL SYS  mmHg    RIGHT RANDI RATIO 1.32     LEFT LOW THIGH SYS  mmHg    LEFT POPLITEAL SYS  mmHg    LEFT DORSALIS PEDIS SYS  mmHg    LEFT POST TIBIAL SYS  mmHg    LEFT RANDI RATIO 1.32     Upper arterial right arm brachial sys max 108 mmHg    Upper arterial left arm brachial sys max 111 mmHg       Current Medications:   Current Outpatient Medications   Medication Sig Dispense Refill   • Alpha-Lipoic Acid 300 MG capsule TAKE ONE TO TWO CAPSULES BY MOUTH TWICE DAILY     • aspirin 81 MG EC tablet Take 1 tablet by mouth 2 (Two) Times a Day With Meals. 60 tablet 0   • Blood Glucose Monitoring Suppl (FREESTYLE FREEDOM LITE) w/Device kit USE TO TEST BLOOD SUGAR TWO TO THREE TI MES DAILY 1 each 0   • carvedilol (COREG) 3.125 MG tablet TAKE ONE TABLET BY MOUTH TWICE DAILY WITH MEALS 30 tablet 3   • cetirizine (zyrTEC) 10 MG tablet Take 1 tablet by mouth Daily. 30 tablet 3   • clopidogrel (PLAVIX) 75 MG tablet Take 1 tablet by mouth Daily. 30 tablet 3   • dicyclomine (BENTYL) 20 MG tablet Take 1 tablet by mouth Every 6 (Six) Hours. 120 tablet 3   • Dulaglutide (Trulicity) 0.75 MG/0.5ML solution pen-injector Inject 0.75 mg under the skin into the appropriate area as directed 1 (One) Time Per Week. 4 pen 3   • Dulaglutide (Trulicity) 1.5 MG/0.5ML solution pen-injector Inject 1.5 mg under the skin into the appropriate area as directed 1 (One) Time Per Week. 4 pen 3   • EASY TOUCH INSULIN SYRINGE 28G X 1/2\" 0.5 ML misc Use at bedtime with insulin 100 each 3   • famotidine (PEPCID) 20 MG tablet Take 1 tablet by mouth 2 (Two) Times a Day. 60 tablet 3   • Fluticasone Furoate-Vilanterol (Breo Ellipta) 200-25 MCG/INH inhaler Inhale 1 puff Daily. 1 inhaler 3   • gabapentin (NEURONTIN) 300 MG capsule Take 300 mg " by mouth 4 (Four) Times a Day.     • glucose blood test strip Check sugars before breakfast and 2 hours after meal. Also give lancets 600 each 12   • Insulin Glargine (BASAGLAR KWIKPEN) 100 UNIT/ML injection pen Take 39 units at bedtime can go up by 2-3 unites every 3 days until am sugars running  9 mL 3   • loratadine (CLARITIN) 10 MG tablet Take 10 mg by mouth Daily.     • lubiprostone (Amitiza) 24 MCG capsule Take 1 capsule by mouth 2 (Two) Times a Day With Meals for 30 days. 60 capsule 5   • meloxicam (MOBIC) 15 MG tablet Take 15 mg by mouth Daily.     • Nicotine Step 1 21 MG/24HR patch APPLY ONE PATCH DAILY 28 each 3   • omeprazole (PrilOSEC) 40 MG capsule Take 1 capsule by mouth Daily. 30 capsule 11   • ondansetron ODT (ZOFRAN-ODT) 8 MG disintegrating tablet DISSOLVE ONE TABLET ON THE TONGUE EVERY 8 HOURS AS NEEDED FOR NAUSEA OR VOMITING 90 tablet 3   • oxyCODONE-acetaminophen (PERCOCET) 7.5-325 MG per tablet Take 1 tablet by mouth 4 (Four) Times a Day.     • polyethylene glycol (MIRALAX) 17 g packet Take 17 g by mouth Daily. 30 each 5   • prazosin (Minipress) 1 MG capsule Take 1 capsule by mouth Every Night. 30 capsule 0   • QUEtiapine (SEROquel) 50 MG tablet Take 1 tablet by mouth Every Night. 30 tablet 0   • ranolazine (Ranexa) 500 MG 12 hr tablet Take 1 tablet by mouth 2 (Two) Times a Day. 60 tablet 6   • rosuvastatin (Crestor) 20 MG tablet Take 1 tablet by mouth Every Night. 30 tablet 3   • UNIFINE PENTIPS 31G X 8 MM misc Check sugars 2-3 times a day 100 each 3   • venlafaxine XR (EFFEXOR-XR) 75 MG 24 hr capsule Take 1 capsule by mouth Daily. 30 capsule 0   • VENTOLIN  (90 Base) MCG/ACT inhaler Inhale 2 puffs 4 (Four) Times a Day. 1 inhaler 3   • vitamin D (ERGOCALCIFEROL) 1.25 MG (39672 UT) capsule capsule Take 1 capsule by mouth Every 7 (Seven) Days. 4 capsule 3     No current facility-administered medications for this visit.        Mental Status Exam:   Hygiene:   good  Cooperation:   Cooperative  Eye Contact:  Good  Psychomotor Behavior:  Appropriate  Affect:  Appropriate  Hopelessness: Denies  Speech:  Normal  Thought Process:  Goal directed  Thought Content:  Normal  Suicidal:  None  Homicidal:  None  Hallucinations:  None  Delusion:  None  Memory:  Intact  Orientation:  Person, Place, Time and Situation  Reliability:  good  Insight:  Good  Judgement:  Good and Fair  Impulse Control:  Good and Fair  Physical/Medical Issues:  Yes Stage 3 CKD, DM, CAD,     Assessment/Plan   Diagnoses and all orders for this visit:    Severe episode of recurrent major depressive disorder, without psychotic features (CMS/HCC)  -     venlafaxine XR (EFFEXOR-XR) 75 MG 24 hr capsule; Take 1 capsule by mouth Daily.    Post traumatic stress disorder (PTSD)  -     prazosin (Minipress) 1 MG capsule; Take 1 capsule by mouth Every Night.  -     QUEtiapine (SEROquel) 50 MG tablet; Take 1 tablet by mouth Every Night.  -     venlafaxine XR (EFFEXOR-XR) 75 MG 24 hr capsule; Take 1 capsule by mouth Daily.    Generalized anxiety disorder  -     QUEtiapine (SEROquel) 50 MG tablet; Take 1 tablet by mouth Every Night.  -     venlafaxine XR (EFFEXOR-XR) 75 MG 24 hr capsule; Take 1 capsule by mouth Daily.        I spent a total of  25 minutes in direct patient care, greater than 15 minutes (greater than 50%) were spent in coordination of care, and counseling the patient regarding depression/anxiety and her recent health issues. Answered any questions patient had with medication and plan.  Also discussed with patient the fact that she is still having GI issues and chronic constipation at times but she notes that she has a upper and lower scope planned that she may have a hernia that may be causing issues related to having her kidney surgery previously but states her PCP is following.    -Increase venlafaxine to 75 mg XR daily for depression and anxiety  -Increase Seroquel to 50 mg at night for sleep and anxiousness.  -Continue  Minipress 1 mg at night for nightmares.    Discussed the risks, benefits, and side effects of the medications; patient ackowledged and verbally consented.  Patient is aware to call the [facility] with any worsening of symptoms.  Patient is agreeable to call 911 or go to the nearest ER should he/she begin having SI/HI.    Prognosis: Guarded dependent on medication/follow up and treatment plan compliance.  Functionality: pt showing improvements in important areas of daily functioning.  Short-Term Goals: Patient will be compliant with medication management and note improvement in symptoms over the next 4 weeks.  Long-Term Goals: Patient will continue psychotherapy as well as medication regimen without impairment in daily functioning over the next 6 months.      Patient will follow-up in 4 weeks, encouraged patient that she had any worsening symptoms or concerns to contact the clinic for sooner appointment patient verbalized understanding.    Errors in dictation may reflect use of voice recognition software and not all errors in transcription may have been detected prior to signing.

## 2020-10-08 ENCOUNTER — TELEMEDICINE (OUTPATIENT)
Dept: PSYCHIATRY | Facility: CLINIC | Age: 50
End: 2020-10-08

## 2020-10-08 DIAGNOSIS — F41.1 GENERALIZED ANXIETY DISORDER: ICD-10-CM

## 2020-10-08 DIAGNOSIS — F43.10 POST TRAUMATIC STRESS DISORDER (PTSD): ICD-10-CM

## 2020-10-08 DIAGNOSIS — F33.2 SEVERE EPISODE OF RECURRENT MAJOR DEPRESSIVE DISORDER, WITHOUT PSYCHOTIC FEATURES (HCC): Primary | ICD-10-CM

## 2020-10-08 PROCEDURE — 90832 PSYTX W PT 30 MINUTES: CPT | Performed by: COUNSELOR

## 2020-10-08 NOTE — PROGRESS NOTES
"Date: October 8, 2020  Time In: Checked in at 0949 but didn't start session until 1021  Time Out: 1051  This provider is located at the [facility] (through ), [address], [place] [address] using a secure [employer] Video Visit through [employer]. Patient is being seen remotely via telehealth at [address] [place] [address] and stated they are in a secure environment for this session. The patient's condition being diagnosed/treated is appropriate for telemedicine. The provider identified herself as well as her credentials. The patient, and/or patients guardian, consent to be seen remotely, and when consent is given they understand that the consent allows for patient identifiable information to be sent to a third party as needed. They may refuse to be seen remotely at any time. The electronic data is encrypted and password protected, and the patient and/or guardian has been advised of the potential risks to privacy not withstanding such measures.     You have chosen to receive care through a telehealth visit.  Do you consent to use a video/audio connection for your medical care today? Yes    PROGRESS NOTE  Data:  Yudi West is a 49 y.o. female who presents today for follow up    Chief Complaint: My anxiety stays high for no reason    History of Present Illness: Patient reports that her anxiety continues to stay at a 6 or a 7 on a 1 to 10 scale stating that this seems to be her baseline. Patient reports that has an upcoming trip scheduled by her sister at the end of this month for a week in efforts to \"relax\". Patient reports though she is looking for to this trip in efforts to \"relax\" but is worried about traveling since the destination is a eight hour drive. Patient reports that she has traveled to [place] and to [place] in the past and would have to stop on the side of the interstate in efforts to exit the car for a few minutes due to feeling " "\"traped\" and fearing that \"something would happen and I wouldn't have control because I couldn't get out\". Patient reports that she wished she would have asked for a Valium during her medication management appointment but that it \"slipped her mind\". Patient also reports that her grandson's maternal grandmother has been demanding recently and has now reported new visitation hours but states that there is no documentation of a  order. Patient reports she is worried that this will be a trigger for her son and hopes that he doesn't relapse stating that she has been able to \"calm him down\" thus far.        Clinical Maneuvering/Intervention:    (Scales based on 0 - 10 with 10 being the worst)  Depression: 2 or 3  Anxiety: 6 or 7        Assisted patient in processing above session content; acknowledged and normalized patient’s thoughts, feelings, and concerns.  Rationalized patient thought process regarding anxiety and irrational fears.  Discussed triggers associated with patient's anxiety.  Also discussed coping skills for patient to implement.    Allowed patient to freely discuss issues without interruption or judgment. Provided safe, confidential environment to facilitate the development of positive therapeutic relationship and encourage open, honest communication. Assisted patient in identifying risk factors which would indicate the need for higher level of care including thoughts to harm self or others and/or self-harming behavior and encouraged patient to contact this office, call 911, or present to the nearest emergency room should any of these events occur. Discussed crisis intervention services and means to access. Patient adamantly and convincingly denies current suicidal or homicidal ideation or perceptual disturbance.    Assessment:   Assessment   Patient appears to maintain relative stability as compared to their baseline.  However, patient continues to struggle with irrational fears and anxiety which " continues to cause impairment in important areas of functioning.  A result, they can be reasonably expected to continue to benefit from treatment and would likely be at increased risk for decompensation otherwise.    Mental Status Exam:   Hygiene:   good  Cooperation:  Cooperative  Eye Contact:  Good  Psychomotor Behavior:  Appropriate  Affect:  Full range  Mood: anxious  Speech:  Normal  Thought Process:  Linear  Thought Content:  Normal and Mood congruent  Suicidal:  None  Homicidal:  None  Hallucinations:  None  Delusion:  None  Memory:  Intact  Orientation:  Person, Place, Time and Situation  Reliability:  good  Insight:  Good  Judgement:  Fair  Impulse Control:  Fair  Physical/Medical Issues:  No        Patient's Support Network Includes:  significant other    Functional Status: Mild impairment     Progress toward goal: Not at goal    Prognosis: Fair with Ongoing Treatment         Plan:    Plan     Patient will continue in individual outpatient therapy with focus on improved functioning and coping skills, maintaining stability, and avoiding decompensation and the need for higher level of care.    Patient will adhere to medication regimen as prescribed and report any side effects. Patient will contact this office, call 911 or present to the nearest emergency room should suicidal or homicidal ideations occur. Provide Cognitive Behavioral Therapy and Solution Focused Therapy to improve functioning, maintain stability, and avoid decompensation and the need for higher level of care.     Return in about 2 weeks, or earlier if symptoms worsen or fail to improve.           VISIT DIAGNOSIS:     ICD-10-CM ICD-9-CM   1. Severe episode of recurrent major depressive disorder, without psychotic features (CMS/[facility])  F33.2 296.33   2. Post traumatic stress disorder (PTSD)  F43.10 309.81   3. Generalized anxiety disorder  F41.1 300.02             This document has been electronically signed by Jeannette Sterling LCSW  October 8,  2020 11:39 EDT      Part of this note may be an electronic transcription/translation of spoken language to printed text using the Dragon Dictation System.

## 2020-10-08 NOTE — TREATMENT PLAN
Multi-Disciplinary Problems (from Behavioral Health Treatment Plan)    Active Problems     Problem: Anxiety  Start Date: 10/08/20    Problem Details: The patient self-scales this problem as a 7 with 10 being the worst.        Goal Priority Start Date Expected End Date End Date    Patient will develop and implement behavioral and cognitive strategies to reduce anxiety and irrational fears. -- 10/08/20 -- --    Goal Details: Progress toward goal:  Not appropriate to rate progress toward goal since this is the initial treatment plan.        Goal Intervention Frequency Start Date End Date    Help patient explore past emotional issues in relation to present anxiety. Q2 Weeks 10/08/20 --    Intervention Details: Duration of treatment until until remission of symptoms.        Goal Intervention Frequency Start Date End Date    Help patient develop an awareness of their cognitive and physical responses to anxiety. Q2 Weeks 10/08/20 --    Intervention Details: Duration of treatment until until remission of symptoms.              Problem: Depression  Start Date: 10/08/20    Problem Details: The patient self-scales this problem as a 7 with 10 being the worst.        Goal Priority Start Date Expected End Date End Date    Patient will demonstrate the ability to initiate new constructive life skills outside of sessions on a consistent basis. -- 10/08/20 -- --    Goal Details: Progress toward goal:  Not appropriate to rate progress toward goal since this is the initial treatment plan.        Goal Intervention Frequency Start Date End Date    Assist patient in setting attainable activities of daily living goals. PRN 10/08/20 --    Goal Intervention Frequency Start Date End Date    Provide education about depression PRN 10/08/20 --    Intervention Details: Duration of treatment until until remission of symptoms.        Goal Intervention Frequency Start Date End Date    Assist patient in developing healthy coping strategies. Q2 Weeks  10/08/20 --    Intervention Details: Duration of treatment until until remission of symptoms.              Problem: Post Traumatic Stress  Start Date: 10/08/20    Problem Details: The patient self-scales this problem as a 7 with 10 being the worst.        Goal Priority Start Date Expected End Date End Date    Patient will process and move through trauma in a way that improves self regard and the patients ability to function optimally in the world around them. -- 10/08/20 -- --    Goal Details: Progress toward goal:  Not appropriate to rate progress toward goal since this is the initial treatment plan.        Goal Intervention Frequency Start Date End Date    Assist patient in identifying ways that trauma has negatively impacted their view of themselves and the world. PRN 10/08/20 --    Intervention Details: Duration of treatment until until remission of symptoms.        Goal Intervention Frequency Start Date End Date    Process trauma in the context of the safe session environment. Q2 Weeks 10/08/20 --    Intervention Details: Duration of treatment until until remission of symptoms.        Goal Intervention Frequency Start Date End Date    Develop a plan of behavior changes that will reduce the stress of the trauma. PRN 10/08/20 --    Intervention Details: Duration of treatment until until remission of symptoms.                           I have discussed and reviewed this treatment plan with the patient.

## 2020-10-19 RX ORDER — ROSUVASTATIN CALCIUM 20 MG/1
TABLET, COATED ORAL
Qty: 30 TABLET | Refills: 3 | Status: SHIPPED | OUTPATIENT
Start: 2020-10-19 | End: 2021-02-17

## 2020-10-19 RX ORDER — LUBIPROSTONE 24 UG/1
24 CAPSULE ORAL 2 TIMES DAILY WITH MEALS
Qty: 60 CAPSULE | Refills: 5 | Status: SHIPPED | OUTPATIENT
Start: 2020-10-19 | End: 2020-11-18

## 2020-10-21 RX ORDER — LORATADINE 10 MG/1
TABLET ORAL
Qty: 30 TABLET | Refills: 3 | Status: SHIPPED | OUTPATIENT
Start: 2020-10-21 | End: 2021-02-03

## 2020-10-21 RX ORDER — FAMOTIDINE 20 MG/1
TABLET, FILM COATED ORAL
Qty: 60 TABLET | Refills: 3 | Status: SHIPPED | OUTPATIENT
Start: 2020-10-21 | End: 2021-02-17

## 2020-10-21 RX ORDER — CARVEDILOL 3.12 MG/1
TABLET ORAL
Qty: 60 TABLET | Refills: 3 | Status: SHIPPED | OUTPATIENT
Start: 2020-10-21 | End: 2021-02-11 | Stop reason: SDUPTHER

## 2020-10-21 RX ORDER — BUSPIRONE HYDROCHLORIDE 10 MG/1
TABLET ORAL
Qty: 90 TABLET | Refills: 3 | Status: SHIPPED | OUTPATIENT
Start: 2020-10-21 | End: 2020-12-09

## 2020-10-21 RX ORDER — ALBUTEROL SULFATE 90 UG/1
AEROSOL, METERED RESPIRATORY (INHALATION)
Qty: 18 G | Refills: 3 | Status: SHIPPED | OUTPATIENT
Start: 2020-10-21 | End: 2021-01-29

## 2020-10-21 RX ORDER — CLOPIDOGREL BISULFATE 75 MG/1
TABLET ORAL
Qty: 30 TABLET | Refills: 3 | Status: SHIPPED | OUTPATIENT
Start: 2020-10-21 | End: 2021-02-17

## 2020-10-29 DIAGNOSIS — F33.2 SEVERE EPISODE OF RECURRENT MAJOR DEPRESSIVE DISORDER, WITHOUT PSYCHOTIC FEATURES (HCC): ICD-10-CM

## 2020-10-29 DIAGNOSIS — F41.1 GENERALIZED ANXIETY DISORDER: ICD-10-CM

## 2020-10-29 DIAGNOSIS — F43.10 POST TRAUMATIC STRESS DISORDER (PTSD): ICD-10-CM

## 2020-10-29 RX ORDER — PRAZOSIN HYDROCHLORIDE 1 MG/1
1 CAPSULE ORAL NIGHTLY
Qty: 30 CAPSULE | Refills: 0 | Status: SHIPPED | OUTPATIENT
Start: 2020-10-29 | End: 2020-11-11

## 2020-10-29 RX ORDER — VENLAFAXINE HYDROCHLORIDE 75 MG/1
75 CAPSULE, EXTENDED RELEASE ORAL DAILY
Qty: 30 CAPSULE | Refills: 0 | Status: SHIPPED | OUTPATIENT
Start: 2020-10-29 | End: 2020-11-11 | Stop reason: SDUPTHER

## 2020-10-29 RX ORDER — QUETIAPINE FUMARATE 50 MG/1
50 TABLET, FILM COATED ORAL NIGHTLY
Qty: 30 TABLET | Refills: 0 | Status: SHIPPED | OUTPATIENT
Start: 2020-10-29 | End: 2020-11-11 | Stop reason: SDUPTHER

## 2020-11-02 RX ORDER — PEN NEEDLE, DIABETIC 31 GX5/16"
NEEDLE, DISPOSABLE MISCELLANEOUS
Qty: 100 EACH | Refills: 3 | Status: SHIPPED | OUTPATIENT
Start: 2020-11-02 | End: 2021-03-01

## 2020-11-02 RX ORDER — SYRINGE AND NEEDLE,INSULIN,1ML 28GX1/2"
SYRINGE, EMPTY DISPOSABLE MISCELLANEOUS
Qty: 100 EACH | Refills: 3 | Status: SHIPPED | OUTPATIENT
Start: 2020-11-02 | End: 2021-07-22 | Stop reason: SDUPTHER

## 2020-11-10 ENCOUNTER — LAB (OUTPATIENT)
Dept: LAB | Facility: HOSPITAL | Age: 50
End: 2020-11-10

## 2020-11-10 DIAGNOSIS — Z01.818 PREOP TESTING: Primary | ICD-10-CM

## 2020-11-10 PROCEDURE — U0003 INFECTIOUS AGENT DETECTION BY NUCLEIC ACID (DNA OR RNA); SEVERE ACUTE RESPIRATORY SYNDROME CORONAVIRUS 2 (SARS-COV-2) (CORONAVIRUS DISEASE [COVID-19]), AMPLIFIED PROBE TECHNIQUE, MAKING USE OF HIGH THROUGHPUT TECHNOLOGIES AS DESCRIBED BY CMS-2020-01-R: HCPCS

## 2020-11-10 PROCEDURE — C9803 HOPD COVID-19 SPEC COLLECT: HCPCS

## 2020-11-11 ENCOUNTER — TELEMEDICINE (OUTPATIENT)
Dept: PSYCHIATRY | Facility: CLINIC | Age: 50
End: 2020-11-11

## 2020-11-11 DIAGNOSIS — F41.1 GENERALIZED ANXIETY DISORDER: Primary | ICD-10-CM

## 2020-11-11 DIAGNOSIS — F43.10 POST TRAUMATIC STRESS DISORDER (PTSD): ICD-10-CM

## 2020-11-11 DIAGNOSIS — F33.1 MODERATE EPISODE OF RECURRENT MAJOR DEPRESSIVE DISORDER (HCC): ICD-10-CM

## 2020-11-11 LAB
COVID LABCORP PRIORITY: NORMAL
SARS-COV-2 RNA RESP QL NAA+PROBE: NOT DETECTED

## 2020-11-11 PROCEDURE — 99214 OFFICE O/P EST MOD 30 MIN: CPT | Performed by: NURSE PRACTITIONER

## 2020-11-11 RX ORDER — QUETIAPINE FUMARATE 100 MG/1
100 TABLET, FILM COATED ORAL NIGHTLY
Qty: 30 TABLET | Refills: 0 | Status: SHIPPED | OUTPATIENT
Start: 2020-11-11 | End: 2020-12-09 | Stop reason: SDUPTHER

## 2020-11-11 RX ORDER — VENLAFAXINE HYDROCHLORIDE 75 MG/1
75 CAPSULE, EXTENDED RELEASE ORAL DAILY
Qty: 30 CAPSULE | Refills: 0 | Status: SHIPPED | OUTPATIENT
Start: 2020-11-11 | End: 2020-12-09 | Stop reason: SDUPTHER

## 2020-11-11 NOTE — PROGRESS NOTES
[facility] provider [facility], [address] [place] [address].The Patient [facility] [address] [place] [address]. Patient is being seen via telehealth and confirm that they are in a secure environment for this session. The patient's condition being diagnosed/treated is appropriate for telemedicine. The provider identified himself/herself: herself as well as her credentials.   The patient gave consent to be seen remotely, and when consent is given they understand that the consent allows for patient identifiable information to be sent to a third party as needed.   They may refuse to be seen remotely at any time. The electronic data is encrypted and password protected, and the patient has been advised of the potential risks to privacy not withstanding such measures.    You have chosen to receive care through a telehealth visit.  Do you consent to use a video/audio connection for your medical care today? Yes     Subjective   Yudi West is a 50 y.o. female who is here today for medication management follow up.    Chief Complaint:  Follow-up depression/anxiety     History of Present Illness   Patient presents today reporting that she has been doing good.  Patient states that she recently went on her little mini vacation and it was nice to get a break.  Patient reports during this time that she tried to stop taking her Effexor and reports that was not a good idea she realized how much she needed it so she started back only after couple days.  Patient still rating depression a 2-3 but states anxiety is roughly a 6 or 7 on a scale 0-10 with 10 being the worst.  Patient states that she is still anxious about everything and on edge.  She also notes that she has an upper GI in a lower GI scope on Friday that she has been nervous about to see what is going on with her GI issues.  Patient notes that her sleep is started to become an  issue as she is waking up at 2 or 3 AM and staying up and not able to go back to sleep.  Patient also states that with the Minipress it is helpful for the nightmares but now she is having weird dreams but the dizziness is still significant and not getting better otherwise patient denies any side effects.  Patient denies any SI/HI/AH/VH.  See cherelle 7 and PHQ 9.        The following portions of the patient's history were reviewed and updated as appropriate: allergies, current medications, past family history, past medical history, past social history, past surgical history and problem list.    Review of Systems   Gastrointestinal: Positive for constipation.   Neurological: Positive for dizziness.   Psychiatric/Behavioral: Positive for dysphoric mood. Negative for agitation. The patient is nervous/anxious.        Objective   Physical Exam  Nursing note reviewed.   Constitutional:       Appearance: Normal appearance.   Neurological:      Mental Status: She is alert.   Psychiatric:         Attention and Perception: Attention and perception normal.         Mood and Affect: Affect normal. Mood is anxious.         Speech: Speech normal.         Behavior: Behavior normal. Behavior is cooperative.         Thought Content: Thought content normal.         Cognition and Memory: Cognition and memory normal.         Judgment: Judgment normal.       There were no vitals taken for this visit. Due to extenuating circumstances and possible current health risks associated with the patient being present in a clinical setting (with current health restrictions in place in regards to possible COVID 19 transmission/exposure), the patient was seen remotely today via a MyChart Video Visit through [facility].  Unable to obtain vital signs due to nature of remote visit.  Height stated at 65 inches.  Weight stated at 179 pounds.      Allergies   Allergen Reactions   • Sulfa Antibiotics Itching       Recent Results (from the past 2016 hour(s))   Doppler  Arterial Multi Level Lower Extremity - Bilateral CAR    Collection Time: 08/31/20  3:45 PM   Result Value Ref Range    RIGHT LOW THIGH SYS  mmHg    RIGHT POPLITEAL SYS  mmHg    RIGHT DORSALIS PEDIS SYS  mmHg    RIGHT POST TIBIAL SYS  mmHg    RIGHT RANDI RATIO 1.32     LEFT LOW THIGH SYS  mmHg    LEFT POPLITEAL SYS  mmHg    LEFT DORSALIS PEDIS SYS  mmHg    LEFT POST TIBIAL SYS  mmHg    LEFT RANDI RATIO 1.32     Upper arterial right arm brachial sys max 108 mmHg    Upper arterial left arm brachial sys max 111 mmHg   COVID-19,[facility] ROUTINE, NP/OP SWAB IN TRANSPORT MEDIA OR ESWAB 72 HR TAT - Swab, Nasopharynx    Collection Time: 11/10/20  9:11 AM    Specimen: Nasopharynx; Swab   Result Value Ref Range    SARS-CoV-2, KOLE Not Detected Not Detected   COVID [facility] Priority - Swab, Nasopharynx    Collection Time: 11/10/20  9:11 AM    Specimen: Nasopharynx; Swab   Result Value Ref Range    COVID [facility] PRIORITY Comment        Current Medications:   Current Outpatient Medications   Medication Sig Dispense Refill   • albuterol sulfate  (90 Base) MCG/ACT inhaler INHALE TWO PUFFS FOUR TIMES DAILY 18 g 3   • Alpha-Lipoic Acid 300 MG capsule TAKE ONE TO TWO CAPSULES BY MOUTH TWICE DAILY     • aspirin 81 MG EC tablet Take 1 tablet by mouth 2 (Two) Times a Day With Meals. 60 tablet 0   • Blood Glucose Monitoring Suppl (FREESTYLE FREEDOM LITE) w/Device kit USE TO TEST BLOOD SUGAR TWO TO THREE TI MES DAILY 1 each 0   • busPIRone (BUSPAR) 10 MG tablet TAKE ONE TABLET BY MOUTH THREE TIMES DAILY 90 tablet 3   • carvedilol (COREG) 3.125 MG tablet TAKE ONE TABLET BY MOUTH TWICE DAILY WITH MEALS 60 tablet 3   • clopidogrel (PLAVIX) 75 MG tablet TAKE ONE TABLET BY MOUTH EVERY DAY 30 tablet 3   • dicyclomine (BENTYL) 20 MG tablet Take 1 tablet by mouth Every 6 (Six) Hours. 120 tablet 3   • Dulaglutide (Trulicity) 0.75 MG/0.5ML solution pen-injector Inject 0.75 mg under the skin  "into the appropriate area as directed 1 (One) Time Per Week. 4 pen 3   • Dulaglutide (Trulicity) 1.5 MG/0.5ML solution pen-injector Inject 1.5 mg under the skin into the appropriate area as directed 1 (One) Time Per Week. 4 pen 3   • Easy Touch Insulin Syringe 28G X 1/2\" 1 ML misc USE AT BEDTIME AS DIRECTED 100 each 3   • famotidine (PEPCID) 20 MG tablet TAKE ONE TABLET BY MOUTH TWICE DAILY 60 tablet 3   • Fluticasone Furoate-Vilanterol (Breo Ellipta) 200-25 MCG/INH inhaler Inhale 1 puff Daily. 1 inhaler 3   • gabapentin (NEURONTIN) 300 MG capsule Take 300 mg by mouth 4 (Four) Times a Day.     • Global Ease Inject Pen Needles 31G X 8 MM misc USE TO TEST BLOOD SUGARS TWO TO THREE TIMES DAILY 100 each 3   • glucose blood test strip Check sugars before breakfast and 2 hours after meal. Also give lancets 600 each 12   • Insulin Glargine (BASAGLAR KWIKPEN) 100 UNIT/ML injection pen Take 39 units at bedtime can go up by 2-3 unites every 3 days until am sugars running  9 mL 3   • loratadine (CLARITIN) 10 MG tablet Take 10 mg by mouth Daily.     • loratadine (CLARITIN) 10 MG tablet TAKE ONE TABLET BY MOUTH EVERY DAY 30 tablet 3   • lubiprostone (Amitiza) 24 MCG capsule Take 1 capsule by mouth 2 (Two) Times a Day With Meals for 30 days. 60 capsule 5   • meloxicam (MOBIC) 15 MG tablet Take 15 mg by mouth Daily.     • Nicotine Step 1 21 MG/24HR patch APPLY ONE PATCH DAILY 28 each 3   • omeprazole (PrilOSEC) 40 MG capsule Take 1 capsule by mouth Daily. 30 capsule 11   • ondansetron ODT (ZOFRAN-ODT) 8 MG disintegrating tablet DISSOLVE ONE TABLET ON THE TONGUE EVERY 8 HOURS AS NEEDED FOR NAUSEA OR VOMITING 90 tablet 3   • oxyCODONE-acetaminophen (PERCOCET) 7.5-325 MG per tablet Take 1 tablet by mouth 4 (Four) Times a Day.     • polyethylene glycol (MIRALAX) 17 g packet Take 17 g by mouth Daily. 30 each 5   • QUEtiapine (SEROquel) 100 MG tablet Take 1 tablet by mouth Every Night. 30 tablet 0   • ranolazine (Ranexa) 500 MG 12 " hr tablet Take 1 tablet by mouth 2 (Two) Times a Day. 60 tablet 6   • rosuvastatin (CRESTOR) 20 MG tablet TAKE ONE TABLET BY MOUTH AT BEDTIME 30 tablet 3   • venlafaxine XR (EFFEXOR-XR) 75 MG 24 hr capsule Take 1 capsule by mouth Daily. 30 capsule 0   • vitamin D (ERGOCALCIFEROL) 1.25 MG (92319 UT) capsule capsule Take 1 capsule by mouth Every 7 (Seven) Days. 4 capsule 3     No current facility-administered medications for this visit.        Mental Status Exam:   Hygiene:   good  Cooperation:  Cooperative  Eye Contact:  Good  Psychomotor Behavior:  Appropriate  Affect:  Appropriate  Hopelessness: Denies  Speech:  Normal  Thought Process:  Goal directed  Thought Content:  Normal  Suicidal:  None  Homicidal:  None  Hallucinations:  None  Delusion:  None  Memory:  Intact  Orientation:  Person, Place, Time and Situation  Reliability:  good  Insight:  Good  Judgement:  Good and Fair  Impulse Control:  Good and Fair  Physical/Medical Issues:  Yes Stage 3 CKD, DM, CAD,     Assessment/Plan   Diagnoses and all orders for this visit:    1. Generalized anxiety disorder (Primary)  -     QUEtiapine (SEROquel) 100 MG tablet; Take 1 tablet by mouth Every Night.  Dispense: 30 tablet; Refill: 0  -     venlafaxine XR (EFFEXOR-XR) 75 MG 24 hr capsule; Take 1 capsule by mouth Daily.  Dispense: 30 capsule; Refill: 0    2. Moderate episode of recurrent major depressive disorder (CMS/HCC)  -     venlafaxine XR (EFFEXOR-XR) 75 MG 24 hr capsule; Take 1 capsule by mouth Daily.  Dispense: 30 capsule; Refill: 0    3. Post traumatic stress disorder (PTSD)  -     QUEtiapine (SEROquel) 100 MG tablet; Take 1 tablet by mouth Every Night.  Dispense: 30 tablet; Refill: 0  -     venlafaxine XR (EFFEXOR-XR) 75 MG 24 hr capsule; Take 1 capsule by mouth Daily.  Dispense: 30 capsule; Refill: 0        I spent a total of  25 minutes in direct patient care, greater than 15 minutes (greater than 50%) were spent in coordination of care, and counseling the  patient regarding her anxiety as well as her sleeping habits.  Answered any questions patient had with medication and plan.  Discussed in detail with the patient that we would address her medications after her scope to see if her anxiety decreases wants that is over with.  Also encouraged the patient that working with the therapist would be helpful in order to have positive coping skills to deal with her anxiety.  Discussed proper sleep hygiene.    -Continue venlafaxine to 75 mg XR daily for depression and anxiety  -Increase Seroquel to 100 mg at night for sleep and anxiousness.  -Discontinue Minipress as it caused significant dizziness.    Discussed the risks, benefits, and side effects of the medications; patient ackowledged and verbally consented.  Patient is aware to call the [facility] with any worsening of symptoms.  Patient is agreeable to call 911 or go to the nearest ER should he/she begin having SI/HI.  Informed the patient that we would be in contact to reschedule her therapist appointment.  Also instructed patient the importance of not stopping and starting medication as will need to be on it for a long period of time in order to have the most effectiveness patient verbalized understanding.    Prognosis: Guarded dependent on medication/follow up and treatment plan compliance.  Functionality: pt showing improvements in important areas of daily functioning.  Short-Term Goals: Patient will be compliant with medication management and note improvement in symptoms over the next 4 weeks.  Long-Term Goals: Patient will continue psychotherapy as well as medication regimen without impairment in daily functioning over the next 6 months.      Patient will follow-up in 4 weeks, encouraged patient that she had any worsening symptoms or concerns to contact the clinic for sooner appointment patient verbalized understanding.    Errors in dictation may reflect use of voice recognition software and  not all errors in transcription may have been detected prior to signing.

## 2020-11-19 ENCOUNTER — OFFICE VISIT (OUTPATIENT)
Dept: ORTHOPEDIC SURGERY | Facility: CLINIC | Age: 50
End: 2020-11-19

## 2020-11-19 VITALS
RESPIRATION RATE: 18 BRPM | HEART RATE: 79 BPM | HEIGHT: 65 IN | OXYGEN SATURATION: 99 % | TEMPERATURE: 97.3 F | SYSTOLIC BLOOD PRESSURE: 135 MMHG | DIASTOLIC BLOOD PRESSURE: 95 MMHG | BODY MASS INDEX: 28.99 KG/M2 | WEIGHT: 174 LBS

## 2020-11-19 DIAGNOSIS — M25.562 ACUTE PAIN OF LEFT KNEE: Primary | ICD-10-CM

## 2020-11-19 PROCEDURE — 99214 OFFICE O/P EST MOD 30 MIN: CPT | Performed by: ORTHOPAEDIC SURGERY

## 2020-11-19 NOTE — PROGRESS NOTES
Yudi West is a 50 y.o. female   Primary provider:  Quintin Flores MD       Chief Complaint   Patient presents with   • Left Knee - Pain, Initial Evaluation       HISTORY OF PRESENT ILLNESS: Mrs. West returns today for evaluation of left knee pain.    She sustained a twisting injury to her left knee last week.  She said she did not fall.  She developed swelling of the knee overnight.  She complains of pain diffusely over the medial lateral aspects of the knee worse with standing and walking.  Treatment has included activity restriction.  She underwent retrograde nailing of a supracondylar fracture of the left femur above a knee replacement a year ago.  Sounds like she was having some low-grade pain symptoms in the knee prior to her most recent injury.  I last saw her for this in March of this year.    Past medical history is unchanged from previous notes.    Pain  This is a new (11/02/2020) problem. The problem occurs constantly. The problem has been unchanged. Associated symptoms include headaches, joint swelling, nausea and vomiting. Associated symptoms comments: Stabbing,aching,pain and swelling left knee. The symptoms are aggravated by walking. She has tried ice for the symptoms. The treatment provided no relief.        CONCURRENT MEDICAL HISTORY:    Past Medical History:   Diagnosis Date   • Anxiety and depression    • Arthritis    • Asthma    • Cancer of kidney (CMS/HCC)     left   • Chronic kidney disease    • COPD (chronic obstructive pulmonary disease) (CMS/[facility])    • Coronary artery disease     1 stent.  is followed by jaden   • Diabetes mellitus (CMS/HCC)    • GERD (gastroesophageal reflux disease)    • Hyperlipidemia    • Hypertension    • Myocardial infarction (CMS/HCC)    • PTSD (post-traumatic stress disorder)    • Sleep apnea    • Substance abuse (CMS/[facility])    • Suicide attempt (CMS/[facility])        Allergies   Allergen Reactions   • Sulfa Antibiotics Itching         Current Outpatient  "Medications:   •  albuterol sulfate  (90 Base) MCG/ACT inhaler, INHALE TWO PUFFS FOUR TIMES DAILY, Disp: 18 g, Rfl: 3  •  Alpha-Lipoic Acid 300 MG capsule, TAKE ONE TO TWO CAPSULES BY MOUTH TWICE DAILY, Disp: , Rfl:   •  aspirin 81 MG EC tablet, Take 1 tablet by mouth 2 (Two) Times a Day With Meals., Disp: 60 tablet, Rfl: 0  •  Blood Glucose Monitoring Suppl (FREESTYLE FREEDOM LITE) w/Device kit, USE TO TEST BLOOD SUGAR TWO TO THREE TI MES DAILY, Disp: 1 each, Rfl: 0  •  carvedilol (COREG) 3.125 MG tablet, TAKE ONE TABLET BY MOUTH TWICE DAILY WITH MEALS, Disp: 60 tablet, Rfl: 3  •  clopidogrel (PLAVIX) 75 MG tablet, TAKE ONE TABLET BY MOUTH EVERY DAY, Disp: 30 tablet, Rfl: 3  •  dicyclomine (BENTYL) 20 MG tablet, Take 1 tablet by mouth Every 6 (Six) Hours., Disp: 120 tablet, Rfl: 3  •  Dulaglutide (Trulicity) 0.75 MG/0.5ML solution pen-injector, Inject 0.75 mg under the skin into the appropriate area as directed 1 (One) Time Per Week., Disp: 4 pen, Rfl: 3  •  Dulaglutide (Trulicity) 1.5 MG/0.5ML solution pen-injector, Inject 1.5 mg under the skin into the appropriate area as directed 1 (One) Time Per Week., Disp: 4 pen, Rfl: 3  •  Easy Touch Insulin Syringe 28G X 1/2\" 1 ML misc, USE AT BEDTIME AS DIRECTED, Disp: 100 each, Rfl: 3  •  famotidine (PEPCID) 20 MG tablet, TAKE ONE TABLET BY MOUTH TWICE DAILY, Disp: 60 tablet, Rfl: 3  •  Fluticasone Furoate-Vilanterol (Breo Ellipta) 200-25 MCG/INH inhaler, Inhale 1 puff Daily., Disp: 1 inhaler, Rfl: 3  •  gabapentin (NEURONTIN) 300 MG capsule, Take 300 mg by mouth 4 (Four) Times a Day., Disp: , Rfl:   •  Global Ease Inject Pen Needles 31G X 8 MM misc, USE TO TEST BLOOD SUGARS TWO TO THREE TIMES DAILY, Disp: 100 each, Rfl: 3  •  glucose blood test strip, Check sugars before breakfast and 2 hours after meal. Also give lancets, Disp: 600 each, Rfl: 12  •  Insulin Glargine (BASAGLAR KWIKPEN) 100 UNIT/ML injection pen, Take 39 units at bedtime can go up by 2-3 unites " every 3 days until am sugars running , Disp: 9 mL, Rfl: 3  •  loratadine (CLARITIN) 10 MG tablet, Take 10 mg by mouth Daily., Disp: , Rfl:   •  loratadine (CLARITIN) 10 MG tablet, TAKE ONE TABLET BY MOUTH EVERY DAY, Disp: 30 tablet, Rfl: 3  •  meloxicam (MOBIC) 15 MG tablet, Take 15 mg by mouth Daily., Disp: , Rfl:   •  Nicotine Step 1 21 MG/24HR patch, APPLY ONE PATCH DAILY, Disp: 28 each, Rfl: 3  •  omeprazole (PrilOSEC) 40 MG capsule, Take 1 capsule by mouth Daily., Disp: 30 capsule, Rfl: 11  •  ondansetron ODT (ZOFRAN-ODT) 8 MG disintegrating tablet, DISSOLVE ONE TABLET ON THE TONGUE EVERY 8 HOURS AS NEEDED FOR NAUSEA OR VOMITING, Disp: 90 tablet, Rfl: 3  •  oxyCODONE-acetaminophen (PERCOCET) 7.5-325 MG per tablet, Take 1 tablet by mouth 4 (Four) Times a Day., Disp: , Rfl:   •  polyethylene glycol (MIRALAX) 17 g packet, Take 17 g by mouth Daily., Disp: 30 each, Rfl: 5  •  QUEtiapine (SEROquel) 100 MG tablet, Take 1 tablet by mouth Every Night., Disp: 30 tablet, Rfl: 0  •  ranolazine (Ranexa) 500 MG 12 hr tablet, Take 1 tablet by mouth 2 (Two) Times a Day., Disp: 60 tablet, Rfl: 6  •  rosuvastatin (CRESTOR) 20 MG tablet, TAKE ONE TABLET BY MOUTH AT BEDTIME, Disp: 30 tablet, Rfl: 3  •  venlafaxine XR (EFFEXOR-XR) 75 MG 24 hr capsule, Take 1 capsule by mouth Daily., Disp: 30 capsule, Rfl: 0  •  vitamin D (ERGOCALCIFEROL) 1.25 MG (14785 UT) capsule capsule, Take 1 capsule by mouth Every 7 (Seven) Days., Disp: 4 capsule, Rfl: 3  •  busPIRone (BUSPAR) 10 MG tablet, TAKE ONE TABLET BY MOUTH THREE TIMES DAILY, Disp: 90 tablet, Rfl: 3    Past Surgical History:   Procedure Laterality Date   • CORONARY STENT PLACEMENT     • FEMUR IM NAILING RETROGRADE Left 11/3/2019    Procedure: FEMUR INTRAMEDULLARY NAILING RETROGRADE;  Surgeon: Howie Campoverde MD;  Location: [facility];  Service: Orthopedics   • GALLBLADDER SURGERY     • HARDWARE REMOVAL Left 12/4/2019    Procedure: HARDWARE REMOVAL LEFT FEMUR        (C-ARM#3);   Surgeon: Howie Campoverde MD;  Location: [facility];  Service: Orthopedics   • HYSTERECTOMY     • NEPHRECTOMY Left    • REPLACEMENT TOTAL KNEE Left    • TONSILLECTOMY         Family History   Problem Relation Age of Onset   • Heart disease Mother    • Hypertension Mother    • Cancer Mother    • Diabetes Mother    • Alcohol abuse Mother    • Heart disease Father    • Hypertension Father    • Alcohol abuse Father    • Alcohol abuse Sister    • Drug abuse Sister    • Depression Sister    • Anxiety disorder Sister    • Drug abuse Brother    • Alcohol abuse Brother    • Suicide Attempts Brother        Social History     Socioeconomic History   • Marital status:      Spouse name: Not on file   • Number of children: Not on file   • Years of education: Not on file   • Highest education level: Not on file   Tobacco Use   • Smoking status: Current Every Day Smoker     Packs/day: 1.00     Years: 36.00     Pack years: 36.00     Types: Cigarettes   • Smokeless tobacco: Never Used   Substance and Sexual Activity   • Alcohol use: Not Currently     Comment: passed use for yrs including cases and 2 fifths daily; quit 20 yrs ago   • Drug use: Not Currently     Types: Methamphetamines, Cocaine(coke)   • Sexual activity: Defer        Review of Systems   Constitutional: Positive for unexpected weight change.   Eyes: Positive for visual disturbance.   Respiratory: Positive for wheezing.         Breathing difficulties   Gastrointestinal: Positive for diarrhea, nausea and vomiting.   Endocrine: Positive for cold intolerance and heat intolerance.   Genitourinary: Positive for difficulty urinating.   Musculoskeletal: Positive for joint swelling.        Stiffness, muscle pain   Neurological: Positive for headaches.   Hematological: Bruises/bleeds easily.   Psychiatric/Behavioral: The patient is nervous/anxious.    Review of systems is positive as noted above.    PHYSICAL EXAMINATION:       Vitals:    11/19/20 0903 11/19/20 0910  "  BP: 135/95    Pulse: 79    Resp: 18    Temp: 97.3 °F (36.3 °C)    SpO2:  99%   Weight: 78.9 kg (174 lb)    Height: 165.1 cm (65\")    PainSc:   4        Physical Exam she is alert and in no apparent distress at rest.  GAIT:     []  Normal  [x]  Antalgic    Assistive device: [x]  None  []  Walker     []  Crutches  []  Cane     []  Wheelchair  []  Stretcher    Ortho Exam she walks with a mild limp favoring the left.  She is somewhat apprehensive about motion of the knee and will allow motion from about 15 to 70 degrees.  There is no instability on gentle varus and valgus stress.  There is a 1-2+ effusion of the knee but no associated warmth or erythema.  Knee motion is smooth.  There is no edema in the foot or leg.  Dorsalis pedis pulses 1+.  The thigh and calf are soft and nontender.    Xr Knee 1 Or 2 View Left    Result Date: 11/19/2020  Ordering Provider:  Howie Campoverde MD Ordering Diagnosis/Indication:  Acute pain of left knee Procedure:  XR KNEE 1 OR 2 VW LEFT Exam Date:  11/19/20 COMPARISON: Exam of the knee dated March 2020      Radiographs of the left knee AP and lateral views done today show a healed supracondylar fracture of the femur with a intramedullary nail in place.  There is a total knee arthroplasty in satisfactory position. Howie Campoverde MD 11/19/20            No results found.        ASSESSMENT: Left knee pain and effusion following knee arthroplasty.  I think it is unlikely she sustained any significant structural pathology as a result of her recent injury.    She was instructed in symptomatic care including activity restriction and soft support.  She may advance activities as tolerated.    If her symptoms do not steadily improve over the next several weeks she will call for reevaluation.    Diagnoses and all orders for this visit:    Acute pain of left knee  -     XR Knee 1 or 2 View Left          PLAN        Patient's Body mass index is 28.96 kg/m². BMI is above normal parameters. " Recommendations include: exercise counseling, nutrition counseling and referral to primary care.  No follow-ups on file.    Howie Campoverde MD  Answers for HPI/ROS submitted by the patient on 11/19/2020   What is the primary reason for your visit?: Other  Please describe your symptoms.: Left knee problems  Have you had these symptoms before?: No  How long have you been having these symptoms?: 5-7 days  Please list any medications you are currently taking for this condition.: Percots  Please describe any probable cause for these symptoms. : Swelling

## 2020-11-23 RX ORDER — BUPROPION HYDROCHLORIDE 300 MG/1
TABLET ORAL
Qty: 30 TABLET | Refills: 3 | OUTPATIENT
Start: 2020-11-23

## 2020-11-23 RX ORDER — BUSPIRONE HYDROCHLORIDE 15 MG/1
TABLET ORAL
Qty: 90 TABLET | Refills: 3 | OUTPATIENT
Start: 2020-11-23

## 2020-11-23 RX ORDER — DOXEPIN HYDROCHLORIDE 25 MG/1
CAPSULE ORAL
Qty: 30 CAPSULE | Refills: 3 | OUTPATIENT
Start: 2020-11-23

## 2020-12-09 ENCOUNTER — TELEMEDICINE (OUTPATIENT)
Dept: PSYCHIATRY | Facility: CLINIC | Age: 50
End: 2020-12-09

## 2020-12-09 DIAGNOSIS — F43.10 POST TRAUMATIC STRESS DISORDER (PTSD): ICD-10-CM

## 2020-12-09 DIAGNOSIS — F41.1 GENERALIZED ANXIETY DISORDER: Primary | ICD-10-CM

## 2020-12-09 DIAGNOSIS — F33.1 MODERATE EPISODE OF RECURRENT MAJOR DEPRESSIVE DISORDER (HCC): ICD-10-CM

## 2020-12-09 DIAGNOSIS — G47.9 SLEEP DIFFICULTIES: ICD-10-CM

## 2020-12-09 PROCEDURE — 99214 OFFICE O/P EST MOD 30 MIN: CPT | Performed by: NURSE PRACTITIONER

## 2020-12-09 RX ORDER — VENLAFAXINE HYDROCHLORIDE 150 MG/1
150 CAPSULE, EXTENDED RELEASE ORAL DAILY
Qty: 30 CAPSULE | Refills: 0 | Status: SHIPPED | OUTPATIENT
Start: 2020-12-09 | End: 2021-01-06 | Stop reason: SDUPTHER

## 2020-12-09 RX ORDER — QUETIAPINE FUMARATE 100 MG/1
150 TABLET, FILM COATED ORAL NIGHTLY
Qty: 45 TABLET | Refills: 0 | Status: SHIPPED | OUTPATIENT
Start: 2020-12-09 | End: 2021-01-06 | Stop reason: SDUPTHER

## 2020-12-09 NOTE — PROGRESS NOTES
This provider is located at [facility], [address] [place] [address].The Patient [facility] [address] [place] [address]. Patient is being seen via telehealth and confirm that they are in a secure environment for this session. The patient's condition being diagnosed/treated is appropriate for telemedicine. The provider identified himself/herself: herself as well as her credentials.   The patient gave consent to be seen remotely, and when consent is given they understand that the consent allows for patient identifiable information to be sent to a third party as needed.   They may refuse to be seen remotely at any time. The electronic data is encrypted and password protected, and the patient has been advised of the potential risks to privacy not withstanding such measures.    You have chosen to receive care through a telehealth visit.  Do you consent to use a video/audio connection for your medical care today? Yes     Subjective   Yudi West is a 50 y.o. female who is here today for medication management follow up.    Chief Complaint:   depression/anxiety     History of Present Illness   Patient states that she is doing ok but still having difficult with her anxiety and sleep. She reports that she wakes up in the middle of the night to use the restroom and then is up for hours. She states her depression is still roughly a 2-3 but her anxiety is the main issue. The patient reports the following symptoms of anxiety: constant anxiety/worry, restlessness/on edge, difficulty concentrating, irritability, sleep disturbance and anxiety causes distress/impairment in important areas of functioning and have caused impairment in important areas of functioning. She still rates her anxiety a 7 on a scale of 0-10 with 10 being the worse. Patent denies any side effects to the medications. She reports her appetite has been better. Patient denies  any SI/HI/AH/VH.       The following portions of the patient's history were reviewed and updated as appropriate: allergies, current medications, past family history, past medical history, past social history, past surgical history and problem list.    Review of Systems   Gastrointestinal: Negative for constipation.   Neurological: Negative for dizziness.   Psychiatric/Behavioral: Positive for sleep disturbance. Negative for agitation and dysphoric mood. The patient is nervous/anxious.        Objective   Physical Exam  Nursing note reviewed.   Constitutional:       Appearance: Normal appearance.   Neurological:      Mental Status: She is alert.   Psychiatric:         Attention and Perception: Attention and perception normal.         Mood and Affect: Affect normal. Mood is anxious.         Speech: Speech normal.         Behavior: Behavior is agitated. Behavior is cooperative.         Thought Content: Thought content normal.         Cognition and Memory: Cognition and memory normal.         Judgment: Judgment normal.       There were no vitals taken for this visit. Due to extenuating circumstances and possible current health risks associated with the patient being present in a clinical setting (with current health restrictions in place in regards to possible COVID 19 transmission/exposure), the patient was seen remotely today via a MyChart Video Visit through [facility].  Unable to obtain vital signs due to nature of remote visit.  Height stated at 65 inches.  Weight stated at 179 pounds.      Allergies   Allergen Reactions   • Sulfa Antibiotics Itching       Recent Results (from the past 2016 hour(s))   COVID-19,[facility] ROUTINE, NP/OP SWAB IN TRANSPORT MEDIA OR ESWAB 72 HR TAT - Swab, Nasopharynx    Collection Time: 11/10/20  9:11 AM    Specimen: Nasopharynx; Swab   Result Value Ref Range    SARS-CoV-2, KOLE Not Detected Not Detected   COVID [facility] Priority - Swab, Nasopharynx    Collection Time: 11/10/20  9:11 AM     "Specimen: Nasopharynx; Swab   Result Value Ref Range    COVID [facility] PRIORITY Comment        Current Medications:   Current Outpatient Medications   Medication Sig Dispense Refill   • albuterol sulfate  (90 Base) MCG/ACT inhaler INHALE TWO PUFFS FOUR TIMES DAILY 18 g 3   • Alpha-Lipoic Acid 300 MG capsule TAKE ONE TO TWO CAPSULES BY MOUTH TWICE DAILY     • aspirin 81 MG EC tablet Take 1 tablet by mouth 2 (Two) Times a Day With Meals. 60 tablet 0   • Blood Glucose Monitoring Suppl (FREESTYLE FREEDOM LITE) w/Device kit USE TO TEST BLOOD SUGAR TWO TO THREE TI MES DAILY 1 each 0   • carvedilol (COREG) 3.125 MG tablet TAKE ONE TABLET BY MOUTH TWICE DAILY WITH MEALS 60 tablet 3   • clopidogrel (PLAVIX) 75 MG tablet TAKE ONE TABLET BY MOUTH EVERY DAY 30 tablet 3   • dicyclomine (BENTYL) 20 MG tablet Take 1 tablet by mouth Every 6 (Six) Hours. 120 tablet 3   • Dulaglutide (Trulicity) 0.75 MG/0.5ML solution pen-injector Inject 0.75 mg under the skin into the appropriate area as directed 1 (One) Time Per Week. 4 pen 3   • Dulaglutide (Trulicity) 1.5 MG/0.5ML solution pen-injector Inject 1.5 mg under the skin into the appropriate area as directed 1 (One) Time Per Week. 4 pen 3   • Easy Touch Insulin Syringe 28G X 1/2\" 1 ML misc USE AT BEDTIME AS DIRECTED 100 each 3   • famotidine (PEPCID) 20 MG tablet TAKE ONE TABLET BY MOUTH TWICE DAILY 60 tablet 3   • Fluticasone Furoate-Vilanterol (Breo Ellipta) 200-25 MCG/INH inhaler Inhale 1 puff Daily. 1 inhaler 3   • gabapentin (NEURONTIN) 300 MG capsule Take 300 mg by mouth 4 (Four) Times a Day.     • Global Ease Inject Pen Needles 31G X 8 MM misc USE TO TEST BLOOD SUGARS TWO TO THREE TIMES DAILY 100 each 3   • glucose blood test strip Check sugars before breakfast and 2 hours after meal. Also give lancets 600 each 12   • Insulin Glargine (BASAGLAR KWIKPEN) 100 UNIT/ML injection pen Take 39 units at bedtime can go up by 2-3 unites every 3 days until am sugars running  9 " mL 3   • loratadine (CLARITIN) 10 MG tablet Take 10 mg by mouth Daily.     • loratadine (CLARITIN) 10 MG tablet TAKE ONE TABLET BY MOUTH EVERY DAY 30 tablet 3   • meloxicam (MOBIC) 15 MG tablet Take 15 mg by mouth Daily.     • Nicotine Step 1 21 MG/24HR patch APPLY ONE PATCH DAILY 28 each 3   • omeprazole (PrilOSEC) 40 MG capsule Take 1 capsule by mouth Daily. 30 capsule 11   • ondansetron ODT (ZOFRAN-ODT) 8 MG disintegrating tablet DISSOLVE ONE TABLET ON THE TONGUE EVERY 8 HOURS AS NEEDED FOR NAUSEA OR VOMITING 90 tablet 3   • oxyCODONE-acetaminophen (PERCOCET) 7.5-325 MG per tablet Take 1 tablet by mouth 4 (Four) Times a Day.     • polyethylene glycol (MIRALAX) 17 g packet Take 17 g by mouth Daily. 30 each 5   • QUEtiapine (SEROquel) 100 MG tablet Take 1.5 tablets by mouth Every Night. 45 tablet 0   • ranolazine (Ranexa) 500 MG 12 hr tablet Take 1 tablet by mouth 2 (Two) Times a Day. 60 tablet 6   • rosuvastatin (CRESTOR) 20 MG tablet TAKE ONE TABLET BY MOUTH AT BEDTIME 30 tablet 3   • venlafaxine XR (EFFEXOR-XR) 150 MG 24 hr capsule Take 1 capsule by mouth Daily. 30 capsule 0   • vitamin D (ERGOCALCIFEROL) 1.25 MG (52267 UT) capsule capsule Take 1 capsule by mouth Every 7 (Seven) Days. 4 capsule 3     No current facility-administered medications for this visit.        Mental Status Exam:   Hygiene:   good  Cooperation:  Cooperative  Eye Contact:  Good  Psychomotor Behavior:  Appropriate  Affect:  Appropriate  Hopelessness: Denies  Speech:  Normal  Thought Process:  Goal directed  Thought Content:  Normal  Suicidal:  None  Homicidal:  None  Hallucinations:  None  Delusion:  None  Memory:  Intact  Orientation:  Person, Place, Time and Situation  Reliability:  good  Insight:  Good  Judgement:  Good and Fair  Impulse Control:  Good and Fair  Physical/Medical Issues:  Yes Stage 3 CKD, DM, CAD,     Assessment/Plan   Diagnoses and all orders for this visit:    1. Generalized anxiety disorder (Primary)  -     venlafaxine  XR (EFFEXOR-XR) 150 MG 24 hr capsule; Take 1 capsule by mouth Daily.  Dispense: 30 capsule; Refill: 0  -     QUEtiapine (SEROquel) 100 MG tablet; Take 1.5 tablets by mouth Every Night.  Dispense: 45 tablet; Refill: 0    2. Moderate episode of recurrent major depressive disorder (CMS/HCC)  -     venlafaxine XR (EFFEXOR-XR) 150 MG 24 hr capsule; Take 1 capsule by mouth Daily.  Dispense: 30 capsule; Refill: 0    3. Post traumatic stress disorder (PTSD)  -     venlafaxine XR (EFFEXOR-XR) 150 MG 24 hr capsule; Take 1 capsule by mouth Daily.  Dispense: 30 capsule; Refill: 0  -     QUEtiapine (SEROquel) 100 MG tablet; Take 1.5 tablets by mouth Every Night.  Dispense: 45 tablet; Refill: 0    4. Sleep difficulties  -     QUEtiapine (SEROquel) 100 MG tablet; Take 1.5 tablets by mouth Every Night.  Dispense: 45 tablet; Refill: 0        I spent a total of  25 minutes in direct patient care, greater than 15 minutes (greater than 50%) were spent in coordination of care, and counseling the patient regarding her anxiety and sleep schedule to rule out any c/o related to sleep apnea. Answered any questions patient had with medication and plan. Encouraged patient to keep her appointments with Jeannette Sterling LCSW to help manage her anxiety.     -Increase venlafaxine to 150 mg XR daily for depression and anxiety  -Increase Seroquel to 150 mg at night for sleep and anxiousness.      Discussed the risks, benefits, and side effects of the medications; patient ackowledged and verbally consented.  Patient is aware to call the [facility] with any worsening of symptoms.  Patient is agreeable to call 911 or go to the nearest ER should he/she begin having SI/HI.  Informed the patient that we would be in contact to reschedule her therapist appointment.  Also instructed patient the importance of not stopping and starting medication as will need to be on it for a long period of time in order to have the most effectiveness  patient verbalized understanding.    Prognosis: Guarded dependent on medication/follow up and treatment plan compliance.  Functionality: pt showing improvements in important areas of daily functioning but struggling with anxiety.   Short-Term Goals: Patient will be compliant with medication management and note improvement in symptoms over the next 4 weeks.  Long-Term Goals: Patient will continue psychotherapy as well as medication regimen without impairment in daily functioning over the next 6 months.      Patient will follow-up in 4 weeks, encouraged patient that she had any worsening symptoms or concerns to contact the clinic for sooner appointment patient verbalized understanding.    Errors in dictation may reflect use of voice recognition software and not all errors in transcription may have been detected prior to signing.

## 2020-12-21 RX ORDER — DULAGLUTIDE 0.75 MG/.5ML
INJECTION, SOLUTION SUBCUTANEOUS
Qty: 2 ML | Refills: 3 | Status: SHIPPED | OUTPATIENT
Start: 2020-12-21 | End: 2021-01-31

## 2020-12-29 RX ORDER — DULAGLUTIDE 1.5 MG/.5ML
INJECTION, SOLUTION SUBCUTANEOUS
Qty: 2 ML | Refills: 3 | Status: SHIPPED | OUTPATIENT
Start: 2020-12-29 | End: 2021-02-05 | Stop reason: DRUGHIGH

## 2021-01-06 ENCOUNTER — TELEMEDICINE (OUTPATIENT)
Dept: PSYCHIATRY | Facility: CLINIC | Age: 51
End: 2021-01-06

## 2021-01-06 DIAGNOSIS — F33.1 MODERATE EPISODE OF RECURRENT MAJOR DEPRESSIVE DISORDER (HCC): Primary | ICD-10-CM

## 2021-01-06 DIAGNOSIS — G47.9 SLEEP DIFFICULTIES: ICD-10-CM

## 2021-01-06 DIAGNOSIS — F43.10 POST TRAUMATIC STRESS DISORDER (PTSD): ICD-10-CM

## 2021-01-06 DIAGNOSIS — F41.1 GENERALIZED ANXIETY DISORDER: ICD-10-CM

## 2021-01-06 PROCEDURE — 99214 OFFICE O/P EST MOD 30 MIN: CPT | Performed by: NURSE PRACTITIONER

## 2021-01-06 RX ORDER — VENLAFAXINE HYDROCHLORIDE 150 MG/1
150 CAPSULE, EXTENDED RELEASE ORAL DAILY
Qty: 30 CAPSULE | Refills: 0 | Status: SHIPPED | OUTPATIENT
Start: 2021-01-06 | End: 2021-02-03 | Stop reason: SDUPTHER

## 2021-01-06 RX ORDER — ARIPIPRAZOLE 2 MG/1
2 TABLET ORAL DAILY
Qty: 30 TABLET | Refills: 0 | Status: SHIPPED | OUTPATIENT
Start: 2021-01-06 | End: 2021-02-22

## 2021-01-06 RX ORDER — QUETIAPINE FUMARATE 100 MG/1
150 TABLET, FILM COATED ORAL NIGHTLY
Qty: 45 TABLET | Refills: 0 | Status: SHIPPED | OUTPATIENT
Start: 2021-01-06 | End: 2021-02-03 | Stop reason: SDUPTHER

## 2021-01-06 NOTE — PROGRESS NOTES
"This provider is located at [facility], [address] [place] [place] [address].The Patient is seen remotely at home via [facility], [address] [place] [address]. Patient is being seen via telehealth and confirm that they are in a secure environment for this session. The patient's condition being diagnosed/treated is appropriate for telemedicine. The provider identified himself/herself: herself as well as her credentials.   The patient gave consent to be seen remotely, and when consent is given they understand that the consent allows for patient identifiable information to be sent to a third party as needed.   They may refuse to be seen remotely at any time. The electronic data is encrypted and password protected, and the patient has been advised of the potential risks to privacy not withstanding such measures.    You have chosen to receive care through a telehealth visit.  Do you consent to use a video/audio connection for your medical care today? Yes     Subjective   Yudi West is a 50 y.o. female who is here today for medication management follow up.    Chief Complaint:   depression/anxiety     History of Present Illness   Patient is seen today via MyChart reporting that she is glad that the holidays are over with as she states various people in her home made her more anxious.  Patient states that her anxiety has gone down some since everyone has left but it is still significant as well as her depression.  Patient states that she is noticed some difference with her depression as she is no longer having those \"thoughts\". The patient reports depressive symptoms including depressed mood, insomnia, feelings of hopelessness, feelings of helplessness, low energy and difficulty concentrating, and have caused impairment in important areas of functioning.  Depression rated 7/10 with 10 being the worst. The patient reports the following symptoms of anxiety: constant " anxiety/worry, restlessness/on edge, difficulty concentrating, mind goes blank, irritability and sleep disturbance and have caused impairment in important areas of functioning.  Patient rates her anxiety as 7-8 on a scale 0-10 with 10 being the worst.  Patient reports that she goes to sleep well with the Seroquel but wakes up at roughly 1 or 2 in the morning and it takes her 1 to 2 hours to go back to sleep and reports on average she is getting 4 to 6 hours.  Patient also notes a history of sleep apnea in which she does experience fatigue headaches and shortness of breath but does not have a CPAP machine.  Highly encouraged patient to contact her PCP or pulmonologist for a CPAP as that will help resolve her sleep.  Patient reports her appetite is the same.  Patient denies any side effects to the medications.  Patient denies any SI/HI/AH/VH.        The following portions of the patient's history were reviewed and updated as appropriate: allergies, current medications, past family history, past medical history, past social history, past surgical history and problem list.    Review of Systems   Gastrointestinal: Negative for constipation.   Neurological: Negative for dizziness.   Psychiatric/Behavioral: Positive for dysphoric mood and sleep disturbance. Negative for agitation. The patient is nervous/anxious.        Objective   Physical Exam  Nursing note reviewed.   Constitutional:       Appearance: Normal appearance.   Neurological:      Mental Status: She is alert.   Psychiatric:         Attention and Perception: Attention and perception normal.         Mood and Affect: Affect normal. Mood is anxious and depressed.         Speech: Speech normal.         Behavior: Behavior is agitated. Behavior is cooperative.         Thought Content: Thought content normal.         Cognition and Memory: Cognition and memory normal.         Judgment: Judgment normal.       There were no vitals taken for this visit. Due to extenuating  "circumstances and possible current health risks associated with the patient being present in a clinical setting (with current health restrictions in place in regards to possible COVID 19 transmission/exposure), the patient was seen remotely today via a MyChart Video Visit through [facility].  Unable to obtain vital signs due to nature of remote visit.  Height stated at 65 inches.  Weight stated at 179 pounds.      Allergies   Allergen Reactions   • Sulfa Antibiotics Itching       Recent Results (from the past 2016 hour(s))   COVID-19,[facility] ROUTINE, NP/OP SWAB IN TRANSPORT MEDIA OR ESWAB 72 HR TAT - Swab, Nasopharynx    Collection Time: 11/10/20  9:11 AM    Specimen: Nasopharynx; Swab   Result Value Ref Range    SARS-CoV-2, KOLE Not Detected Not Detected   COVID [facility] Priority - Swab, Nasopharynx    Collection Time: 11/10/20  9:11 AM    Specimen: Nasopharynx; Swab   Result Value Ref Range    COVID [facility] PRIORITY Comment        Current Medications:   Current Outpatient Medications   Medication Sig Dispense Refill   • albuterol sulfate  (90 Base) MCG/ACT inhaler INHALE TWO PUFFS FOUR TIMES DAILY 18 g 3   • Alpha-Lipoic Acid 300 MG capsule TAKE ONE TO TWO CAPSULES BY MOUTH TWICE DAILY     • ARIPiprazole (Abilify) 2 MG tablet Take 1 tablet by mouth Daily. 30 tablet 0   • aspirin 81 MG EC tablet Take 1 tablet by mouth 2 (Two) Times a Day With Meals. 60 tablet 0   • Blood Glucose Monitoring Suppl (FREESTYLE FREEDOM LITE) w/Device kit USE TO TEST BLOOD SUGAR TWO TO THREE TI MES DAILY 1 each 0   • Breo Ellipta 200-25 MCG/INH inhaler inhale ONE PUFF daily 60 each 3   • carvedilol (COREG) 3.125 MG tablet TAKE ONE TABLET BY MOUTH TWICE DAILY WITH MEALS 60 tablet 3   • clopidogrel (PLAVIX) 75 MG tablet TAKE ONE TABLET BY MOUTH EVERY DAY 30 tablet 3   • dicyclomine (BENTYL) 20 MG tablet Take 1 tablet by mouth Every 6 (Six) Hours. 120 tablet 3   • Easy Touch Insulin Syringe 28G X 1/2\" 1 ML misc USE AT BEDTIME AS DIRECTED 100 " each 3   • famotidine (PEPCID) 20 MG tablet TAKE ONE TABLET BY MOUTH TWICE DAILY 60 tablet 3   • gabapentin (NEURONTIN) 300 MG capsule Take 300 mg by mouth 4 (Four) Times a Day.     • Global Ease Inject Pen Needles 31G X 8 MM misc USE TO TEST BLOOD SUGARS TWO TO THREE TIMES DAILY 100 each 3   • glucose blood test strip Check sugars before breakfast and 2 hours after meal. Also give lancets 600 each 12   • Insulin Glargine (BASAGLAR KWIKPEN) 100 UNIT/ML injection pen Take 39 units at bedtime can go up by 2-3 unites every 3 days until am sugars running  9 mL 3   • loratadine (CLARITIN) 10 MG tablet Take 10 mg by mouth Daily.     • loratadine (CLARITIN) 10 MG tablet TAKE ONE TABLET BY MOUTH EVERY DAY 30 tablet 3   • meloxicam (MOBIC) 15 MG tablet Take 15 mg by mouth Daily.     • Nicotine Step 1 21 MG/24HR patch APPLY ONE PATCH DAILY 28 each 3   • omeprazole (PrilOSEC) 40 MG capsule Take 1 capsule by mouth Daily. 30 capsule 11   • ondansetron ODT (ZOFRAN-ODT) 8 MG disintegrating tablet DISSOLVE ONE TABLET ON THE TONGUE EVERY 8 HOURS AS NEEDED FOR NAUSEA OR VOMITING 90 tablet 3   • oxyCODONE-acetaminophen (PERCOCET) 7.5-325 MG per tablet Take 1 tablet by mouth 4 (Four) Times a Day.     • polyethylene glycol (MIRALAX) 17 g packet Take 17 g by mouth Daily. 30 each 5   • QUEtiapine (SEROquel) 100 MG tablet Take 1.5 tablets by mouth Every Night. 45 tablet 0   • ranolazine (Ranexa) 500 MG 12 hr tablet Take 1 tablet by mouth 2 (Two) Times a Day. 60 tablet 6   • rosuvastatin (CRESTOR) 20 MG tablet TAKE ONE TABLET BY MOUTH AT BEDTIME 30 tablet 3   • Trulicity 0.75 MG/0.5ML solution pen-injector INJECT 0.75mg SUBCUTANEOUSLY AS DIRECTED EVERY WEEK 2 mL 3   • Trulicity 1.5 MG/0.5ML solution pen-injector inject 1.5 MG UNDER THE SKIN into THE appropriate AREA AS DIRECTED one time PER WEEK 2 mL 3   • venlafaxine XR (EFFEXOR-XR) 150 MG 24 hr capsule Take 1 capsule by mouth Daily. 30 capsule 0   • vitamin D (ERGOCALCIFEROL) 1.25  MG (70099 UT) capsule capsule Take 1 capsule by mouth Every 7 (Seven) Days. 4 capsule 3     No current facility-administered medications for this visit.        Mental Status Exam:   Hygiene:   good  Cooperation:  Cooperative  Eye Contact:  Good  Psychomotor Behavior:  Appropriate  Affect:  Appropriate  Hopelessness: Denies  Speech:  Normal  Thought Process:  Goal directed  Thought Content:  Normal  Suicidal:  None  Homicidal:  None  Hallucinations:  None  Delusion:  None  Memory:  Intact  Orientation:  Person, Place, Time and Situation  Reliability:  good  Insight:  Good  Judgement:  Good and Fair  Impulse Control:  Good and Fair  Physical/Medical Issues:  Yes Stage 3 CKD, DM, CAD,     Assessment/Plan   Diagnoses and all orders for this visit:    1. Moderate episode of recurrent major depressive disorder (CMS/HCC) (Primary)  -     venlafaxine XR (EFFEXOR-XR) 150 MG 24 hr capsule; Take 1 capsule by mouth Daily.  Dispense: 30 capsule; Refill: 0  -     ARIPiprazole (Abilify) 2 MG tablet; Take 1 tablet by mouth Daily.  Dispense: 30 tablet; Refill: 0    2. Generalized anxiety disorder  -     venlafaxine XR (EFFEXOR-XR) 150 MG 24 hr capsule; Take 1 capsule by mouth Daily.  Dispense: 30 capsule; Refill: 0  -     QUEtiapine (SEROquel) 100 MG tablet; Take 1.5 tablets by mouth Every Night.  Dispense: 45 tablet; Refill: 0    3. Post traumatic stress disorder (PTSD)  -     venlafaxine XR (EFFEXOR-XR) 150 MG 24 hr capsule; Take 1 capsule by mouth Daily.  Dispense: 30 capsule; Refill: 0  -     QUEtiapine (SEROquel) 100 MG tablet; Take 1.5 tablets by mouth Every Night.  Dispense: 45 tablet; Refill: 0    4. Sleep difficulties  -     QUEtiapine (SEROquel) 100 MG tablet; Take 1.5 tablets by mouth Every Night.  Dispense: 45 tablet; Refill: 0      Discussed medication options as well as any current side effects.  Patient stated that she missed her appointment with Jeannette lomeli LCSW highly encouraged her to keep those appointments to  work on her anxiety.  Patient inquired about benzodiazepine as she stated she was on Xanax in the past and it was extremely helpful.  Informed patient that due to her multiple comorbidities as well as her being on opioids and Neurontin at this time that it was not appropriate use for long-term or in the combination due to her risk of respiratory depression patient verbalized understanding.  Answered any questions patient had with medication and plan. Encouraged patient to keep her appointments with Jeannette Sterling LCSW to help manage her anxiety.     -Continue venlafaxine to 150 mg XR daily for depression and anxiety  -Continue Seroquel to 150 mg at night for sleep and anxiousness.  -Begin aripiprazole 2 mg as adjunct for depression as patient has failed and tried bupropion and multiple SSRIs as well as SNRIs.  -Encourage patient to let her PCP aware that she needs a CPAP machine as she states she has been diagnosed with sleep apnea and to discuss this with him.    Discussed the risks, benefits, and side effects of the medications; patient ackowledged and verbally consented.  Patient is aware to call the [facility] with any worsening of symptoms.  Patient is agreeable to call 911 or go to the nearest ER should he/she begin having SI/HI.      Prognosis: Guarded dependent on medication/follow up and treatment plan compliance.  Functionality: pt having significant impairment in important areas of daily functioning.  Short-Term Goals: Patient will be compliant with medication management and note improvement in symptoms over the next 4 weeks.  Long-Term Goals: Patient will continue psychotherapy as well as medication regimen without impairment in daily functioning over the next 6 months.      Patient will follow-up in 4 weeks, encouraged patient that she had any worsening symptoms or concerns to contact the clinic for sooner appointment patient verbalized understanding.    Errors in dictation may  reflect use of voice recognition software and not all errors in transcription may have been detected prior to signing.

## 2021-01-07 ENCOUNTER — PRIOR AUTHORIZATION (OUTPATIENT)
Dept: PSYCHIATRY | Facility: CLINIC | Age: 51
End: 2021-01-07

## 2021-01-18 RX ORDER — RANOLAZINE 500 MG/1
TABLET, EXTENDED RELEASE ORAL
Qty: 60 TABLET | Refills: 6 | Status: SHIPPED | OUTPATIENT
Start: 2021-01-18 | End: 2021-07-22 | Stop reason: SDUPTHER

## 2021-01-25 ENCOUNTER — LAB (OUTPATIENT)
Dept: LAB | Facility: HOSPITAL | Age: 51
End: 2021-01-25

## 2021-01-25 DIAGNOSIS — Z01.818 PREOP TESTING: Primary | ICD-10-CM

## 2021-01-25 LAB — SARS-COV-2 ORF1AB RESP QL NAA+PROBE: NOT DETECTED

## 2021-01-25 PROCEDURE — C9803 HOPD COVID-19 SPEC COLLECT: HCPCS

## 2021-01-25 PROCEDURE — U0004 COV-19 TEST NON-CDC HGH THRU: HCPCS

## 2021-01-27 ENCOUNTER — ANESTHESIA EVENT (OUTPATIENT)
Dept: GASTROENTEROLOGY | Facility: HOSPITAL | Age: 51
End: 2021-01-27

## 2021-01-28 ENCOUNTER — ANESTHESIA (OUTPATIENT)
Dept: GASTROENTEROLOGY | Facility: HOSPITAL | Age: 51
End: 2021-01-28

## 2021-01-28 ENCOUNTER — HOSPITAL ENCOUNTER (OUTPATIENT)
Facility: HOSPITAL | Age: 51
Setting detail: HOSPITAL OUTPATIENT SURGERY
Discharge: HOME | End: 2021-01-28
Attending: INTERNAL MEDICINE | Admitting: INTERNAL MEDICINE

## 2021-01-28 VITALS
WEIGHT: 175.6 LBS | BODY MASS INDEX: 29.26 KG/M2 | RESPIRATION RATE: 18 BRPM | HEART RATE: 101 BPM | SYSTOLIC BLOOD PRESSURE: 113 MMHG | TEMPERATURE: 96.5 F | DIASTOLIC BLOOD PRESSURE: 86 MMHG | OXYGEN SATURATION: 100 % | HEIGHT: 65 IN

## 2021-01-28 DIAGNOSIS — K59.09 OTHER CONSTIPATION: ICD-10-CM

## 2021-01-28 DIAGNOSIS — R11.0 NAUSEA: ICD-10-CM

## 2021-01-28 DIAGNOSIS — R10.84 GENERALIZED ABDOMINAL PAIN: ICD-10-CM

## 2021-01-28 DIAGNOSIS — R14.0 BLOATING: ICD-10-CM

## 2021-01-28 DIAGNOSIS — R63.4 WEIGHT LOSS: ICD-10-CM

## 2021-01-28 LAB
GLUCOSE BLDC GLUCOMTR-MCNC: 258 MG/DL (ref 70–130)
GLUCOSE BLDC GLUCOMTR-MCNC: 354 MG/DL (ref 70–130)

## 2021-01-28 PROCEDURE — 45385 COLONOSCOPY W/LESION REMOVAL: CPT | Performed by: INTERNAL MEDICINE

## 2021-01-28 PROCEDURE — 25010000002 PROPOFOL 10 MG/ML EMULSION: Performed by: NURSE ANESTHETIST, CERTIFIED REGISTERED

## 2021-01-28 PROCEDURE — 82962 GLUCOSE BLOOD TEST: CPT

## 2021-01-28 PROCEDURE — 43239 EGD BIOPSY SINGLE/MULTIPLE: CPT | Performed by: INTERNAL MEDICINE

## 2021-01-28 PROCEDURE — 45380 COLONOSCOPY AND BIOPSY: CPT | Performed by: INTERNAL MEDICINE

## 2021-01-28 RX ORDER — LIDOCAINE HYDROCHLORIDE 20 MG/ML
INJECTION, SOLUTION EPIDURAL; INFILTRATION; INTRACAUDAL; PERINEURAL AS NEEDED
Status: DISCONTINUED | OUTPATIENT
Start: 2021-01-28 | End: 2021-01-28 | Stop reason: SURG

## 2021-01-28 RX ORDER — SODIUM CHLORIDE 9 MG/ML
50 INJECTION, SOLUTION INTRAVENOUS CONTINUOUS
Status: DISCONTINUED | OUTPATIENT
Start: 2021-01-28 | End: 2021-01-28 | Stop reason: HOSPADM

## 2021-01-28 RX ORDER — PROPOFOL 10 MG/ML
VIAL (ML) INTRAVENOUS AS NEEDED
Status: DISCONTINUED | OUTPATIENT
Start: 2021-01-28 | End: 2021-01-28 | Stop reason: SURG

## 2021-01-28 RX ORDER — DEXTROSE AND SODIUM CHLORIDE 5; .45 G/100ML; G/100ML
30 INJECTION, SOLUTION INTRAVENOUS CONTINUOUS PRN
Status: DISCONTINUED | OUTPATIENT
Start: 2021-01-28 | End: 2021-01-28 | Stop reason: HOSPADM

## 2021-01-28 RX ADMIN — PROPOFOL 10 MG: 10 INJECTION, EMULSION INTRAVENOUS at 11:24

## 2021-01-28 RX ADMIN — PROPOFOL 20 MG: 10 INJECTION, EMULSION INTRAVENOUS at 11:29

## 2021-01-28 RX ADMIN — PROPOFOL 80 MG: 10 INJECTION, EMULSION INTRAVENOUS at 11:19

## 2021-01-28 RX ADMIN — SODIUM CHLORIDE 50 ML/HR: 9 INJECTION, SOLUTION INTRAVENOUS at 10:00

## 2021-01-28 RX ADMIN — PROPOFOL 20 MG: 10 INJECTION, EMULSION INTRAVENOUS at 11:33

## 2021-01-28 RX ADMIN — PROPOFOL 20 MG: 10 INJECTION, EMULSION INTRAVENOUS at 11:26

## 2021-01-28 RX ADMIN — LIDOCAINE HYDROCHLORIDE 60 MG: 20 INJECTION, SOLUTION EPIDURAL; INFILTRATION; INTRACAUDAL; PERINEURAL at 11:19

## 2021-01-28 RX ADMIN — PROPOFOL 10 MG: 10 INJECTION, EMULSION INTRAVENOUS at 11:31

## 2021-01-28 RX ADMIN — PROPOFOL 20 MG: 10 INJECTION, EMULSION INTRAVENOUS at 11:20

## 2021-01-28 RX ADMIN — PROPOFOL 10 MG: 10 INJECTION, EMULSION INTRAVENOUS at 11:30

## 2021-01-28 RX ADMIN — PROPOFOL 10 MG: 10 INJECTION, EMULSION INTRAVENOUS at 11:22

## 2021-01-28 NOTE — ANESTHESIA POSTPROCEDURE EVALUATION
Patient: Yudi West    Procedure Summary     Date: 01/28/21 Room / Location: [facility] ENDOSCOPY 2 / [facility] ENDOSCOPY    Anesthesia Start: 1112 Anesthesia Stop: 1140    Procedures:       ESOPHAGOGASTRODUODENOSCOPY (N/A )      COLONOSCOPY (N/A ) Diagnosis:       Other constipation      Generalized abdominal pain      Nausea      Bloating      Weight loss      (Other constipation [K59.09])      (Generalized abdominal pain [R10.84])      (Nausea [R11.0])      (Bloating [R14.0])      (Weight loss [R63.4])    Surgeon: Juwan Wilkes MD Provider: Dylon Dimas CRNA    Anesthesia Type: MAC ASA Status: 3          Anesthesia Type: MAC    Vitals  No vitals data found for the desired time range.          Post Anesthesia Care and Evaluation    Patient location during evaluation: bedside  Patient participation: waiting for patient participation  Level of consciousness: sleepy but conscious  Pain score: 0  Pain management: adequate  Airway patency: patent  Anesthetic complications: No anesthetic complications  PONV Status: none  Cardiovascular status: hemodynamically stable  Respiratory status: spontaneous ventilation and room air  Hydration status: acceptable

## 2021-01-29 RX ORDER — ALBUTEROL SULFATE 90 UG/1
AEROSOL, METERED RESPIRATORY (INHALATION)
Qty: 18 G | Refills: 3 | Status: SHIPPED | OUTPATIENT
Start: 2021-01-29 | End: 2021-04-30

## 2021-02-01 LAB
LAB AP CASE REPORT: NORMAL
PATH REPORT.FINAL DX SPEC: NORMAL

## 2021-02-01 NOTE — PROGRESS NOTES
Chief Complaint  Diarrhea and Fever    Subjective          Yudi West presents to [facility] for       Pt is 48 yo female with management of her being overweight, DM type 2 HLP, HTN, diabetic neuropathy, insomnia, major depression CAD sp stent  Echocardiogram on 6/3/19 (grade 1 diastolic dysfunction) ,  History of MI COPD, history of fracture of femur, sp surgery on left femur, sp cholecystectomy, sp hysterectomy, sp left nephrectomy,  History of renal cell carcnoma sp left total knee replacement surgery, sp tonsillectomy , diabetic neuropathy, DELFIN    9/16/20 telemedicine  visit for recheck on pt's above medical issues , pt is due for new labwork and colonoscopy screening.   She has been having URI  And was tested for COVID-19 and per patient she is doing better. She continues to have issues with cough/ congestion. She does have upcoming appt with Pulmonologist on 9/28/20. Her left shoulder is still hurting despite getting injection in shoulder with Orthopedic. BP has been stable. Sugars have  Been running 160-200 in morning .  She is now on 43 units of Basaglar. sghe is also taking trulicity 0.75 sub mg weekly. She is due for mammogram colonoscopy screening. She also has cut down to 1/2 ppd. Her anxiety and depression have been worse. She is not seeing counseling. States she is stressed with family and  drinking alcohol. He is verbally abusive     2/8/21 telemedicine visit for recheck on pt's above medical issues.  Since last visit pt has seen Orthopedic on 11/19/20 for her evaluation of left knee mindi. Pt has also had several visit with teleheath with behavioral health. For her major depression. Had colonsocopy and EGD on 1/28/21 that showed several sessile polyps. EGD showed hiatal hernia, gastritis and esophagitis biopsies are pending. For past few days. She is also having diarrhea. She is due for new labwork she continues to take her medication for  her KRISTOFER/depression/HTN/diabetes.      Fatigue  This is a recurrent problem. The current episode started more than 1 year ago. The problem occurs constantly. The problem has been unchanged. Associated symptoms include fatigue. Pertinent negatives include no abdominal pain, anorexia, arthralgias, change in bowel habit, chest pain, chills, congestion, coughing, diaphoresis, fever, headaches, joint swelling, myalgias, nausea, neck pain, numbness, rash, sore throat, swollen glands, urinary symptoms, vertigo, visual change, vomiting or weakness. Nothing aggravates the symptoms. She has tried nothing for the symptoms. The treatment provided no relief.   Anxiety  Presents for follow-up visit. Symptoms include depressed mood, excessive worry, insomnia, irritability, nervous/anxious behavior and shortness of breath. Patient reports no chest pain, compulsions, confusion, decreased concentration, dizziness, dry mouth, feeling of choking, hyperventilation, impotence, malaise, muscle tension, nausea, obsessions, palpitations, panic or restlessness. The quality of sleep is fair. Nighttime awakenings: none.   Diabetes   She presents for her initial diabetic visit. She has type 2 diabetes mellitus. Her disease course has been waxing and waning  Hypoglycemia symptoms include tremors. Pertinent negatives for hypoglycemia include no confusion, dizziness, headaches, hunger, mood changes, nervousness/anxiousness, pallor, seizures, sleepiness, speech difficulty or sweats. Associated symptoms include fatigue, polyuria and weakness. Pertinent negatives for diabetes include no blurred vision, no chest pain, no foot paresthesias and no weight loss. Pertinent negatives for hypoglycemia complications include no blackouts, no hospitalization, no nocturnal hypoglycemia, no required assistance and no required glucagon injection. Symptoms are stable. Pertinent negatives for diabetic complications include no CVA, impotence or retinopathy. Risk  factors for coronary artery disease include diabetes mellitus, dyslipidemia, sedentary lifestyle and tobacco exposure. She is compliant with treatment all of the time. Her weight is stable. She is following a generally unhealthy diet. She does not see a podiatrist.Eye exam is not current.     Hypertension   This is a chronic problem. The current episode started more than 1 year ago. The problem has been waxing and waning since onset. The problem is uncontrolled. Associated symptoms include shortness of breath. Pertinent negatives include no anxiety, blurred vision, chest pain, headaches, malaise/fatigue, palpitations, PND or sweats. Risk factors for coronary artery disease include diabetes mellitus, dyslipidemia, sedentary lifestyle and smoking/tobacco exposure. Past treatments include beta blockers. Current antihypertension treatment includes beta blockers. The current treatment provides no improvement. There is no history of angina, kidney disease, CAD/MI, CVA, heart failure, left ventricular hypertrophy or retinopathy. There is no history of chronic renal disease, coarctation of the aorta, hyperaldosteronism, hypercortisolism, hyperparathyroidism, a hypertension causing med, pheochromocytoma, renovascular disease, sleep apnea or a thyroid problem.   Depression   Visit Type: followup  Onset of symptoms: at an unknown time  Patient presents with the following symptoms: compulsions, decreased concentration, depressed mood, excessive worry and shortness of breath.  Patient is not experiencing: anhedonia, chest pain, choking sensation, confusion, dizziness, dry mouth, fatigue, feelings of hopelessness, feelings of worthlessness, hypersomnia, hyperventilation, impotence, insomnia, irritability, malaise, memory impairment, muscle tension, nausea, nervousness/anxiety, obsessions, palpitations, panic, psychomotor agitation, psychomotor retardation, restlessness, suicidal ideas, suicidal planning, thoughts of death, weight  "gain and weight loss.  Patient has a history of: depression  No history of: anemia, anxiety/panic attacks, arrhythmia, asthma, bipolar disorder, CAD, CHF, chronic lung disease, fibromyalgia, hyperthyroidism, suicide attempt, mental illness and substance abuse  Treatment tried: SSRI, wellbutrin      Objective   Vital Signs:   Ht 165.1 cm (65\")   BMI 30.19 kg/m²     Ht 165.1 cm (65\")   BMI 30.19 kg/m²     Physical Exam  Vitals signs and nursing note reviewed.   Constitutional:       Appearance: She is well-developed. She is not diaphoretic.   HENT:      Head: Normocephalic and atraumatic.      Right Ear: External ear normal.   Eyes:      Conjunctiva/sclera: Conjunctivae normal.      Pupils: Pupils are equal, round, and reactive to light.   Neck:      Musculoskeletal: Normal range of motion and neck supple.   Cardiovascular:      Rate and Rhythm: Normal rate and regular rhythm.      Heart sounds: Normal heart sounds. No murmur.   Pulmonary:      Effort: No respiratory distress.      Comments: Decreased breath sounds   Abdominal:      General: Bowel sounds are normal. There is no distension.      Palpations: Abdomen is soft.      Tenderness: There is no abdominal tenderness.   Musculoskeletal: Normal range of motion.         General: Tenderness present. No deformity.   Skin:     General: Skin is warm.      Coloration: Skin is not pale.      Findings: No erythema or rash.   Neurological:      Mental Status: She is alert and oriented to person, place, and time.      Cranial Nerves: No cranial nerve deficit.   Psychiatric:         Behavior: Behavior normal.        Result Review :                 Assessment and Plan    Problem List Items Addressed This Visit        Cardiac and Vasculature    Hypertension (Chronic)    Relevant Orders    CBC Auto Differential    Comprehensive Metabolic Panel    Hemoglobin A1c    Lipid Panel    TSH    T4, Free    T3, Free    Vitamin D 25 Hydroxy    Vitamin B12    Microalbumin / Creatinine " Urine Ratio - Urine, Clean Catch    Iron Profile    Coronary artery disease involving native coronary artery of native heart without angina pectoris    Relevant Orders    CBC Auto Differential    Comprehensive Metabolic Panel    Hemoglobin A1c    Lipid Panel    TSH    T4, Free    T3, Free    Vitamin D 25 Hydroxy    Vitamin B12    Microalbumin / Creatinine Urine Ratio - Urine, Clean Catch    Iron Profile    Mixed hyperlipidemia    Relevant Orders    CBC Auto Differential    Comprehensive Metabolic Panel    Hemoglobin A1c    Lipid Panel    TSH    T4, Free    T3, Free    Vitamin D 25 Hydroxy    Vitamin B12    Microalbumin / Creatinine Urine Ratio - Urine, Clean Catch    Iron Profile       Endocrine and Metabolic    Uncontrolled type 2 diabetes mellitus with hyperglycemia (CMS/HCC) - Primary    Relevant Orders    CBC Auto Differential    Comprehensive Metabolic Panel    Hemoglobin A1c    Lipid Panel    TSH    T4, Free    T3, Free    Vitamin D 25 Hydroxy    Vitamin B12    Microalbumin / Creatinine Urine Ratio - Urine, Clean Catch    Iron Profile    Class 1 obesity in adult       Genitourinary and Reproductive     Stage 3 chronic kidney disease (CMS/HCC)    Relevant Orders    CBC Auto Differential    Comprehensive Metabolic Panel    Hemoglobin A1c    Lipid Panel    TSH    T4, Free    T3, Free    Vitamin D 25 Hydroxy    Vitamin B12    Microalbumin / Creatinine Urine Ratio - Urine, Clean Catch    Iron Profile       Mental Health    Severe episode of recurrent major depressive disorder, without psychotic features (CMS/HCC)    Relevant Orders    CBC Auto Differential    Comprehensive Metabolic Panel    Hemoglobin A1c    Lipid Panel    TSH    T4, Free    T3, Free    Vitamin D 25 Hydroxy    Vitamin B12    Microalbumin / Creatinine Urine Ratio - Urine, Clean Catch    Iron Profile       Pulmonary and Pneumonias    Stage 1 mild COPD by GOLD classification (CMS/[facility])    Relevant Orders    CBC Auto Differential    Comprehensive  Metabolic Panel    Hemoglobin A1c    Lipid Panel    TSH    T4, Free    T3, Free    Vitamin D 25 Hydroxy    Vitamin B12    Microalbumin / Creatinine Urine Ratio - Urine, Clean Catch    Iron Profile       Tobacco    Tobacco user    Relevant Orders    CBC Auto Differential    Comprehensive Metabolic Panel    Hemoglobin A1c    Lipid Panel    TSH    T4, Free    T3, Free    Vitamin D 25 Hydroxy    Vitamin B12    Microalbumin / Creatinine Urine Ratio - Urine, Clean Catch    Iron Profile      Other Visit Diagnoses     Chronic fatigue        Relevant Orders    CBC Auto Differential    Comprehensive Metabolic Panel    Hemoglobin A1c    Lipid Panel    TSH    T4, Free    T3, Free    Vitamin D 25 Hydroxy    Vitamin B12    Microalbumin / Creatinine Urine Ratio - Urine, Clean Catch    Iron Profile         -labwork before next visit    -recommend mammogram screening  -recommend pap smear  -recommend colonoscopy screening - will refer to Gastroenterology   -recommend influenza vaccination   -recommend Tdap vaccination   -recommend diabetic eye exam  - will refer to Dr. Manjarrez   -chronic fatigue- check thyroid, iron profile, b12, levels.   -HTN -   Stable On coreg 6.25 mg PO BID  -renal impairment/CKD stage 2-3 - Nephrology following. Continue to monitor. Advised blood sugar and blood pressure management. Has US of liver   -left shoulder pain/subacromial bursitis of left shoulder  - Orthopedic following. Recently received steriod injection.      -HLP - on lipitor 40 mg PO qhs  -diabetic neuropathy - on neurontin 400 mg PO TID   -allergic rhinitis -  zyrtec 10 mg daily.  -chronic pain - pt sees Pain Managmeent on Percoet 7.5/325 mg every 6 hours PRN along with neurontin 300 mg PO TID  -insomnia - doxepin 25 mg at bedtime  -tobacco user -counseled quit smoking >5 minutes. Start on wellbutrin  mg daily. Star nicotine patch   -nausea/vomiting - possible gastroparesis. Consider EGD/colonsocopy   -sp  Left nephrectomy/history of  renal cell carcinoma-  Consider  Oncology referral  -GERD - on prilosec 20 mg PO BID  -CAD sp stent /grade 1 diastolic dysfunction - Cardiology following on aspirin 81 mg PO BID, plavix 75 mg PO q daily. Coreg 6.25 mg PO BID, lipitor 40 mg PO qhs, ranexa 500 mg every 12 hours   -DM type 2  -now on basaglar 43  Go up to 46 unites  units at bedtime advised pt to go bup by 2-3 unties every 3 days until morning sugars at goal. Go up on truliicty from 0.75 to 1.5 mg sub q weekly.      -COPD/I/chronic bronchitis - on albuterol inhaler. adivsed to quit smoking.Refer to Pulmonlogy for PFTs on Breo 200 mcg 1 puff daily.  -major depression/KRISTOFER/PTSD  -Mental Health following on lamictal  25 mg PO q daily.  seroquel 100 mg PO qhs.  Effexor  mg PO q daily.   -insomnia - was on ambien. Advised pt to not take ambien anymore due to potential side effects with Percocet and neuroton. Recommend pt use  Melatonin  -obesity  - counseled weight loss >5 minutes  BMI at 30.29   -advised pt to be safe and call with questions and concerns  -advised pt to go to ER or call 911 if symptoms worrisome or severe  -advised pt to followup with specialist  And referrals  -advsied pt to be safe during COVID-19 pandemic  -total time with pt >25 minutes  This visit has been rescheduled as a phone visit to comply with patient safety concerns in accordance with CDC recommendations. Total time of discussion was 25  Minutes.  -recheck in 2 week              This document has been electronically signed by Quintin Flores MD on February 8, 2021 11:16 CST            Follow Up   Return in about 2 weeks (around 2/22/2021).  Patient was given instructions and counseling regarding her condition or for health maintenance advice. Please see specific information pulled into the AVS if appropriate.

## 2021-02-03 ENCOUNTER — TELEMEDICINE (OUTPATIENT)
Dept: PSYCHIATRY | Facility: CLINIC | Age: 51
End: 2021-02-03

## 2021-02-03 DIAGNOSIS — F43.10 POST TRAUMATIC STRESS DISORDER (PTSD): ICD-10-CM

## 2021-02-03 DIAGNOSIS — F33.2 SEVERE EPISODE OF RECURRENT MAJOR DEPRESSIVE DISORDER, WITHOUT PSYCHOTIC FEATURES (HCC): Primary | ICD-10-CM

## 2021-02-03 DIAGNOSIS — G47.9 SLEEP DIFFICULTIES: ICD-10-CM

## 2021-02-03 DIAGNOSIS — F41.1 GENERALIZED ANXIETY DISORDER: ICD-10-CM

## 2021-02-03 PROCEDURE — 99214 OFFICE O/P EST MOD 30 MIN: CPT | Performed by: NURSE PRACTITIONER

## 2021-02-03 RX ORDER — LAMOTRIGINE 25 MG/1
25 TABLET ORAL DAILY
Qty: 30 TABLET | Refills: 0 | Status: SHIPPED | OUTPATIENT
Start: 2021-02-03 | End: 2021-02-22

## 2021-02-03 RX ORDER — QUETIAPINE FUMARATE 100 MG/1
150 TABLET, FILM COATED ORAL NIGHTLY
Qty: 45 TABLET | Refills: 0 | Status: SHIPPED | OUTPATIENT
Start: 2021-02-03 | End: 2021-02-22 | Stop reason: SDUPTHER

## 2021-02-03 RX ORDER — ONDANSETRON 8 MG/1
TABLET, ORALLY DISINTEGRATING ORAL
Qty: 90 TABLET | Refills: 3 | Status: SHIPPED | OUTPATIENT
Start: 2021-02-03 | End: 2021-06-10

## 2021-02-03 RX ORDER — LORATADINE 10 MG/1
TABLET ORAL
Qty: 30 TABLET | Refills: 3 | Status: SHIPPED | OUTPATIENT
Start: 2021-02-03 | End: 2021-06-10

## 2021-02-03 RX ORDER — BUPROPION HYDROCHLORIDE 150 MG/1
TABLET ORAL
Qty: 30 TABLET | Refills: 3 | Status: SHIPPED | OUTPATIENT
Start: 2021-02-03 | End: 2021-02-03

## 2021-02-03 RX ORDER — VENLAFAXINE HYDROCHLORIDE 150 MG/1
150 CAPSULE, EXTENDED RELEASE ORAL DAILY
Qty: 30 CAPSULE | Refills: 0 | Status: SHIPPED | OUTPATIENT
Start: 2021-02-03 | End: 2021-02-22 | Stop reason: SDUPTHER

## 2021-02-03 NOTE — PROGRESS NOTES
This provider is located at [facility], [address] [place] [address].The Patient is seen remotely at home, [address] [place] [address] via [facility]. Patient is being seen via telehealth and confirm that they are in a secure environment for this session. The patient's condition being diagnosed/treated is appropriate for telemedicine. The provider identified himself/herself: herself as well as her credentials.   The patient and  gave consent to be seen remotely, and when consent is given they understand that the consent allows for patient identifiable information to be sent to a third party as needed.   They may refuse to be seen remotely at any time. The electronic data is encrypted and password protected, and the patient has been advised of the potential risks to privacy not withstanding such measures.    You have chosen to receive care through a telehealth visit.  Do you consent to use a video/audio connection for your medical care today? Yes      Chief Complaint  Depression and Anxiety    Subjective          Yudi West presents to [facility] for medication management.   History of Present Illness  Patient presents an hour late to her appointment.  Patient states that she has had a lot going on as she has been taking care of her sister and she has been significantly depressed. The patient reports depressive symptoms including depressed mood, crying spells, insomnia, decreased appetite, feelings of guilt, feelings of hopelessness, feelings of helplessness, feelings of worthlessness, low energy, difficulty concentrating and suicidal ideation, and have caused impairment in important areas of functioning.  Depression rated 8/10 with 10 being the worst.  Patient was driving with her  and as soon as her  stepped out of the car she stated that she had a lot going on with her  and they were on the  "verge of divorce as he did not understand her and she begins crying.  Patient then states \"feel better off if not here because useless for everybody\".  Then patient states \"I would not do that as I would not want to do that to my sister or family\".  Patient denies any plan or intent and is tearful during this time stating that she could not do that she just is having a hard time handling stuff.  Patient's demeanor then quickly changes regarding her health issues and keeping her appointments and how irritated she is with her  and is smiling and laughing when her  gets in the car.  Patient notes that she feels as if her mood is all over the place as she is up 1 minute and down the next with extreme anxiety and irritability as well as depression.  Patient reports that her sleep is not good as she is up and down at all times.  Patient states that she stopped the aripiprazole because it made her extremely drowsy when asking the patient did she take at nighttime she stated no.  Highly encouraged the patient if she had any concerns to contact the clinic first as we need to switch it to nighttime denied any help with her sleep but help as an adjunct for her severe depression.  Patient reports that her appetite is fair.  Patient states that she goes for a follow-up to see what the results of her upper GI showed.  Patient denied any side effects to the current medications.  Patient admits to SI but adamantly denies any plan or intent.  Patient denies any auditory visual hallucinations or any HI.      Objective   Vital Signs:   There were no vitals taken for this visit.  Due to the remote nature of this encounter (virtual encounter), vitals were unable to be obtained.  Height stated at 65 inches.  Weight stated at 175 pounds.      PHQ-9 Score:   PHQ-9 Total Score: 18     Mental Status Exam:   Hygiene:   good  Cooperation:  Cooperative  Eye Contact:  Good  Psychomotor Behavior:  Restless  Affect:  " "Appropriate  Mood: fluctates  Speech:  Rapid  Thought Process:  Goal directed and Disorganized  Thought Content:  Mood incongruent  Suicidal:  Suicidal Ideation  Homicidal:  None  Hallucinations:  None  Delusion:  None  Memory:  Intact  Orientation:  Person, Place, Time and Situation  Reliability:  fair  Insight:  Fair  Judgement:  Fair  Impulse Control:  Fair  Physical/Medical Issues:  Yes COPD, CAD, HTN, DM, and CKD stage 3     Current Medications:   Current Outpatient Medications   Medication Sig Dispense Refill   • Alpha-Lipoic Acid 300 MG capsule TAKE ONE TO TWO CAPSULES BY MOUTH TWICE DAILY     • ARIPiprazole (Abilify) 2 MG tablet Take 1 tablet by mouth Daily. 30 tablet 0   • aspirin 81 MG EC tablet Take 1 tablet by mouth 2 (Two) Times a Day With Meals. 60 tablet 0   • Blood Glucose Monitoring Suppl (FREESTYLE FREEDOM LITE) w/Device kit USE TO TEST BLOOD SUGAR TWO TO THREE TI MES DAILY 1 each 0   • Breo Ellipta 200-25 MCG/INH inhaler inhale ONE PUFF daily 60 each 3   • carvedilol (COREG) 3.125 MG tablet TAKE ONE TABLET BY MOUTH TWICE DAILY WITH MEALS 60 tablet 3   • clopidogrel (PLAVIX) 75 MG tablet TAKE ONE TABLET BY MOUTH EVERY DAY 30 tablet 3   • dicyclomine (BENTYL) 20 MG tablet Take 1 tablet by mouth Every 6 (Six) Hours. 120 tablet 3   • Easy Touch Insulin Syringe 28G X 1/2\" 1 ML misc USE AT BEDTIME AS DIRECTED 100 each 3   • famotidine (PEPCID) 20 MG tablet TAKE ONE TABLET BY MOUTH TWICE DAILY 60 tablet 3   • gabapentin (NEURONTIN) 300 MG capsule Take 300 mg by mouth 4 (Four) Times a Day.     • Global Ease Inject Pen Needles 31G X 8 MM misc USE TO TEST BLOOD SUGARS TWO TO THREE TIMES DAILY 100 each 3   • glucose blood test strip Check sugars before breakfast and 2 hours after meal. Also give lancets 600 each 12   • Insulin Glargine (BASAGLAR KWIKPEN) 100 UNIT/ML injection pen Take 39 units at bedtime can go up by 2-3 unites every 3 days until am sugars running  9 mL 3   • lamoTRIgine (LaMICtal) 25 " MG tablet Take 1 tablet by mouth Daily for 30 days. 30 tablet 0   • loratadine (CLARITIN) 10 MG tablet Take 10 mg by mouth Daily.     • loratadine (CLARITIN) 10 MG tablet TAKE ONE TABLET BY MOUTH EVERY DAY 30 tablet 3   • Nicotine Step 1 21 MG/24HR patch APPLY ONE PATCH DAILY 28 each 3   • omeprazole (PrilOSEC) 40 MG capsule Take 1 capsule by mouth Daily. 30 capsule 11   • ondansetron ODT (ZOFRAN-ODT) 8 MG disintegrating tablet DISSOLVE ONE TABLET ON THE TONGUE EVERY 8 HOURS AS NEEDED FOR NAUSEA OR VOMITING 90 tablet 3   • oxyCODONE-acetaminophen (PERCOCET) 7.5-325 MG per tablet Take 1 tablet by mouth 3 (Three) Times a Day.     • polyethylene glycol (MIRALAX) 17 g packet Take 17 g by mouth Daily. 30 each 5   • QUEtiapine (SEROquel) 100 MG tablet Take 1.5 tablets by mouth Every Night. 45 tablet 0   • ranolazine (RANEXA) 500 MG 12 hr tablet TAKE ONE TABLET BY MOUTH TWICE DAILY 60 tablet 6   • rosuvastatin (CRESTOR) 20 MG tablet TAKE ONE TABLET BY MOUTH AT BEDTIME 30 tablet 3   • Trulicity 1.5 MG/0.5ML solution pen-injector inject 1.5 MG UNDER THE SKIN into THE appropriate AREA AS DIRECTED one time PER WEEK 2 mL 3   • venlafaxine XR (EFFEXOR-XR) 150 MG 24 hr capsule Take 1 capsule by mouth Daily. 30 capsule 0   • Ventolin  (90 Base) MCG/ACT inhaler INHALE TWO PUFFS FOUR TIMES DAILY 18 g 3   • vitamin D (ERGOCALCIFEROL) 1.25 MG (36855 UT) capsule capsule Take 1 capsule by mouth Every 7 (Seven) Days. 4 capsule 3     No current facility-administered medications for this visit.        Physical Exam  Nursing note reviewed.   Constitutional:       Appearance: Normal appearance.   Neurological:      Mental Status: She is alert.   Psychiatric:         Attention and Perception: Perception normal. She is inattentive.         Mood and Affect: Mood is anxious and depressed.         Speech: Speech is rapid and pressured.         Behavior: Behavior is cooperative.         Cognition and Memory: Cognition and memory normal.         Result Review :     The following data was reviewed by: WAI Florez on 02/03/2021:  Common labs    Common Labsle 6/17/20 6/17/20 6/17/20 6/17/20 6/17/20    1435 1435 1435 1435 1435   BUN    12    Creatinine    1.13 (A)    eGFR Non African Am    51 (A)    Sodium    132 (A)    Potassium    5.2    Chloride    97 (A)    Calcium    9.2    Albumin    4.20    Total Bilirubin    0.2    Alkaline Phosphatase    127 (A)    AST (SGOT)    10    ALT (SGPT)    13    WBC  7.21      Hemoglobin  13.3      Hematocrit  39.3      Platelets  249      Triglycerides     145   HDL Cholesterol     38 (A)   LDL Cholesterol      104 (A)   Hemoglobin A1C   11.30 (A)     Microalbumin, Urine 3.7       (A) Abnormal value            Data reviewed: PCP and specialist notes        Assessment and Plan    Problem List Items Addressed This Visit        Other    Severe episode of recurrent major depressive disorder, without psychotic features (CMS/HCC) - Primary    Relevant Medications    QUEtiapine (SEROquel) 100 MG tablet    venlafaxine XR (EFFEXOR-XR) 150 MG 24 hr capsule    lamoTRIgine (LaMICtal) 25 MG tablet      Other Visit Diagnoses     Generalized anxiety disorder        Relevant Medications    QUEtiapine (SEROquel) 100 MG tablet    venlafaxine XR (EFFEXOR-XR) 150 MG 24 hr capsule    lamoTRIgine (LaMICtal) 25 MG tablet    Post traumatic stress disorder (PTSD)        Relevant Medications    QUEtiapine (SEROquel) 100 MG tablet    venlafaxine XR (EFFEXOR-XR) 150 MG 24 hr capsule    Sleep difficulties        Relevant Medications    QUEtiapine (SEROquel) 100 MG tablet            TREATMENT PLAN/GOALS: Continue supportive psychotherapy efforts and medications as indicated. Treatment and medication options discussed during today's visit. Patient ackowledged and verbally consented to continue with current treatment plan and was educated on the importance of compliance with treatment and follow-up appointments.    MEDICATION ISSUES:  We  discussed risks, benefits, and side effects of the above medications and the patient was agreeable with the plan. Patient was educated on the importance of compliance with treatment and follow-up appointments.  Patient is agreeable to call the office with any worsening of symptoms or onset of side effects. Patient is agreeable to call 911 or go to the nearest ER should he/she begin having SI/HI. We will add Lamictal in an effort to stabilize mood.  The patient was reminded to immediately come to the hospital should there be any loss of control.  Explanation was given to her regarding Lamictal and the potential for Fortunato Micheal syndrome and significant rash.  Patient was encouraged to check skin prior to beginning.  Patient was encouraged to report any rash and to immediately stop medication.       -Continue venlafaxine  daily for depression anxiety.  -Continue Seroquel 150 mg at night for sleep and as well as anxiety.  -Restart aripiprazole with 2 mg at nighttime since it caused sedation for the patient as adjunct for severe depression.  -Begin lamotrigine 25 mg daily for depressed mood and significant fluctuation in mood.  -Patient has no-show to her therapist 4 times so will not be rescheduled suggested the patient try another office.  Patient reports that [facility] has therapist encouraged to contact her PCP for referral for therapy as I will continue her med management.    Counseled patient regarding multimodal approach with healthy nutrition, healthy sleep, regular physical activity, social activities, counseling, and medications.      Coping skills reviewed and encouraged positive framing of thoughts     Assisted patient in processing above session content; acknowledged and normalized patient’s thoughts, feelings, and concerns.  Applied  positive coping skills and behavior management in session.  Allowed patient to freely discuss issues without interruption or judgment. Provided safe,  confidential environment to facilitate the development of positive therapeutic relationship and encourage open, honest communication. Assisted patient in identifying risk factors which would indicate the need for higher level of care including thoughts to harm self or others and/or self-harming behavior and encouraged patient to contact this office, call 911, or present to the nearest emergency room should any of these events occur. Discussed crisis intervention services and means to access.     MEDS ORDERED DURING VISIT:  New Medications Ordered This Visit   Medications   • QUEtiapine (SEROquel) 100 MG tablet     Sig: Take 1.5 tablets by mouth Every Night.     Dispense:  45 tablet     Refill:  0   • venlafaxine XR (EFFEXOR-XR) 150 MG 24 hr capsule     Sig: Take 1 capsule by mouth Daily.     Dispense:  30 capsule     Refill:  0   • lamoTRIgine (LaMICtal) 25 MG tablet     Sig: Take 1 tablet by mouth Daily for 30 days.     Dispense:  30 tablet     Refill:  0           Follow Up   Return in about 2 weeks (around 2/17/2021), or if symptoms worsen or fail to improve, for Recheck.    Patient was given instructions and counseling regarding her condition or for health maintenance advice. Please see specific information pulled into the AVS if appropriate.     This document has been electronically signed by WAI Florez  February 3, 2021 16:37 EST    Part of this note may be an electronic transcription/translation of spoken language to printed text using the Dragon Dictation System.

## 2021-02-03 NOTE — PATIENT INSTRUCTIONS
Major Depressive Disorder, Adult  Major depressive disorder (MDD) is a mental health condition. It may also be called clinical depression or unipolar depression. MDD usually causes feelings of sadness, hopelessness, or helplessness. MDD can also cause physical symptoms. It can interfere with work, school, relationships, and other everyday activities. MDD may be mild, moderate, or severe. It may occur once (single episode major depressive disorder) or it may occur multiple times (recurrent major depressive disorder).  What are the causes?  The exact cause of this condition is not known. MDD is most likely caused by a combination of things, which may include:  · Genetic factors. These are traits that are passed along from parent to child.  · Individual factors. Your personality, your behavior, and the way you handle your thoughts and feelings may contribute to MDD. This includes personality traits and behaviors learned from others.  · Physical factors, such as:  ? Differences in the part of your brain that controls emotion. This part of your brain may be different than it is in people who do not have MDD.  ? Long-term (chronic) medical or psychiatric illnesses.  · Social factors. Traumatic experiences or major life changes may play a role in the development of MDD.  What increases the risk?  This condition is more likely to develop in women. The following factors may also make you more likely to develop MDD:  · A family history of depression.  · Troubled family relationships.  · Abnormally low levels of certain brain chemicals.  · Traumatic events in childhood, especially abuse or the loss of a parent.  · Being under a lot of stress, or long-term stress, especially from upsetting life experiences or losses.  · A history of:  ? Chronic physical illness.  ? Other mental health disorders.  ? Substance abuse.  · Poor living conditions.  · Experiencing social exclusion or discrimination on a regular basis.  What are  the signs or symptoms?  The main symptoms of MDD typically include:  · Constant depressed or irritable mood.  · Loss of interest in things and activities.  MDD symptoms may also include:  · Sleeping or eating too much or too little.  · Unexplained weight change.  · Fatigue or low energy.  · Feelings of worthlessness or guilt.  · Difficulty thinking clearly or making decisions.  · Thoughts of suicide or of harming others.  · Physical agitation or weakness.  · Isolation.  Severe cases of MDD may also occur with other symptoms, such as:  · Delusions or hallucinations, in which you imagine things that are not real (psychotic depression).  · Low-level depression that lasts at least a year (chronic depression or persistent depressive disorder).  · Extreme sadness and hopelessness (melancholic depression).  · Trouble speaking and moving (catatonic depression).  How is this diagnosed?  This condition may be diagnosed based on:  · Your symptoms.  · Your medical history, including your mental health history. This may involve tests to evaluate your mental health. You may be asked questions about your lifestyle, including any drug and alcohol use, and how long you have had symptoms of MDD.  · A physical exam.  · Blood tests to rule out other conditions.  You must have a depressed mood and at least four other MDD symptoms most of the day, nearly every day in the same 2-week timeframe before your health care provider can confirm a diagnosis of MDD.  How is this treated?  This condition is usually treated by mental health professionals, such as psychologists, psychiatrists, and clinical social workers. You may need more than one type of treatment. Treatment may include:  · Psychotherapy. This is also called talk therapy or counseling. Types of psychotherapy include:  ? Cognitive behavioral therapy (CBT). This type of therapy teaches you to recognize unhealthy feelings, thoughts, and behaviors, and replace them with positive  thoughts and actions.  ? Interpersonal therapy (IPT). This helps you to improve the way you relate to and communicate with others.  ? Family therapy. This treatment includes members of your family.  · Medicine to treat anxiety and depression, or to help you control certain emotions and behaviors.  · Lifestyle changes, such as:  ? Limiting alcohol and drug use.  ? Exercising regularly.  ? Getting plenty of sleep.  ? Making healthy eating choices.  ? Spending more time outdoors.    Treatments involving stimulation of the brain can be used in situations with extremely severe symptoms, or when medicine or other therapies do not work over time. These treatments include electroconvulsive therapy, transcranial magnetic stimulation, and vagal nerve stimulation.  Follow these instructions at home:  Activity  · Return to your normal activities as told by your health care provider.  · Exercise regularly and spend time outdoors as told by your health care provider.  General instructions  · Take over-the-counter and prescription medicines only as told by your health care provider.  · Do not drink alcohol. If you drink alcohol, limit your alcohol intake to no more than 1 drink a day for nonpregnant women and 2 drinks a day for men. One drink equals 12 oz of beer, 5 oz of wine, or 1½ oz of hard liquor. Alcohol can affect any antidepressant medicines you are taking. Talk to your health care provider about your alcohol use.  · Eat a healthy diet and get plenty of sleep.  · Find activities that you enjoy doing, and make time to do them.  · Consider joining a support group. Your health care provider may be able to recommend a support group.  · Keep all follow-up visits as told by your health care provider. This is important.  Where to find more information  National [place] on Mental Illness  · www.estefania.org  U.[place] National [place] of Mental Health  · www.nimh.nih.gov  National Suicide Prevention Lifeline  · 5-432-041-TALK (8204).  This is free, 24-hour help.  Contact a health care provider if:  · Your symptoms get worse.  · You develop new symptoms.  Get help right away if:  · You self-harm.  · You have serious thoughts about hurting yourself or others.  · You see, hear, taste, smell, or feel things that are not present (hallucinate).  This information is not intended to replace advice given to you by your health care provider. Make sure you discuss any questions you have with your health care provider.  Document Revised: 11/30/2018 Document Reviewed: 06/28/2017  Elsevier Patient Education © 2020 Elsevier Inc.

## 2021-02-05 ENCOUNTER — OFFICE VISIT (OUTPATIENT)
Dept: GASTROENTEROLOGY | Facility: CLINIC | Age: 51
End: 2021-02-05

## 2021-02-05 VITALS
SYSTOLIC BLOOD PRESSURE: 135 MMHG | DIASTOLIC BLOOD PRESSURE: 87 MMHG | HEART RATE: 90 BPM | BODY MASS INDEX: 30.22 KG/M2 | WEIGHT: 181.4 LBS | HEIGHT: 65 IN

## 2021-02-05 DIAGNOSIS — K59.09 OTHER CONSTIPATION: Primary | ICD-10-CM

## 2021-02-05 PROCEDURE — 99214 OFFICE O/P EST MOD 30 MIN: CPT | Performed by: INTERNAL MEDICINE

## 2021-02-05 RX ORDER — DULAGLUTIDE 0.75 MG/.5ML
INJECTION, SOLUTION SUBCUTANEOUS WEEKLY
COMMUNITY
End: 2021-08-02 | Stop reason: DRUGHIGH

## 2021-02-05 RX ORDER — LIDOCAINE 50 MG/G
PATCH TOPICAL
COMMUNITY
Start: 2020-12-02

## 2021-02-05 NOTE — PATIENT INSTRUCTIONS
MyPlate from USDA    MyPlate is an outline of a general healthy diet based on the 2010 Dietary Guidelines for Americans, from the U.S. Department of Agriculture (USDA). It sets guidelines for how much food you should eat from each food group based on your age, sex, and level of physical activity.  What are tips for following MyPlate?  To follow MyPlate recommendations:  · Eat a wide variety of fruits and vegetables, grains, and protein foods.  · Serve smaller portions and eat less food throughout the day.  · Limit portion sizes to avoid overeating.  · Enjoy your food.  · Get at least 150 minutes of exercise every week. This is about 30 minutes each day, 5 or more days per week.  It can be difficult to have every meal look like MyPlate. Think about MyPlate as eating guidelines for an entire day, rather than each individual meal.  Fruits and vegetables  · Make half of your plate fruits and vegetables.  · Eat many different colors of fruits and vegetables each day.  · For a 2,000 calorie daily food plan, eat:  ? 2½ cups of vegetables every day.  ? 2 cups of fruit every day.  · 1 cup is equal to:  ? 1 cup raw or cooked vegetables.  ? 1 cup raw fruit.  ? 1 medium-sized orange, apple, or banana.  ? 1 cup 100% fruit or vegetable juice.  ? 2 cups raw leafy greens, such as lettuce, spinach, or kale.  ? ½ cup dried fruit.  Grains  · One fourth of your plate should be grains.  · Make at least half of the grains you eat each day whole grains.  · For a 2,000 calorie daily food plan, eat 6 oz of grains every day.  · 1 oz is equal to:  ? 1 slice bread.  ? 1 cup cereal.  ? ½ cup cooked rice, cereal, or pasta.  Protein  · One fourth of your plate should be protein.  · Eat a wide variety of protein foods, including meat, poultry, fish, eggs, beans, nuts, and tofu.  · For a 2,000 calorie daily food plan, eat 5½ oz of protein every day.  · 1 oz is equal to:  ? 1 oz meat, poultry, or fish.  ? ¼ cup cooked beans.  ? 1 egg.  ? ½ oz nuts  or seeds.  ? 1 Tbsp peanut butter.  Dairy  · Drink fat-free or low-fat (1%) milk.  · Eat or drink dairy as a side to meals.  · For a 2,000 calorie daily food plan, eat or drink 3 cups of dairy every day.  · 1 cup is equal to:  ? 1 cup milk, yogurt, cottage cheese, or soy milk (soy beverage).  ? 2 oz processed cheese.  ? 1½ oz natural cheese.  Fats, oils, salt, and sugars  · Only small amounts of oils are recommended.  · Avoid foods that are high in calories and low in nutritional value (empty calories), like foods high in fat or added sugars.  · Choose foods that are low in salt (sodium). Choose foods that have less than 140 milligrams (mg) of sodium per serving.  · Drink water instead of sugary drinks. Drink enough water each day to keep your urine pale yellow.  Where to find support  · Work with your health care provider or a nutrition specialist (dietitian) to develop a customized eating plan that is right for you.  · Download an david (mobile application) to help you track your daily food intake.  Where to find more information  · Go to ChooseMyPlate.gov for more information.  Summary  · MyPlate is a general guideline for healthy eating from the USDA. It is based on the 2010 Dietary Guidelines for Americans.  · In general, fruits and vegetables should take up ½ of your plate, grains should take up ¼ of your plate, and protein should take up ¼ of your plate.  This information is not intended to replace advice given to you by your health care provider. Make sure you discuss any questions you have with your health care provider.  Document Revised: 05/21/2020 Document Reviewed: 03/19/2018  Elsevier Patient Education © 2020 Elsevier Inc.

## 2021-02-08 ENCOUNTER — OFFICE VISIT (OUTPATIENT)
Dept: FAMILY MEDICINE CLINIC | Facility: CLINIC | Age: 51
End: 2021-02-08

## 2021-02-08 VITALS — BODY MASS INDEX: 30.19 KG/M2 | HEIGHT: 65 IN

## 2021-02-08 DIAGNOSIS — E11.65 UNCONTROLLED TYPE 2 DIABETES MELLITUS WITH HYPERGLYCEMIA (HCC): Primary | ICD-10-CM

## 2021-02-08 DIAGNOSIS — I25.10 CORONARY ARTERY DISEASE INVOLVING NATIVE CORONARY ARTERY OF NATIVE HEART WITHOUT ANGINA PECTORIS: ICD-10-CM

## 2021-02-08 DIAGNOSIS — F33.2 SEVERE EPISODE OF RECURRENT MAJOR DEPRESSIVE DISORDER, WITHOUT PSYCHOTIC FEATURES (HCC): ICD-10-CM

## 2021-02-08 DIAGNOSIS — N18.30 STAGE 3 CHRONIC KIDNEY DISEASE, UNSPECIFIED WHETHER STAGE 3A OR 3B CKD (HCC): ICD-10-CM

## 2021-02-08 DIAGNOSIS — I10 ESSENTIAL HYPERTENSION: Chronic | ICD-10-CM

## 2021-02-08 DIAGNOSIS — J44.9 STAGE 1 MILD COPD BY GOLD CLASSIFICATION (HCC): ICD-10-CM

## 2021-02-08 DIAGNOSIS — E78.2 MIXED HYPERLIPIDEMIA: ICD-10-CM

## 2021-02-08 DIAGNOSIS — R53.82 CHRONIC FATIGUE: ICD-10-CM

## 2021-02-08 DIAGNOSIS — E66.9 CLASS 1 OBESITY IN ADULT, UNSPECIFIED BMI, UNSPECIFIED OBESITY TYPE, UNSPECIFIED WHETHER SERIOUS COMORBIDITY PRESENT: ICD-10-CM

## 2021-02-08 DIAGNOSIS — Z72.0 TOBACCO USER: ICD-10-CM

## 2021-02-08 PROBLEM — E66.811 CLASS 1 OBESITY IN ADULT: Status: ACTIVE | Noted: 2021-02-08

## 2021-02-08 PROCEDURE — 99423 OL DIG E/M SVC 21+ MIN: CPT | Performed by: FAMILY MEDICINE

## 2021-02-10 ENCOUNTER — TELEPHONE (OUTPATIENT)
Dept: GASTROENTEROLOGY | Facility: CLINIC | Age: 51
End: 2021-02-10

## 2021-02-10 NOTE — TELEPHONE ENCOUNTER
02/10/2021, 1442 - [facility], [place], [place] telephoned per this staff member [phone] with notification Prior Authorization approval obtained for Linzess 290 MCG Capsules effective 02/10/2021 - 02/10/2022.  Spoke with pharmacy personnel, Nancy.  Patient has $0.00 co-pay.    02/10/2021, 1443 - Patient telephoned per this staff member [phone] with notification Prior Authorization approval obtained for Linzess 290 MCG Capsules.  Patient verbalized understanding.

## 2021-02-11 RX ORDER — CARVEDILOL 3.12 MG/1
TABLET ORAL
Qty: 60 TABLET | Refills: 3 | Status: SHIPPED | OUTPATIENT
Start: 2021-02-11 | End: 2021-06-10

## 2021-02-12 ENCOUNTER — TELEPHONE (OUTPATIENT)
Dept: FAMILY MEDICINE CLINIC | Facility: CLINIC | Age: 51
End: 2021-02-12

## 2021-02-12 RX ORDER — BUDESONIDE AND FORMOTEROL FUMARATE DIHYDRATE 160; 4.5 UG/1; UG/1
1 AEROSOL RESPIRATORY (INHALATION) DAILY
Qty: 1 EACH | Refills: 3 | Status: SHIPPED | OUTPATIENT
Start: 2021-02-12 | End: 2021-06-10

## 2021-02-12 NOTE — TELEPHONE ENCOUNTER
Insurance denied PA for Breo. Pt must try advair diskus, advair hfa, dulera, symbicort. Please advise.

## 2021-02-17 RX ORDER — INSULIN GLARGINE 100 [IU]/ML
INJECTION, SOLUTION SUBCUTANEOUS
Qty: 15 ML | Refills: 3 | Status: SHIPPED | OUTPATIENT
Start: 2021-02-17 | End: 2021-07-22 | Stop reason: SDUPTHER

## 2021-02-17 RX ORDER — BUSPIRONE HYDROCHLORIDE 10 MG/1
TABLET ORAL
Qty: 90 TABLET | Refills: 3 | Status: SHIPPED | OUTPATIENT
Start: 2021-02-17 | End: 2021-02-22

## 2021-02-17 RX ORDER — ROSUVASTATIN CALCIUM 20 MG/1
TABLET, COATED ORAL
Qty: 30 TABLET | Refills: 3 | Status: SHIPPED | OUTPATIENT
Start: 2021-02-17 | End: 2021-06-10

## 2021-02-17 RX ORDER — CLOPIDOGREL BISULFATE 75 MG/1
TABLET ORAL
Qty: 30 TABLET | Refills: 3 | Status: SHIPPED | OUTPATIENT
Start: 2021-02-17 | End: 2021-07-22 | Stop reason: SDUPTHER

## 2021-02-17 RX ORDER — FAMOTIDINE 20 MG/1
TABLET, FILM COATED ORAL
Qty: 60 TABLET | Refills: 3 | Status: SHIPPED | OUTPATIENT
Start: 2021-02-17 | End: 2021-07-22 | Stop reason: SDUPTHER

## 2021-02-19 NOTE — PROGRESS NOTES
Chief Complaint   Patient presents with   • EGD/Colonoscopy Performed 01/28/2021     Other Constipation       Subjective    Yudi West is a 50 y.o. female.    History of Present Illness  Patient presented to GI clinic for follow-up visit today.  She feels some better currently.  Has continued symptoms with constipation and bloating.  Abdominal pain has improved.  Taking MiraLAX daily.  GERD is well controlled with PPI.  EGD was consistent with hiatal hernia, esophagitis and gastritis.  Path was consistent with reactive gastropathy.  Colonoscopy was consistent with hyperplastic polyps and hemorrhoids.  Gained 2 pounds weight since last visit.       The following portions of the patient's history were reviewed and updated as appropriate:   Past Medical History:   Diagnosis Date   • Anxiety and depression    • Arthritis    • Asthma    • Cancer of kidney (CMS/HCC)     left   • Chronic kidney disease    • COPD (chronic obstructive pulmonary disease) (CMS/HCC)    • Coronary artery disease     1 stent.  is followed by jaden   • Diabetes mellitus (CMS/HCC)    • GERD (gastroesophageal reflux disease)    • Hyperlipidemia    • Hypertension    • Myocardial infarction (CMS/HCC)    • PTSD (post-traumatic stress disorder)    • Sleep apnea    • Substance abuse (CMS/HCC)    • Suicide attempt (CMS/HCC)      Past Surgical History:   Procedure Laterality Date   • COLONOSCOPY N/A 1/28/2021    Procedure: COLONOSCOPY;  Surgeon: Juwan Wilkes MD;  Location: [facility] ENDOSCOPY;  Service: Gastroenterology;  Laterality: N/A;   • CORONARY STENT PLACEMENT     • ENDOSCOPY N/A 1/28/2021    Procedure: ESOPHAGOGASTRODUODENOSCOPY;  Surgeon: Juwan Wilkes MD;  Location: [facility] ENDOSCOPY;  Service: Gastroenterology;  Laterality: N/A;   • FEMUR IM NAILING RETROGRADE Left 11/3/2019    Procedure: FEMUR INTRAMEDULLARY NAILING RETROGRADE;  Surgeon: Howie Campoverde MD;  Location: [facility] OR;  Service: Orthopedics   • GALLBLADDER  Well positioned. "SURGERY     • HARDWARE REMOVAL Left 12/4/2019    Procedure: HARDWARE REMOVAL LEFT FEMUR        (C-ARM#3);  Surgeon: Howie Campoverde MD;  Location: [facility];  Service: Orthopedics   • HYSTERECTOMY     • NEPHRECTOMY Left    • REPLACEMENT TOTAL KNEE Left    • TONSILLECTOMY     • UPPER GASTROINTESTINAL ENDOSCOPY  01/28/2021    Per Dr. Juwan Wilkes M.D., [place], [place]     Family History   Problem Relation Age of Onset   • Heart disease Mother    • Hypertension Mother    • Cancer Mother    • Diabetes Mother    • Alcohol abuse Mother    • Heart disease Father    • Hypertension Father    • Alcohol abuse Father    • Alcohol abuse Sister    • Drug abuse Sister    • Depression Sister    • Anxiety disorder Sister    • Drug abuse Brother    • Alcohol abuse Brother    • Suicide Attempts Brother      OB History    No obstetric history on file.       Prior to Admission medications    Medication Sig Start Date End Date Taking? Authorizing Provider   ARIPiprazole (Abilify) 2 MG tablet Take 1 tablet by mouth Daily. 1/6/21  Yes Lashell Dueñas APRN   aspirin 81 MG EC tablet Take 1 tablet by mouth 2 (Two) Times a Day With Meals.  Patient taking differently: Take 81 mg by mouth Daily. 11/4/19  Yes Howie Campoverde MD   Blood Glucose Monitoring Suppl (FREESTYLE FREEDOM LITE) w/Device kit USE TO TEST BLOOD SUGAR TWO TO THREE TI MES DAILY 7/14/20  Yes Quintin Flores MD   Breo Ellipta 200-25 MCG/INH inhaler inhale ONE PUFF daily 1/4/21  Yes Quintin Flores MD   carvedilol (COREG) 3.125 MG tablet TAKE ONE TABLET BY MOUTH TWICE DAILY WITH MEALS 10/21/20  Yes Quintin Flores MD   clopidogrel (PLAVIX) 75 MG tablet TAKE ONE TABLET BY MOUTH EVERY DAY 10/21/20  Yes Quintin Flores MD   Dulaglutide (Trulicity) 0.75 MG/0.5ML solution pen-injector Inject  under the skin into the appropriate area as directed 1 (One) Time Per Week.   Yes Provider, MD Sara   Easy Touch Insulin Syringe 28G X 1/2\" 1 ML misc USE AT " BEDTIME AS DIRECTED 11/2/20  Yes Quintin Flores MD   famotidine (PEPCID) 20 MG tablet TAKE ONE TABLET BY MOUTH TWICE DAILY 10/21/20  Yes Quintin Flores MD   gabapentin (NEURONTIN) 300 MG capsule Take 300 mg by mouth 4 (Four) Times a Day. 6/10/20  Yes Sara Benoit MD   Global Ease Inject Pen Needles 31G X 8 MM misc USE TO TEST BLOOD SUGARS TWO TO THREE TIMES DAILY 11/2/20  Yes Quintin Flores MD   glucose blood test strip Check sugars before breakfast and 2 hours after meal. Also give lancets 7/2/20  Yes Quintin Flores MD   Insulin Glargine (BASAGLAR KWIKPEN) 100 UNIT/ML injection pen Take 39 units at bedtime can go up by 2-3 unites every 3 days until am sugars running   Patient taking differently: Take 36 units at bedtime can go up by 2-3 unites every 3 days until am sugars running     02/05/2021 - Patient currently utilizing 50 Units nightly. 9/9/20  Yes Quintin Flores MD   lamoTRIgine (LaMICtal) 25 MG tablet Take 1 tablet by mouth Daily for 30 days. 2/3/21 3/5/21 Yes Lashell Dueñas APRN   lidocaine (LIDODERM) 5 % APPLY ONE TO TWO PATCHES AS DIRECTED ON LOWER BACK, LEFT UPPER LEG 12 HOURS ON AND 12 HOURS OFF 12/2/20  Yes Sara Benoit MD   loratadine (CLARITIN) 10 MG tablet TAKE ONE TABLET BY MOUTH EVERY DAY 2/3/21  Yes Quintin Flores MD   omeprazole (PrilOSEC) 40 MG capsule Take 1 capsule by mouth Daily. 10/6/20  Yes Juwan Wilkes MD   ondansetron ODT (ZOFRAN-ODT) 8 MG disintegrating tablet DISSOLVE ONE TABLET ON THE TONGUE EVERY 8 HOURS AS NEEDED FOR NAUSEA OR VOMITING 2/3/21  Yes Quintin Flores MD   oxyCODONE-acetaminophen (PERCOCET) 7.5-325 MG per tablet Take 1 tablet by mouth 3 (Three) Times a Day. 6/15/20  Yes Sara Benoit MD   QUEtiapine (SEROquel) 100 MG tablet Take 1.5 tablets by mouth Every Night. 2/3/21  Yes Lashell Dueñas APRN   ranolazine (RANEXA) 500 MG 12 hr tablet TAKE ONE TABLET BY MOUTH TWICE DAILY 1/18/21  Yes Quintin Flores  MD SIVA   rosuvastatin (CRESTOR) 20 MG tablet TAKE ONE TABLET BY MOUTH AT BEDTIME 10/19/20  Yes Quintin Flores MD   venlafaxine XR (EFFEXOR-XR) 150 MG 24 hr capsule Take 1 capsule by mouth Daily. 2/3/21  Yes Lashell Dueñas APRN   Ventolin  (90 Base) MCG/ACT inhaler INHALE TWO PUFFS FOUR TIMES DAILY  Patient taking differently: As Needed 1/29/21  Yes Quintin Flores MD   vitamin D (ERGOCALCIFEROL) 1.25 MG (95321 UT) capsule capsule Take 1 capsule by mouth Every 7 (Seven) Days. 7/2/20  Yes Quintin Flores MD   Alpha-Lipoic Acid 300 MG capsule TAKE ONE TO TWO CAPSULES BY MOUTH TWICE DAILY 8/28/20   Sara Benoit MD   dicyclomine (BENTYL) 20 MG tablet Take 1 tablet by mouth Every 6 (Six) Hours. 7/2/20   Quintin Flores MD   linaclotide (LINZESS) 290 MCG capsule capsule Take 1 capsule by mouth Every Morning Before Breakfast. 2/5/21   Juwan Wilkes MD   loratadine (CLARITIN) 10 MG tablet Take 10 mg by mouth Daily. 8/28/20   Sara Benoit MD   Nicotine Step 1 21 MG/24HR patch APPLY ONE PATCH DAILY 1/18/21   Quintin Flores MD   polyethylene glycol (MIRALAX) 17 g packet Take 17 g by mouth Daily. 10/6/20   Juwan Wilkes MD   SITagliptin (JANUVIA) 50 MG tablet at bed time    ProviderSara MD   Dulaglutide (Trulicity) 1.5 MG/0.5ML solution pen-injector Inject 1.5 mg under the skin into the appropriate area as directed 1 (One) Time Per Week. 9/16/20   Quintin Flores MD   Trulicity 1.5 MG/0.5ML solution pen-injector inject 1.5 MG UNDER THE SKIN into THE appropriate AREA AS DIRECTED one time PER WEEK 12/29/20 2/5/21  Quintin Flores MD     Allergies   Allergen Reactions   • Sulfa Antibiotics Itching     Social History     Socioeconomic History   • Marital status:      Spouse name: Not on file   • Number of children: Not on file   • Years of education: Not on file   • Highest education level: Not on file   Tobacco Use   • Smoking status: Current Every Day Smoker      "Packs/day: 1.00     Years: 36.00     Pack years: 36.00     Types: Cigarettes   • Smokeless tobacco: Never Used   Substance and Sexual Activity   • Alcohol use: Not Currently     Comment: passed use for yrs including cases and 2 fifths daily; quit 20 yrs ago   • Drug use: Not Currently     Types: Methamphetamines, Cocaine(coke)   • Sexual activity: Defer       Review of Systems  Review of Systems   Constitutional: Positive for unexpected weight change. Negative for chills, fatigue and fever.   HENT: Negative for congestion, ear discharge, hearing loss, nosebleeds and sore throat.    Eyes: Negative for pain, discharge and redness.   Respiratory: Negative for cough, chest tightness, shortness of breath and wheezing.    Cardiovascular: Negative for chest pain and palpitations.   Gastrointestinal: Positive for constipation. Negative for abdominal distention, abdominal pain, blood in stool, diarrhea, nausea and vomiting.   Endocrine: Negative for cold intolerance, polydipsia, polyphagia and polyuria.   Genitourinary: Negative for dysuria, flank pain, frequency, hematuria and urgency.   Musculoskeletal: Negative for arthralgias, back pain, joint swelling and myalgias.   Skin: Negative for color change, pallor and rash.   Neurological: Negative for tremors, seizures, syncope, weakness and headaches.   Hematological: Negative for adenopathy. Does not bruise/bleed easily.   Psychiatric/Behavioral: Negative for behavioral problems, confusion, dysphoric mood, hallucinations and suicidal ideas. The patient is not nervous/anxious.    Has bloating.     /87   Pulse 90   Ht 165.1 cm (65\")   Wt 82.3 kg (181 lb 6.4 oz)   BMI 30.19 kg/m²     Objective    Physical Exam  Constitutional:       Appearance: She is well-developed.   HENT:      Head: Normocephalic and atraumatic.   Eyes:      Conjunctiva/sclera: Conjunctivae normal.      Pupils: Pupils are equal, round, and reactive to light.   Neck:      Musculoskeletal: Normal " range of motion and neck supple.      Thyroid: No thyromegaly.   Cardiovascular:      Rate and Rhythm: Normal rate and regular rhythm.      Heart sounds: Normal heart sounds. No murmur.   Pulmonary:      Effort: Pulmonary effort is normal.      Breath sounds: Normal breath sounds. No wheezing.   Abdominal:      General: Bowel sounds are normal. There is no distension.      Palpations: Abdomen is soft. There is no mass.      Tenderness: There is no abdominal tenderness.      Hernia: No hernia is present.   Genitourinary:     Comments: No lesions noted  Musculoskeletal: Normal range of motion.         General: No tenderness.   Lymphadenopathy:      Cervical: No cervical adenopathy.   Skin:     General: Skin is warm and dry.      Findings: No rash.   Neurological:      Mental Status: She is alert and oriented to person, place, and time.      Cranial Nerves: No cranial nerve deficit.   Psychiatric:         Thought Content: Thought content normal.       Admission on 01/28/2021, Discharged on 01/28/2021   Component Date Value Ref Range Status   • Glucose 01/28/2021 354* 70 - 130 mg/dL Final    RN NotifiedNSOperator: [id number] SARAHKIKE SCHNEIDERN DENISEMeter ID: [id number]   • Case Report 01/28/2021    Final                    Value:Surgical Pathology Report                         Case: [id number]                                  Authorizing Provider:  Juwan Wilkes MD      Collected:           01/28/2021 11:25 AM          Ordering Location:     [facility]             Received:            01/28/2021 02:27 PM                                 [facility]                                                     Pathologist:           Freedom Turcios MD                                                           Specimens:   1) - Gastric, Antrum                                                                                2) - Esophagus, EGJ                                                                                  3) - Large Intestine, Cecum, polyp                                                                  4) - Large Intestine, Right / Ascending Colon, polyp                                                5) - Large Intestine, Rectum, polyps                                                                                          6) - Large Intestine, Sigmoid Colon, polyps                                               • Final Diagnosis 01/28/2021    Final                    Value:This result contains rich text formatting which cannot be displayed here.   • Glucose 01/28/2021 258* 70 - 130 mg/dL Final    Result Not ConfirmedOperator: [id number] LAVINIA Bonillater ID: [id number]     Assessment/Plan    No diagnosis found..   1.  Abdominal pain with constipation and weight loss, improving. Add Linzess and continue MiraLAX daily.  Continue high-fiber diet.  CT abdomen and pelvis if symptoms continue.  2.  Abdominal pain with nausea, vomiting and weight loss, improving.  Continue Prilosec 40 mg p.o. daily.    Add Carafate if continues.  3.  Obesity with recent weight loss, patient gained 2 pounds weight since last visit.  4.  Tobacco usage, recommend cessation.  5.  History of drug usage, off currently.  6.  Colorectal cancer screening, repeat colonoscopy in 5 years.    Orders placed during this encounter include:  No orders of the defined types were placed in this encounter.      * Surgery not found *    Review and/or summary of lab tests, radiology, procedures, medications. Review and summary of old records and obtaining of history. The risks and benefits of my recommendations, as well as other treatment options were discussed with the patient today. Questions were answered.    New Medications Ordered This Visit   Medications   • linaclotide (LINZESS) 290 MCG capsule capsule     Sig: Take 1 capsule by mouth Every Morning Before Breakfast.     Dispense:  30 capsule     Refill:  5       Follow-up: Return in about 2  weeks (around 2/19/2021).               Results for orders placed or performed during the hospital encounter of 01/28/21   Tissue Pathology Exam    Specimen: A: Gastric, Antrum; Tissue    B: Esophagus; Tissue    C: Large Intestine, Cecum; Polyp    D: Large Intestine, Right / Ascending Colon; Polyp    E: Large Intestine, Sigmoid Colon; Polyp    F: Large Intestine, Rectum; Polyp   Result Value Ref Range    Case Report       Surgical Pathology Report                         Case: [id number]                                  Authorizing Provider:  Juwan Wilkes MD      Collected:           01/28/2021 11:25 AM          Ordering Location:     [facility]             Received:            01/28/2021 02:27 PM                                 [facility]                                                     Pathologist:           Freedom Turcios MD                                                           Specimens:   1) - Gastric, Antrum                                                                                2) - Esophagus, EGJ                                                                                 3) - Large Intestine, Cecum, polyp                                                                  4) - Large Intestine, Right / Ascending Colon, polyp                                                5) - Large Intestine, Rectum, polyps                                                                 6) - Large Intestine, Sigmoid Colon, polyps                                                Final Diagnosis       SEE SCANNED REPORT       POC Glucose Once    Specimen: Blood   Result Value Ref Range    Glucose 258 (H) 70 - 130 mg/dL   POC Glucose Once    Specimen: Blood   Result Value Ref Range    Glucose 354 (H) 70 - 130 mg/dL   Results for orders placed or performed in visit on 01/25/21   COVID-19,[facility], NP/OP SWAB IN UTM/VTM/[facility] TRANSPORT MEDIA,24 HR TAT - Swab, Nasopharynx     Specimen: Nasopharynx; Swab   Result Value Ref Range    COVID19 Not Detected Not Detected - Ref. Range   Results for orders placed or performed in visit on 11/10/20   COVID [facility] Priority - Swab, Nasopharynx    Specimen: Nasopharynx; Swab   Result Value Ref Range    COVID [facility] PRIORITY Comment    COVID-19,[facility] ROUTINE, NP/OP SWAB IN TRANSPORT MEDIA OR ESWAB 72 HR TAT - Swab, Nasopharynx    Specimen: Nasopharynx; Swab   Result Value Ref Range    SARS-CoV-2, KOLE Not Detected Not Detected   Results for orders placed or performed during the hospital encounter of 08/31/20   Doppler Arterial Multi Level Lower Extremity - Bilateral CAR   Result Value Ref Range    RIGHT LOW THIGH SYS  mmHg    RIGHT POPLITEAL SYS  mmHg    RIGHT DORSALIS PEDIS SYS  mmHg    RIGHT POST TIBIAL SYS  mmHg    RIGHT RANDI RATIO 1.32     LEFT LOW THIGH SYS  mmHg    LEFT POPLITEAL SYS  mmHg    LEFT DORSALIS PEDIS SYS  mmHg    LEFT POST TIBIAL SYS  mmHg    LEFT RANDI RATIO 1.32     Upper arterial right arm brachial sys max 108 mmHg    Upper arterial left arm brachial sys max 111 mmHg   Results for orders placed or performed in visit on 06/17/20   CBC Auto Differential    Specimen: Blood   Result Value Ref Range    WBC 7.21 3.40 - 10.80 10*3/mm3    RBC 4.50 3.77 - 5.28 10*6/mm3    Hemoglobin 13.3 12.0 - 15.9 g/dL    Hematocrit 39.3 34.0 - 46.6 %    MCV 87.3 79.0 - 97.0 fL    MCH 29.6 26.6 - 33.0 pg    MCHC 33.8 31.5 - 35.7 g/dL    RDW 11.9 (L) 12.3 - 15.4 %    RDW-SD 38.2 37.0 - 54.0 fl    MPV 11.2 6.0 - 12.0 fL    Platelets 249 140 - 450 10*3/mm3    Neutrophil % 47.4 42.7 - 76.0 %    Lymphocyte % 35.4 19.6 - 45.3 %    Monocyte % 6.9 5.0 - 12.0 %    Eosinophil % 7.1 (H) 0.3 - 6.2 %    Basophil % 1.4 0.0 - 1.5 %    Immature Grans % 1.8 (H) 0.0 - 0.5 %    Neutrophils, Absolute 3.42 1.70 - 7.00 10*3/mm3    Lymphocytes, Absolute 2.55 0.70 - 3.10 10*3/mm3    Monocytes, Absolute 0.50 0.10 - 0.90  10*3/mm3    Eosinophils, Absolute 0.51 (H) 0.00 - 0.40 10*3/mm3    Basophils, Absolute 0.10 0.00 - 0.20 10*3/mm3    Immature Grans, Absolute 0.13 (H) 0.00 - 0.05 10*3/mm3    nRBC 0.0 0.0 - 0.2 /100 WBC   Hepatitis C antibody    Specimen: Blood   Result Value Ref Range    Hepatitis C Ab Non-Reactive Non-Reactive   Microalbumin / Creatinine Urine Ratio - Urine, Clean Catch    Specimen: Urine, Clean Catch   Result Value Ref Range    Microalbumin/Creatinine Ratio 13.6 mg/g    Creatinine, Urine 272.2 mg/dL    Microalbumin, Urine 3.7 mg/dL   Vitamin D 25 Hydroxy    Specimen: Blood   Result Value Ref Range    25 Hydroxy, Vitamin D 41.5 30.0 - 100.0 ng/ml   C-Peptide    Specimen: Blood   Result Value Ref Range    C-Peptide 4.8 (H) 1.1 - 4.4 ng/mL   T3, Free    Specimen: Blood   Result Value Ref Range    T3, Free 2.87 2.00 - 4.40 pg/mL   TSH    Specimen: Blood   Result Value Ref Range    TSH 0.821 0.270 - 4.200 uIU/mL   T4, Free    Specimen: Blood   Result Value Ref Range    Free T4 1.16 0.93 - 1.70 ng/dL   Hemoglobin A1c    Specimen: Blood   Result Value Ref Range    Hemoglobin A1C 11.30 (H) 4.80 - 5.60 %   Vitamin B12    Specimen: Blood   Result Value Ref Range    Vitamin B-12 494 211 - 946 pg/mL   Lipid Panel    Specimen: Blood   Result Value Ref Range    Total Cholesterol 171 0 - 200 mg/dL    Triglycerides 145 0 - 150 mg/dL    HDL Cholesterol 38 (L) 40 - 60 mg/dL    LDL Cholesterol  104 (H) 0 - 100 mg/dL    VLDL Cholesterol 29 5 - 40 mg/dL    LDL/HDL Ratio 2.74    Comprehensive Metabolic Panel    Specimen: Blood   Result Value Ref Range    Glucose 390 (H) 65 - 99 mg/dL    BUN 12 6 - 20 mg/dL    Creatinine 1.13 (H) 0.57 - 1.00 mg/dL    Sodium 132 (L) 136 - 145 mmol/L    Potassium 5.2 3.5 - 5.2 mmol/L    Chloride 97 (L) 98 - 107 mmol/L    CO2 20.8 (L) 22.0 - 29.0 mmol/L    Calcium 9.2 8.6 - 10.5 mg/dL    Total Protein 6.7 6.0 - 8.5 g/dL    Albumin 4.20 3.50 - 5.20 g/dL    ALT (SGPT) 13 1 - 33 U/L    AST (SGOT) 10 1 - 32  U/L    Alkaline Phosphatase 127 (H) 39 - 117 U/L    Total Bilirubin 0.2 0.2 - 1.2 mg/dL    eGFR Non African Amer 51 (L) >60 mL/min/1.73    Globulin 2.5 gm/dL    A/G Ratio 1.7 g/dL    BUN/Creatinine Ratio 10.6 7.0 - 25.0    Anion Gap 14.2 5.0 - 15.0 mmol/L   Results for orders placed or performed during the hospital encounter of 12/04/19   Hardware / Foreign Body Culture - Hardware / Foreign Body, Leg, Left    Specimen: Leg, Left; Hardware / Foreign Body   Result Value Ref Range    Hardware / Foreign Body Culture No growth at 3 days     Gram Stain Few (2+) WBCs seen     Gram Stain No organisms seen    POC Glucose Once    Specimen: Blood   Result Value Ref Range    Glucose 96 70 - 130 mg/dL   POC Glucose Once    Specimen: Blood   Result Value Ref Range    Glucose 266 (H) 70 - 130 mg/dL   Basic Metabolic Panel    Specimen: Blood   Result Value Ref Range    Glucose 271 (H) 65 - 99 mg/dL    BUN 10 6 - 20 mg/dL    Creatinine 0.71 0.57 - 1.00 mg/dL    Sodium 138 136 - 145 mmol/L    Potassium 4.1 3.5 - 5.2 mmol/L    Chloride 101 98 - 107 mmol/L    CO2 27.0 22.0 - 29.0 mmol/L    Calcium 9.7 8.6 - 10.5 mg/dL    eGFR Non African Amer 87 >60 mL/min/1.73    BUN/Creatinine Ratio 14.1 7.0 - 25.0    Anion Gap 10.0 5.0 - 15.0 mmol/L   Results for orders placed or performed during the hospital encounter of 11/02/19   PREVIOUS HISTORY    Specimen: Blood   Result Value Ref Range    Previous History Previous Record on File    Green Top (Gel)   Result Value Ref Range    Extra Tube Hold for add-ons.    Urinalysis With Culture If Indicated - Urine, Catheter    Specimen: Urine, Catheter   Result Value Ref Range    Color, UA Yellow Yellow, Straw, Dark Yellow, Jeannette    Appearance, UA Slightly Cloudy (A) Clear    pH, UA 6.0 5.0 - 9.0    Specific Gravity, UA 1.008 1.003 - 1.030    Glucose,  mg/dL (1+) (A) Negative    Ketones, UA Negative Negative    Bilirubin, UA Negative Negative    Blood, UA Negative Negative    Protein, UA Negative  Negative    Leuk Esterase, UA Negative Negative    Nitrite, UA Negative Negative    Urobilinogen, UA 0.2 E.U./dL 0.2 - 1.0 E.U./dL     *Note: Due to a large number of results and/or encounters for the requested time period, some results have not been displayed. A complete set of results can be found in Results Review.         This document has been electronically signed by Juwan Wilkes MD on February 5, 2021 12:24 CST

## 2021-02-22 ENCOUNTER — TELEMEDICINE (OUTPATIENT)
Dept: PSYCHIATRY | Facility: CLINIC | Age: 51
End: 2021-02-22

## 2021-02-22 DIAGNOSIS — F33.2 SEVERE EPISODE OF RECURRENT MAJOR DEPRESSIVE DISORDER, WITHOUT PSYCHOTIC FEATURES (HCC): Primary | ICD-10-CM

## 2021-02-22 DIAGNOSIS — F43.10 POST TRAUMATIC STRESS DISORDER (PTSD): ICD-10-CM

## 2021-02-22 DIAGNOSIS — G47.9 SLEEP DIFFICULTIES: ICD-10-CM

## 2021-02-22 DIAGNOSIS — F41.1 GENERALIZED ANXIETY DISORDER: ICD-10-CM

## 2021-02-22 PROCEDURE — 99214 OFFICE O/P EST MOD 30 MIN: CPT | Performed by: NURSE PRACTITIONER

## 2021-02-22 RX ORDER — VENLAFAXINE HYDROCHLORIDE 37.5 MG/1
37.5 CAPSULE, EXTENDED RELEASE ORAL DAILY
Qty: 30 CAPSULE | Refills: 0 | Status: SHIPPED | OUTPATIENT
Start: 2021-02-22 | End: 2021-03-24

## 2021-02-22 RX ORDER — VENLAFAXINE HYDROCHLORIDE 150 MG/1
150 CAPSULE, EXTENDED RELEASE ORAL DAILY
Qty: 30 CAPSULE | Refills: 0 | Status: SHIPPED | OUTPATIENT
Start: 2021-02-22 | End: 2021-03-24 | Stop reason: SDUPTHER

## 2021-02-22 RX ORDER — QUETIAPINE FUMARATE 100 MG/1
150 TABLET, FILM COATED ORAL NIGHTLY
Qty: 45 TABLET | Refills: 0 | Status: SHIPPED | OUTPATIENT
Start: 2021-02-22 | End: 2021-03-24 | Stop reason: SDUPTHER

## 2021-02-22 NOTE — PROGRESS NOTES
"This provider is located at [facility], [address] [place] [address].The Patient is seen remotely at home, [address] [facility] [address] [facility]. Patient is being seen via telehealth and confirm that they are in a secure environment for this session. The patient's condition being diagnosed/treated is appropriate for telemedicine. The provider identified himself/herself: herself as well as her credentials.   The patient and  gave consent to be seen remotely, and when consent is given they understand that the consent allows for patient identifiable information to be sent to a third party as needed.   They may refuse to be seen remotely at any time. The electronic data is encrypted and password protected, and the patient has been advised of the potential risks to privacy not withstanding such measures.    You have chosen to receive care through a telehealth visit.  Do you consent to use a video/audio connection for your medical care today? Yes      Chief Complaint  Depression and anxiety     Subjective    Yudi Alisha West presents to [facility] for medication management.     History of Present Illness   Patient presents at home [address] in [place], [place]. Patient states that she has stopped the Lamictal and Abilify due to having hallucinations. Patient states that she was seeing \"sharks in the dinah\" and reaching for objects that were not there. Patient states that she went and stayed with her sister for a few days after her sister had a procedure done. Patient states that put more stress on her. Patient states that since leaving her sisters, she has noticed more depressive symptoms. Patient states she has been picking more arguments with her  and wanting to stay in the bedroom more. Patient reports she is having some feelings of hopelessness and helplessness, but states as thought she feels that it " is situational. Patient denies any side effects with the Effexor or Seroquel and states they seem to being doing well for her, better than previous medications. She notes that she is sleeping well for the most part some nights only getting 4 hrs but then gets on her phone. Reports appetite is good. Patient denies any SI/HI/AH/VH.       Objective   Vital Signs:   There were no vitals taken for this visit.  Due to the remote nature of this encounter (virtual encounter), vitals were unable to be obtained.  Height stated at 65 inches.  Weight stated at 175 pounds.      PHQ-9 Score:   PHQ-9 Total Score:       Mental Status Exam:   Hygiene:   good  Cooperation:  Cooperative  Eye Contact:  Good  Psychomotor Behavior:  Restless  Affect:  Appropriate  Mood: fluctates  Speech:  Rapid  Thought Process:  Goal directed and Disorganized  Thought Content:  Mood incongruent  Suicidal:  Suicidal Ideation  Homicidal:  None  Hallucinations:  None  Delusion:  None  Memory:  Intact  Orientation:  Person, Place, Time and Situation  Reliability:  fair  Insight:  Fair  Judgement:  Fair  Impulse Control:  Fair  Physical/Medical Issues:  Yes COPD, CAD, HTN, DM, and CKD stage 3     Current Medications:   Current Outpatient Medications   Medication Sig Dispense Refill   • Alpha-Lipoic Acid 300 MG capsule TAKE ONE TO TWO CAPSULES BY MOUTH TWICE DAILY     • aspirin 81 MG EC tablet Take 1 tablet by mouth 2 (Two) Times a Day With Meals. (Patient taking differently: Take 81 mg by mouth Daily.) 60 tablet 0   • Blood Glucose Monitoring Suppl (FREESTYLE FREEDOM LITE) w/Device kit USE TO TEST BLOOD SUGAR TWO TO THREE TI MES DAILY 1 each 0   • budesonide-formoterol (Symbicort) 160-4.5 MCG/ACT inhaler Inhale 1 puff Daily. 1 each 3   • carvedilol (COREG) 3.125 MG tablet TAKE ONE TABLET BY MOUTH TWICE DAILY WITH MEALS 60 tablet 3   • clopidogrel (PLAVIX) 75 MG tablet TAKE ONE TABLET BY MOUTH EVERY DAY 30 tablet 3   • dicyclomine (BENTYL) 20 MG tablet Take  "1 tablet by mouth Every 6 (Six) Hours. 120 tablet 3   • Dulaglutide (Trulicity) 0.75 MG/0.5ML solution pen-injector Inject  under the skin into the appropriate area as directed 1 (One) Time Per Week.     • Easy Touch Insulin Syringe 28G X 1/2\" 1 ML misc USE AT BEDTIME AS DIRECTED 100 each 3   • famotidine (PEPCID) 20 MG tablet TAKE ONE TABLET BY MOUTH TWICE DAILY 60 tablet 3   • gabapentin (NEURONTIN) 300 MG capsule Take 300 mg by mouth 4 (Four) Times a Day.     • Global Ease Inject Pen Needles 31G X 8 MM misc USE TO TEST BLOOD SUGARS TWO TO THREE TIMES DAILY 100 each 3   • glucose blood test strip Check sugars before breakfast and 2 hours after meal. Also give lancets 600 each 12   • Insulin Glargine (BASAGLAR KWIKPEN) 100 UNIT/ML injection pen Take 36 units at bedtime can go up by 2-3 unites every 3 days until am sugars running     02/05/2021 - Patient currently utilizing 50 Units nightly. 15 mL 3   • lidocaine (LIDODERM) 5 % APPLY ONE TO TWO PATCHES AS DIRECTED ON LOWER BACK, LEFT UPPER LEG 12 HOURS ON AND 12 HOURS OFF     • linaclotide (LINZESS) 290 MCG capsule capsule Take 1 capsule by mouth Every Morning Before Breakfast. 30 capsule 5   • loratadine (CLARITIN) 10 MG tablet TAKE ONE TABLET BY MOUTH EVERY DAY 30 tablet 3   • Nicotine Step 1 21 MG/24HR patch APPLY ONE PATCH DAILY 28 each 3   • omeprazole (PrilOSEC) 40 MG capsule Take 1 capsule by mouth Daily. 30 capsule 11   • ondansetron ODT (ZOFRAN-ODT) 8 MG disintegrating tablet DISSOLVE ONE TABLET ON THE TONGUE EVERY 8 HOURS AS NEEDED FOR NAUSEA OR VOMITING 90 tablet 3   • oxyCODONE-acetaminophen (PERCOCET) 7.5-325 MG per tablet Take 1 tablet by mouth 3 (Three) Times a Day.     • polyethylene glycol (MIRALAX) 17 g packet Take 17 g by mouth Daily. 30 each 5   • QUEtiapine (SEROquel) 100 MG tablet Take 1.5 tablets by mouth Every Night. 45 tablet 0   • ranolazine (RANEXA) 500 MG 12 hr tablet TAKE ONE TABLET BY MOUTH TWICE DAILY 60 tablet 6   • rosuvastatin " (CRESTOR) 20 MG tablet TAKE ONE TABLET BY MOUTH AT BEDTIME 30 tablet 3   • venlafaxine XR (Effexor XR) 37.5 MG 24 hr capsule Take 1 capsule by mouth Daily. 30 capsule 0   • venlafaxine XR (EFFEXOR-XR) 150 MG 24 hr capsule Take 1 capsule by mouth Daily. 30 capsule 0   • Ventolin  (90 Base) MCG/ACT inhaler INHALE TWO PUFFS FOUR TIMES DAILY (Patient taking differently: As Needed) 18 g 3   • vitamin D (ERGOCALCIFEROL) 1.25 MG (57555 UT) capsule capsule Take 1 capsule by mouth Every 7 (Seven) Days. 4 capsule 3     No current facility-administered medications for this visit.        Physical Exam  Nursing note reviewed.   Constitutional:       Appearance: Normal appearance.   Neurological:      Mental Status: She is alert.   Psychiatric:         Attention and Perception: Attention and perception normal. She is attentive.         Mood and Affect: Mood is anxious and depressed.         Speech: Speech normal. Speech is not rapid and pressured.         Behavior: Behavior is cooperative.         Thought Content: Thought content normal.         Cognition and Memory: Cognition and memory normal.        Result Review :     The following data was reviewed by: WAI Florez on 02/03/2021:  Common labs    Common Labsle 6/17/20 6/17/20 6/17/20 6/17/20 6/17/20    1435 1435 1435 1435 1435   Glucose    390 (A)    BUN    12    Creatinine    1.13 (A)    eGFR Non African Am    51 (A)    Sodium    132 (A)    Potassium    5.2    Chloride    97 (A)    Calcium    9.2    Albumin    4.20    Total Bilirubin    0.2    Alkaline Phosphatase    127 (A)    AST (SGOT)    10    ALT (SGPT)    13    WBC  7.21      Hemoglobin  13.3      Hematocrit  39.3      Platelets  249      Total Cholesterol     171   Triglycerides     145   HDL Cholesterol     38 (A)   LDL Cholesterol      104 (A)   Hemoglobin A1C   11.30 (A)     Microalbumin, Urine 3.7       (A) Abnormal value            Data reviewed: PCP and specialist notes        Assessment and  Plan    Problem List Items Addressed This Visit        Mental Health    Severe episode of recurrent major depressive disorder, without psychotic features (CMS/HCC) - Primary    Relevant Medications    venlafaxine XR (EFFEXOR-XR) 150 MG 24 hr capsule    QUEtiapine (SEROquel) 100 MG tablet    venlafaxine XR (Effexor XR) 37.5 MG 24 hr capsule      Other Visit Diagnoses     Generalized anxiety disorder        Relevant Medications    venlafaxine XR (EFFEXOR-XR) 150 MG 24 hr capsule    QUEtiapine (SEROquel) 100 MG tablet    venlafaxine XR (Effexor XR) 37.5 MG 24 hr capsule    Post traumatic stress disorder (PTSD)        Relevant Medications    venlafaxine XR (EFFEXOR-XR) 150 MG 24 hr capsule    QUEtiapine (SEROquel) 100 MG tablet    venlafaxine XR (Effexor XR) 37.5 MG 24 hr capsule    Sleep difficulties        Relevant Medications    QUEtiapine (SEROquel) 100 MG tablet            TREATMENT PLAN/GOALS: Continue supportive psychotherapy efforts and medications as indicated. Treatment and medication options discussed during today's visit. Patient ackowledged and verbally consented to continue with current treatment plan and was educated on the importance of compliance with treatment and follow-up appointments.    MEDICATION ISSUES:  We discussed risks, benefits, and side effects of the above medications and the patient was agreeable with the plan. Patient was educated on the importance of compliance with treatment and follow-up appointments.  Patient is agreeable to call the office with any worsening of symptoms or onset of side effects. Patient is agreeable to call 911 or go to the nearest ER should he/she begin having SI/HI. We will add Lamictal in an effort to stabilize mood.  The patient was reminded to immediately come to the hospital should there be any loss of control.  Explanation was given to her regarding Lamictal and the potential for Fortunato Micheal syndrome and significant rash.  Patient was encouraged to check  skin prior to beginning.  Patient was encouraged to report any rash and to immediately stop medication.       -Increase venlafaxine  daily for depression and anxiety, will add additional 37.5 mg XR dose for patient's specific depression and anxiety.  Encourage counseling and therapy for the patient since she does believe it is situational.  -Continue Seroquel 150 mg at night for sleep and as well as anxiety.  -Educated patient regarding proper sleep hygiene.    -Patient has no-show to her therapist 4 times so will not be rescheduled suggested the patient try another office.  Patient reports that [facility] has therapist encouraged to contact her PCP for referral for therapy as I will continue her med management.    Counseled patient regarding multimodal approach with healthy nutrition, healthy sleep, regular physical activity, social activities, counseling, and medications.      Coping skills reviewed and encouraged positive framing of thoughts     Assisted patient in processing above session content; acknowledged and normalized patient’s thoughts, feelings, and concerns.  Applied  positive coping skills and behavior management in session.  Allowed patient to freely discuss issues without interruption or judgment. Provided safe, confidential environment to facilitate the development of positive therapeutic relationship and encourage open, honest communication. Assisted patient in identifying risk factors which would indicate the need for higher level of care including thoughts to harm self or others and/or self-harming behavior and encouraged patient to contact this office, call 911, or present to the nearest emergency room should any of these events occur. Discussed crisis intervention services and means to access.     MEDS ORDERED DURING VISIT:  New Medications Ordered This Visit   Medications   • venlafaxine XR (EFFEXOR-XR) 150 MG 24 hr capsule     Sig: Take 1 capsule by mouth Daily.      Dispense:  30 capsule     Refill:  0   • QUEtiapine (SEROquel) 100 MG tablet     Sig: Take 1.5 tablets by mouth Every Night.     Dispense:  45 tablet     Refill:  0   • venlafaxine XR (Effexor XR) 37.5 MG 24 hr capsule     Sig: Take 1 capsule by mouth Daily.     Dispense:  30 capsule     Refill:  0     Take with the 150mg of Effexor           Follow Up   Return in about 4 weeks (around 3/22/2021), or if symptoms worsen or fail to improve, for Recheck.    Patient was given instructions and counseling regarding her condition or for health maintenance advice. Please see specific information pulled into the AVS if appropriate.     This document has been electronically signed by WAI Florez  February 22, 2021 10:53 EST    Part of this note may be an electronic transcription/translation of spoken language to printed text using the Dragon Dictation System.

## 2021-02-23 ENCOUNTER — TELEPHONE (OUTPATIENT)
Dept: FAMILY MEDICINE CLINIC | Facility: CLINIC | Age: 51
End: 2021-02-23

## 2021-02-23 NOTE — TELEPHONE ENCOUNTER
CALLED AT 1241 02/23/2021 TO R/S APPT. SCHEDULED FOR 03/10/2021  NA/LVM  TOLD HER TO CALL BACK IF NOT OK   HUB TO READ

## 2021-03-01 RX ORDER — PEN NEEDLE, DIABETIC 31 GX5/16"
NEEDLE, DISPOSABLE MISCELLANEOUS
Qty: 100 EACH | Refills: 3 | Status: SHIPPED | OUTPATIENT
Start: 2021-03-01 | End: 2023-02-02 | Stop reason: SDUPTHER

## 2021-03-02 RX ORDER — POLYETHYLENE GLYCOL 3350 17 G/17G
POWDER, FOR SOLUTION ORAL
Qty: 510 G | Refills: 5 | Status: SHIPPED | OUTPATIENT
Start: 2021-03-02

## 2021-03-05 ENCOUNTER — TELEPHONE (OUTPATIENT)
Dept: CARDIOLOGY | Facility: CLINIC | Age: 51
End: 2021-03-05

## 2021-03-05 NOTE — TELEPHONE ENCOUNTER
Called pt to let her know that her surgical clearance has been signed and fax to oral and maxillofacial

## 2021-03-24 ENCOUNTER — TELEMEDICINE (OUTPATIENT)
Dept: PSYCHIATRY | Facility: CLINIC | Age: 51
End: 2021-03-24

## 2021-03-24 DIAGNOSIS — G47.9 SLEEP DIFFICULTIES: ICD-10-CM

## 2021-03-24 DIAGNOSIS — F41.1 GENERALIZED ANXIETY DISORDER: ICD-10-CM

## 2021-03-24 DIAGNOSIS — F33.2 SEVERE EPISODE OF RECURRENT MAJOR DEPRESSIVE DISORDER, WITHOUT PSYCHOTIC FEATURES (HCC): Primary | ICD-10-CM

## 2021-03-24 DIAGNOSIS — F43.10 POST TRAUMATIC STRESS DISORDER (PTSD): ICD-10-CM

## 2021-03-24 PROCEDURE — 99214 OFFICE O/P EST MOD 30 MIN: CPT | Performed by: NURSE PRACTITIONER

## 2021-03-24 RX ORDER — VENLAFAXINE HYDROCHLORIDE 150 MG/1
150 CAPSULE, EXTENDED RELEASE ORAL DAILY
Qty: 30 CAPSULE | Refills: 0 | Status: SHIPPED | OUTPATIENT
Start: 2021-03-24 | End: 2021-09-28

## 2021-03-24 RX ORDER — HYDROXYZINE HYDROCHLORIDE 25 MG/1
12.5-25 TABLET, FILM COATED ORAL 3 TIMES DAILY PRN
Qty: 90 TABLET | Refills: 0 | Status: SHIPPED | OUTPATIENT
Start: 2021-03-24 | End: 2021-03-26 | Stop reason: SDUPTHER

## 2021-03-24 RX ORDER — QUETIAPINE FUMARATE 100 MG/1
150 TABLET, FILM COATED ORAL NIGHTLY
Qty: 45 TABLET | Refills: 0 | Status: SHIPPED | OUTPATIENT
Start: 2021-03-24 | End: 2021-09-28 | Stop reason: SDUPTHER

## 2021-03-24 NOTE — PROGRESS NOTES
"This provider is located at [facility], [address] [place] [place] [address].The Patient is seen remotely at home, [address] [place] [address] via [employer]. Patient is being seen via telehealth and confirm that they are in a secure environment for this session. The patient's condition being diagnosed/treated is appropriate for telemedicine. The provider identified himself/herself: herself as well as her credentials.   The patient and  gave consent to be seen remotely, and when consent is given they understand that the consent allows for patient identifiable information to be sent to a third party as needed.   They may refuse to be seen remotely at any time. The electronic data is encrypted and password protected, and the patient has been advised of the potential risks to privacy not withstanding such measures.    You have chosen to receive care through a telehealth visit.  Do you consent to use a video/audio connection for your medical care today? Yes      Chief Complaint  Depression and anxiety     Subjective    Yudi West presents to [facility] for medication management.     History of Present Illness   Patient presents today reporting that she tried the increase in Effexor for 4 to 5 days but states she felt shaky on the inside and even passed out and what she states her cousins did look like she was having a seizure.  Patient states she went back on her original dose and is still had the jittery shaky feeling but not all the time.  Encourage patient that is probably not from the medication that she needs to get to her primary doctor for evaluation as it may be related to her blood sugar.  Patient also sounds as if she may have a cold or other respiratory issue and encouraged her to go to her PCP as well as she states she has appointment on the 6.  Patient states that she is \"dealing with depression pretty good\" " despite that they have had 7 deaths in her family over the last few months.  Patient states last week she just had all of her top teeth pulled out so that has been difficult getting over.  She states her anxiety is doing better and then states it is about the same is going to funerals make it worse.  She states her sleep is okay as she lays awake at night thinking about things.  Patient denies any side effects with the medication denies any SI/HI/AH/VH.    Objective   Vital Signs:   There were no vitals taken for this visit.  Due to the remote nature of this encounter (virtual encounter), vitals were unable to be obtained.  Height stated at 65 inches.  Weight stated at 175 pounds.      PHQ-9 Score:   PHQ-9 Total Score:       Mental Status Exam:   Hygiene:   good  Cooperation:  Cooperative  Eye Contact:  Good  Psychomotor Behavior:  Restless  Affect:  Appropriate  Mood: normal  Speech:  Normal  Thought Process:  Goal directed and Disorganized  Thought Content:  Mood congruent  Suicidal:  None  Homicidal:  None  Hallucinations:  None  Delusion:  None  Memory:  Intact  Orientation:  Person, Place, Time and Situation  Reliability:  fair  Insight:  Fair  Judgement:  Fair  Impulse Control:  Fair  Physical/Medical Issues:  Yes COPD, CAD, HTN, DM, and CKD stage 3     Current Medications:   Current Outpatient Medications   Medication Sig Dispense Refill   • Alpha-Lipoic Acid 300 MG capsule TAKE ONE TO TWO CAPSULES BY MOUTH TWICE DAILY     • aspirin 81 MG EC tablet Take 1 tablet by mouth 2 (Two) Times a Day With Meals. (Patient taking differently: Take 81 mg by mouth Daily.) 60 tablet 0   • Blood Glucose Monitoring Suppl (FREESTYLE FREEDOM LITE) w/Device kit USE TO TEST BLOOD SUGAR TWO TO THREE TI MES DAILY 1 each 0   • budesonide-formoterol (Symbicort) 160-4.5 MCG/ACT inhaler Inhale 1 puff Daily. 1 each 3   • carvedilol (COREG) 3.125 MG tablet TAKE ONE TABLET BY MOUTH TWICE DAILY WITH MEALS 60 tablet 3   • clopidogrel  "(PLAVIX) 75 MG tablet TAKE ONE TABLET BY MOUTH EVERY DAY 30 tablet 3   • dicyclomine (BENTYL) 20 MG tablet Take 1 tablet by mouth Every 6 (Six) Hours. 120 tablet 3   • Dulaglutide (Trulicity) 0.75 MG/0.5ML solution pen-injector Inject  under the skin into the appropriate area as directed 1 (One) Time Per Week.     • Easy Touch Insulin Syringe 28G X 1/2\" 1 ML misc USE AT BEDTIME AS DIRECTED 100 each 3   • famotidine (PEPCID) 20 MG tablet TAKE ONE TABLET BY MOUTH TWICE DAILY 60 tablet 3   • gabapentin (NEURONTIN) 300 MG capsule Take 300 mg by mouth 4 (Four) Times a Day.     • Global Ease Inject Pen Needles 31G X 8 MM misc USE TO TEST BLOOD SUGARS TWO TO THREE TIMES DAILY 100 each 3   • glucose blood test strip Check sugars before breakfast and 2 hours after meal. Also give lancets 600 each 12   • hydrOXYzine (ATARAX) 25 MG tablet Take 0.5-1 tablets by mouth 3 (Three) Times a Day As Needed for Anxiety. 90 tablet 0   • Insulin Glargine (BASAGLAR KWIKPEN) 100 UNIT/ML injection pen Take 36 units at bedtime can go up by 2-3 unites every 3 days until am sugars running     02/05/2021 - Patient currently utilizing 50 Units nightly. 15 mL 3   • lidocaine (LIDODERM) 5 % APPLY ONE TO TWO PATCHES AS DIRECTED ON LOWER BACK, LEFT UPPER LEG 12 HOURS ON AND 12 HOURS OFF     • linaclotide (LINZESS) 290 MCG capsule capsule Take 1 capsule by mouth Every Morning Before Breakfast. 30 capsule 5   • loratadine (CLARITIN) 10 MG tablet TAKE ONE TABLET BY MOUTH EVERY DAY 30 tablet 3   • Nicotine Step 1 21 MG/24HR patch APPLY ONE PATCH DAILY 28 each 3   • omeprazole (PrilOSEC) 40 MG capsule Take 1 capsule by mouth Daily. 30 capsule 11   • ondansetron ODT (ZOFRAN-ODT) 8 MG disintegrating tablet DISSOLVE ONE TABLET ON THE TONGUE EVERY 8 HOURS AS NEEDED FOR NAUSEA OR VOMITING 90 tablet 3   • oxyCODONE-acetaminophen (PERCOCET) 7.5-325 MG per tablet Take 1 tablet by mouth 3 (Three) Times a Day.     • polyethylene glycol (MIRALAX) 17 GM/SCOOP " powder MIX 17 GRAMS (1 CAPFUL) IN 8 OUNCES OF WATER OR JUICE AND DRINK DAILY 510 g 5   • QUEtiapine (SEROquel) 100 MG tablet Take 1.5 tablets by mouth Every Night. 45 tablet 0   • ranolazine (RANEXA) 500 MG 12 hr tablet TAKE ONE TABLET BY MOUTH TWICE DAILY 60 tablet 6   • rosuvastatin (CRESTOR) 20 MG tablet TAKE ONE TABLET BY MOUTH AT BEDTIME 30 tablet 3   • venlafaxine XR (EFFEXOR-XR) 150 MG 24 hr capsule Take 1 capsule by mouth Daily. 30 capsule 0   • Ventolin  (90 Base) MCG/ACT inhaler INHALE TWO PUFFS FOUR TIMES DAILY (Patient taking differently: As Needed) 18 g 3   • vitamin D (ERGOCALCIFEROL) 1.25 MG (94813 UT) capsule capsule Take 1 capsule by mouth Every 7 (Seven) Days. 4 capsule 3     No current facility-administered medications for this visit.       Physical Exam  Nursing note reviewed.   Constitutional:       Appearance: Normal appearance.   Neurological:      Mental Status: She is alert.   Psychiatric:         Attention and Perception: Attention and perception normal. She is attentive.         Mood and Affect: Mood is anxious and depressed.         Speech: Speech normal. Speech is not rapid and pressured.         Behavior: Behavior is cooperative.         Thought Content: Thought content normal.         Cognition and Memory: Cognition and memory normal.        Result Review :     The following data was reviewed by: WAI Florez on 02/03/2021:  Common labs    Common Labsle 6/17/20 6/17/20 6/17/20 6/17/20 6/17/20    1435 1435 1435 1435 1435   Glucose    390 (A)    BUN    12    Creatinine    1.13 (A)    eGFR Non African Am    51 (A)    Sodium    132 (A)    Potassium    5.2    Chloride    97 (A)    Calcium    9.2    Albumin    4.20    Total Bilirubin    0.2    Alkaline Phosphatase    127 (A)    AST (SGOT)    10    ALT (SGPT)    13    WBC  7.21      Hemoglobin  13.3      Hematocrit  39.3      Platelets  249      Total Cholesterol     171   Triglycerides     145   HDL Cholesterol     38 (A)    LDL Cholesterol      104 (A)   Hemoglobin A1C   11.30 (A)     Microalbumin, Urine 3.7       (A) Abnormal value            Data reviewed: PCP and specialist notes        Assessment and Plan    Problem List Items Addressed This Visit        Mental Health    Severe episode of recurrent major depressive disorder, without psychotic features (CMS/HCC) - Primary    Relevant Medications    venlafaxine XR (EFFEXOR-XR) 150 MG 24 hr capsule    QUEtiapine (SEROquel) 100 MG tablet    hydrOXYzine (ATARAX) 25 MG tablet      Other Visit Diagnoses     Generalized anxiety disorder        Relevant Medications    venlafaxine XR (EFFEXOR-XR) 150 MG 24 hr capsule    QUEtiapine (SEROquel) 100 MG tablet    hydrOXYzine (ATARAX) 25 MG tablet    Post traumatic stress disorder (PTSD)        Relevant Medications    venlafaxine XR (EFFEXOR-XR) 150 MG 24 hr capsule    QUEtiapine (SEROquel) 100 MG tablet    hydrOXYzine (ATARAX) 25 MG tablet    Sleep difficulties        Relevant Medications    QUEtiapine (SEROquel) 100 MG tablet    hydrOXYzine (ATARAX) 25 MG tablet            TREATMENT PLAN/GOALS: Continue supportive psychotherapy efforts and medications as indicated. Treatment and medication options discussed during today's visit. Patient ackowledged and verbally consented to continue with current treatment plan and was educated on the importance of compliance with treatment and follow-up appointments.    MEDICATION ISSUES:  We discussed risks, benefits, and side effects of the above medications and the patient was agreeable with the plan. Patient was educated on the importance of compliance with treatment and follow-up appointments.  Patient is agreeable to call the office with any worsening of symptoms or onset of side effects. Patient is agreeable to call 911 or go to the nearest ER should he/she begin having SI/HI. We will add Lamictal in an effort to stabilize mood.  The patient was reminded to immediately come to the hospital should there  be any loss of control.  Explanation was given to her regarding Lamictal and the potential for Fortunato Micheal syndrome and significant rash.  Patient was encouraged to check skin prior to beginning.  Patient was encouraged to report any rash and to immediately stop medication.       -Decrease venlafaxine back to 150 mg XR daily for depression and anxiety as patient could not tolerate extra dose.  -Continue Seroquel 150 mg at night for sleep and as well as anxiety.  -Begin hydroxyzine 12.5 to 25 mg up to 3 times a day as needed for anxiousness.  Encourage patient if it made her drowsy to only take at night.  -Educated patient regarding proper sleep hygiene.    -Patient has no-show to her therapist 4 times so will not be rescheduled suggested the patient try another office.  Patient reports that [facility] has therapist encouraged to contact her PCP for referral for therapy as I will continue her med management.    Counseled patient regarding multimodal approach with healthy nutrition, healthy sleep, regular physical activity, social activities, counseling, and medications.      Coping skills reviewed and encouraged positive framing of thoughts     Assisted patient in processing above session content; acknowledged and normalized patient’s thoughts, feelings, and concerns.  Applied  positive coping skills and behavior management in session.  Allowed patient to freely discuss issues without interruption or judgment. Provided safe, confidential environment to facilitate the development of positive therapeutic relationship and encourage open, honest communication. Assisted patient in identifying risk factors which would indicate the need for higher level of care including thoughts to harm self or others and/or self-harming behavior and encouraged patient to contact this office, call 911, or present to the nearest emergency room should any of these events occur. Discussed crisis intervention services and  means to access.     MEDS ORDERED DURING VISIT:  New Medications Ordered This Visit   Medications   • venlafaxine XR (EFFEXOR-XR) 150 MG 24 hr capsule     Sig: Take 1 capsule by mouth Daily.     Dispense:  30 capsule     Refill:  0   • QUEtiapine (SEROquel) 100 MG tablet     Sig: Take 1.5 tablets by mouth Every Night.     Dispense:  45 tablet     Refill:  0   • hydrOXYzine (ATARAX) 25 MG tablet     Sig: Take 0.5-1 tablets by mouth 3 (Three) Times a Day As Needed for Anxiety.     Dispense:  90 tablet     Refill:  0           Follow Up   Return in about 4 weeks (around 4/21/2021), or if symptoms worsen or fail to improve, for Recheck.    Patient was given instructions and counseling regarding her condition or for health maintenance advice. Please see specific information pulled into the AVS if appropriate.     This document has been electronically signed by WAI Florez  March 24, 2021 09:00 EDT    Part of this note may be an electronic transcription/translation of spoken language to printed text using the Dragon Dictation System.

## 2021-03-31 DIAGNOSIS — G47.9 SLEEP DIFFICULTIES: ICD-10-CM

## 2021-03-31 DIAGNOSIS — F33.2 SEVERE EPISODE OF RECURRENT MAJOR DEPRESSIVE DISORDER, WITHOUT PSYCHOTIC FEATURES (HCC): ICD-10-CM

## 2021-03-31 DIAGNOSIS — F41.1 GENERALIZED ANXIETY DISORDER: ICD-10-CM

## 2021-03-31 DIAGNOSIS — F43.10 POST TRAUMATIC STRESS DISORDER (PTSD): ICD-10-CM

## 2021-03-31 RX ORDER — QUETIAPINE FUMARATE 100 MG/1
TABLET, FILM COATED ORAL
Qty: 45 TABLET | Refills: 0 | OUTPATIENT
Start: 2021-03-31

## 2021-03-31 RX ORDER — VENLAFAXINE HYDROCHLORIDE 150 MG/1
CAPSULE, EXTENDED RELEASE ORAL
Qty: 30 CAPSULE | Refills: 0 | OUTPATIENT
Start: 2021-03-31

## 2021-03-31 RX ORDER — LAMOTRIGINE 25 MG/1
TABLET ORAL
Qty: 30 TABLET | Refills: 0 | OUTPATIENT
Start: 2021-03-31

## 2021-04-05 ENCOUNTER — TELEPHONE (OUTPATIENT)
Dept: FAMILY MEDICINE CLINIC | Facility: CLINIC | Age: 51
End: 2021-04-05

## 2021-04-21 RX ORDER — DULAGLUTIDE 1.5 MG/.5ML
INJECTION, SOLUTION SUBCUTANEOUS
Qty: 2 ML | Refills: 3 | Status: SHIPPED | OUTPATIENT
Start: 2021-04-21 | End: 2021-05-07

## 2021-04-27 ENCOUNTER — TELEPHONE (OUTPATIENT)
Dept: FAMILY MEDICINE CLINIC | Facility: CLINIC | Age: 51
End: 2021-04-27

## 2021-04-30 RX ORDER — ALBUTEROL SULFATE 90 UG/1
AEROSOL, METERED RESPIRATORY (INHALATION)
Qty: 18 G | Refills: 0 | Status: SHIPPED | OUTPATIENT
Start: 2021-04-30 | End: 2021-06-01

## 2021-05-07 RX ORDER — NICOTINE 21 MG/24HR
PATCH, TRANSDERMAL 24 HOURS TRANSDERMAL
Qty: 28 PATCH | Refills: 3 | Status: SHIPPED | OUTPATIENT
Start: 2021-05-07 | End: 2021-08-18 | Stop reason: SDUPTHER

## 2021-06-01 RX ORDER — ALBUTEROL SULFATE 90 UG/1
AEROSOL, METERED RESPIRATORY (INHALATION)
Qty: 18 G | Refills: 0 | Status: SHIPPED | OUTPATIENT
Start: 2021-06-01 | End: 2021-06-23

## 2021-06-08 ENCOUNTER — TELEPHONE (OUTPATIENT)
Dept: FAMILY MEDICINE CLINIC | Facility: CLINIC | Age: 51
End: 2021-06-08

## 2021-06-10 RX ORDER — ROSUVASTATIN CALCIUM 20 MG/1
TABLET, COATED ORAL
Qty: 30 TABLET | Refills: 3 | Status: SHIPPED | OUTPATIENT
Start: 2021-06-10 | End: 2022-08-28 | Stop reason: SDUPTHER

## 2021-06-10 RX ORDER — LORATADINE 10 MG/1
TABLET ORAL
Qty: 30 TABLET | Refills: 3 | Status: SHIPPED | OUTPATIENT
Start: 2021-06-10 | End: 2022-04-25

## 2021-06-10 RX ORDER — BUPROPION HYDROCHLORIDE 150 MG/1
TABLET ORAL
Qty: 30 TABLET | Refills: 3 | Status: SHIPPED | OUTPATIENT
Start: 2021-06-10 | End: 2021-07-22

## 2021-06-10 RX ORDER — ONDANSETRON 8 MG/1
TABLET, ORALLY DISINTEGRATING ORAL
Qty: 90 TABLET | Refills: 3 | Status: SHIPPED | OUTPATIENT
Start: 2021-06-10 | End: 2022-08-25 | Stop reason: SDUPTHER

## 2021-06-10 RX ORDER — BUDESONIDE AND FORMOTEROL FUMARATE DIHYDRATE 160; 4.5 UG/1; UG/1
AEROSOL RESPIRATORY (INHALATION)
Qty: 10.2 G | Refills: 3 | Status: SHIPPED | OUTPATIENT
Start: 2021-06-10 | End: 2021-07-22

## 2021-06-10 RX ORDER — CARVEDILOL 3.12 MG/1
TABLET ORAL
Qty: 60 TABLET | Refills: 3 | Status: SHIPPED | OUTPATIENT
Start: 2021-06-10 | End: 2022-04-25

## 2021-06-23 RX ORDER — ALBUTEROL SULFATE 90 UG/1
AEROSOL, METERED RESPIRATORY (INHALATION)
Qty: 18 G | Refills: 0 | Status: SHIPPED | OUTPATIENT
Start: 2021-06-23 | End: 2021-07-19

## 2021-06-25 ENCOUNTER — OFFICE VISIT (OUTPATIENT)
Dept: PULMONOLOGY | Facility: CLINIC | Age: 51
End: 2021-06-25

## 2021-06-25 ENCOUNTER — TELEPHONE (OUTPATIENT)
Dept: PULMONOLOGY | Facility: CLINIC | Age: 51
End: 2021-06-25

## 2021-06-25 VITALS
OXYGEN SATURATION: 98 % | SYSTOLIC BLOOD PRESSURE: 134 MMHG | TEMPERATURE: 96.9 F | HEART RATE: 109 BPM | WEIGHT: 176 LBS | HEIGHT: 65 IN | DIASTOLIC BLOOD PRESSURE: 98 MMHG | BODY MASS INDEX: 29.32 KG/M2

## 2021-06-25 DIAGNOSIS — J20.9 ACUTE BRONCHITIS, UNSPECIFIED ORGANISM: Primary | ICD-10-CM

## 2021-06-25 PROCEDURE — 99213 OFFICE O/P EST LOW 20 MIN: CPT | Performed by: INTERNAL MEDICINE

## 2021-06-25 RX ORDER — PREDNISONE 10 MG/1
TABLET ORAL
Qty: 22 TABLET | Refills: 0 | Status: SHIPPED | OUTPATIENT
Start: 2021-06-25 | End: 2021-07-22

## 2021-06-25 RX ORDER — FLUCONAZOLE 150 MG/1
150 TABLET ORAL ONCE
Qty: 1 TABLET | Refills: 0 | Status: SHIPPED | OUTPATIENT
Start: 2021-06-25 | End: 2021-06-25

## 2021-06-25 RX ORDER — DOXYCYCLINE HYCLATE 100 MG
100 TABLET ORAL 2 TIMES DAILY
Qty: 14 TABLET | Refills: 0 | Status: SHIPPED | OUTPATIENT
Start: 2021-06-25 | End: 2021-07-02

## 2021-06-25 NOTE — TELEPHONE ENCOUNTER
I called the pt and let her know that an rx for Diflucan has been sent to the pharmacy.   She can pick it up and take if she feels like she's getting a yeast infection

## 2021-06-25 NOTE — PROGRESS NOTES
Pulmonary Office Follow-up    Subjective     Yudi West is seen today at the office for   Chief Complaint   Patient presents with   • Shortness of Breath         HPI  Yudi West is a 50 y.o. female with a PMH significant for    Patient was seen by Dr Rico last year, had normal PFTs and a clear CXR. She was started on Breo at some point. She doesn't normally use it but reports that she's had a lot of chest congestion lately and has been using it along with her albuterol inhalers. She is smoking 2 ppd on average, only was able to smoke 4 yesterday because she had so much coughing    Tobacco use history:  Type: cigarettes  Amount: 2 ppd  Duration: 37 years  Cessation: n/a   Willing to quit: No      Patient Active Problem List   Diagnosis   • Coronary artery disease involving native coronary artery of native heart without angina pectoris   • Myocardial infarction, old   • Hypertension   • Type 1 diabetes mellitus (CMS/HCC)   • Mixed hyperlipidemia   • Stage 1 mild COPD by GOLD classification (CMS/[facility])   • Dyspnea on exertion   • S/p nephrectomy   • Precordial pain   • Femur fracture (CMS/HCC)   • Closed displaced supracondylar fracture without intracondylar extension of lower end of left femur (CMS/HCC)   • Anemia, posthemorrhagic, acute   • Painful orthopaedic hardware (CMS/HCC)   • Closed displaced transverse fracture of shaft of femur with routine healing   • Overweight   • Diabetic neuropathy (CMS/HCC)   • Tobacco abuse counseling   • Tobacco user   • Class 1 obesity with serious comorbidity and body mass index (BMI) of 30.0 to 30.9 in adult   • High risk medication use   • Insomnia   • Dyspnea   • Renal impairment   • Uncontrolled type 2 diabetes mellitus with hyperglycemia (CMS/HCC)   • Cigarette nicotine dependence, uncomplicated   • Stage 3 chronic kidney disease (CMS/HCC)   • Chronic left shoulder pain   • Chronic bronchitis (CMS/HCC)   • Claudication of lower extremity (CMS/HCC)   •  Left shoulder pain   • Severe episode of recurrent major depressive disorder, without psychotic features (CMS/HCC)   • KRISTOFER (generalized anxiety disorder)   • Other constipation   • Generalized abdominal pain   • Nausea   • Bloating   • Weight loss   • Acute pain of left knee   • Class 1 obesity in adult   • Acute bronchitis         Medications, Allergies, Social, and Family Histories reviewed as per EMR.    Objective     Vitals:    06/25/21 0754   BP: 134/98   Pulse: 109   Temp: 96.9 °F (36.1 °C)   SpO2: 98%         06/25/21  0754   Weight: 79.8 kg (176 lb)     [unfilled]  Physical Exam  Vitals reviewed.   Constitutional:       Appearance: Normal appearance.   HENT:      Head: Normocephalic and atraumatic.      Nose: Nose normal.      Mouth/Throat:      Mouth: Mucous membranes are moist.      Pharynx: Oropharynx is clear.   Eyes:      Conjunctiva/sclera: Conjunctivae normal.      Pupils: Pupils are equal, round, and reactive to light.   Cardiovascular:      Rate and Rhythm: Normal rate and regular rhythm.      Pulses: Normal pulses.      Heart sounds: Normal heart sounds.   Pulmonary:      Effort: Pulmonary effort is normal.      Breath sounds: Normal breath sounds.   Abdominal:      General: Abdomen is flat. Bowel sounds are normal.      Palpations: Abdomen is soft.   Musculoskeletal:         General: Normal range of motion.      Cervical back: Normal range of motion.   Skin:     General: Skin is warm and dry.   Neurological:      General: No focal deficit present.      Mental Status: She is alert and oriented to person, place, and time.   Psychiatric:         Mood and Affect: Mood normal.         Behavior: Behavior normal.             Assessment/Plan     Diagnoses and all orders for this visit:    1. Acute bronchitis, unspecified organism (Primary)    Other orders  -     doxycycline (VIBRAMYICN) 100 MG tablet; Take 1 tablet by mouth 2 (Two) Times a Day for 7 days.  Dispense: 14 tablet; Refill: 0  -      predniSONE (DELTASONE) 10 MG tablet; Take 40mg PO x 3d, then 30mg PO x 2d, then 20mg PO x 2d then stop  Dispense: 22 tablet; Refill: 0         Discussion/ Recommendations:   Explained to patient that she likely has an acute bronchitis that has been made worse by the smoking. She does not have COPD based on her PFTs from last year. She will most likely develop it in time if she continues to smoke the way she is now. I do not think she is benefitting from Breo (in fact she's not normally taking it) and she is only putting herself at increased risk for pneumonia by using an ICS. She can continue the albuterol for now. Will try a course of steroids and antibiotics for the bronchitis but overall she really needs to just stop smoking which she is wholly uninterested in at this time, despite her symptoms.     -Stop Breo  -Continue Albuterol  -Prednisone and Doxycycline for 7 days  -STOP SMOKING    As patient does not have evidence of chronic lung disease that requires specialty intervention at this time, she can follow up as needed for new concerns    Return if symptoms worsen or fail to improve.          This document has been electronically signed by Khushi Sanford DO on June 25, 2021 08:17 CDT

## 2021-07-18 NOTE — PROGRESS NOTES
Chief Complaint  Insomnia, Shortness of Breath, Hypertension, Hyperlipidemia, Coronary Artery Disease, Diabetes, Chronic Kidney Disease, Anemia, Anxiety, Depression, and COPD    Subjective          Yudi West presents to [facility]  History of Present Illness     Pt is 49 yo female with management of her being overweight, DM type 2 HLP, HTN, diabetic neuropathy, insomnia, major depression CAD sp stent  Echocardiogram on 6/3/19 (grade 1 diastolic dysfunction) ,  History of MI COPD, history of fracture of femur, sp surgery on left femur, sp cholecystectomy, sp hysterectomy, sp left nephrectomy,  History of renal cell carcnoma sp left total knee replacement surgery, sp tonsillectomy , diabetic neuropathy, DELFIN, colonic polyps, GERD with esophagitis, gastritis, hiatal hernia        2/8/21 telemedicine visit for recheck on pt's above medical issues.  Since last visit pt has seen Orthopedic on 11/19/20 for her evaluation of left knee mindi. Pt has also had several visit with teleheath with behavioral health. For her major depression. Had colonsocopy and EGD on 1/28/21 that showed several sessile polyps. EGD showed hiatal hernia, gastritis and esophagitis biopsies are pending. For past few days. She is also having diarrhea. She is due for new labwork she continues to take her medication for her KRISTOFER/depression/HTN/diabetes.      7/22/21 in office visit for recheck on pt's above medical issues. Pt is due for new labwork and mammgoram screening. She has missed several office appts in past. Pt saw Pulmonology on 6/25/21 for her COPD and chronic bronchitis.  Pt continues to smoke and was advised to continue albuterol inhaler. ICS was not recommended due to increased risk of pneumonia.  She was given prednisone and doxycycline for 7 days. Pt has yet to get labwork ordered on 28/21. She is also due for mammogram screening. Pt continues to take her medications for HTN, IDDM type 2, CAD, HLP,  GERD, Major depression, IBS constipation, chornic pain. She is due for appt with Gastro, Cardiology and Orthopedic. She continues to have abdominal issues along with pain in left shoulder.  She also has boiln near genital area that has been present for a few days      Fatigue  This is a recurrent problem. The current episode started more than 1 year ago. The problem occurs constantly. The problem has been unchanged. Associated symptoms include fatigue. Pertinent negatives include no abdominal pain, anorexia, arthralgias, change in bowel habit, chest pain, chills, congestion, coughing, diaphoresis, fever, headaches, joint swelling, myalgias, nausea, neck pain, numbness, rash, sore throat, swollen glands, urinary symptoms, vertigo, visual change, vomiting or weakness. Nothing aggravates the symptoms. She has tried nothing for the symptoms. The treatment provided no relief.   Anxiety  Presents for follow-up visit. Symptoms include depressed mood, excessive worry, insomnia, irritability, nervous/anxious behavior and shortness of breath. Patient reports no chest pain, compulsions, confusion, decreased concentration, dizziness, dry mouth, feeling of choking, hyperventilation, impotence, malaise, muscle tension, nausea, obsessions, palpitations, panic or restlessness. The quality of sleep is fair. Nighttime awakenings: none.   Diabetes   She presents for her initial diabetic visit. She has type 2 diabetes mellitus. Her disease course has been waxing and waning  Hypoglycemia symptoms include tremors. Pertinent negatives for hypoglycemia include no confusion, dizziness, headaches, hunger, mood changes, nervousness/anxiousness, pallor, seizures, sleepiness, speech difficulty or sweats. Associated symptoms include fatigue, polyuria and weakness. Pertinent negatives for diabetes include no blurred vision, no chest pain, no foot paresthesias and no weight loss. Pertinent negatives for hypoglycemia complications include no  blackouts, no hospitalization, no nocturnal hypoglycemia, no required assistance and no required glucagon injection. Symptoms are stable. Pertinent negatives for diabetic complications include no CVA, impotence or retinopathy. Risk factors for coronary artery disease include diabetes mellitus, dyslipidemia, sedentary lifestyle and tobacco exposure. She is compliant with treatment all of the time. Her weight is stable. She is following a generally unhealthy diet. She does not see a podiatrist.Eye exam is not current.      Hypertension   This is a chronic problem. The current episode started more than 1 year ago. The problem has been waxing and waning since onset. The problem is uncontrolled. Associated symptoms include shortness of breath. Pertinent negatives include no anxiety, blurred vision, chest pain, headaches, malaise/fatigue, palpitations, PND or sweats. Risk factors for coronary artery disease include diabetes mellitus, dyslipidemia, sedentary lifestyle and smoking/tobacco exposure. Past treatments include beta blockers. Current antihypertension treatment includes beta blockers. The current treatment provides no improvement. There is no history of angina, kidney disease, CAD/MI, CVA, heart failure, left ventricular hypertrophy or retinopathy. There is no history of chronic renal disease, coarctation of the aorta, hyperaldosteronism, hypercortisolism, hyperparathyroidism, a hypertension causing med, pheochromocytoma, renovascular disease, sleep apnea or a thyroid problem.   Depression   Visit Type: followup  Onset of symptoms: at an unknown time  Patient presents with the following symptoms: compulsions, decreased concentration, depressed mood, excessive worry and shortness of breath.  Patient is not experiencing: anhedonia, chest pain, choking sensation, confusion, dizziness, dry mouth, fatigue, feelings of hopelessness, feelings of  "worthlessness, hypersomnia, hyperventilation, impotence, insomnia, irritability, malaise, memory impairment, muscle tension, nausea, nervousness/anxiety, obsessions, palpitations, panic, psychomotor agitation, psychomotor retardation, restlessness, suicidal ideas, suicidal planning, thoughts of death, weight gain and weight loss.  Patient has a history of: depression  No history of: anemia, anxiety/panic attacks, arrhythmia, asthma, bipolar disorder, CAD, CHF, chronic lung disease, fibromyalgia, hyperthyroidism, suicide attempt, mental illness and substance abuse  Treatment tried: SSRI, wellbutrin      Objective   Vital Signs:   /68 (BP Location: Right arm, Patient Position: Sitting, Cuff Size: Large Adult)   Pulse 94   Temp 97.1 °F (36.2 °C)   Ht 165.1 cm (65\")   Wt 80.3 kg (177 lb)   SpO2 97%   BMI 29.45 kg/m²        Physical Exam  Vitals and nursing note reviewed.   Constitutional:       Appearance: She is well-developed. She is not diaphoretic.   HENT:      Head: Normocephalic and atraumatic.      Right Ear: External ear normal.   Eyes:      Conjunctiva/sclera: Conjunctivae normal.      Pupils: Pupils are equal, round, and reactive to light.   Cardiovascular:      Rate and Rhythm: Normal rate and regular rhythm.      Heart sounds: Normal heart sounds. No murmur heard.     Pulmonary:      Effort: No respiratory distress.      Comments: Decreased breath sounds   Abdominal:      General: Bowel sounds are normal. There is no distension.      Palpations: Abdomen is soft.      Tenderness: There is no abdominal tenderness.   Musculoskeletal:         General: Tenderness present. No deformity. Normal range of motion.      Cervical back: Normal range of motion and neck supple.   Skin:     General: Skin is warm.      Coloration: Skin is not pale.      Findings: No erythema or rash.   Neurological:      Mental Status: She is alert and oriented to person, place, and time.      Cranial Nerves: No cranial nerve " "deficit.   Psychiatric:         Behavior: Behavior normal.        Result Review :                 Assessment and Plan    Diagnoses and all orders for this visit:    1. Screening mammogram, encounter for (Primary)  -     Mammo Screening Bilateral With CAD; Future    2. Stage 3a chronic kidney disease (CMS/[facility])    3. Stage 1 mild COPD by GOLD classification (CMS/[facility])    4. Uncontrolled type 2 diabetes mellitus with hyperglycemia (CMS/[facility])    5. Overweight    6. Essential hypertension    7. KRISTOFER (generalized anxiety disorder)    8. Coronary artery disease involving native coronary artery of native heart without angina pectoris    9. Other diabetic neurological complication associated with type 1 diabetes mellitus (CMS/[facility])    10. Mixed hyperlipidemia    11. Boil    Other orders  -     clopidogrel (PLAVIX) 75 MG tablet; Take 1 tablet by mouth Daily.  Dispense: 30 tablet; Refill: 3  -     Easy Touch Insulin Syringe 28G X 1/2\" 1 ML misc; USE AT BEDTIME AS DIRECTED  Dispense: 100 each; Refill: 3  -     famotidine (PEPCID) 20 MG tablet; Take 1 tablet by mouth 2 (Two) Times a Day.  Dispense: 60 tablet; Refill: 3  -     Insulin Glargine (BASAGLAR KWIKPEN) 100 UNIT/ML injection pen; Take 36 units at bedtime can go up by 2-3 unites every 3 days until am sugars running     02/05/2021 - Patient currently utilizing 50 Units nightly.  Dispense: 15 mL; Refill: 3  -     ranolazine (RANEXA) 500 MG 12 hr tablet; Take 1 tablet by mouth 2 (Two) Times a Day.  Dispense: 60 tablet; Refill: 3  -     nicotine (Nicoderm CQ) 21 MG/24HR patch; Place 1 patch on the skin as directed by provider Daily.  Dispense: 30 patch; Refill: 3  -     guaiFENesin (Mucinex) 600 MG 12 hr tablet; Take 2 tablets by mouth 2 (Two) Times a Day.  Dispense: 60 tablet; Refill: 3  -     amoxicillin-clavulanate (Augmentin) 875-125 MG per tablet; Take 1 tablet by mouth 2 (Two) Times a Day for 14 days.  Dispense: 7 tablet; Refill: 0  -     fluconazole (Diflucan) 150 MG " tablet; Take on onset of symptoms now and in 72 hours  Dispense: 2 tablet; Refill: 0          -recommend labowrk    -recommend mammogram screening -schedule at imaging center   -recommend pap smear  -advised pt to make an appt with Gastro, Orthopedic and Cardiology   -recommend Tdap/shingles vaccination   -boils on genital area. -augmentin 875-125 mg every 12 hours for 7 days. Also will give diflucan in event of yeast infeciton   -recommend diabetic eye exam  - referred to Dr. Hernandez   -chronic fatigue- check thyroid, iron profile, b12, levels.   -HTN -   Stable on  coreg 6.25 mg PO BID  -renal impairment/CKD stage 2-3 - Nephrology following. Continue to monitor.  -left shoulder pain/subacromial bursitis of left shoulder  - Orthopedic following. Recently received steriod injection.      -HLP - on lipitor 40 mg PO qh. Recommend heart Novatek ydiet   -diabetic neuropathy - on neurontin 400 mg PO TID   -allergic rhinitis -  zyrtec 10 mg daily.  -chronic pain - pt sees Pain Managmeent on Percoet 7.5/325 mg every 6 hours PRN along with neurontin 300 mg PO TID  -insomnia - on seroquel at bedtim   -tobacco user -counseled quit smoking >5 minutes. Recommend 1 800 qUIT now   -nausea/vomiting - possible gastroparesis.  Advised pt to make an appt with GI   -sp  Left nephrectomy/history of renal cell carcinoma-  Consider  Oncology referral  -GERD with esophagitis,colonic polyps/gastritis  - on prilosec 20 mg PO BID on pepcid GI following   -CAD sp stent /grade 1 diastolic dysfunction - Cardiology following on aspirin 81 mg PO BID, plavix 75 mg PO q daily. Coreg 6.25 mg PO BID, lipitor 40 mg PO qhs, ranexa 500 mg every 12 hours   -DM type 2  -now on basaglar 43  Go up to 46 unites  units at bedtime advised pt to go bup by 2-3 unties every 3 days until morning sugars at goal.on trulicity 1.5 mg subq weekly.     -COPD/chronic bronchitis - on albuterol inhaler. adivsed to quit smoking.Refer to Pulmonlogy for PFT. Breo stopped.  She  was given prednisone and doxycycline. . Stressed importance of smoking cessation.    -major depression/KRISTOFER/PTSD  -Mental Health following on  seroquel 100 mg PO qhs.  Effexor  mg PO q daily.   -insomnia - was on ambien. Advised pt to not take ambien anymore due to potential side effects with Percocet and neuroton. Recommend pt use  Melatonin  -overweight  - counseled weight loss >5 minutes  BMI at 29.45   -advised pt to be safe and call with questions and concerns  -advised pt to go to ER or call 911 if symptoms worrisome or severe  -advised pt to followup with specialist  And referrals  -advsied pt to be safe during COVID-19 pandemic  I spent 30  minutes caring for Yuid on this date of service. This time includes time spent by me in the following activities: preparing for the visit, reviewing tests, obtaining and/or reviewing a separately obtained history, performing a medically appropriate examination and/or evaluation, counseling and educating the patient/family/caregiver, ordering medications, tests, or procedures, referring and communicating with other health care professionals, documenting information in the medical record, independently interpreting results and communicating that information with the patient/family/caregiver and care coordination.         This document has been electronically signed by Quintin Flores MD on July 22, 2021 10:04 CDT          Follow Up   Return in about 1 month (around 8/22/2021).  Patient was given instructions and counseling regarding her condition or for health maintenance advice. Please see specific information pulled into the AVS if appropriate.       Answers for HPI/ROS submitted by the patient on 7/15/2021  What is the primary reason for your visit?: Diabetes

## 2021-07-19 RX ORDER — ALBUTEROL SULFATE 90 UG/1
AEROSOL, METERED RESPIRATORY (INHALATION)
Qty: 18 G | Refills: 0 | Status: SHIPPED | OUTPATIENT
Start: 2021-07-19 | End: 2021-07-20 | Stop reason: ALTCHOICE

## 2021-07-22 ENCOUNTER — OFFICE VISIT (OUTPATIENT)
Dept: FAMILY MEDICINE CLINIC | Facility: CLINIC | Age: 51
End: 2021-07-22

## 2021-07-22 ENCOUNTER — TELEPHONE (OUTPATIENT)
Dept: FAMILY MEDICINE CLINIC | Facility: CLINIC | Age: 51
End: 2021-07-22

## 2021-07-22 VITALS
OXYGEN SATURATION: 97 % | DIASTOLIC BLOOD PRESSURE: 68 MMHG | SYSTOLIC BLOOD PRESSURE: 108 MMHG | HEIGHT: 65 IN | HEART RATE: 94 BPM | BODY MASS INDEX: 29.49 KG/M2 | TEMPERATURE: 97.1 F | WEIGHT: 177 LBS

## 2021-07-22 DIAGNOSIS — I10 ESSENTIAL HYPERTENSION: Chronic | ICD-10-CM

## 2021-07-22 DIAGNOSIS — N18.31 STAGE 3A CHRONIC KIDNEY DISEASE (HCC): ICD-10-CM

## 2021-07-22 DIAGNOSIS — E78.2 MIXED HYPERLIPIDEMIA: ICD-10-CM

## 2021-07-22 DIAGNOSIS — E11.65 UNCONTROLLED TYPE 2 DIABETES MELLITUS WITH HYPERGLYCEMIA (HCC): ICD-10-CM

## 2021-07-22 DIAGNOSIS — E10.49 OTHER DIABETIC NEUROLOGICAL COMPLICATION ASSOCIATED WITH TYPE 1 DIABETES MELLITUS (HCC): ICD-10-CM

## 2021-07-22 DIAGNOSIS — L02.92 BOIL: ICD-10-CM

## 2021-07-22 DIAGNOSIS — E66.3 OVERWEIGHT: ICD-10-CM

## 2021-07-22 DIAGNOSIS — I25.10 CORONARY ARTERY DISEASE INVOLVING NATIVE CORONARY ARTERY OF NATIVE HEART WITHOUT ANGINA PECTORIS: ICD-10-CM

## 2021-07-22 DIAGNOSIS — J44.9 STAGE 1 MILD COPD BY GOLD CLASSIFICATION (HCC): ICD-10-CM

## 2021-07-22 DIAGNOSIS — F41.1 GAD (GENERALIZED ANXIETY DISORDER): ICD-10-CM

## 2021-07-22 DIAGNOSIS — Z12.31 SCREENING MAMMOGRAM, ENCOUNTER FOR: Primary | ICD-10-CM

## 2021-07-22 PROCEDURE — 99214 OFFICE O/P EST MOD 30 MIN: CPT | Performed by: FAMILY MEDICINE

## 2021-07-22 RX ORDER — FLUCONAZOLE 150 MG/1
TABLET ORAL
Qty: 2 TABLET | Refills: 0 | Status: SHIPPED | OUTPATIENT
Start: 2021-07-22 | End: 2021-08-10 | Stop reason: SDUPTHER

## 2021-07-22 RX ORDER — RANOLAZINE 500 MG/1
500 TABLET, EXTENDED RELEASE ORAL 2 TIMES DAILY
Qty: 60 TABLET | Refills: 3 | Status: SHIPPED | OUTPATIENT
Start: 2021-07-22 | End: 2021-11-10

## 2021-07-22 RX ORDER — SYRINGE AND NEEDLE,INSULIN,1ML 28GX1/2"
SYRINGE, EMPTY DISPOSABLE MISCELLANEOUS
Qty: 100 EACH | Refills: 3 | Status: SHIPPED | OUTPATIENT
Start: 2021-07-22 | End: 2021-11-19

## 2021-07-22 RX ORDER — AMOXICILLIN AND CLAVULANATE POTASSIUM 875; 125 MG/1; MG/1
1 TABLET, FILM COATED ORAL 2 TIMES DAILY
Qty: 7 TABLET | Refills: 0 | Status: SHIPPED | OUTPATIENT
Start: 2021-07-22 | End: 2021-08-05

## 2021-07-22 RX ORDER — FAMOTIDINE 20 MG/1
20 TABLET, FILM COATED ORAL 2 TIMES DAILY
Qty: 60 TABLET | Refills: 3 | Status: SHIPPED | OUTPATIENT
Start: 2021-07-22 | End: 2021-11-10

## 2021-07-22 RX ORDER — CLOPIDOGREL BISULFATE 75 MG/1
75 TABLET ORAL DAILY
Qty: 30 TABLET | Refills: 3 | Status: SHIPPED | OUTPATIENT
Start: 2021-07-22 | End: 2021-11-03

## 2021-07-22 RX ORDER — INSULIN GLARGINE 100 [IU]/ML
INJECTION, SOLUTION SUBCUTANEOUS
Qty: 15 ML | Refills: 3 | Status: SHIPPED | OUTPATIENT
Start: 2021-07-22 | End: 2022-09-13 | Stop reason: SDUPTHER

## 2021-07-22 RX ORDER — NICOTINE 21 MG/24HR
1 PATCH, TRANSDERMAL 24 HOURS TRANSDERMAL EVERY 24 HOURS
Qty: 30 PATCH | Refills: 3 | Status: SHIPPED | OUTPATIENT
Start: 2021-07-22 | End: 2021-11-01

## 2021-07-22 NOTE — TELEPHONE ENCOUNTER
Pharmacy called to verify amoxicillin. Per PCP it is 2 times a day for 7 days for a total of 14 tabs.

## 2021-07-22 NOTE — TELEPHONE ENCOUNTER
Pharmacy called on the patients Ventolin Inhaler on the script it says VAW 1 [employer] wants it to be pro air. Please call Alycia at [employer]

## 2021-07-22 NOTE — PATIENT INSTRUCTIONS
Please make appt with Dr. Campoverde with Orthopedic regarding shoulder pain    And Dr. Wilkes regarding stomach and followup on colonoscopy    Also call  Dr. Dover with Cardiology for appt.     Call and make appt with behavioral health     Please get labwork.

## 2021-07-26 ENCOUNTER — OFFICE VISIT (OUTPATIENT)
Dept: ORTHOPEDIC SURGERY | Facility: CLINIC | Age: 51
End: 2021-07-26

## 2021-07-26 VITALS
DIASTOLIC BLOOD PRESSURE: 88 MMHG | SYSTOLIC BLOOD PRESSURE: 129 MMHG | HEART RATE: 101 BPM | HEIGHT: 65 IN | WEIGHT: 176 LBS | BODY MASS INDEX: 29.32 KG/M2

## 2021-07-26 DIAGNOSIS — M25.512 LEFT SHOULDER PAIN, UNSPECIFIED CHRONICITY: ICD-10-CM

## 2021-07-26 DIAGNOSIS — M75.52 SUBACROMIAL BURSITIS OF LEFT SHOULDER JOINT: Primary | ICD-10-CM

## 2021-07-26 PROCEDURE — 99212 OFFICE O/P EST SF 10 MIN: CPT | Performed by: ORTHOPAEDIC SURGERY

## 2021-07-26 PROCEDURE — 20610 DRAIN/INJ JOINT/BURSA W/O US: CPT | Performed by: ORTHOPAEDIC SURGERY

## 2021-07-26 RX ORDER — TRIAMCINOLONE ACETONIDE 40 MG/ML
80 INJECTION, SUSPENSION INTRA-ARTICULAR; INTRAMUSCULAR
Status: COMPLETED | OUTPATIENT
Start: 2021-07-26 | End: 2021-07-26

## 2021-07-26 RX ORDER — BUPIVACAINE HYDROCHLORIDE 5 MG/ML
3 INJECTION, SOLUTION PERINEURAL
Status: COMPLETED | OUTPATIENT
Start: 2021-07-26 | End: 2021-07-26

## 2021-07-26 RX ADMIN — TRIAMCINOLONE ACETONIDE 80 MG: 40 INJECTION, SUSPENSION INTRA-ARTICULAR; INTRAMUSCULAR at 09:28

## 2021-07-26 RX ADMIN — BUPIVACAINE HYDROCHLORIDE 3 ML: 5 INJECTION, SOLUTION PERINEURAL at 09:28

## 2021-07-26 NOTE — PROGRESS NOTES
"Yudi West is a 50 y.o. female returns for     Chief Complaint   Patient presents with   • Left Shoulder - Follow-up       HISTORY OF PRESENT ILLNESS:  F/u left shoulder pain. Patient states pain has worsened since last visit on 9/8/2020. Pain score today 6/10      Mrs. West had a good response to her subacromial injection in September 2020.  Her symptoms began to recur over the last several months.  There is been no further trauma.  Her symptoms are similar though she had previously.     CONCURRENT MEDICAL HISTORY:    The following portions of the patient's history were reviewed and updated as appropriate: allergies, current medications, past family history, past medical history, past social history, past surgical history and problem list.         PHYSICAL EXAMINATION:       /88   Pulse 101   Ht 165.1 cm (65\")   Wt 79.8 kg (176 lb)   BMI 29.29 kg/m²     Physical Exam she is alert and in apparent mild discomfort.    GAIT:     [x]  Normal  []  Antalgic    Assistive device: [x]  None  []  Walker     []  Crutches  []  Cane     []  Wheelchair  []  Stretcher    Ortho Exam biceps contours are normal.  Active elevation of the left shoulder is smooth but painfully restricted to about 80 degrees.  Strength about the left shoulder is diffusely decreased with complaints of pain.  Passive motion of the shoulder is smooth.      No results found.          ASSESSMENT: Recurrent left shoulder pain probably secondary to rotator cuff tendinitis.    Treatment options were reviewed.  Potential benefits of injection therapy were discussed.  She wished to proceed with this.    The left shoulder was prepped.  Skin was infiltrated with 1% Xylocaine with epinephrine.  The subacromial bursa was injected with a mixture of Marcaine Kenalog and Xylocaine with epinephrine.  There were no complications.  After the injection she remains somewhat apprehensive about the exam and motion of the limb.  There was no significant " improvement in active motion of the shoulder.  Abduction strength remain moderately decreased with complaints of pain.    She may advance activities as tolerated.  Return here in 4 to 6 weeks for clinical exam if her symptoms have not improved.  She understands if her symptoms do not respond to today's treatment we we may need to consider MRI scan of the shoulder.    Diagnoses and all orders for this visit:    Subacromial bursitis of left shoulder joint    Left shoulder pain, unspecified chronicity    Other orders  -     Large Joint Arthrocentesis: L subacromial bursa          PLAN      Large Joint Arthrocentesis: L subacromial bursa  Date/Time: 7/26/2021 9:28 AM  Consent given by: patient  Site marked: site marked  Timeout: Immediately prior to procedure a time out was called to verify the correct patient, procedure, equipment, support staff and site/side marked as required   Supporting Documentation  Indications: pain   Procedure Details  Location: shoulder - L subacromial bursa  Preparation: Patient was prepped and draped in the usual sterile fashion  Needle size: 22 G  Approach: posterior  Medications administered: 3 mL bupivacaine 0.5 %; 80 mg triamcinolone acetonide 40 MG/ML (2cc 2% lidocaine with epi Lot [id number] )  Patient tolerance: patient tolerated the procedure well with no immediate complications          Return in about 5 weeks (around 8/30/2021).    Howie Campoverde MD

## 2021-07-28 ENCOUNTER — LAB (OUTPATIENT)
Dept: LAB | Facility: HOSPITAL | Age: 51
End: 2021-07-28

## 2021-07-28 DIAGNOSIS — I10 ESSENTIAL HYPERTENSION: Chronic | ICD-10-CM

## 2021-07-28 DIAGNOSIS — E78.2 MIXED HYPERLIPIDEMIA: ICD-10-CM

## 2021-07-28 DIAGNOSIS — E11.65 UNCONTROLLED TYPE 2 DIABETES MELLITUS WITH HYPERGLYCEMIA (HCC): ICD-10-CM

## 2021-07-28 DIAGNOSIS — Z72.0 TOBACCO USER: ICD-10-CM

## 2021-07-28 DIAGNOSIS — N18.30 STAGE 3 CHRONIC KIDNEY DISEASE (HCC): ICD-10-CM

## 2021-07-28 DIAGNOSIS — Z01.00 ENCOUNTER FOR EYE EXAM: ICD-10-CM

## 2021-07-28 DIAGNOSIS — R53.82 CHRONIC FATIGUE: ICD-10-CM

## 2021-07-28 DIAGNOSIS — J44.9 CHRONIC OBSTRUCTIVE PULMONARY DISEASE, UNSPECIFIED COPD TYPE (HCC): Chronic | ICD-10-CM

## 2021-07-28 DIAGNOSIS — F41.1 GAD (GENERALIZED ANXIETY DISORDER): ICD-10-CM

## 2021-07-28 DIAGNOSIS — N18.30 STAGE 3 CHRONIC KIDNEY DISEASE, UNSPECIFIED WHETHER STAGE 3A OR 3B CKD (HCC): ICD-10-CM

## 2021-07-28 DIAGNOSIS — I25.10 CORONARY ARTERY DISEASE INVOLVING NATIVE CORONARY ARTERY OF NATIVE HEART WITHOUT ANGINA PECTORIS: ICD-10-CM

## 2021-07-28 DIAGNOSIS — J44.9 STAGE 1 MILD COPD BY GOLD CLASSIFICATION (HCC): ICD-10-CM

## 2021-07-28 DIAGNOSIS — F33.2 SEVERE EPISODE OF RECURRENT MAJOR DEPRESSIVE DISORDER, WITHOUT PSYCHOTIC FEATURES (HCC): ICD-10-CM

## 2021-07-28 DIAGNOSIS — Z12.11 ENCOUNTER FOR SCREENING COLONOSCOPY: ICD-10-CM

## 2021-07-28 LAB
25(OH)D3 SERPL-MCNC: 20.2 NG/ML
ALBUMIN SERPL-MCNC: 4.3 G/DL (ref 3.5–5.2)
ALBUMIN UR-MCNC: 21.7 MG/DL
ALBUMIN/GLOB SERPL: 1.4 G/DL
ALP SERPL-CCNC: 111 U/L (ref 39–117)
ALT SERPL W P-5'-P-CCNC: 11 U/L (ref 1–33)
ANION GAP SERPL CALCULATED.3IONS-SCNC: 12.6 MMOL/L (ref 5–15)
AST SERPL-CCNC: 12 U/L (ref 1–32)
BASOPHILS # BLD AUTO: 0.07 10*3/MM3 (ref 0–0.2)
BASOPHILS NFR BLD AUTO: 0.5 % (ref 0–1.5)
BILIRUB SERPL-MCNC: 0.5 MG/DL (ref 0–1.2)
BUN SERPL-MCNC: 18 MG/DL (ref 6–20)
BUN/CREAT SERPL: 15.5 (ref 7–25)
CALCIUM SPEC-SCNC: 11.8 MG/DL (ref 8.6–10.5)
CHLORIDE SERPL-SCNC: 93 MMOL/L (ref 98–107)
CHOLEST SERPL-MCNC: 227 MG/DL (ref 0–200)
CO2 SERPL-SCNC: 27.4 MMOL/L (ref 22–29)
CREAT SERPL-MCNC: 1.16 MG/DL (ref 0.57–1)
CREAT UR-MCNC: 140.7 MG/DL
DEPRECATED RDW RBC AUTO: 36.3 FL (ref 37–54)
EOSINOPHIL # BLD AUTO: 0.08 10*3/MM3 (ref 0–0.4)
EOSINOPHIL NFR BLD AUTO: 0.6 % (ref 0.3–6.2)
ERYTHROCYTE [DISTWIDTH] IN BLOOD BY AUTOMATED COUNT: 11.8 % (ref 12.3–15.4)
GFR SERPL CREATININE-BSD FRML MDRD: 49 ML/MIN/1.73
GLOBULIN UR ELPH-MCNC: 3.1 GM/DL
GLUCOSE SERPL-MCNC: 283 MG/DL (ref 65–99)
HBA1C MFR BLD: 13.51 % (ref 4.8–5.6)
HCT VFR BLD AUTO: 44.5 % (ref 34–46.6)
HDLC SERPL-MCNC: 41 MG/DL (ref 40–60)
HGB BLD-MCNC: 15.1 G/DL (ref 12–15.9)
IMM GRANULOCYTES # BLD AUTO: 0.07 10*3/MM3 (ref 0–0.05)
IMM GRANULOCYTES NFR BLD AUTO: 0.5 % (ref 0–0.5)
IRON 24H UR-MRATE: 110 MCG/DL (ref 37–145)
IRON SATN MFR SERPL: 29 % (ref 20–50)
LDLC SERPL CALC-MCNC: 145 MG/DL (ref 0–100)
LDLC/HDLC SERPL: 3.43 {RATIO}
LYMPHOCYTES # BLD AUTO: 2.69 10*3/MM3 (ref 0.7–3.1)
LYMPHOCYTES NFR BLD AUTO: 19.8 % (ref 19.6–45.3)
MCH RBC QN AUTO: 28.9 PG (ref 26.6–33)
MCHC RBC AUTO-ENTMCNC: 33.9 G/DL (ref 31.5–35.7)
MCV RBC AUTO: 85.1 FL (ref 79–97)
MICROALBUMIN/CREAT UR: 154.2 MG/G
MONOCYTES # BLD AUTO: 0.83 10*3/MM3 (ref 0.1–0.9)
MONOCYTES NFR BLD AUTO: 6.1 % (ref 5–12)
NEUTROPHILS NFR BLD AUTO: 72.5 % (ref 42.7–76)
NEUTROPHILS NFR BLD AUTO: 9.84 10*3/MM3 (ref 1.7–7)
NRBC BLD AUTO-RTO: 0 /100 WBC (ref 0–0.2)
PLATELET # BLD AUTO: 358 10*3/MM3 (ref 140–450)
PMV BLD AUTO: 11.2 FL (ref 6–12)
POTASSIUM SERPL-SCNC: 4.8 MMOL/L (ref 3.5–5.2)
PROT SERPL-MCNC: 7.4 G/DL (ref 6–8.5)
RBC # BLD AUTO: 5.23 10*6/MM3 (ref 3.77–5.28)
SODIUM SERPL-SCNC: 133 MMOL/L (ref 136–145)
T3FREE SERPL-MCNC: 2.51 PG/ML (ref 2–4.4)
T4 FREE SERPL-MCNC: 1.36 NG/DL (ref 0.93–1.7)
TIBC SERPL-MCNC: 378 MCG/DL (ref 298–536)
TRANSFERRIN SERPL-MCNC: 254 MG/DL (ref 200–360)
TRIGL SERPL-MCNC: 226 MG/DL (ref 0–150)
TSH SERPL DL<=0.05 MIU/L-ACNC: 1.48 UIU/ML (ref 0.27–4.2)
VIT B12 BLD-MCNC: 587 PG/ML (ref 211–946)
VLDLC SERPL-MCNC: 41 MG/DL (ref 5–40)
WBC # BLD AUTO: 13.58 10*3/MM3 (ref 3.4–10.8)

## 2021-07-28 PROCEDURE — 84443 ASSAY THYROID STIM HORMONE: CPT

## 2021-07-28 PROCEDURE — 82607 VITAMIN B-12: CPT

## 2021-07-28 PROCEDURE — 85025 COMPLETE CBC W/AUTO DIFF WBC: CPT

## 2021-07-28 PROCEDURE — 82570 ASSAY OF URINE CREATININE: CPT

## 2021-07-28 PROCEDURE — 80053 COMPREHEN METABOLIC PANEL: CPT

## 2021-07-28 PROCEDURE — 83036 HEMOGLOBIN GLYCOSYLATED A1C: CPT

## 2021-07-28 PROCEDURE — 80061 LIPID PANEL: CPT

## 2021-07-28 PROCEDURE — 84439 ASSAY OF FREE THYROXINE: CPT

## 2021-07-28 PROCEDURE — 82043 UR ALBUMIN QUANTITATIVE: CPT

## 2021-07-28 PROCEDURE — 84466 ASSAY OF TRANSFERRIN: CPT

## 2021-07-28 PROCEDURE — 82306 VITAMIN D 25 HYDROXY: CPT

## 2021-07-28 PROCEDURE — 84481 FREE ASSAY (FT-3): CPT

## 2021-07-28 PROCEDURE — 83540 ASSAY OF IRON: CPT

## 2021-07-30 DIAGNOSIS — E10.69 TYPE 1 DIABETES MELLITUS WITH OTHER SPECIFIED COMPLICATION (HCC): Primary | ICD-10-CM

## 2021-07-30 RX ORDER — BLOOD SUGAR DIAGNOSTIC
STRIP MISCELLANEOUS
Qty: 600 EACH | Refills: 3 | Status: SHIPPED | OUTPATIENT
Start: 2021-07-30 | End: 2022-02-24

## 2021-08-02 ENCOUNTER — TELEPHONE (OUTPATIENT)
Dept: FAMILY MEDICINE CLINIC | Facility: CLINIC | Age: 51
End: 2021-08-02

## 2021-08-02 NOTE — TELEPHONE ENCOUNTER
----- Message from Quintin Flores MD sent at 8/1/2021  5:12 AM CDT -----  Please call pt    Thyroid studies normal     Pt has microalbuminuria or protein in urine which is diabetic nephropathy along with GFR on CMP at 49. Pt has CKD stage 3a due to DM and HTN.  Recommend pt start taking losartan 25 mg PO q daily give 30 pills and 3 refills.  She is to monitor BP at home     Vitamin D is low and recommend Vitamin D3 50,000 units once a week. Continue with this at home     On lipid panel her LDL is high at 145. Please verify if pt is really taking her crestor 20 mg at bedtime. If she is have her start on zetia 10 mg PO q daily to lower this give 30 pills and 3 refills.  Also recommend Vascepa 2 grams PO BID to lower triglcyerides give 60 pills and 3 refills     Hga1c is worse at 13.51 from 11.30. highly recommend pt continue her insulin also go up on trulicity from 0.75 to 1.5 mg subq weekly give 4 pens and 3 refills. Also recommend pt start taking jardiance 25 mg PO q daily to help urinate sugars. Give 30 pills and 3 refills if pt agreeable. Needs to drink a lot of water while taking this since it can cause dehydration. Also may cause UTI and yeast infection      On CBC WBC is high at 13. And may be reactive. May need to see Hematologist in future    Recheck on next visit thanks

## 2021-08-03 DIAGNOSIS — F43.10 POST TRAUMATIC STRESS DISORDER (PTSD): ICD-10-CM

## 2021-08-03 DIAGNOSIS — F41.1 GENERALIZED ANXIETY DISORDER: ICD-10-CM

## 2021-08-03 DIAGNOSIS — F33.2 SEVERE EPISODE OF RECURRENT MAJOR DEPRESSIVE DISORDER, WITHOUT PSYCHOTIC FEATURES (HCC): ICD-10-CM

## 2021-08-03 DIAGNOSIS — G47.9 SLEEP DIFFICULTIES: ICD-10-CM

## 2021-08-03 RX ORDER — LOSARTAN POTASSIUM 25 MG/1
25 TABLET ORAL DAILY
Qty: 30 TABLET | Refills: 3 | Status: SHIPPED | OUTPATIENT
Start: 2021-08-03 | End: 2021-11-22

## 2021-08-03 RX ORDER — VENLAFAXINE HYDROCHLORIDE 150 MG/1
150 CAPSULE, EXTENDED RELEASE ORAL DAILY
Qty: 30 CAPSULE | Refills: 0 | OUTPATIENT
Start: 2021-08-03

## 2021-08-03 RX ORDER — HYDROXYZINE HYDROCHLORIDE 25 MG/1
12.5-25 TABLET, FILM COATED ORAL 3 TIMES DAILY PRN
Qty: 90 TABLET | Refills: 0 | OUTPATIENT
Start: 2021-08-03

## 2021-08-03 RX ORDER — ICOSAPENT ETHYL 1000 MG/1
2 CAPSULE ORAL 2 TIMES DAILY WITH MEALS
Qty: 120 CAPSULE | Refills: 3 | Status: SHIPPED | OUTPATIENT
Start: 2021-08-03 | End: 2021-08-09 | Stop reason: CLARIF

## 2021-08-03 RX ORDER — ERGOCALCIFEROL 1.25 MG/1
50000 CAPSULE ORAL
Qty: 4 CAPSULE | Refills: 3 | Status: SHIPPED | OUTPATIENT
Start: 2021-08-03 | End: 2021-11-15

## 2021-08-03 RX ORDER — QUETIAPINE FUMARATE 100 MG/1
150 TABLET, FILM COATED ORAL NIGHTLY
Qty: 45 TABLET | Refills: 0 | OUTPATIENT
Start: 2021-08-03

## 2021-08-03 NOTE — TELEPHONE ENCOUNTER
Patient was scheduled for an appointment today she could not access [employer] due to losing password, by the time she spoke with the [employer] Help Desk and called back it was over an hour past the appointment. I gave her a new appointment for 08/17/21 however she needs refills for medications.     Thank you

## 2021-08-03 NOTE — TELEPHONE ENCOUNTER
Called patient back and left message about refills. I had already told her you may not be able to refill.

## 2021-08-03 NOTE — TELEPHONE ENCOUNTER
I can't refill. I haven't seen the patient in over 4 months. She should have already been out of medication.

## 2021-08-09 ENCOUNTER — TELEPHONE (OUTPATIENT)
Dept: FAMILY MEDICINE CLINIC | Facility: CLINIC | Age: 51
End: 2021-08-09

## 2021-08-09 ENCOUNTER — HOSPITAL ENCOUNTER (EMERGENCY)
Facility: HOSPITAL | Age: 51
Discharge: LEFT BEFORE DISCHARGE | End: 2021-08-09

## 2021-08-09 VITALS
TEMPERATURE: 98.2 F | HEIGHT: 65 IN | WEIGHT: 167.4 LBS | RESPIRATION RATE: 20 BRPM | DIASTOLIC BLOOD PRESSURE: 87 MMHG | BODY MASS INDEX: 27.89 KG/M2 | HEART RATE: 100 BPM | OXYGEN SATURATION: 96 % | SYSTOLIC BLOOD PRESSURE: 105 MMHG

## 2021-08-09 PROCEDURE — 99211 OFF/OP EST MAY X REQ PHY/QHP: CPT

## 2021-08-09 RX ORDER — EZETIMIBE 10 MG/1
10 TABLET ORAL DAILY
Qty: 30 TABLET | Refills: 3 | Status: SHIPPED | OUTPATIENT
Start: 2021-08-09 | End: 2021-11-29

## 2021-08-09 NOTE — PROGRESS NOTES
Chief Complaint  White Spots in Mouth, Thrush, Neck Pain, and Arm Pain    Subjective          Yudi West presents to [facility]     Pt is 51 yo female with management of her being overweight, DM type 2 HLP, HTN, diabetic neuropathy, insomnia, major depression CAD sp stent  Echocardiogram on 6/3/19 (grade 1 diastolic dysfunction) ,  History of MI COPD, history of fracture of femur, sp surgery on left femur, sp cholecystectomy, sp hysterectomy, sp left nephrectomy,  History of renal cell carcnoma sp left total knee replacement surgery, sp tonsillectomy , diabetic neuropathy, DELFIN, colonic polyps, GERD with esophagitis, gastritis, hiatal hernia, hypercalcemia         7/22/21 in office visit for recheck on pt's above medical issues. Pt is due for new labwork and mammgoram screening. She has missed several office appts in past. Pt saw Pulmonology on 6/25/21 for her COPD and chronic bronchitis.  Pt continues to smoke and was advised to continue albuterol inhaler. ICS was not recommended due to increased risk of pneumonia.  She was given prednisone and doxycycline for 7 days. Pt has yet to get labwork ordered on 28/21. She is also due for mammogram screening. Pt continues to take her medications for HTN, IDDM type 2, CAD, HLP, GERD, Major depression, IBS constipation, chornic pain. She is due for appt with Gastro, Cardiology and Orthopedic. She continues to have abdominal issues along with pain in left shoulder.  She also has boiln near genital area that has been present for a few days     8/10/21 in office visit for recheck on pt's above medical issues. Pt saw Orthopedic on 7/26/21 for her  Left shoulder base and had injection done. Pt did not show up for david with behavioral health on 8/3/21 and canceled her appt with GI on 8/3/21. Pt had labwork done on 7/28/21 that showed normal thyroid studies Vitamin D was at 20.2. vitamin B12 was at 587.  Lipid panel showed LDL at 145 with  triglcyerides at 226 and total cholesterol at 227.  hga1c was at 13.51 from 11.30. CMP showed glucose at 283. CR at 1.16 sodium at 133 chloride 93. Calcium was at 11.8. GFR was at 49. CBC was at 13.58. neutrophil count was at 9.84. pt was at [facility] yesterday. Pt continues to take her medication for CAD, HTN, HLP, IDDM type 2, tobacco cessation,  Major depression, constipation. She went to ER last night at [facility] but she left early. She has whitish spots in mouth along with yeast infection. She has cut back to 3 cigarettes  A day. It hurts to swallow and it is dry. She did get her 1st COVID injection this past Friday.  Her main concern is pain on right shoulder since getting injection.  Her sugars have been running better since starting jardiance and trulicity 1.5 weekly. Her sugars have been running less than 200         Dental Pain   This is a new problem. The current episode started more than 1 month ago. The problem occurs constantly. The problem has been unchanged. The pain is at a severity of 3/10. The pain is mild. Pertinent negatives include no difficulty swallowing, facial pain, fever, oral bleeding, sinus pressure or thermal sensitivity. She has tried ice for the symptoms. The treatment provided mild relief.   Chronic Kidney Disease  This is a chronic problem. The current episode started more than 1 year ago. The problem has been gradually worsening. Associated symptoms include arthralgias and fatigue. Pertinent negatives include no abdominal pain, anorexia, change in bowel habit, chest pain, chills, congestion, coughing, diaphoresis, fever, headaches, joint swelling, myalgias, nausea, neck pain, numbness, rash, sore throat, swollen glands, urinary symptoms, vertigo, visual change or weakness. Nothing aggravates the symptoms. She has tried nothing for the symptoms. The treatment provided no relief.   Fatigue  This is a recurrent problem. The current episode started more than 1 year ago. The problem  occurs constantly. The problem has been unchanged. Associated symptoms include fatigue. Pertinent negatives include no abdominal pain, anorexia, arthralgias, change in bowel habit, chest pain, chills, congestion, coughing, diaphoresis, fever, headaches, joint swelling, myalgias, nausea, neck pain, numbness, rash, sore throat, swollen glands, urinary symptoms, vertigo, visual change, vomiting or weakness. Nothing aggravates the symptoms. She has tried nothing for the symptoms. The treatment provided no relief.   Anxiety  Presents for follow-up visit. Symptoms include depressed mood, excessive worry, insomnia, irritability, nervous/anxious behavior and shortness of breath. Patient reports no chest pain, compulsions, confusion, decreased concentration, dizziness, dry mouth, feeling of choking, hyperventilation, impotence, malaise, muscle tension, nausea, obsessions, palpitations, panic or restlessness. The quality of sleep is fair. Nighttime awakenings: none.   Diabetes   She presents for her initial diabetic visit. She has type 2 diabetes mellitus. Her disease course has been waxing and waning  Hypoglycemia symptoms include tremors. Pertinent negatives for hypoglycemia include no confusion, dizziness, headaches, hunger, mood changes, nervousness/anxiousness, pallor, seizures, sleepiness, speech difficulty or sweats. Associated symptoms include fatigue, polyuria and weakness. Pertinent negatives for diabetes include no blurred vision, no chest pain, no foot paresthesias and no weight loss. Pertinent negatives for hypoglycemia complications include no blackouts, no hospitalization, no nocturnal hypoglycemia, no required assistance and no required glucagon injection. Symptoms are stable. Pertinent negatives for diabetic complications include no CVA, impotence or retinopathy. Risk factors for coronary artery disease include diabetes mellitus, dyslipidemia, sedentary lifestyle and tobacco exposure. She is compliant with  treatment all of the time. Her weight is stable. She is following a generally unhealthy diet. She does not see a podiatrist.Eye exam is not current.      Hypertension   This is a chronic problem. The current episode started more than 1 year ago. The problem has been waxing and waning since onset. The problem is uncontrolled. Associated symptoms include shortness of breath. Pertinent negatives include no anxiety, blurred vision, chest pain, headaches, malaise/fatigue, palpitations, PND or sweats. Risk factors for coronary artery disease include diabetes mellitus, dyslipidemia, sedentary lifestyle and smoking/tobacco exposure. Past treatments include beta blockers. Current antihypertension treatment includes beta blockers. The current treatment provides no improvement. There is no history of angina, kidney disease, CAD/MI, CVA, heart failure, left ventricular hypertrophy or retinopathy. There is no history of chronic renal disease, coarctation of the aorta, hyperaldosteronism, hypercortisolism, hyperparathyroidism, a hypertension causing med, pheochromocytoma, renovascular disease, sleep apnea or a thyroid problem.   Depression   Visit Type: followup  Onset of symptoms: at an unknown time  Patient presents with the following symptoms: compulsions, decreased concentration, depressed mood, excessive worry and shortness of breath.  Patient is not experiencing: anhedonia, chest pain, choking sensation, confusion, dizziness, dry mouth, fatigue, feelings of hopelessness, feelings of worthlessness, hypersomnia, hyperventilation, impotence, insomnia, irritability, malaise, memory impairment, muscle tension, nausea, nervousness/anxiety, obsessions, palpitations, panic, psychomotor agitation, psychomotor retardation, restlessness, suicidal ideas, suicidal planning, thoughts of death, weight gain and weight loss.  Patient has a history of: depression  No history of: anemia, anxiety/panic attacks, arrhythmia, asthma, bipolar  "disorder, CAD, CHF, chronic lung disease, fibromyalgia, hyperthyroidism, suicide attempt, mental illness and substance abuse  Treatment tried: SSRI, wellbutrin       Objective   Vital Signs:   /68 (BP Location: Right arm, Patient Position: Sitting, Cuff Size: Large Adult)   Pulse 114   Temp 98 °F (36.7 °C)   Ht 165.1 cm (65\")   Wt 75 kg (165 lb 6.4 oz)   SpO2 98%   BMI 27.52 kg/m²         Physical Exam  Vitals and nursing note reviewed.   Constitutional:       Appearance: She is well-developed. She is not diaphoretic.   HENT:      Head: Normocephalic and atraumatic.      Right Ear: External ear normal.      Mouth/Throat:     Eyes:      Conjunctiva/sclera: Conjunctivae normal.      Pupils: Pupils are equal, round, and reactive to light.   Cardiovascular:      Rate and Rhythm: Normal rate and regular rhythm.      Heart sounds: Normal heart sounds. No murmur heard.     Pulmonary:      Effort: Pulmonary effort is normal. No respiratory distress.      Breath sounds: Normal breath sounds.   Abdominal:      General: Bowel sounds are normal. There is no distension.      Palpations: Abdomen is soft.      Tenderness: There is no abdominal tenderness.   Musculoskeletal:         General: No deformity. Normal range of motion.        Arms:       Cervical back: Normal range of motion and neck supple.   Skin:     General: Skin is warm.      Coloration: Skin is not pale.      Findings: No erythema or rash.   Neurological:      Mental Status: She is alert and oriented to person, place, and time.      Cranial Nerves: No cranial nerve deficit.   Psychiatric:         Behavior: Behavior normal.        Result Review :                 Assessment and Plan    Diagnoses and all orders for this visit:    1. Uncontrolled type 2 diabetes mellitus with hyperglycemia (CMS/HCC) (Primary)    2. Tobacco user    3. Stage 3a chronic kidney disease (CMS/HCC)  -     Ambulatory Referral to Nephrology    4. Mixed hyperlipidemia    5. Essential " hypertension    6. Coronary artery disease involving native coronary artery of native heart without angina pectoris    7. Stage 1 mild COPD by GOLD classification (CMS/HCC)    8. Hypercalcemia  -     Basic metabolic panel; Future  -     PTH, Intact & Calcium; Future    9. Encounter for Papanicolaou smear of cervix  -     Ambulatory Referral to Obstetrics / Gynecology    10. [place]counter for diabetes type 2 eye exam [place] [place]  -     Ambulatory Referral for Diabetic Eye Exam-Ophthalmology    11. Oral thrush    12. Yeast infection    13. History of renal cell cancer  -     Ambulatory Referral to Nephrology    14. S/p nephrectomy  -     Ambulatory Referral to Nephrology    15. Muscle pain    Other orders  -     fluconazole (Diflucan) 150 MG tablet; Take on onset of symptoms now and in 72 hours  Dispense: 2 tablet; Refill: 0  -     nystatin (MYCOSTATIN) [id number] UNIT/ML suspension; Take 5 mL by mouth 4 (Four) Times a Day for 7 days.  Dispense: 473 mL; Refill: 1  -     cyclobenzaprine (FLEXERIL) 5 MG tablet; Take 1 tablet by mouth 3 (Three) Times a Day As Needed for Muscle Spasms.  Dispense: 30 tablet; Refill: 0       -went over labwork   -recommend mammogram screening -schedule at imaging center   -recommend pap smear  -advised pt to make an appt with Gastro, Orthopedic and Cardiology   -recommend Tdap/shingles vaccination   -oral thrush/yeast infection - diflucan 150 mg now and in 72 hours. Will give nystatin swish and swallow every 6 hours for 7 days   -muscle aches - start on flexeril PRN. Recommend Tylenol PRN since she had COVID vaccination   -recommend diabetic eye exam  - referred to Dr. Hernandez   -hypercalcemia- check PTH level. Recheck calcium levels   -HTN -    on  coreg 6.25 mg PO BID  -left shoulder pain/subacromial bursitis of left shoulder  - Orthopedic following. Recently received steriod injection.      -HLP - on lipitor 40 mg PO qh. Recommend heart health ydiet   -diabetic neuropathy - on neurontin 400 mg PO  TID   -allergic rhinitis -  zyrtec 10 mg daily.  -chronic pain - pt sees Pain Managmeent on Percoet 7.5/325 mg every 6 hours PRN along with neurontin 300 mg PO TID  -insomnia - on seroquel at bedtim   -tobacco user -counseled quit smoking >5 minutes. Recommend 1 800 qUIT now   -nausea/vomiting - possible gastroparesis.  Advised pt to make an appt with GI   -sp  Left nephrectomy/history of renal cell carcinoma/CKD stage 3a - refer to Nephrology   -GERD with esophagitis,colonic polyps/gastritis  - on prilosec 20 mg PO BID on pepcid GI following   -CAD sp stent /grade 1 diastolic dysfunction - Cardiology following on aspirin 81 mg PO BID, plavix 75 mg PO q daily. Coreg 6.25 mg PO BID, lipitor 40 mg PO qhs, ranexa 500 mg every 12 hours   -IDDM type 2  -now on basaglar   Go up to 50  unites  units at bedtime advised pt to go bup by 2-3 unties every 3 days until morning sugars at goal.on trulicity 1.5 mg subq weekly.   on jardiance 25 mg daily.    -COPD/chronic bronchitis - on albuterol inhaler. adivsed to quit smoking.Refer to Pulmonlogy for PFT. Breo stopped.  She was given prednisone and doxycycline. . Stressed importance of smoking cessation.    -major depression/KRISTOFER/PTSD  -Mental Health following on  seroquel 100 mg PO qhs.  Effexor  mg PO q daily.   -insomnia - was on ambien. Advised pt to not take ambien anymore due to potential side effects with Percocet and neuroton. Recommend pt use  Melatonin  -overweight  - counseled weight loss >5 minutes  BMI at 29.45   -advised pt to be safe and call with questions and concerns  -advised pt to go to ER or call 911 if symptoms worrisome or severe  -advised pt to followup with specialist  And referrals  -advsied pt to be safe during COVID-19 pandemic  I spent 30  minutes caring for Yudi on this date of service. This time includes time spent by me in the following activities: preparing for the visit, reviewing tests, obtaining and/or reviewing a separately obtained  history, performing a medically appropriate examination and/or evaluation, counseling and educating the patient/family/caregiver, ordering medications, tests, or procedures, referring and communicating with other health care professionals, documenting information in the medical record, independently interpreting results and communicating that information with the patient/family/caregiver and care coordination.   -recheck in 2 weeks         This document has been electronically signed by Quintin Flores MD on August 10, 2021 16:18 CDT        Follow Up   Return in about 2 weeks (around 8/24/2021).  Patient was given instructions and counseling regarding her condition or for health maintenance advice. Please see specific information pulled into the AVS if appropriate.

## 2021-08-09 NOTE — TELEPHONE ENCOUNTER
Insurance denied Vascepa. Pt must try and fail 2 preferred: cholestyramine, cholestyramine light, colestipol, prevalite, ezetimibe, niacin, atorvastatin, lovastatin, pravastatin, simvastatin, rosuvastatin.

## 2021-08-10 ENCOUNTER — OFFICE VISIT (OUTPATIENT)
Dept: FAMILY MEDICINE CLINIC | Facility: CLINIC | Age: 51
End: 2021-08-10

## 2021-08-10 ENCOUNTER — LAB (OUTPATIENT)
Dept: LAB | Facility: HOSPITAL | Age: 51
End: 2021-08-10

## 2021-08-10 VITALS
HEIGHT: 65 IN | SYSTOLIC BLOOD PRESSURE: 106 MMHG | OXYGEN SATURATION: 98 % | HEART RATE: 114 BPM | BODY MASS INDEX: 27.56 KG/M2 | WEIGHT: 165.4 LBS | TEMPERATURE: 98 F | DIASTOLIC BLOOD PRESSURE: 68 MMHG

## 2021-08-10 DIAGNOSIS — Z12.4 ENCOUNTER FOR PAPANICOLAOU SMEAR OF CERVIX: ICD-10-CM

## 2021-08-10 DIAGNOSIS — Z72.0 TOBACCO USER: ICD-10-CM

## 2021-08-10 DIAGNOSIS — E11.65 UNCONTROLLED TYPE 2 DIABETES MELLITUS WITH HYPERGLYCEMIA (HCC): Primary | ICD-10-CM

## 2021-08-10 DIAGNOSIS — N18.31 STAGE 3A CHRONIC KIDNEY DISEASE (HCC): ICD-10-CM

## 2021-08-10 DIAGNOSIS — E78.2 MIXED HYPERLIPIDEMIA: ICD-10-CM

## 2021-08-10 DIAGNOSIS — J44.9 STAGE 1 MILD COPD BY GOLD CLASSIFICATION (HCC): ICD-10-CM

## 2021-08-10 DIAGNOSIS — Z85.528 HISTORY OF RENAL CELL CANCER: ICD-10-CM

## 2021-08-10 DIAGNOSIS — Z01.00 ENCOUNTER FOR DIABETES TYPE 2 EYE EXAM (HCC): ICD-10-CM

## 2021-08-10 DIAGNOSIS — I10 ESSENTIAL HYPERTENSION: Chronic | ICD-10-CM

## 2021-08-10 DIAGNOSIS — B37.9 YEAST INFECTION: ICD-10-CM

## 2021-08-10 DIAGNOSIS — I25.10 CORONARY ARTERY DISEASE INVOLVING NATIVE CORONARY ARTERY OF NATIVE HEART WITHOUT ANGINA PECTORIS: ICD-10-CM

## 2021-08-10 DIAGNOSIS — B37.0 ORAL THRUSH: ICD-10-CM

## 2021-08-10 DIAGNOSIS — E11.9 ENCOUNTER FOR DIABETES TYPE 2 EYE EXAM (HCC): ICD-10-CM

## 2021-08-10 DIAGNOSIS — M79.10 MUSCLE PAIN: ICD-10-CM

## 2021-08-10 DIAGNOSIS — E83.52 HYPERCALCEMIA: ICD-10-CM

## 2021-08-10 DIAGNOSIS — Z90.5 S/P NEPHRECTOMY: ICD-10-CM

## 2021-08-10 PROCEDURE — 80048 BASIC METABOLIC PNL TOTAL CA: CPT

## 2021-08-10 PROCEDURE — 99214 OFFICE O/P EST MOD 30 MIN: CPT | Performed by: FAMILY MEDICINE

## 2021-08-10 PROCEDURE — 83970 ASSAY OF PARATHORMONE: CPT

## 2021-08-10 RX ORDER — FLUCONAZOLE 150 MG/1
TABLET ORAL
Qty: 2 TABLET | Refills: 0 | Status: SHIPPED | OUTPATIENT
Start: 2021-08-10 | End: 2021-10-05

## 2021-08-10 RX ORDER — CYCLOBENZAPRINE HCL 5 MG
5 TABLET ORAL 3 TIMES DAILY PRN
Qty: 30 TABLET | Refills: 0 | Status: SHIPPED | OUTPATIENT
Start: 2021-08-10 | End: 2022-07-18

## 2021-08-10 NOTE — ED NOTES
Pt stated that she went Friday and got first Covid shot and her neck is sore and now she can not move it.  Mouth and jaws are white     Jessica Mendoza, RN  08/09/21 [id number]

## 2021-08-10 NOTE — PROGRESS NOTES
Chief Complaint  Depression, Anxiety, Hypertension, Allergies, Insomnia, Chronic Kidney Disease, Heartburn, Coronary Artery Disease, Diabetes, and COPD    Subjective          Yudi West presents to [facility]         Pt is 49 yo female with management of her being overweight, DM type 2 HLP, HTN, diabetic neuropathy, insomnia, major depression CAD sp stent  Echocardiogram on 6/3/19 (grade 1 diastolic dysfunction) ,  History of MI COPD, history of fracture of femur, sp surgery on left femur, sp cholecystectomy, sp hysterectomy, sp left nephrectomy,  History of renal cell carcnoma sp left total knee replacement surgery, sp tonsillectomy , diabetic neuropathy, DELFIN, colonic polyps, GERD with esophagitis, gastritis, hiatal hernia, CKD stage 3a.          7/22/21 in office visit for recheck on pt's above medical issues. Pt is due for new labwork and mammgoram screening. She has missed several office appts in past. Pt saw Pulmonology on 6/25/21 for her COPD and chronic bronchitis.  Pt continues to smoke and was advised to continue albuterol inhaler. ICS was not recommended due to increased risk of pneumonia.  She was given prednisone and doxycycline for 7 days. Pt has yet to get labwork ordered on 28/21. She is also due for mammogram screening. Pt continues to take her medications for HTN, IDDM type 2, CAD, HLP, GERD, Major depression, IBS constipation, chornic pain. She is due for appt with Gastro, Cardiology and Orthopedic. She continues to have abdominal issues along with pain in left shoulder.  She also has boiln near genital area that has been present for a few days     8/24/21 in office visit for recheck on pt's above medical issues. She was treated for oral thrush last visit with nystatin swish and swallow. She continues to take her medications for DM type 2, HTN, HLP, CAD, chronic pain, HLP, KRISTOFER,  GERD, tobacco cessation , vitamin D defiicency. Pt had labwork done on 8/10/21  that showed on BMP sodium at 134 chloride at 95 GFR at 46 with CR at 1.23.  PTH and calcium levels were normal. Pt had upcoming david with OB/GYN, Orthopedic and Cardiology soon. Endoscopy was scheduled on 9/24/21. Pt had gastric emptying study on 8/20/21 that was normal. Her sugars have been better but still has readings in high 100s. She is still on basaglar, trulicity and jardiance. She has cut back on smoking to 1/2 ppd..  Her thrush has improved with nystatin.          Chronic Kidney Disease  This is a chronic problem. The current episode started more than 1 year ago. The problem occurs constantly. The problem has been unchanged. Associated symptoms include arthralgias and fatigue. Pertinent negatives include no abdominal pain, anorexia, change in bowel habit, chest pain, chills, congestion, coughing, diaphoresis, fever, headaches, joint swelling, myalgias, nausea, neck pain, numbness, sore throat, swollen glands, urinary symptoms, vertigo, visual change, vomiting or weakness. Nothing aggravates the symptoms. She has tried nothing for the symptoms. The treatment provided no relief.   Fatigue  This is a recurrent problem. The current episode started more than 1 year ago. The problem occurs constantly. The problem has been unchanged. Associated symptoms include fatigue. Pertinent negatives include no abdominal pain, anorexia, arthralgias, change in bowel habit, chest pain, chills, congestion, coughing, diaphoresis, fever, headaches, joint swelling, myalgias, nausea, neck pain, numbness, rash, sore throat, swollen glands, urinary symptoms, vertigo, visual change, vomiting or weakness. Nothing aggravates the symptoms. She has tried nothing for the symptoms. The treatment provided no relief.   Anxiety  Presents for follow-up visit. Symptoms include depressed mood, excessive worry, insomnia, irritability, nervous/anxious behavior and shortness of breath. Patient reports no chest pain, compulsions, confusion, decreased  concentration, dizziness, dry mouth, feeling of choking, hyperventilation, impotence, malaise, muscle tension, nausea, obsessions, palpitations, panic or restlessness. The quality of sleep is fair. Nighttime awakenings: none.   Diabetes   She presents for her initial diabetic visit. She has type 2 diabetes mellitus. Her disease course has been waxing and waning  Hypoglycemia symptoms include tremors. Pertinent negatives for hypoglycemia include no confusion, dizziness, headaches, hunger, mood changes, nervousness/anxiousness, pallor, seizures, sleepiness, speech difficulty or sweats. Associated symptoms include fatigue, polyuria and weakness. Pertinent negatives for diabetes include no blurred vision, no chest pain, no foot paresthesias and no weight loss. Pertinent negatives for hypoglycemia complications include no blackouts, no hospitalization, no nocturnal hypoglycemia, no required assistance and no required glucagon injection. Symptoms are stable. Pertinent negatives for diabetic complications include no CVA, impotence or retinopathy. Risk factors for coronary artery disease include diabetes mellitus, dyslipidemia, sedentary lifestyle and tobacco exposure. She is compliant with treatment all of the time. Her weight is stable. She is following a generally unhealthy diet. She does not see a podiatrist.Eye exam is not current.      Hypertension   This is a chronic problem. The current episode started more than 1 year ago. The problem has been waxing and waning since onset. The problem is uncontrolled. Associated symptoms include shortness of breath. Pertinent negatives include no anxiety, blurred vision, chest pain, headaches, malaise/fatigue, palpitations, PND or sweats. Risk factors for coronary artery disease include diabetes mellitus, dyslipidemia, sedentary lifestyle and smoking/tobacco exposure. Past treatments include beta blockers. Current antihypertension treatment includes beta blockers. The current  "treatment provides no improvement. There is no history of angina, kidney disease, CAD/MI, CVA, heart failure, left ventricular hypertrophy or retinopathy. There is no history of chronic renal disease, coarctation of the aorta, hyperaldosteronism, hypercortisolism, hyperparathyroidism, a hypertension causing med, pheochromocytoma, renovascular disease, sleep apnea or a thyroid problem.   Depression   Visit Type: followup  Onset of symptoms: at an unknown time  Patient presents with the following symptoms: compulsions, decreased concentration, depressed mood, excessive worry and shortness of breath.  Patient is not experiencing: anhedonia, chest pain, choking sensation, confusion, dizziness, dry mouth, fatigue, feelings of hopelessness, feelings of worthlessness, hypersomnia, hyperventilation, impotence, insomnia, irritability, malaise, memory impairment, muscle tension, nausea, nervousness/anxiety, obsessions, palpitations, panic, psychomotor agitation, psychomotor retardation, restlessness, suicidal ideas, suicidal planning, thoughts of death, weight gain and weight loss.  Patient has a history of: depression  No history of: anemia, anxiety/panic attacks, arrhythmia, asthma, bipolar disorder, CAD, CHF, chronic lung disease, fibromyalgia, hyperthyroidism, suicide attempt, mental illness and substance abuse  Treatment tried: SSRI, wellbutrin         Objective   Vital Signs:   /58 (BP Location: Left arm, Patient Position: Sitting, Cuff Size: Adult)   Pulse 84   Temp 97.3 °F (36.3 °C)   Ht 165.1 cm (65\")   Wt 75.4 kg (166 lb 3.2 oz)   SpO2 97%   BMI 27.66 kg/m²     Physical Exam  Vitals and nursing note reviewed.   Constitutional:       Appearance: She is well-developed. She is not diaphoretic.   HENT:      Head: Normocephalic and atraumatic.      Right Ear: External ear normal.   Eyes:      Conjunctiva/sclera: Conjunctivae normal.      Pupils: Pupils are equal, round, and reactive to light. "   Cardiovascular:      Rate and Rhythm: Normal rate and regular rhythm.      Heart sounds: Normal heart sounds. No murmur heard.     Pulmonary:      Effort: No respiratory distress.      Comments: Decreased breath sounds   Abdominal:      General: Bowel sounds are normal. There is no distension.      Palpations: Abdomen is soft.      Tenderness: There is no abdominal tenderness.   Musculoskeletal:         General: Tenderness present. No deformity. Normal range of motion.      Cervical back: Normal range of motion and neck supple.   Skin:     General: Skin is warm.      Coloration: Skin is not pale.      Findings: No erythema or rash.   Neurological:      Mental Status: She is alert and oriented to person, place, and time.      Cranial Nerves: No cranial nerve deficit.   Psychiatric:         Behavior: Behavior normal.        Result Review :                 Assessment and Plan    Diagnoses and all orders for this visit:    1. Uncontrolled type 2 diabetes mellitus with hyperglycemia (CMS/[facility]) (Primary)    2. Stage 3a chronic kidney disease (CMS/[facility])    3. Tobacco user    4. Stage 1 mild COPD by GOLD classification (CMS/[facility])    5. Severe episode of recurrent major depressive disorder, without psychotic features (CMS/[facility])    6. Oral thrush    7. Overweight    8. Mixed hyperlipidemia    9. KRISTOFER (generalized anxiety disorder)    10. Other diabetic neurological complication associated with type 1 diabetes mellitus (CMS/HCC)    11. Coronary artery disease involving native coronary artery of native heart without angina pectoris    12. Chronic bronchitis, unspecified chronic bronchitis type (CMS/HCC)    Other orders  -     Dulaglutide 3 MG/0.5ML solution pen-injector; Inject 3 mg under the skin into the appropriate area as directed 1 (One) Time Per Week.  Dispense: 4 pen; Refill: 3  -     azithromycin (Zithromax Z-Jose E) 250 MG tablet; Take 2 tablets the first day, then 1 tablet daily for 4 days.  Dispense: 6 tablet; Refill:  0         -recommend labowrk    -recommend mammogram screening -schedule at imaging center   -recommend pap smear  -advised pt to make an appt with Gastro, Orthopedic and Cardiology   -recommend Tdap/shingles vaccination   -boils on genital area. -augmentin 875-125 mg every 12 hours for 7 days. Also will give diflucan in event of yeast infeciton   -recommend diabetic eye exam  - referred to Dr. Hernandez   -chronic fatigue- check thyroid, iron profile, b12, levels.   -HTN -   Stable on  coreg 6.25 mg PO BID  -renal impairment/CKD stage 3a  - Nephrology following. Continue to monitor.  -left shoulder pain/subacromial bursitis of left shoulder  - Orthopedic following. Recently received steriod injection.      -HLP - on lipitor 40 mg PO qh. Recommend heart health diet   -diabetic neuropathy - on neurontin 400 mg PO TID   -allergic rhinitis -  zyrtec 10 mg daily.  -chronic pain - pt sees Pain Managmeent on Percoet 7.5/325 mg every 6 hours PRN along with neurontin 300 mg PO TID  -insomnia - on seroquel 100 mg PO qhs.    -tobacco user -counseled quit smoking >5 minutes. Recommend 1 800 qUIT now   -nausea/vomiting - possible gastroparesis.  Advised pt to make an appt with GI   -sp  Left nephrectomy/history of renal cell carcinoma-  Consider  Oncology referral  -GERD with esophagitis,colonic polyps/gastritis  - on prilosec 20 mg PO BID on pepcid GI following   -CAD sp stent /grade 1 diastolic dysfunction - Cardiology following on aspirin 81 mg PO BID, plavix 75 mg PO q daily. Coreg 6.25 mg PO BID, lipitor 40 mg PO qhs, ranexa 500 mg every 12 hours   -DM type 2  -now on basaglar 30 units at beditme  2-3 unties every 3 days until morning sugars at goal.on trulicity 1.5 mg subq weekly.  jardiance 25 mg daily. Go up on trulicity from 1.5 to 3 mg subq weekly   -COPD/chronic bronchitis - on albuterol inhaler. adivsed to quit smoking.Refer to Pulmonlogy for PFT. Breo stopped.   Stressed importance of smoking cessation.  will give  azithromycin in event it gets worse   -major depression/KRISTOFER/PTSD  -Mental Health following on  seroquel 100 mg PO qhs.  Effexor  mg PO q daily.   -insomnia - was on ambien. Advised pt to not take ambien anymore due to potential side effects with Percocet and neuroton. Recommend pt use  Melatonin  -overweight  - counseled weight loss >5 minutes  BMI at 27.66   -advised pt to be safe and call with questions and concerns  -advised pt to go to ER or call 911 if symptoms worrisome or severe  -advised pt to followup with specialist  And referrals  -advsied pt to be safe during COVID-19 pandemic  I spent 30  minutes caring for Yudi on this date of service. This time includes time spent by me in the following activities: preparing for the visit, reviewing tests, obtaining and/or reviewing a separately obtained history, performing a medically appropriate examination and/or evaluation, counseling and educating the patient/family/caregiver, ordering medications, tests, or procedures, referring and communicating with other health care professionals, documenting information in the medical record, independently interpreting results and communicating that information with the patient/family/caregiver and care coordination.  -recheck in 6 weeks          This document has been electronically signed by Quintin Flores MD on August 24, 2021 09:50 CDT        Follow Up   No follow-ups on file.  Patient was given instructions and counseling regarding her condition or for health maintenance advice. Please see specific information pulled into the AVS if appropriate.

## 2021-08-10 NOTE — ED NOTES
Robin looked all over the waiting room and SDS and outside, pt no where to be found      Jessica Mendoza, RN  08/09/21 [id number]

## 2021-08-10 NOTE — PATIENT INSTRUCTIONS
Take flexeril 5 mg at bedtime    Get labwork    Recheck in 2 weeks    Nystatin swich and swallow 4 x a day for 7 days    Diflucan     Hypercalcemia  Hypercalcemia is when the level of calcium in a person's blood is above normal. The body needs calcium to make bones and keep them strong. Calcium also helps the muscles, nerves, brain, and heart work the way they should.  Most of the calcium in the body is in the bones. There is also some calcium in the blood. Hypercalcemia can happen when calcium comes out of the bones, or when the kidneys are not able to remove calcium from the blood. Hypercalcemia can be mild or severe.  What are the causes?  There are many possible causes of hypercalcemia. Common causes of this condition include:  · Hyperparathyroidism. This is a condition in which the body produces too much parathyroid hormone. There are four parathyroid glands in your neck. These glands produce a chemical messenger (hormone) that helps the body absorb calcium from foods and helps your bones release calcium.  · Certain kinds of cancer.  Less common causes of hypercalcemia include:  · Getting too much calcium or vitamin D from your diet.  · Kidney failure.  · Hyperthyroidism.  · Severe dehydration.  · Being on bed rest or being inactive for a long time.  · Certain medicines.  · Infections.  What increases the risk?  You are more likely to develop this condition if you:  · Are female.  · Are 60 years of age or older.  · Have a family history of hypercalcemia.  What are the signs or symptoms?  Mild hypercalcemia that starts slowly may not cause symptoms. Severe, sudden hypercalcemia is more likely to cause symptoms, such as:  · Being more thirsty than usual.  · Needing to urinate more often than usual.  · Abdominal pain.  · Nausea and vomiting.  · Constipation.  · Muscle pain, twitching, or weakness.  · Feeling very tired.  How is this diagnosed?    Hypercalcemia is usually diagnosed with a blood test. You may also  have tests to help determine what is causing this condition, such as imaging tests and more blood tests.  How is this treated?  Treatment for hypercalcemia depends on the cause. Treatment may include:  · Receiving fluids through an IV.  · Medicines that:  ? Keep calcium levels steady after receiving fluids (loop diuretics).  ? Keep calcium in your bones (bisphosphonates).  ? Lower the calcium level in your blood.  · Surgery to remove overactive parathyroid glands.  · A procedure that filters your blood to correct calcium levels (hemodialysis).  Follow these instructions at home:    · Take over-the-counter and prescription medicines only as told by your health care provider.  · Follow instructions from your health care provider about eating or drinking restrictions.  · Drink enough fluid to keep your urine pale yellow.  · Stay active. Weight-bearing exercise helps to keep calcium in your bones. Follow instructions from your health care provider about what type and level of exercise is safe for you.  · Keep all follow-up visits as told by your health care provider. This is important.  Contact a health care provider if you have:  · A fever.  · A heartbeat that is irregular or very fast.  · Changes in mood, memory, or personality.  Get help right away if you:  · Have severe abdominal pain.  · Have chest pain.  · Have trouble breathing.  · Become very confused and sleepy.  · Lose consciousness.  Summary  · Hypercalcemia is when the level of calcium in a person's blood is above normal. The body needs calcium to make bones and keep them strong. Calcium also helps the muscles, nerves, brain, and heart work the way they should.  · There are many possible causes of hypercalcemia, and treatment depends on the cause.  · Take over-the-counter and prescription medicines only as told by your health care provider.  · Follow instructions from your health care provider about eating or drinking restrictions.  This information is not  intended to replace advice given to you by your health care provider. Make sure you discuss any questions you have with your health care provider.  Document Revised: 01/14/2020 Document Reviewed: 09/23/2019  [employer] Patient Education © 2021 [employer]    Chronic Kidney Disease, Adult  Chronic kidney disease (CKD) occurs when the kidneys become damaged slowly over a long period of time. The kidneys are a pair of organs that do many important jobs in the body, including:  · Removing waste and extra fluid from the blood to make urine.  · Making hormones that maintain the amount of fluid in tissues and blood vessels.  · Maintaining the right amount of fluids and chemicals in the body.  A small amount of kidney damage may not cause problems, but a large amount of damage may make it hard or impossible for the kidneys to work the way they should. If steps are not taken to slow down kidney damage or to stop it from getting worse, the kidneys may stop working permanently (end-stage renal disease or ESRD). Most of the time, CKD does not go away, but it can often be controlled. People who have CKD are usually able to live normal lives.  What are the causes?  The most common causes of this condition are diabetes and high blood pressure (hypertension). Other causes include:  · Heart and blood vessel (cardiovascular) disease.  · Kidney diseases, such as:  ? Glomerulonephritis.  ? Interstitial nephritis.  ? Polycystic kidney disease.  ? Renal vascular disease.  · Diseases that affect the immune system.  · Genetic diseases.  · Medicines that damage the kidneys, such as anti-inflammatory medicines.  · Being around or being in contact with poisonous (toxic) substances.  · A kidney or urinary infection that occurs again and again (recurs).  · Vasculitis. This is swelling or inflammation of the blood vessels.  · A problem with urine flow that may be caused by:  ? Cancer.  ? Having kidney stones more than one time.  ? An enlarged  prostate, in males.  What increases the risk?  You are more likely to develop this condition if you:  · Are older than age 60.  · Are female.  · Are -American, , , , or .  · Are a current or former smoker.  · Are obese.  · Have a family history of kidney disease or failure.  · Often take medicines that are damaging to the kidneys.  What are the signs or symptoms?  Symptoms of this condition include:  · Swelling (edema) of the face, legs, ankles, or feet.  · Tiredness (lethargy) and having less energy.  · Nausea or vomiting.  · Confusion or trouble concentrating.  · Problems with urination, such as:  ? Painful or burning feeling during urination.  ? Decreased urine production.  ? Frequent urination, especially at night.  ? Bloody urine.  · Muscle twitches and cramps, especially in the legs.  · Shortness of breath.  · Weakness.  · Loss of appetite.  · Metallic taste in the mouth.  · Trouble sleeping.  · Dry, itchy skin.  · A low blood count (anemia).  · Pale lining of the eyelids and surface of the eye (conjunctiva).  Symptoms develop slowly and may not be obvious until the kidney damage becomes severe. It is possible to have kidney disease for years without having any symptoms.  How is this diagnosed?  This condition may be diagnosed based on:  · Blood tests.  · Urine tests.  · Imaging tests, such as an ultrasound or CT scan.  · A test in which a sample of tissue is removed from the kidneys to be examined under a microscope (kidney biopsy).  These test results will help your health care provider determine how serious the CKD is.  How is this treated?  There is no cure for most cases of this condition, but treatment usually relieves symptoms and prevents or slows the progression of the disease. Treatment may include:  · Making diet changes, which may require you to avoid alcohol, salty foods (sodium), and foods that are high in potassium, calcium, and  protein.  · Medicines:  ? To lower blood pressure.  ? To control blood glucose.  ? To relieve anemia.  ? To relieve swelling.  ? To protect your bones.  ? To improve the balance of electrolytes in your blood.  · Removing toxic waste from the body through types of dialysis, if the kidneys can no longer do their job (kidney failure).  · Managing any other conditions that are causing your CKD or making it worse.  Follow these instructions at home:  Medicines  · Take over-the-counter and prescription medicines only as told by your health care provider. The dose of some medicines that you take may need to be adjusted.  · Do not take any new medicines unless approved by your health care provider. Many medicines can worsen your kidney damage.  · Do not take any vitamin and mineral supplements unless approved by your health care provider. Many nutritional supplements can worsen your kidney damage.  General instructions  · Follow your prescribed diet as told by your health care provider.  · Do not use any products that contain nicotine or tobacco, such as cigarettes and e-cigarettes. If you need help quitting, ask your health care provider.  · Monitor and track your blood pressure at home. Report changes in your blood pressure as told by your health care provider.  · If you are being treated for diabetes, monitor and track your blood sugar (blood glucose) levels as told by your health care provider.  · Maintain a healthy weight. If you need help with this, ask your health care provider.  · Start or continue an exercise plan. Exercise at least 30 minutes a day, 5 days a week.  · Keep your immunizations up to date as told by your health care provider.  · Keep all follow-up visits as told by your health care provider. This is important.  Where to find more information  · American Association of Kidney Patients: www.aakp.org  · National Kidney Foundation: www.kidney.org  · American Kidney Fund: www.akfinc.org  · [employer] Options  Rehabilitation Program: www.lifeoptions.org and www.kidneyschool.org  Contact a health care provider if:  · Your symptoms get worse.  · You develop new symptoms.  Get help right away if:  · You develop symptoms of ESRD, which include:  ? Headaches.  ? Numbness in the hands or feet.  ? Easy bruising.  ? Frequent hiccups.  ? Chest pain.  ? Shortness of breath.  ? Lack of menstruation, in women.  · You have a fever.  · You have decreased urine production.  · You have pain or bleeding when you urinate.  Summary  · Chronic kidney disease (CKD) occurs when the kidneys become damaged slowly over a long period of time.  · The most common causes of this condition are diabetes and high blood pressure (hypertension).  · There is no cure for most cases of this condition, but treatment usually relieves symptoms and prevents or slows the progression of the disease. Treatment may include a combination of medicines and lifestyle changes.  This information is not intended to replace advice given to you by your health care provider. Make sure you discuss any questions you have with your health care provider.  Document Revised: 11/30/2018 Document Reviewed: 01/25/2018  [employer] Patient Education © 2020 [employer]  Nystatin oral suspension  What is this medicine?  NYSTATIN (wendie STAT in) is an antifungal medicine. It is used to treat certain kinds of fungal or yeast infections.  This medicine may be used for other purposes; ask your health care provider or pharmacist if you have questions.  COMMON BRAND NAME(S): Mycostatin, Nystex  What should I tell my health care provider before I take this medicine?  They need to know if you have any of these conditions:  · diabetes  · kidney disease  · an unusual or allergic reaction to nystatin, ethylenediamine, parabens, thimerosal, other foods, dyes or preservatives  · pregnant or trying to get pregnant  · breast-feeding  How should I use this medicine?  Follow the directions on the  prescription label. Shake well before using. Use a specially marked dropper to measure every dose. Ask your pharmacist if you do not have one. Put one half of the dose in each side of your mouth. Swish the medicine around in your mouth and gargle. Hold your dose in your mouth for as long as you can. Swallow or spit out as directed by your doctor. Take your medicine at regular intervals. Do not take your medicine more often than directed. Do not skip doses or stop your medicine early even if you feel better. Do not stop taking except on your doctor's advice.  Talk to your pediatrician regarding the use of this medicine in children. Special care may be needed.  Overdosage: If you think you have taken too much of this medicine contact a poison control center or emergency room at once.  NOTE: This medicine is only for you. Do not share this medicine with others.  What if I miss a dose?  If you miss a dose, take it as soon as you can. If it is almost time for your next dose, take only that dose. Do not take double or extra doses.  What may interact with this medicine?  Interactions are not expected.  This list may not describe all possible interactions. Give your health care provider a list of all the medicines, herbs, non-prescription drugs, or dietary supplements you use. Also tell them if you smoke, drink alcohol, or use illegal drugs. Some items may interact with your medicine.  What should I watch for while using this medicine?  Tell your doctor or health care professional if your symptoms do not improve or get worse. If you wear dentures talk to your doctor about how to clean them.  What side effects may I notice from receiving this medicine?  Side effects that you should report to your doctor or health care professional as soon as possible:  · allergic reactions like skin rash, itching or hives, swelling of the face, lips, or tongue  · fast heart beat  · redness, blistering, peeling or loosening of the skin,  including inside the mouth  · trouble breathing  Side effects that usually do not require medical attention (report to your doctor or health care professional if they continue or are bothersome):  · diarrhea  · muscle aches or pains  · nausea, vomiting  · stomach upset  This list may not describe all possible side effects. Call your doctor for medical advice about side effects. You may report side effects to FDA at 8-579-WLJ-2951.  Where should I keep my medicine?  Keep out of the reach of children.  Store at room temperature between 15 and 25 degrees C (59 and 77 degrees F). Protect from light. Throw away any unused medicine after the expiration date.  NOTE: This sheet is a summary. It may not cover all possible information. If you have questions about this medicine, talk to your doctor, pharmacist, or health care provider.  © 2021 [employer] (2010-01-19 13:21:00)    Oral Thrush, Adult  Oral thrush, also called oral candidiasis, is a fungal infection that develops in the mouth and throat and on the tongue. It causes white patches to form in the mouth and on the tongue.  Many cases of thrush are mild, but this infection can also be serious. Thrush can be a repeated (recurrent) problem for certain people who have a weak body defense system (immune system). The weakness can be caused by chronic illnesses, or by taking medicines that limit the body's ability to fight infection. If a person has difficulty fighting infection, the fungus that causes thrush can spread through the body. This can cause life-threatening blood or organ infections.  What are the causes?  This condition is caused by a fungus (yeast) called Candida albicans.  · This fungus is normally present in small amounts in the mouth and on other mucous membranes. It usually causes no harm.  · If conditions are present that allow the fungus to grow without control, it invades surrounding tissues and becomes an infection.  · Other Candida species  can also lead to thrush, though this is rare.  What increases the risk?  The following factors may make you more likely to develop this condition:  · Having a weakened immune system.  · Being an older adult.  · Having diabetes, cancer, or HIV (human immunodeficiency virus).  · Having dry mouth (xerostomia).  · Being pregnant or breastfeeding.  · Having poor dental care, especially in those who have dentures.  · Using antibiotic or steroid medicines.  What are the signs or symptoms?  Symptoms of this condition can vary from mild and moderate to severe and persistent. Symptoms may include:  · A burning feeling in the mouth and throat. This can occur at the start of a thrush infection.  · White patches that stick to the mouth and tongue. The tissue around the patches may be red, raw, and painful. If rubbed (during tooth brushing, for example), the patches and the tissue of the mouth may bleed easily.  · A bad taste in the mouth or difficulty tasting foods.  · A cottony feeling in the mouth.  · Pain during eating and swallowing.  · Poor appetite.  · Cracking at the corners of the mouth.  How is this diagnosed?  This condition is diagnosed based on:  · A physical exam.  · Your medical history.  How is this treated?  This condition is treated with medicines called antifungals, which prevent the growth of fungi. These medicines are either applied directly to the affected area (topical) or swallowed (oral). The treatment will depend on the severity of the condition.  · Mild cases of thrush may be treated with an antifungal mouth rinse or lozenges. Treatment usually lasts about 14 days.  · Moderate to severe cases of thrush can be treated with oral antifungal medicine, if they have spread to the esophagus. A topical antifungal medicine may also be used. For some severe infections, treatment may need to continue for more than 14 days.  ? Oral antifungal medicines are rarely used during pregnancy because they may be harmful to  the unborn child. If you are pregnant, talk with your health care provider about options for treatment.  · Persistent or recurrent thrush. For cases of thrush that do not go away or keep coming back:  ? Treatment may be needed twice as long as the symptoms last.  ? Treatment will include both oral and topical antifungal medicines.  ? People with a weakened immune system can take an antifungal medicine on a continuous basis to prevent thrush infections.  It is important to treat conditions that make a person more likely to get thrush, such as diabetes or HIV.  Follow these instructions at home:  Medicines  · Take or use over-the-counter and prescription medicines only as told by your health care provider.  · Talk with your health care provider about an over-the-counter medicine called gentian violet, which kills bacteria and fungi.  Relieving soreness and discomfort  To help reduce the discomfort of thrush:  · Drink cold liquids such as water or iced tea.  · Try flavored ice treats or frozen juices.  · Eat foods that are easy to swallow, such as gelatin, ice cream, or custard.  · Try drinking from a straw if the patches in your mouth are painful.    General instructions  · Eat plain, unflavored yogurt as directed by your health care provider. Check the label to make sure the yogurt contains live cultures. This yogurt can help healthy bacteria grow in the mouth and can stop the growth of the fungus that causes thrush.  · If you wear dentures, remove the dentures before going to bed, brush them vigorously, and soak them in a cleaning solution as directed by your health care provider.  · Rinse your mouth with a warm salt-water mixture several times a day. To make a salt-water mixture, dissolve ½-1 tsp (3-6 g) of salt in 1 cup (237 mL) of warm water.  Contact a health care provider if:  · Your symptoms are getting worse or are not improving within 7 days of starting treatment.  · You have symptoms of a spreading  infection, such as white patches on the skin outside of the mouth.  · You are breastfeeding your baby and you have redness and pain in the nipples.  Summary  · Oral thrush, also called oral candidiasis, is a fungal infection that develops in the mouth and throat and on the tongue. It causes white patches to form in the mouth and on the tongue.  · You are more likely to get this condition if you have a weakened immune system or an underlying condition, such as HIV, cancer, or diabetes.  · This condition is treated with medicines called antifungals, which prevent the growth of fungi.  · Contact a health care provider if your symptoms do not improve, or get worse, within 7 days of starting treatment.  This information is not intended to replace advice given to you by your health care provider. Make sure you discuss any questions you have with your health care provider.  Document Revised: 10/23/2020 Document Reviewed: 10/23/2020  [employer] Patient Education © 2021 [employer]  Cyclobenzaprine tablets  What is this medicine?  CYCLOBENZAPRINE (sye kloe CHRISTINE za preen) is a muscle relaxer. It is used to treat muscle pain, spasms, and stiffness.  This medicine may be used for other purposes; ask your health care provider or pharmacist if you have questions.  COMMON BRAND NAME(S): Fexmid, Flexeril  What should I tell my health care provider before I take this medicine?  They need to know if you have any of these conditions:  · heart disease, irregular heartbeat, or previous heart attack  · liver disease  · thyroid problem  · an unusual or allergic reaction to cyclobenzaprine, tricyclic antidepressants, lactose, other medicines, foods, dyes, or preservatives  · pregnant or trying to get pregnant  · breast-feeding  How should I use this medicine?  Take this medicine by mouth with a glass of water. Follow the directions on the prescription label. If this medicine upsets your stomach, take it with food or milk. Take your medicine  at regular intervals. Do not take it more often than directed.  Talk to your pediatrician regarding the use of this medicine in children. Special care may be needed.  Overdosage: If you think you have taken too much of this medicine contact a poison control center or emergency room at once.  NOTE: This medicine is only for you. Do not share this medicine with others.  What if I miss a dose?  If you miss a dose, take it as soon as you can. If it is almost time for your next dose, take only that dose. Do not take double or extra doses.  What may interact with this medicine?  Do not take this medicine with any of the following medications:  · MAOIs like Carbex, Eldepryl, Marplan, Nardil, and Parnate  · narcotic medicines for cough  · safinamide  This medicine may also interact with the following medications:  · alcohol  · bupropion  · antihistamines for allergy, cough and cold  · certain medicines for anxiety or sleep  · certain medicines for bladder problems like oxybutynin, tolterodine  · certain medicines for depression like amitriptyline, fluoxetine, sertraline  · certain medicines for Parkinson's disease like benztropine, trihexyphenidyl  · certain medicines for seizures like phenobarbital, primidone  · certain medicines for stomach problems like dicyclomine, hyoscyamine  · certain medicines for travel sickness like scopolamine  · general anesthetics like halothane, isoflurane, methoxyflurane, propofol  · ipratropium  · local anesthetics like lidocaine, pramoxine, tetracaine  · medicines that relax muscles for surgery  · narcotic medicines for pain  · phenothiazines like chlorpromazine, mesoridazine, prochlorperazine, thioridazine  · verapamil  This list may not describe all possible interactions. Give your health care provider a list of all the medicines, herbs, non-prescription drugs, or dietary supplements you use. Also tell them if you smoke, drink alcohol, or use illegal drugs. Some items may interact with  your medicine.  What should I watch for while using this medicine?  Tell your doctor or health care professional if your symptoms do not start to get better or if they get worse.  You may get drowsy or dizzy. Do not drive, use machinery, or do anything that needs mental alertness until you know how this medicine affects you. Do not stand or sit up quickly, especially if you are an older patient. This reduces the risk of dizzy or fainting spells. Alcohol may interfere with the effect of this medicine. Avoid alcoholic drinks.  If you are taking another medicine that also causes drowsiness, you may have more side effects. Give your health care provider a list of all medicines you use. Your doctor will tell you how much medicine to take. Do not take more medicine than directed. Call emergency for help if you have problems breathing or unusual sleepiness.  Your mouth may get dry. Chewing sugarless gum or sucking hard candy, and drinking plenty of water may help. Contact your doctor if the problem does not go away or is severe.  What side effects may I notice from receiving this medicine?  Side effects that you should report to your doctor or health care professional as soon as possible:  · allergic reactions like skin rash, itching or hives, swelling of the face, lips, or tongue  · breathing problems  · chest pain  · fast, irregular heartbeat  · hallucinations  · seizures  · unusually weak or tired  Side effects that usually do not require medical attention (report to your doctor or health care professional if they continue or are bothersome):  · headache  · nausea, vomiting  This list may not describe all possible side effects. Call your doctor for medical advice about side effects. You may report side effects to FDA at 1-581-FDA-5764.  Where should I keep my medicine?  Keep out of the reach of children.  Store at room temperature between 15 and 30 degrees C (59 and 86 degrees F). Keep container tightly closed. Throw  away any unused medicine after the expiration date.  NOTE: This sheet is a summary. It may not cover all possible information. If you have questions about this medicine, talk to your doctor, pharmacist, or health care provider.  © 2021 [employer] (2019-11-20 12:49:26)

## 2021-08-11 LAB
ANION GAP SERPL CALCULATED.3IONS-SCNC: 14.2 MMOL/L (ref 5–15)
BUN SERPL-MCNC: 18 MG/DL (ref 6–20)
BUN/CREAT SERPL: 14.6 (ref 7–25)
CALCIUM SPEC-SCNC: 9.2 MG/DL (ref 8.6–10.5)
CALCIUM SPEC-SCNC: 9.7 MG/DL (ref 8.6–10.5)
CHLORIDE SERPL-SCNC: 95 MMOL/L (ref 98–107)
CO2 SERPL-SCNC: 24.8 MMOL/L (ref 22–29)
CREAT SERPL-MCNC: 1.23 MG/DL (ref 0.57–1)
GFR SERPL CREATININE-BSD FRML MDRD: 46 ML/MIN/1.73
GLUCOSE SERPL-MCNC: 174 MG/DL (ref 65–99)
POTASSIUM SERPL-SCNC: 4.2 MMOL/L (ref 3.5–5.2)
PTH-INTACT SERPL-MCNC: 52.1 PG/ML (ref 15–65)
SODIUM SERPL-SCNC: 134 MMOL/L (ref 136–145)

## 2021-08-12 ENCOUNTER — TELEPHONE (OUTPATIENT)
Dept: FAMILY MEDICINE CLINIC | Facility: CLINIC | Age: 51
End: 2021-08-12

## 2021-08-12 NOTE — TELEPHONE ENCOUNTER
----- Message from Quintin Flores MD sent at 8/12/2021  7:20 AM CDT -----  Please let pt know calcium levels along with PTH this time normal. Will continue to moonitor.     Recheck on next visit thanks

## 2021-08-18 ENCOUNTER — OFFICE VISIT (OUTPATIENT)
Dept: GASTROENTEROLOGY | Facility: CLINIC | Age: 51
End: 2021-08-18

## 2021-08-18 ENCOUNTER — HOSPITAL ENCOUNTER (OUTPATIENT)
Facility: HOSPITAL | Age: 51
Setting detail: HOSPITAL OUTPATIENT SURGERY
End: 2021-08-18
Attending: INTERNAL MEDICINE | Admitting: INTERNAL MEDICINE

## 2021-08-18 VITALS
DIASTOLIC BLOOD PRESSURE: 76 MMHG | SYSTOLIC BLOOD PRESSURE: 109 MMHG | WEIGHT: 166 LBS | BODY MASS INDEX: 27.66 KG/M2 | HEART RATE: 96 BPM | HEIGHT: 65 IN

## 2021-08-18 DIAGNOSIS — R10.13 EPIGASTRIC PAIN: Primary | ICD-10-CM

## 2021-08-18 DIAGNOSIS — R11.0 NAUSEA: ICD-10-CM

## 2021-08-18 DIAGNOSIS — K21.9 GASTROESOPHAGEAL REFLUX DISEASE, UNSPECIFIED WHETHER ESOPHAGITIS PRESENT: ICD-10-CM

## 2021-08-18 PROCEDURE — 99214 OFFICE O/P EST MOD 30 MIN: CPT | Performed by: INTERNAL MEDICINE

## 2021-08-18 RX ORDER — OMEPRAZOLE 40 MG/1
40 CAPSULE, DELAYED RELEASE ORAL DAILY
Qty: 30 CAPSULE | Refills: 11 | Status: SHIPPED | OUTPATIENT
Start: 2021-08-18 | End: 2022-09-13 | Stop reason: SDUPTHER

## 2021-08-18 RX ORDER — DEXTROSE AND SODIUM CHLORIDE 5; .45 G/100ML; G/100ML
30 INJECTION, SOLUTION INTRAVENOUS CONTINUOUS PRN
Status: CANCELLED | OUTPATIENT
Start: 2021-08-18

## 2021-08-19 NOTE — PROGRESS NOTES
Chief Complaint   Patient presents with   • 6 Month Clinical Appointment     Other Constipation       Subjective    Yudi West is a 50 y.o. female.    History of Present Illness  Patient presented to GI clinic complaining of epigastric pain associated with regurgitation, bloating with nausea and vomiting.  Sometimes vomits food consumed 2 to 3 days ahead of time.  Has belching and regurgitation.  Lost 11 pounds weight since last visit.  Also has hernia and nephrectomy scar.  Bowel movements are regular.  Taking PPI daily.       The following portions of the patient's history were reviewed and updated as appropriate:   Past Medical History:   Diagnosis Date   • Anxiety and depression    • Arthritis    • Asthma    • Cancer of kidney (CMS/HCC)     left   • Chronic kidney disease    • COPD (chronic obstructive pulmonary disease) (CMS/[facility])    • Coronary artery disease     1 stent.  is followed by jaden   • Diabetes mellitus (CMS/[facility])    • GERD (gastroesophageal reflux disease)    • Hyperlipidemia    • Hypertension    • Myocardial infarction (CMS/[facility])    • PTSD (post-traumatic stress disorder)    • Sleep apnea    • Substance abuse (CMS/[facility])    • Suicide attempt (CMS/[facility])      Past Surgical History:   Procedure Laterality Date   • COLONOSCOPY N/A 1/28/2021    Procedure: COLONOSCOPY;  Surgeon: Juwan Wilkes MD;  Location: [facility] ENDOSCOPY;  Service: Gastroenterology;  Laterality: N/A;   • CORONARY STENT PLACEMENT     • ENDOSCOPY N/A 1/28/2021    Procedure: ESOPHAGOGASTRODUODENOSCOPY;  Surgeon: Juwan Wilkes MD;  Location: [facility] ENDOSCOPY;  Service: Gastroenterology;  Laterality: N/A;   • FEMUR IM NAILING RETROGRADE Left 11/3/2019    Procedure: FEMUR INTRAMEDULLARY NAILING RETROGRADE;  Surgeon: Howie Campoverde MD;  Location: [facility] OR;  Service: Orthopedics   • GALLBLADDER SURGERY     • HARDWARE REMOVAL Left 12/4/2019    Procedure: HARDWARE REMOVAL LEFT FEMUR        (C-ARM#3);  Surgeon: Nirali  Howie Mcguire MD;  Location: [facility];  Service: Orthopedics   • HYSTERECTOMY     • NEPHRECTOMY Left    • REPLACEMENT TOTAL KNEE Left    • TONSILLECTOMY     • UPPER GASTROINTESTINAL ENDOSCOPY  01/28/2021    Per Dr. Juwan Wilkes M.D., [place], [place]     Family History   Problem Relation Age of Onset   • Heart disease Mother    • Hypertension Mother    • Cancer Mother    • Diabetes Mother    • Alcohol abuse Mother    • Heart disease Father    • Hypertension Father    • Alcohol abuse Father    • Alcohol abuse Sister    • Drug abuse Sister    • Depression Sister    • Anxiety disorder Sister    • Drug abuse Brother    • Alcohol abuse Brother    • Suicide Attempts Brother      OB History    No obstetric history on file.       Prior to Admission medications    Medication Sig Start Date End Date Taking? Authorizing Provider   albuterol (PROAIR RESPICLICK) 108 (90 Base) MCG/ACT inhaler Inhale 2 puffs Every 4 (Four) Hours As Needed for Wheezing. 7/20/21  Yes Quintin Flores MD   Alpha-Lipoic Acid 300 MG capsule TAKE ONE TO TWO CAPSULES BY MOUTH TWICE DAILY 8/28/20  Yes Provider, MD Sara   aspirin 81 MG EC tablet Take 1 tablet by mouth 2 (Two) Times a Day With Meals.  Patient taking differently: Take 81 mg by mouth Daily. 11/4/19  Yes Howie Campoverde MD   Blood Glucose Monitoring Suppl (FREESTYLE FREEDOM LITE) w/Device kit USE TO TEST BLOOD SUGAR TWO TO THREE TI MES DAILY 7/14/20  Yes Quintin Flores MD   carvedilol (COREG) 3.125 MG tablet TAKE ONE TABLET BY MOUTH TWICE DAILY WITH MEALS 6/10/21  Yes Quintin Flores MD   clopidogrel (PLAVIX) 75 MG tablet Take 1 tablet by mouth Daily. 7/22/21  Yes Quintin Flores MD   cyclobenzaprine (FLEXERIL) 5 MG tablet Take 1 tablet by mouth 3 (Three) Times a Day As Needed for Muscle Spasms. 8/10/21  Yes Quintin Flores MD   dicyclomine (BENTYL) 20 MG tablet Take 1 tablet by mouth Every 6 (Six) Hours. 7/2/20  Yes Quintin Flores MD   Dulaglutide 1.5 MG/0.5ML solution  "pen-injector Inject 1.5 mg under the skin into the appropriate area as directed 1 (One) Time Per Week. 8/3/21  Yes Quintin Florse MD   Easy Touch Insulin Syringe 28G X 1/2\" 1 ML misc USE AT BEDTIME AS DIRECTED 7/22/21  Yes Quintin Flores MD   empagliflozin (JARDIANCE) 25 MG tablet tablet Take 1 tablet by mouth Daily. 8/3/21  Yes Quintin Flores MD   ezetimibe (Zetia) 10 MG tablet Take 1 tablet by mouth Daily. 8/9/21  Yes Quintin Flores MD   famotidine (PEPCID) 20 MG tablet Take 1 tablet by mouth 2 (Two) Times a Day. 7/22/21  Yes Quintin Flores MD   fluconazole (Diflucan) 150 MG tablet Take on onset of symptoms now and in 72 hours 8/10/21  Yes Quintin Flores MD   gabapentin (NEURONTIN) 300 MG capsule Take 300 mg by mouth 4 (Four) Times a Day. 6/10/20  Yes Provider, Historical, MD   Global Ease Inject Pen Needles 31G X 8 MM misc USE TO TEST BLOOD SUGARS TWO TO THREE TIMES DAILY 3/1/21  Yes Quintin Flores MD   glucose blood (FREESTYLE TEST STRIPS) test strip CHECK SUGARS BEFORE BREAKFAST AND TWO HOURS AFTER MEALS 7/30/21  Yes Quintin Flores MD   guaiFENesin (Mucinex) 600 MG 12 hr tablet Take 2 tablets by mouth 2 (Two) Times a Day. 7/22/21  Yes Quintin Flores MD   hydrOXYzine (ATARAX) 25 MG tablet Take 0.5-1 tablets by mouth 3 (Three) Times a Day As Needed for Anxiety. 3/26/21  Yes Lashell Dueñas APRN   Insulin Glargine (BASAGLAR KWIKPEN) 100 UNIT/ML injection pen Take 36 units at bedtime can go up by 2-3 unites every 3 days until am sugars running     02/05/2021 - Patient currently utilizing 50 Units nightly.  Patient taking differently: Take 36 units at bedtime can go up by 2-3 unites every 3 days until am sugars running     08/18/2021 - Patient currently utilizing 50 Units nightly. 7/22/21  Yes Quintin Flores MD   lidocaine (LIDODERM) 5 % APPLY ONE TO TWO PATCHES AS DIRECTED ON LOWER BACK, LEFT UPPER LEG 12 HOURS ON AND 12 HOURS OFF 12/2/20  Yes Provider, MD Sara "   linaclotide (LINZESS) 290 MCG capsule capsule Take 1 capsule by mouth Every Morning Before Breakfast. 2/5/21  Yes Juwan Wilkes MD   loratadine (CLARITIN) 10 MG tablet TAKE ONE TABLET BY MOUTH EVERY DAY 6/10/21  Yes Quintin Flores MD   losartan (Cozaar) 25 MG tablet Take 1 tablet by mouth Daily. 8/3/21  Yes Quintin Flores MD   nicotine (Nicoderm CQ) 21 MG/24HR patch Place 1 patch on the skin as directed by provider Daily. 7/22/21  Yes Quintin Flores MD   omeprazole (PrilOSEC) 40 MG capsule Take 1 capsule by mouth Daily. 10/6/20  Yes Juwan Wilkes MD   ondansetron ODT (ZOFRAN-ODT) 8 MG disintegrating tablet DISSOLVE ONE TABLET ON THE TONGUE EVERY 8 HOURS AS NEEDED FOR NAUSEA OR VOMITING 6/10/21  Yes Quintin Flores MD   oxyCODONE-acetaminophen (PERCOCET) 7.5-325 MG per tablet Take 1 tablet by mouth 3 (Three) Times a Day. 6/15/20  Yes ProviderSara MD   polyethylene glycol (MIRALAX) 17 GM/SCOOP powder MIX 17 GRAMS (1 CAPFUL) IN 8 OUNCES OF WATER OR JUICE AND DRINK DAILY 3/2/21  Yes Juwan Wilkes MD   QUEtiapine (SEROquel) 100 MG tablet Take 1.5 tablets by mouth Every Night. 3/24/21  Yes Lashell Dueñas APRN   ranolazine (RANEXA) 500 MG 12 hr tablet Take 1 tablet by mouth 2 (Two) Times a Day. 7/22/21  Yes Quintin Flores MD   rosuvastatin (CRESTOR) 20 MG tablet TAKE ONE TABLET BY MOUTH AT BEDTIME 6/10/21  Yes Quintin lFores MD   venlafaxine XR (EFFEXOR-XR) 150 MG 24 hr capsule Take 1 capsule by mouth Daily. 3/24/21  Yes Lashell Dueñas APRN   vitamin D (ERGOCALCIFEROL) 1.25 MG (62251 UT) capsule capsule Take 1 capsule by mouth Every 7 (Seven) Days. 8/3/21  Yes Quintin Flores MD   nystatin (MYCOSTATIN) 531244 UNIT/ML suspension Take 5 mL by mouth 4 (Four) Times a Day for 7 days. 8/10/21 8/17/21  Quintin Flores MD   omeprazole (priLOSEC) 40 MG capsule Take 1 capsule by mouth Daily. 8/18/21   Juwan Wilkes MD   carvedilol (COREG) 3.125 MG tablet TAKE ONE TABLET  BY MOUTH TWICE DAILY WITH MEALS 10/21/20   Quintin Flores MD   Dulaglutide (Trulicity) 1.5 MG/0.5ML solution pen-injector Inject 1.5 mg under the skin into the appropriate area as directed 1 (One) Time Per Week. 9/16/20   Quintin Flores MD   nicotine (NICODERM CQ) 21 MG/24HR patch APPLY ONE PATCH DAILY 5/7/21 8/18/21  Quintin Flores MD     Allergies   Allergen Reactions   • Sulfa Antibiotics Itching     Social History     Socioeconomic History   • Marital status:      Spouse name: Not on file   • Number of children: Not on file   • Years of education: Not on file   • Highest education level: Not on file   Tobacco Use   • Smoking status: Current Every Day Smoker     Packs/day: 1.00     Years: 36.00     Pack years: 36.00     Types: Cigarettes   • Smokeless tobacco: Never Used   Vaping Use   • Vaping Use: Never used   • Passive vaping exposure Yes (Son)   Substance and Sexual Activity   • Alcohol use: Not Currently     Comment: passed use for yrs including cases and 2 fifths daily; quit 20 yrs ago   • Drug use: Not Currently     Types: Methamphetamines, Cocaine(coke)   • Sexual activity: Defer       Review of Systems  Review of Systems   Constitutional: Negative for chills, fatigue, fever and unexpected weight change.   HENT: Negative for congestion, ear discharge, hearing loss, nosebleeds and sore throat.    Eyes: Negative for pain, discharge and redness.   Respiratory: Negative for cough, chest tightness, shortness of breath and wheezing.    Cardiovascular: Negative for chest pain and palpitations.   Gastrointestinal: Positive for abdominal pain, nausea and vomiting. Negative for abdominal distention, blood in stool, constipation and diarrhea.   Endocrine: Negative for cold intolerance, polydipsia, polyphagia and polyuria.   Genitourinary: Negative for dysuria, flank pain, frequency, hematuria and urgency.   Musculoskeletal: Negative for arthralgias, back pain, joint swelling and myalgias.   Skin:  "Negative for color change, pallor and rash.   Neurological: Negative for tremors, seizures, syncope, weakness and headaches.   Hematological: Negative for adenopathy. Does not bruise/bleed easily.   Psychiatric/Behavioral: Negative for behavioral problems, confusion, dysphoric mood, hallucinations and suicidal ideas. The patient is not nervous/anxious.         /76   Pulse 96   Ht 165.1 cm (65\")   Wt 75.3 kg (166 lb)   BMI 27.62 kg/m²     Objective    Physical Exam  Constitutional:       Appearance: She is well-developed.   HENT:      Head: Normocephalic and atraumatic.   Eyes:      Conjunctiva/sclera: Conjunctivae normal.      Pupils: Pupils are equal, round, and reactive to light.   Neck:      Thyroid: No thyromegaly.   Cardiovascular:      Rate and Rhythm: Normal rate and regular rhythm.      Heart sounds: Normal heart sounds. No murmur heard.     Pulmonary:      Effort: Pulmonary effort is normal.      Breath sounds: Normal breath sounds. No wheezing.   Abdominal:      General: Bowel sounds are normal. There is no distension.      Palpations: Abdomen is soft. There is no mass.      Tenderness: There is no abdominal tenderness.      Hernia: No hernia is present.   Genitourinary:     Comments: No lesions noted  Musculoskeletal:         General: No tenderness. Normal range of motion.      Cervical back: Normal range of motion and neck supple.   Lymphadenopathy:      Cervical: No cervical adenopathy.   Skin:     General: Skin is warm and dry.      Findings: No rash.   Neurological:      Mental Status: She is alert and oriented to person, place, and time.      Cranial Nerves: No cranial nerve deficit.   Psychiatric:         Thought Content: Thought content normal.       Lab on 08/10/2021   Component Date Value Ref Range Status   • Glucose 08/10/2021 174* 65 - 99 mg/dL Final   • BUN 08/10/2021 18  6 - 20 mg/dL Final   • Creatinine 08/10/2021 1.23* 0.57 - 1.00 mg/dL Final   • Sodium 08/10/2021 134* 136 - 145 " mmol/L Final   • Potassium 08/10/2021 4.2  3.5 - 5.2 mmol/L Final   • Chloride 08/10/2021 95* 98 - 107 mmol/L Final   • CO2 08/10/2021 24.8  22.0 - 29.0 mmol/L Final   • Calcium 08/10/2021 9.2  8.6 - 10.5 mg/dL Final   • eGFR Non African Amer 08/10/2021 46* >60 mL/min/1.73 Final   • BUN/Creatinine Ratio 08/10/2021 14.6  7.0 - 25.0 Final   • Anion Gap 08/10/2021 14.2  5.0 - 15.0 mmol/L Final   • PTH, Intact 08/10/2021 52.1  15.0 - 65.0 pg/mL Final   • Calcium 08/10/2021 9.7  8.6 - 10.5 mg/dL Final     Assessment/Plan      1. Epigastric pain    2. Nausea    3. Gastroesophageal reflux disease, unspecified whether esophagitis present    1.  Abdominal pain with constipation and weight loss, continue Linzess and continue MiraLAX daily.  Continue high-fiber diet.  CT abdomen and pelvis if symptoms continue.  2.  Abdominal pain with nausea, vomiting and weight loss, daily of gastroparesis.  Continue Prilosec 40 mg p.o. daily.    Add Carafate and schedule gastric emptying scan.  3.  Obesity with recent weight loss, add p.o. nutritional supplements..  4.  GERD with worsening symptoms rule out gastroparesis and complications.  Increase PPI to twice daily.  Could also be due to hyper secretory syndrome.  Schedule EGD with Bravo pH probe placement.  5.  History of drug usage, off currently.  6.  Tobacco usage, recommend cessation.  7.  Colorectal cancer screening, repeat colonoscopy in 5 years.      Orders placed during this encounter include:  Orders Placed This Encounter   Procedures   • NM Gastric Emptying     Standing Status:   Future     Standing Expiration Date:   8/18/2022     Order Specific Question:   Patient Pregnant     Answer:   No     Order Specific Question:   Is the patient breastfeeding?     Answer:   No   • Follow Anesthesia Guidelines / Standing Orders     Standing Status:   Future   • Obtain Informed Consent     Standing Status:   Future     Order Specific Question:   Informed Consent Given For     Answer:    ESOPHAGOGASTRODUODENOSCOPY   • Follow Anesthesia Guidelines / Standing Orders     Standing Status:   Future   • Obtain Informed Consent     Standing Status:   Future     Order Specific Question:   Informed Consent Given For     Answer:   ESOPHAGOGASTRODUODENOSCOPY AND BRAVO       ESOPHAGOGASTRODUODENOSCOPY AND BRAVO (N/A)    Review and/or summary of lab tests, radiology, procedures, medications. Review and summary of old records and obtaining of history. The risks and benefits of my recommendations, as well as other treatment options were discussed with the patient today. Questions were answered.    New Medications Ordered This Visit   Medications   • omeprazole (priLOSEC) 40 MG capsule     Sig: Take 1 capsule by mouth Daily.     Dispense:  30 capsule     Refill:  11       Follow-up: Return in about 1 month (around 9/18/2021).               Results for orders placed or performed in visit on 08/10/21   PTH, Intact & Calcium    Specimen: Blood   Result Value Ref Range    PTH, Intact 52.1 15.0 - 65.0 pg/mL    Calcium 9.7 8.6 - 10.5 mg/dL   Basic metabolic panel    Specimen: Blood   Result Value Ref Range    Glucose 174 (H) 65 - 99 mg/dL    BUN 18 6 - 20 mg/dL    Creatinine 1.23 (H) 0.57 - 1.00 mg/dL    Sodium 134 (L) 136 - 145 mmol/L    Potassium 4.2 3.5 - 5.2 mmol/L    Chloride 95 (L) 98 - 107 mmol/L    CO2 24.8 22.0 - 29.0 mmol/L    Calcium 9.2 8.6 - 10.5 mg/dL    eGFR Non African Amer 46 (L) >60 mL/min/1.73    BUN/Creatinine Ratio 14.6 7.0 - 25.0    Anion Gap 14.2 5.0 - 15.0 mmol/L   Results for orders placed or performed in visit on 07/28/21   CBC Auto Differential    Specimen: Blood   Result Value Ref Range    WBC 13.58 (H) 3.40 - 10.80 10*3/mm3    RBC 5.23 3.77 - 5.28 10*6/mm3    Hemoglobin 15.1 12.0 - 15.9 g/dL    Hematocrit 44.5 34.0 - 46.6 %    MCV 85.1 79.0 - 97.0 fL    MCH 28.9 26.6 - 33.0 pg    MCHC 33.9 31.5 - 35.7 g/dL    RDW 11.8 (L) 12.3 - 15.4 %    RDW-SD 36.3 (L) 37.0 - 54.0 fl    MPV 11.2 6.0 - 12.0  fL    Platelets 358 140 - 450 10*3/mm3    Neutrophil % 72.5 42.7 - 76.0 %    Lymphocyte % 19.8 19.6 - 45.3 %    Monocyte % 6.1 5.0 - 12.0 %    Eosinophil % 0.6 0.3 - 6.2 %    Basophil % 0.5 0.0 - 1.5 %    Immature Grans % 0.5 0.0 - 0.5 %    Neutrophils, Absolute 9.84 (H) 1.70 - 7.00 10*3/mm3    Lymphocytes, Absolute 2.69 0.70 - 3.10 10*3/mm3    Monocytes, Absolute 0.83 0.10 - 0.90 10*3/mm3    Eosinophils, Absolute 0.08 0.00 - 0.40 10*3/mm3    Basophils, Absolute 0.07 0.00 - 0.20 10*3/mm3    Immature Grans, Absolute 0.07 (H) 0.00 - 0.05 10*3/mm3    nRBC 0.0 0.0 - 0.2 /100 WBC   Iron Profile    Specimen: Blood   Result Value Ref Range    Iron 110 37 - 145 mcg/dL    Iron Saturation 29 20 - 50 %    Transferrin 254 200 - 360 mg/dL    TIBC 378 298 - 536 mcg/dL   Microalbumin / Creatinine Urine Ratio - Urine, Clean Catch    Specimen: Urine, Clean Catch   Result Value Ref Range    Microalbumin/Creatinine Ratio 154.2 mg/g    Creatinine, Urine 140.7 mg/dL    Microalbumin, Urine 21.7 mg/dL   Vitamin D 25 Hydroxy    Specimen: Blood   Result Value Ref Range    25 Hydroxy, Vitamin D 20.2 ng/ml   T3, Free    Specimen: Blood   Result Value Ref Range    T3, Free 2.51 2.00 - 4.40 pg/mL   TSH    Specimen: Blood   Result Value Ref Range    TSH 1.480 0.270 - 4.200 uIU/mL   T4, Free    Specimen: Blood   Result Value Ref Range    Free T4 1.36 0.93 - 1.70 ng/dL   Hemoglobin A1c    Specimen: Blood   Result Value Ref Range    Hemoglobin A1C 13.51 (H) 4.80 - 5.60 %   Vitamin B12    Specimen: Blood   Result Value Ref Range    Vitamin B-12 587 211 - 946 pg/mL   Lipid Panel    Specimen: Blood   Result Value Ref Range    Total Cholesterol 227 (H) 0 - 200 mg/dL    Triglycerides 226 (H) 0 - 150 mg/dL    HDL Cholesterol 41 40 - 60 mg/dL    LDL Cholesterol  145 (H) 0 - 100 mg/dL    VLDL Cholesterol 41 (H) 5 - 40 mg/dL    LDL/HDL Ratio 3.43    Comprehensive Metabolic Panel    Specimen: Blood   Result Value Ref Range    Glucose 283 (H) 65 - 99 mg/dL     BUN 18 6 - 20 mg/dL    Creatinine 1.16 (H) 0.57 - 1.00 mg/dL    Sodium 133 (L) 136 - 145 mmol/L    Potassium 4.8 3.5 - 5.2 mmol/L    Chloride 93 (L) 98 - 107 mmol/L    CO2 27.4 22.0 - 29.0 mmol/L    Calcium 11.8 (H) 8.6 - 10.5 mg/dL    Total Protein 7.4 6.0 - 8.5 g/dL    Albumin 4.30 3.50 - 5.20 g/dL    ALT (SGPT) 11 1 - 33 U/L    AST (SGOT) 12 1 - 32 U/L    Alkaline Phosphatase 111 39 - 117 U/L    Total Bilirubin 0.5 0.0 - 1.2 mg/dL    eGFR Non African Amer 49 (L) >60 mL/min/1.73    Globulin 3.1 gm/dL    A/G Ratio 1.4 g/dL    BUN/Creatinine Ratio 15.5 7.0 - 25.0    Anion Gap 12.6 5.0 - 15.0 mmol/L   Results for orders placed or performed during the hospital encounter of 01/28/21   Tissue Pathology Exam    Specimen: A: Gastric, Antrum; Tissue    B: Esophagus; Tissue    C: Large Intestine, Cecum; Tissue    D: Large Intestine, Right / Ascending Colon; Tissue    E: Large Intestine, Sigmoid Colon; Tissue    F: Large Intestine, Rectum; Tissue   Result Value Ref Range    Case Report       Surgical Pathology Report                         Case: [id number]                                  Authorizing Provider:  Juwan Wilkes MD      Collected:           01/28/2021 11:25 AM          Ordering Location:     [facility]             Received:            01/28/2021 02:27 PM                                 [facility]                                                     Pathologist:           Freedom Turcios MD                                                           Specimens:   1) - Gastric, Antrum                                                                                2) - Esophagus, EGJ                                                                                 3) - Large Intestine, Cecum, polyp                                                                  4) - Large Intestine, Right / Ascending Colon, polyp                                                5) - Large Intestine, Rectum, polyps                                                                  6) - Large Intestine, Sigmoid Colon, polyps                                                Final Diagnosis       SEE SCANNED REPORT       POC Glucose Once    Specimen: Blood   Result Value Ref Range    Glucose 258 (H) 70 - 130 mg/dL   POC Glucose Once    Specimen: Blood   Result Value Ref Range    Glucose 354 (H) 70 - 130 mg/dL   Results for orders placed or performed in visit on 01/25/21   COVID-19,[facility] PANTHER,[facility] IN-HOUSE, NP/OP SWAB IN UTM/VTM/[facility] TRANSPORT MEDIA,24 HR TAT - Swab, Nasopharynx    Specimen: Nasopharynx; Swab   Result Value Ref Range    COVID19 Not Detected Not Detected - Ref. Range   Results for orders placed or performed in visit on 11/10/20   COVID [facility] Priority - Swab, Nasopharynx    Specimen: Nasopharynx; Swab   Result Value Ref Range    COVID [facility] PRIORITY Comment    COVID-19,[facility] ROUTINE, NP/OP SWAB IN TRANSPORT MEDIA OR ESWAB 72 HR TAT - Swab, Nasopharynx    Specimen: Nasopharynx; Swab   Result Value Ref Range    SARS-CoV-2, KOLE Not Detected Not Detected   Results for orders placed or performed during the hospital encounter of 08/31/20   Doppler Arterial Multi Level Lower Extremity - Bilateral CAR   Result Value Ref Range    RIGHT LOW THIGH SYS  mmHg    RIGHT POPLITEAL SYS  mmHg    RIGHT DORSALIS PEDIS SYS  mmHg    RIGHT POST TIBIAL SYS  mmHg    RIGHT RANDI RATIO 1.32     LEFT LOW THIGH SYS  mmHg    LEFT POPLITEAL SYS  mmHg    LEFT DORSALIS PEDIS SYS  mmHg    LEFT POST TIBIAL SYS  mmHg    LEFT RANDI RATIO 1.32     Upper arterial right arm brachial sys max 108 mmHg    Upper arterial left arm brachial sys max 111 mmHg   Results for orders placed or performed in visit on 06/17/20   CBC Auto Differential    Specimen: Blood   Result Value Ref Range    WBC 7.21 3.40 - 10.80 10*3/mm3    RBC 4.50 3.77 - 5.28 10*6/mm3    Hemoglobin 13.3 12.0 - 15.9 g/dL    Hematocrit 39.3  34.0 - 46.6 %    MCV 87.3 79.0 - 97.0 fL    MCH 29.6 26.6 - 33.0 pg    MCHC 33.8 31.5 - 35.7 g/dL    RDW 11.9 (L) 12.3 - 15.4 %    RDW-SD 38.2 37.0 - 54.0 fl    MPV 11.2 6.0 - 12.0 fL    Platelets 249 140 - 450 10*3/mm3    Neutrophil % 47.4 42.7 - 76.0 %    Lymphocyte % 35.4 19.6 - 45.3 %    Monocyte % 6.9 5.0 - 12.0 %    Eosinophil % 7.1 (H) 0.3 - 6.2 %    Basophil % 1.4 0.0 - 1.5 %    Immature Grans % 1.8 (H) 0.0 - 0.5 %    Neutrophils, Absolute 3.42 1.70 - 7.00 10*3/mm3    Lymphocytes, Absolute 2.55 0.70 - 3.10 10*3/mm3    Monocytes, Absolute 0.50 0.10 - 0.90 10*3/mm3    Eosinophils, Absolute 0.51 (H) 0.00 - 0.40 10*3/mm3    Basophils, Absolute 0.10 0.00 - 0.20 10*3/mm3    Immature Grans, Absolute 0.13 (H) 0.00 - 0.05 10*3/mm3    nRBC 0.0 0.0 - 0.2 /100 WBC     *Note: Due to a large number of results and/or encounters for the requested time period, some results have not been displayed. A complete set of results can be found in Results Review.         This document has been electronically signed by Juwan Wilkes MD on August 18, 2021 22:30 CDT

## 2021-08-20 ENCOUNTER — HOSPITAL ENCOUNTER (OUTPATIENT)
Dept: NUCLEAR MEDICINE | Facility: HOSPITAL | Age: 51
Discharge: HOME | End: 2021-08-20

## 2021-08-20 DIAGNOSIS — R11.0 NAUSEA: ICD-10-CM

## 2021-08-20 DIAGNOSIS — R10.13 EPIGASTRIC PAIN: ICD-10-CM

## 2021-08-20 DIAGNOSIS — K21.9 GASTROESOPHAGEAL REFLUX DISEASE, UNSPECIFIED WHETHER ESOPHAGITIS PRESENT: ICD-10-CM

## 2021-08-20 PROCEDURE — A9541 TC99M SULFUR COLLOID: HCPCS | Performed by: INTERNAL MEDICINE

## 2021-08-20 PROCEDURE — 0 TECHNETIUM SULFUR COLLOID: Performed by: INTERNAL MEDICINE

## 2021-08-20 PROCEDURE — 78264 GASTRIC EMPTYING IMG STUDY: CPT

## 2021-08-20 RX ADMIN — TECHNETIUM TC 99M SULFUR COLLOID 1 DOSE: KIT at 07:12

## 2021-08-23 ENCOUNTER — OFFICE VISIT (OUTPATIENT)
Dept: CARDIOLOGY | Facility: CLINIC | Age: 51
End: 2021-08-23

## 2021-08-23 VITALS
OXYGEN SATURATION: 96 % | HEIGHT: 65 IN | HEART RATE: 88 BPM | BODY MASS INDEX: 28.49 KG/M2 | TEMPERATURE: 97.1 F | WEIGHT: 171 LBS | SYSTOLIC BLOOD PRESSURE: 118 MMHG | DIASTOLIC BLOOD PRESSURE: 72 MMHG

## 2021-08-23 DIAGNOSIS — Z72.0 TOBACCO USER: ICD-10-CM

## 2021-08-23 DIAGNOSIS — I25.10 CORONARY ARTERY DISEASE INVOLVING NATIVE CORONARY ARTERY OF NATIVE HEART WITHOUT ANGINA PECTORIS: ICD-10-CM

## 2021-08-23 DIAGNOSIS — I10 ESSENTIAL HYPERTENSION: Primary | ICD-10-CM

## 2021-08-23 DIAGNOSIS — E78.2 MIXED HYPERLIPIDEMIA: ICD-10-CM

## 2021-08-23 PROCEDURE — 93000 ELECTROCARDIOGRAM COMPLETE: CPT | Performed by: INTERNAL MEDICINE

## 2021-08-23 PROCEDURE — 99214 OFFICE O/P EST MOD 30 MIN: CPT | Performed by: INTERNAL MEDICINE

## 2021-08-23 NOTE — PROGRESS NOTES
Yudi West  50 y.o. female    1. Essential hypertension    2. Coronary artery disease involving native coronary artery of native heart without angina pectoris    3. Mixed hyperlipidemia    4. Tobacco user        History of Present Illness:  Yudi West is a 50-year-old female with multiple medical issues including coronary artery disease status post PCI to RCA in May 2015, hypertension, diabetes mellitus, hyperlipidemia, depression, CKD with history of renal cell carcinoma status post left nephrectomy, DJD status post left knee surgery, obstructive sleep apnea, GERD/hiatal hernia, history of hypercalcemia, tobacco abuse.  She is being seen by me after long interval.  Fortunately she has done reasonably well from a cardiac standpoint and denies any chest pain or shortness of breath.  Diabetes has not been under control and her hemoglobin A1c was 13.5 in July 2021.  She claims compliant with medication.  She however continues to smoke but has cut back on the tobacco use.    She presented with acute inferior wall Myocardial Infarction in May 2015 and underwent cardiac catheterization which showed the following findings:  1.    Critical lesion noted in the right coronary artery which was the        infarct vessel and successful PCI with balloon angioplasty and        subsequent placement of a 3.5 x 23 mm Xience Xpedition stent and        reduction of stenosis to 0%.  2.    Critical lesion noted in one of the small subbranches of the ramus        intermedius vessel. This will be treated medically.  3.    Noncritical disease noted in the left anterior descending coronary        artery and left circumflex coronary artery.  4.    Overall normal contractility of the left ventricle with an        ejection fraction of 50% to 55% with mild inferolateral        hypokinesis.     Lexiscan Cardiolite stress test in June 2019 showed no reversible ischemia and echocardiogram showed normal LV systolic function with  grade 1 diastolic dysfunction.    EKG today showed sinus rhythm with heart rate of 88 bpm.  Poor R wave progression anteroseptal leads.      Allergies   Allergen Reactions   • Sulfa Antibiotics Itching         Past Medical History:   Diagnosis Date   • Anxiety and depression    • Arthritis    • Asthma    • Cancer of kidney (CMS/HCC)     left   • Chronic kidney disease    • COPD (chronic obstructive pulmonary disease) (CMS/HCC)    • Coronary artery disease     1 stent.  is followed by jaden   • Diabetes mellitus (CMS/HCC)    • GERD (gastroesophageal reflux disease)    • Hyperlipidemia    • Hypertension    • Myocardial infarction (CMS/HCC)    • PTSD (post-traumatic stress disorder)    • Sleep apnea    • Substance abuse (CMS/HCC)    • Suicide attempt (CMS/HCC)          Past Surgical History:   Procedure Laterality Date   • COLONOSCOPY N/A 1/28/2021    Procedure: COLONOSCOPY;  Surgeon: Juwan Wilkes MD;  Location: [facility] ENDOSCOPY;  Service: Gastroenterology;  Laterality: N/A;   • CORONARY STENT PLACEMENT     • ENDOSCOPY N/A 1/28/2021    Procedure: ESOPHAGOGASTRODUODENOSCOPY;  Surgeon: Juwan Wilkes MD;  Location: [facility] ENDOSCOPY;  Service: Gastroenterology;  Laterality: N/A;   • FEMUR IM NAILING RETROGRADE Left 11/3/2019    Procedure: FEMUR INTRAMEDULLARY NAILING RETROGRADE;  Surgeon: Howie Campoverde MD;  Location: [facility] OR;  Service: Orthopedics   • GALLBLADDER SURGERY     • HARDWARE REMOVAL Left 12/4/2019    Procedure: HARDWARE REMOVAL LEFT FEMUR        (C-ARM#3);  Surgeon: Howie Campoverde MD;  Location: [facility] OR;  Service: Orthopedics   • HYSTERECTOMY     • NEPHRECTOMY Left    • REPLACEMENT TOTAL KNEE Left    • TONSILLECTOMY     • UPPER GASTROINTESTINAL ENDOSCOPY  01/28/2021    Per Dr. Juwan Wilkes M.D., [place], [place]         Family History   Problem Relation Age of Onset   • Heart disease Mother    • Hypertension Mother    • Cancer Mother    • Diabetes Mother    • Alcohol abuse  Mother    • Heart disease Father    • Hypertension Father    • Alcohol abuse Father    • Alcohol abuse Sister    • Drug abuse Sister    • Depression Sister    • Anxiety disorder Sister    • Drug abuse Brother    • Alcohol abuse Brother    • Suicide Attempts Brother          Social History     Socioeconomic History   • Marital status:      Spouse name: Not on file   • Number of children: Not on file   • Years of education: Not on file   • Highest education level: Not on file   Tobacco Use   • Smoking status: Current Every Day Smoker     Packs/day: 1.00     Years: 36.00     Pack years: 36.00     Types: Cigarettes   • Smokeless tobacco: Never Used   Vaping Use   • Vaping Use: Never used   • Passive vaping exposure Yes (Son)   Substance and Sexual Activity   • Alcohol use: Not Currently     Comment: passed use for yrs including cases and 2 fifths daily; quit 20 yrs ago   • Drug use: Not Currently     Types: Methamphetamines, Cocaine(coke)   • Sexual activity: Defer         Current Outpatient Medications   Medication Sig Dispense Refill   • albuterol (PROAIR RESPICLICK) 108 (90 Base) MCG/ACT inhaler Inhale 2 puffs Every 4 (Four) Hours As Needed for Wheezing. 1 each 3   • Alpha-Lipoic Acid 300 MG capsule TAKE ONE TO TWO CAPSULES BY MOUTH TWICE DAILY     • aspirin 81 MG EC tablet Take 1 tablet by mouth 2 (Two) Times a Day With Meals. (Patient taking differently: Take 81 mg by mouth Daily.) 60 tablet 0   • Blood Glucose Monitoring Suppl (FREESTYLE FREEDOM LITE) w/Device kit USE TO TEST BLOOD SUGAR TWO TO THREE TI MES DAILY 1 each 0   • carvedilol (COREG) 3.125 MG tablet TAKE ONE TABLET BY MOUTH TWICE DAILY WITH MEALS 60 tablet 3   • clopidogrel (PLAVIX) 75 MG tablet Take 1 tablet by mouth Daily. 30 tablet 3   • cyclobenzaprine (FLEXERIL) 5 MG tablet Take 1 tablet by mouth 3 (Three) Times a Day As Needed for Muscle Spasms. 30 tablet 0   • dicyclomine (BENTYL) 20 MG tablet Take 1 tablet by mouth Every 6 (Six) Hours.  "120 tablet 3   • Dulaglutide 1.5 MG/0.5ML solution pen-injector Inject 1.5 mg under the skin into the appropriate area as directed 1 (One) Time Per Week. 4 pen 3   • Easy Touch Insulin Syringe 28G X 1/2\" 1 ML misc USE AT BEDTIME AS DIRECTED 100 each 3   • empagliflozin (JARDIANCE) 25 MG tablet tablet Take 1 tablet by mouth Daily. 30 tablet 3   • ezetimibe (Zetia) 10 MG tablet Take 1 tablet by mouth Daily. 30 tablet 3   • famotidine (PEPCID) 20 MG tablet Take 1 tablet by mouth 2 (Two) Times a Day. 60 tablet 3   • fluconazole (Diflucan) 150 MG tablet Take on onset of symptoms now and in 72 hours 2 tablet 0   • gabapentin (NEURONTIN) 300 MG capsule Take 300 mg by mouth 4 (Four) Times a Day.     • Global Ease Inject Pen Needles 31G X 8 MM misc USE TO TEST BLOOD SUGARS TWO TO THREE TIMES DAILY 100 each 3   • glucose blood (FREESTYLE TEST STRIPS) test strip CHECK SUGARS BEFORE BREAKFAST AND TWO HOURS AFTER MEALS 600 each 3   • guaiFENesin (Mucinex) 600 MG 12 hr tablet Take 2 tablets by mouth 2 (Two) Times a Day. 60 tablet 3   • hydrOXYzine (ATARAX) 25 MG tablet Take 0.5-1 tablets by mouth 3 (Three) Times a Day As Needed for Anxiety. 90 tablet 0   • Insulin Glargine (BASAGLAR KWIKPEN) 100 UNIT/ML injection pen Take 36 units at bedtime can go up by 2-3 unites every 3 days until am sugars running     02/05/2021 - Patient currently utilizing 50 Units nightly. (Patient taking differently: Take 36 units at bedtime can go up by 2-3 unites every 3 days until am sugars running     08/18/2021 - Patient currently utilizing 50 Units nightly.) 15 mL 3   • lidocaine (LIDODERM) 5 % APPLY ONE TO TWO PATCHES AS DIRECTED ON LOWER BACK, LEFT UPPER LEG 12 HOURS ON AND 12 HOURS OFF     • linaclotide (LINZESS) 290 MCG capsule capsule Take 1 capsule by mouth Every Morning Before Breakfast. 30 capsule 5   • loratadine (CLARITIN) 10 MG tablet TAKE ONE TABLET BY MOUTH EVERY DAY 30 tablet 3   • losartan (Cozaar) 25 MG tablet Take 1 " "tablet by mouth Daily. 30 tablet 3   • nicotine (Nicoderm CQ) 21 MG/24HR patch Place 1 patch on the skin as directed by provider Daily. 30 patch 3   • omeprazole (PrilOSEC) 40 MG capsule Take 1 capsule by mouth Daily. 30 capsule 11   • omeprazole (priLOSEC) 40 MG capsule Take 1 capsule by mouth Daily. 30 capsule 11   • ondansetron ODT (ZOFRAN-ODT) 8 MG disintegrating tablet DISSOLVE ONE TABLET ON THE TONGUE EVERY 8 HOURS AS NEEDED FOR NAUSEA OR VOMITING 90 tablet 3   • oxyCODONE-acetaminophen (PERCOCET) 7.5-325 MG per tablet Take 1 tablet by mouth 3 (Three) Times a Day.     • polyethylene glycol (MIRALAX) 17 GM/SCOOP powder MIX 17 GRAMS (1 CAPFUL) IN 8 OUNCES OF WATER OR JUICE AND DRINK DAILY 510 g 5   • QUEtiapine (SEROquel) 100 MG tablet Take 1.5 tablets by mouth Every Night. 45 tablet 0   • ranolazine (RANEXA) 500 MG 12 hr tablet Take 1 tablet by mouth 2 (Two) Times a Day. 60 tablet 3   • rosuvastatin (CRESTOR) 20 MG tablet TAKE ONE TABLET BY MOUTH AT BEDTIME 30 tablet 3   • venlafaxine XR (EFFEXOR-XR) 150 MG 24 hr capsule Take 1 capsule by mouth Daily. 30 capsule 0   • vitamin D (ERGOCALCIFEROL) 1.25 MG (15360 UT) capsule capsule Take 1 capsule by mouth Every 7 (Seven) Days. 4 capsule 3     No current facility-administered medications for this visit.         OBJECTIVE    /72 (BP Location: Left arm, Patient Position: Sitting, Cuff Size: Adult)   Pulse 88   Temp 97.1 °F (36.2 °C)   Ht 165.1 cm (65\")   Wt 77.6 kg (171 lb)   SpO2 96%   BMI 28.46 kg/m²         Review of Systems : The following systems are reviewed and no changes noted other than what is mentioned in history of present illness    Constitutional:  Denies recent weight loss, weight gain, fever or chills     HENT:  Denies any hearing loss, epistaxis, hoarseness, or difficulty speaking.     Eyes: Wears eyeglasses or contact lenses     Respiratory:  Dyspnea with exertion,no cough, wheezing, or hemoptysis.     Cardiovascular: No chest pain.  No " orthopnea, PND, peripheral edema, syncope, or claudication.     Gastrointestinal:  Denies change in bowel habits, dyspepsia, ulcer disease, hematochezia, or melena.     Endocrine: Negative for cold intolerance, heat intolerance, polydipsia, polyphagia and polyuria.    Genitourinary: History of nephrectomy in the past      Musculoskeletal: DJD.  Recent surgery left femur    Neurological:  Denies any history of recurrent headaches, strokes, TIA, or seizure disorder.     Hematological: Denies any food allergies, seasonal allergies, bleeding disorders, or lymphadenopathy.     Psychiatric/Behavioral: history of depression/ anxiety, no substance abuse, or change in cognitive function.     Physical Exam : The following systems are reviewed and no changes noted    Constitutional: Cooperative, alert and oriented, in no acute distress.     HENT:   Head: Normocephalic, normal hair patterns, no masses or tenderness.  Ears, Nose, and Throat: No gross abnormalities. No pallor or cyanosis.   Eyes: EOMS intact, PERRL, conjunctivae and lids unremarkable. Fundoscopic exam and visual fields not performed.   Neck: No palpable masses or adenopathy, no thyromegaly, no JVD, carotid pulses are full and equal bilaterally and without  Bruits.     Cardiovascular: Regular rhythm, S1 and S2 normal, no S3 or S4.  No murmurs, gallops, or rubs detected.     Pulmonary/Chest: Chest: normal symmetry,  normal respiratory excursion, no intercostal retraction, no use of accessory muscles.            Pulmonary: Normal breath sounds. No rales or ronchi.    Abdominal: Abdomen soft, bowel sounds normoactive, no masses, no hepatosplenomegaly, non-tender, no bruits.     Musculoskeletal: No deformities, clubbing, cyanosis, erythema, or edema observed.     Neurological: No gross motor or sensory deficits noted, affect appropriate, oriented to time, person, place.     Skin: Warm and dry to the touch, no apparent skin lesions or masses noted.     Psychiatric: She  has a normal mood and affect. Her behavior is normal. Judgment and thought content normal.         Procedures      Lab Results   Component Value Date    WBC 13.58 (H) 07/28/2021    HGB 15.1 07/28/2021    HCT 44.5 07/28/2021    MCV 85.1 07/28/2021     07/28/2021     Lab Results   Component Value Date    GLUCOSE 174 (H) 08/10/2021    BUN 18 08/10/2021    CREATININE 1.23 (H) 08/10/2021    EGFRIFNONA 46 (L) 08/10/2021    BCR 14.6 08/10/2021    CO2 24.8 08/10/2021    CALCIUM 9.2 08/10/2021    CALCIUM 9.7 08/10/2021    ALBUMIN 4.30 07/28/2021    AST 12 07/28/2021    ALT 11 07/28/2021     Lab Results   Component Value Date    CHOL 227 (H) 07/28/2021    CHOL 171 06/17/2020     Lab Results   Component Value Date    TRIG 226 (H) 07/28/2021    TRIG 145 06/17/2020    TRIG 141 10/28/2016     Lab Results   Component Value Date    HDL 41 07/28/2021    HDL 38 (L) 06/17/2020    HDL 31 (L) 10/28/2016     No components found for: LDLCALC  Lab Results   Component Value Date     (H) 07/28/2021     (H) 06/17/2020    LDL 88 10/28/2016     No results found for: HDLLDLRATIO  No components found for: CHOLHDL  Lab Results   Component Value Date    HGBA1C 13.51 (H) 07/28/2021     Lab Results   Component Value Date    TSH 1.480 07/28/2021           ASSESSMENT AND PLAN  Yudi West has multiple medical issues but is stable from a cardiac standpoint with no clinical evidence of angina, arrhythmia or congestive heart failure.  Her lab results from July 2021 and 8/10/2021 were reviewed.  Her GFR of was 46.  LDL was 145 and triglyceride 226 with a total cholesterol of 227.   I understand that atorvastatin was changed to Crestor by her primary care physician.    Her present medicines including antiplatelet therapy with aspirin and Plavix, antianginal therapy with Ranexa, lipid-lowering therapy with Crestor, antihypertensive therapy with Coreg have been continued.  Smoking cessation was once again stressed.  If her LDL is  above 70 in spite of the above changes, we could consider starting her on PCSK9 inhibitors.    Diagnoses and all orders for this visit:    1. Essential hypertension (Primary)  -     ECG 12 Lead    2. Coronary artery disease involving native coronary artery of native heart without angina pectoris    3. Mixed hyperlipidemia    4. Tobacco user        Patient's Body mass index is 28.46 kg/m². BMI is above normal parameters. Recommendations include: exercise counseling and nutrition counseling.      I advised Yudi of the risks of continuing to use tobacco, and I provided her with tobacco cessation educational materials in the After Visit Summary.     During this visit, I spent 3 minutes counseling the patient regarding tobacco cessation.      Lewis Johnson MD  8/23/2021  11:04 CDT

## 2021-08-24 ENCOUNTER — OFFICE VISIT (OUTPATIENT)
Dept: FAMILY MEDICINE CLINIC | Facility: CLINIC | Age: 51
End: 2021-08-24

## 2021-08-24 VITALS
TEMPERATURE: 97.3 F | OXYGEN SATURATION: 97 % | HEIGHT: 65 IN | WEIGHT: 166.2 LBS | DIASTOLIC BLOOD PRESSURE: 58 MMHG | BODY MASS INDEX: 27.69 KG/M2 | HEART RATE: 84 BPM | SYSTOLIC BLOOD PRESSURE: 100 MMHG

## 2021-08-24 DIAGNOSIS — E10.49 OTHER DIABETIC NEUROLOGICAL COMPLICATION ASSOCIATED WITH TYPE 1 DIABETES MELLITUS (HCC): ICD-10-CM

## 2021-08-24 DIAGNOSIS — I25.10 CORONARY ARTERY DISEASE INVOLVING NATIVE CORONARY ARTERY OF NATIVE HEART WITHOUT ANGINA PECTORIS: ICD-10-CM

## 2021-08-24 DIAGNOSIS — B37.0 ORAL THRUSH: ICD-10-CM

## 2021-08-24 DIAGNOSIS — Z72.0 TOBACCO USER: ICD-10-CM

## 2021-08-24 DIAGNOSIS — E11.65 UNCONTROLLED TYPE 2 DIABETES MELLITUS WITH HYPERGLYCEMIA (HCC): Primary | ICD-10-CM

## 2021-08-24 DIAGNOSIS — N18.31 STAGE 3A CHRONIC KIDNEY DISEASE (HCC): ICD-10-CM

## 2021-08-24 DIAGNOSIS — F33.2 SEVERE EPISODE OF RECURRENT MAJOR DEPRESSIVE DISORDER, WITHOUT PSYCHOTIC FEATURES (HCC): ICD-10-CM

## 2021-08-24 DIAGNOSIS — F41.1 GAD (GENERALIZED ANXIETY DISORDER): ICD-10-CM

## 2021-08-24 DIAGNOSIS — J42 CHRONIC BRONCHITIS, UNSPECIFIED CHRONIC BRONCHITIS TYPE (HCC): ICD-10-CM

## 2021-08-24 DIAGNOSIS — E78.2 MIXED HYPERLIPIDEMIA: ICD-10-CM

## 2021-08-24 DIAGNOSIS — J44.9 STAGE 1 MILD COPD BY GOLD CLASSIFICATION (HCC): ICD-10-CM

## 2021-08-24 DIAGNOSIS — E66.3 OVERWEIGHT: ICD-10-CM

## 2021-08-24 LAB
QT INTERVAL: 354 MS
QTC INTERVAL: 428 MS

## 2021-08-24 PROCEDURE — 99214 OFFICE O/P EST MOD 30 MIN: CPT | Performed by: FAMILY MEDICINE

## 2021-08-24 RX ORDER — AZITHROMYCIN 250 MG/1
TABLET, FILM COATED ORAL
Qty: 6 TABLET | Refills: 0 | Status: SHIPPED | OUTPATIENT
Start: 2021-08-24 | End: 2021-10-05

## 2021-09-10 RX ORDER — GUAIFENESIN 600 MG/1
TABLET, EXTENDED RELEASE ORAL
Qty: 60 TABLET | Refills: 0 | Status: SHIPPED | OUTPATIENT
Start: 2021-09-10 | End: 2021-10-05 | Stop reason: SDUPTHER

## 2021-09-28 ENCOUNTER — TELEMEDICINE (OUTPATIENT)
Dept: PSYCHIATRY | Facility: CLINIC | Age: 51
End: 2021-09-28

## 2021-09-28 DIAGNOSIS — G47.9 SLEEP DIFFICULTIES: ICD-10-CM

## 2021-09-28 DIAGNOSIS — F33.2 SEVERE EPISODE OF RECURRENT MAJOR DEPRESSIVE DISORDER, WITHOUT PSYCHOTIC FEATURES (HCC): Primary | ICD-10-CM

## 2021-09-28 DIAGNOSIS — F43.10 POST TRAUMATIC STRESS DISORDER (PTSD): ICD-10-CM

## 2021-09-28 DIAGNOSIS — F41.1 GENERALIZED ANXIETY DISORDER: ICD-10-CM

## 2021-09-28 PROCEDURE — 99214 OFFICE O/P EST MOD 30 MIN: CPT | Performed by: NURSE PRACTITIONER

## 2021-09-28 RX ORDER — HYDROXYZINE HYDROCHLORIDE 25 MG/1
12.5-25 TABLET, FILM COATED ORAL 3 TIMES DAILY PRN
Qty: 90 TABLET | Refills: 0 | Status: SHIPPED | OUTPATIENT
Start: 2021-09-28 | End: 2021-10-19 | Stop reason: SDUPTHER

## 2021-09-28 RX ORDER — QUETIAPINE FUMARATE 100 MG/1
150 TABLET, FILM COATED ORAL NIGHTLY
Qty: 45 TABLET | Refills: 0 | Status: SHIPPED | OUTPATIENT
Start: 2021-09-28 | End: 2021-10-19 | Stop reason: SDUPTHER

## 2021-09-28 RX ORDER — VENLAFAXINE HYDROCHLORIDE 75 MG/1
75 CAPSULE, EXTENDED RELEASE ORAL DAILY
Qty: 30 CAPSULE | Refills: 0 | Status: SHIPPED | OUTPATIENT
Start: 2021-09-28 | End: 2021-10-19 | Stop reason: SDUPTHER

## 2021-09-28 NOTE — PROGRESS NOTES
This provider is located at [facility], [address] [place] [address].The Patient is seen remotely at home, [address] [place] [address] via [employer]. Patient is being seen via telehealth and confirm that they are in a secure environment for this session. The patient's condition being diagnosed/treated is appropriate for telemedicine. The provider identified himself/herself: herself as well as her credentials.   The patient and  gave consent to be seen remotely, and when consent is given they understand that the consent allows for patient identifiable information to be sent to a third party as needed.   They may refuse to be seen remotely at any time. The electronic data is encrypted and password protected, and the patient has been advised of the potential risks to privacy not withstanding such measures.    You have chosen to receive care through a telehealth visit.  Do you consent to use a video/audio connection for your medical care today? Yes      Chief Complaint  Depression and anxiety     Subjective    Yudi West presents to [facility] for medication management.     History of Present Illness   Patient presents today after not being seen since March 2021.  Patient states that she knows she needs to keep her appointment she just had a lot going on.  Patient states that she did run out of medication and reports her anxiety and depression significantly worsened.  Patient states last Friday she found 37.5 of Effexor since she started taking 3 of those and has noted headache and nausea.  Encourage patient to keep appointments that she cannot run out of medication and then take higher doses as it can cause worsening symptoms.  Patient notes that she has had family issues as well.  She reports that she did have SI with plan a few weeks ago but states her sister talked her out of it.  Highly encourage patient that she  needs to go to the nearest ER when she has that and keep her appointments in order to stay on her medication to avoid worsening symptoms patient verbalized understanding.  Patient states that she is only sleeping roughly an hour and a half at night.  Patient states that she still has suicidal ideation now but adamantly denies any plan or intent.  Patient states that she is feeling depressed and hopeless as well as worthless feeling with decreased focus and concentration.  Patient notes that she is constantly on edge feeling anxious with irritability and agitation.  Patient states that she has been getting out as she is running errands with her son currently at the [employer].  Patient rates her depression a 9 and anxiety a 10 on a scale of 0-10 with 10 being the worst.  Denies any HI/AH/VH.  Patient admits to SI but adamantly denies any plan or intent now as she states she would not do that to herself or her family.      Objective   Vital Signs:   There were no vitals taken for this visit.  Due to the remote nature of this encounter (virtual encounter), vitals were unable to be obtained.  Height stated at 65 inches.  Weight stated at 175 pounds.      PHQ-9 Score:   PHQ-9 Total Score:       Mental Status Exam:   Hygiene:   good  Cooperation:  Cooperative  Eye Contact:  Good  Psychomotor Behavior:  Restless  Affect:  Appropriate  Mood: sad, depressed and anxious  Speech:  Normal  Thought Process:  Goal directed and Disorganized  Thought Content:  Mood congruent  Suicidal:  None  Homicidal:  None  Hallucinations:  None  Delusion:  None  Memory:  Intact  Orientation:  Person, Place, Time and Situation  Reliability:  fair  Insight:  Fair  Judgement:  Fair  Impulse Control:  Fair  Physical/Medical Issues:  Yes COPD, CAD, HTN, DM, and CKD stage 3     Current Medications:   Current Outpatient Medications   Medication Sig Dispense Refill   • albuterol (PROAIR RESPICLICK) 108 (90 Base) MCG/ACT inhaler Inhale 2 puffs Every 4  "(Four) Hours As Needed for Wheezing. 1 each 3   • Alpha-Lipoic Acid 300 MG capsule TAKE ONE TO TWO CAPSULES BY MOUTH TWICE DAILY     • aspirin 81 MG EC tablet Take 1 tablet by mouth 2 (Two) Times a Day With Meals. (Patient taking differently: Take 81 mg by mouth Daily.) 60 tablet 0   • azithromycin (Zithromax Z-Jose E) 250 MG tablet Take 2 tablets the first day, then 1 tablet daily for 4 days. 6 tablet 0   • Blood Glucose Monitoring Suppl (FREESTYLE FREEDOM LITE) w/Device kit USE TO TEST BLOOD SUGAR TWO TO THREE TI MES DAILY 1 each 0   • carvedilol (COREG) 3.125 MG tablet TAKE ONE TABLET BY MOUTH TWICE DAILY WITH MEALS 60 tablet 3   • clopidogrel (PLAVIX) 75 MG tablet Take 1 tablet by mouth Daily. 30 tablet 3   • cyclobenzaprine (FLEXERIL) 5 MG tablet Take 1 tablet by mouth 3 (Three) Times a Day As Needed for Muscle Spasms. 30 tablet 0   • dicyclomine (BENTYL) 20 MG tablet Take 1 tablet by mouth Every 6 (Six) Hours. 120 tablet 3   • Dulaglutide 3 MG/0.5ML solution pen-injector Inject 3 mg under the skin into the appropriate area as directed 1 (One) Time Per Week. 4 pen 3   • Easy Touch Insulin Syringe 28G X 1/2\" 1 ML misc USE AT BEDTIME AS DIRECTED 100 each 3   • empagliflozin (JARDIANCE) 25 MG tablet tablet Take 1 tablet by mouth Daily. 30 tablet 3   • ezetimibe (Zetia) 10 MG tablet Take 1 tablet by mouth Daily. 30 tablet 3   • famotidine (PEPCID) 20 MG tablet Take 1 tablet by mouth 2 (Two) Times a Day. 60 tablet 3   • fluconazole (Diflucan) 150 MG tablet Take on onset of symptoms now and in 72 hours 2 tablet 0   • gabapentin (NEURONTIN) 300 MG capsule Take 300 mg by mouth 4 (Four) Times a Day.     • Global Ease Inject Pen Needles 31G X 8 MM misc USE TO TEST BLOOD SUGARS TWO TO THREE TIMES DAILY 100 each 3   • glucose blood (FREESTYLE TEST STRIPS) test strip CHECK SUGARS BEFORE BREAKFAST AND TWO HOURS AFTER MEALS 600 each 3   • hydrOXYzine (ATARAX) 25 MG tablet Take 0.5-1 tablets by mouth 3 (Three) Times a Day As " Needed for Anxiety. 90 tablet 0   • Insulin Glargine (BASAGLAR KWIKPEN) 100 UNIT/ML injection pen Take 36 units at bedtime can go up by 2-3 unites every 3 days until am sugars running     02/05/2021 - Patient currently utilizing 50 Units nightly. (Patient taking differently: Take 36 units at bedtime can go up by 2-3 unites every 3 days until am sugars running     08/18/2021 - Patient currently utilizing 50 Units nightly.) 15 mL 3   • lidocaine (LIDODERM) 5 % APPLY ONE TO TWO PATCHES AS DIRECTED ON LOWER BACK, LEFT UPPER LEG 12 HOURS ON AND 12 HOURS OFF     • linaclotide (LINZESS) 290 MCG capsule capsule Take 1 capsule by mouth Every Morning Before Breakfast. 30 capsule 5   • loratadine (CLARITIN) 10 MG tablet TAKE ONE TABLET BY MOUTH EVERY DAY 30 tablet 3   • losartan (Cozaar) 25 MG tablet Take 1 tablet by mouth Daily. 30 tablet 3   • Mucus Relief 600 MG 12 hr tablet TAKE TWO TABLETS BY MOUTH TWICE DAILY 60 tablet 0   • nicotine (Nicoderm CQ) 21 MG/24HR patch Place 1 patch on the skin as directed by provider Daily. 30 patch 3   • omeprazole (PrilOSEC) 40 MG capsule Take 1 capsule by mouth Daily. 30 capsule 11   • omeprazole (priLOSEC) 40 MG capsule Take 1 capsule by mouth Daily. 30 capsule 11   • ondansetron ODT (ZOFRAN-ODT) 8 MG disintegrating tablet DISSOLVE ONE TABLET ON THE TONGUE EVERY 8 HOURS AS NEEDED FOR NAUSEA OR VOMITING 90 tablet 3   • oxyCODONE-acetaminophen (PERCOCET) 7.5-325 MG per tablet Take 1 tablet by mouth 3 (Three) Times a Day.     • polyethylene glycol (MIRALAX) 17 GM/SCOOP powder MIX 17 GRAMS (1 CAPFUL) IN 8 OUNCES OF WATER OR JUICE AND DRINK DAILY 510 g 5   • QUEtiapine (SEROquel) 100 MG tablet Take 1.5 tablets by mouth Every Night. 45 tablet 0   • vitamin D (ERGOCALCIFEROL) 1.25 MG (36548 UT) capsule capsule Take 1 capsule by mouth Every 7 (Seven) Days. 4 capsule 3   • ranolazine (RANEXA) 500 MG 12 hr tablet Take 1 tablet by mouth 2 (Two) Times a Day. 60 tablet 3   • rosuvastatin  (CRESTOR) 20 MG tablet TAKE ONE TABLET BY MOUTH AT BEDTIME 30 tablet 3   • venlafaxine XR (Effexor XR) 75 MG 24 hr capsule Take 1 capsule by mouth Daily for 30 days. 30 capsule 0     No current facility-administered medications for this visit.       Physical Exam  Nursing note reviewed.   Constitutional:       Appearance: Normal appearance.   Neurological:      Mental Status: She is alert.   Psychiatric:         Attention and Perception: Attention and perception normal. She is attentive.         Mood and Affect: Mood is anxious and depressed.         Speech: Speech normal. Speech is not rapid and pressured.         Behavior: Behavior is agitated. Behavior is cooperative.         Cognition and Memory: Cognition and memory normal.        Result Review :     The following data was reviewed by: WAI Florez on 02/03/2021:  Common labs    Common Labsle 7/28/21 7/28/21 7/28/21 7/28/21 7/28/21 8/10/21 8/10/21    1032 1032 1032 1032 1032 1622 1622   Glucose    283 (A)   174 (A)   BUN    18   18   Creatinine    1.16 (A)   1.23 (A)   eGFR Non  Am    49 (A)   46 (A)   Sodium    133 (A)   134 (A)   Potassium    4.8   4.2   Chloride    93 (A)   95 (A)   Calcium    11.8 (A)  9.7 9.2   Albumin    4.30      Total Bilirubin    0.5      Alkaline Phosphatase    111      AST (SGOT)    12      ALT (SGPT)    11      WBC 13.58 (A)         Hemoglobin 15.1         Hematocrit 44.5         Platelets 358         Total Cholesterol   227 (A)       Triglycerides   226 (A)       HDL Cholesterol   41       LDL Cholesterol    145 (A)       Hemoglobin A1C  13.51 (A)        Microalbumin, Urine     21.7     (A) Abnormal value            Data reviewed: PCP and specialist notes        Assessment and Plan    Problem List Items Addressed This Visit        Mental Health    Severe episode of recurrent major depressive disorder, without psychotic features (CMS/HCC) - Primary    Relevant Medications    venlafaxine XR (Effexor XR) 75 MG 24 hr  capsule    QUEtiapine (SEROquel) 100 MG tablet    hydrOXYzine (ATARAX) 25 MG tablet      Other Visit Diagnoses     Generalized anxiety disorder        Relevant Medications    venlafaxine XR (Effexor XR) 75 MG 24 hr capsule    QUEtiapine (SEROquel) 100 MG tablet    hydrOXYzine (ATARAX) 25 MG tablet    Post traumatic stress disorder (PTSD)        Relevant Medications    venlafaxine XR (Effexor XR) 75 MG 24 hr capsule    QUEtiapine (SEROquel) 100 MG tablet    hydrOXYzine (ATARAX) 25 MG tablet    Sleep difficulties        Relevant Medications    QUEtiapine (SEROquel) 100 MG tablet    hydrOXYzine (ATARAX) 25 MG tablet            TREATMENT PLAN/GOALS: Continue supportive psychotherapy efforts and medications as indicated. Treatment and medication options discussed during today's visit. Patient ackowledged and verbally consented to continue with current treatment plan and was educated on the importance of compliance with treatment and follow-up appointments.    MEDICATION ISSUES:  We discussed risks, benefits, and side effects of the above medications and the patient was agreeable with the plan. Patient was educated on the importance of compliance with treatment and follow-up appointments.  Patient is agreeable to call the office with any worsening of symptoms or onset of side effects. Patient is agreeable to call 911 or go to the nearest ER should he/she begin having SI/HI.        -Restart Effexor at 75 mg XR daily for depression and anxiety.  -Restart Seroquel at 150 mg at night for sleep.  -Restart hydroxyzine 25 mg up to 3 times a day as needed for anxiety.  Patient was asking if there was anything immediate to help with her anxiety while the medication gets in her system informed her that we cannot do a benzodiazepine but she could use the hydroxyzine as needed for her anxiety she verbalized understanding was agreeable.  -Highly advised patient to keep her appointments to ensure that her medication is at adequate  doses to avoid worsening symptoms.  Informed patient that if she were having any plan or intent that is when she needs to go to the nearest ER for stabilization patient verbalized understanding and was agreeable.    -Patient has no-show to her therapist 4 times so will not be rescheduled suggested the patient try another office.  Patient reports that [facility] has therapist encouraged to contact her PCP for referral for therapy as I will continue her med management.    Counseled patient regarding multimodal approach with healthy nutrition, healthy sleep, regular physical activity, social activities, counseling, and medications.      Coping skills reviewed and encouraged positive framing of thoughts     Assisted patient in processing above session content; acknowledged and normalized patient’s thoughts, feelings, and concerns.  Applied  positive coping skills and behavior management in session.  Allowed patient to freely discuss issues without interruption or judgment. Provided safe, confidential environment to facilitate the development of positive therapeutic relationship and encourage open, honest communication. Assisted patient in identifying risk factors which would indicate the need for higher level of care including thoughts to harm self or others and/or self-harming behavior and encouraged patient to contact this office, call 911, or present to the nearest emergency room should any of these events occur. Discussed crisis intervention services and means to access.     MEDS ORDERED DURING VISIT:  New Medications Ordered This Visit   Medications   • venlafaxine XR (Effexor XR) 75 MG 24 hr capsule     Sig: Take 1 capsule by mouth Daily for 30 days.     Dispense:  30 capsule     Refill:  0   • QUEtiapine (SEROquel) 100 MG tablet     Sig: Take 1.5 tablets by mouth Every Night.     Dispense:  45 tablet     Refill:  0   • hydrOXYzine (ATARAX) 25 MG tablet     Sig: Take 0.5-1 tablets by mouth 3 (Three)  Times a Day As Needed for Anxiety.     Dispense:  90 tablet     Refill:  0           Follow Up   Return in about 3 weeks (around 10/19/2021), or if symptoms worsen or fail to improve, for Recheck.    Patient was given instructions and counseling regarding her condition or for health maintenance advice. Please see specific information pulled into the AVS if appropriate.     Some of the data in this electronic note has been brought forward from a previous encounter, any necessary changes have been made, it has been reviewed by this APRN, and it is accurate.      This document has been electronically signed by WAI Florez  September 28, 2021 10:17 EDT    Part of this note may be an electronic transcription/translation of spoken language to printed text using the Dragon Dictation System.

## 2021-10-04 ENCOUNTER — TELEPHONE (OUTPATIENT)
Dept: FAMILY MEDICINE CLINIC | Facility: CLINIC | Age: 51
End: 2021-10-04

## 2021-10-05 ENCOUNTER — OFFICE VISIT (OUTPATIENT)
Dept: FAMILY MEDICINE CLINIC | Facility: CLINIC | Age: 51
End: 2021-10-05

## 2021-10-05 ENCOUNTER — LAB (OUTPATIENT)
Dept: LAB | Facility: HOSPITAL | Age: 51
End: 2021-10-05

## 2021-10-05 VITALS
WEIGHT: 158 LBS | DIASTOLIC BLOOD PRESSURE: 64 MMHG | SYSTOLIC BLOOD PRESSURE: 110 MMHG | BODY MASS INDEX: 26.33 KG/M2 | HEART RATE: 74 BPM | TEMPERATURE: 98.3 F | HEIGHT: 65 IN | OXYGEN SATURATION: 96 %

## 2021-10-05 DIAGNOSIS — F33.2 SEVERE EPISODE OF RECURRENT MAJOR DEPRESSIVE DISORDER, WITHOUT PSYCHOTIC FEATURES (HCC): ICD-10-CM

## 2021-10-05 DIAGNOSIS — K21.9 GASTROESOPHAGEAL REFLUX DISEASE, UNSPECIFIED WHETHER ESOPHAGITIS PRESENT: ICD-10-CM

## 2021-10-05 DIAGNOSIS — E11.65 UNCONTROLLED TYPE 2 DIABETES MELLITUS WITH HYPERGLYCEMIA (HCC): ICD-10-CM

## 2021-10-05 DIAGNOSIS — Z23 NEED FOR IMMUNIZATION AGAINST INFLUENZA: ICD-10-CM

## 2021-10-05 DIAGNOSIS — I25.10 CORONARY ARTERY DISEASE INVOLVING NATIVE CORONARY ARTERY OF NATIVE HEART WITHOUT ANGINA PECTORIS: ICD-10-CM

## 2021-10-05 DIAGNOSIS — J44.9 STAGE 1 MILD COPD BY GOLD CLASSIFICATION (HCC): ICD-10-CM

## 2021-10-05 DIAGNOSIS — E10.49 OTHER DIABETIC NEUROLOGICAL COMPLICATION ASSOCIATED WITH TYPE 1 DIABETES MELLITUS (HCC): ICD-10-CM

## 2021-10-05 DIAGNOSIS — I10 ESSENTIAL HYPERTENSION: Chronic | ICD-10-CM

## 2021-10-05 DIAGNOSIS — Z01.818 PREOP TESTING: Primary | ICD-10-CM

## 2021-10-05 DIAGNOSIS — E78.2 MIXED HYPERLIPIDEMIA: ICD-10-CM

## 2021-10-05 DIAGNOSIS — N39.0 URINARY TRACT INFECTION WITHOUT HEMATURIA, SITE UNSPECIFIED: ICD-10-CM

## 2021-10-05 DIAGNOSIS — F41.1 GAD (GENERALIZED ANXIETY DISORDER): ICD-10-CM

## 2021-10-05 DIAGNOSIS — Z12.2 SCREENING FOR LUNG CANCER: Primary | ICD-10-CM

## 2021-10-05 DIAGNOSIS — J42 CHRONIC BRONCHITIS, UNSPECIFIED CHRONIC BRONCHITIS TYPE (HCC): ICD-10-CM

## 2021-10-05 DIAGNOSIS — E66.3 OVERWEIGHT: ICD-10-CM

## 2021-10-05 DIAGNOSIS — Z72.0 TOBACCO USER: ICD-10-CM

## 2021-10-05 DIAGNOSIS — N18.31 STAGE 3A CHRONIC KIDNEY DISEASE (HCC): ICD-10-CM

## 2021-10-05 PROCEDURE — 90471 IMMUNIZATION ADMIN: CPT | Performed by: FAMILY MEDICINE

## 2021-10-05 PROCEDURE — 99214 OFFICE O/P EST MOD 30 MIN: CPT | Performed by: FAMILY MEDICINE

## 2021-10-05 PROCEDURE — 90686 IIV4 VACC NO PRSV 0.5 ML IM: CPT | Performed by: FAMILY MEDICINE

## 2021-10-05 RX ORDER — GUAIFENESIN 600 MG/1
1200 TABLET, EXTENDED RELEASE ORAL 2 TIMES DAILY
Qty: 60 TABLET | Refills: 3 | Status: SHIPPED | OUTPATIENT
Start: 2021-10-05 | End: 2021-11-24

## 2021-10-05 RX ORDER — GUAIFENESIN 600 MG/1
TABLET, EXTENDED RELEASE ORAL
Qty: 60 TABLET | Refills: 0 | OUTPATIENT
Start: 2021-10-05

## 2021-10-05 NOTE — PATIENT INSTRUCTIONS
Lung Cancer Screening  A lung cancer screening is a test that checks for lung cancer. Lung cancer screening is done to look for lung cancer in its very early stages when you are not likely to have any symptoms and before it spreads beyond the lung, making it harder to treat. Finding cancer early improves the chances of successful treatment. It may save your life.  Who should have screening?  You should be screened for lung cancer if all of these apply:  · You currently smoke or you have quit smoking within the past 15 years.  · You are 50-80 years old. Screening may be recommended up to age 80 depending on your overall health and other factors.  · You are in good general health.  · You have a smoking history of 1 pack of cigarettes a day for 20 years or 2 packs a day for 10 years.  Screening may also be recommended if you are at high risk for the disease. You may be at high risk if:  · You have a family history of lung cancer.  · You have been exposed to asbestos or radon.  · You have chronic obstructive pulmonary disease (COPD).  How is screening done?    The recommended screening test is a low-dose computed tomography (LDCT) scan. This scan takes detailed images of the lungs. This allows a health care provider to look for abnormal cells. If you are at risk for lung cancer, it is recommended that you get screened once a year. Talk to your health care provider about the risks, benefits, and limitations of screening.  What are the benefits of screening?  Screening can find lung cancer early, before symptoms start and before it has spread outside of the lungs. The chances of curing lung cancer are greater if the cancer is diagnosed early.  What are the risks of screening?  · The screening may show lung cancer when no cancer is present (false-positive result).  · The screening may not find lung cancer when it is present.  · The person gets exposed to radiation.  How can I lower my risk of lung cancer?  Make these  lifestyle changes to lower your risk of developing lung cancer:  · Do not use any products that contain nicotine or tobacco, such as cigarettes, e-cigarettes, and chewing tobacco. If you need help quitting, ask your health care provider.  · Avoid secondhand smoke.  · Avoid exposure to radiation.  · Avoid exposure to radon gas. Have your home checked for radon regularly.  · Avoid things that cause cancer (carcinogens).  · Avoid living or working in places with high air pollution.  Questions to ask your health care provider  · Am I eligible for lung cancer screening?  · Does my health insurance cover the cost of lung cancer screening?  · What happens if the lung cancer screening shows something of concern?  · How soon will I have results from my lung cancer screening?  · Is there anything that I need to do to prepare for my lung cancer screening?  · What happens if I decide not to have lung cancer screening?  Where to find more information  Ask your health care provider about the risks and benefits of screening. More information and resources are available from these organizations:  · American Cancer Society (ACS): www.cancer.org  · American Lung Association: www.lung.org  Contact a health care provider if:  · You start to show symptoms of lung cancer, including:  ? Coughing that will not go away.  ? Making whistling sounds when you breathe (wheezing).  ? Chest pain.  ? Coughing up blood.  ? Shortness of breath.  ? Weight loss that cannot be explained.  ? Constant tiredness (fatigue).  ? Hoarse voice.  Summary  · Lung cancer screening may find lung cancer before symptoms appear. Finding cancer early improves the chances of successful treatment. It may save your life.  · The recommended screening test is a low-dose computed tomography (LDCT) scan that looks for abnormal cells in the lungs. If you are at risk for lung cancer, it is recommended that you get screened once a year.  · You can make lifestyle changes to lower  your risk of lung cancer.  · Ask your health care provider about the risks and benefits of screening.  This information is not intended to replace advice given to you by your health care provider. Make sure you discuss any questions you have with your health care provider.  Document Revised: 05/10/2021 Document Reviewed: 12/15/2020  Elsevier Patient Education © 2021 [employer]

## 2021-10-06 PROCEDURE — 82570 ASSAY OF URINE CREATININE: CPT

## 2021-10-06 PROCEDURE — 82043 UR ALBUMIN QUANTITATIVE: CPT

## 2021-10-06 PROCEDURE — 81001 URINALYSIS AUTO W/SCOPE: CPT

## 2021-10-06 PROCEDURE — 87088 URINE BACTERIA CULTURE: CPT

## 2021-10-06 PROCEDURE — 87086 URINE CULTURE/COLONY COUNT: CPT

## 2021-10-06 PROCEDURE — 87186 SC STD MICRODIL/AGAR DIL: CPT

## 2021-10-07 ENCOUNTER — TELEPHONE (OUTPATIENT)
Dept: FAMILY MEDICINE CLINIC | Facility: CLINIC | Age: 51
End: 2021-10-07

## 2021-10-07 DIAGNOSIS — Z91.14 NONCOMPLIANCE WITH MEDICATIONS: ICD-10-CM

## 2021-10-07 DIAGNOSIS — E11.65 UNCONTROLLED TYPE 2 DIABETES MELLITUS WITH HYPERGLYCEMIA (HCC): Primary | ICD-10-CM

## 2021-10-07 RX ORDER — CEPHALEXIN 500 MG/1
500 CAPSULE ORAL 2 TIMES DAILY
Qty: 20 CAPSULE | Refills: 0 | Status: SHIPPED | OUTPATIENT
Start: 2021-10-07 | End: 2021-10-17

## 2021-10-07 RX ORDER — FLUCONAZOLE 150 MG/1
TABLET ORAL
Qty: 2 TABLET | Refills: 0 | Status: SHIPPED | OUTPATIENT
Start: 2021-10-07 | End: 2022-09-14

## 2021-10-07 NOTE — TELEPHONE ENCOUNTER
----- Message from Quintin Flores MD sent at 10/7/2021  7:43 AM CDT -----  Please let pt knwo that she does have trace bacteria along with nitrate positive to suggest UTI. Recommend starting on keflex 500 mg PO BID for 10 days give 20 pills and no refills.  Awaiting urine culture    Will also refer pt to Endocirnology for her uncontrolled DM type 2. Lab at [facility] contacted me about pt's critical alert. Pt agreed to see Endocirnology. Hga1c at 13.06     GFR at 48 from 46. Continues to have CKD stage 3a     Lipid panel uncontrolled and pt restarted taking lipitor     Hemoglobin at 16.3 and need to monitor her polycythemia. Likely due to her tobacco use     Recommend continuing vitamin D for deficiency    Recheck on next visit thanks

## 2021-10-08 ENCOUNTER — TELEPHONE (OUTPATIENT)
Dept: FAMILY MEDICINE CLINIC | Facility: CLINIC | Age: 51
End: 2021-10-08

## 2021-10-08 NOTE — TELEPHONE ENCOUNTER
----- Message from Quintin Flores MD sent at 10/8/2021 10:11 AM CDT -----  Pt has e.coli uti. Pt can continue keflex when finished. If pt not getting better start on macrobid 100 mg PO BID for 7 days give 14 pills and no refills    Recheck on next visit mar[place]

## 2021-10-18 ENCOUNTER — OFFICE VISIT (OUTPATIENT)
Dept: ENDOCRINOLOGY | Facility: CLINIC | Age: 51
End: 2021-10-18

## 2021-10-18 VITALS
BODY MASS INDEX: 27.67 KG/M2 | SYSTOLIC BLOOD PRESSURE: 112 MMHG | OXYGEN SATURATION: 96 % | WEIGHT: 166.1 LBS | HEART RATE: 94 BPM | HEIGHT: 65 IN | DIASTOLIC BLOOD PRESSURE: 60 MMHG

## 2021-10-18 DIAGNOSIS — E11.65 UNCONTROLLED TYPE 2 DIABETES MELLITUS WITH HYPERGLYCEMIA (HCC): Primary | ICD-10-CM

## 2021-10-18 DIAGNOSIS — Z72.0 TOBACCO USER: ICD-10-CM

## 2021-10-18 DIAGNOSIS — E78.2 MIXED HYPERLIPIDEMIA: ICD-10-CM

## 2021-10-18 DIAGNOSIS — E55.9 VITAMIN D DEFICIENCY: ICD-10-CM

## 2021-10-18 DIAGNOSIS — I10 PRIMARY HYPERTENSION: ICD-10-CM

## 2021-10-18 DIAGNOSIS — E10.49 OTHER DIABETIC NEUROLOGICAL COMPLICATION ASSOCIATED WITH TYPE 1 DIABETES MELLITUS (HCC): ICD-10-CM

## 2021-10-18 PROCEDURE — 99214 OFFICE O/P EST MOD 30 MIN: CPT | Performed by: NURSE PRACTITIONER

## 2021-10-18 RX ORDER — PEN NEEDLE, DIABETIC 30 GX3/16"
1 NEEDLE, DISPOSABLE MISCELLANEOUS 4 TIMES DAILY
Qty: 120 EACH | Refills: 11 | Status: SHIPPED | OUTPATIENT
Start: 2021-10-18 | End: 2022-09-13 | Stop reason: SDUPTHER

## 2021-10-18 RX ORDER — PROCHLORPERAZINE 25 MG/1
1 SUPPOSITORY RECTAL ONCE
Qty: 1 EACH | Refills: 1 | Status: SHIPPED | OUTPATIENT
Start: 2021-10-18 | End: 2021-10-18

## 2021-10-18 RX ORDER — PROCHLORPERAZINE 25 MG/1
1 SUPPOSITORY RECTAL
Qty: 1 EACH | Refills: 3 | Status: SHIPPED | OUTPATIENT
Start: 2021-10-18 | End: 2022-10-31

## 2021-10-18 RX ORDER — INSULIN LISPRO 100 [IU]/ML
INJECTION, SOLUTION INTRAVENOUS; SUBCUTANEOUS
Qty: 6 PEN | Refills: 11 | Status: SHIPPED | OUTPATIENT
Start: 2021-10-18 | End: 2022-09-13 | Stop reason: SDUPTHER

## 2021-10-18 RX ORDER — PROCHLORPERAZINE 25 MG/1
1 SUPPOSITORY RECTAL AS NEEDED
Qty: 9 EACH | Refills: 3 | Status: SHIPPED | OUTPATIENT
Start: 2021-10-18 | End: 2023-02-02 | Stop reason: SDUPTHER

## 2021-10-18 NOTE — PROGRESS NOTES
"Chief Complaint  Diabetes    Subjective          Yudi West presents to [facility] ENDOCRINOLOGY  History of Present Illness     In office visit     Referring provider Quintin Flores MD      Chief Complaint   Patient presents with   • Diabetes       HPI    50-year-old female presents for consultation    Reason diabetes mellitus type 2        Duration--diagnosed at age 17     Context --gestational diabetes     Timing constant    Quality  Not controlled    Lab Results   Component Value Date    HGBA1C 13.06 (H) 10/05/2021         Severity high     Macrovascular complications-- CAD    MI, stent placed         Microvascular complications -neuropathy , DR , CKD stage ?     Patient has one kidney -- renal carcinoma         Current diabetes regimen     Oral medications, insulin, GLP- 1         Current glucose monitoring     fingersticks     Checks 4 times daily     Am readings 300 and above     Daytime and night -- 450 and higher     No recent lows       History of severe hypoglycemia     Aggravating factors depression and stops medication         Review of Systems - General ROS: negative            Objective   Vital Signs:   /60   Pulse 94   Ht 165.1 cm (65\")   Wt 75.3 kg (166 lb 1.6 oz)   SpO2 96%   BMI 27.64 kg/m²     Physical Exam  Constitutional:       Appearance: Normal appearance.   Cardiovascular:      Rate and Rhythm: Regular rhythm.      Heart sounds: Normal heart sounds.   Pulmonary:      Breath sounds: Normal breath sounds.   Musculoskeletal:      Cervical back: Normal range of motion.   Neurological:      Mental Status: She is alert.        Result Review :{Labs  Result Review  Imaging  Med Tab  Media  Procedures :23}   The following data was reviewed by: WAI Connelly on 10/18/2021:  Common labs    Common Labsle 8/10/21 8/10/21 10/5/21 10/5/21 10/5/21 10/5/21 10/6/21    1622 1622 1005 1005 1005 1005    Glucose  174 (A)    468 (A)    BUN  18    " 13    Creatinine  1.23 (A)    1.19 (A)    eGFR Non  Am  46 (A)    48 (A)    Sodium  134 (A)    130 (A)    Potassium  4.2    4.0    Chloride  95 (A)    92 (A)    Calcium 9.7 9.2    9.6    Albumin      4.40    Total Bilirubin      0.3    Alkaline Phosphatase      117    AST (SGOT)      13    ALT (SGPT)      17    WBC   10.63       Hemoglobin   16.3 (A)       Hematocrit   52.5 (A)       Platelets   352       Total Cholesterol     276 (A)     Triglycerides     216 (A)     HDL Cholesterol     45     LDL Cholesterol      190 (A)     Hemoglobin A1C    13.06 (A)      Microalbumin, Urine       5.8   (A) Abnormal value                      Assessment and Plan    Diagnoses and all orders for this visit:    1. Uncontrolled type 2 diabetes mellitus with hyperglycemia (HCC) (Primary)    2. 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 Tobacco user    4. Vitamin D deficiency    5. Mixed hyperlipidemia    6. Primary hypertension    Other orders  -     Insulin Lispro, 1 Unit Dial, (HumaLOG KwikPen) 100 UNIT/ML solution pen-injector; Up to 20 units with meals  Dispense: 6 pen; Refill: 11  -     Insulin Pen Needle (Pen Needles) 32G X 4 MM misc; 1 each 4 (Four) Times a Day. Use 4 x daily, Dx code E11.65  Dispense: 120 each; Refill: 11  -     Continuous Blood Gluc Transmit (Dexcom G6 Transmitter) misc; 1 each Every 3 (Three) Months.  Dispense: 1 each; Refill: 3  -     Continuous Blood Gluc Sensor (Dexcom G6 Sensor); 1 each As Needed (glucose control). Every 10 days  Dispense: 9 each; Refill: 3  -     Continuous Blood Gluc  (Dexcom G6 ) device; 1 each 1 (One) Time for 1 dose.  Dispense: 1 each; Refill: 1                     Glycemic Management:    Diabetes mellitus type 2    Lab Results   Component Value Date    HGBA1C 13.06 (H) 10/05/2021       Taking Trulicity 3 mg once weekly       Taking jardiance 25 mg once daily       Taking basaglar 50 units     Cannot take  metformin         Start Humalog       Start with 4 units before each meal three times daily     Plus sliding scale     151-200   2 units   201-250   4 units   251-300   6 units   Above 301  8 units         Goals for sugar    Fasting and before meals 80 to 130    2 hours after meals 180 or less      Aim for 45 grams of carbohydrate per meal    Aim for 15 grams of carbohydrate per snack         Approve dexcom g6           The patient has diabetes mellitus, insulin-dependent.     Our Diabetes Department has evaluated the patient in the last six months and will continue counseling on insulin adjustment.      The patient performs blood glucose testing at least four times daily with proven glucose variability from 50 to 300 mg per dl.     The patient is administering basal insulin and prandial insulin four times per day for more than six months.     The patient uses a home blood glucose monitor to assess blood glucose at least four times daily for more than six months.     The patient requires frequent adjustment of insulin treatment regimen based on blood glucose readings.     The patient has frequent variability in blood glucose readings due to activity and variability in meal content and time.      The patient has completed a diabetes education program with us.     The patient has demonstrated the ability to self-monitor her glucose.      The patient is motivated in improving diabetes control      The patient has hypoglycemia unawareness         Microvascular Complications Monitoring       Last eye exam--- Oct. 2021 ,      Neuropathy --yes     Taking gabapentin       Component      Latest Ref Rng & Units 10/6/2021   Microalbumin/Creatinine Ratio      mg/g 165.2   Creatinine, Urine      mg/dL 35.1   Microalbumin, Urine      mg/dL 5.8             Lipid Management:         Hyperlipidemia     Taking Crestor 20 mg one at night       Taking zetia 10 mg one at night         Total Cholesterol   Date Value Ref Range Status    10/05/2021 276 (H) 0 - 200 mg/dL Final     Triglycerides   Date Value Ref Range Status   10/05/2021 216 (H) 0 - 150 mg/dL Final     HDL Cholesterol   Date Value Ref Range Status   10/05/2021 45 40 - 60 mg/dL Final     LDL Cholesterol    Date Value Ref Range Status   10/05/2021 190 (H) 0 - 100 mg/dL Final             Blood Pressure Management:    Taking losartan 25 mg daily         Thyroid Health    Lab Results   Component Value Date    TSH 1.480 07/28/2021         Lab Results   Component Value Date    [id number] 709 10/05/2021             Bone Health     Vitamin d def.    Taking vitamin d supplement     Component      Latest Ref Rng & Units 10/5/2021   25 Hydroxy, Vitamin D      ng/ml 23.8         Weight Management:    Patient's Body mass index is 27.64 kg/m². indicating that she is overweight (BMI 25-29.9). Obesity-related health conditions include the following: diabetes mellitus. Obesity is unchanged. BMI is is above average; no BMI management plan is appropriate. We discussed portion control and increasing exercise..        Preventive Care:     Smoking     Yudi West  reports that she has been smoking cigarettes. She has a 37.00 pack-year smoking history. She has never used smokeless tobacco.. I have educated her on the risk of diseases from using tobacco products such as cancer, COPD and heart disease.     I advised her to quit and she is not willing to quit.    I spent 3  minutes counseling the patient.         Records from Dr. Flores received and reviewed   Thank you for consultation         Follow Up   Return in about 6 weeks (around 11/29/2021) for Recheck.  Patient was given instructions and counseling regarding her condition or for health maintenance advice. Please see specific information pulled into the AVS if appropriate.         This document has been electronically signed by WAI Connelly on October 18, 2021 13:37 CDT.

## 2021-10-18 NOTE — PATIENT INSTRUCTIONS
Start Humalog       Start with 4 units before each meal three times daily     Plus sliding scale     151-200   2 units   201-250   4 units   251-300   6 units   Above 301  8 units         Goals for sugar    Fasting and before meals 80 to 130    2 hours after meals 180 or less      Aim for 45 grams of carbohydrate per meal    Aim for 15 grams of carbohydrate per snack

## 2021-10-19 ENCOUNTER — TELEPHONE (OUTPATIENT)
Dept: ENDOCRINOLOGY | Facility: CLINIC | Age: 51
End: 2021-10-19

## 2021-10-19 ENCOUNTER — TELEMEDICINE (OUTPATIENT)
Dept: PSYCHIATRY | Facility: CLINIC | Age: 51
End: 2021-10-19

## 2021-10-19 DIAGNOSIS — F43.10 POST TRAUMATIC STRESS DISORDER (PTSD): ICD-10-CM

## 2021-10-19 DIAGNOSIS — F33.2 SEVERE EPISODE OF RECURRENT MAJOR DEPRESSIVE DISORDER, WITHOUT PSYCHOTIC FEATURES (HCC): Primary | ICD-10-CM

## 2021-10-19 DIAGNOSIS — F41.1 GENERALIZED ANXIETY DISORDER: ICD-10-CM

## 2021-10-19 DIAGNOSIS — G47.9 SLEEP DIFFICULTIES: ICD-10-CM

## 2021-10-19 PROCEDURE — 99214 OFFICE O/P EST MOD 30 MIN: CPT | Performed by: NURSE PRACTITIONER

## 2021-10-19 RX ORDER — QUETIAPINE FUMARATE 100 MG/1
150 TABLET, FILM COATED ORAL NIGHTLY
Qty: 45 TABLET | Refills: 0 | Status: SHIPPED | OUTPATIENT
Start: 2021-10-19 | End: 2021-11-15 | Stop reason: SDUPTHER

## 2021-10-19 RX ORDER — VENLAFAXINE HYDROCHLORIDE 150 MG/1
150 CAPSULE, EXTENDED RELEASE ORAL DAILY
Qty: 30 CAPSULE | Refills: 0 | Status: SHIPPED | OUTPATIENT
Start: 2021-10-19 | End: 2021-11-15 | Stop reason: SDUPTHER

## 2021-10-19 RX ORDER — HYDROXYZINE HYDROCHLORIDE 25 MG/1
12.5-25 TABLET, FILM COATED ORAL 3 TIMES DAILY PRN
Qty: 90 TABLET | Refills: 0 | Status: SHIPPED | OUTPATIENT
Start: 2021-10-19 | End: 2021-12-28 | Stop reason: SDUPTHER

## 2021-10-19 NOTE — TELEPHONE ENCOUNTER
Pt has started trulicity and she is having sour belches and wants to know if that is from the medicine understands that causes upset stomach and it is but wants to be sure that is possibly causing the belching, diurea and headache .Took Basglar and trulicity at the same time last night before bed . Woke up feeling bad

## 2021-10-19 NOTE — PROGRESS NOTES
This provider is located at [facility], [address] [place] [address].The Patient is seen remotely at home, [address] [place] [address] via [employer]. Patient is being seen via telehealth and confirm that they are in a secure environment for this session. The patient's condition being diagnosed/treated is appropriate for telemedicine. The provider identified himself/herself: herself as well as her credentials.   The patient and  gave consent to be seen remotely, and when consent is given they understand that the consent allows for patient identifiable information to be sent to a third party as needed.   They may refuse to be seen remotely at any time. The electronic data is encrypted and password protected, and the patient has been advised of the potential risks to privacy not withstanding such measures.    You have chosen to receive care through a telehealth visit.  Do you consent to use a video/audio connection for your medical care today? Yes.  Patient verified , name and address      Chief Complaint  Depression and anxiety     Subjective    Yudi West presents to [facility] for medication management.     History of Present Illness   Patient presents today noting that she has been doing better with the medication and she states she can tell a big difference.  She notes the last 3 to 4 days though however she has been feeling more hopeless and helpless and worsening depression symptoms.  Patient states all she does is sit and cry.  She states that she has had a lot of situations and family stressors going on which could be contributing.  She states that she is running off her sister-in-law and her daughter put eviction notice even though she has the least for 18 months so that has been stressful.  Patient states that she is still only sleeping 4 hours at night.  She notes that she saw the diabetes specialist  and they are changing her medications around however states her blood sugars have been informed 500s and the patient notes the diabetes specialist has told her that that could be a cause of a lot of mood issues.  Patient denied any side effects with the medications.  She reports that hydroxyzine has helped with her anxiety.  Denies any SI/HI/AH/VH at this time.      Objective   Vital Signs:   There were no vitals taken for this visit.  Due to the remote nature of this encounter (virtual encounter), vitals were unable to be obtained.  Height stated at 65 inches.  Weight stated at 175 pounds.      PHQ-9 Score:   PHQ-9 Total Score:       Mental Status Exam:   Hygiene:   good  Cooperation:  Cooperative  Eye Contact:  Good  Psychomotor Behavior:  Restless  Affect:  Appropriate  Mood: depressed and anxious  Speech:  Normal  Thought Process:  Goal directed and Disorganized  Thought Content:  Mood congruent  Suicidal:  None  Homicidal:  None  Hallucinations:  None  Delusion:  None  Memory:  Intact  Orientation:  Person, Place, Time and Situation  Reliability:  fair  Insight:  Fair  Judgement:  Fair  Impulse Control:  Fair  Physical/Medical Issues:  Yes COPD, CAD, HTN, DM, and CKD stage 3     Current Medications:   Current Outpatient Medications   Medication Sig Dispense Refill   • albuterol (PROAIR RESPICLICK) 108 (90 Base) MCG/ACT inhaler Inhale 2 puffs Every 4 (Four) Hours As Needed for Wheezing. 1 each 3   • Alpha-Lipoic Acid 300 MG capsule TAKE ONE TO TWO CAPSULES BY MOUTH TWICE DAILY     • aspirin 81 MG EC tablet Take 1 tablet by mouth 2 (Two) Times a Day With Meals. (Patient taking differently: Take 81 mg by mouth Daily.) 60 tablet 0   • Blood Glucose Monitoring Suppl (FREESTYLE FREEDOM LITE) w/Device kit USE TO TEST BLOOD SUGAR TWO TO THREE TI MES DAILY 1 each 0   • carvedilol (COREG) 3.125 MG tablet TAKE ONE TABLET BY MOUTH TWICE DAILY WITH MEALS 60 tablet 3   • clopidogrel (PLAVIX) 75 MG tablet Take 1 tablet by  "mouth Daily. 30 tablet 3   • Continuous Blood Gluc Sensor (Dexcom G6 Sensor) 1 each As Needed (glucose control). Every 10 days 9 each 3   • Continuous Blood Gluc Transmit (Dexcom G6 Transmitter) misc 1 each Every 3 (Three) Months. 1 each 3   • cyclobenzaprine (FLEXERIL) 5 MG tablet Take 1 tablet by mouth 3 (Three) Times a Day As Needed for Muscle Spasms. 30 tablet 0   • dicyclomine (BENTYL) 20 MG tablet Take 1 tablet by mouth Every 6 (Six) Hours. 120 tablet 3   • Dulaglutide 3 MG/0.5ML solution pen-injector Inject 3 mg under the skin into the appropriate area as directed 1 (One) Time Per Week. 4 pen 3   • Easy Touch Insulin Syringe 28G X 1/2\" 1 ML misc USE AT BEDTIME AS DIRECTED 100 each 3   • empagliflozin (JARDIANCE) 25 MG tablet tablet Take 1 tablet by mouth Daily. 30 tablet 3   • ezetimibe (Zetia) 10 MG tablet Take 1 tablet by mouth Daily. 30 tablet 3   • famotidine (PEPCID) 20 MG tablet Take 1 tablet by mouth 2 (Two) Times a Day. 60 tablet 3   • fluconazole (Diflucan) 150 MG tablet Take 1 tablet now and in 72 hours 2 tablet 0   • gabapentin (NEURONTIN) 300 MG capsule Take 300 mg by mouth 4 (Four) Times a Day.     • Global Ease Inject Pen Needles 31G X 8 MM misc USE TO TEST BLOOD SUGARS TWO TO THREE TIMES DAILY 100 each 3   • glucose blood (FREESTYLE TEST STRIPS) test strip CHECK SUGARS BEFORE BREAKFAST AND TWO HOURS AFTER MEALS 600 each 3   • guaiFENesin (Mucus Relief) 600 MG 12 hr tablet Take 2 tablets by mouth 2 (Two) Times a Day. 60 tablet 3   • hydrOXYzine (ATARAX) 25 MG tablet Take 0.5-1 tablets by mouth 3 (Three) Times a Day As Needed for Anxiety. 90 tablet 0   • Insulin Glargine (BASAGLAR KWIKPEN) 100 UNIT/ML injection pen Take 36 units at bedtime can go up by 2-3 unites every 3 days until am sugars running     02/05/2021 - Patient currently utilizing 50 Units nightly. (Patient taking differently: Take 36 units at bedtime can go up by 2-3 unites every 3 days until am sugars running "     08/18/2021 - Patient currently utilizing 50 Units nightly.) 15 mL 3   • Insulin Lispro, 1 Unit Dial, (HumaLOG KwikPen) 100 UNIT/ML solution pen-injector Up to 20 units with meals 6 pen 11   • Insulin Pen Needle (Pen Needles) 32G X 4 MM misc 1 each 4 (Four) Times a Day. Use 4 x daily, Dx code E11.65 120 each 11   • lidocaine (LIDODERM) 5 % APPLY ONE TO TWO PATCHES AS DIRECTED ON LOWER BACK, LEFT UPPER LEG 12 HOURS ON AND 12 HOURS OFF     • linaclotide (LINZESS) 290 MCG capsule capsule Take 1 capsule by mouth Every Morning Before Breakfast. 30 capsule 5   • loratadine (CLARITIN) 10 MG tablet TAKE ONE TABLET BY MOUTH EVERY DAY 30 tablet 3   • losartan (Cozaar) 25 MG tablet Take 1 tablet by mouth Daily. 30 tablet 3   • nicotine (Nicoderm CQ) 21 MG/24HR patch Place 1 patch on the skin as directed by provider Daily. 30 patch 3   • omeprazole (priLOSEC) 40 MG capsule Take 1 capsule by mouth Daily. 30 capsule 11   • ondansetron ODT (ZOFRAN-ODT) 8 MG disintegrating tablet DISSOLVE ONE TABLET ON THE TONGUE EVERY 8 HOURS AS NEEDED FOR NAUSEA OR VOMITING 90 tablet 3   • oxyCODONE-acetaminophen (PERCOCET) 7.5-325 MG per tablet Take 1 tablet by mouth 3 (Three) Times a Day.     • polyethylene glycol (MIRALAX) 17 GM/SCOOP powder MIX 17 GRAMS (1 CAPFUL) IN 8 OUNCES OF WATER OR JUICE AND DRINK DAILY 510 g 5   • QUEtiapine (SEROquel) 100 MG tablet Take 1.5 tablets by mouth Every Night. 45 tablet 0   • ranolazine (RANEXA) 500 MG 12 hr tablet Take 1 tablet by mouth 2 (Two) Times a Day. 60 tablet 3   • rosuvastatin (CRESTOR) 20 MG tablet TAKE ONE TABLET BY MOUTH AT BEDTIME 30 tablet 3   • venlafaxine XR (Effexor XR) 150 MG 24 hr capsule Take 1 capsule by mouth Daily for 30 days. 30 capsule 0   • vitamin D (ERGOCALCIFEROL) 1.25 MG (72987 UT) capsule capsule Take 1 capsule by mouth Every 7 (Seven) Days. 4 capsule 3     No current facility-administered medications for this visit.       Physical Exam  Nursing note reviewed.    Constitutional:       Appearance: Normal appearance.   Neurological:      Mental Status: She is alert.   Psychiatric:         Attention and Perception: Attention and perception normal. She is attentive.         Mood and Affect: Mood is anxious and depressed.         Speech: Speech normal. Speech is not rapid and pressured.         Behavior: Behavior is agitated. Behavior is cooperative.         Cognition and Memory: Cognition and memory normal.        Result Review :     The following data was reviewed by: WAI Florez on 02/03/2021:  Common labs    Common Labsle 8/10/21 8/10/21 10/5/21 10/5/21 10/5/21 10/5/21 10/6/21    1622 1622 1005 1005 1005 1005    Glucose  174 (A)    468 (A)    BUN  18    13    Creatinine  1.23 (A)    1.19 (A)    eGFR Non  Am  46 (A)    48 (A)    Sodium  134 (A)    130 (A)    Potassium  4.2    4.0    Chloride  95 (A)    92 (A)    Calcium 9.7 9.2    9.6    Albumin      4.40    Total Bilirubin      0.3    Alkaline Phosphatase      117    AST (SGOT)      13    ALT (SGPT)      17    WBC   10.63       Hemoglobin   16.3 (A)       Hematocrit   52.5 (A)       Platelets   352       Total Cholesterol     276 (A)     Triglycerides     216 (A)     HDL Cholesterol     45     LDL Cholesterol      190 (A)     Hemoglobin A1C    13.06 (A)      Microalbumin, Urine       5.8   (A) Abnormal value            Data reviewed: PCP and specialist notes        Assessment and Plan    Problem List Items Addressed This Visit        Mental Health    Severe episode of recurrent major depressive disorder, without psychotic features (HCC) - Primary    Relevant Medications    QUEtiapine (SEROquel) 100 MG tablet    hydrOXYzine (ATARAX) 25 MG tablet    venlafaxine XR (Effexor XR) 150 MG 24 hr capsule      Other Visit Diagnoses     Generalized anxiety disorder        Relevant Medications    QUEtiapine (SEROquel) 100 MG tablet    hydrOXYzine (ATARAX) 25 MG tablet    venlafaxine XR (Effexor XR) 150 MG 24 hr  capsule    Post traumatic stress disorder (PTSD)        Relevant Medications    QUEtiapine (SEROquel) 100 MG tablet    hydrOXYzine (ATARAX) 25 MG tablet    venlafaxine XR (Effexor XR) 150 MG 24 hr capsule    Sleep difficulties        Relevant Medications    QUEtiapine (SEROquel) 100 MG tablet    hydrOXYzine (ATARAX) 25 MG tablet            TREATMENT PLAN/GOALS: Continue supportive psychotherapy efforts and medications as indicated. Treatment and medication options discussed during today's visit. Patient ackowledged and verbally consented to continue with current treatment plan and was educated on the importance of compliance with treatment and follow-up appointments.    MEDICATION ISSUES:  We discussed risks, benefits, and side effects of the above medications and the patient was agreeable with the plan. Patient was educated on the importance of compliance with treatment and follow-up appointments.  Patient is agreeable to call the office with any worsening of symptoms or onset of side effects. Patient is agreeable to call 911 or go to the nearest ER should he/she begin having SI/HI.        -Increase Effexor to 150 mg XR daily for depression and anxiety.  -Continue Seroquel at 150 mg at night for sleep.  -Continue hydroxyzine 25 mg up to 3 times a day as needed for anxiety.    -Highly advised patient to keep her appointments to ensure that her medication is at adequate doses to avoid worsening symptoms.  Informed patient that if she were having any plan or intent that is when she needs to go to the nearest ER for stabilization patient verbalized understanding and was agreeable.    -Patient has no-show to her therapist 4 times so will not be rescheduled suggested the patient try another office.  Patient reports that [facility] has therapist encouraged to contact her PCP for referral for therapy as I will continue her med management.    Counseled patient regarding multimodal approach with healthy  nutrition, healthy sleep, regular physical activity, social activities, counseling, and medications.      Coping skills reviewed and encouraged positive framing of thoughts     Assisted patient in processing above session content; acknowledged and normalized patient’s thoughts, feelings, and concerns.  Applied  positive coping skills and behavior management in session.  Allowed patient to freely discuss issues without interruption or judgment. Provided safe, confidential environment to facilitate the development of positive therapeutic relationship and encourage open, honest communication. Assisted patient in identifying risk factors which would indicate the need for higher level of care including thoughts to harm self or others and/or self-harming behavior and encouraged patient to contact this office, call 911, or present to the nearest emergency room should any of these events occur. Discussed crisis intervention services and means to access.     MEDS ORDERED DURING VISIT:  New Medications Ordered This Visit   Medications   • QUEtiapine (SEROquel) 100 MG tablet     Sig: Take 1.5 tablets by mouth Every Night.     Dispense:  45 tablet     Refill:  0   • hydrOXYzine (ATARAX) 25 MG tablet     Sig: Take 0.5-1 tablets by mouth 3 (Three) Times a Day As Needed for Anxiety.     Dispense:  90 tablet     Refill:  0   • venlafaxine XR (Effexor XR) 150 MG 24 hr capsule     Sig: Take 1 capsule by mouth Daily for 30 days.     Dispense:  30 capsule     Refill:  0           Follow Up   Return in about 4 weeks (around 11/16/2021), or if symptoms worsen or fail to improve, for Recheck.    Patient was given instructions and counseling regarding her condition or for health maintenance advice. Please see specific information pulled into the AVS if appropriate.     Some of the data in this electronic note has been brought forward from a previous encounter, any necessary changes have been made, it has been reviewed by this APRN, and it  is accurate.      This document has been electronically signed by WAI Florez  October 19, 2021 11:43 EDT    Part of this note may be an electronic transcription/translation of spoken language to printed text using the Dragon Dictation System.

## 2021-10-19 NOTE — TELEPHONE ENCOUNTER
There is no alternative, will have to use more mealtime insulin    This was her first dose of restarting if she wants to  a sample 0.75 intake that next week to get readjusted to the dose that will be fine otherwise there is no other alternatives to this group of medications

## 2021-10-20 ENCOUNTER — DOCUMENTATION (OUTPATIENT)
Dept: ENDOCRINOLOGY | Facility: CLINIC | Age: 51
End: 2021-10-20

## 2021-10-20 NOTE — PROGRESS NOTES
[facility]     PT HAS BEEN NOTIFIED.     PT STATES SHE WILL  SAMPLE ON Tuesday, 10/26/2021.

## 2021-11-01 RX ORDER — NICOTINE TRANSDERMAL SYSTEM 21 MG/24H
PATCH, EXTENDED RELEASE TRANSDERMAL
Qty: 28 EACH | Refills: 3 | Status: SHIPPED | OUTPATIENT
Start: 2021-11-01 | End: 2022-07-18

## 2021-11-03 RX ORDER — CLOPIDOGREL BISULFATE 75 MG/1
TABLET ORAL
Qty: 30 TABLET | Refills: 3 | Status: SHIPPED | OUTPATIENT
Start: 2021-11-03 | End: 2022-03-25

## 2021-11-03 NOTE — TELEPHONE ENCOUNTER
Rx Refill Note  Requested Prescriptions     Pending Prescriptions Disp Refills   • clopidogrel (PLAVIX) 75 MG tablet [Pharmacy Med Name: clopidogrel 75 mg tablet] 30 tablet 3     Sig: TAKE ONE TABLET BY MOUTH DAILY      Last office visit with prescribing clinician: 10/5/2021      Next office visit with prescribing clinician: 11/16/2021   {TIP  Encounters:     Plavix Protocol Failed 11/03/2021 08:06 AM    No conflicting PPI on medication list              {TIP  Please add Last Relevant Lab Date if appropriate  {TIP  Is Refill Pharmacy correct? yes  Raciel Wheatley LPN  11/03/21, 08:52 CDT

## 2021-11-10 RX ORDER — FAMOTIDINE 20 MG/1
TABLET, FILM COATED ORAL
Qty: 60 TABLET | Refills: 0 | Status: SHIPPED | OUTPATIENT
Start: 2021-11-10 | End: 2023-02-02 | Stop reason: SDUPTHER

## 2021-11-10 RX ORDER — RANOLAZINE 500 MG/1
TABLET, EXTENDED RELEASE ORAL
Qty: 60 TABLET | Refills: 0 | Status: SHIPPED | OUTPATIENT
Start: 2021-11-10 | End: 2021-12-13

## 2021-11-11 ENCOUNTER — TELEPHONE (OUTPATIENT)
Dept: GENERAL RADIOLOGY | Facility: HOSPITAL | Age: 51
End: 2021-11-11

## 2021-11-11 ENCOUNTER — APPOINTMENT (OUTPATIENT)
Dept: CT IMAGING | Facility: HOSPITAL | Age: 51
End: 2021-11-11

## 2021-11-15 ENCOUNTER — TELEMEDICINE (OUTPATIENT)
Dept: PSYCHIATRY | Facility: CLINIC | Age: 51
End: 2021-11-15

## 2021-11-15 DIAGNOSIS — F43.10 POST TRAUMATIC STRESS DISORDER (PTSD): ICD-10-CM

## 2021-11-15 DIAGNOSIS — F33.2 SEVERE EPISODE OF RECURRENT MAJOR DEPRESSIVE DISORDER, WITHOUT PSYCHOTIC FEATURES (HCC): ICD-10-CM

## 2021-11-15 DIAGNOSIS — F41.1 GENERALIZED ANXIETY DISORDER: ICD-10-CM

## 2021-11-15 DIAGNOSIS — G47.9 SLEEP DIFFICULTIES: ICD-10-CM

## 2021-11-15 PROCEDURE — 99214 OFFICE O/P EST MOD 30 MIN: CPT | Performed by: NURSE PRACTITIONER

## 2021-11-15 RX ORDER — QUETIAPINE FUMARATE 100 MG/1
150 TABLET, FILM COATED ORAL NIGHTLY
Qty: 45 TABLET | Refills: 2 | Status: SHIPPED | OUTPATIENT
Start: 2021-11-15 | End: 2021-12-28 | Stop reason: SDUPTHER

## 2021-11-15 RX ORDER — ERGOCALCIFEROL 1.25 MG/1
CAPSULE ORAL
Qty: 4 CAPSULE | Refills: 3 | Status: SHIPPED | OUTPATIENT
Start: 2021-11-15 | End: 2022-03-25

## 2021-11-15 RX ORDER — VENLAFAXINE HYDROCHLORIDE 150 MG/1
150 CAPSULE, EXTENDED RELEASE ORAL DAILY
Qty: 90 CAPSULE | Refills: 0 | Status: SHIPPED | OUTPATIENT
Start: 2021-11-15 | End: 2021-12-28

## 2021-11-15 NOTE — PROGRESS NOTES
This provider is located at [facility], [address] [place] [address].The Patient is seen remotely at home, [address] [place] [address] via [employer]. Patient is being seen via telehealth and confirm that they are in a secure environment for this session. The patient's condition being diagnosed/treated is appropriate for telemedicine. The provider identified himself/herself: herself as well as her credentials.   The patient and  gave consent to be seen remotely, and when consent is given they understand that the consent allows for patient identifiable information to be sent to a third party as needed.   They may refuse to be seen remotely at any time. The electronic data is encrypted and password protected, and the patient has been advised of the potential risks to privacy not withstanding such measures.    You have chosen to receive care through a telehealth visit.  Do you consent to use a video/audio connection for your medical care today? Yes.  Patient verified , name and address      Chief Complaint  Depression and anxiety     Subjective    Yudi West presents to [facility] for medication management.     History of Present Illness   Patient presents today reporting that she was doing okay up until this past Friday when she had to bury her nephew.  She states that he had a heart attack and passed away suddenly so that has been very difficult for her.  Patient states however before he passed she reports she was doing good with her depression and anxiety and she was sleeping roughly 6 hours at night.  She states now that he has passed she is felt sad and anxious lately and have a hard time sleeping as she was close with him.  Patient notes that she is still not found a therapist encouraged her to find someone in person.  Patient states that she is using hydroxyzine as needed for anxiety and is working well.   "She denies any side effects to the medications.  Patient states that other than her nephew passing she has been doing \"pretty good\".  Patient states that her sugars are still up and down and over 400s at times as she is trying to manage.  Denies any SI/HI/AH/VH at this time.         Objective   Vital Signs:   There were no vitals taken for this visit.  Due to the remote nature of this encounter (virtual encounter), vitals were unable to be obtained.  Height stated at 65 inches.  Weight stated at 175 pounds.      PHQ-9 Score:   PHQ-9 Total Score:       Mental Status Exam:   Hygiene:   good  Cooperation:  Cooperative  Eye Contact:  Good  Psychomotor Behavior:  Restless  Affect:  Appropriate  Mood: depressed and anxious  Speech:  Normal  Thought Process:  Goal directed and Disorganized  Thought Content:  Mood congruent  Suicidal:  None  Homicidal:  None  Hallucinations:  None  Delusion:  None  Memory:  Intact  Orientation:  Person, Place, Time and Situation  Reliability:  fair  Insight:  Fair  Judgement:  Fair  Impulse Control:  Fair  Physical/Medical Issues:  Yes COPD, CAD, HTN, DM, and CKD stage 3     Current Medications:   Current Outpatient Medications   Medication Sig Dispense Refill   • Alpha-Lipoic Acid 300 MG capsule TAKE ONE TO TWO CAPSULES BY MOUTH TWICE DAILY     • aspirin 81 MG EC tablet Take 1 tablet by mouth 2 (Two) Times a Day With Meals. (Patient taking differently: Take 81 mg by mouth Daily.) 60 tablet 0   • Blood Glucose Monitoring Suppl (FREESTYLE FREEDOM LITE) w/Device kit USE TO TEST BLOOD SUGAR TWO TO THREE TI MES DAILY 1 each 0   • carvedilol (COREG) 3.125 MG tablet TAKE ONE TABLET BY MOUTH TWICE DAILY WITH MEALS 60 tablet 3   • clopidogrel (PLAVIX) 75 MG tablet TAKE ONE TABLET BY MOUTH DAILY 30 tablet 3   • Continuous Blood Gluc Sensor (Dexcom G6 Sensor) 1 each As Needed (glucose control). Every 10 days 9 each 3   • Continuous Blood Gluc Transmit (Dexcom G6 Transmitter) misc 1 each Every 3 " "(Three) Months. 1 each 3   • cyclobenzaprine (FLEXERIL) 5 MG tablet Take 1 tablet by mouth 3 (Three) Times a Day As Needed for Muscle Spasms. 30 tablet 0   • dicyclomine (BENTYL) 20 MG tablet Take 1 tablet by mouth Every 6 (Six) Hours. 120 tablet 3   • Dulaglutide 3 MG/0.5ML solution pen-injector Inject 3 mg under the skin into the appropriate area as directed 1 (One) Time Per Week. 4 pen 3   • Easy Touch Insulin Syringe 28G X 1/2\" 1 ML misc USE AT BEDTIME AS DIRECTED 100 each 3   • empagliflozin (JARDIANCE) 25 MG tablet tablet Take 1 tablet by mouth Daily. 30 tablet 3   • ezetimibe (Zetia) 10 MG tablet Take 1 tablet by mouth Daily. 30 tablet 3   • famotidine (PEPCID) 20 MG tablet TAKE ONE TABLET BY MOUTH TWICE DAILY 60 tablet 0   • fluconazole (Diflucan) 150 MG tablet Take 1 tablet now and in 72 hours 2 tablet 0   • gabapentin (NEURONTIN) 300 MG capsule Take 300 mg by mouth 4 (Four) Times a Day.     • Global Ease Inject Pen Needles 31G X 8 MM misc USE TO TEST BLOOD SUGARS TWO TO THREE TIMES DAILY 100 each 3   • glucose blood (FREESTYLE TEST STRIPS) test strip CHECK SUGARS BEFORE BREAKFAST AND TWO HOURS AFTER MEALS 600 each 3   • GNP Nicotine 21 MG/24HR patch apply ONE PATCH ON THE SKIN AS DIRECTED by provider DAILY 28 each 3   • guaiFENesin (Mucus Relief) 600 MG 12 hr tablet Take 2 tablets by mouth 2 (Two) Times a Day. 60 tablet 3   • hydrOXYzine (ATARAX) 25 MG tablet Take 0.5-1 tablets by mouth 3 (Three) Times a Day As Needed for Anxiety. 90 tablet 0   • Insulin Glargine (BASAGLAR KWIKPEN) 100 UNIT/ML injection pen Take 36 units at bedtime can go up by 2-3 unites every 3 days until am sugars running     02/05/2021 - Patient currently utilizing 50 Units nightly. (Patient taking differently: Take 36 units at bedtime can go up by 2-3 unites every 3 days until am sugars running     08/18/2021 - Patient currently utilizing 50 Units nightly.) 15 mL 3   • Insulin Lispro, 1 Unit Dial, (HumaLOG KwikPen) 100 " UNIT/ML solution pen-injector Up to 20 units with meals 6 pen 11   • Insulin Pen Needle (Pen Needles) 32G X 4 MM misc 1 each 4 (Four) Times a Day. Use 4 x daily, Dx code E11.65 120 each 11   • lidocaine (LIDODERM) 5 % APPLY ONE TO TWO PATCHES AS DIRECTED ON LOWER BACK, LEFT UPPER LEG 12 HOURS ON AND 12 HOURS OFF     • linaclotide (LINZESS) 290 MCG capsule capsule Take 1 capsule by mouth Every Morning Before Breakfast. 30 capsule 5   • loratadine (CLARITIN) 10 MG tablet TAKE ONE TABLET BY MOUTH EVERY DAY 30 tablet 3   • losartan (Cozaar) 25 MG tablet Take 1 tablet by mouth Daily. 30 tablet 3   • omeprazole (priLOSEC) 40 MG capsule Take 1 capsule by mouth Daily. 30 capsule 11   • ondansetron ODT (ZOFRAN-ODT) 8 MG disintegrating tablet DISSOLVE ONE TABLET ON THE TONGUE EVERY 8 HOURS AS NEEDED FOR NAUSEA OR VOMITING 90 tablet 3   • oxyCODONE-acetaminophen (PERCOCET) 7.5-325 MG per tablet Take 1 tablet by mouth 3 (Three) Times a Day.     • polyethylene glycol (MIRALAX) 17 GM/SCOOP powder MIX 17 GRAMS (1 CAPFUL) IN 8 OUNCES OF WATER OR JUICE AND DRINK DAILY 510 g 5   • ProAir  (90 Base) MCG/ACT inhaler inhale TWO puffs EVERY 4 HOURS AS NEEDED FOR wheezing 8.5 g 0   • QUEtiapine (SEROquel) 100 MG tablet Take 1.5 tablets by mouth Every Night. 45 tablet 2   • ranolazine (RANEXA) 500 MG 12 hr tablet TAKE ONE TABLET BY MOUTH TWICE DAILY 60 tablet 0   • rosuvastatin (CRESTOR) 20 MG tablet TAKE ONE TABLET BY MOUTH AT BEDTIME 30 tablet 3   • venlafaxine XR (Effexor XR) 150 MG 24 hr capsule Take 1 capsule by mouth Daily for 90 days. 90 capsule 0   • vitamin D (ERGOCALCIFEROL) 1.25 MG (05984 UT) capsule capsule TAKE ONE CAPSULE BY MOUTH EVERY 7 DAYS 4 capsule 3     No current facility-administered medications for this visit.       Physical Exam  Nursing note reviewed.   Constitutional:       Appearance: Normal appearance.   Neurological:      Mental Status: She is alert.   Psychiatric:         Attention and Perception:  Attention and perception normal. She is attentive.         Mood and Affect: Mood is anxious and depressed.         Speech: Speech normal. Speech is not rapid and pressured.         Behavior: Behavior is not agitated. Behavior is cooperative.         Cognition and Memory: Cognition and memory normal.        Result Review :     The following data was reviewed by: WAI Florez on 02/03/2021:  Common labs    Common Labsle 8/10/21 8/10/21 10/5/21 10/5/21 10/5/21 10/5/21 10/6/21    1622 1622 1005 1005 1005 1005    Glucose  174 (A)    468 (A)    BUN  18    13    Creatinine  1.23 (A)    1.19 (A)    eGFR Non  Am  46 (A)    48 (A)    Sodium  134 (A)    130 (A)    Potassium  4.2    4.0    Chloride  95 (A)    92 (A)    Calcium 9.7 9.2    9.6    Albumin      4.40    Total Bilirubin      0.3    Alkaline Phosphatase      117    AST (SGOT)      13    ALT (SGPT)      17    WBC   10.63       Hemoglobin   16.3 (A)       Hematocrit   52.5 (A)       Platelets   352       Total Cholesterol     276 (A)     Triglycerides     216 (A)     HDL Cholesterol     45     LDL Cholesterol      190 (A)     Hemoglobin A1C    13.06 (A)      Microalbumin, Urine       5.8   (A) Abnormal value            Data reviewed: PCP and specialist notes        Assessment and Plan    Problem List Items Addressed This Visit        Mental Health    Severe episode of recurrent major depressive disorder, without psychotic features (HCC)    Relevant Medications    venlafaxine XR (Effexor XR) 150 MG 24 hr capsule    QUEtiapine (SEROquel) 100 MG tablet      Other Visit Diagnoses     Generalized anxiety disorder        Relevant Medications    venlafaxine XR (Effexor XR) 150 MG 24 hr capsule    QUEtiapine (SEROquel) 100 MG tablet    Post traumatic stress disorder (PTSD)        Relevant Medications    venlafaxine XR (Effexor XR) 150 MG 24 hr capsule    QUEtiapine (SEROquel) 100 MG tablet    Sleep difficulties        Relevant Medications    QUEtiapine  (SEROquel) 100 MG tablet            TREATMENT PLAN/GOALS: Continue supportive psychotherapy efforts and medications as indicated. Treatment and medication options discussed during today's visit. Patient ackowledged and verbally consented to continue with current treatment plan and was educated on the importance of compliance with treatment and follow-up appointments.    MEDICATION ISSUES:  We discussed risks, benefits, and side effects of the above medications and the patient was agreeable with the plan. Patient was educated on the importance of compliance with treatment and follow-up appointments.  Patient is agreeable to call the office with any worsening of symptoms or onset of side effects. Patient is agreeable to call 911 or go to the nearest ER should he/she begin having SI/HI.        -Continue Effexor to 150 mg XR daily for depression and anxiety.  -Continue Seroquel at 150 mg at night for sleep.  -Continue hydroxyzine 25 mg up to 3 times a day as needed for anxiety.    -Highly advised patient to keep her appointments to ensure that her medication is at adequate doses to avoid worsening symptoms.  Informed patient that if she were having any plan or intent that is when she needs to go to the nearest ER for stabilization patient verbalized understanding and was agreeable.    -Patient has no-show to her therapist 4 times so will not be rescheduled suggested the patient try another office.  Patient reports that [facility] has therapist encouraged to contact her PCP for referral for therapy as I will continue her med management.    Counseled patient regarding multimodal approach with healthy nutrition, healthy sleep, regular physical activity, social activities, counseling, and medications.      Coping skills reviewed and encouraged positive framing of thoughts     Assisted patient in processing above session content; acknowledged and normalized patient’s thoughts, feelings, and concerns.  Applied   positive coping skills and behavior management in session.  Allowed patient to freely discuss issues without interruption or judgment. Provided safe, confidential environment to facilitate the development of positive therapeutic relationship and encourage open, honest communication. Assisted patient in identifying risk factors which would indicate the need for higher level of care including thoughts to harm self or others and/or self-harming behavior and encouraged patient to contact this office, call 911, or present to the nearest emergency room should any of these events occur. Discussed crisis intervention services and means to access.     MEDS ORDERED DURING VISIT:  New Medications Ordered This Visit   Medications   • venlafaxine XR (Effexor XR) 150 MG 24 hr capsule     Sig: Take 1 capsule by mouth Daily for 90 days.     Dispense:  90 capsule     Refill:  0   • QUEtiapine (SEROquel) 100 MG tablet     Sig: Take 1.5 tablets by mouth Every Night.     Dispense:  45 tablet     Refill:  2           Follow Up   Return in about 6 weeks (around 12/27/2021), or if symptoms worsen or fail to improve, for Recheck.    Patient was given instructions and counseling regarding her condition or for health maintenance advice. Please see specific information pulled into the AVS if appropriate.     Some of the data in this electronic note has been brought forward from a previous encounter, any necessary changes have been made, it has been reviewed by this APRN, and it is accurate.      This document has been electronically signed by WAI Florez  November 15, 2021 11:54 EST    Part of this note may be an electronic transcription/translation of spoken language to printed text using the Dragon Dictation System.

## 2021-11-19 RX ORDER — SYRINGE-NEEDLE,INSULIN,0.5 ML 27GX1/2"
SYRINGE, EMPTY DISPOSABLE MISCELLANEOUS
Qty: 100 EACH | Refills: 3 | Status: CANCELLED | OUTPATIENT
Start: 2021-11-19

## 2021-11-22 ENCOUNTER — OFFICE VISIT (OUTPATIENT)
Dept: ENDOCRINOLOGY | Facility: CLINIC | Age: 51
End: 2021-11-22

## 2021-11-22 VITALS
BODY MASS INDEX: 28.82 KG/M2 | OXYGEN SATURATION: 99 % | WEIGHT: 173 LBS | HEART RATE: 89 BPM | SYSTOLIC BLOOD PRESSURE: 118 MMHG | HEIGHT: 65 IN | DIASTOLIC BLOOD PRESSURE: 68 MMHG

## 2021-11-22 DIAGNOSIS — I10 PRIMARY HYPERTENSION: ICD-10-CM

## 2021-11-22 DIAGNOSIS — Z79.4 TYPE 2 DIABETES MELLITUS WITH HYPERGLYCEMIA, WITH LONG-TERM CURRENT USE OF INSULIN (HCC): Primary | ICD-10-CM

## 2021-11-22 DIAGNOSIS — E78.2 MIXED HYPERLIPIDEMIA: ICD-10-CM

## 2021-11-22 DIAGNOSIS — E55.9 VITAMIN D DEFICIENCY: ICD-10-CM

## 2021-11-22 DIAGNOSIS — E11.65 TYPE 2 DIABETES MELLITUS WITH HYPERGLYCEMIA, WITH LONG-TERM CURRENT USE OF INSULIN (HCC): Primary | ICD-10-CM

## 2021-11-22 DIAGNOSIS — Z72.0 TOBACCO USER: ICD-10-CM

## 2021-11-22 PROCEDURE — 95251 CONT GLUC MNTR ANALYSIS I&R: CPT | Performed by: NURSE PRACTITIONER

## 2021-11-22 PROCEDURE — 99214 OFFICE O/P EST MOD 30 MIN: CPT | Performed by: NURSE PRACTITIONER

## 2021-11-22 RX ORDER — LOSARTAN POTASSIUM 25 MG/1
TABLET ORAL
Qty: 30 TABLET | Refills: 3 | Status: SHIPPED | OUTPATIENT
Start: 2021-11-22 | End: 2022-03-25

## 2021-11-22 RX ORDER — EMPAGLIFLOZIN 25 MG/1
TABLET, FILM COATED ORAL
Qty: 30 TABLET | Refills: 3 | Status: SHIPPED | OUTPATIENT
Start: 2021-11-22 | End: 2022-03-25

## 2021-11-22 NOTE — PROGRESS NOTES
"Chief Complaint  Diabetes    Subjective          Yudi West presents to [facility]  History of Present Illness       In office visit        50-year-old female presents for follow up      Reason diabetes mellitus type 2           Duration--diagnosed at age 17      Context --gestational diabetes      Timing constant     Quality  Not controlled       Severity high      Macrovascular complications-- CAD     MI, stent placed            Microvascular complications -neuropathy , DR , CKD stage ?      Patient has one kidney -- renal carcinoma            Current diabetes regimen      Oral medications, insulin, GLP- 1            Current glucose monitoring      fingersticks      Checks 4 times daily        Dexcom G6    See below              History of severe hypoglycemia      Aggravating factors depression and stops medication            Review of Systems - General ROS: negative          Objective   Vital Signs:   /68   Pulse 89   Ht 165.1 cm (65\")   Wt 78.5 kg (173 lb)   SpO2 99%   BMI 28.79 kg/m²     Physical Exam  Constitutional:       Appearance: Normal appearance.   Cardiovascular:      Rate and Rhythm: Regular rhythm.      Heart sounds: Normal heart sounds.   Pulmonary:      Breath sounds: Normal breath sounds.   Musculoskeletal:      Cervical back: Normal range of motion.   Neurological:      Mental Status: She is alert.        Result Review :   The following data was reviewed by: WAI Connelly on 11/22/2021:  Common labs    Common Labsle 8/10/21 8/10/21 10/5/21 10/5/21 10/5/21 10/5/21 10/6/21    1622 1622 1005 1005 1005 1005    Glucose  174 (A)    468 (A)    BUN  18    13    Creatinine  1.23 (A)    1.19 (A)    eGFR Non  Am  46 (A)    48 (A)    Sodium  134 (A)    130 (A)    Potassium  4.2    4.0    Chloride  95 (A)    92 (A)    Calcium 9.7 9.2    9.6    Albumin      4.40    Total Bilirubin      0.3    Alkaline Phosphatase      117  "   AST (SGOT)      13    ALT (SGPT)      17    WBC   10.63       Hemoglobin   16.3 (A)       Hematocrit   52.5 (A)       Platelets   352       Total Cholesterol     276 (A)     Triglycerides     216 (A)     HDL Cholesterol     45     LDL Cholesterol      190 (A)     Hemoglobin A1C    13.06 (A)      Microalbumin, Urine       5.8   (A) Abnormal value                      Assessment and Plan    Diagnoses and all orders for this visit:    1. Type 2 diabetes mellitus with hyperglycemia, with long-term current use of insulin (HCC) (Primary)  -     CBC & Differential; Future  -     Comprehensive Metabolic Panel; Future  -     Hemoglobin A1c; Future  -     Lipid Panel; Future  -     Microalbumin / Creatinine Urine Ratio - Urine, Clean Catch; Future  -     Protein / Creatinine Ratio, Urine - Urine, Clean Catch; Future  -     TSH; Future  -     Vitamin B12; Future  -     Vitamin D 25 Hydroxy; Future    2. Mixed hyperlipidemia  -     CBC & Differential; Future  -     Comprehensive Metabolic Panel; Future  -     Hemoglobin A1c; Future  -     Lipid Panel; Future  -     Microalbumin / Creatinine Urine Ratio - Urine, Clean Catch; Future  -     Protein / Creatinine Ratio, Urine - Urine, Clean Catch; Future  -     TSH; Future  -     Vitamin B12; Future  -     Vitamin D 25 Hydroxy; Future    3. Primary hypertension  -     CBC & Differential; Future  -     Comprehensive Metabolic Panel; Future  -     Hemoglobin A1c; Future  -     Lipid Panel; Future  -     Microalbumin / Creatinine Urine Ratio - Urine, Clean Catch; Future  -     Protein / Creatinine Ratio, Urine - Urine, Clean Catch; Future  -     TSH; Future  -     Vitamin B12; Future  -     Vitamin D 25 Hydroxy; Future    4. Vitamin D deficiency  -     CBC & Differential; Future  -     Comprehensive Metabolic Panel; Future  -     Hemoglobin A1c; Future  -     Lipid Panel; Future  -     Microalbumin / Creatinine Urine Ratio - Urine, Clean Catch; Future  -     Protein / Creatinine  Ratio, Urine - Urine, Clean Catch; Future  -     TSH; Future  -     Vitamin B12; Future  -     Vitamin D 25 Hydroxy; Future    5. Tobacco user             Glycemic Management:     Diabetes mellitus type 2           Lab Results   Component Value Date     HGBA1C 13.06 (H) 10/05/2021           Dexcom g6     Downloaded and reviewed    Dated from Nov. 9 to Nov. 22 , 2021     Average bg 311    Target bg 2.8%     High 97.2%     Very high 81.8%     Low 0 %     Very low 0 %     Hyperglycemia all the time     Adjust all insulin             Taking Trulicity 3 mg once weekly --- not taking caused her to be sick and the 0.75 mg made her sick         Taking jardiance 25 mg once daily         Taking basaglar 40  units once daily ----increase to 44 units      Cannot take metformin            Humalog          6  units before each meal three times daily --change to following       Change to 8 to 10 for small carb meal    Give 10 up to 12 units for medium carb    Give 14 up to 16 units for large carb          Plus sliding scale      151-200   2 units   201-250   4 units   251-300   6 units   Above 301  8 units            Goals for sugar     Fasting and before meals 80 to 130     2 hours after meals 180 or less        Aim for 45 grams of carbohydrate per meal     Aim for 15 grams of carbohydrate per snack                       Microvascular Complications Monitoring         Last eye exam--- Oct. 2021 ,       Neuropathy --yes      Taking gabapentin         Component      Latest Ref Rng & Units 10/6/2021   Microalbumin/Creatinine Ratio      mg/g 165.2   Creatinine, Urine      mg/dL 35.1   Microalbumin, Urine      mg/dL 5.8                  Lipid Management:            Hyperlipidemia      Taking Crestor 20 mg one at night         Taking zetia 10 mg one at night                  Total Cholesterol   Date Value Ref Range Status   10/05/2021 276 (H) 0 - 200 mg/dL Final            Triglycerides   Date Value Ref Range Status   10/05/2021 216  (H) 0 - 150 mg/dL Final            HDL Cholesterol   Date Value Ref Range Status   10/05/2021 45 40 - 60 mg/dL Final            LDL Cholesterol    Date Value Ref Range Status   10/05/2021 190 (H) 0 - 100 mg/dL Final                  Blood Pressure Management:     Taking losartan 25 mg daily            Thyroid Health           Lab Results   Component Value Date     TSH 1.480 07/28/2021                  Lab Results   Component Value Date     [id number] 709 10/05/2021                  Bone Health      Vitamin d def.     Taking vitamin d supplement      Component      Latest Ref Rng & Units 10/5/2021   25 Hydroxy, Vitamin D      ng/ml 23.8            Weight Management:     Patient's Body mass index is 27.64 kg/m². indicating that she is overweight (BMI 25-29.9). Obesity-related health conditions include the following: diabetes mellitus. Obesity is unchanged. BMI is is above average; no BMI management plan is appropriate. We discussed portion control and increasing exercise..           Preventive Care:      Smoking         Yudi West  reports that she has been smoking cigarettes. She has a 37.00 pack-year smoking history. She has never used smokeless tobacco.. I have educated her on the risk of diseases from using tobacco products such as cancer and COPD.     I advised her to quit and she is not willing to quit.    I spent 3  minutes counseling the patient.                 Follow Up   Return in about 3 months (around 2/22/2022) for Recheck.  Patient was given instructions and counseling regarding her condition or for health maintenance advice. Please see specific information pulled into the AVS if appropriate.         This document has been electronically signed by WAI Connelly on November 22, 2021 11:42 CST.

## 2021-11-24 RX ORDER — GUAIFENESIN 600 MG/1
TABLET, EXTENDED RELEASE ORAL
Qty: 60 TABLET | Refills: 3 | Status: SHIPPED | OUTPATIENT
Start: 2021-11-24 | End: 2022-01-24

## 2021-11-29 RX ORDER — EZETIMIBE 10 MG/1
TABLET ORAL
Qty: 30 TABLET | Refills: 3 | Status: SHIPPED | OUTPATIENT
Start: 2021-11-29 | End: 2022-03-25

## 2021-12-01 ENCOUNTER — OFFICE VISIT (OUTPATIENT)
Dept: ORTHOPEDIC SURGERY | Facility: CLINIC | Age: 51
End: 2021-12-01

## 2021-12-01 VITALS
DIASTOLIC BLOOD PRESSURE: 86 MMHG | BODY MASS INDEX: 28.32 KG/M2 | HEIGHT: 65 IN | OXYGEN SATURATION: 97 % | SYSTOLIC BLOOD PRESSURE: 129 MMHG | WEIGHT: 170 LBS | HEART RATE: 103 BPM

## 2021-12-01 DIAGNOSIS — M75.52 SUBACROMIAL BURSITIS OF LEFT SHOULDER JOINT: Primary | ICD-10-CM

## 2021-12-01 DIAGNOSIS — M25.512 LEFT SHOULDER PAIN, UNSPECIFIED CHRONICITY: ICD-10-CM

## 2021-12-01 PROCEDURE — 20610 DRAIN/INJ JOINT/BURSA W/O US: CPT | Performed by: ORTHOPAEDIC SURGERY

## 2021-12-01 PROCEDURE — 99214 OFFICE O/P EST MOD 30 MIN: CPT | Performed by: ORTHOPAEDIC SURGERY

## 2021-12-01 RX ORDER — BUPIVACAINE HYDROCHLORIDE 5 MG/ML
3 INJECTION, SOLUTION PERINEURAL
Status: COMPLETED | OUTPATIENT
Start: 2021-12-01 | End: 2021-12-01

## 2021-12-01 RX ORDER — TRIAMCINOLONE ACETONIDE 40 MG/ML
80 INJECTION, SUSPENSION INTRA-ARTICULAR; INTRAMUSCULAR
Status: COMPLETED | OUTPATIENT
Start: 2021-12-01 | End: 2021-12-01

## 2021-12-01 RX ORDER — LIDOCAINE HYDROCHLORIDE AND EPINEPHRINE 10; 10 MG/ML; UG/ML
2 INJECTION, SOLUTION INFILTRATION; PERINEURAL
Status: COMPLETED | OUTPATIENT
Start: 2021-12-01 | End: 2021-12-01

## 2021-12-01 RX ADMIN — LIDOCAINE HYDROCHLORIDE AND EPINEPHRINE 2 ML: 10; 10 INJECTION, SOLUTION INFILTRATION; PERINEURAL at 13:26

## 2021-12-01 RX ADMIN — BUPIVACAINE HYDROCHLORIDE 3 ML: 5 INJECTION, SOLUTION PERINEURAL at 13:26

## 2021-12-01 RX ADMIN — TRIAMCINOLONE ACETONIDE 80 MG: 40 INJECTION, SUSPENSION INTRA-ARTICULAR; INTRAMUSCULAR at 13:26

## 2021-12-01 NOTE — PROGRESS NOTES
"Yuid West is a 51 y.o. female returns for     Chief Complaint   Patient presents with   • Left Shoulder - Follow-up, Pain       HISTORY OF PRESENT ILLNESS:Continued pain left shoulder  Pain scale today 9/10     had only 2 weeks of relief after her left subacromial injection in July.  She complains of diffuse pain in the shoulder worse with motion of the limb and worse at night.  She says she was given what sounds like a lidocaine patch by her pain clinic and this gives her some temporary relief of her pain.  She is also taking 30 mg of oxycodone daily on a regular basis.       CONCURRENT MEDICAL HISTORY:    The following portions of the patient's history were reviewed and updated as appropriate: allergies, current medications, past family history, past medical history, past social history, past surgical history and problem list.         PHYSICAL EXAMINATION:       /86   Pulse 103   Ht 165.1 cm (65\")   Wt 77.1 kg (170 lb)   SpO2 97%   BMI 28.29 kg/m²     Physical Exam she is alert and in apparent mild discomfort at rest.    GAIT:     [x]  Normal  []  Antalgic    Assistive device: [x]  None  []  Walker     []  Crutches  []  Cane     []  Wheelchair  []  Stretcher    Ortho Exam active elevation of the left shoulder is smooth but painfully restricted to about 80 degrees.  She is moderately apprehensive about exam of the limb.  Impingement testing produces moderate discomfort.  Passive motion of the shoulder is smooth.  She has prominent pain with any motor testing about the shoulder.      Large Joint Arthrocentesis: L subacromial bursa  Date/Time: 12/1/2021 1:26 PM  Consent given by: patient  Site marked: site marked  Timeout: Immediately prior to procedure a time out was called to verify the correct patient, procedure, equipment, support staff and site/side marked as required   Supporting Documentation  Indications: pain   Procedure Details  Location: shoulder - L subacromial " bursa  Preparation: Patient was prepped and draped in the usual sterile fashion  Needle size: 22 G  Approach: posterior  Medications administered: 3 mL bupivacaine 0.5 %; 80 mg triamcinolone acetonide 40 MG/ML; 2 mL lidocaine 1% - EPINEPHrine 1:710712 1 %-1:520417  Patient tolerance: patient tolerated the procedure well with no immediate complications                ASSESSMENT: Recurrent rotator cuff tendinitis.  I am concerned about a possible rotator cuff tear.  She understands surgery may be needed.  I recommend obtaining an MRI scan.  She was agreeable with this.    Potential benefits of repeat injection were discussed.  She wished to proceed with this.    The left shoulder was prepped.  Skin was infiltrated with 1% Xylocaine with epinephrine.  The subacromial bursa was injected with a mixture of Marcaine Kenalog and Xylocaine with epinephrine.  There were no complications.    Return here after study for further counseling.    Diagnoses and all orders for this visit:    Subacromial bursitis of left shoulder joint  -     MRI shoulder left wo contrast; Future    Left shoulder pain, unspecified chronicity  -     MRI shoulder left wo contrast; Future    Other orders  -     Large Joint Arthrocentesis: L subacromial bursa          PLAN    Return if symptoms worsen or fail to improve.    Howie Campoverde MD

## 2021-12-06 RX ORDER — DULAGLUTIDE 3 MG/.5ML
INJECTION, SOLUTION SUBCUTANEOUS
Qty: 2 ML | Refills: 0 | Status: SHIPPED | OUTPATIENT
Start: 2021-12-06 | End: 2022-01-11

## 2021-12-13 RX ORDER — RANOLAZINE 500 MG/1
TABLET, EXTENDED RELEASE ORAL
Qty: 60 TABLET | Refills: 0 | Status: SHIPPED | OUTPATIENT
Start: 2021-12-13 | End: 2022-01-31

## 2021-12-15 ENCOUNTER — APPOINTMENT (OUTPATIENT)
Dept: MRI IMAGING | Facility: HOSPITAL | Age: 51
End: 2021-12-15

## 2021-12-15 ENCOUNTER — APPOINTMENT (OUTPATIENT)
Dept: CT IMAGING | Facility: HOSPITAL | Age: 51
End: 2021-12-15

## 2021-12-27 ENCOUNTER — APPOINTMENT (OUTPATIENT)
Dept: MRI IMAGING | Facility: HOSPITAL | Age: 51
End: 2021-12-27

## 2021-12-28 ENCOUNTER — TELEMEDICINE (OUTPATIENT)
Dept: PSYCHIATRY | Facility: CLINIC | Age: 51
End: 2021-12-28

## 2021-12-28 DIAGNOSIS — F43.10 POST TRAUMATIC STRESS DISORDER (PTSD): ICD-10-CM

## 2021-12-28 DIAGNOSIS — F33.2 SEVERE EPISODE OF RECURRENT MAJOR DEPRESSIVE DISORDER, WITHOUT PSYCHOTIC FEATURES (HCC): Primary | ICD-10-CM

## 2021-12-28 DIAGNOSIS — F41.1 GENERALIZED ANXIETY DISORDER: ICD-10-CM

## 2021-12-28 DIAGNOSIS — G47.9 SLEEP DIFFICULTIES: ICD-10-CM

## 2021-12-28 PROCEDURE — 99214 OFFICE O/P EST MOD 30 MIN: CPT | Performed by: NURSE PRACTITIONER

## 2021-12-28 RX ORDER — LAMOTRIGINE 25 MG/1
TABLET ORAL
Qty: 45 TABLET | Refills: 0 | Status: SHIPPED | OUTPATIENT
Start: 2021-12-28 | End: 2022-07-18

## 2021-12-28 RX ORDER — QUETIAPINE FUMARATE 100 MG/1
150 TABLET, FILM COATED ORAL NIGHTLY
Qty: 45 TABLET | Refills: 2 | Status: SHIPPED | OUTPATIENT
Start: 2021-12-28 | End: 2022-06-15

## 2021-12-28 RX ORDER — HYDROXYZINE HYDROCHLORIDE 25 MG/1
12.5-25 TABLET, FILM COATED ORAL 3 TIMES DAILY PRN
Qty: 90 TABLET | Refills: 1 | Status: SHIPPED | OUTPATIENT
Start: 2021-12-28 | End: 2022-02-16

## 2021-12-28 NOTE — PROGRESS NOTES
This provider is located at [facility], [address] [place] [address].The Patient is seen remotely at home, [address] [place] [address] via [employer]. Patient is being seen via telehealth and confirm that they are in a secure environment for this session. The patient's condition being diagnosed/treated is appropriate for telemedicine. The provider identified himself/herself: herself as well as her credentials.   The patient and  gave consent to be seen remotely, and when consent is given they understand that the consent allows for patient identifiable information to be sent to a third party as needed.   They may refuse to be seen remotely at any time. The electronic data is encrypted and password protected, and the patient has been advised of the potential risks to privacy not withstanding such measures.    You have chosen to receive care through a telehealth visit.  Do you consent to use a video/audio connection for your medical care today? Yes.  Patient verified , name and address      Chief Complaint  Depression and anxiety     Subjective    Yudi West presents to [facility] for medication management.     History of Present Illness   Patient presents today reporting things have not been going well.  She states that she has not had the Effexor in several weeks as it made her too sick to her stomach even decreasing back to the 75 mg.  Patient states that she had a very her brother-in-law and that has been difficult.  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  Patient states that she feels hopeless and helpless and more depressed lately and had more crying episodes.  She notes that she has been more irritable and agitated which have caused issues with her .  Patient states that  she sleeps 3 to 4 hours at night but she is up and down and then will take another Seroquel in the middle of the night at times.  Patient states that she is even taking 300 mg which is not prescribed at night to help with her sleep.  Patient notes that her anxiety has been increased as she is constantly on edge and irritable and agitated with racing thoughts.  She notes that she has been scratching at her skin even due to her anxiety.  Patient states that she is still not got up with anyone regarding therapy encouraged her to do so as she stated she would talk to [facility].  Patient denies any SI/HI/AH/VH.      Objective   Vital Signs:   There were no vitals taken for this visit.  Due to the remote nature of this encounter (virtual encounter), vitals were unable to be obtained.  Height stated at 65 inches.  Weight stated at 175 pounds.      PHQ-9 Score:   PHQ-9 Total Score:       Mental Status Exam:   Hygiene:   good  Cooperation:  Cooperative  Eye Contact:  Good  Psychomotor Behavior:  Restless  Affect:  Appropriate  Mood: depressed and anxious  Speech:  Normal  Thought Process:  Goal directed and Disorganized  Thought Content:  Mood congruent  Suicidal:  None  Homicidal:  None  Hallucinations:  None  Delusion:  None  Memory:  Intact  Orientation:  Person, Place, Time and Situation  Reliability:  fair  Insight:  Fair  Judgement:  Fair  Impulse Control:  Fair  Physical/Medical Issues:  Yes COPD, CAD, HTN, DM, and CKD stage 3     Current Medications:   Current Outpatient Medications   Medication Sig Dispense Refill   • Alpha-Lipoic Acid 300 MG capsule TAKE ONE TO TWO CAPSULES BY MOUTH TWICE DAILY     • aspirin 81 MG EC tablet Take 1 tablet by mouth 2 (Two) Times a Day With Meals. (Patient taking differently: Take 81 mg by mouth Daily.) 60 tablet 0   • Blood Glucose Monitoring Suppl (FREESTYLE FREEDOM LITE) w/Device kit USE TO TEST BLOOD SUGAR TWO TO THREE TI MES DAILY 1 each 0   • carvedilol (COREG)  3.125 MG tablet TAKE ONE TABLET BY MOUTH TWICE DAILY WITH MEALS 60 tablet 3   • clopidogrel (PLAVIX) 75 MG tablet TAKE ONE TABLET BY MOUTH DAILY 30 tablet 3   • Continuous Blood Gluc Sensor (Dexcom G6 Sensor) 1 each As Needed (glucose control). Every 10 days 9 each 3   • Continuous Blood Gluc Transmit (Dexcom G6 Transmitter) misc 1 each Every 3 (Three) Months. 1 each 3   • cyclobenzaprine (FLEXERIL) 5 MG tablet Take 1 tablet by mouth 3 (Three) Times a Day As Needed for Muscle Spasms. 30 tablet 0   • dicyclomine (BENTYL) 20 MG tablet Take 1 tablet by mouth Every 6 (Six) Hours. 120 tablet 3   • ezetimibe (ZETIA) 10 MG tablet TAKE ONE TABLET BY MOUTH EVERY DAY 30 tablet 3   • famotidine (PEPCID) 20 MG tablet TAKE ONE TABLET BY MOUTH TWICE DAILY 60 tablet 0   • fluconazole (Diflucan) 150 MG tablet Take 1 tablet now and in 72 hours 2 tablet 0   • gabapentin (NEURONTIN) 300 MG capsule Take 300 mg by mouth 4 (Four) Times a Day.     • Global Ease Inject Pen Needles 31G X 8 MM misc USE TO TEST BLOOD SUGARS TWO TO THREE TIMES DAILY 100 each 3   • glucose blood (FREESTYLE TEST STRIPS) test strip CHECK SUGARS BEFORE BREAKFAST AND TWO HOURS AFTER MEALS 600 each 3   • GNP Nicotine 21 MG/24HR patch apply ONE PATCH ON THE SKIN AS DIRECTED by provider DAILY 28 each 3   • hydrOXYzine (ATARAX) 25 MG tablet Take 0.5-1 tablets by mouth 3 (Three) Times a Day As Needed for Anxiety. 90 tablet 1   • Insulin Glargine (BASAGLAR KWIKPEN) 100 UNIT/ML injection pen Take 36 units at bedtime can go up by 2-3 unites every 3 days until am sugars running     02/05/2021 - Patient currently utilizing 50 Units nightly. (Patient taking differently: Take 36 units at bedtime can go up by 2-3 unites every 3 days until am sugars running     08/18/2021 - Patient currently utilizing 50 Units nightly.) 15 mL 3   • Insulin Lispro, 1 Unit Dial, (HumaLOG KwikPen) 100 UNIT/ML solution pen-injector Up to 20 units with meals 6 pen 11   • Insulin Pen  Needle (Pen Needles) 32G X 4 MM misc 1 each 4 (Four) Times a Day. Use 4 x daily, Dx code E11.65 120 each 11   • Jardiance 25 MG tablet tablet TAKE ONE TABLET BY MOUTH EVERY DAY 30 tablet 3   • lamoTRIgine (LaMICtal) 25 MG tablet Take 1 tablet for 2 weeks if no side effects or rash Take 2 tablets daily. 45 tablet 0   • lidocaine (LIDODERM) 5 % APPLY ONE TO TWO PATCHES AS DIRECTED ON LOWER BACK, LEFT UPPER LEG 12 HOURS ON AND 12 HOURS OFF     • linaclotide (LINZESS) 290 MCG capsule capsule Take 1 capsule by mouth Every Morning Before Breakfast. 30 capsule 5   • loratadine (CLARITIN) 10 MG tablet TAKE ONE TABLET BY MOUTH EVERY DAY 30 tablet 3   • losartan (COZAAR) 25 MG tablet TAKE ONE TABLET BY MOUTH EVERY DAY 30 tablet 3   • Mucus Relief 600 MG 12 hr tablet TAKE TWO TABLETS BY MOUTH TWICE DAILY 60 tablet 3   • omeprazole (priLOSEC) 40 MG capsule Take 1 capsule by mouth Daily. 30 capsule 11   • ondansetron ODT (ZOFRAN-ODT) 8 MG disintegrating tablet DISSOLVE ONE TABLET ON THE TONGUE EVERY 8 HOURS AS NEEDED FOR NAUSEA OR VOMITING 90 tablet 3   • oxyCODONE-acetaminophen (PERCOCET) 7.5-325 MG per tablet Take 1 tablet by mouth 3 (Three) Times a Day.     • polyethylene glycol (MIRALAX) 17 GM/SCOOP powder MIX 17 GRAMS (1 CAPFUL) IN 8 OUNCES OF WATER OR JUICE AND DRINK DAILY 510 g 5   • ProAir  (90 Base) MCG/ACT inhaler inhale TWO puffs EVERY 4 HOURS AS NEEDED FOR wheezing 8.5 g 0   • QUEtiapine (SEROquel) 100 MG tablet Take 1.5 tablets by mouth Every Night. 45 tablet 2   • ranolazine (RANEXA) 500 MG 12 hr tablet TAKE ONE TABLET BY MOUTH TWICE DAILY 60 tablet 0   • rosuvastatin (CRESTOR) 20 MG tablet TAKE ONE TABLET BY MOUTH AT BEDTIME 30 tablet 3   • Trulicity 3 MG/0.5ML solution pen-injector INJECT INTRAMUSCULARLY WEEKLY AS DIRECTED 2 mL 0   • vitamin D (ERGOCALCIFEROL) 1.25 MG (45182 UT) capsule capsule TAKE ONE CAPSULE BY MOUTH EVERY 7 DAYS 4 capsule 3     No current facility-administered medications for this  visit.       Physical Exam  Nursing note reviewed.   Constitutional:       Appearance: Normal appearance.   Neurological:      Mental Status: She is alert.   Psychiatric:         Attention and Perception: Attention and perception normal. She is attentive.         Mood and Affect: Mood is anxious and depressed.         Speech: Speech is rapid and pressured.         Behavior: Behavior is not agitated. Behavior is cooperative.         Thought Content: Thought content normal.         Cognition and Memory: Cognition and memory normal.        Result Review :     The following data was reviewed by: WAI Florez on 02/03/2021:  Common labs    Common Labsle 8/10/21 8/10/21 10/5/21 10/5/21 10/5/21 10/5/21 10/6/21    1622 1622 1005 1005 1005 1005    Glucose  174 (A)    468 (A)    BUN  18    13    Creatinine  1.23 (A)    1.19 (A)    eGFR Non  Am  46 (A)    48 (A)    Sodium  134 (A)    130 (A)    Potassium  4.2    4.0    Chloride  95 (A)    92 (A)    Calcium 9.7 9.2    9.6    Albumin      4.40    Total Bilirubin      0.3    Alkaline Phosphatase      117    AST (SGOT)      13    ALT (SGPT)      17    WBC   10.63       Hemoglobin   16.3 (A)       Hematocrit   52.5 (A)       Platelets   352       Total Cholesterol     276 (A)     Triglycerides     216 (A)     HDL Cholesterol     45     LDL Cholesterol      190 (A)     Hemoglobin A1C    13.06 (A)      Microalbumin, Urine       5.8   (A) Abnormal value            Data reviewed: PCP and specialist notes        Assessment and Plan    Problem List Items Addressed This Visit        Mental Health    Severe episode of recurrent major depressive disorder, without psychotic features (HCC) - Primary    Relevant Medications    lamoTRIgine (LaMICtal) 25 MG tablet    QUEtiapine (SEROquel) 100 MG tablet    hydrOXYzine (ATARAX) 25 MG tablet      Other Visit Diagnoses     Generalized anxiety disorder        Relevant Medications    lamoTRIgine (LaMICtal) 25 MG tablet    QUEtiapine  (SEROquel) 100 MG tablet    hydrOXYzine (ATARAX) 25 MG tablet    Post traumatic stress disorder (PTSD)        Relevant Medications    lamoTRIgine (LaMICtal) 25 MG tablet    QUEtiapine (SEROquel) 100 MG tablet    hydrOXYzine (ATARAX) 25 MG tablet    Sleep difficulties        Relevant Medications    QUEtiapine (SEROquel) 100 MG tablet    hydrOXYzine (ATARAX) 25 MG tablet            TREATMENT PLAN/GOALS: Continue supportive psychotherapy efforts and medications as indicated. Treatment and medication options discussed during today's visit. Patient ackowledged and verbally consented to continue with current treatment plan and was educated on the importance of compliance with treatment and follow-up appointments.    MEDICATION ISSUES:  We discussed risks, benefits, and side effects of the above medications and the patient was agreeable with the plan. Patient was educated on the importance of compliance with treatment and follow-up appointments.  Patient is agreeable to call the office with any worsening of symptoms or onset of side effects. Patient is agreeable to call 911 or go to the nearest ER should he/she begin having SI/HI.   We will add Lamictal in an effort to stabilize mood.  The patient was reminded to immediately come to the hospital should there be any loss of control.  Explanation was given to her regarding Lamictal and the potential for Fortunato Micheal syndrome and significant rash.  Patient was encouraged to check skin prior to beginning.  Patient was encouraged to report any rash and to immediately stop medication.       -Discontinue Effexor since patient states she cannot tolerate due to severe nausea and vomiting.  -Begin lamotrigine 25 mg for 2 weeks if no side effects or rash then begin taking 2 tablets totaling 50 mg daily for depression and anxiety symptoms.  Patient has now failed and tried sertraline, paroxetine, fluoxetine, aripiprazole, escitalopram, venlafaxine, bupropion, buspirone and  Valium.  -Continue Seroquel at 150 mg at night for sleep.  -Continue hydroxyzine 25 mg up to 3 times a day as needed for anxiety.    -Highly advised patient to keep her appointments to ensure that her medication is at adequate doses to avoid worsening symptoms.  Informed patient that if she were having any plan or intent that is when she needs to go to the nearest ER for stabilization patient verbalized understanding and was agreeable.  Highly encourage patient to contact [facility] for counseling services.    -Patient has no-show to her therapist 4 times so will not be rescheduled suggested the patient try another office.  Patient reports that [facility] has therapist encouraged to contact her PCP for referral for therapy as I will continue her med management.    Counseled patient regarding multimodal approach with healthy nutrition, healthy sleep, regular physical activity, social activities, counseling, and medications.      Coping skills reviewed and encouraged positive framing of thoughts     Assisted patient in processing above session content; acknowledged and normalized patient’s thoughts, feelings, and concerns.  Applied  positive coping skills and behavior management in session.  Allowed patient to freely discuss issues without interruption or judgment. Provided safe, confidential environment to facilitate the development of positive therapeutic relationship and encourage open, honest communication. Assisted patient in identifying risk factors which would indicate the need for higher level of care including thoughts to harm self or others and/or self-harming behavior and encouraged patient to contact this office, call 911, or present to the nearest emergency room should any of these events occur. Discussed crisis intervention services and means to access.     MEDS ORDERED DURING VISIT:  New Medications Ordered This Visit   Medications   • lamoTRIgine (LaMICtal) 25 MG tablet      Sig: Take 1 tablet for 2 weeks if no side effects or rash Take 2 tablets daily.     Dispense:  45 tablet     Refill:  0   • QUEtiapine (SEROquel) 100 MG tablet     Sig: Take 1.5 tablets by mouth Every Night.     Dispense:  45 tablet     Refill:  2   • hydrOXYzine (ATARAX) 25 MG tablet     Sig: Take 0.5-1 tablets by mouth 3 (Three) Times a Day As Needed for Anxiety.     Dispense:  90 tablet     Refill:  1           Follow Up   Return in about 4 weeks (around 1/25/2022), or if symptoms worsen or fail to improve, for Recheck.    Patient was given instructions and counseling regarding her condition or for health maintenance advice. Please see specific information pulled into the AVS if appropriate.     Some of the data in this electronic note has been brought forward from a previous encounter, any necessary changes have been made, it has been reviewed by this APRN, and it is accurate.      This document has been electronically signed by WAI Florez  December 28, 2021 11:44 EST    Part of this note may be an electronic transcription/translation of spoken language to printed text using the Dragon Dictation System.

## 2021-12-29 ENCOUNTER — APPOINTMENT (OUTPATIENT)
Dept: MRI IMAGING | Facility: HOSPITAL | Age: 51
End: 2021-12-29

## 2022-01-11 RX ORDER — DULAGLUTIDE 3 MG/.5ML
INJECTION, SOLUTION SUBCUTANEOUS
Qty: 2 ML | Refills: 0 | Status: SHIPPED | OUTPATIENT
Start: 2022-01-11 | End: 2022-02-07

## 2022-01-14 ENCOUNTER — TELEPHONE (OUTPATIENT)
Dept: GENERAL RADIOLOGY | Facility: HOSPITAL | Age: 52
End: 2022-01-14

## 2022-01-24 RX ORDER — GUAIFENESIN 600 MG/1
TABLET, EXTENDED RELEASE ORAL
Qty: 60 TABLET | Refills: 0 | Status: SHIPPED | OUTPATIENT
Start: 2022-01-24 | End: 2022-02-14

## 2022-01-31 RX ORDER — RANOLAZINE 500 MG/1
TABLET, EXTENDED RELEASE ORAL
Qty: 60 TABLET | Refills: 3 | Status: SHIPPED | OUTPATIENT
Start: 2022-01-31 | End: 2022-04-25

## 2022-02-01 ENCOUNTER — TELEPHONE (OUTPATIENT)
Dept: PSYCHIATRY | Facility: CLINIC | Age: 52
End: 2022-02-01

## 2022-02-01 NOTE — TELEPHONE ENCOUNTER
SPOKE WITH PATIENT REGARDING HER NO SHOW ON 1/25 THIS IS PATIENTS 7TH NO SHOWS WITH  [facility] I'VE RESCHEDULED AND REVIEW NO SHOW POLICY WITH PATIENT.

## 2022-02-07 RX ORDER — DULAGLUTIDE 3 MG/.5ML
0.5 INJECTION, SOLUTION SUBCUTANEOUS WEEKLY
Qty: 2 ML | Refills: 1 | Status: SHIPPED | OUTPATIENT
Start: 2022-02-07 | End: 2022-03-15

## 2022-02-14 RX ORDER — GUAIFENESIN 600 MG/1
TABLET, EXTENDED RELEASE ORAL
Qty: 60 TABLET | Refills: 0 | Status: SHIPPED | OUTPATIENT
Start: 2022-02-14 | End: 2022-03-07

## 2022-02-16 DIAGNOSIS — G47.9 SLEEP DIFFICULTIES: ICD-10-CM

## 2022-02-16 DIAGNOSIS — F41.1 GENERALIZED ANXIETY DISORDER: ICD-10-CM

## 2022-02-16 RX ORDER — HYDROXYZINE HYDROCHLORIDE 25 MG/1
TABLET, FILM COATED ORAL
Qty: 90 TABLET | Refills: 1 | Status: SHIPPED | OUTPATIENT
Start: 2022-02-16 | End: 2022-04-18

## 2022-02-24 ENCOUNTER — TELEMEDICINE (OUTPATIENT)
Dept: ENDOCRINOLOGY | Facility: CLINIC | Age: 52
End: 2022-02-24

## 2022-02-24 DIAGNOSIS — F17.200 SMOKING: ICD-10-CM

## 2022-02-24 DIAGNOSIS — E11.65 TYPE 2 DIABETES MELLITUS WITH HYPERGLYCEMIA, WITH LONG-TERM CURRENT USE OF INSULIN: Primary | ICD-10-CM

## 2022-02-24 DIAGNOSIS — Z79.4 TYPE 2 DIABETES MELLITUS WITH HYPERGLYCEMIA, WITH LONG-TERM CURRENT USE OF INSULIN: Primary | ICD-10-CM

## 2022-02-24 DIAGNOSIS — I10 PRIMARY HYPERTENSION: ICD-10-CM

## 2022-02-24 DIAGNOSIS — E78.2 MIXED HYPERLIPIDEMIA: ICD-10-CM

## 2022-02-24 PROBLEM — IMO0001 SMOKING: Status: ACTIVE | Noted: 2020-06-17

## 2022-02-24 PROCEDURE — 99214 OFFICE O/P EST MOD 30 MIN: CPT | Performed by: NURSE PRACTITIONER

## 2022-02-24 RX ORDER — BLOOD-GLUCOSE METER
1 KIT MISCELLANEOUS AS NEEDED
Qty: 1 EACH | Refills: 0 | Status: SHIPPED | OUTPATIENT
Start: 2022-02-24

## 2022-02-24 RX ORDER — LANCETS 30 GAUGE
EACH MISCELLANEOUS
Qty: 100 EACH | Refills: 11 | Status: SHIPPED | OUTPATIENT
Start: 2022-02-24 | End: 2022-09-13 | Stop reason: SDUPTHER

## 2022-02-24 NOTE — PROGRESS NOTES
Chief Complaint  Diabetes    Subjective          Yudi West presents to [facility] ENDOCRINOLOGY  History of Present Illness       You have chosen to receive care through a telehealth visit.  Do you consent to use a video/audio connection for your medical care today? Yes            TELEHEALTH VIDEO VISIT     This a video visit due to [facility] current guidelines for social distancing due to the COVID 19 pandemic      50-year-old female presents for follow up      Reason diabetes mellitus type 2           Duration--diagnosed at age 17      Context --gestational diabetes      Timing constant     Quality  Not controlled        Severity high      Macrovascular complications-- CAD     MI, stent placed            Microvascular complications -neuropathy , DR , CKD stage ?      Patient has one kidney -- renal carcinoma            Current diabetes regimen      Oral medications, insulin, GLP- 1            Current glucose monitoring      fingersticks      Checks 4 times daily         Dexcom G6  Could not download                             History of severe hypoglycemia      Aggravating factors depression and stops medication       Review of Systems - General ROS: negative             Objective   Vital Signs:   There were no vitals taken for this visit.    Physical Exam  Neurological:      General: No focal deficit present.      Mental Status: She is alert.   Psychiatric:         Mood and Affect: Mood normal.         Thought Content: Thought content normal.         Judgment: Judgment normal.        Result Review :   The following data was reviewed by: WAI Connelly on 02/24/2022:  Common labs    Common Labsle 8/10/21 8/10/21 10/5/21 10/5/21 10/5/21 10/5/21 10/6/21    1622 1622 1005 1005 1005 1005    Glucose  174 (A)    468 (A)    BUN  18    13    Creatinine  1.23 (A)    1.19 (A)    eGFR Non  Am  46 (A)    48 (A)    Sodium  134 (A)    130 (A)    Potassium  4.2    4.0     Chloride  95 (A)    92 (A)    Calcium 9.7 9.2    9.6    Albumin      4.40    Total Bilirubin      0.3    Alkaline Phosphatase      117    AST (SGOT)      13    ALT (SGPT)      17    WBC   10.63       Hemoglobin   16.3 (A)       Hematocrit   52.5 (A)       Platelets   352       Total Cholesterol     276 (A)     Triglycerides     216 (A)     HDL Cholesterol     45     LDL Cholesterol      190 (A)     Hemoglobin A1C    13.06 (A)      Microalbumin, Urine       5.8   (A) Abnormal value                      Assessment and Plan    Diagnoses and all orders for this visit:    1. Type 2 diabetes mellitus with hyperglycemia, with long-term current use of insulin (HCC) (Primary)    2. Smoking    3. Primary hypertension    4. Mixed hyperlipidemia    Other orders  -     glucose monitor monitoring kit; 1 each As Needed (to check bg).  Dispense: 1 each; Refill: 0  -     glucose blood test strip; Test 3 times daily  Dispense: 100 each; Refill: 11  -     Lancets misc; Test 3 times daily , E11.65  Dispense: 100 each; Refill: 11             Glycemic Management:     Diabetes mellitus type 2       Lab Results   Component Value Date    HGBA1C 13.06 (H) 10/05/2021       Dexcom g6 could not download                Taking Trulicity   0.75 mg  ( higher doses made sick)         Taking jardiance 25 mg once daily         Taking basaglar  44 units      Cannot take metformin            Humalog              8 to 10 for small carb meal     Give 10 up to 12 units for medium carb     Give 14 up to 16 units for large carb            Plus sliding scale      151-200   2 units   201-250   4 units   251-300   6 units   Above 301  8 units            Goals for sugar     Fasting and before meals 80 to 130     2 hours after meals 180 or less        Aim for 45 grams of carbohydrate per meal     Aim for 15 grams of carbohydrate per snack                        Microvascular Complications Monitoring         Last eye exam--- Oct. 2021 ,        Neuropathy --yes      Taking gabapentin         Component      Latest Ref Rng & Units 10/6/2021   Microalbumin/Creatinine Ratio      mg/g 165.2   Creatinine, Urine      mg/dL 35.1   Microalbumin, Urine      mg/dL 5.8                  Lipid Management:            Hyperlipidemia      Taking Crestor 20 mg one at night         Taking zetia 10 mg one at night                      Total Cholesterol   Date Value Ref Range Status   10/05/2021 276 (H) 0 - 200 mg/dL Final                Triglycerides   Date Value Ref Range Status   10/05/2021 216 (H) 0 - 150 mg/dL Final                HDL Cholesterol   Date Value Ref Range Status   10/05/2021 45 40 - 60 mg/dL Final                LDL Cholesterol    Date Value Ref Range Status   10/05/2021 190 (H) 0 - 100 mg/dL Final                  Blood Pressure Management:     Taking losartan 25 mg daily            Thyroid Health               Lab Results   Component Value Date     TSH 1.480 07/28/2021                      Lab Results   Component Value Date     QDJPQESR23 709 10/05/2021                  Bone Health      Vitamin d def.     Taking vitamin d supplement      Component      Latest Ref Rng & Units 10/5/2021   25 Hydroxy, Vitamin D      ng/ml 23.8            Weight Management:             Preventive Care:      Smoking          Yudi West  reports that she has been smoking cigarettes. She has a 37.00 pack-year smoking history. She has never used smokeless tobacco.. I have educated her on the risk of diseases from using tobacco products such as cancer and COPD.     I advised her to quit and she is not willing to quit.    I spent 3  minutes counseling the patient.                         Follow Up   No follow-ups on file.  Patient was given instructions and counseling regarding her condition or for health maintenance advice. Please see specific information pulled into the AVS if appropriate.         This document has been electronically signed by Viktor Perrin  WAI Rico on February 24, 2022 09:45 CST.

## 2022-03-02 ENCOUNTER — TELEPHONE (OUTPATIENT)
Dept: FAMILY MEDICINE CLINIC | Facility: CLINIC | Age: 52
End: 2022-03-02

## 2022-03-02 NOTE — TELEPHONE ENCOUNTER
Dr. Gomes's office called and asked for patient's list of medications be faxed to them at [phone].  Call back number: [phone]

## 2022-03-07 RX ORDER — GUAIFENESIN 600 MG/1
TABLET, EXTENDED RELEASE ORAL
Qty: 60 TABLET | Refills: 0 | Status: SHIPPED | OUTPATIENT
Start: 2022-03-07 | End: 2022-03-28

## 2022-03-15 RX ORDER — DULAGLUTIDE 3 MG/.5ML
INJECTION, SOLUTION SUBCUTANEOUS
Qty: 2 ML | Refills: 0 | Status: SHIPPED | OUTPATIENT
Start: 2022-03-15 | End: 2022-07-18

## 2022-03-25 RX ORDER — CLOPIDOGREL BISULFATE 75 MG/1
TABLET ORAL
Qty: 30 TABLET | Refills: 0 | Status: SHIPPED | OUTPATIENT
Start: 2022-03-25 | End: 2022-05-12

## 2022-03-25 RX ORDER — EZETIMIBE 10 MG/1
TABLET ORAL
Qty: 30 TABLET | Refills: 0 | Status: SHIPPED | OUTPATIENT
Start: 2022-03-25 | End: 2022-05-12

## 2022-03-25 RX ORDER — EMPAGLIFLOZIN 25 MG/1
TABLET, FILM COATED ORAL
Qty: 30 TABLET | Refills: 0 | Status: SHIPPED | OUTPATIENT
Start: 2022-03-25 | End: 2022-05-12

## 2022-03-25 RX ORDER — LOSARTAN POTASSIUM 25 MG/1
TABLET ORAL
Qty: 30 TABLET | Refills: 0 | Status: SHIPPED | OUTPATIENT
Start: 2022-03-25 | End: 2022-05-12

## 2022-03-25 RX ORDER — ERGOCALCIFEROL 1.25 MG/1
CAPSULE ORAL
Qty: 4 CAPSULE | Refills: 0 | Status: SHIPPED | OUTPATIENT
Start: 2022-03-25 | End: 2022-05-12

## 2022-03-28 RX ORDER — GUAIFENESIN 600 MG/1
TABLET, EXTENDED RELEASE ORAL
Qty: 60 TABLET | Refills: 0 | Status: SHIPPED | OUTPATIENT
Start: 2022-03-28 | End: 2022-04-18

## 2022-04-17 DIAGNOSIS — F41.1 GENERALIZED ANXIETY DISORDER: ICD-10-CM

## 2022-04-17 DIAGNOSIS — G47.9 SLEEP DIFFICULTIES: ICD-10-CM

## 2022-04-18 RX ORDER — GUAIFENESIN 600 MG/1
1200 TABLET, EXTENDED RELEASE ORAL 2 TIMES DAILY
Qty: 60 TABLET | Refills: 0 | Status: SHIPPED | OUTPATIENT
Start: 2022-04-18 | End: 2023-02-02 | Stop reason: SDUPTHER

## 2022-04-18 RX ORDER — HYDROXYZINE HYDROCHLORIDE 25 MG/1
TABLET, FILM COATED ORAL
Qty: 90 TABLET | Refills: 1 | Status: SHIPPED | OUTPATIENT
Start: 2022-04-18 | End: 2023-02-02

## 2022-04-18 NOTE — TELEPHONE ENCOUNTER
Pt needs appointment but if she can't keep appointment and virtual is too difficult then she needs to f/u in person or else where has she has had 7 no shows.

## 2022-04-18 NOTE — TELEPHONE ENCOUNTER
TRIED CALLING PT TO RESCHEDULE APPOINTMENT.  COULDN'T LEAVE A VM, MAILBOX WAS NOT SET UP.    SENT PT [facility] MESSAGE X1.

## 2022-04-22 DIAGNOSIS — M25.512 LEFT SHOULDER PAIN, UNSPECIFIED CHRONICITY: ICD-10-CM

## 2022-04-22 DIAGNOSIS — M75.52 SUBACROMIAL BURSITIS OF LEFT SHOULDER JOINT: Primary | ICD-10-CM

## 2022-04-25 RX ORDER — BUPROPION HYDROCHLORIDE 150 MG/1
TABLET ORAL
Qty: 30 TABLET | Refills: 0 | Status: SHIPPED | OUTPATIENT
Start: 2022-04-25 | End: 2022-07-18

## 2022-04-25 RX ORDER — CARVEDILOL 3.12 MG/1
TABLET ORAL
Qty: 60 TABLET | Refills: 0 | Status: SHIPPED | OUTPATIENT
Start: 2022-04-25 | End: 2023-02-02 | Stop reason: SDUPTHER

## 2022-04-25 RX ORDER — LORATADINE 10 MG/1
TABLET ORAL
Qty: 30 TABLET | Refills: 0 | Status: SHIPPED | OUTPATIENT
Start: 2022-04-25 | End: 2023-02-02 | Stop reason: SDUPTHER

## 2022-04-25 RX ORDER — RANOLAZINE 500 MG/1
TABLET, EXTENDED RELEASE ORAL
Qty: 60 TABLET | Refills: 0 | Status: SHIPPED | OUTPATIENT
Start: 2022-04-25 | End: 2023-02-02 | Stop reason: SDUPTHER

## 2022-04-29 ENCOUNTER — OFFICE VISIT (OUTPATIENT)
Dept: ORTHOPEDIC SURGERY | Facility: CLINIC | Age: 52
End: 2022-04-29

## 2022-04-29 VITALS — WEIGHT: 170 LBS | BODY MASS INDEX: 28.32 KG/M2 | HEIGHT: 65 IN

## 2022-04-29 DIAGNOSIS — M75.52 SUBACROMIAL BURSITIS OF LEFT SHOULDER JOINT: Primary | ICD-10-CM

## 2022-04-29 DIAGNOSIS — M24.812 INTERNAL DERANGEMENT OF LEFT SHOULDER: ICD-10-CM

## 2022-04-29 PROCEDURE — 99214 OFFICE O/P EST MOD 30 MIN: CPT | Performed by: NURSE PRACTITIONER

## 2022-04-29 PROCEDURE — 20610 DRAIN/INJ JOINT/BURSA W/O US: CPT | Performed by: NURSE PRACTITIONER

## 2022-04-29 RX ORDER — TRIAMCINOLONE ACETONIDE 40 MG/ML
40 INJECTION, SUSPENSION INTRA-ARTICULAR; INTRAMUSCULAR
Status: COMPLETED | OUTPATIENT
Start: 2022-04-29 | End: 2022-04-29

## 2022-04-29 RX ORDER — BUPIVACAINE HYDROCHLORIDE 5 MG/ML
2 INJECTION, SOLUTION PERINEURAL
Status: COMPLETED | OUTPATIENT
Start: 2022-04-29 | End: 2022-04-29

## 2022-04-29 RX ADMIN — BUPIVACAINE HYDROCHLORIDE 2 ML: 5 INJECTION, SOLUTION PERINEURAL at 10:28

## 2022-04-29 RX ADMIN — TRIAMCINOLONE ACETONIDE 40 MG: 40 INJECTION, SUSPENSION INTRA-ARTICULAR; INTRAMUSCULAR at 10:28

## 2022-04-29 NOTE — PROGRESS NOTES
"Yudi West is a 51 y.o. female returns for     Chief Complaint   Patient presents with   • Left Shoulder - Follow-up       HISTORY OF PRESENT ILLNESS:    The patient is a 51-year-old female who presents today for follow-up of left shoulder pain.  Patient has had chronic left shoulder pain for the past couple years.  Patient has been seen in this office previously by Dr. Campoverde.  At last appointment it was recommended she proceed with MRI for recurrent rotator cuff tendinitis and to assess integrity of rotator cuff, she did not have this done.  She has been treated in the past with subacromial injections which have been somewhat helpful.  She also takes oxycodone for chronic pain.  She does not take NSAIDs due to current blood thinner use.  She reports shoulder pain is aggravated by essentially all movement and radiates down towards her hand.  She has no complaints of fever, nausea, vomiting, burning, tingling, numbness in left upper extremity today.  She is left-handed.        CONCURRENT MEDICAL HISTORY:    The following portions of the patient's history were reviewed and updated as appropriate: allergies, current medications, past family history, past medical history, past social history, past surgical history and problem list.     ROS  No fevers or chills.  No chest pain or shortness of air.  No GI or  disturbances. Left shoulder pain.     PHYSICAL EXAMINATION:       Ht 165.1 cm (65\")   Wt 77.1 kg (170 lb)   BMI 28.29 kg/m²     Physical Exam  Vitals and nursing note reviewed.   Constitutional:       General: She is not in acute distress.     Appearance: She is well-developed. She is not toxic-appearing.   HENT:      Head: Normocephalic.   Pulmonary:      Effort: Pulmonary effort is normal. No respiratory distress.   Skin:     General: Skin is warm and dry.   Neurological:      Mental Status: She is alert and oriented to person, place, and time.   Psychiatric:         Behavior: Behavior normal.       "   Thought Content: Thought content normal.         Judgment: Judgment normal.         GAIT:     []  Normal  []  Antalgic    Assistive device: [x]  None  []  Walker     []  Crutches  []  Cane     []  Wheelchair  []  Stretcher    Left Shoulder Exam     Range of Motion   Active abduction: 80   Extension: 10   External rotation: abnormal   Forward flexion: 90   Internal rotation 90 degrees: abnormal     Tests   Impingement: positive    Other   Erythema: absent  Sensation: normal  Pulse: present     Comments:  Positive full can test.  Positive empty can test.  No evidence of infection              No results found.          ASSESSMENT:    Diagnoses and all orders for this visit:    Subacromial bursitis of left shoulder joint  -     MRI shoulder left wo contrast; Future    Internal derangement of left shoulder  -     MRI shoulder left wo contrast; Future    Other orders  -     Large Joint Arthrocentesis: L subacromial bursa          PLAN  Large Joint Arthrocentesis: L subacromial bursa  Date/Time: 4/29/2022 10:28 AM  Consent given by: patient  Site marked: site marked  Timeout: Immediately prior to procedure a time out was called to verify the correct patient, procedure, equipment, support staff and site/side marked as required   Supporting Documentation  Indications: pain   Procedure Details  Location: shoulder - L subacromial bursa  Preparation: Patient was prepped and draped in the usual sterile fashion  Needle size: 22 G  Approach: posterior  Medications administered: 40 mg triamcinolone acetonide 40 MG/ML; 2 mL bupivacaine 0.5 %  Patient tolerance: patient tolerated the procedure well with no immediate complications            Internal derangement based on exam.  After discussing risk, benefits, alternatives patient desires to proceed with subacromial injection.  Patient tolerated injections well.   Patient is diabetic but has historically tolerated injections well.  Patient's last hemoglobin A1c was 13, patient  tolerated 80 mg of Kenalog at this time.  Patient given 40 mg Kenalog today out of caution.  Signs symptoms to report and when to seek care explained.  Patient verbalized understanding.  Patient to return after MRI to review results.    Return for MRI .    EMR Dragon/Transciption Disclaimer: Some of this note may be an electronic transcription/translation of spoken language to printed text.  The electronic translation of spoken language may permit erroneous, or at times, nonsensical words or phrases to be inadvertently transcribed. Although I have reviewed the note for such errors, some may still exist.       This document has been electronically signed by WAI Salas on April 29, 2022 10:32 CDT

## 2022-05-02 RX ORDER — GUAIFENESIN 600 MG/1
TABLET, EXTENDED RELEASE ORAL
Qty: 60 TABLET | Refills: 0 | OUTPATIENT
Start: 2022-05-02

## 2022-05-02 NOTE — TELEPHONE ENCOUNTER
Rx Refill Note  Requested Prescriptions     Refused Prescriptions Disp Refills   • Mucus Relief 600 MG 12 hr tablet [Pharmacy Med Name: Mucus Relief  mg tablet, extended release] 60 tablet 0     Sig: TAKE TWO TABLETS BY MOUTH TWICE DAILY      Last office visit with prescribing clinician: 10/5/2021      Next office visit with prescribing clinician: Visit date not found   {TIP  Encounters:  RX SENT 4/25/22. PT NEEDS AN APPT         Raciel Wheatley LPN  05/02/22, 09:01 CDT

## 2022-05-04 DIAGNOSIS — M54.50 ACUTE LOW BACK PAIN, UNSPECIFIED BACK PAIN LATERALITY, UNSPECIFIED WHETHER SCIATICA PRESENT: Primary | ICD-10-CM

## 2022-05-12 ENCOUNTER — HOSPITAL ENCOUNTER (OUTPATIENT)
Dept: MRI IMAGING | Facility: HOSPITAL | Age: 52
Discharge: HOME | End: 2022-05-12
Admitting: NURSE PRACTITIONER

## 2022-05-12 DIAGNOSIS — M24.812 INTERNAL DERANGEMENT OF LEFT SHOULDER: ICD-10-CM

## 2022-05-12 DIAGNOSIS — M75.52 SUBACROMIAL BURSITIS OF LEFT SHOULDER JOINT: ICD-10-CM

## 2022-05-12 PROCEDURE — 73221 MRI JOINT UPR EXTREM W/O DYE: CPT

## 2022-05-12 RX ORDER — EZETIMIBE 10 MG/1
TABLET ORAL
Qty: 30 TABLET | Refills: 0 | Status: SHIPPED | OUTPATIENT
Start: 2022-05-12 | End: 2022-06-24

## 2022-05-12 RX ORDER — ERGOCALCIFEROL 1.25 MG/1
CAPSULE ORAL
Qty: 4 CAPSULE | Refills: 0 | Status: SHIPPED | OUTPATIENT
Start: 2022-05-12 | End: 2022-06-24

## 2022-05-12 RX ORDER — EMPAGLIFLOZIN 25 MG/1
TABLET, FILM COATED ORAL
Qty: 30 TABLET | Refills: 0 | Status: SHIPPED | OUTPATIENT
Start: 2022-05-12 | End: 2022-06-24

## 2022-05-12 RX ORDER — CLOPIDOGREL BISULFATE 75 MG/1
TABLET ORAL
Qty: 30 TABLET | Refills: 0 | Status: SHIPPED | OUTPATIENT
Start: 2022-05-12 | End: 2022-06-24

## 2022-05-12 RX ORDER — LOSARTAN POTASSIUM 25 MG/1
TABLET ORAL
Qty: 30 TABLET | Refills: 0 | Status: SHIPPED | OUTPATIENT
Start: 2022-05-12 | End: 2022-06-24

## 2022-05-12 NOTE — TELEPHONE ENCOUNTER
Rx Refill Note  Requested Prescriptions     Pending Prescriptions Disp Refills   • clopidogrel (PLAVIX) 75 MG tablet [Pharmacy Med Name: CLOPIDOGREL 75 MG TABLET] 30 tablet 0     Sig: TAKE 1 TABLET DAILY.   • Jardiance 25 MG tablet tablet [Pharmacy Med Name: JARDIANCE 25 MG TABLET] 30 tablet 0     Sig: TAKE 1 TABLET DAILY.   • vitamin D (ERGOCALCIFEROL) 1.25 MG (44760 UT) capsule capsule [Pharmacy Med Name: VITAMIN D2 1.25MG(50,000 UNIT)] 4 capsule 0     Sig: TAKE 1 CAPSULE WEEKLY   • losartan (COZAAR) 25 MG tablet [Pharmacy Med Name: LOSARTAN POTASSIUM 25 MG TAB] 30 tablet 0     Sig: TAKE 1 TABLET DAILY.   • ezetimibe (ZETIA) 10 MG tablet [Pharmacy Med Name: EZETIMIBE 10 MG TABLET] 30 tablet 0     Sig: TAKE 1 TABLET DAILY.      Last office visit with prescribing clinician: 10/5/2021      Next office visit with prescribing clinician: Visit date not found   {TIP  Encounters:     Plavix Protocol Failed 05/12/2022 02:46 PM    No conflicting PPI on medication list            {TIP  Please add Last Relevant Lab Date if appropriate  {TIP  Is Refill Pharmacy correct? yes  Raciel Wheatley LPN  05/12/22, 16:09 CDT

## 2022-05-16 ENCOUNTER — OFFICE VISIT (OUTPATIENT)
Dept: ORTHOPEDIC SURGERY | Facility: CLINIC | Age: 52
End: 2022-05-16

## 2022-05-16 VITALS — HEIGHT: 65 IN | WEIGHT: 148 LBS | BODY MASS INDEX: 24.66 KG/M2

## 2022-05-16 DIAGNOSIS — M75.52 SUBACROMIAL BURSITIS OF LEFT SHOULDER JOINT: Primary | ICD-10-CM

## 2022-05-16 DIAGNOSIS — M25.512 LEFT SHOULDER PAIN, UNSPECIFIED CHRONICITY: ICD-10-CM

## 2022-05-16 PROCEDURE — 99214 OFFICE O/P EST MOD 30 MIN: CPT | Performed by: NURSE PRACTITIONER

## 2022-05-16 NOTE — PROGRESS NOTES
"Yudi West is a 51 y.o. female returns for     Chief Complaint   Patient presents with   • Left Shoulder - Follow-up   • Results     MRI       HISTORY OF PRESENT ILLNESS:      The patient is a 51-year-old female who presents today for follow-up of left MRI results.  Patient has had chronic left shoulder pain for the past couple years.  Patient has been seen in this office previously by Dr. Campoverde and treated with subacromial injections.  She was given a subacromial injection on 4/29 which she reports was not helpful.  She also takes oxycodone for chronic pain.  She does not take NSAIDs due to current blood thinner use.   She reports doing PT in the past without relief.  She reports shoulder pain is aggravated by essentially all movement and radiates down towards her hand.  She has no complaints of fever, nausea, vomiting, burning, tingling, numbness in left upper extremity today.  She is left-handed.      CONCURRENT MEDICAL HISTORY:    The following portions of the patient's history were reviewed and updated as appropriate: allergies, current medications, past family history, past medical history, past social history, past surgical history and problem list.     ROS  No fevers or chills.  No chest pain or shortness of air.  No GI or  disturbances. Left shoulder pain.     PHYSICAL EXAMINATION:       Ht 165.1 cm (65\")   Wt 67.1 kg (148 lb)   BMI 24.63 kg/m²     Physical Exam  Vitals and nursing note reviewed.   Constitutional:       General: She is not in acute distress.     Appearance: She is well-developed. She is not toxic-appearing.   HENT:      Head: Normocephalic.   Pulmonary:      Effort: Pulmonary effort is normal. No respiratory distress.   Skin:     General: Skin is warm and dry.   Neurological:      Mental Status: She is alert and oriented to person, place, and time.   Psychiatric:         Behavior: Behavior normal.         Thought Content: Thought content normal.         Judgment: Judgment " normal.         GAIT:     [x]  Normal  []  Antalgic    Assistive device: [x]  None  []  Walker     []  Crutches  []  Cane     []  Wheelchair  []  Stretcher    Left Shoulder Exam     Range of Motion   Active abduction: 80   Passive abduction: 110   Extension: 10   External rotation: abnormal   Forward flexion: 90   Internal rotation 90 degrees: abnormal     Tests   Impingement: positive    Other   Erythema: absent  Sensation: normal  Pulse: present     Comments:  Positive full can test.  Positive empty can test.  No evidence of infection              XR Shoulder 2+ View Left    Result Date: 4/29/2022  Narrative: Three view left shoulder HISTORY: Left shoulder pain AP films with the humerus in internal and external rotation and scapular Y view were obtained. COMPARISON: August 5, 2020 FINDINGS: Hypertrophic change acromioclavicular joint. No fracture or dislocation. No other osseous or articular abnormality. Several scattered calcified granulomata in the left lung.     Impression: CONCLUSION: Hypertrophic change acromioclavicular joint. [id number] Electronically signed by:  Juve Rico MD  4/29/2022 7:56 PM CDT Workstation: [id number]    MRI Shoulder Left Without Contrast    Result Date: 5/13/2022  Narrative: EXAM:   MR Left Upper Extremity Without Intravenous Contrast, Shoulder FACILITY:   [facility] REFERRING:   GERRI ROME CLINICAL HISTORY:   51 years, Female; chronic left shoulder pain., M75.52 Bursitis of left shoulder M24.812 Other specific joint derangements of left shoulder, not elsewhere classified TECHNIQUE:   Multiplanar magnetic resonance images of the left shoulder without intravenous contrast. COMPARISON:   Left shoulder x-rays 8/5/2020 FINDINGS:  TENDONS:   Supraspinatus:  Unremarkable.   Infraspinatus:  Unremarkable.   Subscapularis:  There is thickening and signal change within the subscapularis tendon consistent with chronic tendinosis changes.   Teres minor:  Unremarkable.   Biceps  brachii, long head:  Unremarkable.  LIGAMENTS:   Glenohumeral:  Unremarkable.   Muscles:  Unremarkable.   Fluid:  There is evidence of a small subacromial-subdeltoid bursal effusion.       There is a moderate subcoracoid bursal effusion.       A small glenohumeral joint effusion is evident.       There are mild degenerative changes at the acromioclavicular joint with evidence of a small AC joint effusion.   Cartilage:  Unremarkable.   Glenoid labrum:  Unremarkable.  No tear.   Bones/joints:  Unremarkable.     Impression: *  Chronic tendinosis changes of the subscapularis tendon. *  Mild subacromial/subdeltoid and moderate subcoracoid bursal effusion. *  Small glenohumeral joint effusion. *  Mild acromioclavicular joint degenerative changes with small AC joint effusion. Electronically signed by:  Jeremias Harper DO  5/13/2022 11:38 AM CDT Workstation: [id number]            ASSESSMENT:    Diagnoses and all orders for this visit:    Subacromial bursitis of left shoulder joint    Left shoulder pain, unspecified chronicity          PLAN      MRI results reviewed with patient.  Plan to proceed with physical therapy, however patient reports she is already done physical therapy and this did not help with symptoms.  I do not see therapy notes in patient's chart.  Patient has also tried and failed multiple subacromial injections.  Patient is interested in possible surgical management.  Patient's most recent hemoglobin A1c 13.06 months ago.  She reports tolerating injection well.      Return for surgical consult .      EMR Dragon/Transciption Disclaimer: Some of this note may be an electronic transcription/translation of spoken language to printed text.  The electronic translation of spoken language may permit erroneous, or at times, nonsensical words or phrases to be inadvertently transcribed. Although I have reviewed the note for such errors, some may still exist.     Time spent of a minimum of 30 minutes including the face to  face evaluation, reviewing of medical history and prior medial records, reviewing of diagnostic studies, ordering additional tests, documentation, patient education and coordination of care.         This document has been electronically signed by WAI Salas on May 16, 2022 16:10 CDT

## 2022-05-20 ENCOUNTER — LAB (OUTPATIENT)
Dept: LAB | Facility: HOSPITAL | Age: 52
End: 2022-05-20

## 2022-05-20 DIAGNOSIS — E55.9 VITAMIN D DEFICIENCY: ICD-10-CM

## 2022-05-20 DIAGNOSIS — I10 PRIMARY HYPERTENSION: ICD-10-CM

## 2022-05-20 DIAGNOSIS — E11.65 TYPE 2 DIABETES MELLITUS WITH HYPERGLYCEMIA, WITH LONG-TERM CURRENT USE OF INSULIN: ICD-10-CM

## 2022-05-20 DIAGNOSIS — E78.2 MIXED HYPERLIPIDEMIA: ICD-10-CM

## 2022-05-20 DIAGNOSIS — Z79.4 TYPE 2 DIABETES MELLITUS WITH HYPERGLYCEMIA, WITH LONG-TERM CURRENT USE OF INSULIN: ICD-10-CM

## 2022-05-20 LAB
ALBUMIN SERPL-MCNC: 4 G/DL (ref 3.5–5.2)
ALBUMIN/GLOB SERPL: 1.5 G/DL
ALP SERPL-CCNC: 92 U/L (ref 39–117)
ALT SERPL W P-5'-P-CCNC: 12 U/L (ref 1–33)
ANION GAP SERPL CALCULATED.3IONS-SCNC: 8 MMOL/L (ref 5–15)
AST SERPL-CCNC: 11 U/L (ref 1–32)
BASOPHILS # BLD AUTO: 0.09 10*3/MM3 (ref 0–0.2)
BASOPHILS NFR BLD AUTO: 1.1 % (ref 0–1.5)
BILIRUB SERPL-MCNC: 0.3 MG/DL (ref 0–1.2)
BUN SERPL-MCNC: 15 MG/DL (ref 6–20)
BUN/CREAT SERPL: 17.4 (ref 7–25)
CALCIUM SPEC-SCNC: 9.4 MG/DL (ref 8.6–10.5)
CHLORIDE SERPL-SCNC: 98 MMOL/L (ref 98–107)
CO2 SERPL-SCNC: 26 MMOL/L (ref 22–29)
CREAT SERPL-MCNC: 0.86 MG/DL (ref 0.57–1)
DEPRECATED RDW RBC AUTO: 36 FL (ref 37–54)
EGFRCR SERPLBLD CKD-EPI 2021: 81.9 ML/MIN/1.73
EOSINOPHIL # BLD AUTO: 0.18 10*3/MM3 (ref 0–0.4)
EOSINOPHIL NFR BLD AUTO: 2.2 % (ref 0.3–6.2)
ERYTHROCYTE [DISTWIDTH] IN BLOOD BY AUTOMATED COUNT: 11.9 % (ref 12.3–15.4)
GLOBULIN UR ELPH-MCNC: 2.7 GM/DL
GLUCOSE SERPL-MCNC: 341 MG/DL (ref 65–99)
HCT VFR BLD AUTO: 42.5 % (ref 34–46.6)
HGB BLD-MCNC: 14.9 G/DL (ref 12–15.9)
IMM GRANULOCYTES # BLD AUTO: 0.02 10*3/MM3 (ref 0–0.05)
IMM GRANULOCYTES NFR BLD AUTO: 0.2 % (ref 0–0.5)
LYMPHOCYTES # BLD AUTO: 2.13 10*3/MM3 (ref 0.7–3.1)
LYMPHOCYTES NFR BLD AUTO: 25.6 % (ref 19.6–45.3)
MCH RBC QN AUTO: 29.1 PG (ref 26.6–33)
MCHC RBC AUTO-ENTMCNC: 35.1 G/DL (ref 31.5–35.7)
MCV RBC AUTO: 83 FL (ref 79–97)
MONOCYTES # BLD AUTO: 0.64 10*3/MM3 (ref 0.1–0.9)
MONOCYTES NFR BLD AUTO: 7.7 % (ref 5–12)
NEUTROPHILS NFR BLD AUTO: 5.27 10*3/MM3 (ref 1.7–7)
NEUTROPHILS NFR BLD AUTO: 63.2 % (ref 42.7–76)
NRBC BLD AUTO-RTO: 0 /100 WBC (ref 0–0.2)
PLATELET # BLD AUTO: 222 10*3/MM3 (ref 140–450)
PMV BLD AUTO: 10.7 FL (ref 6–12)
POTASSIUM SERPL-SCNC: 4.8 MMOL/L (ref 3.5–5.2)
PROT SERPL-MCNC: 6.7 G/DL (ref 6–8.5)
RBC # BLD AUTO: 5.12 10*6/MM3 (ref 3.77–5.28)
SODIUM SERPL-SCNC: 132 MMOL/L (ref 136–145)
WBC NRBC COR # BLD: 8.33 10*3/MM3 (ref 3.4–10.8)

## 2022-05-20 PROCEDURE — 85025 COMPLETE CBC W/AUTO DIFF WBC: CPT

## 2022-05-20 PROCEDURE — 84443 ASSAY THYROID STIM HORMONE: CPT

## 2022-05-20 PROCEDURE — 82306 VITAMIN D 25 HYDROXY: CPT

## 2022-05-20 PROCEDURE — 83036 HEMOGLOBIN GLYCOSYLATED A1C: CPT

## 2022-05-20 PROCEDURE — 82607 VITAMIN B-12: CPT

## 2022-05-20 PROCEDURE — 80053 COMPREHEN METABOLIC PANEL: CPT

## 2022-05-20 PROCEDURE — 80061 LIPID PANEL: CPT

## 2022-05-21 LAB
25(OH)D3 SERPL-MCNC: 22.5 NG/ML (ref 30–100)
CHOLEST SERPL-MCNC: 213 MG/DL (ref 0–200)
HBA1C MFR BLD: 12.2 % (ref 4.8–5.6)
HDLC SERPL-MCNC: 45 MG/DL (ref 40–60)
LDLC SERPL CALC-MCNC: 152 MG/DL (ref 0–100)
LDLC/HDLC SERPL: 3.33 {RATIO}
TRIGL SERPL-MCNC: 90 MG/DL (ref 0–150)
TSH SERPL DL<=0.05 MIU/L-ACNC: 0.71 UIU/ML (ref 0.27–4.2)
VIT B12 BLD-MCNC: 438 PG/ML (ref 211–946)
VLDLC SERPL-MCNC: 16 MG/DL (ref 5–40)

## 2022-06-15 ENCOUNTER — OFFICE VISIT (OUTPATIENT)
Dept: ORTHOPEDIC SURGERY | Facility: CLINIC | Age: 52
End: 2022-06-15

## 2022-06-15 VITALS — HEIGHT: 65 IN | BODY MASS INDEX: 23.78 KG/M2 | WEIGHT: 142.7 LBS

## 2022-06-15 DIAGNOSIS — M24.812 INTERNAL DERANGEMENT OF LEFT SHOULDER: ICD-10-CM

## 2022-06-15 DIAGNOSIS — M75.52 SUBACROMIAL BURSITIS OF LEFT SHOULDER JOINT: Primary | ICD-10-CM

## 2022-06-15 DIAGNOSIS — M25.512 LEFT SHOULDER PAIN, UNSPECIFIED CHRONICITY: ICD-10-CM

## 2022-06-15 PROCEDURE — 99213 OFFICE O/P EST LOW 20 MIN: CPT | Performed by: ORTHOPAEDIC SURGERY

## 2022-06-15 RX ORDER — CARIPRAZINE 1.5 MG/1
CAPSULE, GELATIN COATED ORAL
COMMUNITY
Start: 2022-06-07 | End: 2023-04-04

## 2022-06-15 RX ORDER — MIRTAZAPINE 7.5 MG/1
TABLET, FILM COATED ORAL
COMMUNITY
Start: 2022-06-07 | End: 2023-04-04 | Stop reason: ALTCHOICE

## 2022-06-15 RX ORDER — GABAPENTIN 800 MG/1
TABLET ORAL
COMMUNITY
Start: 2022-06-07

## 2022-06-15 RX ORDER — CLONAZEPAM 0.5 MG/1
TABLET ORAL
COMMUNITY
Start: 2022-04-07 | End: 2023-02-02

## 2022-06-15 RX ORDER — DOXAZOSIN MESYLATE 1 MG/1
TABLET ORAL
COMMUNITY
Start: 2022-06-07

## 2022-06-15 RX ORDER — LIDOCAINE AND PRILOCAINE 25; 25 MG/G; MG/G
CREAM TOPICAL
COMMUNITY
Start: 2022-05-20 | End: 2023-02-02

## 2022-06-15 NOTE — PROGRESS NOTES
"Yudi West is a 51 y.o. female returns for     Chief Complaint   Patient presents with   • Left Shoulder - Follow-up, Pain       HISTORY OF PRESENT ILLNESS:    51 LHD diabetic F with several years  L shoulder pain of insidious onset.  Has difficulty lifting.  Can't sleep on L side.  Notes pain that radiates down her upper arm and occ into forearm.  +NSAIDs.  +PT/OT.  +CSI (last 4/29/2022).  No sx.     CONCURRENT MEDICAL HISTORY:    The following portions of the patient's history were reviewed and updated as appropriate: allergies, current medications, past family history, past medical history, past social history, past surgical history and problem list.     ROS  No fevers or chills.  No chest pain or shortness of air.  No GI or  disturbances.    PHYSICAL EXAMINATION:       Ht 165.1 cm (65\")   Wt 64.7 kg (142 lb 11.2 oz)   BMI 23.75 kg/m²     Physical Exam    GAIT:     [x]  Normal  []  Antalgic    Assistive device: [x]  None  []  Walker     []  Crutches  []  Cane     []  Wheelchair  []  Stretcher    Ortho Exam  NAD  LUE:  +TTP over AC joint, GT, and bicipital groove.  PROM=AROM: 130, 90, 10.  +Neer/Lang.  4/5 SS, 4/5 ER, 5/5 IR.  +Yergason.  +Bear hug.  DNVI ax, m, r, u.    EXAM:    MR Left Upper Extremity Without Intravenous Contrast, Shoulder     FACILITY:    [facility]     REFERRING:    GERRI ROME     CLINICAL HISTORY:    51 years, Female; chronic left shoulder pain., M75.52 Bursitis  of left shoulder M24.812 Other specific joint derangements of  left shoulder, not elsewhere classified     TECHNIQUE:    Multiplanar magnetic resonance images of the left shoulder  without intravenous contrast.     COMPARISON:    Left shoulder x-rays 8/5/2020     FINDINGS:   TENDONS:    Supraspinatus:  Unremarkable.    Infraspinatus:  Unremarkable.    Subscapularis:  There is thickening and signal change within  the subscapularis tendon consistent with chronic tendinosis  changes.    Teres minor: "  Unremarkable.    Biceps brachii, long head:  Unremarkable.      LIGAMENTS:    Glenohumeral:  Unremarkable.       Muscles:  Unremarkable.    Fluid:  There is evidence of a small subacromial-subdeltoid  bursal effusion.        There is a moderate subcoracoid bursal effusion.        A small glenohumeral joint effusion is evident.        There are mild degenerative changes at the  acromioclavicular joint with evidence of a small AC joint  effusion.    Cartilage:  Unremarkable.    Glenoid labrum:  Unremarkable.  No tear.    Bones/joints:  Unremarkable.     IMPRESSION:  *  Chronic tendinosis changes of the subscapularis tendon.  *  Mild subacromial/subdeltoid and moderate subcoracoid bursal  effusion.  *  Small glenohumeral joint effusion.  *  Mild acromioclavicular joint degenerative changes with small  AC joint effusion.     Electronically signed by:  Jeremias Harper DO  5/13/2022 11:38 AM CDT  Workstation: [id number]          ASSESSMENT:    Diagnoses and all orders for this visit:    Subacromial bursitis of left shoulder joint    Left shoulder pain, unspecified chronicity    Internal derangement of left shoulder    Other orders  -     Vraylar 1.5 MG capsule capsule  -     clonazePAM (KlonoPIN) 0.5 MG tablet  -     lidocaine-prilocaine (EMLA) 2.5-2.5 % cream  -     mirtazapine (REMERON) 7.5 MG tablet  -     gabapentin (NEURONTIN) 800 MG tablet  -     doxazosin (CARDURA) 1 MG tablet          PLAN  51 LHD diabetic F with several years L shoulder pain.  She has adhesive capsulitis.  XRs demonstrate no fxs or dislocations.  MRI demonstrates RC tendinosis and AC OA.  Rec conservative mgmt with ice, NSAIDs, CSI, and PT/OT.  Will refer to IR for image-guided intra-articular GH CSI.  Referral also placed to PT/OT for aggressive L shoulder ROM exercises and modalities.  RTC prn.  No follow-ups on file.    Radha Mireles, HAM

## 2022-06-24 RX ORDER — LOSARTAN POTASSIUM 25 MG/1
25 TABLET ORAL DAILY
Qty: 30 TABLET | Refills: 0 | Status: SHIPPED | OUTPATIENT
Start: 2022-06-24 | End: 2022-07-18

## 2022-06-24 RX ORDER — EZETIMIBE 10 MG/1
10 TABLET ORAL DAILY
Qty: 30 TABLET | Refills: 0 | Status: SHIPPED | OUTPATIENT
Start: 2022-06-24 | End: 2022-07-18

## 2022-06-24 RX ORDER — ERGOCALCIFEROL 1.25 MG/1
50000 CAPSULE ORAL WEEKLY
Qty: 4 CAPSULE | Refills: 0 | Status: SHIPPED | OUTPATIENT
Start: 2022-06-24 | End: 2022-07-18

## 2022-06-24 RX ORDER — CLOPIDOGREL BISULFATE 75 MG/1
75 TABLET ORAL DAILY
Qty: 30 TABLET | Refills: 0 | Status: SHIPPED | OUTPATIENT
Start: 2022-06-24 | End: 2022-07-18

## 2022-06-24 NOTE — TELEPHONE ENCOUNTER
Rx Refill Note  Requested Prescriptions     Pending Prescriptions Disp Refills   • empagliflozin (Jardiance) 25 MG tablet tablet [Pharmacy Med Name: JARDIANCE 25 MG TABLET] 30 tablet 0     Sig: Take 1 tablet by mouth Daily. NO ADDITIONAL REFILLS WITHOUT AN APPOINTMENT   • ezetimibe (ZETIA) 10 MG tablet [Pharmacy Med Name: EZETIMIBE 10 MG TABLET] 30 tablet 0     Sig: Take 1 tablet by mouth Daily. NO ADDITIONAL REFILLS WITHOUT AN APPOINTMENT   • losartan (COZAAR) 25 MG tablet [Pharmacy Med Name: LOSARTAN POTASSIUM 25 MG TAB] 30 tablet 0     Sig: Take 1 tablet by mouth Daily. NO ADDITIONAL REFILLS WITHOUT AN APPOINTMENT   • vitamin D (ERGOCALCIFEROL) 1.25 MG (21922 UT) capsule capsule [Pharmacy Med Name: VITAMIN D2 1.25MG(50,000 UNIT)] 4 capsule 0     Sig: Take 1 capsule by mouth 1 (One) Time Per Week. NO ADDITIONAL REFILLS WITHOUT AN APPOINTMENT   • clopidogrel (PLAVIX) 75 MG tablet [Pharmacy Med Name: CLOPIDOGREL 75 MG TABLET] 30 tablet 0     Sig: Take 1 tablet by mouth Daily. NO ADDITIONAL REFILLS WITHOUT AN APPOINTMENT      Last office visit with prescribing clinician: 10/5/2021      Next office visit with prescribing clinician: Visit date not found   {TIP  Encounters:     Plavix Protocol Failed 06/24/2022 11:41 AM    No conflicting PPI on medication list            {TIP  Please add Last Relevant Lab Date if appropriate  {TIP  Is Refill Pharmacy correct? YES  Raciel Wheatley LPN  06/24/22, 11:51 CDT

## 2022-06-28 ENCOUNTER — HOSPITAL ENCOUNTER (OUTPATIENT)
Dept: INTERVENTIONAL RADIOLOGY/VASCULAR | Facility: HOSPITAL | Age: 52
Discharge: HOME | End: 2022-06-28
Admitting: ORTHOPAEDIC SURGERY

## 2022-06-28 VITALS
SYSTOLIC BLOOD PRESSURE: 138 MMHG | DIASTOLIC BLOOD PRESSURE: 89 MMHG | RESPIRATION RATE: 20 BRPM | OXYGEN SATURATION: 96 % | HEART RATE: 82 BPM

## 2022-06-28 DIAGNOSIS — M25.512 LEFT SHOULDER PAIN, UNSPECIFIED CHRONICITY: ICD-10-CM

## 2022-06-28 DIAGNOSIS — M24.812 INTERNAL DERANGEMENT OF LEFT SHOULDER: ICD-10-CM

## 2022-06-28 DIAGNOSIS — M75.52 SUBACROMIAL BURSITIS OF LEFT SHOULDER JOINT: ICD-10-CM

## 2022-06-28 PROCEDURE — 77002 NEEDLE LOCALIZATION BY XRAY: CPT

## 2022-06-28 PROCEDURE — 0 LIDOCAINE 1 % SOLUTION: Performed by: RADIOLOGY

## 2022-06-28 PROCEDURE — 25010000002 TRIAMCINOLONE PER 10 MG: Performed by: RADIOLOGY

## 2022-06-28 RX ORDER — BUPIVACAINE HYDROCHLORIDE 5 MG/ML
INJECTION, SOLUTION PERINEURAL
Status: COMPLETED | OUTPATIENT
Start: 2022-06-28 | End: 2022-06-28

## 2022-06-28 RX ORDER — TRIAMCINOLONE ACETONIDE 40 MG/ML
INJECTION, SUSPENSION INTRA-ARTICULAR; INTRAMUSCULAR
Status: COMPLETED | OUTPATIENT
Start: 2022-06-28 | End: 2022-06-28

## 2022-06-28 RX ORDER — TRIAMCINOLONE ACETONIDE 40 MG/ML
INJECTION, SUSPENSION INTRA-ARTICULAR; INTRAMUSCULAR
Status: DISPENSED
Start: 2022-06-28 | End: 2022-06-28

## 2022-06-28 RX ORDER — LIDOCAINE HYDROCHLORIDE 10 MG/ML
INJECTION, SOLUTION INFILTRATION; PERINEURAL
Status: COMPLETED | OUTPATIENT
Start: 2022-06-28 | End: 2022-06-28

## 2022-06-28 RX ADMIN — BUPIVACAINE HYDROCHLORIDE 5 ML: 5 INJECTION, SOLUTION PERINEURAL at 10:30

## 2022-06-28 RX ADMIN — TRIAMCINOLONE ACETONIDE 80 MG: 40 INJECTION, SUSPENSION INTRA-ARTICULAR; INTRAMUSCULAR at 10:30

## 2022-06-28 RX ADMIN — LIDOCAINE HYDROCHLORIDE 5 ML: 10 INJECTION, SOLUTION INFILTRATION; PERINEURAL at 10:28

## 2022-06-28 NOTE — PRE-PROCEDURE NOTE
INTERVENTIONAL RADIOLOGY    52yo F PSgHx hardware removal left femur, coronary stents, PMH COPD, HLD, HTN, DM, DELFIN referred by Dr. Gregorio for progressive left shoulder pain over past few years. She has had a prior CSI of her left subacromial bursa in the ortho office on 4/29/22 and has adhesive capsulitis. She is planned for conservative management and referred for left glenohumeral joint CSI.    On exam, VSS, room air. Keeps left arm down and bent at elbow.    The patient's history and physical exam were reviewed, and no changes were noted. The risks, benefits, alternatives, and indications of the procedure were discussed with the patient, and all questions were answered. Informed consent was obtained.    Thank you very much for the referral. Please do not hesitate to contact me if I may be of further assistance.    Marian Kothari M.D.  Vascular, Interventional and Wound Physician  IR Chief, [facility]  Cell: [phone] / Office: [phone]

## 2022-06-28 NOTE — POST-PROCEDURE NOTE
INTERVENTIONAL RADIOLOGY: BRIEF OP NOTE    Pre-Procedure Diagnosis: left frozen shoulder  Post-Procedure Diagnosis: same  Procedure: Fluoroscopically Guided Left Glenohumeral Joint Injection  Anesthesia: local  Staff: Marian Kothari M.D.  EBL: none  Specimens: none  Drains: none  Findings: Appropriate contrast layering in left glenohumeral joint capsule. 80mg kenalog/5mL bupivicaine  Complications: none    Marian Kothari M.D.  Vascular, Interventional and Wound Physician  IR Chief, [facility]  Cell: [phone] / Office: [phone]

## 2022-07-04 ENCOUNTER — APPOINTMENT (OUTPATIENT)
Dept: GENERAL RADIOLOGY | Facility: HOSPITAL | Age: 52
End: 2022-07-04

## 2022-07-04 ENCOUNTER — HOSPITAL ENCOUNTER (EMERGENCY)
Facility: HOSPITAL | Age: 52
Discharge: LEFT BEFORE DISCHARGE | End: 2022-07-04
Attending: STUDENT IN AN ORGANIZED HEALTH CARE EDUCATION/TRAINING PROGRAM | Admitting: STUDENT IN AN ORGANIZED HEALTH CARE EDUCATION/TRAINING PROGRAM

## 2022-07-04 VITALS
SYSTOLIC BLOOD PRESSURE: 113 MMHG | WEIGHT: 148 LBS | BODY MASS INDEX: 24.66 KG/M2 | HEIGHT: 65 IN | DIASTOLIC BLOOD PRESSURE: 75 MMHG | RESPIRATION RATE: 16 BRPM | HEART RATE: 88 BPM | OXYGEN SATURATION: 95 %

## 2022-07-04 DIAGNOSIS — R00.2 PALPITATIONS: ICD-10-CM

## 2022-07-04 DIAGNOSIS — R07.9 CHEST PAIN, UNSPECIFIED TYPE: Primary | ICD-10-CM

## 2022-07-04 LAB
ALBUMIN SERPL-MCNC: 3.6 G/DL (ref 3.5–5.2)
ALBUMIN/GLOB SERPL: 1.4 G/DL
ALP SERPL-CCNC: 90 U/L (ref 39–117)
ALT SERPL W P-5'-P-CCNC: 21 U/L (ref 1–33)
ANION GAP SERPL CALCULATED.3IONS-SCNC: 13 MMOL/L (ref 5–15)
AST SERPL-CCNC: 27 U/L (ref 1–32)
BASOPHILS # BLD AUTO: 0.06 10*3/MM3 (ref 0–0.2)
BASOPHILS NFR BLD AUTO: 0.6 % (ref 0–1.5)
BILIRUB SERPL-MCNC: 0.3 MG/DL (ref 0–1.2)
BUN SERPL-MCNC: 25 MG/DL (ref 6–20)
BUN/CREAT SERPL: 29.1 (ref 7–25)
CALCIUM SPEC-SCNC: 8.6 MG/DL (ref 8.6–10.5)
CHLORIDE SERPL-SCNC: 102 MMOL/L (ref 98–107)
CO2 SERPL-SCNC: 20 MMOL/L (ref 22–29)
CREAT SERPL-MCNC: 0.86 MG/DL (ref 0.57–1)
DEPRECATED RDW RBC AUTO: 37.1 FL (ref 37–54)
EGFRCR SERPLBLD CKD-EPI 2021: 81.9 ML/MIN/1.73
EOSINOPHIL # BLD AUTO: 0.14 10*3/MM3 (ref 0–0.4)
EOSINOPHIL NFR BLD AUTO: 1.3 % (ref 0.3–6.2)
ERYTHROCYTE [DISTWIDTH] IN BLOOD BY AUTOMATED COUNT: 12.2 % (ref 12.3–15.4)
FLUAV RNA RESP QL NAA+PROBE: NOT DETECTED
FLUBV RNA RESP QL NAA+PROBE: NOT DETECTED
GLOBULIN UR ELPH-MCNC: 2.5 GM/DL
GLUCOSE SERPL-MCNC: 411 MG/DL (ref 65–99)
HCT VFR BLD AUTO: 37.6 % (ref 34–46.6)
HGB BLD-MCNC: 13.1 G/DL (ref 12–15.9)
HOLD SPECIMEN: NORMAL
HOLD SPECIMEN: NORMAL
IMM GRANULOCYTES # BLD AUTO: 0.08 10*3/MM3 (ref 0–0.05)
IMM GRANULOCYTES NFR BLD AUTO: 0.7 % (ref 0–0.5)
LYMPHOCYTES # BLD AUTO: 2.37 10*3/MM3 (ref 0.7–3.1)
LYMPHOCYTES NFR BLD AUTO: 21.9 % (ref 19.6–45.3)
MCH RBC QN AUTO: 29 PG (ref 26.6–33)
MCHC RBC AUTO-ENTMCNC: 34.8 G/DL (ref 31.5–35.7)
MCV RBC AUTO: 83.4 FL (ref 79–97)
MONOCYTES # BLD AUTO: 0.89 10*3/MM3 (ref 0.1–0.9)
MONOCYTES NFR BLD AUTO: 8.2 % (ref 5–12)
NEUTROPHILS NFR BLD AUTO: 67.3 % (ref 42.7–76)
NEUTROPHILS NFR BLD AUTO: 7.28 10*3/MM3 (ref 1.7–7)
NRBC BLD AUTO-RTO: 0 /100 WBC (ref 0–0.2)
NT-PROBNP SERPL-MCNC: 253.9 PG/ML (ref 0–900)
PLATELET # BLD AUTO: 282 10*3/MM3 (ref 140–450)
PMV BLD AUTO: 10.1 FL (ref 6–12)
POTASSIUM SERPL-SCNC: 4 MMOL/L (ref 3.5–5.2)
PROT SERPL-MCNC: 6.1 G/DL (ref 6–8.5)
RBC # BLD AUTO: 4.51 10*6/MM3 (ref 3.77–5.28)
SARS-COV-2 RNA RESP QL NAA+PROBE: NOT DETECTED
SODIUM SERPL-SCNC: 135 MMOL/L (ref 136–145)
TROPONIN T SERPL-MCNC: <0.01 NG/ML (ref 0–0.03)
WBC NRBC COR # BLD: 10.82 10*3/MM3 (ref 3.4–10.8)
WHOLE BLOOD HOLD COAG: NORMAL
WHOLE BLOOD HOLD SPECIMEN: NORMAL

## 2022-07-04 PROCEDURE — 71045 X-RAY EXAM CHEST 1 VIEW: CPT

## 2022-07-04 PROCEDURE — 80053 COMPREHEN METABOLIC PANEL: CPT | Performed by: STUDENT IN AN ORGANIZED HEALTH CARE EDUCATION/TRAINING PROGRAM

## 2022-07-04 PROCEDURE — 99284 EMERGENCY DEPT VISIT MOD MDM: CPT

## 2022-07-04 PROCEDURE — 83880 ASSAY OF NATRIURETIC PEPTIDE: CPT | Performed by: STUDENT IN AN ORGANIZED HEALTH CARE EDUCATION/TRAINING PROGRAM

## 2022-07-04 PROCEDURE — 93010 ELECTROCARDIOGRAM REPORT: CPT | Performed by: INTERNAL MEDICINE

## 2022-07-04 PROCEDURE — 85025 COMPLETE CBC W/AUTO DIFF WBC: CPT | Performed by: STUDENT IN AN ORGANIZED HEALTH CARE EDUCATION/TRAINING PROGRAM

## 2022-07-04 PROCEDURE — 84484 ASSAY OF TROPONIN QUANT: CPT | Performed by: STUDENT IN AN ORGANIZED HEALTH CARE EDUCATION/TRAINING PROGRAM

## 2022-07-04 PROCEDURE — 36415 COLL VENOUS BLD VENIPUNCTURE: CPT

## 2022-07-04 PROCEDURE — 87636 SARSCOV2 & INF A&B AMP PRB: CPT | Performed by: STUDENT IN AN ORGANIZED HEALTH CARE EDUCATION/TRAINING PROGRAM

## 2022-07-04 PROCEDURE — 93005 ELECTROCARDIOGRAM TRACING: CPT | Performed by: STUDENT IN AN ORGANIZED HEALTH CARE EDUCATION/TRAINING PROGRAM

## 2022-07-04 RX ORDER — SODIUM CHLORIDE 0.9 % (FLUSH) 0.9 %
10 SYRINGE (ML) INJECTION AS NEEDED
Status: DISCONTINUED | OUTPATIENT
Start: 2022-07-04 | End: 2022-07-04 | Stop reason: HOSPADM

## 2022-07-04 RX ORDER — DILTIAZEM HYDROCHLORIDE 5 MG/ML
10 INJECTION INTRAVENOUS ONCE
Status: DISCONTINUED | OUTPATIENT
Start: 2022-07-04 | End: 2022-07-04 | Stop reason: HOSPADM

## 2022-07-04 RX ADMIN — SODIUM CHLORIDE, POTASSIUM CHLORIDE, SODIUM LACTATE AND CALCIUM CHLORIDE 1000 ML: 600; 310; 30; 20 INJECTION, SOLUTION INTRAVENOUS at 16:09

## 2022-07-04 NOTE — ED NOTES
Pt presents to ED via EMS with c/o chest pain that started today. HX of MI, reports she's not taking any of her medication except her Narco and gabapentin d/t depression. Denies SI or HI. States she sees a therapist for depression. A & O X 4.

## 2022-07-04 NOTE — ED NOTES
JAMI paper work reviewed with Pt. MD discussed risk of leaving. Pt states understanding. Paper work signed at this time.

## 2022-07-04 NOTE — ED PROVIDER NOTES
Subjective   51-year-old female history of prior cardiac stent placement comes to the ER chief complaint of lightheadedness, fast heart rate.  She noticed it earlier today when she was shopping at [employer].  She also endorses a chest heaviness discomfort.  Patient reports that she has not been taking any of her medicines, other than her Norco and gabapentin.  The chest heaviness does not radiate anywhere.  Patient states that she was prescribed a medication for an irregular heartbeat, but cannot remember what the arrhythmia was called.      History provided by:  Patient   used: No        Review of Systems   Constitutional: Negative for activity change, appetite change, chills and fever.   HENT: Negative for drooling.    Eyes: Negative for redness.   Respiratory: Negative for cough, chest tightness, shortness of breath and wheezing.    Cardiovascular: Positive for chest pain and palpitations.   Gastrointestinal: Negative for abdominal pain, nausea and vomiting.   Genitourinary: Negative for flank pain.   Skin: Negative for color change.   Neurological: Positive for light-headedness. Negative for dizziness, seizures, weakness and numbness.   Psychiatric/Behavioral: Negative for confusion.       Past Medical History:   Diagnosis Date   • ADHD (attention deficit hyperactivity disorder)    • Allergic    • Anxiety and depression    • Arthritis    • Asthma    • Cancer of kidney ([facility])     left   • Chronic kidney disease    • COPD (chronic obstructive pulmonary disease) ([facility])    • Coronary artery disease     1 stent.  is followed by jaden   • Diabetes mellitus ([facility])    • GERD (gastroesophageal reflux disease)    • Hyperlipidemia    • Hypertension    • Myocardial infarction ([facility])    • PTSD (post-traumatic stress disorder)    • Pulmonary embolism ([facility])    • Sleep apnea    • Substance abuse ([facility])    • Suicide attempt ([facility])        Allergies   Allergen Reactions   • Sulfa Antibiotics Itching       Past  Surgical History:   Procedure Laterality Date   • CARDIAC SURGERY     • CHOLECYSTECTOMY     • COLONOSCOPY N/A 1/28/2021    Procedure: COLONOSCOPY;  Surgeon: Juwan Wilkes MD;  Location: [facility] ENDOSCOPY;  Service: Gastroenterology;  Laterality: N/A;   • CORONARY STENT PLACEMENT     • ENDOSCOPY N/A 1/28/2021    Procedure: ESOPHAGOGASTRODUODENOSCOPY;  Surgeon: Juwan Wilkes MD;  Location: [facility] ENDOSCOPY;  Service: Gastroenterology;  Laterality: N/A;   • FEMUR IM NAILING RETROGRADE Left 11/3/2019    Procedure: FEMUR INTRAMEDULLARY NAILING RETROGRADE;  Surgeon: Howie Campoverde MD;  Location: [facility] OR;  Service: Orthopedics   • GALLBLADDER SURGERY     • HARDWARE REMOVAL Left 12/4/2019    Procedure: HARDWARE REMOVAL LEFT FEMUR        (C-ARM#3);  Surgeon: Howie Campoverde MD;  Location: [facility] OR;  Service: Orthopedics   • HYSTERECTOMY     • JOINT REPLACEMENT     • NEPHRECTOMY Left    • REPLACEMENT TOTAL KNEE Left    • TONSILLECTOMY     • TUBAL ABDOMINAL LIGATION     • UPPER GASTROINTESTINAL ENDOSCOPY  01/28/2021    Per Dr. Juwan Wilkes M.D., [place], [place]       Family History   Problem Relation Age of Onset   • Heart disease Mother    • Hypertension Mother    • Cancer Mother    • Diabetes Mother    • Alcohol abuse Mother    • Heart disease Father    • Hypertension Father    • Alcohol abuse Father    • Alcohol abuse Sister    • Drug abuse Sister    • Depression Sister    • Anxiety disorder Sister    • Drug abuse Brother    • Alcohol abuse Brother    • Suicide Attempts Brother        Social History     Socioeconomic History   • Marital status:    Tobacco Use   • Smoking status: Current Every Day Smoker     Packs/day: 2.00     Years: 37.00     Pack years: 74.00     Types: Cigarettes   • Smokeless tobacco: Never Used   • Tobacco comment: Have slowed down alot   Vaping Use   • Vaping Use: Never used   • Passive vaping exposure: Yes (Son)   Substance and Sexual Activity   • Alcohol use: Not  "Currently     Alcohol/week: 0.0 standard drinks     Comment: passed use for yrs including cases and 2 fifths daily; quit 20 yrs ago   • Drug use: Not Currently     Types: \"Crack\" cocaine, Methamphetamines   • Sexual activity: Yes     Partners: Male     Birth control/protection: None           Objective    Vitals:    07/04/22 1500 07/04/22 1610   BP: 97/77 93/70   BP Location: Right arm Right arm   Patient Position: Sitting Lying   Pulse: (!) 142 99   Resp: 16 16   SpO2: 97% 97%   Weight: 67.1 kg (148 lb)    Height: 165.1 cm (65\")        Physical Exam  Vitals and nursing note reviewed.   Constitutional:       General: She is not in acute distress.     Appearance: She is well-developed. She is obese. She is ill-appearing. She is not toxic-appearing or diaphoretic.   HENT:      Head: Normocephalic.      Right Ear: External ear normal.      Left Ear: External ear normal.      Nose: No congestion or rhinorrhea.   Eyes:      General: No scleral icterus.     Conjunctiva/sclera: Conjunctivae normal.   Cardiovascular:      Rate and Rhythm: Tachycardia present.   Pulmonary:      Effort: Pulmonary effort is normal. No accessory muscle usage or respiratory distress.      Breath sounds: No wheezing.   Chest:      Chest wall: No tenderness.   Abdominal:      General: Bowel sounds are normal.      Palpations: Abdomen is soft.      Tenderness: There is no abdominal tenderness (deep palpation).   Skin:     General: Skin is warm and dry.      Capillary Refill: Capillary refill takes less than 2 seconds.   Neurological:      Mental Status: She is alert and oriented to person, place, and time.   Psychiatric:         Behavior: Behavior normal.         ECG 12 Lead      Date/Time: 7/4/2022 4:06 PM  Performed by: Ulises Salinas MD  Authorized by: Ulises Salinas MD   Interpreted by physician  Rhythm: sinus rhythm  Rate: normal  BPM: 97  QRS axis: normal  ST segment elevation noted on lead: none.      ECG 12 Lead      Date/Time: " 7/4/2022 3:07 PM  Performed by: Ulises Salinas MD  Authorized by: Ulises Salinas MD   Interpreted by physician  Rhythm comments: wide complex tachycardia  Rate: tachycardic  BPM: 137  QRS axis: normal  ST segment elevation noted on lead: none.  Clinical impression: abnormal ECG                 ED Course      Results for orders placed or performed during the hospital encounter of 07/04/22   Troponin    Specimen: Arm, Right; Blood   Result Value Ref Range    Troponin T <0.010 0.000 - 0.030 ng/mL   Comprehensive Metabolic Panel    Specimen: Arm, Right; Blood   Result Value Ref Range    Glucose 411 (C) 65 - 99 mg/dL    BUN 25 (H) 6 - 20 mg/dL    Creatinine 0.86 0.57 - 1.00 mg/dL    Sodium 135 (L) 136 - 145 mmol/L    Potassium 4.0 3.5 - 5.2 mmol/L    Chloride 102 98 - 107 mmol/L    CO2 20.0 (L) 22.0 - 29.0 mmol/L    Calcium 8.6 8.6 - 10.5 mg/dL    Total Protein 6.1 6.0 - 8.5 g/dL    Albumin 3.60 3.50 - 5.20 g/dL    ALT (SGPT) 21 1 - 33 U/L    AST (SGOT) 27 1 - 32 U/L    Alkaline Phosphatase 90 39 - 117 U/L    Total Bilirubin 0.3 0.0 - 1.2 mg/dL    Globulin 2.5 gm/dL    A/G Ratio 1.4 g/dL    BUN/Creatinine Ratio 29.1 (H) 7.0 - 25.0    Anion Gap 13.0 5.0 - 15.0 mmol/L    eGFR 81.9 >60.0 mL/min/1.73   BNP    Specimen: Arm, Right; Blood   Result Value Ref Range    proBNP 253.9 0.0 - 900.0 pg/mL   CBC Auto Differential    Specimen: Arm, Right; Blood   Result Value Ref Range    WBC 10.82 (H) 3.40 - 10.80 10*3/mm3    RBC 4.51 3.77 - 5.28 10*6/mm3    Hemoglobin 13.1 12.0 - 15.9 g/dL    Hematocrit 37.6 34.0 - 46.6 %    MCV 83.4 79.0 - 97.0 fL    MCH 29.0 26.6 - 33.0 pg    MCHC 34.8 31.5 - 35.7 g/dL    RDW 12.2 (L) 12.3 - 15.4 %    RDW-SD 37.1 37.0 - 54.0 fl    MPV 10.1 6.0 - 12.0 fL    Platelets 282 140 - 450 10*3/mm3    Neutrophil % 67.3 42.7 - 76.0 %    Lymphocyte % 21.9 19.6 - 45.3 %    Monocyte % 8.2 5.0 - 12.0 %    Eosinophil % 1.3 0.3 - 6.2 %    Basophil % 0.6 0.0 - 1.5 %    Immature Grans % 0.7 (H) 0.0 - 0.5 %     Neutrophils, Absolute 7.28 (H) 1.70 - 7.00 10*3/mm3    Lymphocytes, Absolute 2.37 0.70 - 3.10 10*3/mm3    Monocytes, Absolute 0.89 0.10 - 0.90 10*3/mm3    Eosinophils, Absolute 0.14 0.00 - 0.40 10*3/mm3    Basophils, Absolute 0.06 0.00 - 0.20 10*3/mm3    Immature Grans, Absolute 0.08 (H) 0.00 - 0.05 10*3/mm3    nRBC 0.0 0.0 - 0.2 /100 WBC   Green Top (Gel)   Result Value Ref Range    Extra Tube Hold for add-ons.    Lavender Top   Result Value Ref Range    Extra Tube hold for add-on    Gold Top - SST   Result Value Ref Range    Extra Tube Hold for add-ons.    Light Blue Top   Result Value Ref Range    Extra Tube Hold for add-ons.      XR Chest 1 View   Final Result   CONCLUSION:   No Acute Disease      [id number]      Electronically signed by:  Juve Rico MD  7/4/2022 3:34 PM CDT   Workstation: [id number]        [facility]  Number of Diagnoses or Management Options  Chest pain, unspecified type: new and requires workup  Palpitations: new and requires workup  Diagnosis management comments: Vital signs are stable, afebrile.  Patient states she cannot stay and wants to go home.  Laboratory work-up to this point is unremarkable.  Glucose is elevated at 400.  Patient converted to normal sinus on her own.  Chest x-ray shows no acute cardiopulmonary processes.  She reports feeling better.  Recommend that she stay in the hospital given her heart score of 4, but patient declines. Patient is competent.  Patient was advised and expressed understanding of  the risks leaving AGAINST MEDICAL ADVICE include worsening of their medical condition resulting in disability or death.  Patient was encouraged to follow up with their PCP as soon as possible.  Encouraged her to restart taking her medications.  Patient does not need refills patient was advised to return to the emergency department at any time for re-evaluation.      Final diagnoses:   Chest pain, unspecified type   Palpitations       ED Disposition  ED Disposition     ED Disposition   AMA     Condition   --    Comment   --             No follow-up provider specified.       Medication List      No changes were made to your prescriptions during this visit.          Ulises Salinas MD  07/04/22 7919

## 2022-07-08 LAB
QT INTERVAL: 300 MS
QT INTERVAL: 350 MS
QTC INTERVAL: 444 MS
QTC INTERVAL: 453 MS

## 2022-07-18 ENCOUNTER — OFFICE VISIT (OUTPATIENT)
Dept: CARDIOLOGY | Facility: CLINIC | Age: 52
End: 2022-07-18

## 2022-07-18 VITALS
DIASTOLIC BLOOD PRESSURE: 78 MMHG | SYSTOLIC BLOOD PRESSURE: 120 MMHG | HEIGHT: 65 IN | WEIGHT: 148 LBS | HEART RATE: 78 BPM | OXYGEN SATURATION: 98 % | BODY MASS INDEX: 24.66 KG/M2 | TEMPERATURE: 98 F

## 2022-07-18 DIAGNOSIS — Z90.5 S/P NEPHRECTOMY: ICD-10-CM

## 2022-07-18 DIAGNOSIS — I47.1 PAROXYSMAL SVT (SUPRAVENTRICULAR TACHYCARDIA): ICD-10-CM

## 2022-07-18 DIAGNOSIS — I25.10 CORONARY ARTERY DISEASE INVOLVING NATIVE CORONARY ARTERY OF NATIVE HEART WITHOUT ANGINA PECTORIS: ICD-10-CM

## 2022-07-18 DIAGNOSIS — E11.65 TYPE 2 DIABETES MELLITUS WITH HYPERGLYCEMIA, WITH LONG-TERM CURRENT USE OF INSULIN: ICD-10-CM

## 2022-07-18 DIAGNOSIS — I10 PRIMARY HYPERTENSION: ICD-10-CM

## 2022-07-18 DIAGNOSIS — Z72.0 TOBACCO USE: ICD-10-CM

## 2022-07-18 DIAGNOSIS — E78.2 MIXED HYPERLIPIDEMIA: ICD-10-CM

## 2022-07-18 DIAGNOSIS — Z79.4 TYPE 2 DIABETES MELLITUS WITH HYPERGLYCEMIA, WITH LONG-TERM CURRENT USE OF INSULIN: ICD-10-CM

## 2022-07-18 DIAGNOSIS — R07.9 CHEST PAIN, UNSPECIFIED TYPE: Primary | ICD-10-CM

## 2022-07-18 PROBLEM — I47.10 PAROXYSMAL SVT (SUPRAVENTRICULAR TACHYCARDIA): Status: ACTIVE | Noted: 2022-07-18

## 2022-07-18 LAB
QT INTERVAL: 374 MS
QTC INTERVAL: 426 MS

## 2022-07-18 PROCEDURE — 93000 ELECTROCARDIOGRAM COMPLETE: CPT | Performed by: INTERNAL MEDICINE

## 2022-07-18 PROCEDURE — 99214 OFFICE O/P EST MOD 30 MIN: CPT | Performed by: INTERNAL MEDICINE

## 2022-07-18 RX ORDER — ERGOCALCIFEROL 1.25 MG/1
50000 CAPSULE ORAL WEEKLY
Qty: 4 CAPSULE | Refills: 0 | Status: SHIPPED | OUTPATIENT
Start: 2022-07-18 | End: 2022-08-11

## 2022-07-18 RX ORDER — CLOPIDOGREL BISULFATE 75 MG/1
75 TABLET ORAL DAILY
Qty: 30 TABLET | Refills: 0 | Status: SHIPPED | OUTPATIENT
Start: 2022-07-18 | End: 2022-08-11

## 2022-07-18 RX ORDER — EZETIMIBE 10 MG/1
10 TABLET ORAL DAILY
Qty: 30 TABLET | Refills: 0 | Status: SHIPPED | OUTPATIENT
Start: 2022-07-18 | End: 2022-08-11

## 2022-07-18 RX ORDER — LOSARTAN POTASSIUM 25 MG/1
25 TABLET ORAL DAILY
Qty: 30 TABLET | Refills: 0 | Status: SHIPPED | OUTPATIENT
Start: 2022-07-18 | End: 2022-08-11

## 2022-07-18 RX ORDER — DILTIAZEM HYDROCHLORIDE 120 MG/1
120 CAPSULE, EXTENDED RELEASE ORAL DAILY
Qty: 90 CAPSULE | Refills: 3 | Status: SHIPPED | OUTPATIENT
Start: 2022-07-18 | End: 2022-08-25 | Stop reason: SDUPTHER

## 2022-07-18 RX ORDER — HYDROCODONE BITARTRATE AND ACETAMINOPHEN 10; 325 MG/1; MG/1
TABLET ORAL
COMMUNITY
Start: 2022-07-15

## 2022-07-18 NOTE — TELEPHONE ENCOUNTER
Rx Refill Note  Requested Prescriptions     Pending Prescriptions Disp Refills   • clopidogrel (PLAVIX) 75 MG tablet [Pharmacy Med Name: CLOPIDOGREL 75 MG TABLET] 30 tablet 0     Sig: TAKE 1 TABLET DAILY.   • vitamin D (ERGOCALCIFEROL) 1.25 MG (47312 UT) capsule capsule [Pharmacy Med Name: VITAMIN D2 1.25MG(50,000 UNIT)] 4 capsule 0     Sig: TAKE 1 CAPSULE WEEKLY   • ezetimibe (ZETIA) 10 MG tablet [Pharmacy Med Name: EZETIMIBE 10 MG TABLET] 30 tablet 0     Sig: TAKE 1 TABLET DAILY.   • losartan (COZAAR) 25 MG tablet [Pharmacy Med Name: LOSARTAN POTASSIUM 25 MG TAB] 30 tablet 0     Sig: TAKE 1 TABLET DAILY.   • Jardiance 25 MG tablet tablet [Pharmacy Med Name: JARDIANCE 25 MG TABLET] 30 tablet 0     Sig: TAKE 1 TABLET DAILY.      Last office visit with prescribing clinician: 10/5/2021      Next office visit with prescribing clinician: Visit date not found   {TIP  Encounters:    Plavix Protocol Failed 07/18/2022 11:03 AM    Visit with authorizing provider in past 9 months or upcoming 90 days    No conflicting PPI on medication list         {TIP  Please add Last Relevant Lab Date if appropriate  {TIP  Is Refill Pharmacy correct? yes  Raciel [place]ley LPN  07/18/22, 11:39 CDT

## 2022-07-18 NOTE — PROGRESS NOTES
Yudi West  51 y.o. female    1. Chest pain, unspecified type    2. Coronary artery disease involving native coronary artery of native heart without angina pectoris    3. Paroxysmal SVT (supraventricular tachycardia) (HCC)    4. Primary hypertension    5. Type 2 diabetes mellitus with hyperglycemia, with long-term current use of insulin (HCC)    6. S/p nephrectomy    7. Mixed hyperlipidemia    8. Tobacco use        History of Present Illness:  Yudi West is a 51-year-old female with multiple medical issues including coronary artery disease status post PCI to RCA in May 2015, hypertension, diabetes mellitus, hyperlipidemia, depression, CKD with history of renal cell carcinoma status post left nephrectomy, DJD status post left knee surgery, obstructive sleep apnea, GERD/hiatal hernia, history of hypercalcemia, tobacco abuse.  She is being seen in our office after a long interval.    Patient reports that she has been under a lot of stress secondary to family situations and has not been able to keep her appointments.  She has been depressed and apparently went off all her medication for more than 5 months.  She presented to [facility] ER on 7/4/2022 with intermittent episodes of palpitation she has been going on for about a month.  In the ER she was noted to have tachyarrhythmia most likely secondary to atrial tachycardia/SVT.  She converted to sinus rhythm.  She was advised hospital admission which she signed AMA secondary to family situations.  Her records were reviewed.  Troponins, BNP were within normal limits.  Hemoglobin A1c was more than 12 and LDL was markedly elevated at 152.  Glucose was 411.  Hospital records were reviewed in detail.    Following her ER visit the patient has continued to have intermittent palpitations with heart rate going up to the 130s associated with some degree of chest pressure/pain.  Fortunately she has restarted all her medications.  She has not been  following up with primary care and have strongly advised her to do so.  She will be getting appointment.    She presented with acute inferior wall Myocardial Infarction in May 2015 and underwent cardiac catheterization which showed the following findings:  1.    Critical lesion noted in the right coronary artery which was the        infarct vessel and successful PCI with balloon angioplasty and        subsequent placement of a 3.5 x 23 mm Xience Xpedition stent and        reduction of stenosis to 0%.  2.    Critical lesion noted in one of the small subbranches of the ramus        intermedius vessel. This will be treated medically.  3.    Noncritical disease noted in the left anterior descending coronary        artery and left circumflex coronary artery.  4.    Overall normal contractility of the left ventricle with an        ejection fraction of 50% to 55% with mild inferolateral        hypokinesis.     Lexiscan Cardiolite stress test in June 2019 showed no reversible ischemia and echocardiogram showed normal LV systolic function with grade 1 diastolic dysfunction.    EKG today showed sinus rhythm with heart rate of 78 bpm.  Cannot rule out old inferior wall MI.  No arrhythmia noted.    Allergies   Allergen Reactions   • Sulfa Antibiotics Itching         Past Medical History:   Diagnosis Date   • ADHD (attention deficit hyperactivity disorder)    • Allergic    • Anxiety and depression    • Arthritis    • Asthma    • Cancer of kidney ([facility])     left   • Chronic kidney disease    • COPD (chronic obstructive pulmonary disease) ([facility])    • Coronary artery disease     1 stent.  is followed by jaden   • Diabetes mellitus ([facility])    • GERD (gastroesophageal reflux disease)    • Hyperlipidemia    • Hypertension    • Myocardial infarction ([facility])    • PTSD (post-traumatic stress disorder)    • Pulmonary embolism ([facility])    • Sleep apnea    • Substance abuse ([facility])    • Suicide attempt ([facility])          Past Surgical History:    Procedure Laterality Date   • CARDIAC SURGERY     • CHOLECYSTECTOMY     • COLONOSCOPY N/A 1/28/2021    Procedure: COLONOSCOPY;  Surgeon: Juwan Wilkes MD;  Location: [facility] ENDOSCOPY;  Service: Gastroenterology;  Laterality: N/A;   • CORONARY STENT PLACEMENT     • ENDOSCOPY N/A 1/28/2021    Procedure: ESOPHAGOGASTRODUODENOSCOPY;  Surgeon: Juwan Wilkes MD;  Location: [facility] ENDOSCOPY;  Service: Gastroenterology;  Laterality: N/A;   • FEMUR IM NAILING RETROGRADE Left 11/3/2019    Procedure: FEMUR INTRAMEDULLARY NAILING RETROGRADE;  Surgeon: Howie Campoverde MD;  Location: [facility] OR;  Service: Orthopedics   • GALLBLADDER SURGERY     • HARDWARE REMOVAL Left 12/4/2019    Procedure: HARDWARE REMOVAL LEFT FEMUR        (C-ARM#3);  Surgeon: Howie Campoverde MD;  Location: [facility] OR;  Service: Orthopedics   • HYSTERECTOMY     • JOINT REPLACEMENT     • NEPHRECTOMY Left    • REPLACEMENT TOTAL KNEE Left    • TONSILLECTOMY     • TUBAL ABDOMINAL LIGATION     • UPPER GASTROINTESTINAL ENDOSCOPY  01/28/2021    Per Dr. Juwan Wilkes M.D., [place], [place]         Family History   Problem Relation Age of Onset   • Heart disease Mother    • Hypertension Mother    • Cancer Mother    • Diabetes Mother    • Alcohol abuse Mother    • Heart disease Father    • Hypertension Father    • Alcohol abuse Father    • Alcohol abuse Sister    • Drug abuse Sister    • Depression Sister    • Anxiety disorder Sister    • Drug abuse Brother    • Alcohol abuse Brother    • Suicide Attempts Brother          Social History     Socioeconomic History   • Marital status:    Tobacco Use   • Smoking status: Current Every Day Smoker     Packs/day: 2.00     Years: 37.00     Pack years: 74.00     Types: Cigarettes   • Smokeless tobacco: Never Used   • Tobacco comment: Have slowed down alot   Vaping Use   • Vaping Use: Never used   • Passive vaping exposure: Yes (Son)   Substance and Sexual Activity   • Alcohol use: Not Currently      "Alcohol/week: 0.0 standard drinks     Comment: passed use for yrs including cases and 2 fifths daily; quit 20 yrs ago   • Drug use: Not Currently     Types: \"Crack\" cocaine, Methamphetamines   • Sexual activity: Yes     Partners: Male     Birth control/protection: None         Current Outpatient Medications   Medication Sig Dispense Refill   • Allergy Relief 10 MG tablet TAKE 1 TABLET DAILY. 30 tablet 0   • Alpha-Lipoic Acid 300 MG capsule TAKE ONE TO TWO CAPSULES BY MOUTH TWICE DAILY     • aspirin 81 MG EC tablet Take 1 tablet by mouth 2 (Two) Times a Day With Meals. (Patient taking differently: Take 81 mg by mouth Daily.) 60 tablet 0   • Blood Glucose Monitoring Suppl (FREESTYLE FREEDOM LITE) w/Device kit USE TO TEST BLOOD SUGAR TWO TO THREE TI MES DAILY 1 each 0   • carvedilol (COREG) 3.125 MG tablet TAKE 1 TABLET TWICE DAILY WITH MEALS. 60 tablet 0   • clonazePAM (KlonoPIN) 0.5 MG tablet      • clopidogrel (PLAVIX) 75 MG tablet Take 1 tablet by mouth Daily. NO ADDITIONAL REFILLS WITHOUT AN APPOINTMENT 30 tablet 0   • Continuous Blood Gluc Sensor (Dexcom G6 Sensor) 1 each As Needed (glucose control). Every 10 days 9 each 3   • Continuous Blood Gluc Transmit (Dexcom G6 Transmitter) misc 1 each Every 3 (Three) Months. 1 each 3   • dicyclomine (BENTYL) 20 MG tablet Take 1 tablet by mouth Every 6 (Six) Hours. 120 tablet 3   • doxazosin (CARDURA) 1 MG tablet      • empagliflozin (Jardiance) 25 MG tablet tablet Take 1 tablet by mouth Daily. NO ADDITIONAL REFILLS WITHOUT AN APPOINTMENT 30 tablet 0   • ezetimibe (ZETIA) 10 MG tablet Take 1 tablet by mouth Daily. NO ADDITIONAL REFILLS WITHOUT AN APPOINTMENT 30 tablet 0   • famotidine (PEPCID) 20 MG tablet TAKE ONE TABLET BY MOUTH TWICE DAILY 60 tablet 0   • fluconazole (Diflucan) 150 MG tablet Take 1 tablet now and in 72 hours 2 tablet 0   • gabapentin (NEURONTIN) 800 MG tablet      • Global Ease Inject Pen Needles 31G X 8 MM misc USE TO TEST BLOOD SUGARS TWO TO THREE " TIMES DAILY 100 each 3   • glucose blood test strip Test 3 times daily 100 each 11   • glucose monitor monitoring kit 1 each As Needed (to check bg). 1 each 0   • guaiFENesin (Mucus Relief) 600 MG 12 hr tablet Take 2 tablets by mouth 2 (Two) Times a Day. NO ADDITIONAL REFILLS WITHOUT AN APPOINTMENT 60 tablet 0   • HYDROcodone-acetaminophen (NORCO)  MG per tablet      • hydrOXYzine (ATARAX) 25 MG tablet TAKE ONE-HALF TO ONE TABLET BY MOUTH THREE TIMES DAILY AS NEEDED for ANXIETY 90 tablet 1   • Insulin Glargine (BASAGLAR KWIKPEN) 100 UNIT/ML injection pen Take 36 units at bedtime can go up by 2-3 unites every 3 days until am sugars running     02/05/2021 - Patient currently utilizing 50 Units nightly. (Patient taking differently: Take 36 units at bedtime can go up by 2-3 unites every 3 days until am sugars running     08/18/2021 - Patient currently utilizing 50 Units nightly.) 15 mL 3   • Insulin Lispro, 1 Unit Dial, (HumaLOG KwikPen) 100 UNIT/ML solution pen-injector Up to 20 units with meals 6 pen 11   • Insulin Pen Needle (Pen Needles) 32G X 4 MM misc 1 each 4 (Four) Times a Day. Use 4 x daily, Dx code E11.65 120 each 11   • Lancets misc Test 3 times daily , E11.65 100 each 11   • lidocaine (LIDODERM) 5 % APPLY ONE TO TWO PATCHES AS DIRECTED ON LOWER BACK, LEFT UPPER LEG 12 HOURS ON AND 12 HOURS OFF     • lidocaine-prilocaine (EMLA) 2.5-2.5 % cream      • losartan (COZAAR) 25 MG tablet Take 1 tablet by mouth Daily. NO ADDITIONAL REFILLS WITHOUT AN APPOINTMENT 30 tablet 0   • mirtazapine (REMERON) 7.5 MG tablet      • omeprazole (priLOSEC) 40 MG capsule Take 1 capsule by mouth Daily. 30 capsule 11   • ondansetron ODT (ZOFRAN-ODT) 8 MG disintegrating tablet DISSOLVE ONE TABLET ON THE TONGUE EVERY 8 HOURS AS NEEDED FOR NAUSEA OR VOMITING 90 tablet 3   • polyethylene glycol (MIRALAX) 17 GM/SCOOP powder MIX 17 GRAMS (1 CAPFUL) IN 8 OUNCES OF WATER OR JUICE AND DRINK DAILY 510 g 5   • ProAir   "(90 Base) MCG/ACT inhaler inhale TWO puffs EVERY 4 HOURS AS NEEDED FOR wheezing 8.5 g 0   • ranolazine (RANEXA) 500 MG 12 hr tablet TAKE 1 TABLET TWICE DAILY. 60 tablet 0   • rosuvastatin (CRESTOR) 20 MG tablet TAKE ONE TABLET BY MOUTH AT BEDTIME 30 tablet 3   • vitamin D (ERGOCALCIFEROL) 1.25 MG (12055 UT) capsule capsule Take 1 capsule by mouth 1 (One) Time Per Week. NO ADDITIONAL REFILLS WITHOUT AN APPOINTMENT 4 capsule 0   • Vraylar 1.5 MG capsule capsule      • dilTIAZem XR (DILACOR XR) 120 MG 24 hr capsule Take 1 capsule by mouth Daily. 90 capsule 3     No current facility-administered medications for this visit.         OBJECTIVE    /78 (BP Location: Left arm, Patient Position: Sitting, Cuff Size: Adult)   Pulse 78   Temp 98 °F (36.7 °C)   Ht 165.1 cm (65\")   Wt 67.1 kg (148 lb)   SpO2 98%   BMI 24.63 kg/m²         Review of Systems : The following systems are reviewed and no changes noted other than what is mentioned in history of present illness    Constitutional:  Denies recent weight loss, weight gain, fever or chills     HENT:  Denies any hearing loss, epistaxis, hoarseness, or difficulty speaking.     Eyes: Wears eyeglasses or contact lenses     Respiratory:  Dyspnea with exertion,no cough, wheezing, or hemoptysis.     Cardiovascular: See HPI.  Recurrent palpitation    Gastrointestinal:  Denies change in bowel habits, dyspepsia, ulcer disease, hematochezia, or melena.     Endocrine: Negative for cold intolerance, heat intolerance, polydipsia, polyphagia and polyuria.    Genitourinary: History of nephrectomy in the past      Musculoskeletal: DJD.  Recent surgery left femur    Neurological:  Denies any history of recurrent headaches, strokes, TIA, or seizure disorder.     Hematological: Denies any food allergies, seasonal allergies, bleeding disorders, or lymphadenopathy.     Psychiatric/Behavioral: history of depression/ anxiety, no substance abuse, or change in cognitive function. "     Physical Exam : The following systems are reviewed and no changes noted    Constitutional: Cooperative, alert and oriented, in no acute distress.     HENT:   Head: Normocephalic, normal hair patterns, no masses or tenderness.  Ears, Nose, and Throat: No gross abnormalities. No pallor or cyanosis.   Eyes: EOMS intact, PERRL, conjunctivae and lids unremarkable. Fundoscopic exam and visual fields not performed.   Neck: No palpable masses or adenopathy, no thyromegaly, no JVD, carotid pulses are full and equal bilaterally and without  Bruits.     Cardiovascular: Regular rhythm, S1 and S2 normal, no S3 or S4.  No murmurs, gallops, or rubs detected.     Pulmonary/Chest: Chest: normal symmetry,  normal respiratory excursion, no intercostal retraction, no use of accessory muscles.            Pulmonary: Normal breath sounds. No rales or ronchi.    Abdominal: Abdomen soft, bowel sounds normoactive, no masses, no hepatosplenomegaly, non-tender, no bruits.     Musculoskeletal: No deformities, clubbing, cyanosis, erythema, or edema observed.     Neurological: No gross motor or sensory deficits noted, affect appropriate, oriented to time, person, place.     Skin: Warm and dry to the touch, no apparent skin lesions or masses noted.     Psychiatric: She has a normal mood and affect. Her behavior is normal. Judgment and thought content normal.         Procedures      Lab Results   Component Value Date    WBC 10.82 (H) 07/04/2022    HGB 13.1 07/04/2022    HCT 37.6 07/04/2022    MCV 83.4 07/04/2022     07/04/2022     Lab Results   Component Value Date    GLUCOSE 411 (C) 07/04/2022    BUN 25 (H) 07/04/2022    CREATININE 0.86 07/04/2022    EGFRIFNONA 48 (L) 10/05/2021    BCR 29.1 (H) 07/04/2022    CO2 20.0 (L) 07/04/2022    CALCIUM 8.6 07/04/2022    ALBUMIN 3.60 07/04/2022    AST 27 07/04/2022    ALT 21 07/04/2022     Lab Results   Component Value Date    CHOL 213 (H) 05/20/2022    CHOL 276 (H) 10/05/2021    CHOL 227 (H)  07/28/2021     Lab Results   Component Value Date    TRIG 90 05/20/2022    TRIG 216 (H) 10/05/2021    TRIG 226 (H) 07/28/2021     Lab Results   Component Value Date    HDL 45 05/20/2022    HDL 45 10/05/2021    HDL 41 07/28/2021     No components found for: LDLCALC  Lab Results   Component Value Date     (H) 05/20/2022     (H) 10/05/2021     (H) 07/28/2021     No results found for: HDLLDLRATIO  No components found for: CHOLHDL  Lab Results   Component Value Date    HGBA1C 12.20 (H) 05/20/2022     Lab Results   Component Value Date    TSH 0.715 05/20/2022           ASSESSMENT AND PLAN  Yudi West has multiple medical issues as discussed in detail in the history of present illness and has frequent episodes of palpitation is most likely secondary to SVT/atrial tachycardia.   Compliance with medications has been an issue and I have long discussion with her about the importance.  Both her diabetes and lipid profiles are not under control.  She will reestablish with primary care.  Fortunately she was restarted her medications.     After reviewing the situation, I have started her on diltiazem  mg daily in addition to carvedilol 3.125 mg twice a day.  I have continued antiplatelet therapy with aspirin and Plavix, antianginal therapy with Ranexa, lipid-lowering therapy with Crestor.  Smoking cessation was once again stressed.    The plan will be to proceed with an echocardiogram to assess left ventricular and valvular function and a Lexiscan Cardiolite stress to assess myocardial perfusion is being arranged.  I will consider PCSK9 inhibitor if her LDL does not optimized with restarting crestor.    The patient is a management challenge because of compliance issues and social situation.    Diagnoses and all orders for this visit:    1. Chest pain, unspecified type (Primary)  -     ECG 12 Lead  -     Stress Test With Myocardial Perfusion One Day; Future  -     Adult Transthoracic Echo Complete w/  Color, Spectral and Contrast if Necessary Per Protocol; Future    2. Coronary artery disease involving native coronary artery of native heart without angina pectoris  -     ECG 12 Lead  -     ECG 12 Lead  -     Stress Test With Myocardial Perfusion One Day; Future  -     Adult Transthoracic Echo Complete w/ Color, Spectral and Contrast if Necessary Per Protocol; Future    3. Paroxysmal SVT (supraventricular tachycardia) (HCC)  -     Stress Test With Myocardial Perfusion One Day; Future  -     Adult Transthoracic Echo Complete w/ Color, Spectral and Contrast if Necessary Per Protocol; Future    4. Primary hypertension  -     Stress Test With Myocardial Perfusion One Day; Future  -     Adult Transthoracic Echo Complete w/ Color, Spectral and Contrast if Necessary Per Protocol; Future    5. Type 2 diabetes mellitus with hyperglycemia, with long-term current use of insulin (HCC)  -     Stress Test With Myocardial Perfusion One Day; Future  -     Adult Transthoracic Echo Complete w/ Color, Spectral and Contrast if Necessary Per Protocol; Future    6. S/p nephrectomy    7. Mixed hyperlipidemia  -     Stress Test With Myocardial Perfusion One Day; Future  -     Adult Transthoracic Echo Complete w/ Color, Spectral and Contrast if Necessary Per Protocol; Future    8. Tobacco use    Other orders  -     dilTIAZem XR (DILACOR XR) 120 MG 24 hr capsule; Take 1 capsule by mouth Daily.  Dispense: 90 capsule; Refill: 3        Patient's Body mass index is 24.63 kg/m². BMI is above normal parameters. Recommendations include: exercise counseling and nutrition counseling.      I advised Yudi of the risks of continuing to use tobacco, and I provided her with tobacco cessation educational materials in the After Visit Summary.     During this visit, I spent 3 minutes counseling the patient regarding tobacco cessation.      Lewis Johnson MD  7/18/2022  12:16 CDT

## 2022-07-18 NOTE — TELEPHONE ENCOUNTER
Rx Refill Note  Requested Prescriptions     Pending Prescriptions Disp Refills   • clopidogrel (PLAVIX) 75 MG tablet [Pharmacy Med Name: CLOPIDOGREL 75 MG TABLET] 30 tablet 0     Sig: TAKE 1 TABLET DAILY.   • vitamin D (ERGOCALCIFEROL) 1.25 MG (67574 UT) capsule capsule [Pharmacy Med Name: VITAMIN D2 1.25MG(50,000 UNIT)] 4 capsule 0     Sig: TAKE 1 CAPSULE WEEKLY   • ezetimibe (ZETIA) 10 MG tablet [Pharmacy Med Name: EZETIMIBE 10 MG TABLET] 30 tablet 0     Sig: TAKE 1 TABLET DAILY.   • losartan (COZAAR) 25 MG tablet [Pharmacy Med Name: LOSARTAN POTASSIUM 25 MG TAB] 30 tablet 0     Sig: TAKE 1 TABLET DAILY.   • Jardiance 25 MG tablet tablet [Pharmacy Med Name: JARDIANCE 25 MG TABLET] 30 tablet 0     Sig: TAKE 1 TABLET DAILY.      Last office visit with prescribing clinician: 10/5/2021      Next office visit with prescribing clinician: Visit date not found   {TIP  Encounters:     Plavix Protocol Failed 07/18/2022 11:03 AM    Visit with authorizing provider in past 9 months or upcoming 90 days    No conflicting PPI on medication list          PT HAS NOT BEEN SEEN SINCE 10/5/21.    {TIP  Please add Last Relevant Lab Date if appropriate  {TIP  Is Refill Pharmacy correct? YES  Raciel Wheatley LPN  07/18/22, 13:18 CDT

## 2022-07-28 ENCOUNTER — APPOINTMENT (OUTPATIENT)
Dept: NUCLEAR MEDICINE | Facility: HOSPITAL | Age: 52
End: 2022-07-28

## 2022-07-28 ENCOUNTER — APPOINTMENT (OUTPATIENT)
Dept: CARDIOLOGY | Facility: HOSPITAL | Age: 52
End: 2022-07-28

## 2022-08-08 ENCOUNTER — APPOINTMENT (OUTPATIENT)
Dept: NUCLEAR MEDICINE | Facility: HOSPITAL | Age: 52
End: 2022-08-08

## 2022-08-08 ENCOUNTER — APPOINTMENT (OUTPATIENT)
Dept: CARDIOLOGY | Facility: HOSPITAL | Age: 52
End: 2022-08-08

## 2022-08-11 RX ORDER — EZETIMIBE 10 MG/1
TABLET ORAL
Qty: 30 TABLET | Refills: 0 | Status: SHIPPED | OUTPATIENT
Start: 2022-08-11 | End: 2022-09-12

## 2022-08-11 RX ORDER — EMPAGLIFLOZIN 25 MG/1
TABLET, FILM COATED ORAL
Qty: 30 TABLET | Refills: 0 | Status: SHIPPED | OUTPATIENT
Start: 2022-08-11 | End: 2022-09-12

## 2022-08-11 RX ORDER — ERGOCALCIFEROL 1.25 MG/1
CAPSULE ORAL
Qty: 4 CAPSULE | Refills: 0 | Status: SHIPPED | OUTPATIENT
Start: 2022-08-11 | End: 2022-09-12

## 2022-08-11 RX ORDER — CLOPIDOGREL BISULFATE 75 MG/1
TABLET ORAL
Qty: 30 TABLET | Refills: 0 | Status: SHIPPED | OUTPATIENT
Start: 2022-08-11 | End: 2022-09-12

## 2022-08-11 RX ORDER — LOSARTAN POTASSIUM 25 MG/1
TABLET ORAL
Qty: 30 TABLET | Refills: 0 | Status: SHIPPED | OUTPATIENT
Start: 2022-08-11 | End: 2022-09-12

## 2022-08-11 NOTE — TELEPHONE ENCOUNTER
Rx Refill Note  Requested Prescriptions     Pending Prescriptions Disp Refills   • Jardiance 25 MG tablet tablet [Pharmacy Med Name: JARDIANCE 25 MG TABLET] 30 tablet 0     Sig: TAKE 1 TABLET DAILY.   • losartan (COZAAR) 25 MG tablet [Pharmacy Med Name: LOSARTAN POTASSIUM 25 MG TAB] 30 tablet 0     Sig: TAKE 1 TABLET DAILY.   • ezetimibe (ZETIA) 10 MG tablet [Pharmacy Med Name: EZETIMIBE 10 MG TABLET] 30 tablet 0     Sig: TAKE 1 TABLET DAILY.   • vitamin D (ERGOCALCIFEROL) 1.25 MG (13611 UT) capsule capsule [Pharmacy Med Name: VITAMIN D2 1.25MG(50,000 UNIT)] 4 capsule 0     Sig: TAKE 1 CAPSULE WEEKLY   • clopidogrel (PLAVIX) 75 MG tablet [Pharmacy Med Name: CLOPIDOGREL 75 MG TABLET] 30 tablet 0     Sig: TAKE 1 TABLET DAILY.      Last office visit with prescribing clinician: 10/5/2021      Next office visit with prescribing clinician: 8/25/2022   {TIP  Encounters:     Plavix Protocol Failed 08/11/2022 12:08 PM    No conflicting PPI on medication list            {TIP  Please add Last Relevant Lab Date if appropriate  {TIP  Is Refill Pharmacy correct? yes  Raciel Wheatley LPN  08/11/22, 13:03 CDT

## 2022-08-16 ENCOUNTER — HOSPITAL ENCOUNTER (OUTPATIENT)
Dept: NUCLEAR MEDICINE | Facility: HOSPITAL | Age: 52
Discharge: HOME | End: 2022-08-16

## 2022-08-16 ENCOUNTER — HOSPITAL ENCOUNTER (OUTPATIENT)
Dept: CARDIOLOGY | Facility: HOSPITAL | Age: 52
Discharge: HOME | End: 2022-08-16

## 2022-08-16 DIAGNOSIS — I10 PRIMARY HYPERTENSION: ICD-10-CM

## 2022-08-16 DIAGNOSIS — I47.1 PAROXYSMAL SVT (SUPRAVENTRICULAR TACHYCARDIA): ICD-10-CM

## 2022-08-16 DIAGNOSIS — E11.65 TYPE 2 DIABETES MELLITUS WITH HYPERGLYCEMIA, WITH LONG-TERM CURRENT USE OF INSULIN: ICD-10-CM

## 2022-08-16 DIAGNOSIS — R07.9 CHEST PAIN, UNSPECIFIED TYPE: ICD-10-CM

## 2022-08-16 DIAGNOSIS — I25.10 CORONARY ARTERY DISEASE INVOLVING NATIVE CORONARY ARTERY OF NATIVE HEART WITHOUT ANGINA PECTORIS: ICD-10-CM

## 2022-08-16 DIAGNOSIS — Z79.4 TYPE 2 DIABETES MELLITUS WITH HYPERGLYCEMIA, WITH LONG-TERM CURRENT USE OF INSULIN: ICD-10-CM

## 2022-08-16 DIAGNOSIS — E78.2 MIXED HYPERLIPIDEMIA: ICD-10-CM

## 2022-08-16 LAB
BH CV REST NUCLEAR ISOTOPE DOSE: 11 MCI
BH CV STRESS BP STAGE 1: NORMAL
BH CV STRESS COMMENTS STAGE 1: NORMAL
BH CV STRESS DOSE REGADENOSON STAGE 1: 0.4
BH CV STRESS DURATION MIN STAGE 1: 0
BH CV STRESS DURATION SEC STAGE 1: 10
BH CV STRESS HR STAGE 1: 70
BH CV STRESS NUCLEAR ISOTOPE DOSE: 35.4 MCI
BH CV STRESS PROTOCOL 1: NORMAL
BH CV STRESS RECOVERY BP: NORMAL MMHG
BH CV STRESS RECOVERY HR: 86 BPM
BH CV STRESS STAGE 1: 1
LV EF NUC BP: 63 %
MAXIMAL PREDICTED HEART RATE: 169 BPM
PERCENT MAX PREDICTED HR: 57.99 %
STRESS BASELINE BP: NORMAL MMHG
STRESS BASELINE HR: 77 BPM
STRESS PERCENT HR: 68 %
STRESS POST ESTIMATED WORKLOAD: 1 METS
STRESS POST PEAK BP: NORMAL MMHG
STRESS POST PEAK HR: 98 BPM
STRESS TARGET HR: 144 BPM

## 2022-08-16 PROCEDURE — 0 TECHNETIUM SESTAMIBI: Performed by: INTERNAL MEDICINE

## 2022-08-16 PROCEDURE — 78452 HT MUSCLE IMAGE SPECT MULT: CPT

## 2022-08-16 PROCEDURE — 78452 HT MUSCLE IMAGE SPECT MULT: CPT | Performed by: INTERNAL MEDICINE

## 2022-08-16 PROCEDURE — 93017 CV STRESS TEST TRACING ONLY: CPT

## 2022-08-16 PROCEDURE — A9500 TC99M SESTAMIBI: HCPCS | Performed by: INTERNAL MEDICINE

## 2022-08-16 PROCEDURE — 25010000002 REGADENOSON 0.4 MG/5ML SOLUTION: Performed by: INTERNAL MEDICINE

## 2022-08-16 PROCEDURE — 93018 CV STRESS TEST I&R ONLY: CPT | Performed by: INTERNAL MEDICINE

## 2022-08-16 RX ORDER — SODIUM CHLORIDE 0.9 % (FLUSH) 0.9 %
10 SYRINGE (ML) INJECTION ONCE
Status: COMPLETED | OUTPATIENT
Start: 2022-08-16 | End: 2022-08-16

## 2022-08-16 RX ADMIN — TECHNETIUM TC 99M SESTAMIBI 1 DOSE: 1 INJECTION INTRAVENOUS at 07:36

## 2022-08-16 RX ADMIN — REGADENOSON 0.4 MG: 0.08 INJECTION, SOLUTION INTRAVENOUS at 08:35

## 2022-08-16 RX ADMIN — TECHNETIUM TC 99M SESTAMIBI 1 DOSE: 1 INJECTION INTRAVENOUS at 08:36

## 2022-08-16 RX ADMIN — Medication 10 ML: at 08:35

## 2022-08-16 NOTE — PROGRESS NOTES
Subjective:  Yudi West is a 51 y.o. female who presents for       Patient Active Problem List   Diagnosis   • Coronary artery disease involving native coronary artery of native heart without angina pectoris   • Myocardial infarction, old   • Primary hypertension   • Type 1 diabetes mellitus ([facility])   • Mixed hyperlipidemia   • Stage 1 mild COPD by GOLD classification ([facility])   • Dyspnea on exertion   • S/p nephrectomy   • Precordial pain   • Femur fracture ([facility])   • Closed displaced supracondylar fracture without intracondylar extension of lower end of left femur ([facility])   • Anemia, posthemorrhagic, acute   • Painful orthopaedic hardware ([facility])   • Closed displaced transverse fracture of shaft of femur with routine healing   • Overweight   • Diabetic neuropathy ([facility])   • Tobacco abuse counseling   • Smoking   • Class 1 obesity with serious comorbidity and body mass index (BMI) of 30.0 to 30.9 in adult   • High risk medication use   • Insomnia   • Dyspnea   • Renal impairment   • Type 2 diabetes mellitus with hyperglycemia, with long-term current use of insulin ([facility])   • Cigarette nicotine dependence, uncomplicated   • Stage 3 chronic kidney disease ([facility])   • Chronic left shoulder pain   • Chronic bronchitis ([facility])   • Claudication of lower extremity ([facility])   • Left shoulder pain   • Severe episode of recurrent major depressive disorder, without psychotic features ([facility])   • KRISTOFER (generalized anxiety disorder)   • Other constipation   • Generalized abdominal pain   • Nausea   • Bloating   • Weight loss   • Acute pain of left knee   • Class 1 obesity in adult   • Acute bronchitis   • Boil   • Subacromial bursitis of left shoulder joint   • Yeast infection   • Oral thrush   • Hypercalcemia   • Epigastric pain   • Gastroesophageal reflux disease   • Vitamin D deficiency   • Internal derangement of left shoulder   • Paroxysmal SVT (supraventricular tachycardia) ([facility])   • Tobacco use   • Uncontrolled type 2 diabetes  mellitus with hyperglycemia ([facility])   • Essential hypertension   • History of heart artery stent   • Fatigue due to exposure   • Daytime sleepiness   • Snoring   • Screening for lung cancer   • Increased urinary frequency   • Abnormal weight loss           Current Outpatient Medications:   •  Allergy Relief 10 MG tablet, TAKE 1 TABLET DAILY., Disp: 30 tablet, Rfl: 0  •  Alpha-Lipoic Acid 300 MG capsule, TAKE ONE TO TWO CAPSULES BY MOUTH TWICE DAILY, Disp: , Rfl:   •  aspirin 81 MG EC tablet, Take 1 tablet by mouth 2 (Two) Times a Day With Meals. (Patient taking differently: Take 81 mg by mouth Daily.), Disp: 60 tablet, Rfl: 0  •  Blood Glucose Monitoring Suppl (FREESTYLE FREEDOM LITE) w/Device kit, USE TO TEST BLOOD SUGAR TWO TO THREE TI MES DAILY, Disp: 1 each, Rfl: 0  •  carvedilol (COREG) 3.125 MG tablet, TAKE 1 TABLET TWICE DAILY WITH MEALS., Disp: 60 tablet, Rfl: 0  •  clonazePAM (KlonoPIN) 0.5 MG tablet, , Disp: , Rfl:   •  clopidogrel (PLAVIX) 75 MG tablet, TAKE 1 TABLET DAILY., Disp: 30 tablet, Rfl: 0  •  Continuous Blood Gluc Sensor (Dexcom G6 Sensor), 1 each As Needed (glucose control). Every 10 days, Disp: 9 each, Rfl: 3  •  Continuous Blood Gluc Transmit (Dexcom G6 Transmitter) misc, 1 each Every 3 (Three) Months., Disp: 1 each, Rfl: 3  •  dicyclomine (BENTYL) 20 MG tablet, Take 1 tablet by mouth Every 6 (Six) Hours., Disp: 120 tablet, Rfl: 3  •  dilTIAZem CD (CARDIZEM CD) 120 MG 24 hr capsule, , Disp: , Rfl:   •  doxazosin (CARDURA) 1 MG tablet, , Disp: , Rfl:   •  ezetimibe (ZETIA) 10 MG tablet, TAKE 1 TABLET DAILY., Disp: 30 tablet, Rfl: 0  •  famotidine (PEPCID) 20 MG tablet, TAKE ONE TABLET BY MOUTH TWICE DAILY, Disp: 60 tablet, Rfl: 0  •  fluconazole (Diflucan) 150 MG tablet, Take 1 tablet now and in 72 hours, Disp: 2 tablet, Rfl: 0  •  gabapentin (NEURONTIN) 800 MG tablet, , Disp: , Rfl:   •  Global Ease Inject Pen Needles 31G X 8 MM misc, USE TO TEST BLOOD SUGARS TWO TO THREE TIMES DAILY, Disp: 100  each, Rfl: 3  •  glucose blood test strip, Test 3 times daily, Disp: 100 each, Rfl: 11  •  glucose monitor monitoring kit, 1 each As Needed (to check bg)., Disp: 1 each, Rfl: 0  •  guaiFENesin (Mucus Relief) 600 MG 12 hr tablet, Take 2 tablets by mouth 2 (Two) Times a Day. NO ADDITIONAL REFILLS WITHOUT AN APPOINTMENT, Disp: 60 tablet, Rfl: 0  •  HYDROcodone-acetaminophen (NORCO)  MG per tablet, , Disp: , Rfl:   •  hydrOXYzine (ATARAX) 25 MG tablet, TAKE ONE-HALF TO ONE TABLET BY MOUTH THREE TIMES DAILY AS NEEDED for ANXIETY, Disp: 90 tablet, Rfl: 1  •  Insulin Glargine (BASAGLAR KWIKPEN) 100 UNIT/ML injection pen, Take 36 units at bedtime can go up by 2-3 unites every 3 days until am sugars running   02/05/2021 - Patient currently utilizing 50 Units nightly. (Patient taking differently: Take 36 units at bedtime can go up by 2-3 unites every 3 days until am sugars running   08/18/2021 - Patient currently utilizing 50 Units nightly.), Disp: 15 mL, Rfl: 3  •  Insulin Lispro, 1 Unit Dial, (HumaLOG KwikPen) 100 UNIT/ML solution pen-injector, Up to 20 units with meals, Disp: 6 pen, Rfl: 11  •  Insulin Pen Needle (Pen Needles) 32G X 4 MM misc, 1 each 4 (Four) Times a Day. Use 4 x daily, Dx code E11.65, Disp: 120 each, Rfl: 11  •  Jardiance 25 MG tablet tablet, TAKE 1 TABLET DAILY., Disp: 30 tablet, Rfl: 0  •  Lancets misc, Test 3 times daily , E11.65, Disp: 100 each, Rfl: 11  •  lidocaine (LIDODERM) 5 %, APPLY ONE TO TWO PATCHES AS DIRECTED ON LOWER BACK, LEFT UPPER LEG 12 HOURS ON AND 12 HOURS OFF, Disp: , Rfl:   •  lidocaine-prilocaine (EMLA) 2.5-2.5 % cream, , Disp: , Rfl:   •  losartan (COZAAR) 25 MG tablet, TAKE 1 TABLET DAILY., Disp: 30 tablet, Rfl: 0  •  mirtazapine (REMERON) 7.5 MG tablet, , Disp: , Rfl:   •  omeprazole (priLOSEC) 40 MG capsule, Take 1 capsule by mouth Daily., Disp: 30 capsule, Rfl: 11  •  ondansetron ODT (ZOFRAN-ODT) 8 MG disintegrating tablet, Place 1 tablet on the tongue Every  8 (Eight) Hours As Needed for Nausea or Vomiting., Disp: 90 tablet, Rfl: 3  •  polyethylene glycol (MIRALAX) 17 GM/SCOOP powder, MIX 17 GRAMS (1 CAPFUL) IN 8 OUNCES OF WATER OR JUICE AND DRINK DAILY, Disp: 510 g, Rfl: 5  •  ProAir  (90 Base) MCG/ACT inhaler, inhale TWO puffs EVERY 4 HOURS AS NEEDED FOR wheezing, Disp: 8.5 g, Rfl: 0  •  ranolazine (RANEXA) 500 MG 12 hr tablet, TAKE 1 TABLET TWICE DAILY., Disp: 60 tablet, Rfl: 0  •  rosuvastatin (CRESTOR) 20 MG tablet, TAKE ONE TABLET BY MOUTH AT BEDTIME, Disp: 30 tablet, Rfl: 3  •  vitamin D (ERGOCALCIFEROL) 1.25 MG (96191 UT) capsule capsule, TAKE 1 CAPSULE WEEKLY, Disp: 4 capsule, Rfl: 0  •  Vraylar 1.5 MG capsule capsule, , Disp: , Rfl:   •  hydrOXYzine pamoate (Vistaril) 25 MG capsule, Take 1 capsule by mouth 3 (Three) Times a Day As Needed for Itching or Anxiety., Disp: 90 capsule, Rfl: 3    HPI        Pt is 49 yo female with management of her being overweight, DM type 2 HLP, HTN, diabetic neuropathy, insomnia, major depression CAD sp stent  Echocardiogram on 6/3/19 (grade 1 diastolic dysfunction) ,  History of MI COPD, history of fracture of femur, sp surgery on left femur, sp cholecystectomy, sp hysterectomy, sp left nephrectomy,  History of renal cell carcnoma sp left total knee replacement surgery, sp tonsillectomy , diabetic neuropathy, DELFIN, colonic polyps, GERD with esophagitis, gastritis, hiatal hernia, CKD stage 3a.          10/5/21 in office visit for recheck on pt's above medical issues. Pt continues to take her medications for CAD, HTN, tobacco cessation, HLP, GERD, daibetic neuropathy,  IDDM type 2, insomnia, major depression, COPD,. KRISTOFER. She continues to have epigastric pain and has EGD and Bravo test on 10/8/21. She may also have a UTI. She is now back on her psych medications for effexor, atarax and seroquel. She was going trhough a period of depression.  She lost 8 lbs since her last visit      8/25/22 in office visit for recheck. She has  been following up with Orthopedic for her left shoulder pain and joint issues.. pt [facility] ER on 74/22 for chets pain and palpitaitons.  Her glucose was at 400 chest x-ray was normal.  She was recommend to stay in hospital for observation but pt declined. She signed AMA.  Her BNP troponin were normal on 7/4/22  Pt has history of noncompliance.  Pt saw Cardiology on 7/18/22 for her chest pain. She stopped taking her medications for 5 months she has been going through stress with losing family members. . Her palpitations was likely due to SVT/atrial tachycria.  Pt has restarted her medications including coreg 3.125 mg PO BID plavix ranexa and crestor along with aspirin. She was started on diltiazem  mg daily.  She was [facility]. Her stress test on 8/16/22 showed low risk study her last labwork on 5/20/22 showed stable viitamin b12 tsh normal lipid panel showed LDL at 152. hga1c at 12.20. CMP showed glucose >300 GFR at 81.9. CBC showed stable hemoglobin and WBC. Vitamin D was normal at 22.5. pt lost 17 lbs since her last visit.  She has not been checking her sugars daily. She continues to smoke about 1 ppd. She also gets tired during the day and averages about 3 hours of sleep at bedtime.      Chronic Kidney Disease  This is a chronic problem. The current episode started more than 1 year ago. The problem occurs constantly. The problem has been unchanged. Associated symptoms include arthralgias and fatigue. Pertinent negatives include no abdominal pain, anorexia, change in bowel habit, chest pain, chills, congestion, coughing, diaphoresis, fever, headaches, joint swelling, myalgias, nausea, neck pain, numbness, sore throat, swollen glands, urinary symptoms, vertigo, visual change, vomiting or weakness. Nothing aggravates the symptoms. She has tried nothing for the symptoms. The treatment provided no relief.   Fatigue  This is a recurrent problem.  The current episode started more than 1 year ago. The problem occurs constantly. The problem has been unchanged. Associated symptoms include fatigue. Pertinent negatives include no abdominal pain, anorexia, arthralgias, change in bowel habit, chest pain, chills, congestion, coughing, diaphoresis, fever, headaches, joint swelling, myalgias, nausea, neck pain, numbness, rash, sore throat, swollen glands, urinary symptoms, vertigo, visual change, vomiting or weakness. Nothing aggravates the symptoms. She has tried nothing for the symptoms. The treatment provided no relief.   Anxiety  Presents for follow-up visit. Symptoms include depressed mood, excessive worry, insomnia, irritability, nervous/anxious behavior and shortness of breath. Patient reports no chest pain, compulsions, confusion, decreased concentration, dizziness, dry mouth, feeling of choking, hyperventilation, impotence, malaise, muscle tension, nausea, obsessions, palpitations, panic or restlessness. The quality of sleep is fair. Nighttime awakenings: none.   Diabetes   She presents for her followup diabetic visit. She has type 2 diabetes mellitus. Her disease course has been waxing and waning  Hypoglycemia symptoms include tremors. Pertinent negatives for hypoglycemia include no confusion, dizziness, headaches, hunger, mood changes, nervousness/anxiousness, pallor, seizures, sleepiness, speech difficulty or sweats. Associated symptoms include fatigue, polyuria and weakness. Pertinent negatives for diabetes include no blurred vision, no chest pain, no foot paresthesias and no weight loss. Pertinent negatives for hypoglycemia complications include no blackouts, no hospitalization, no nocturnal hypoglycemia, no required assistance and no required glucagon injection. Symptoms are stable. Pertinent negatives for diabetic complications include no CVA, impotence or retinopathy. Risk factors for coronary artery disease include diabetes mellitus, dyslipidemia,  sedentary lifestyle and tobacco exposure. She is compliant with treatment all of the time. Her weight is stable. She is following a generally unhealthy diet. She does not see a podiatrist.Eye exam is not current.      Hypertension   This is a chronic problem. The current episode started more than 1 year ago. The problem has been waxing and waning since onset. The problem is uncontrolled. Associated symptoms include shortness of breath. Pertinent negatives include no anxiety, blurred vision, chest pain, headaches, malaise/fatigue, palpitations, PND or sweats. Risk factors for coronary artery disease include diabetes mellitus, dyslipidemia, sedentary lifestyle and smoking/tobacco exposure. Past treatments include beta blockers. Current antihypertension treatment includes beta blockers. The current treatment provides no improvement. There is no history of angina, kidney disease, CAD/MI, CVA, heart failure, left ventricular hypertrophy or retinopathy. There is no history of chronic renal disease, coarctation of the aorta, hyperaldosteronism, hypercortisolism, hyperparathyroidism, a hypertension causing med, pheochromocytoma, renovascular disease, sleep apnea or a thyroid problem.   Depression   Visit Type: followup  Onset of symptoms: at an unknown time  Patient presents with the following symptoms: compulsions, decreased concentration, depressed mood, excessive worry and shortness of breath.  Patient is not experiencing: anhedonia, chest pain, choking sensation, confusion, dizziness, dry mouth, fatigue, feelings of hopelessness, feelings of worthlessness, hypersomnia, hyperventilation, impotence, insomnia, irritability, malaise, memory impairment, muscle tension, nausea, nervousness/anxiety, obsessions, palpitations, panic, psychomotor agitation, psychomotor retardation, restlessness, suicidal ideas, suicidal planning, thoughts of death, weight gain and weight loss.  Patient has a history of: depression  No history  of: anemia, anxiety/panic attacks, arrhythmia, asthma, bipolar disorder, CAD, CHF, chronic lung disease, fibromyalgia, hyperthyroidism, suicide attempt, mental illness and substance abuse  Treatment tried: SSRI, wellbutrin     Review of Systems  Review of Systems   Constitutional: Positive for activity change, fatigue and unexpected weight change. Negative for appetite change, chills, diaphoresis and fever.   HENT: Negative for congestion, postnasal drip, rhinorrhea, sinus pressure, sinus pain, sneezing, sore throat, trouble swallowing and voice change.    Respiratory: Positive for cough and shortness of breath. Negative for choking, chest tightness, wheezing and stridor.    Cardiovascular: Negative for chest pain.   Gastrointestinal: Negative for diarrhea, nausea and vomiting.   Musculoskeletal: Positive for arthralgias.   Neurological: Positive for weakness and numbness. Negative for headaches.   Psychiatric/Behavioral: The patient is nervous/anxious.         Depressed mood        Patient Active Problem List   Diagnosis   • Coronary artery disease involving native coronary artery of native heart without angina pectoris   • Myocardial infarction, old   • Primary hypertension   • Type 1 diabetes mellitus ([facility])   • Mixed hyperlipidemia   • Stage 1 mild COPD by GOLD classification ([facility])   • Dyspnea on exertion   • S/p nephrectomy   • Precordial pain   • Femur fracture ([facility])   • Closed displaced supracondylar fracture without intracondylar extension of lower end of left femur ([facility])   • Anemia, posthemorrhagic, acute   • Painful orthopaedic hardware ([facility])   • Closed displaced transverse fracture of shaft of femur with routine healing   • Overweight   • Diabetic neuropathy ([facility])   • Tobacco abuse counseling   • Smoking   • Class 1 obesity with serious comorbidity and body mass index (BMI) of 30.0 to 30.9 in adult   • High risk medication use   • Insomnia   • Dyspnea   • Renal impairment   • Type 2 diabetes mellitus with  hyperglycemia, with long-term current use of insulin ([facility])   • Cigarette nicotine dependence, uncomplicated   • Stage 3 chronic kidney disease ([facility])   • Chronic left shoulder pain   • Chronic bronchitis ([facility])   • Claudication of lower extremity ([facility])   • Left shoulder pain   • Severe episode of recurrent major depressive disorder, without psychotic features ([facility])   • KRISTOFER (generalized anxiety disorder)   • Other constipation   • Generalized abdominal pain   • Nausea   • Bloating   • Weight loss   • Acute pain of left knee   • Class 1 obesity in adult   • Acute bronchitis   • Boil   • Subacromial bursitis of left shoulder joint   • Yeast infection   • Oral thrush   • Hypercalcemia   • Epigastric pain   • Gastroesophageal reflux disease   • Vitamin D deficiency   • Internal derangement of left shoulder   • Paroxysmal SVT (supraventricular tachycardia) ([facility])   • Tobacco use   • Uncontrolled type 2 diabetes mellitus with hyperglycemia ([facility])   • Essential hypertension   • History of heart artery stent   • Fatigue due to exposure   • Daytime sleepiness   • Snoring   • Screening for lung cancer   • Increased urinary frequency   • Abnormal weight loss     Past Surgical History:   Procedure Laterality Date   • CARDIAC SURGERY     • CHOLECYSTECTOMY     • COLONOSCOPY N/A 1/28/2021    Procedure: COLONOSCOPY;  Surgeon: Juwan Wilkes MD;  Location: [facility] ENDOSCOPY;  Service: Gastroenterology;  Laterality: N/A;   • CORONARY STENT PLACEMENT     • ENDOSCOPY N/A 1/28/2021    Procedure: ESOPHAGOGASTRODUODENOSCOPY;  Surgeon: Juwan Wilkes MD;  Location: [facility] ENDOSCOPY;  Service: Gastroenterology;  Laterality: N/A;   • FEMUR IM NAILING RETROGRADE Left 11/3/2019    Procedure: FEMUR INTRAMEDULLARY NAILING RETROGRADE;  Surgeon: Howie Campoverde MD;  Location: [facility] OR;  Service: Orthopedics   • GALLBLADDER SURGERY     • HARDWARE REMOVAL Left 12/4/2019    Procedure: HARDWARE REMOVAL LEFT FEMUR        (C-ARM#3);  Surgeon:  "Howie Campoverde MD;  Location: [facility];  Service: Orthopedics   • HYSTERECTOMY     • JOINT REPLACEMENT     • NEPHRECTOMY Left    • REPLACEMENT TOTAL KNEE Left    • TONSILLECTOMY     • TUBAL ABDOMINAL LIGATION     • UPPER GASTROINTESTINAL ENDOSCOPY  01/28/2021    Per Dr. Juwan Wilkes M.D., [place], [place]     Social History     Socioeconomic History   • Marital status:    Tobacco Use   • Smoking status: Current Every Day Smoker     Packs/day: 2.00     Years: 37.00     Pack years: 74.00     Types: Cigarettes   • Smokeless tobacco: Never Used   • Tobacco comment: Have slowed down alot   Vaping Use   • Vaping Use: Never used   • Passive vaping exposure: Yes (Son)   Substance and Sexual Activity   • Alcohol use: Not Currently     Alcohol/week: 0.0 standard drinks     Comment: passed use for yrs including cases and 2 fifths daily; quit 20 yrs ago   • Drug use: Not Currently     Types: \"Crack\" cocaine, Methamphetamines   • Sexual activity: Yes     Partners: Male     Birth control/protection: None     Family History   Problem Relation Age of Onset   • Heart disease Mother    • Hypertension Mother    • Cancer Mother    • Diabetes Mother    • Alcohol abuse Mother    • Heart disease Father    • Hypertension Father    • Alcohol abuse Father    • Alcohol abuse Sister    • Drug abuse Sister    • Depression Sister    • Anxiety disorder Sister    • Drug abuse Brother    • Alcohol abuse Brother    • Suicide Attempts Brother      Hospital Outpatient Visit on 08/16/2022   Component Date Value Ref Range Status   • Target HR (85%) 08/16/2022 144  bpm Final   • Max. Pred. HR (100%) 08/16/2022 169  bpm Final   •  CV STRESS PROTOCOL 1 08/16/2022 Pharmacologic   Final   • Stage 1 08/16/2022 1   Final   • HR Stage 1 08/16/2022 70   Final   • BP Stage 1 08/16/2022 136/77   Final   • Duration Min Stage 1 08/16/2022 0   Final   • Duration Sec Stage 1 08/16/2022 10   Final   • Stress Dose Regadenoson Stage 1 08/16/2022 0.4  "  Final   • Stress Comments Stage 1 08/16/2022 10 sec bolus injection   Final   • Baseline HR 08/16/2022 77  bpm Final   • Baseline BP 08/16/2022 119/75  mmHg Final   • Peak HR 08/16/2022 98  bpm Final   • Percent Max Pred HR 08/16/2022 57.99  % Final   • Percent Target HR 08/16/2022 68  % Final   • Peak BP 08/16/2022 136/77  mmHg Final   • Recovery HR 08/16/2022 86  bpm Final   • Recovery BP 08/16/2022 131/76  mmHg Final   • Estimated workload 08/16/2022 1.0  METS Final   • BH CV REST NUCLEAR ISOTOPE DOSE 08/16/2022 11.0  mCi Final   • BH CV STRESS NUCLEAR ISOTOPE DOSE 08/16/2022 35.4  mCi Final   • Nuc Stress EF 08/16/2022 63  % Final   Office Visit on 07/18/2022   Component Date Value Ref Range Status   • QT Interval 07/18/2022 374  ms Final   • QTC Interval 07/18/2022 426  ms Final   Admission on 07/04/2022, Discharged on 07/04/2022   Component Date Value Ref Range Status   • QT Interval 07/04/2022 300  ms Final   • QTC Interval 07/04/2022 453  ms Final   • Troponin T 07/04/2022 <0.010  0.000 - 0.030 ng/mL Final   • Glucose 07/04/2022 411 (A) 65 - 99 mg/dL Final   • BUN 07/04/2022 25 (A) 6 - 20 mg/dL Final   • Creatinine 07/04/2022 0.86  0.57 - 1.00 mg/dL Final   • Sodium 07/04/2022 135 (A) 136 - 145 mmol/L Final   • Potassium 07/04/2022 4.0  3.5 - 5.2 mmol/L Final   • Chloride 07/04/2022 102  98 - 107 mmol/L Final   • CO2 07/04/2022 20.0 (A) 22.0 - 29.0 mmol/L Final   • Calcium 07/04/2022 8.6  8.6 - 10.5 mg/dL Final   • Total Protein 07/04/2022 6.1  6.0 - 8.5 g/dL Final   • Albumin 07/04/2022 3.60  3.50 - 5.20 g/dL Final   • ALT (SGPT) 07/04/2022 21  1 - 33 U/L Final   • AST (SGOT) 07/04/2022 27  1 - 32 U/L Final   • Alkaline Phosphatase 07/04/2022 90  39 - 117 U/L Final   • Total Bilirubin 07/04/2022 0.3  0.0 - 1.2 mg/dL Final   • Globulin 07/04/2022 2.5  gm/dL Final   • A/G Ratio 07/04/2022 1.4  g/dL Final   • BUN/Creatinine Ratio 07/04/2022 29.1 (A) 7.0 - 25.0 Final   • Anion Gap 07/04/2022 13.0  5.0 - 15.0  mmol/L Final   • eGFR 07/04/2022 81.9  >60.0 mL/min/1.73 Final    National Kidney Foundation and American Society of Nephrology (ASN) Task Force recommended calculation based on the Chronic Kidney Disease Epidemiology Collaboration (CKD-EPI) equation refit without adjustment for race.   • proBNP 07/04/2022 253.9  0.0 - 900.0 pg/mL Final   • Extra Tube 07/04/2022 Hold for add-ons.   Final    Auto resulted.   • Extra Tube 07/04/2022 hold for add-on   Final    Auto resulted   • Extra Tube 07/04/2022 Hold for add-ons.   Final    Auto resulted.   • Extra Tube 07/04/2022 Hold for add-ons.   Final    Auto resulted   • WBC 07/04/2022 10.82 (A) 3.40 - 10.80 10*3/mm3 Final   • RBC 07/04/2022 4.51  3.77 - 5.28 10*6/mm3 Final   • Hemoglobin 07/04/2022 13.1  12.0 - 15.9 g/dL Final   • Hematocrit 07/04/2022 37.6  34.0 - 46.6 % Final   • MCV 07/04/2022 83.4  79.0 - 97.0 fL Final   • MCH 07/04/2022 29.0  26.6 - 33.0 pg Final   • MCHC 07/04/2022 34.8  31.5 - 35.7 g/dL Final   • RDW 07/04/2022 12.2 (A) 12.3 - 15.4 % Final   • RDW-SD 07/04/2022 37.1  37.0 - 54.0 fl Final   • MPV 07/04/2022 10.1  6.0 - 12.0 fL Final   • Platelets 07/04/2022 282  140 - 450 10*3/mm3 Final   • Neutrophil % 07/04/2022 67.3  42.7 - 76.0 % Final   • Lymphocyte % 07/04/2022 21.9  19.6 - 45.3 % Final   • Monocyte % 07/04/2022 8.2  5.0 - 12.0 % Final   • Eosinophil % 07/04/2022 1.3  0.3 - 6.2 % Final   • Basophil % 07/04/2022 0.6  0.0 - 1.5 % Final   • Immature Grans % 07/04/2022 0.7 (A) 0.0 - 0.5 % Final   • Neutrophils, Absolute 07/04/2022 7.28 (A) 1.70 - 7.00 10*3/mm3 Final   • Lymphocytes, Absolute 07/04/2022 2.37  0.70 - 3.10 10*3/mm3 Final   • Monocytes, Absolute 07/04/2022 0.89  0.10 - 0.90 10*3/mm3 Final   • Eosinophils, Absolute 07/04/2022 0.14  0.00 - 0.40 10*3/mm3 Final   • Basophils, Absolute 07/04/2022 0.06  0.00 - 0.20 10*3/mm3 Final   • Immature Grans, Absolute 07/04/2022 0.08 (A) 0.00 - 0.05 10*3/mm3 Final   • nRBC 07/04/2022 0.0  0.0 - 0.2 /100  WBC Final   • QT Interval 07/04/2022 350  ms Final   • QTC Interval 07/04/2022 444  ms Final   • COVID19 07/04/2022 Not Detected  Not Detected - Ref. Range Final   • Influenza A PCR 07/04/2022 Not Detected  Not Detected Final   • Influenza B PCR 07/04/2022 Not Detected  Not Detected Final   Lab on 05/20/2022   Component Date Value Ref Range Status   • Glucose 05/20/2022 341 (A) 65 - 99 mg/dL Final   • BUN 05/20/2022 15  6 - 20 mg/dL Final   • Creatinine 05/20/2022 0.86  0.57 - 1.00 mg/dL Final   • Sodium 05/20/2022 132 (A) 136 - 145 mmol/L Final   • Potassium 05/20/2022 4.8  3.5 - 5.2 mmol/L Final   • Chloride 05/20/2022 98  98 - 107 mmol/L Final   • CO2 05/20/2022 26.0  22.0 - 29.0 mmol/L Final   • Calcium 05/20/2022 9.4  8.6 - 10.5 mg/dL Final   • Total Protein 05/20/2022 6.7  6.0 - 8.5 g/dL Final   • Albumin 05/20/2022 4.00  3.50 - 5.20 g/dL Final   • ALT (SGPT) 05/20/2022 12  1 - 33 U/L Final   • AST (SGOT) 05/20/2022 11  1 - 32 U/L Final   • Alkaline Phosphatase 05/20/2022 92  39 - 117 U/L Final   • Total Bilirubin 05/20/2022 0.3  0.0 - 1.2 mg/dL Final   • Globulin 05/20/2022 2.7  gm/dL Final   • A/G Ratio 05/20/2022 1.5  g/dL Final   • BUN/Creatinine Ratio 05/20/2022 17.4  7.0 - 25.0 Final   • Anion Gap 05/20/2022 8.0  5.0 - 15.0 mmol/L Final   • eGFR 05/20/2022 81.9  >60.0 mL/min/1.73 Final    National Kidney Foundation and American Society of Nephrology (ASN) Task Force recommended calculation based on the Chronic Kidney Disease Epidemiology Collaboration (CKD-EPI) equation refit without adjustment for race.   • Hemoglobin A1C 05/20/2022 12.20 (A) 4.80 - 5.60 % Final   • Total Cholesterol 05/20/2022 213 (A) 0 - 200 mg/dL Final   • Triglycerides 05/20/2022 90  0 - 150 mg/dL Final   • HDL Cholesterol 05/20/2022 45  40 - 60 mg/dL Final   • LDL Cholesterol  05/20/2022 152 (A) 0 - 100 mg/dL Final   • VLDL Cholesterol 05/20/2022 16  5 - 40 mg/dL Final   • LDL/HDL Ratio 05/20/2022 3.33   Final   • TSH 05/20/2022  0.715  0.270 - 4.200 uIU/mL Final   • Vitamin B-12 05/20/2022 438  211 - 946 pg/mL Final   • 25 Hydroxy, Vitamin D 05/20/2022 22.5 (A) 30.0 - 100.0 ng/ml Final   • WBC 05/20/2022 8.33  3.40 - 10.80 10*3/mm3 Final   • RBC 05/20/2022 5.12  3.77 - 5.28 10*6/mm3 Final   • Hemoglobin 05/20/2022 14.9  12.0 - 15.9 g/dL Final   • Hematocrit 05/20/2022 42.5  34.0 - 46.6 % Final   • MCV 05/20/2022 83.0  79.0 - 97.0 fL Final   • MCH 05/20/2022 29.1  26.6 - 33.0 pg Final   • MCHC 05/20/2022 35.1  31.5 - 35.7 g/dL Final   • RDW 05/20/2022 11.9 (A) 12.3 - 15.4 % Final   • RDW-SD 05/20/2022 36.0 (A) 37.0 - 54.0 fl Final   • MPV 05/20/2022 10.7  6.0 - 12.0 fL Final   • Platelets 05/20/2022 222  140 - 450 10*3/mm3 Final   • Neutrophil % 05/20/2022 63.2  42.7 - 76.0 % Final   • Lymphocyte % 05/20/2022 25.6  19.6 - 45.3 % Final   • Monocyte % 05/20/2022 7.7  5.0 - 12.0 % Final   • Eosinophil % 05/20/2022 2.2  0.3 - 6.2 % Final   • Basophil % 05/20/2022 1.1  0.0 - 1.5 % Final   • Immature Grans % 05/20/2022 0.2  0.0 - 0.5 % Final   • Neutrophils, Absolute 05/20/2022 5.27  1.70 - 7.00 10*3/mm3 Final   • Lymphocytes, Absolute 05/20/2022 2.13  0.70 - 3.10 10*3/mm3 Final   • Monocytes, Absolute 05/20/2022 0.64  0.10 - 0.90 10*3/mm3 Final   • Eosinophils, Absolute 05/20/2022 0.18  0.00 - 0.40 10*3/mm3 Final   • Basophils, Absolute 05/20/2022 0.09  0.00 - 0.20 10*3/mm3 Final   • Immature Grans, Absolute 05/20/2022 0.02  0.00 - 0.05 10*3/mm3 Final   • nRBC 05/20/2022 0.0  0.0 - 0.2 /100 WBC Final      Stress Test With Myocardial Perfusion One Day  · Findings consistent with an equivocal ECG stress test.  · Left ventricular ejection fraction is normal. (Calculated EF = 63%).  · Myocardial perfusion imaging indicates a normal myocardial perfusion   study with no evidence of ischemia.  · Impressions are consistent with a low risk study.       [unfilled]  Immunization History   Administered Date(s) Administered   • COVID-19 (PFIZER) PURPLE CAP  "08/06/2021, 08/06/2021   • Flu Vaccine Split Quad 09/30/2017   • FluLaval/Fluzone >6mos 09/30/2017, 09/28/2020, 10/05/2021   • Influenza Quad Vaccine (Inpatient) 10/07/2016, 10/07/2016   • Pneumococcal Polysaccharide (PPSV23) 02/24/2010, 02/24/2010       The following portions of the patient's history were reviewed and updated as appropriate: allergies, current medications, past family history, past medical history, past social history, past surgical history and problem list.        Physical Exam  /66 (BP Location: Right arm, Patient Position: Sitting, Cuff Size: Adult)   Pulse 85   Temp 97.7 °F (36.5 °C)   Ht 165.1 cm (65\")   Wt 64 kg (141 lb)   SpO2 94%   BMI 23.46 kg/m²     Physical Exam  Vitals and nursing note reviewed.   Constitutional:       Appearance: She is well-developed. She is not diaphoretic.   HENT:      Head: Normocephalic and atraumatic.      Right Ear: External ear normal.   Eyes:      Conjunctiva/sclera: Conjunctivae normal.      Pupils: Pupils are equal, round, and reactive to light.   Cardiovascular:      Rate and Rhythm: Normal rate and regular rhythm.      Heart sounds: Normal heart sounds. No murmur heard.  Pulmonary:      Effort: Pulmonary effort is normal. No respiratory distress.      Comments: Decreased breath sounds   Abdominal:      General: Bowel sounds are normal. There is no distension.      Palpations: Abdomen is soft.      Tenderness: There is no abdominal tenderness.   Musculoskeletal:         General: Tenderness present. No deformity. Normal range of motion.      Cervical back: Normal range of motion and neck supple.   Skin:     General: Skin is warm.      Coloration: Skin is not pale.      Findings: No erythema or rash.   Neurological:      Mental Status: She is alert and oriented to person, place, and time.      Cranial Nerves: No cranial nerve deficit.   Psychiatric:         Behavior: Behavior normal.         [unfilled]   Diagnosis Plan   1. Uncontrolled type " 2 diabetes mellitus with hyperglycemia ([facility])  CBC Auto Differential    Comprehensive Metabolic Panel    Hemoglobin A1c    Lipid Panel    Vitamin D 25 Hydroxy    Vitamin B12    Microalbumin / Creatinine Urine Ratio - Urine, Clean Catch    TSH    T4, Free    Iron Profile    Ferritin    Ambulatory Referral to Endocrinology   2. Other diabetic neurological complication associated with type 1 diabetes mellitus ([facility])  CBC Auto Differential    Comprehensive Metabolic Panel    Hemoglobin A1c    Lipid Panel    Vitamin D 25 Hydroxy    Vitamin B12    Microalbumin / Creatinine Urine Ratio - Urine, Clean Catch    TSH    T4, Free    Iron Profile    Ferritin   3. Overweight  CBC Auto Differential    Comprehensive Metabolic Panel    Hemoglobin A1c    Lipid Panel    Vitamin D 25 Hydroxy    Vitamin B12    Microalbumin / Creatinine Urine Ratio - Urine, Clean Catch    TSH    T4, Free    Iron Profile    Ferritin   4. Stage 3a chronic kidney disease ([facility])  CBC Auto Differential    Comprehensive Metabolic Panel    Hemoglobin A1c    Lipid Panel    Vitamin D 25 Hydroxy    Vitamin B12    Microalbumin / Creatinine Urine Ratio - Urine, Clean Catch    TSH    T4, Free    Iron Profile    Ferritin   5. Stage 1 mild COPD by GOLD classification ([facility])  CBC Auto Differential    Comprehensive Metabolic Panel    Hemoglobin A1c    Lipid Panel    Vitamin D 25 Hydroxy    Vitamin B12    Microalbumin / Creatinine Urine Ratio - Urine, Clean Catch    TSH    T4, Free    Iron Profile    Ferritin   6. Vitamin D deficiency  CBC Auto Differential    Comprehensive Metabolic Panel    Hemoglobin A1c    Lipid Panel    Vitamin D 25 Hydroxy    Vitamin B12    Microalbumin / Creatinine Urine Ratio - Urine, Clean Catch    TSH    T4, Free    Iron Profile    Ferritin   7. Tobacco use  CBC Auto Differential    Comprehensive Metabolic Panel    Hemoglobin A1c    Lipid Panel    Vitamin D 25 Hydroxy    Vitamin B12    Microalbumin / Creatinine Urine Ratio - Urine, Clean Catch     TSH    T4, Free    Iron Profile    Ferritin   8. Weight loss  CBC Auto Differential    Comprehensive Metabolic Panel    Hemoglobin A1c    Lipid Panel    Vitamin D 25 Hydroxy    Vitamin B12    Microalbumin / Creatinine Urine Ratio - Urine, Clean Catch    TSH    T4, Free    Iron Profile    Ferritin   9. Essential hypertension  CBC Auto Differential    Comprehensive Metabolic Panel    Hemoglobin A1c    Lipid Panel    Vitamin D 25 Hydroxy    Vitamin B12    Microalbumin / Creatinine Urine Ratio - Urine, Clean Catch    TSH    T4, Free    Iron Profile    Ferritin   10. Mixed hyperlipidemia  CBC Auto Differential    Comprehensive Metabolic Panel    Hemoglobin A1c    Lipid Panel    Vitamin D 25 Hydroxy    Vitamin B12    Microalbumin / Creatinine Urine Ratio - Urine, Clean Catch    TSH    T4, Free    Iron Profile    Ferritin   11. Coronary artery disease involving native coronary artery of native heart with other form of angina pectoris (HCC)  CBC Auto Differential    Comprehensive Metabolic Panel    Hemoglobin A1c    Lipid Panel    Vitamin D 25 Hydroxy    Vitamin B12    Microalbumin / Creatinine Urine Ratio - Urine, Clean Catch    TSH    T4, Free    Iron Profile    Ferritin   12. History of heart artery stent  CBC Auto Differential    Comprehensive Metabolic Panel    Hemoglobin A1c    Lipid Panel    Vitamin D 25 Hydroxy    Vitamin B12    Microalbumin / Creatinine Urine Ratio - Urine, Clean Catch    TSH    T4, Free    Iron Profile    Ferritin   13. Insomnia, unspecified type  CBC Auto Differential    Comprehensive Metabolic Panel    Hemoglobin A1c    Lipid Panel    Vitamin D 25 Hydroxy    Vitamin B12    Microalbumin / Creatinine Urine Ratio - Urine, Clean Catch    TSH    T4, Free    Iron Profile    Ferritin    Ambulatory Referral to Sleep Medicine   14. Encounter for breast cancer screening other than mammogram  Mammo Screening Bilateral With CAD    TSH    T4, Free    Iron Profile    Ferritin   15. Fatigue due to exposure,  sequela  TSH    T4, Free    Iron Profile    Ferritin   16. Daytime sleepiness  Ambulatory Referral to Sleep Medicine   17. Snoring  Ambulatory Referral to Sleep Medicine   18. Screening for lung cancer  CT Chest Low Dose Wo   19. Abnormal weight loss     20. Increased urinary frequency  Urinalysis With Microscopic - Urine, Clean Catch    Urine Culture - Urine, Urine, Catheter   21. KRISTOFER (generalized anxiety disorder)     22. High risk medication use             -recommend labowrk    -recommend mammogram screening -schedule at imaging center   -recommend pap smear  -recommend lung cancer screening - will schedule at imaging center   -recommend Tdap/shingles vaccination   -recommend diabetic eye exam  - referred to Dr. Manjarrez   -dysuria - check urine studies   -fatigue -check thyroid check b12 iron, vitamin D  -insomnia/daytime sleepiness - recommend sleep medicine referral   -HTN -   on  coreg 3.25 mg PO BID on cardizem 120 mg daily.    -renal impairment/CKD stage 3a  - Nephrology following. Continue to monitor.  -left shoulder pain/subacromial bursitis of left shoulder  - Orthopedic following. Recently received steriod injection.      -HLP - on crestor  40 mg PO qh. Recommend heart health diet   -diabetic neuropathy - on neurontin 400 mg PO TID   -allergic rhinitis -  zyrtec 10 mg daily.  -chronic pain - pt sees Pain Managmeent on Percoet 7.5/325 mg every 6 hours PRN on neurontin 800 mg pO TI   -insomnia - on seroquel 100 mg PO qhs.    -tobacco user -counseled quit smoking >5 minutes. Recommend 1 800 qUIT now   -nausea/vomiting - zofran 8 mg  Every 8 hours PRN  -sp  Left nephrectomy/history of renal cell carcinoma-  Consider  Oncology referral  -GERD with esophagitis,colonic polyps/gastritis  - on prilosec 20 mg PO BID on pepcid GI following   -CAD sp stent /grade 1 diastolic dysfunction - Cardiology following on aspirin 81 mg PO BID, plavix 75 mg PO q daily. Coreg 6.25 mg PO BID, lcrestor  40 mg PO qhs, ranexa 500  mg every 12 hours.  Recent stress test normal   -DM type 2  -now on basaglar 50  units at beditme  2-3 unties every 3 days until morning sugars at goal.on trulicity 3.0  mg subq weekly.  jardiance 25 mg daily. Endocrinology following on humalog before meals along with sliding scale   -COPD/chronic bronchitis - on albuterol inhaler. adivsed to quit smoking.Refer to Pulmonlogy for PFT. Breo stopped.   Stressed importance of smoking cessation.  will give azithromycin in event it gets worse   -major depression/KRISTOFER/PTSD  -Mental Health following on remeron 7.5 mg daily. On vraylar 1.5 mg daily.  On klonopin 0.5 mg  I advised she talk to counseling about stopping this since she is on Norco. She is at risk for increased mortality if taken together. Recommend vistaril 25 mg every 8 hours as needed   -advised pt to be safe and call with questions and concerns  -advised pt to go to ER or call 911 if symptoms worrisome or severe  -advised pt to followup with specialist  And referrals  -advsied pt to be safe during COVID-19 pandemic  I spent 45 minutes caring for Yudi on this date of service. This time includes time spent by me in the following activities: preparing for the visit, reviewing tests, obtaining and/or reviewing a separately obtained history, performing a medically appropriate examination and/or evaluation, counseling and educating the patient/family/caregiver, ordering medications, tests, or procedures, referring and communicating with other health care professionals, documenting information in the medical record, independently interpreting results and communicating that information with the patient/family/caregiver and care coordination.  -recheck in 2 months         This document has been electronically signed by Quintin Flores MD on August 25, 2022 12:13 CDT

## 2022-08-17 ENCOUNTER — TELEPHONE (OUTPATIENT)
Dept: CARDIOLOGY | Facility: CLINIC | Age: 52
End: 2022-08-17

## 2022-08-17 NOTE — TELEPHONE ENCOUNTER
Stress test within normal limits.  Continue medical management.  Please stress compliance with medications  Spoke with pt notified and understood   Per Dr Dover

## 2022-08-22 ENCOUNTER — OFFICE VISIT (OUTPATIENT)
Dept: ORTHOPEDIC SURGERY | Facility: CLINIC | Age: 52
End: 2022-08-22

## 2022-08-22 VITALS — BODY MASS INDEX: 24.16 KG/M2 | HEIGHT: 65 IN | WEIGHT: 145 LBS

## 2022-08-22 DIAGNOSIS — M75.52 SUBACROMIAL BURSITIS OF LEFT SHOULDER JOINT: Primary | ICD-10-CM

## 2022-08-22 DIAGNOSIS — M25.512 LEFT SHOULDER PAIN, UNSPECIFIED CHRONICITY: ICD-10-CM

## 2022-08-22 DIAGNOSIS — M24.812 INTERNAL DERANGEMENT OF LEFT SHOULDER: ICD-10-CM

## 2022-08-22 PROCEDURE — 99212 OFFICE O/P EST SF 10 MIN: CPT | Performed by: ORTHOPAEDIC SURGERY

## 2022-08-22 NOTE — PROGRESS NOTES
"Yudi West is a 51 y.o. female returns for     Chief Complaint   Patient presents with   • Left Shoulder - Follow-up       HISTORY OF PRESENT ILLNESS:  F/u left shoulder pain. Patient states that injection done on 6/28/2022 helped but is now worn out.      51 LHD F returns to clinic regarding her L shoulder pain.  Pt last seen in clinic 2 months ago and diagnosed with adhesive capsulitis.  Pt sent for a  CSI to be followed by PT/OT for aggressive ROM exercises.  Pt says she got the injection which helped her feel better, but she never went to PT/OT.  She still has pain in the shoulder.     CONCURRENT MEDICAL HISTORY:    The following portions of the patient's history were reviewed and updated as appropriate: allergies, current medications, past family history, past medical history, past social history, past surgical history and problem list.     ROS  No fevers or chills.  No chest pain or shortness of air.  No GI or  disturbances.    PHYSICAL EXAMINATION:       Ht 165.1 cm (65\")   Wt 65.8 kg (145 lb)   BMI 24.13 kg/m²     Physical Exam    GAIT:     []  Normal  []  Antalgic    Assistive device: [x]  None  []  Walker     []  Crutches  []  Cane     []  Wheelchair  []  Stretcher    Ortho Exam  NAD  LUE:  FE 90, ER 10.  Pt left before further exam conducted.              ASSESSMENT:    Diagnoses and all orders for this visit:    Subacromial bursitis of left shoulder joint    Left shoulder pain, unspecified chronicity    Internal derangement of left shoulder          PLAN  51 LHD F with L shoulder adhesive capsulitis.  Pt did not go follow-up with PT/OT as instructed.  Reinforced importance of ROM exercises and stretching to treat the frozen shoulder.  Pt left upset that there wasn't more to be done for her.  Unclear exactly what she was hoping for.  RTC prn.  Return if symptoms worsen or fail to improve.    Brendan Gregorio MD  "

## 2022-08-25 ENCOUNTER — APPOINTMENT (OUTPATIENT)
Dept: LAB | Facility: HOSPITAL | Age: 52
End: 2022-08-25

## 2022-08-25 ENCOUNTER — OFFICE VISIT (OUTPATIENT)
Dept: FAMILY MEDICINE CLINIC | Facility: CLINIC | Age: 52
End: 2022-08-25

## 2022-08-25 ENCOUNTER — LAB (OUTPATIENT)
Dept: LAB | Facility: HOSPITAL | Age: 52
End: 2022-08-25

## 2022-08-25 VITALS
TEMPERATURE: 97.7 F | WEIGHT: 141 LBS | HEIGHT: 65 IN | DIASTOLIC BLOOD PRESSURE: 66 MMHG | OXYGEN SATURATION: 94 % | HEART RATE: 85 BPM | BODY MASS INDEX: 23.49 KG/M2 | SYSTOLIC BLOOD PRESSURE: 110 MMHG

## 2022-08-25 DIAGNOSIS — R63.4 WEIGHT LOSS: ICD-10-CM

## 2022-08-25 DIAGNOSIS — R06.83 SNORING: ICD-10-CM

## 2022-08-25 DIAGNOSIS — E78.2 MIXED HYPERLIPIDEMIA: ICD-10-CM

## 2022-08-25 DIAGNOSIS — E10.49 OTHER DIABETIC NEUROLOGICAL COMPLICATION ASSOCIATED WITH TYPE 1 DIABETES MELLITUS: ICD-10-CM

## 2022-08-25 DIAGNOSIS — N18.31 STAGE 3A CHRONIC KIDNEY DISEASE: ICD-10-CM

## 2022-08-25 DIAGNOSIS — Z95.5 HISTORY OF HEART ARTERY STENT: ICD-10-CM

## 2022-08-25 DIAGNOSIS — E11.65 UNCONTROLLED TYPE 2 DIABETES MELLITUS WITH HYPERGLYCEMIA: Primary | ICD-10-CM

## 2022-08-25 DIAGNOSIS — F41.1 GAD (GENERALIZED ANXIETY DISORDER): ICD-10-CM

## 2022-08-25 DIAGNOSIS — E66.3 OVERWEIGHT: ICD-10-CM

## 2022-08-25 DIAGNOSIS — R35.0 INCREASED URINARY FREQUENCY: ICD-10-CM

## 2022-08-25 DIAGNOSIS — Z72.0 TOBACCO USE: ICD-10-CM

## 2022-08-25 DIAGNOSIS — J44.9 STAGE 1 MILD COPD BY GOLD CLASSIFICATION: ICD-10-CM

## 2022-08-25 DIAGNOSIS — G47.00 INSOMNIA, UNSPECIFIED TYPE: ICD-10-CM

## 2022-08-25 DIAGNOSIS — Z12.2 SCREENING FOR LUNG CANCER: ICD-10-CM

## 2022-08-25 DIAGNOSIS — I10 ESSENTIAL HYPERTENSION: ICD-10-CM

## 2022-08-25 DIAGNOSIS — T73.2XXS FATIGUE DUE TO EXPOSURE, SEQUELA: ICD-10-CM

## 2022-08-25 DIAGNOSIS — R63.4 ABNORMAL WEIGHT LOSS: ICD-10-CM

## 2022-08-25 DIAGNOSIS — I25.118 CORONARY ARTERY DISEASE INVOLVING NATIVE CORONARY ARTERY OF NATIVE HEART WITH OTHER FORM OF ANGINA PECTORIS: ICD-10-CM

## 2022-08-25 DIAGNOSIS — R40.0 DAYTIME SLEEPINESS: ICD-10-CM

## 2022-08-25 DIAGNOSIS — Z79.899 HIGH RISK MEDICATION USE: ICD-10-CM

## 2022-08-25 DIAGNOSIS — E11.65 UNCONTROLLED TYPE 2 DIABETES MELLITUS WITH HYPERGLYCEMIA: ICD-10-CM

## 2022-08-25 DIAGNOSIS — E55.9 VITAMIN D DEFICIENCY: ICD-10-CM

## 2022-08-25 DIAGNOSIS — Z12.39 ENCOUNTER FOR BREAST CANCER SCREENING OTHER THAN MAMMOGRAM: ICD-10-CM

## 2022-08-25 PROBLEM — T73.2XXA FATIGUE DUE TO EXPOSURE: Status: ACTIVE | Noted: 2022-08-25

## 2022-08-25 PROCEDURE — 81001 URINALYSIS AUTO W/SCOPE: CPT

## 2022-08-25 PROCEDURE — 87088 URINE BACTERIA CULTURE: CPT

## 2022-08-25 PROCEDURE — 87086 URINE CULTURE/COLONY COUNT: CPT

## 2022-08-25 PROCEDURE — 82570 ASSAY OF URINE CREATININE: CPT

## 2022-08-25 PROCEDURE — 82043 UR ALBUMIN QUANTITATIVE: CPT

## 2022-08-25 PROCEDURE — 87186 SC STD MICRODIL/AGAR DIL: CPT

## 2022-08-25 PROCEDURE — 99215 OFFICE O/P EST HI 40 MIN: CPT | Performed by: FAMILY MEDICINE

## 2022-08-25 PROCEDURE — 84156 ASSAY OF PROTEIN URINE: CPT

## 2022-08-25 RX ORDER — HYDROXYZINE PAMOATE 25 MG/1
25 CAPSULE ORAL 3 TIMES DAILY PRN
Qty: 90 CAPSULE | Refills: 3 | Status: SHIPPED | OUTPATIENT
Start: 2022-08-25 | End: 2023-02-02 | Stop reason: SDUPTHER

## 2022-08-25 RX ORDER — DILTIAZEM HYDROCHLORIDE 120 MG/1
CAPSULE, COATED, EXTENDED RELEASE ORAL
COMMUNITY
Start: 2022-07-18 | End: 2023-02-02

## 2022-08-25 RX ORDER — ONDANSETRON 8 MG/1
8 TABLET, ORALLY DISINTEGRATING ORAL EVERY 8 HOURS PRN
Qty: 90 TABLET | Refills: 3 | Status: SHIPPED | OUTPATIENT
Start: 2022-08-25 | End: 2023-02-02 | Stop reason: SDUPTHER

## 2022-08-25 NOTE — PATIENT INSTRUCTIONS
Get labwork fasting     Will get mammogram    Will refer to sleep medicien    Will refer back to endocrinology    Stop klonopin and discuss with counseling about not to take with hydrocodone        Recheck in 2 month recheck             Start with 4 units before each meal three times daily      Plus sliding scale      151-200   2 units   201-250   4 units   251-300   6 units   Above 301  8 units

## 2022-08-26 ENCOUNTER — LAB (OUTPATIENT)
Dept: LAB | Facility: HOSPITAL | Age: 52
End: 2022-08-26

## 2022-08-26 DIAGNOSIS — E10.49 OTHER DIABETIC NEUROLOGICAL COMPLICATION ASSOCIATED WITH TYPE 1 DIABETES MELLITUS: ICD-10-CM

## 2022-08-26 DIAGNOSIS — I10 ESSENTIAL HYPERTENSION: ICD-10-CM

## 2022-08-26 DIAGNOSIS — E55.9 VITAMIN D DEFICIENCY: ICD-10-CM

## 2022-08-26 DIAGNOSIS — E78.2 MIXED HYPERLIPIDEMIA: ICD-10-CM

## 2022-08-26 DIAGNOSIS — N18.31 STAGE 3A CHRONIC KIDNEY DISEASE: ICD-10-CM

## 2022-08-26 DIAGNOSIS — E66.3 OVERWEIGHT: ICD-10-CM

## 2022-08-26 DIAGNOSIS — Z95.5 HISTORY OF HEART ARTERY STENT: ICD-10-CM

## 2022-08-26 DIAGNOSIS — T73.2XXS FATIGUE DUE TO EXPOSURE, SEQUELA: ICD-10-CM

## 2022-08-26 DIAGNOSIS — E11.65 UNCONTROLLED TYPE 2 DIABETES MELLITUS WITH HYPERGLYCEMIA: ICD-10-CM

## 2022-08-26 DIAGNOSIS — J44.9 STAGE 1 MILD COPD BY GOLD CLASSIFICATION: ICD-10-CM

## 2022-08-26 DIAGNOSIS — Z72.0 TOBACCO USE: ICD-10-CM

## 2022-08-26 DIAGNOSIS — I25.118 CORONARY ARTERY DISEASE INVOLVING NATIVE CORONARY ARTERY OF NATIVE HEART WITH OTHER FORM OF ANGINA PECTORIS: ICD-10-CM

## 2022-08-26 DIAGNOSIS — Z12.39 ENCOUNTER FOR BREAST CANCER SCREENING OTHER THAN MAMMOGRAM: ICD-10-CM

## 2022-08-26 DIAGNOSIS — G47.00 INSOMNIA, UNSPECIFIED TYPE: ICD-10-CM

## 2022-08-26 DIAGNOSIS — R63.4 WEIGHT LOSS: ICD-10-CM

## 2022-08-26 PROCEDURE — 82728 ASSAY OF FERRITIN: CPT

## 2022-08-26 PROCEDURE — 82607 VITAMIN B-12: CPT

## 2022-08-26 PROCEDURE — 80053 COMPREHEN METABOLIC PANEL: CPT

## 2022-08-26 PROCEDURE — 84439 ASSAY OF FREE THYROXINE: CPT

## 2022-08-26 PROCEDURE — 83540 ASSAY OF IRON: CPT

## 2022-08-26 PROCEDURE — 84443 ASSAY THYROID STIM HORMONE: CPT

## 2022-08-26 PROCEDURE — 82306 VITAMIN D 25 HYDROXY: CPT

## 2022-08-26 PROCEDURE — 80061 LIPID PANEL: CPT

## 2022-08-26 PROCEDURE — 83036 HEMOGLOBIN GLYCOSYLATED A1C: CPT

## 2022-08-26 PROCEDURE — 84466 ASSAY OF TRANSFERRIN: CPT

## 2022-08-27 LAB
25(OH)D3 SERPL-MCNC: 26.4 NG/ML (ref 30–100)
ALBUMIN SERPL-MCNC: 4 G/DL (ref 3.5–5.2)
ALBUMIN UR-MCNC: 2.6 MG/DL
ALBUMIN/GLOB SERPL: 1.5 G/DL
ALP SERPL-CCNC: 89 U/L (ref 39–117)
ALT SERPL W P-5'-P-CCNC: 15 U/L (ref 1–33)
ANION GAP SERPL CALCULATED.3IONS-SCNC: 9.9 MMOL/L (ref 5–15)
AST SERPL-CCNC: 12 U/L (ref 1–32)
BACTERIA UR QL AUTO: ABNORMAL /HPF
BILIRUB SERPL-MCNC: <0.2 MG/DL (ref 0–1.2)
BILIRUB UR QL STRIP: NEGATIVE
BUN SERPL-MCNC: 21 MG/DL (ref 6–20)
BUN/CREAT SERPL: 21 (ref 7–25)
CALCIUM SPEC-SCNC: 9.5 MG/DL (ref 8.6–10.5)
CHLORIDE SERPL-SCNC: 98 MMOL/L (ref 98–107)
CHOLEST SERPL-MCNC: 227 MG/DL (ref 0–200)
CLARITY UR: ABNORMAL
CO2 SERPL-SCNC: 26.1 MMOL/L (ref 22–29)
COLOR UR: YELLOW
CREAT SERPL-MCNC: 1 MG/DL (ref 0.57–1)
CREAT UR-MCNC: 39.4 MG/DL
CREAT UR-MCNC: 39.4 MG/DL
EGFRCR SERPLBLD CKD-EPI 2021: 68.3 ML/MIN/1.73
FERRITIN SERPL-MCNC: 308 NG/ML (ref 13–150)
GLOBULIN UR ELPH-MCNC: 2.7 GM/DL
GLUCOSE SERPL-MCNC: 290 MG/DL (ref 65–99)
GLUCOSE UR STRIP-MCNC: ABNORMAL MG/DL
HBA1C MFR BLD: 11.5 % (ref 4.8–5.6)
HDLC SERPL-MCNC: 51 MG/DL (ref 40–60)
HGB UR QL STRIP.AUTO: ABNORMAL
HYALINE CASTS UR QL AUTO: ABNORMAL /LPF
IRON 24H UR-MRATE: 60 MCG/DL (ref 37–145)
IRON SATN MFR SERPL: 15 % (ref 20–50)
KETONES UR QL STRIP: NEGATIVE
LDLC SERPL CALC-MCNC: 155 MG/DL (ref 0–100)
LDLC/HDLC SERPL: 2.98 {RATIO}
LEUKOCYTE ESTERASE UR QL STRIP.AUTO: ABNORMAL
MICROALBUMIN/CREAT UR: 66 MG/G
NITRITE UR QL STRIP: NEGATIVE
PH UR STRIP.AUTO: 5.5 [PH] (ref 5–8)
POTASSIUM SERPL-SCNC: 4.7 MMOL/L (ref 3.5–5.2)
PROT ?TM UR-MCNC: 12.2 MG/DL
PROT SERPL-MCNC: 6.7 G/DL (ref 6–8.5)
PROT UR QL STRIP: NEGATIVE
PROT/CREAT UR: 309.6 MG/G CREA (ref 0–200)
RBC # UR STRIP: ABNORMAL /HPF
REF LAB TEST METHOD: ABNORMAL
SODIUM SERPL-SCNC: 134 MMOL/L (ref 136–145)
SP GR UR STRIP: >=1.03 (ref 1–1.03)
SQUAMOUS #/AREA URNS HPF: ABNORMAL /HPF
T4 FREE SERPL-MCNC: 1.3 NG/DL (ref 0.93–1.7)
TIBC SERPL-MCNC: 387 MCG/DL (ref 298–536)
TRANSFERRIN SERPL-MCNC: 260 MG/DL (ref 200–360)
TRIGL SERPL-MCNC: 119 MG/DL (ref 0–150)
TSH SERPL DL<=0.05 MIU/L-ACNC: 1.53 UIU/ML (ref 0.27–4.2)
UROBILINOGEN UR QL STRIP: ABNORMAL
VIT B12 BLD-MCNC: 491 PG/ML (ref 211–946)
VLDLC SERPL-MCNC: 21 MG/DL (ref 5–40)
WBC # UR STRIP: ABNORMAL /HPF

## 2022-08-28 LAB — BACTERIA SPEC AEROBE CULT: ABNORMAL

## 2022-08-28 RX ORDER — NITROFURANTOIN 25; 75 MG/1; MG/1
100 CAPSULE ORAL 2 TIMES DAILY
Qty: 14 CAPSULE | Refills: 0 | Status: SHIPPED | OUTPATIENT
Start: 2022-08-28 | End: 2022-09-04

## 2022-08-28 RX ORDER — ROSUVASTATIN CALCIUM 20 MG/1
20 TABLET, COATED ORAL
Qty: 30 TABLET | Refills: 3 | Status: SHIPPED | OUTPATIENT
Start: 2022-08-28 | End: 2023-02-02 | Stop reason: SDUPTHER

## 2022-08-28 RX ORDER — FLUCONAZOLE 150 MG/1
150 TABLET ORAL ONCE
Qty: 2 TABLET | Refills: 0 | Status: SHIPPED | OUTPATIENT
Start: 2022-08-28 | End: 2022-08-28

## 2022-08-30 ENCOUNTER — HOSPITAL ENCOUNTER (OUTPATIENT)
Dept: CT IMAGING | Facility: HOSPITAL | Age: 52
Discharge: HOME | End: 2022-08-30
Admitting: FAMILY MEDICINE

## 2022-08-30 DIAGNOSIS — Z12.2 SCREENING FOR LUNG CANCER: ICD-10-CM

## 2022-08-30 PROCEDURE — 71271 CT THORAX LUNG CANCER SCR C-: CPT

## 2022-08-31 ENCOUNTER — TELEPHONE (OUTPATIENT)
Dept: FAMILY MEDICINE CLINIC | Facility: CLINIC | Age: 52
End: 2022-08-31

## 2022-08-31 NOTE — TELEPHONE ENCOUNTER
Patient called and stated that the pharmacy has not received the PA back for the januvia. She would like a call back

## 2022-09-12 RX ORDER — ERGOCALCIFEROL 1.25 MG/1
CAPSULE ORAL
Qty: 4 CAPSULE | Refills: 1 | Status: SHIPPED | OUTPATIENT
Start: 2022-09-12 | End: 2022-11-16

## 2022-09-12 RX ORDER — EMPAGLIFLOZIN 25 MG/1
TABLET, FILM COATED ORAL
Qty: 30 TABLET | Refills: 1 | Status: SHIPPED | OUTPATIENT
Start: 2022-09-12 | End: 2022-11-16

## 2022-09-12 RX ORDER — CLOPIDOGREL BISULFATE 75 MG/1
TABLET ORAL
Qty: 30 TABLET | Refills: 1 | Status: SHIPPED | OUTPATIENT
Start: 2022-09-12 | End: 2022-11-16

## 2022-09-12 RX ORDER — LOSARTAN POTASSIUM 25 MG/1
TABLET ORAL
Qty: 30 TABLET | Refills: 1 | Status: SHIPPED | OUTPATIENT
Start: 2022-09-12 | End: 2022-11-16

## 2022-09-12 RX ORDER — EZETIMIBE 10 MG/1
TABLET ORAL
Qty: 30 TABLET | Refills: 1 | Status: SHIPPED | OUTPATIENT
Start: 2022-09-12 | End: 2022-11-16

## 2022-09-12 NOTE — TELEPHONE ENCOUNTER
Rx Refill Note  Requested Prescriptions     Pending Prescriptions Disp Refills   • Jardiance 25 MG tablet tablet [Pharmacy Med Name: JARDIANCE 25 MG TABLET] 30 tablet 1     Sig: TAKE 1 TABLET DAILY.   • losartan (COZAAR) 25 MG tablet [Pharmacy Med Name: LOSARTAN POTASSIUM 25 MG TAB] 30 tablet 1     Sig: TAKE 1 TABLET DAILY.   • ezetimibe (ZETIA) 10 MG tablet [Pharmacy Med Name: EZETIMIBE 10 MG TABLET] 30 tablet 1     Sig: TAKE 1 TABLET DAILY.   • vitamin D (ERGOCALCIFEROL) 1.25 MG (27068 UT) capsule capsule [Pharmacy Med Name: VITAMIN D2 1.25MG(50,000 UNIT)] 4 capsule 1     Sig: TAKE 1 CAPSULE WEEKLY   • clopidogrel (PLAVIX) 75 MG tablet [Pharmacy Med Name: CLOPIDOGREL 75 MG TABLET] 30 tablet 1     Sig: TAKE 1 TABLET DAILY.      Last office visit with prescribing clinician: 8/25/2022      Next office visit with prescribing clinician: 10/26/2022   {TIP  Encounters:       Plavix Protocol Failed 09/12/2022 10:58 AM    No conflicting PPI on medication list          {TIP  Please add Last Relevant Lab Date if appropriate  {TIP  Is Refill Pharmacy correct? yes  Raciel Wheatley LPN  09/12/22, 11:04 CDT

## 2022-09-13 RX ORDER — ALBUTEROL SULFATE 90 UG/1
2 AEROSOL, METERED RESPIRATORY (INHALATION) EVERY 4 HOURS PRN
Qty: 8.5 G | Refills: 3 | Status: SHIPPED | OUTPATIENT
Start: 2022-09-13 | End: 2023-02-02 | Stop reason: SDUPTHER

## 2022-09-13 RX ORDER — PEN NEEDLE, DIABETIC 30 GX3/16"
1 NEEDLE, DISPOSABLE MISCELLANEOUS 4 TIMES DAILY
Qty: 120 EACH | Refills: 11 | Status: SHIPPED | OUTPATIENT
Start: 2022-09-13 | End: 2023-02-02 | Stop reason: SDUPTHER

## 2022-09-13 RX ORDER — INSULIN LISPRO 100 [IU]/ML
INJECTION, SOLUTION INTRAVENOUS; SUBCUTANEOUS
Qty: 15 ML | Refills: 1 | Status: SHIPPED | OUTPATIENT
Start: 2022-09-13 | End: 2022-12-05

## 2022-09-13 RX ORDER — LANCETS 30 GAUGE
EACH MISCELLANEOUS
Qty: 100 EACH | Refills: 11 | Status: SHIPPED | OUTPATIENT
Start: 2022-09-13 | End: 2023-02-02 | Stop reason: SDUPTHER

## 2022-09-13 RX ORDER — OMEPRAZOLE 40 MG/1
40 CAPSULE, DELAYED RELEASE ORAL DAILY
Qty: 30 CAPSULE | Refills: 3 | Status: SHIPPED | OUTPATIENT
Start: 2022-09-13 | End: 2023-02-02 | Stop reason: SDUPTHER

## 2022-09-13 RX ORDER — INSULIN GLARGINE 100 [IU]/ML
INJECTION, SOLUTION SUBCUTANEOUS
Qty: 15 ML | Refills: 1 | Status: SHIPPED | OUTPATIENT
Start: 2022-09-13 | End: 2023-02-02 | Stop reason: SDUPTHER

## 2022-09-13 NOTE — TELEPHONE ENCOUNTER
Rx Refill Note  Requested Prescriptions     Pending Prescriptions Disp Refills   • omeprazole (priLOSEC) 40 MG capsule 30 capsule 11     Sig: Take 1 capsule by mouth Daily.   • Insulin Glargine (BASAGLAR KWIKPEN) 100 UNIT/ML injection pen 15 mL 3     Sig: Take 36 units at bedtime can go up by 2-3 unites every 3 days until am sugars running     2021 - Patient currently utilizing 50 Units nightly.   • Insulin Pen Needle (Pen Needles) 32G X 4 MM misc 120 each 11     Si each 4 (Four) Times a Day. Use 4 x daily, Dx code E11.65   • Insulin Lispro, 1 Unit Dial, (HumaLOG KwikPen) 100 UNIT/ML solution pen-injector       Sig: Up to 20 units with meals   • Lancets misc 100 each 11     Sig: Test 3 times daily , E11.65   • albuterol sulfate HFA (ProAir HFA) 108 (90 Base) MCG/ACT inhaler 8.5 g 0     Sig: Inhale 2 puffs Every 4 (Four) Hours As Needed for Wheezing.      Last office visit with prescribing clinician: 2022      Next office visit with prescribing clinician: 10/26/2022   {TIP  Encounters:     Proton Pump Inhibitors Protocol Failed 2022 01:03 PM    Not on Clopidogrel (Plavix) or if on, refill is for Pantoprazole (Protonix)            {TIP  Please add Last Relevant Lab Date if appropriate  {TIP  Is Refill Pharmacy correct? yes  Raciel Wheatley LPN  22, 13:34 CDT

## 2022-09-13 NOTE — TELEPHONE ENCOUNTER
Patient also requested more glucose blood test strips. Patient also said she is starting to have another UTI and would like to know if Dr. Flores would send her in more medication.  Call back number: [phone]

## 2022-09-14 RX ORDER — NITROFURANTOIN 25; 75 MG/1; MG/1
100 CAPSULE ORAL 2 TIMES DAILY
Qty: 14 CAPSULE | Refills: 0 | Status: SHIPPED | OUTPATIENT
Start: 2022-09-14 | End: 2022-09-21

## 2022-09-14 RX ORDER — FLUCONAZOLE 150 MG/1
TABLET ORAL
Qty: 2 TABLET | Refills: 0 | Status: SHIPPED | OUTPATIENT
Start: 2022-09-14 | End: 2023-02-02

## 2022-09-14 NOTE — TELEPHONE ENCOUNTER
Quintin Flores MD   8/28/2022 11:20 AM CDT Back to Top        I called pt on 8/28/22 to update on pt's Urine results     Pt has E.coli UTI and send macrobid 100 mg pO BID for 7 days too pharmacy along with diflucan     Pt made aware to start taking immediately     Recheck on next visit

## 2022-09-14 NOTE — TELEPHONE ENCOUNTER
Patient called and stated that she had picked up her prescriptions and they was not an antibiotic in there. She stated that she has a UTI and is needing something for it please send to [facility] [address] any questions please call patient

## 2022-10-31 RX ORDER — PROCHLORPERAZINE 25 MG/1
SUPPOSITORY RECTAL
Qty: 1 EACH | Refills: 3 | Status: SHIPPED | OUTPATIENT
Start: 2022-10-31 | End: 2023-02-02 | Stop reason: SDUPTHER

## 2022-11-16 RX ORDER — LOSARTAN POTASSIUM 25 MG/1
TABLET ORAL
Qty: 30 TABLET | Refills: 0 | Status: SHIPPED | OUTPATIENT
Start: 2022-11-16 | End: 2022-12-19

## 2022-11-16 RX ORDER — EZETIMIBE 10 MG/1
TABLET ORAL
Qty: 30 TABLET | Refills: 0 | Status: SHIPPED | OUTPATIENT
Start: 2022-11-16 | End: 2022-12-19

## 2022-11-16 RX ORDER — ERGOCALCIFEROL 1.25 MG/1
CAPSULE ORAL
Qty: 4 CAPSULE | Refills: 0 | Status: SHIPPED | OUTPATIENT
Start: 2022-11-16 | End: 2022-12-19

## 2022-11-16 RX ORDER — EMPAGLIFLOZIN 25 MG/1
TABLET, FILM COATED ORAL
Qty: 30 TABLET | Refills: 0 | Status: SHIPPED | OUTPATIENT
Start: 2022-11-16 | End: 2022-12-19

## 2022-11-16 RX ORDER — CLOPIDOGREL BISULFATE 75 MG/1
TABLET ORAL
Qty: 30 TABLET | Refills: 0 | Status: SHIPPED | OUTPATIENT
Start: 2022-11-16 | End: 2022-12-19

## 2022-11-16 NOTE — TELEPHONE ENCOUNTER
Rx Refill Note  Requested Prescriptions     Pending Prescriptions Disp Refills   • clopidogrel (PLAVIX) 75 MG tablet [Pharmacy Med Name: CLOPIDOGREL 75 MG TABLET] 30 tablet 0     Sig: TAKE 1 TABLET DAILY.   • vitamin D (ERGOCALCIFEROL) 1.25 MG (43817 UT) capsule capsule [Pharmacy Med Name: VITAMIN D2 1.25MG(50,000 UNIT)] 4 capsule 0     Sig: TAKE 1 CAPSULE WEEKLY   • losartan (COZAAR) 25 MG tablet [Pharmacy Med Name: LOSARTAN POTASSIUM 25 MG TAB] 30 tablet 0     Sig: TAKE 1 TABLET DAILY.   • ezetimibe (ZETIA) 10 MG tablet [Pharmacy Med Name: EZETIMIBE 10 MG TABLET] 30 tablet 0     Sig: TAKE 1 TABLET DAILY.   • Jardiance 25 MG tablet tablet [Pharmacy Med Name: JARDIANCE 25 MG TABLET] 30 tablet 0     Sig: TAKE 1 TABLET DAILY.      Last office visit with prescribing clinician: 8/25/2022      Next office visit with prescribing clinician: Visit date not found   {TIP  Encounters:       Plavix Protocol Failed 11/16/2022 12:20 PM    No conflicting PPI on medication list          {TIP  Please add Last Relevant Lab Date if appropriate  {TIP  Is Refill Pharmacy correct? yes  Raciel Wheatley LPN  11/16/22, 15:04 CST

## 2022-12-02 NOTE — PROGRESS NOTES
Subjective:  Yudi West is a 52 y.o. female who presents for       Patient Active Problem List   Diagnosis   • Coronary artery disease involving native coronary artery of native heart without angina pectoris   • Myocardial infarction, old   • Primary hypertension   • Type 1 diabetes mellitus ([facility])   • Mixed hyperlipidemia   • Stage 1 mild COPD by GOLD classification ([facility])   • Dyspnea on exertion   • S/p nephrectomy   • Precordial pain   • Femur fracture ([facility])   • Closed displaced supracondylar fracture without intracondylar extension of lower end of left femur ([facility])   • Anemia, posthemorrhagic, acute   • Painful orthopaedic hardware ([facility])   • Closed displaced transverse fracture of shaft of femur with routine healing   • Overweight   • Diabetic neuropathy ([facility])   • Tobacco abuse counseling   • Smoking   • Class 1 obesity with serious comorbidity and body mass index (BMI) of 30.0 to 30.9 in adult   • High risk medication use   • Insomnia   • Dyspnea   • Renal impairment   • Type 2 diabetes mellitus with hyperglycemia, with long-term current use of insulin ([facility])   • Cigarette nicotine dependence, uncomplicated   • Stage 3 chronic kidney disease ([facility])   • Chronic left shoulder pain   • Chronic bronchitis ([facility])   • Claudication of lower extremity ([facility])   • Left shoulder pain   • Severe episode of recurrent major depressive disorder, without psychotic features ([facility])   • KRISTOFER (generalized anxiety disorder)   • Other constipation   • Generalized abdominal pain   • Nausea   • Bloating   • Weight loss   • Acute pain of left knee   • Class 1 obesity in adult   • Acute bronchitis   • Boil   • Subacromial bursitis of left shoulder joint   • Yeast infection   • Oral thrush   • Hypercalcemia   • Epigastric pain   • Gastroesophageal reflux disease   • Vitamin D deficiency   • Internal derangement of left shoulder   • Paroxysmal SVT (supraventricular tachycardia) ([facility])   • Tobacco use   • Uncontrolled type 2 diabetes  mellitus with hyperglycemia (HCC)   • Essential hypertension   • History of heart artery stent   • Fatigue due to exposure   • Daytime sleepiness   • Snoring   • Screening for lung cancer   • Increased urinary frequency   • Abnormal weight loss   • Noncompliance   • Stage 2 chronic kidney disease   • Nicotine dependence with nicotine-induced disorder           Current Outpatient Medications:   •  clopidogrel (PLAVIX) 75 MG tablet, TAKE 1 TABLET DAILY., Disp: 30 tablet, Rfl: 0  •  ezetimibe (ZETIA) 10 MG tablet, TAKE 1 TABLET DAILY., Disp: 30 tablet, Rfl: 0  •  Jardiance 25 MG tablet tablet, TAKE 1 TABLET DAILY., Disp: 30 tablet, Rfl: 0  •  losartan (COZAAR) 25 MG tablet, TAKE 1 TABLET DAILY., Disp: 30 tablet, Rfl: 0  •  vitamin D (ERGOCALCIFEROL) 1.25 MG (62211 UT) capsule capsule, TAKE 1 CAPSULE WEEKLY, Disp: 4 capsule, Rfl: 0  •  albuterol sulfate HFA (ProAir HFA) 108 (90 Base) MCG/ACT inhaler, Inhale 2 puffs Every 4 (Four) Hours As Needed for Wheezing., Disp: 8.5 g, Rfl: 3  •  Allergy Relief 10 MG tablet, TAKE 1 TABLET DAILY., Disp: 30 tablet, Rfl: 0  •  Alpha-Lipoic Acid 300 MG capsule, TAKE ONE TO TWO CAPSULES BY MOUTH TWICE DAILY, Disp: , Rfl:   •  aspirin 81 MG EC tablet, Take 1 tablet by mouth 2 (Two) Times a Day With Meals. (Patient taking differently: Take 81 mg by mouth Daily.), Disp: 60 tablet, Rfl: 0  •  Blood Glucose Monitoring Suppl (FREESTYLE FREEDOM LITE) w/Device kit, USE TO TEST BLOOD SUGAR TWO TO THREE TI MES DAILY, Disp: 1 each, Rfl: 0  •  carvedilol (COREG) 3.125 MG tablet, TAKE 1 TABLET TWICE DAILY WITH MEALS., Disp: 60 tablet, Rfl: 0  •  clonazePAM (KlonoPIN) 0.5 MG tablet, , Disp: , Rfl:   •  Continuous Blood Gluc Sensor (Dexcom G6 Sensor), 1 each As Needed (glucose control). Every 10 days, Disp: 9 each, Rfl: 3  •  Continuous Blood Gluc Transmit (Dexcom G6 Transmitter) misc, USE TO MONITOR BLOOD GLUCOSE THROUGHOUT THE DAY AS DIRECTED; CHANGE EVERY 90 DAYS, Disp: 1 each, Rfl: 3  •   dicyclomine (BENTYL) 20 MG tablet, Take 1 tablet by mouth Every 6 (Six) Hours., Disp: 120 tablet, Rfl: 3  •  dilTIAZem CD (CARDIZEM CD) 120 MG 24 hr capsule, , Disp: , Rfl:   •  doxazosin (CARDURA) 1 MG tablet, , Disp: , Rfl:   •  famotidine (PEPCID) 20 MG tablet, TAKE ONE TABLET BY MOUTH TWICE DAILY, Disp: 60 tablet, Rfl: 0  •  fluconazole (Diflucan) 150 MG tablet, Take 1 tablet now and 1 tablet in 72 hours., Disp: 2 tablet, Rfl: 0  •  gabapentin (NEURONTIN) 800 MG tablet, , Disp: , Rfl:   •  Global Ease Inject Pen Needles 31G X 8 MM misc, USE TO TEST BLOOD SUGARS TWO TO THREE TIMES DAILY, Disp: 100 each, Rfl: 3  •  glucose blood test strip, Test 3 times daily, Disp: 100 each, Rfl: 11  •  glucose monitor monitoring kit, 1 each As Needed (to check bg)., Disp: 1 each, Rfl: 0  •  guaiFENesin (Mucus Relief) 600 MG 12 hr tablet, Take 2 tablets by mouth 2 (Two) Times a Day. NO ADDITIONAL REFILLS WITHOUT AN APPOINTMENT, Disp: 60 tablet, Rfl: 0  •  HYDROcodone-acetaminophen (NORCO)  MG per tablet, , Disp: , Rfl:   •  hydrOXYzine (ATARAX) 25 MG tablet, TAKE ONE-HALF TO ONE TABLET BY MOUTH THREE TIMES DAILY AS NEEDED for ANXIETY, Disp: 90 tablet, Rfl: 1  •  hydrOXYzine pamoate (Vistaril) 25 MG capsule, Take 1 capsule by mouth 3 (Three) Times a Day As Needed for Itching or Anxiety., Disp: 90 capsule, Rfl: 3  •  Insulin Glargine (BASAGLAR KWIKPEN) 100 UNIT/ML injection pen, Take 36 units at bedtime can go up by 2-3 unites every 3 days until am sugars running   02/05/2021 - Patient currently utilizing 50 Units nightly., Disp: 15 mL, Rfl: 1  •  Insulin Lispro, 1 Unit Dial, (HUMALOG) 100 UNIT/ML solution pen-injector, INJECT UP TO 20 UNITS WITH MEALS, Disp: 15 mL, Rfl: 0  •  Insulin Pen Needle (Pen Needles) 32G X 4 MM misc, 1 each 4 (Four) Times a Day. Use 4 x daily, Dx code E11.65, Disp: 120 each, Rfl: 11  •  Lancets misc, Test 3 times daily , E11.65, Disp: 100 each, Rfl: 11  •  lidocaine (LIDODERM) 5 %, APPLY ONE TO  TWO PATCHES AS DIRECTED ON LOWER BACK, LEFT UPPER LEG 12 HOURS ON AND 12 HOURS OFF, Disp: , Rfl:   •  lidocaine-prilocaine (EMLA) 2.5-2.5 % cream, , Disp: , Rfl:   •  mirtazapine (REMERON) 7.5 MG tablet, , Disp: , Rfl:   •  omeprazole (priLOSEC) 40 MG capsule, Take 1 capsule by mouth Daily., Disp: 30 capsule, Rfl: 3  •  ondansetron ODT (ZOFRAN-ODT) 8 MG disintegrating tablet, Place 1 tablet on the tongue Every 8 (Eight) Hours As Needed for Nausea or Vomiting., Disp: 90 tablet, Rfl: 3  •  polyethylene glycol (MIRALAX) 17 GM/SCOOP powder, MIX 17 GRAMS (1 CAPFUL) IN 8 OUNCES OF WATER OR JUICE AND DRINK DAILY, Disp: 510 g, Rfl: 5  •  ranolazine (RANEXA) 500 MG 12 hr tablet, TAKE 1 TABLET TWICE DAILY., Disp: 60 tablet, Rfl: 0  •  rosuvastatin (CRESTOR) 20 MG tablet, Take 1 tablet by mouth every night at bedtime., Disp: 30 tablet, Rfl: 3  •  SITagliptin (Januvia) 100 MG tablet, Take 1 tablet by mouth Daily., Disp: 30 tablet, Rfl: 3  •  Vraylar 1.5 MG capsule capsule, , Disp: , Rfl:     HPI          Pt is 53 yo female with management of her being overweight, DM type 2 HLP, HTN, diabetic neuropathy, insomnia, major depression CAD sp stent  Echocardiogram on 6/3/19 (grade 1 diastolic dysfunction) ,  History of MI COPD, history of fracture of femur, sp surgery on left femur, sp cholecystectomy, sp hysterectomy, sp left nephrectomy,  History of renal cell carcnoma sp left total knee replacement surgery, sp tonsillectomy , diabetic neuropathy, DELFIN, colonic polyps, GERD with esophagitis, gastritis, hiatal hernia, CKD stage 3a.      8/25/22 in office visit for recheck. She has been following up with Orthopedic for her left shoulder pain and joint issues.. pt recently went to [facility] ER on 74/22 for chets pain and palpitaitons.  Her glucose was at 400 chest x-ray was normal.  She was recommend to stay in hospital for observation but pt declined. She signed AMA.  Her BNP troponin were normal on 7/4/22  Pt has history of  noncompliance.  Pt saw Cardiology on 7/18/22 for her chest pain. She stopped taking her medications for 5 months she has been going through stress with losing family members. . Her palpitations was likely due to SVT/atrial tachycria.  Pt has restarted her medications including coreg 3.125 mg PO BID plavix ranexa and crestor along with aspirin. She was started on diltiazem  mg daily.  She was recommended echocardiogram and  Lexican cardiolite stress test. Her stress test on 8/16/22 showed low risk study her last labwork on 5/20/22 showed stable viitamin b12 tsh normal lipid panel showed LDL at 152. hga1c at 12.20. CMP showed glucose >300 GFR at 81.9. CBC showed stable hemoglobin and WBC. Vitamin D was normal at 22.5. pt lost 17 lbs since her last visit.  She has not been checking her sugars daily. She continues to smoke about 1 ppd. She also gets tired during the day and averages about 3 hours of sleep at bedtime.     12/21/22 in office visit for recheck. Pt no showed to several appt including Sleep medicine on 10/27/22 Cardiology on 12/1/22 Orthopedic on 12/7/22 and her Endocrinology appt was rescheduled to 2/8/2. Pt had labwork done on 8/26/22 that showed vitamin B12 normal thyroid studies normal iron profile showed low iron saturation. Ferritin was at 308. Vitamin D was low at 26.4. lipid panel showed LDL at 155. Total cholesterol at 227. hga1c was at 11.50. CMP showed glucose at 290 bUN at 21 sodim at 134 GFR at 68.3 from 81.9. pt had CT of chest on 8/30/22 that showed no pulmonary nodules does have mild centrilobular emphysematous changes and right upper lobe apical segment small linear foci of lung scarring and evidence of old granulomatous disease.     Chronic Kidney Disease  This is a chronic problem. The current episode started more than 1 year ago. The problem occurs constantly. The problem has been unchanged. Associated symptoms include arthralgias and fatigue. Pertinent negatives include  no abdominal pain, anorexia, change in bowel habit, chest pain, chills, congestion, coughing, diaphoresis, fever, headaches, joint swelling, myalgias, nausea, neck pain, numbness, sore throat, swollen glands, urinary symptoms, vertigo, visual change, vomiting or weakness. Nothing aggravates the symptoms. She has tried nothing for the symptoms. The treatment provided no relief.   Fatigue  This is a recurrent problem. The current episode started more than 1 year ago. The problem occurs constantly. The problem has been unchanged. Associated symptoms include fatigue. Pertinent negatives include no abdominal pain, anorexia, arthralgias, change in bowel habit, chest pain, chills, congestion, coughing, diaphoresis, fever, headaches, joint swelling, myalgias, nausea, neck pain, numbness, rash, sore throat, swollen glands, urinary symptoms, vertigo, visual change, vomiting or weakness. Nothing aggravates the symptoms. She has tried nothing for the symptoms. The treatment provided no relief.   Anxiety  Presents for follow-up visit. Symptoms include depressed mood, excessive worry, insomnia, irritability, nervous/anxious behavior and shortness of breath. Patient reports no chest pain, compulsions, confusion, decreased concentration, dizziness, dry mouth, feeling of choking, hyperventilation, impotence, malaise, muscle tension, nausea, obsessions, palpitations, panic or restlessness. The quality of sleep is fair. Nighttime awakenings: none.   Diabetes   She presents for her followup diabetic visit. She has type 2 diabetes mellitus. Her disease course has been waxing and waning  Hypoglycemia symptoms include tremors. Pertinent negatives for hypoglycemia include no confusion, dizziness, headaches, hunger, mood changes, nervousness/anxiousness, pallor, seizures, sleepiness, speech difficulty or sweats. Associated symptoms include fatigue, polyuria and weakness. Pertinent negatives for diabetes include no blurred vision, no chest  pain, no foot paresthesias and no weight loss. Pertinent negatives for hypoglycemia complications include no blackouts, no hospitalization, no nocturnal hypoglycemia, no required assistance and no required glucagon injection. Symptoms are stable. Pertinent negatives for diabetic complications include no CVA, impotence or retinopathy. Risk factors for coronary artery disease include diabetes mellitus, dyslipidemia, sedentary lifestyle and tobacco exposure. She is compliant with treatment all of the time. Her weight is stable. She is following a generally unhealthy diet. She does not see a podiatrist.Eye exam is not current.      Hypertension   This is a chronic problem. The current episode started more than 1 year ago. The problem has been waxing and waning since onset. The problem is uncontrolled. Associated symptoms include shortness of breath. Pertinent negatives include no anxiety, blurred vision, chest pain, headaches, malaise/fatigue, palpitations, PND or sweats. Risk factors for coronary artery disease include diabetes mellitus, dyslipidemia, sedentary lifestyle and smoking/tobacco exposure. Past treatments include beta blockers. Current antihypertension treatment includes beta blockers. The current treatment provides no improvement. There is no history of angina, kidney disease, CAD/MI, CVA, heart failure, left ventricular hypertrophy or retinopathy. There is no history of chronic renal disease, coarctation of the aorta, hyperaldosteronism, hypercortisolism, hyperparathyroidism, a hypertension causing med, pheochromocytoma, renovascular disease, sleep apnea or a thyroid problem.   Depression   Visit Type: followup  Onset of symptoms: at an unknown time  Patient presents with the following symptoms: compulsions, decreased concentration, depressed mood, excessive worry and shortness of breath.  Patient is not experiencing: anhedonia, chest pain, choking sensation, confusion, dizziness, dry  mouth, fatigue, feelings of hopelessness, feelings of worthlessness, hypersomnia, hyperventilation, impotence, insomnia, irritability, malaise, memory impairment, muscle tension, nausea, nervousness/anxiety, obsessions, palpitations, panic, psychomotor agitation, psychomotor retardation, restlessness, suicidal ideas, suicidal planning, thoughts of death, weight gain and weight loss.  Patient has a history of: depression  No history of: anemia, anxiety/panic attacks, arrhythmia, asthma, bipolar disorder, CAD, CHF, chronic lung disease, fibromyalgia, hyperthyroidism, suicide attempt, mental illness and substance abuse  Treatment tried: SSRI, wellbutrin     Review of Systems  Review of Systems   Constitutional: Positive for activity change, fatigue and unexpected weight change. Negative for appetite change, chills, diaphoresis and fever.   HENT: Negative for congestion, postnasal drip, rhinorrhea, sinus pressure, sinus pain, sneezing, sore throat, trouble swallowing and voice change.    Respiratory: Positive for cough and shortness of breath. Negative for choking, chest tightness, wheezing and stridor.    Cardiovascular: Negative for chest pain.   Gastrointestinal: Negative for diarrhea, nausea and vomiting.   Musculoskeletal: Positive for arthralgias.   Neurological: Positive for weakness and numbness. Negative for headaches.   Psychiatric/Behavioral: The patient is nervous/anxious.         Depressed mood        Patient Active Problem List   Diagnosis   • Coronary artery disease involving native coronary artery of native heart without angina pectoris   • Myocardial infarction, old   • Primary hypertension   • Type 1 diabetes mellitus ([facility])   • Mixed hyperlipidemia   • Stage 1 mild COPD by GOLD classification ([facility])   • Dyspnea on exertion   • S/p nephrectomy   • Precordial pain   • Femur fracture ([facility])   • Closed displaced supracondylar fracture without intracondylar extension of lower end of left femur ([facility])   •  Anemia, posthemorrhagic, acute   • Painful orthopaedic hardware (HCC)   • Closed displaced transverse fracture of shaft of femur with routine healing   • Overweight   • Diabetic neuropathy ([facility])   • Tobacco abuse counseling   • Smoking   • Class 1 obesity with serious comorbidity and body mass index (BMI) of 30.0 to 30.9 in adult   • High risk medication use   • Insomnia   • Dyspnea   • Renal impairment   • Type 2 diabetes mellitus with hyperglycemia, with long-term current use of insulin ([facility])   • Cigarette nicotine dependence, uncomplicated   • Stage 3 chronic kidney disease ([facility])   • Chronic left shoulder pain   • Chronic bronchitis ([facility])   • Claudication of lower extremity ([facility])   • Left shoulder pain   • Severe episode of recurrent major depressive disorder, without psychotic features ([facility])   • KRISTOFER (generalized anxiety disorder)   • Other constipation   • Generalized abdominal pain   • Nausea   • Bloating   • Weight loss   • Acute pain of left knee   • Class 1 obesity in adult   • Acute bronchitis   • Boil   • Subacromial bursitis of left shoulder joint   • Yeast infection   • Oral thrush   • Hypercalcemia   • Epigastric pain   • Gastroesophageal reflux disease   • Vitamin D deficiency   • Internal derangement of left shoulder   • Paroxysmal SVT (supraventricular tachycardia) ([facility])   • Tobacco use   • Uncontrolled type 2 diabetes mellitus with hyperglycemia ([facility])   • Essential hypertension   • History of heart artery stent   • Fatigue due to exposure   • Daytime sleepiness   • Snoring   • Screening for lung cancer   • Increased urinary frequency   • Abnormal weight loss   • Noncompliance   • Stage 2 chronic kidney disease   • Nicotine dependence with nicotine-induced disorder     Past Surgical History:   Procedure Laterality Date   • CARDIAC SURGERY     • CHOLECYSTECTOMY     • COLONOSCOPY N/A 1/28/2021    Procedure: COLONOSCOPY;  Surgeon: Juwan Wilkes MD;  Location: [facility] ENDOSCOPY;  Service:  Gastroenterology;  Laterality: N/A;   • CORONARY STENT PLACEMENT     • ENDOSCOPY N/A 1/28/2021    Procedure: ESOPHAGOGASTRODUODENOSCOPY;  Surgeon: Juwan Wilkes MD;  Location: [facility] ENDOSCOPY;  Service: Gastroenterology;  Laterality: N/A;   • FEMUR IM NAILING RETROGRADE Left 11/3/2019    Procedure: FEMUR INTRAMEDULLARY NAILING RETROGRADE;  Surgeon: Howie Campoverde MD;  Location: [facility] OR;  Service: Orthopedics   • GALLBLADDER SURGERY     • HARDWARE REMOVAL Left 12/4/2019    Procedure: HARDWARE REMOVAL LEFT FEMUR        (C-ARM#3);  Surgeon: Howie Campoverde MD;  Location: [facility] OR;  Service: Orthopedics   • HYSTERECTOMY     • JOINT REPLACEMENT     • NEPHRECTOMY Left    • REPLACEMENT TOTAL KNEE Left    • TONSILLECTOMY     • TUBAL ABDOMINAL LIGATION     • UPPER GASTROINTESTINAL ENDOSCOPY  01/28/2021    Per Dr. Juwan Wilkes M.D., [place], [place]     Social History     Socioeconomic History   • Marital status:    Tobacco Use   • Smoking status: Every Day     Packs/day: 2.00     Years: 37.00     Pack years: 74.00     Types: Cigarettes   • Smokeless tobacco: Never   • Tobacco comments:     Have slowed down alot   Vaping Use   • Vaping Use: Never used   • Passive vaping exposure: Yes (Son)   Substance and Sexual Activity   • Alcohol use: Not Currently     Alcohol/week: 0.0 standard drinks     Comment: passed use for yrs including cases and 2 fifths daily; quit 20 yrs ago   • Drug use: Not Currently     Types: \"Crack\" cocaine, Methamphetamines   • Sexual activity: Yes     Partners: Male     Birth control/protection: None     Family History   Problem Relation Age of Onset   • Heart disease Mother    • Hypertension Mother    • Cancer Mother    • Diabetes Mother    • Alcohol abuse Mother    • Heart disease Father    • Hypertension Father    • Alcohol abuse Father    • Alcohol abuse Sister    • Drug abuse Sister    • Depression Sister    • Anxiety disorder Sister    • Drug abuse Brother    • Alcohol  abuse Brother    • Suicide Attempts Brother      Lab on 08/26/2022   Component Date Value Ref Range Status   • Glucose 08/26/2022 290 (H)  65 - 99 mg/dL Final   • BUN 08/26/2022 21 (H)  6 - 20 mg/dL Final   • Creatinine 08/26/2022 1.00  0.57 - 1.00 mg/dL Final   • Sodium 08/26/2022 134 (L)  136 - 145 mmol/L Final   • Potassium 08/26/2022 4.7  3.5 - 5.2 mmol/L Final   • Chloride 08/26/2022 98  98 - 107 mmol/L Final   • CO2 08/26/2022 26.1  22.0 - 29.0 mmol/L Final   • Calcium 08/26/2022 9.5  8.6 - 10.5 mg/dL Final   • Total Protein 08/26/2022 6.7  6.0 - 8.5 g/dL Final   • Albumin 08/26/2022 4.00  3.50 - 5.20 g/dL Final   • ALT (SGPT) 08/26/2022 15  1 - 33 U/L Final   • AST (SGOT) 08/26/2022 12  1 - 32 U/L Final   • Alkaline Phosphatase 08/26/2022 89  39 - 117 U/L Final   • Total Bilirubin 08/26/2022 <0.2  0.0 - 1.2 mg/dL Final   • Globulin 08/26/2022 2.7  gm/dL Final   • A/G Ratio 08/26/2022 1.5  g/dL Final   • BUN/Creatinine Ratio 08/26/2022 21.0  7.0 - 25.0 Final   • Anion Gap 08/26/2022 9.9  5.0 - 15.0 mmol/L Final   • eGFR 08/26/2022 68.3  >60.0 mL/min/1.73 Final    National Kidney Foundation and American Society of Nephrology (ASN) Task Force recommended calculation based on the Chronic Kidney Disease Epidemiology Collaboration (CKD-EPI) equation refit without adjustment for race.   • Hemoglobin A1C 08/26/2022 11.50 (H)  4.80 - 5.60 % Final   • Total Cholesterol 08/26/2022 227 (H)  0 - 200 mg/dL Final   • Triglycerides 08/26/2022 119  0 - 150 mg/dL Final   • HDL Cholesterol 08/26/2022 51  40 - 60 mg/dL Final   • LDL Cholesterol  08/26/2022 155 (H)  0 - 100 mg/dL Final   • VLDL Cholesterol 08/26/2022 21  5 - 40 mg/dL Final   • LDL/HDL Ratio 08/26/2022 2.98   Final   • 25 Hydroxy, Vitamin D 08/26/2022 26.4 (L)  30.0 - 100.0 ng/ml Final   • Vitamin B-12 08/26/2022 491  211 - 946 pg/mL Final   • TSH 08/26/2022 1.530  0.270 - 4.200 uIU/mL Final   • Free T4 08/26/2022 1.30  0.93 - 1.70 ng/dL Final   • Iron 08/26/2022  60  37 - 145 mcg/dL Final   • Iron Saturation 08/26/2022 15 (L)  20 - 50 % Final   • Transferrin 08/26/2022 260  200 - 360 mg/dL Final   • TIBC 08/26/2022 387  298 - 536 mcg/dL Final   • Ferritin 08/26/2022 308.00 (H)  13.00 - 150.00 ng/mL Final   Lab on 08/25/2022   Component Date Value Ref Range Status   • Urine Culture 08/25/2022 50,000 CFU/mL Escherichia coli (A)   Final   • Color, UA 08/25/2022 Yellow  Yellow, Straw Final   • Appearance, UA 08/25/2022 Cloudy (A)  Clear Final   • pH, UA 08/25/2022 5.5  5.0 - 8.0 Final   • Specific Gravity, UA 08/25/2022 >=1.030  1.005 - 1.030 Final   • Glucose, UA 08/25/2022 >=1000 mg/dL (3+) (A)  Negative Final   • Ketones, UA 08/25/2022 Negative  Negative Final   • Bilirubin, UA 08/25/2022 Negative  Negative Final   • Blood, UA 08/25/2022 Trace (A)  Negative Final   • Protein, UA 08/25/2022 Negative  Negative Final   • Leuk Esterase, UA 08/25/2022 Small (1+) (A)  Negative Final   • Nitrite, UA 08/25/2022 Negative  Negative Final   • Urobilinogen, UA 08/25/2022 0.2 E.U./dL  0.2 - 1.0 E.U./dL Final   • RBC, UA 08/25/2022 0-2  None Seen, 0-2 /HPF Final   • WBC, UA 08/25/2022 Too Numerous to Count (A)  None Seen, 0-2 /HPF Final   • Bacteria, UA 08/25/2022 4+ (A)  None Seen /HPF Final   • Squamous Epithelial Cells, UA 08/25/2022 3-6 (A)  None Seen, 0-2 /HPF Final   • Hyaline Casts, UA 08/25/2022 0-2  None Seen /LPF Final   • Methodology 08/25/2022 Automated Microscopy   Final   Hospital Outpatient Visit on 08/16/2022   Component Date Value Ref Range Status   • Target HR (85%) 08/16/2022 144  bpm Final   • Max. Pred. HR (100%) 08/16/2022 169  bpm Final   • BH CV STRESS PROTOCOL 1 08/16/2022 Pharmacologic   Final   • Stage 1 08/16/2022 1   Final   • HR Stage 1 08/16/2022 70   Final   • BP Stage 1 08/16/2022 136/77   Final   • Duration Min Stage 1 08/16/2022 0   Final   • Duration Sec Stage 1 08/16/2022 10   Final   • Stress Dose Regadenoson Stage 1 08/16/2022 0.4   Final   • Stress  Comments Stage 1 08/16/2022 10 sec bolus injection   Final   • Baseline HR 08/16/2022 77  bpm Final   • Baseline BP 08/16/2022 119/75  mmHg Final   • Peak HR 08/16/2022 98  bpm Final   • Percent Max Pred HR 08/16/2022 57.99  % Final   • Percent Target HR 08/16/2022 68  % Final   • Peak BP 08/16/2022 136/77  mmHg Final   • Recovery HR 08/16/2022 86  bpm Final   • Recovery BP 08/16/2022 131/76  mmHg Final   • Estimated workload 08/16/2022 1.0  METS Final   • BH CV REST NUCLEAR ISOTOPE DOSE 08/16/2022 11.0  mCi Final   • BH CV STRESS NUCLEAR ISOTOPE DOSE 08/16/2022 35.4  mCi Final   • Nuc Stress EF 08/16/2022 63  % Final   Office Visit on 07/18/2022   Component Date Value Ref Range Status   • QT Interval 07/18/2022 374  ms Final   • QTC Interval 07/18/2022 426  ms Final   Admission on 07/04/2022, Discharged on 07/04/2022   Component Date Value Ref Range Status   • QT Interval 07/04/2022 300  ms Final   • QTC Interval 07/04/2022 453  ms Final   • Troponin T 07/04/2022 <0.010  0.000 - 0.030 ng/mL Final   • Glucose 07/04/2022 411 (C)  65 - 99 mg/dL Final   • BUN 07/04/2022 25 (H)  6 - 20 mg/dL Final   • Creatinine 07/04/2022 0.86  0.57 - 1.00 mg/dL Final   • Sodium 07/04/2022 135 (L)  136 - 145 mmol/L Final   • Potassium 07/04/2022 4.0  3.5 - 5.2 mmol/L Final   • Chloride 07/04/2022 102  98 - 107 mmol/L Final   • CO2 07/04/2022 20.0 (L)  22.0 - 29.0 mmol/L Final   • Calcium 07/04/2022 8.6  8.6 - 10.5 mg/dL Final   • Total Protein 07/04/2022 6.1  6.0 - 8.5 g/dL Final   • Albumin 07/04/2022 3.60  3.50 - 5.20 g/dL Final   • ALT (SGPT) 07/04/2022 21  1 - 33 U/L Final   • AST (SGOT) 07/04/2022 27  1 - 32 U/L Final   • Alkaline Phosphatase 07/04/2022 90  39 - 117 U/L Final   • Total Bilirubin 07/04/2022 0.3  0.0 - 1.2 mg/dL Final   • Globulin 07/04/2022 2.5  gm/dL Final   • A/G Ratio 07/04/2022 1.4  g/dL Final   • BUN/Creatinine Ratio 07/04/2022 29.1 (H)  7.0 - 25.0 Final   • Anion Gap 07/04/2022 13.0  5.0 - 15.0 mmol/L Final    • eGFR 07/04/2022 81.9  >60.0 mL/min/1.73 Final    National Kidney Foundation and American Society of Nephrology (ASN) Task Force recommended calculation based on the Chronic Kidney Disease Epidemiology Collaboration (CKD-EPI) equation refit without adjustment for race.   • proBNP 07/04/2022 253.9  0.0 - 900.0 pg/mL Final   • Extra Tube 07/04/2022 Hold for add-ons.   Final    Auto resulted.   • Extra Tube 07/04/2022 hold for add-on   Final    Auto resulted   • Extra Tube 07/04/2022 Hold for add-ons.   Final    Auto resulted.   • Extra Tube 07/04/2022 Hold for add-ons.   Final    Auto resulted   • WBC 07/04/2022 10.82 (H)  3.40 - 10.80 10*3/mm3 Final   • RBC 07/04/2022 4.51  3.77 - 5.28 10*6/mm3 Final   • Hemoglobin 07/04/2022 13.1  12.0 - 15.9 g/dL Final   • Hematocrit 07/04/2022 37.6  34.0 - 46.6 % Final   • MCV 07/04/2022 83.4  79.0 - 97.0 fL Final   • MCH 07/04/2022 29.0  26.6 - 33.0 pg Final   • MCHC 07/04/2022 34.8  31.5 - 35.7 g/dL Final   • RDW 07/04/2022 12.2 (L)  12.3 - 15.4 % Final   • RDW-SD 07/04/2022 37.1  37.0 - 54.0 fl Final   • MPV 07/04/2022 10.1  6.0 - 12.0 fL Final   • Platelets 07/04/2022 282  140 - 450 10*3/mm3 Final   • Neutrophil % 07/04/2022 67.3  42.7 - 76.0 % Final   • Lymphocyte % 07/04/2022 21.9  19.6 - 45.3 % Final   • Monocyte % 07/04/2022 8.2  5.0 - 12.0 % Final   • Eosinophil % 07/04/2022 1.3  0.3 - 6.2 % Final   • Basophil % 07/04/2022 0.6  0.0 - 1.5 % Final   • Immature Grans % 07/04/2022 0.7 (H)  0.0 - 0.5 % Final   • Neutrophils, Absolute 07/04/2022 7.28 (H)  1.70 - 7.00 10*3/mm3 Final   • Lymphocytes, Absolute 07/04/2022 2.37  0.70 - 3.10 10*3/mm3 Final   • Monocytes, Absolute 07/04/2022 0.89  0.10 - 0.90 10*3/mm3 Final   • Eosinophils, Absolute 07/04/2022 0.14  0.00 - 0.40 10*3/mm3 Final   • Basophils, Absolute 07/04/2022 0.06  0.00 - 0.20 10*3/mm3 Final   • Immature Grans, Absolute 07/04/2022 0.08 (H)  0.00 - 0.05 10*3/mm3 Final   • nRBC 07/04/2022 0.0  0.0 - 0.2 /100 WBC  Final   • QT Interval 07/04/2022 350  ms Final   • QTC Interval 07/04/2022 444  ms Final   • COVID19 07/04/2022 Not Detected  Not Detected - Ref. Range Final   • Influenza A PCR 07/04/2022 Not Detected  Not Detected Final   • Influenza B PCR 07/04/2022 Not Detected  Not Detected Final      CT Chest Low Dose Cancer Screening WO  Narrative: PROCEDURE:  CT LUNG CANCER SCREENING    CLINICAL HISTORY:  Screening visit , Lung cancer screening,  smoker with greater than 20 pack year history    COMPARISON:  None.    TECHNIQUE:    kVp:  120  mA:  20-40  DLP:  38    This exam was performed according to our departmental  dose-optimization program, which includes automated exposure  control, adjustment of the mA and/or kV according to patient size  and/or use of iterative reconstruction technique. Limited  sensitivity of exam secondary to low dose technique and absence  of contrast.     FINDINGS:    Exam parameters:  Diagnostic quality:  Satisfactory  Comments:  None    Lung nodules:  No suspicious lung nodule is identified.    Lungs:  COPD:  Mild centrilobular emphysematous changes.  Fibrosis:  Right upper lobe apical segment small linear foci of  lung parenchymal scarring.  Lymph nodes:  Calcified precarinal and bilateral perihilar lymph  nodes consistent with old granulomatous disease.  Other findings:  Bilateral lung parenchymal multiple small  scattered calcified lung parenchymal granulomas consistent with  old granulomatous disease.    Right pleural space:  Effusion:  None  Calcifications:  None  Thickening:  None   Pneumothorax:  None      Left pleural space:  Effusion:  None  Calcifications:  None  Thickening:  None   Pneumothorax:  None      Heart:  Heart size:  Normal  Coronary calcification:  RCA circumferential hyperdense tubular  structure most likely representing vascular stent. Otherwise  unremarkable.  Pericardial effusion:  None    Other findings:  Upper abdomen:  None.  Thorax:  None.  Base of neck:   None.  Impression: Impression:  1.  No suspicious lung nodule is identified.  2.  Mild centrilobular emphysematous changes.  3.  Right upper lobe apical segment small linear foci of lung  parenchymal scarring.  4.  Evidence of old granulomatous disease.  5.  RCA circumferential hyperdense tubular structure most likely  representing vascular stent.     LUNG RADS:    Category 1:  NEGATIVE.  No nodules and definitely benign nodules.     Findings include no lung nodules.  Nodule(s) with specific  calcifications: complete, central, popcorn, concentric rings and  fat containing nodule(s) are considered benign.    MANAGEMENT: Continue annual screening with low dose CT in 12  months.    Modifier:    Electronically signed by:  Wilver Ng MD  8/31/2022 1:07 PM CDT  Workstation: [id number]    @[email or web address]@  Immunization History   Administered Date(s) Administered   • COVID-19 (PFIZER) PURPLE CAP 08/06/2021, 08/06/2021   • Flu Vaccine Split Quad 09/30/2017   • FluLaval/Fluzone >6mos 09/30/2017, 09/28/2020, 10/05/2021   • Influenza Quad Vaccine (Inpatient) 10/07/2016, 10/07/2016   • Pneumococcal Polysaccharide (PPSV23) 02/24/2010, 02/24/2010       The following portions of the patient's history were reviewed and updated as appropriate: allergies, current medications, past family history, past medical history, past social history, past surgical history and problem list.        Physical Exam  There were no vitals taken for this visit.    Physical Exam  Vitals and nursing note reviewed.   Constitutional:       Appearance: She is well-developed. She is not diaphoretic.   HENT:      Head: Normocephalic and atraumatic.      Right Ear: External ear normal.   Eyes:      Conjunctiva/sclera: Conjunctivae normal.      Pupils: Pupils are equal, round, and reactive to light.   Cardiovascular:      Rate and Rhythm: Normal rate and regular rhythm.      Heart sounds: Normal heart sounds. No murmur heard.  Pulmonary:      Effort: Pulmonary  effort is normal. No respiratory distress.      Breath sounds: Normal breath sounds.      Comments: Decreased breath sounds   Abdominal:      General: Bowel sounds are normal. There is no distension.      Palpations: Abdomen is soft.      Tenderness: There is no abdominal tenderness.   Musculoskeletal:         General: Tenderness present. No deformity. Normal range of motion.      Cervical back: Normal range of motion and neck supple.   Skin:     General: Skin is warm.      Coloration: Skin is not pale.      Findings: No erythema or rash.   Neurological:      Mental Status: She is alert and oriented to person, place, and time.      Cranial Nerves: No cranial nerve deficit.   Psychiatric:         Behavior: Behavior normal.         [unfilled]   Diagnosis Plan   1. Uncontrolled type 2 diabetes mellitus with hyperglycemia (HCC)  CBC Auto Differential    Comprehensive Metabolic Panel    Hemoglobin A1c    Lipid Panel    Vitamin D,25-Hydroxy    Vitamin B12    Microalbumin / Creatinine Urine Ratio - Urine, Clean Catch      2. Essential hypertension  CBC Auto Differential    Comprehensive Metabolic Panel    Hemoglobin A1c    Lipid Panel    Vitamin D,25-Hydroxy    Vitamin B12    Microalbumin / Creatinine Urine Ratio - Urine, Clean Catch      3. History of heart artery stent  CBC Auto Differential    Comprehensive Metabolic Panel    Hemoglobin A1c    Lipid Panel    Vitamin D,25-Hydroxy    Vitamin B12    Microalbumin / Creatinine Urine Ratio - Urine, Clean Catch      4. Coronary artery disease involving native coronary artery of native heart without angina pectoris  CBC Auto Differential    Comprehensive Metabolic Panel    Hemoglobin A1c    Lipid Panel    Vitamin D,25-Hydroxy    Vitamin B12    Microalbumin / Creatinine Urine Ratio - Urine, Clean Catch      5. Mixed hyperlipidemia  CBC Auto Differential    Comprehensive Metabolic Panel    Hemoglobin A1c    Lipid Panel    Vitamin D,25-Hydroxy    Vitamin B12    Microalbumin  / Creatinine Urine Ratio - Urine, Clean Catch      6. Tobacco use  CBC Auto Differential    Comprehensive Metabolic Panel    Hemoglobin A1c    Lipid Panel    Vitamin D,25-Hydroxy    Vitamin B12    Microalbumin / Creatinine Urine Ratio - Urine, Clean Catch      7. Stage 1 mild COPD by GOLD classification (HCC)  CBC Auto Differential    Comprehensive Metabolic Panel    Hemoglobin A1c    Lipid Panel    Vitamin D,25-Hydroxy    Vitamin B12    Microalbumin / Creatinine Urine Ratio - Urine, Clean Catch      8. Noncompliance  CBC Auto Differential    Comprehensive Metabolic Panel    Hemoglobin A1c    Lipid Panel    Vitamin D,25-Hydroxy    Vitamin B12    Microalbumin / Creatinine Urine Ratio - Urine, Clean Catch      9. Stage 2 chronic kidney disease  CBC Auto Differential    Comprehensive Metabolic Panel    Hemoglobin A1c    Lipid Panel    Vitamin D,25-Hydroxy    Vitamin B12    Microalbumin / Creatinine Urine Ratio - Urine, Clean Catch      10. Gastroesophageal reflux disease, unspecified whether esophagitis present        11. Nicotine dependence with nicotine-induced disorder, unspecified nicotine product type                -recommend labowrk    -recommend mammogram screening -schedule at imaging center   -recommend pap smear  -recommend influenza vaccination   -recommend lung cancer screening - will schedule at imaging center   -recommend Tdap/shingles vaccination   -recommend diabetic eye exam  - referred to Dr. Manjarrez   -advised pt to make appts with Orthopedic Sleep medicine Cardiology   -insomnia/daytime sleepiness - recommend sleep medicine referral   -HTN -   on  coreg 3.25 mg PO BID on cardizem 120 mg daily. On losartan 25 mg daily.    -renal impairment/CKD stage 3a  - Nephrology following. Continue to monitor.  -left shoulder pain/subacromial bursitis of left shoulder  - Orthopedic following. Recently received steriod injection.      -HLP - on crestor  40 mg PO qh. Recommend heart health diet recommend repatha  injections every 2 weeks on zetia 10 mg PO q daily.   -diabetic neuropathy - on neurontin 400 mg PO TID   -allergic rhinitis -  zyrtec 10 mg daily.  -chronic pain - pt sees Pain Managmeent on Percoet 7.5/325 mg every 6 hours PRN on neurontin 800 mg pO TI   -insomnia - on seroquel 100 mg PO qhs.    -tobacco user -counseled quit smoking >5 minutes. Recommend 1 800 qUIT now   -nausea/vomiting - zofran 8 mg  Every 8 hours PRN  -sp  Left nephrectomy/history of renal cell carcinoma-  Consider  Oncology referral  -GERD with esophagitis,colonic polyps/gastritis/IBS  - on prilosec 20 mg PO BID on pepcid GI following  on bentyl 20 mg every 6 hours   -CAD sp stent /grade 1 diastolic dysfunction - Cardiology following on aspirin 81 mg PO BID, plavix 75 mg PO q daily. Coreg 6.25 mg PO BID, crestor  40 mg PO qhs, ranexa 500 mg every 12 hours.  Recent stress test normal recommend repatha injections every 2 weeks  -DM type 2  -now on basaglar 50  units at beditme  2-3 unties every 3 days until morning sugars at goal.was on trulicity 3.0  mg subq weekly.  on januvia 100 mg daily.  jardiance 25 mg daily. Endocrinology following on humalog before meals along with sliding scale   -COPD/chronic bronchitis - on albuterol inhaler. adivsed to quit smoking.Refer to Pulmonlogy for PFT. Breo stopped.   Stressed importance of smoking cessation.  will give azithromycin in event it gets worse   -major depression/KRISTOFER/PTSD  -Mental Health following on remeron 7.5 mg daily. On vraylar 1.5 mg daily.  On klonopin 0.5 mg  I advised she talk to counseling about stopping this since she is on Norco. She is at risk for increased mortality if taken together. Recommend vistaril 25 mg every 8 hours as needed   -advised pt to be safe and call with questions and concerns  -advised pt to go to ER or call 911 if symptoms worrisome or severe  -advised pt to followup with specialist  And referrals  -advsied pt to be safe during COVID-19 pandemic  I spent 45  minutes caring for Yudi on this date of service. This time includes time spent by me in the following activities: preparing for the visit, reviewing tests, obtaining and/or reviewing a separately obtained history, performing a medically appropriate examination and/or evaluation, counseling and educating the patient/family/caregiver, ordering medications, tests, or procedures, referring and communicating with other health care professionals, documenting information in the medical record, independently interpreting results and communicating that information with the patient/family/caregiver and care coordination.        This document has been electronically signed by Quintin Flores MD on December 21, 2022 07:40 CST          This encounter was created in error - please disregard.

## 2022-12-05 RX ORDER — INSULIN LISPRO 100 [IU]/ML
INJECTION, SOLUTION INTRAVENOUS; SUBCUTANEOUS
Qty: 15 ML | Refills: 0 | Status: SHIPPED | OUTPATIENT
Start: 2022-12-05 | End: 2023-02-02 | Stop reason: SDUPTHER

## 2022-12-19 RX ORDER — CLOPIDOGREL BISULFATE 75 MG/1
TABLET ORAL
Qty: 30 TABLET | Refills: 0 | Status: SHIPPED | OUTPATIENT
Start: 2022-12-19 | End: 2023-02-02 | Stop reason: SDUPTHER

## 2022-12-19 RX ORDER — LOSARTAN POTASSIUM 25 MG/1
TABLET ORAL
Qty: 30 TABLET | Refills: 0 | Status: SHIPPED | OUTPATIENT
Start: 2022-12-19 | End: 2023-02-02 | Stop reason: SDUPTHER

## 2022-12-19 RX ORDER — EZETIMIBE 10 MG/1
TABLET ORAL
Qty: 30 TABLET | Refills: 0 | Status: SHIPPED | OUTPATIENT
Start: 2022-12-19 | End: 2023-02-02 | Stop reason: SDUPTHER

## 2022-12-19 RX ORDER — ERGOCALCIFEROL 1.25 MG/1
CAPSULE ORAL
Qty: 4 CAPSULE | Refills: 0 | Status: SHIPPED | OUTPATIENT
Start: 2022-12-19 | End: 2023-02-02 | Stop reason: SDUPTHER

## 2022-12-19 RX ORDER — EMPAGLIFLOZIN 25 MG/1
TABLET, FILM COATED ORAL
Qty: 30 TABLET | Refills: 0 | Status: SHIPPED | OUTPATIENT
Start: 2022-12-19 | End: 2023-02-02 | Stop reason: SDUPTHER

## 2022-12-19 NOTE — TELEPHONE ENCOUNTER
Rx Refill Note  Requested Prescriptions     Pending Prescriptions Disp Refills   • Jardiance 25 MG tablet tablet [Pharmacy Med Name: JARDIANCE 25 MG TABLET] 30 tablet 0     Sig: TAKE 1 TABLET DAILY.   • ezetimibe (ZETIA) 10 MG tablet [Pharmacy Med Name: EZETIMIBE 10 MG TABLET] 30 tablet 0     Sig: TAKE 1 TABLET DAILY.   • losartan (COZAAR) 25 MG tablet [Pharmacy Med Name: LOSARTAN POTASSIUM 25 MG TAB] 30 tablet 0     Sig: TAKE 1 TABLET DAILY.   • vitamin D (ERGOCALCIFEROL) 1.25 MG (56986 UT) capsule capsule [Pharmacy Med Name: VITAMIN D2 1.25MG(50,000 UNIT)] 4 capsule 0     Sig: TAKE 1 CAPSULE WEEKLY   • clopidogrel (PLAVIX) 75 MG tablet [Pharmacy Med Name: CLOPIDOGREL 75 MG TABLET] 30 tablet 0     Sig: TAKE 1 TABLET DAILY.      Last office visit with prescribing clinician: 8/25/2022   Last telemedicine visit with prescribing clinician: 12/21/2022   Next office visit with prescribing clinician: 12/21/2022   {TIP  Encounters:     Plavix Protocol Failed 12/19/2022 09:39 AM    No conflicting PPI on medication list            {TIP  Please add Last Relevant Lab Date if appropriate             {TIP  Is Refill Pharmacy correct? YES    Would you like a call back once the refill request has been completed: [] Yes [] No    If the office needs to give you a call back, can they leave a voicemail: [] Yes [] No    Raciel Wheatley LPN  12/19/22, 09:57 CST

## 2022-12-21 ENCOUNTER — OFFICE VISIT (OUTPATIENT)
Dept: FAMILY MEDICINE CLINIC | Facility: CLINIC | Age: 52
End: 2022-12-21
Payer: MEDICAID

## 2022-12-21 DIAGNOSIS — K21.9 GASTROESOPHAGEAL REFLUX DISEASE, UNSPECIFIED WHETHER ESOPHAGITIS PRESENT: ICD-10-CM

## 2022-12-21 DIAGNOSIS — I25.10 CORONARY ARTERY DISEASE INVOLVING NATIVE CORONARY ARTERY OF NATIVE HEART WITHOUT ANGINA PECTORIS: ICD-10-CM

## 2022-12-21 DIAGNOSIS — I10 ESSENTIAL HYPERTENSION: ICD-10-CM

## 2022-12-21 DIAGNOSIS — E11.65 UNCONTROLLED TYPE 2 DIABETES MELLITUS WITH HYPERGLYCEMIA: Primary | ICD-10-CM

## 2022-12-21 DIAGNOSIS — E78.2 MIXED HYPERLIPIDEMIA: ICD-10-CM

## 2022-12-21 DIAGNOSIS — J44.9 STAGE 1 MILD COPD BY GOLD CLASSIFICATION: ICD-10-CM

## 2022-12-21 DIAGNOSIS — N18.2 STAGE 2 CHRONIC KIDNEY DISEASE: ICD-10-CM

## 2022-12-21 DIAGNOSIS — F17.209 NICOTINE DEPENDENCE WITH NICOTINE-INDUCED DISORDER, UNSPECIFIED NICOTINE PRODUCT TYPE: ICD-10-CM

## 2022-12-21 DIAGNOSIS — Z95.5 HISTORY OF HEART ARTERY STENT: ICD-10-CM

## 2022-12-21 DIAGNOSIS — Z91.199 NONCOMPLIANCE: ICD-10-CM

## 2022-12-21 DIAGNOSIS — Z72.0 TOBACCO USE: ICD-10-CM

## 2022-12-21 NOTE — PATIENT INSTRUCTIONS
Please get labwork fasting    Please call Orthopedic Cardiology and Sleep Medicine for an appt  Mammogram screening    Pap smear?

## 2023-01-17 ENCOUNTER — TELEPHONE (OUTPATIENT)
Dept: FAMILY MEDICINE CLINIC | Facility: CLINIC | Age: 53
End: 2023-01-17

## 2023-01-17 RX ORDER — ZOSTER VACCINE RECOMBINANT, ADJUVANTED 50 MCG/0.5
0.5 KIT INTRAMUSCULAR ONCE
Qty: 1 EACH | Refills: 0 | Status: SHIPPED | OUTPATIENT
Start: 2023-01-17 | End: 2023-01-17

## 2023-01-17 NOTE — TELEPHONE ENCOUNTER
Patient would like to have a shingles injection prescription sent over to [facility]. Her nephew woke up with chicken pox and she is afraid she might get shingles again. She has had it twice already. Any questions please call [phone]

## 2023-01-18 ENCOUNTER — OFFICE VISIT (OUTPATIENT)
Dept: ORTHOPEDIC SURGERY | Facility: CLINIC | Age: 53
End: 2023-01-18
Payer: MEDICAID

## 2023-01-18 VITALS — BODY MASS INDEX: 23.99 KG/M2 | WEIGHT: 144 LBS | HEIGHT: 65 IN

## 2023-01-18 DIAGNOSIS — M75.52 SUBACROMIAL BURSITIS OF LEFT SHOULDER JOINT: Primary | ICD-10-CM

## 2023-01-18 DIAGNOSIS — M75.02 ADHESIVE CAPSULITIS OF LEFT SHOULDER: ICD-10-CM

## 2023-01-18 PROCEDURE — 99214 OFFICE O/P EST MOD 30 MIN: CPT | Performed by: NURSE PRACTITIONER

## 2023-01-18 NOTE — PROGRESS NOTES
"Yudi West is a 52 y.o. female returns for     Chief Complaint   Patient presents with   • Left Shoulder - Follow-up       HISTORY OF PRESENT ILLNESS:      Ms. West is a 52-year-old female who presents today requesting a repeat glenohumeral joint injection.  Patient was last seen by Dr. Gregorio in June.  Prior to being seen by myself and Dr. Gregorio, patient was seen by Dr. Campoverde and given subacromial injections.  She reports the glenohumeral joint injection given to her at that time seem to help better with symptoms and subacromial injections have in the past.  She was also referred to physical therapy to which she did not attend though she states she has a cousin who is a physical therapist who has work with her some.  No new symptoms.  Her hemoglobin A1c is lower than at previous appointment 11.     CONCURRENT MEDICAL HISTORY:    The following portions of the patient's history were reviewed and updated as appropriate: allergies, current medications, past family history, past medical history, past social history, past surgical history and problem list.     ROS  No fevers or chills.  No chest pain or shortness of air.  No GI or  disturbances.  Left shoulder pain.    PHYSICAL EXAMINATION:       Ht 165.1 cm (65\")   Wt 65.3 kg (144 lb)   BMI 23.96 kg/m²     Physical Exam  Vitals and nursing note reviewed.   Constitutional:       General: She is not in acute distress.     Appearance: She is well-developed. She is not toxic-appearing or diaphoretic.   HENT:      Head: Normocephalic.   Eyes:      General: No scleral icterus.  Pulmonary:      Effort: Pulmonary effort is normal. No respiratory distress.   Skin:     General: Skin is warm and dry.   Neurological:      Mental Status: She is alert and oriented to person, place, and time.   Psychiatric:         Behavior: Behavior normal.         Thought Content: Thought content normal.         Judgment: Judgment normal.         GAIT:     []  Normal  [x]  " Antalgic    Assistive device: [x]  None  []  Walker     []  Crutches  []  Cane     []  Wheelchair  []  Stretcher    Left Shoulder Exam     Range of Motion   Active abduction: 80   Passive abduction: 90   Extension: 10   External rotation: abnormal   Forward flexion: 90 (passive 90)   Internal rotation 90 degrees: abnormal     Other   Erythema: absent  Sensation: normal  Pulse: present     Comments:  No evidence of infection                      ASSESSMENT:    Diagnoses and all orders for this visit:    Subacromial bursitis of left shoulder joint  -     Ambulatory Referral to Physical Therapy Evaluate and treat; (as indicated ); Stretching, ROM, Strengthening  -     CT Guided Contrast Injection Joint; Future  -     CT Injection/Aspriation Large Joint or Bursa; Future    Adhesive capsulitis of left shoulder  -     Ambulatory Referral to Physical Therapy Evaluate and treat; (as indicated ); Stretching, ROM, Strengthening  -     CT Guided Contrast Injection Joint; Future  -     CT Injection/Aspriation Large Joint or Bursa; Future    Other orders  -     Cancel: Large Joint Arthrocentesis: L subacromial bursa          PLAN      After discussing risk, benefits, alternatives, new order for glenohumeral joint injection placed.  Also strongly advised patient to return to formal physical therapy.  Patient agreeable.  PT sent to [facility].  Patient to return in 6 weeks  for recheck.      Return in about 6 weeks (around 3/1/2023).       Time spent of a minimum of 30 minutes including the face to face evaluation, reviewing of medical history and prior medial records, reviewing of diagnostic studies, ordering additional tests, documentation, patient education and coordination of care.       EMR Dragon/Transciption Disclaimer: Some of this note may be an electronic transcription/translation of spoken language to printed text.  The electronic translation of spoken language may permit erroneous, or at times, nonsensical words  or phrases to be inadvertently transcribed. Although I have reviewed the note for such errors, some may still exist.       This document has been electronically signed by Yamilka CARRENO on January 18, 2023 12:08 CST

## 2023-01-25 ENCOUNTER — HOSPITAL ENCOUNTER (OUTPATIENT)
Dept: INTERVENTIONAL RADIOLOGY/VASCULAR | Facility: HOSPITAL | Age: 53
Discharge: HOME | End: 2023-01-25
Admitting: RADIOLOGY
Payer: MEDICAID

## 2023-01-25 VITALS
DIASTOLIC BLOOD PRESSURE: 81 MMHG | HEART RATE: 77 BPM | RESPIRATION RATE: 18 BRPM | OXYGEN SATURATION: 96 % | SYSTOLIC BLOOD PRESSURE: 139 MMHG

## 2023-01-25 DIAGNOSIS — M75.52 SUBACROMIAL BURSITIS OF LEFT SHOULDER JOINT: ICD-10-CM

## 2023-01-25 DIAGNOSIS — M75.02 ADHESIVE CAPSULITIS OF LEFT SHOULDER: ICD-10-CM

## 2023-01-25 PROCEDURE — 25010000002 ROPIVACAINE PER 1 MG: Performed by: RADIOLOGY

## 2023-01-25 PROCEDURE — 25010000002 TRIAMCINOLONE PER 10 MG: Performed by: RADIOLOGY

## 2023-01-25 PROCEDURE — 25010000002 IOPAMIDOL 61 % SOLUTION: Performed by: RADIOLOGY

## 2023-01-25 PROCEDURE — 77002 NEEDLE LOCALIZATION BY XRAY: CPT

## 2023-01-25 RX ORDER — ROPIVACAINE HYDROCHLORIDE 5 MG/ML
INJECTION, SOLUTION EPIDURAL; INFILTRATION; PERINEURAL AS NEEDED
Status: COMPLETED | OUTPATIENT
Start: 2023-01-25 | End: 2023-01-25

## 2023-01-25 RX ORDER — TRIAMCINOLONE ACETONIDE 40 MG/ML
INJECTION, SUSPENSION INTRA-ARTICULAR; INTRAMUSCULAR AS NEEDED
Status: COMPLETED | OUTPATIENT
Start: 2023-01-25 | End: 2023-01-25

## 2023-01-25 RX ORDER — TRIAMCINOLONE ACETONIDE 40 MG/ML
INJECTION, SUSPENSION INTRA-ARTICULAR; INTRAMUSCULAR
Status: DISPENSED
Start: 2023-01-25 | End: 2023-01-25

## 2023-01-25 RX ADMIN — TRIAMCINOLONE ACETONIDE 80 MG: 40 INJECTION, SUSPENSION INTRA-ARTICULAR; INTRAMUSCULAR at 10:59

## 2023-01-25 RX ADMIN — ROPIVACAINE HYDROCHLORIDE 15 ML: 5 INJECTION, SOLUTION EPIDURAL; INFILTRATION; PERINEURAL at 10:54

## 2023-01-25 RX ADMIN — IOPAMIDOL 2 ML: 612 INJECTION, SOLUTION INTRAVENOUS at 10:57

## 2023-01-25 NOTE — PRE-PROCEDURE NOTE
Patient's history and physical exam were reviewed, and no changes were noted.  The risks and benefits of left shoulder steroid injection were discussed with the patient, and the patient had ample opportunity to ask questions.  Informed consent was obtained.

## 2023-01-25 NOTE — POST-PROCEDURE NOTE
Pre-Op Diagnosis:  left shoulder pain  Post-Op Diagnosis: same  Procedure: fluoro guided left shoulder steroid injection  Anesthesia: ropivicaine  EBL: none  Specimens:  none   Findings: see on pacs  Complications: no immediate  Disposition:  Patient tolerated the procedure well.

## 2023-01-30 PROBLEM — R07.2 PRECORDIAL PAIN: Status: RESOLVED | Noted: 2019-06-03 | Resolved: 2023-01-30

## 2023-01-30 PROBLEM — E66.3 OVERWEIGHT: Status: RESOLVED | Noted: 2020-06-14 | Resolved: 2023-01-30

## 2023-01-30 PROBLEM — E83.52 HYPERCALCEMIA: Status: RESOLVED | Noted: 2021-08-10 | Resolved: 2023-01-30

## 2023-01-30 PROBLEM — J20.9 ACUTE BRONCHITIS: Status: RESOLVED | Noted: 2021-06-25 | Resolved: 2023-01-30

## 2023-01-30 PROBLEM — R40.0 DAYTIME SLEEPINESS: Status: RESOLVED | Noted: 2022-08-25 | Resolved: 2023-01-30

## 2023-01-30 PROBLEM — M25.562 ACUTE PAIN OF LEFT KNEE: Status: RESOLVED | Noted: 2020-11-19 | Resolved: 2023-01-30

## 2023-01-30 PROBLEM — M25.512 CHRONIC LEFT SHOULDER PAIN: Status: RESOLVED | Noted: 2020-08-05 | Resolved: 2023-01-30

## 2023-01-30 PROBLEM — D62 ANEMIA, POSTHEMORRHAGIC, ACUTE: Status: RESOLVED | Noted: 2019-11-04 | Resolved: 2023-01-30

## 2023-01-30 PROBLEM — Z12.2 SCREENING FOR LUNG CANCER: Status: RESOLVED | Noted: 2022-08-25 | Resolved: 2023-01-30

## 2023-01-30 PROBLEM — G89.29 CHRONIC LEFT SHOULDER PAIN: Status: RESOLVED | Noted: 2020-08-05 | Resolved: 2023-01-30

## 2023-01-30 PROBLEM — J42 CHRONIC BRONCHITIS (HCC): Status: RESOLVED | Noted: 2020-08-05 | Resolved: 2023-01-30

## 2023-01-30 PROBLEM — R14.0 BLOATING: Status: RESOLVED | Noted: 2020-10-06 | Resolved: 2023-01-30

## 2023-01-30 PROBLEM — N18.2 STAGE 2 CHRONIC KIDNEY DISEASE: Status: RESOLVED | Noted: 2022-12-21 | Resolved: 2023-01-30

## 2023-01-30 PROBLEM — S72.90XA FEMUR FRACTURE: Status: RESOLVED | Noted: 2019-11-02 | Resolved: 2023-01-30

## 2023-01-30 PROBLEM — E10.9 TYPE 1 DIABETES MELLITUS: Chronic | Status: RESOLVED | Noted: 2017-08-14 | Resolved: 2023-01-30

## 2023-01-30 PROBLEM — R06.09 DYSPNEA ON EXERTION: Status: RESOLVED | Noted: 2017-08-14 | Resolved: 2023-01-30

## 2023-01-30 PROBLEM — M25.512 LEFT SHOULDER PAIN: Status: RESOLVED | Noted: 2020-09-08 | Resolved: 2023-01-30

## 2023-01-30 PROBLEM — I10 PRIMARY HYPERTENSION: Status: RESOLVED | Noted: 2017-08-14 | Resolved: 2023-01-30

## 2023-01-30 PROBLEM — Z79.4 TYPE 2 DIABETES MELLITUS WITH HYPERGLYCEMIA, WITH LONG-TERM CURRENT USE OF INSULIN: Status: RESOLVED | Noted: 2020-07-02 | Resolved: 2023-01-30

## 2023-01-30 PROBLEM — B37.0 ORAL THRUSH: Status: RESOLVED | Noted: 2021-08-10 | Resolved: 2023-01-30

## 2023-01-30 PROBLEM — M75.52 SUBACROMIAL BURSITIS OF LEFT SHOULDER JOINT: Status: RESOLVED | Noted: 2021-07-26 | Resolved: 2023-01-30

## 2023-01-30 PROBLEM — E66.9 CLASS 1 OBESITY WITH SERIOUS COMORBIDITY AND BODY MASS INDEX (BMI) OF 30.0 TO 30.9 IN ADULT: Status: RESOLVED | Noted: 2020-06-17 | Resolved: 2023-01-30

## 2023-01-30 PROBLEM — R63.4 ABNORMAL WEIGHT LOSS: Status: RESOLVED | Noted: 2022-08-25 | Resolved: 2023-01-30

## 2023-01-30 PROBLEM — S72.323D: Status: RESOLVED | Noted: 2019-12-11 | Resolved: 2023-01-30

## 2023-01-30 PROBLEM — T73.2XXA FATIGUE DUE TO EXPOSURE: Status: RESOLVED | Noted: 2022-08-25 | Resolved: 2023-01-30

## 2023-01-30 PROBLEM — T84.84XA PAINFUL ORTHOPAEDIC HARDWARE: Status: RESOLVED | Noted: 2019-12-03 | Resolved: 2023-01-30

## 2023-01-30 PROBLEM — S72.452A: Status: RESOLVED | Noted: 2019-11-02 | Resolved: 2023-01-30

## 2023-01-30 PROBLEM — B37.9 YEAST INFECTION: Status: RESOLVED | Noted: 2021-08-10 | Resolved: 2023-01-30

## 2023-01-30 PROBLEM — IMO0001 SMOKING: Status: RESOLVED | Noted: 2020-06-17 | Resolved: 2023-01-30

## 2023-01-30 PROBLEM — N28.9 RENAL IMPAIRMENT: Status: RESOLVED | Noted: 2020-07-02 | Resolved: 2023-01-30

## 2023-01-30 PROBLEM — R11.0 NAUSEA: Status: RESOLVED | Noted: 2020-10-06 | Resolved: 2023-01-30

## 2023-01-30 PROBLEM — L02.92 BOIL: Status: RESOLVED | Noted: 2021-07-22 | Resolved: 2023-01-30

## 2023-01-30 PROBLEM — E11.65 TYPE 2 DIABETES MELLITUS WITH HYPERGLYCEMIA, WITH LONG-TERM CURRENT USE OF INSULIN: Status: RESOLVED | Noted: 2020-07-02 | Resolved: 2023-01-30

## 2023-01-30 PROBLEM — E66.811 CLASS 1 OBESITY WITH SERIOUS COMORBIDITY AND BODY MASS INDEX (BMI) OF 30.0 TO 30.9 IN ADULT: Status: RESOLVED | Noted: 2020-06-17 | Resolved: 2023-01-30

## 2023-01-30 PROBLEM — F17.210 CIGARETTE NICOTINE DEPENDENCE, UNCOMPLICATED: Status: RESOLVED | Noted: 2020-08-03 | Resolved: 2023-01-30

## 2023-01-30 PROBLEM — R10.13 EPIGASTRIC PAIN: Status: RESOLVED | Noted: 2021-08-18 | Resolved: 2023-01-30

## 2023-01-30 PROBLEM — F17.200 SMOKING: Status: RESOLVED | Noted: 2020-06-17 | Resolved: 2023-01-30

## 2023-01-30 PROBLEM — R06.00 DYSPNEA: Status: RESOLVED | Noted: 2020-06-17 | Resolved: 2023-01-30

## 2023-01-30 PROBLEM — R35.0 INCREASED URINARY FREQUENCY: Status: RESOLVED | Noted: 2022-08-25 | Resolved: 2023-01-30

## 2023-02-02 ENCOUNTER — LAB (OUTPATIENT)
Dept: LAB | Facility: HOSPITAL | Age: 53
End: 2023-02-02
Payer: MEDICAID

## 2023-02-02 ENCOUNTER — OFFICE VISIT (OUTPATIENT)
Dept: FAMILY MEDICINE CLINIC | Facility: CLINIC | Age: 53
End: 2023-02-02
Payer: MEDICAID

## 2023-02-02 ENCOUNTER — TELEPHONE (OUTPATIENT)
Dept: FAMILY MEDICINE CLINIC | Facility: CLINIC | Age: 53
End: 2023-02-02

## 2023-02-02 VITALS
HEIGHT: 65 IN | OXYGEN SATURATION: 96 % | BODY MASS INDEX: 24.43 KG/M2 | DIASTOLIC BLOOD PRESSURE: 60 MMHG | WEIGHT: 146.6 LBS | TEMPERATURE: 98.6 F | SYSTOLIC BLOOD PRESSURE: 92 MMHG | HEART RATE: 100 BPM

## 2023-02-02 DIAGNOSIS — Z91.199 NONCOMPLIANCE: ICD-10-CM

## 2023-02-02 DIAGNOSIS — N18.31 STAGE 3A CHRONIC KIDNEY DISEASE: ICD-10-CM

## 2023-02-02 DIAGNOSIS — R30.0 DYSURIA: ICD-10-CM

## 2023-02-02 DIAGNOSIS — E55.9 VITAMIN D DEFICIENCY: ICD-10-CM

## 2023-02-02 DIAGNOSIS — I25.118 CORONARY ARTERY DISEASE INVOLVING NATIVE CORONARY ARTERY OF NATIVE HEART WITH OTHER FORM OF ANGINA PECTORIS: ICD-10-CM

## 2023-02-02 DIAGNOSIS — Z79.899 HIGH RISK MEDICATION USE: ICD-10-CM

## 2023-02-02 DIAGNOSIS — I10 ESSENTIAL HYPERTENSION: ICD-10-CM

## 2023-02-02 DIAGNOSIS — R63.4 WEIGHT LOSS: ICD-10-CM

## 2023-02-02 DIAGNOSIS — Z95.5 HISTORY OF HEART ARTERY STENT: ICD-10-CM

## 2023-02-02 DIAGNOSIS — J44.9 STAGE 1 MILD COPD BY GOLD CLASSIFICATION: ICD-10-CM

## 2023-02-02 DIAGNOSIS — E11.65 UNCONTROLLED TYPE 2 DIABETES MELLITUS WITH HYPERGLYCEMIA: ICD-10-CM

## 2023-02-02 DIAGNOSIS — E78.2 MIXED HYPERLIPIDEMIA: ICD-10-CM

## 2023-02-02 DIAGNOSIS — E10.49 OTHER DIABETIC NEUROLOGICAL COMPLICATION ASSOCIATED WITH TYPE 1 DIABETES MELLITUS: ICD-10-CM

## 2023-02-02 DIAGNOSIS — F33.2 SEVERE EPISODE OF RECURRENT MAJOR DEPRESSIVE DISORDER, WITHOUT PSYCHOTIC FEATURES: ICD-10-CM

## 2023-02-02 DIAGNOSIS — Z12.31 SCREENING MAMMOGRAM, ENCOUNTER FOR: ICD-10-CM

## 2023-02-02 DIAGNOSIS — E11.65 UNCONTROLLED TYPE 2 DIABETES MELLITUS WITH HYPERGLYCEMIA: Primary | ICD-10-CM

## 2023-02-02 DIAGNOSIS — G47.00 INSOMNIA, UNSPECIFIED TYPE: ICD-10-CM

## 2023-02-02 DIAGNOSIS — I25.10 CORONARY ARTERY DISEASE INVOLVING NATIVE CORONARY ARTERY OF NATIVE HEART WITHOUT ANGINA PECTORIS: ICD-10-CM

## 2023-02-02 DIAGNOSIS — E66.3 OVERWEIGHT: ICD-10-CM

## 2023-02-02 DIAGNOSIS — Z85.528 HISTORY OF RENAL CELL CARCINOMA: ICD-10-CM

## 2023-02-02 DIAGNOSIS — Z12.4 ENCOUNTER FOR PAPANICOLAOU SMEAR OF CERVIX: ICD-10-CM

## 2023-02-02 DIAGNOSIS — K21.9 GASTROESOPHAGEAL REFLUX DISEASE, UNSPECIFIED WHETHER ESOPHAGITIS PRESENT: ICD-10-CM

## 2023-02-02 DIAGNOSIS — N18.2 STAGE 2 CHRONIC KIDNEY DISEASE: ICD-10-CM

## 2023-02-02 DIAGNOSIS — Z86.010 HISTORY OF COLONIC POLYPS: ICD-10-CM

## 2023-02-02 DIAGNOSIS — Z72.0 TOBACCO USE: ICD-10-CM

## 2023-02-02 DIAGNOSIS — Z12.11 ENCOUNTER FOR SCREENING COLONOSCOPY: ICD-10-CM

## 2023-02-02 DIAGNOSIS — R10.84 GENERALIZED ABDOMINAL PAIN: ICD-10-CM

## 2023-02-02 DIAGNOSIS — F41.1 GAD (GENERALIZED ANXIETY DISORDER): ICD-10-CM

## 2023-02-02 DIAGNOSIS — Z90.5 S/P NEPHRECTOMY: ICD-10-CM

## 2023-02-02 DIAGNOSIS — F17.209 NICOTINE DEPENDENCE WITH NICOTINE-INDUCED DISORDER, UNSPECIFIED NICOTINE PRODUCT TYPE: ICD-10-CM

## 2023-02-02 LAB
25(OH)D3 SERPL-MCNC: 24.2 NG/ML (ref 30–100)
ALBUMIN SERPL-MCNC: 4.1 G/DL (ref 3.5–5.2)
ALBUMIN UR-MCNC: 12.7 MG/DL
ALBUMIN/GLOB SERPL: 1.4 G/DL
ALP SERPL-CCNC: 94 U/L (ref 39–117)
ALT SERPL W P-5'-P-CCNC: 7 U/L (ref 1–33)
ANION GAP SERPL CALCULATED.3IONS-SCNC: 9 MMOL/L (ref 5–15)
AST SERPL-CCNC: 12 U/L (ref 1–32)
BACTERIA UR QL AUTO: NORMAL /HPF
BASOPHILS # BLD AUTO: 0.08 10*3/MM3 (ref 0–0.2)
BASOPHILS NFR BLD AUTO: 0.5 % (ref 0–1.5)
BILIRUB SERPL-MCNC: 0.4 MG/DL (ref 0–1.2)
BILIRUB UR QL STRIP: NEGATIVE
BUN SERPL-MCNC: 13 MG/DL (ref 6–20)
BUN/CREAT SERPL: 12.4 (ref 7–25)
CALCIUM SPEC-SCNC: 9 MG/DL (ref 8.6–10.5)
CHLORIDE SERPL-SCNC: 97 MMOL/L (ref 98–107)
CHOLEST SERPL-MCNC: 199 MG/DL (ref 0–200)
CLARITY UR: CLEAR
CO2 SERPL-SCNC: 26 MMOL/L (ref 22–29)
COLOR UR: YELLOW
CREAT SERPL-MCNC: 1.05 MG/DL (ref 0.57–1)
CREAT UR-MCNC: 83.5 MG/DL
DEPRECATED RDW RBC AUTO: 37.1 FL (ref 37–54)
EGFRCR SERPLBLD CKD-EPI 2021: 64.1 ML/MIN/1.73
EOSINOPHIL # BLD AUTO: 0.27 10*3/MM3 (ref 0–0.4)
EOSINOPHIL NFR BLD AUTO: 1.7 % (ref 0.3–6.2)
ERYTHROCYTE [DISTWIDTH] IN BLOOD BY AUTOMATED COUNT: 11.7 % (ref 12.3–15.4)
GLOBULIN UR ELPH-MCNC: 3 GM/DL
GLUCOSE SERPL-MCNC: 235 MG/DL (ref 65–99)
GLUCOSE UR STRIP-MCNC: ABNORMAL MG/DL
HBA1C MFR BLD: 10.7 % (ref 4.8–5.6)
HCT VFR BLD AUTO: 42 % (ref 34–46.6)
HDLC SERPL-MCNC: 49 MG/DL (ref 40–60)
HGB BLD-MCNC: 14.1 G/DL (ref 12–15.9)
HGB UR QL STRIP.AUTO: NEGATIVE
HYALINE CASTS UR QL AUTO: NORMAL /LPF
IMM GRANULOCYTES # BLD AUTO: 0.09 10*3/MM3 (ref 0–0.05)
IMM GRANULOCYTES NFR BLD AUTO: 0.6 % (ref 0–0.5)
KETONES UR QL STRIP: NEGATIVE
LDLC SERPL CALC-MCNC: 131 MG/DL (ref 0–100)
LDLC/HDLC SERPL: 2.64 {RATIO}
LEUKOCYTE ESTERASE UR QL STRIP.AUTO: NEGATIVE
LYMPHOCYTES # BLD AUTO: 3.79 10*3/MM3 (ref 0.7–3.1)
LYMPHOCYTES NFR BLD AUTO: 23.7 % (ref 19.6–45.3)
MCH RBC QN AUTO: 29.3 PG (ref 26.6–33)
MCHC RBC AUTO-ENTMCNC: 33.6 G/DL (ref 31.5–35.7)
MCV RBC AUTO: 87.3 FL (ref 79–97)
MICROALBUMIN/CREAT UR: 152.1 MG/G
MONOCYTES # BLD AUTO: 1.17 10*3/MM3 (ref 0.1–0.9)
MONOCYTES NFR BLD AUTO: 7.3 % (ref 5–12)
NEUTROPHILS NFR BLD AUTO: 10.56 10*3/MM3 (ref 1.7–7)
NEUTROPHILS NFR BLD AUTO: 66.2 % (ref 42.7–76)
NITRITE UR QL STRIP: NEGATIVE
NRBC BLD AUTO-RTO: 0.1 /100 WBC (ref 0–0.2)
PH UR STRIP.AUTO: 5.5 [PH] (ref 5–8)
PLATELET # BLD AUTO: 322 10*3/MM3 (ref 140–450)
PMV BLD AUTO: 10.5 FL (ref 6–12)
POTASSIUM SERPL-SCNC: 4.6 MMOL/L (ref 3.5–5.2)
PROT SERPL-MCNC: 7.1 G/DL (ref 6–8.5)
PROT UR QL STRIP: ABNORMAL
RBC # BLD AUTO: 4.81 10*6/MM3 (ref 3.77–5.28)
RBC # UR STRIP: NORMAL /HPF
REF LAB TEST METHOD: NORMAL
SODIUM SERPL-SCNC: 132 MMOL/L (ref 136–145)
SP GR UR STRIP: 1.01 (ref 1–1.03)
SQUAMOUS #/AREA URNS HPF: NORMAL /HPF
TRIGL SERPL-MCNC: 104 MG/DL (ref 0–150)
UROBILINOGEN UR QL STRIP: ABNORMAL
VIT B12 BLD-MCNC: 546 PG/ML (ref 211–946)
VLDLC SERPL-MCNC: 19 MG/DL (ref 5–40)
WBC # UR STRIP: NORMAL /HPF
WBC NRBC COR # BLD: 15.96 10*3/MM3 (ref 3.4–10.8)

## 2023-02-02 PROCEDURE — 85025 COMPLETE CBC W/AUTO DIFF WBC: CPT

## 2023-02-02 PROCEDURE — 82570 ASSAY OF URINE CREATININE: CPT

## 2023-02-02 PROCEDURE — 81001 URINALYSIS AUTO W/SCOPE: CPT

## 2023-02-02 PROCEDURE — 80053 COMPREHEN METABOLIC PANEL: CPT

## 2023-02-02 PROCEDURE — 80061 LIPID PANEL: CPT

## 2023-02-02 PROCEDURE — 83036 HEMOGLOBIN GLYCOSYLATED A1C: CPT

## 2023-02-02 PROCEDURE — 82607 VITAMIN B-12: CPT

## 2023-02-02 PROCEDURE — 87086 URINE CULTURE/COLONY COUNT: CPT

## 2023-02-02 PROCEDURE — 82306 VITAMIN D 25 HYDROXY: CPT

## 2023-02-02 PROCEDURE — 82043 UR ALBUMIN QUANTITATIVE: CPT

## 2023-02-02 RX ORDER — ALBUTEROL SULFATE 90 UG/1
2 AEROSOL, METERED RESPIRATORY (INHALATION) EVERY 4 HOURS PRN
Qty: 8.5 G | Refills: 3 | Status: SHIPPED | OUTPATIENT
Start: 2023-02-02

## 2023-02-02 RX ORDER — FAMOTIDINE 20 MG/1
20 TABLET, FILM COATED ORAL 2 TIMES DAILY
Qty: 60 TABLET | Refills: 3 | Status: SHIPPED | OUTPATIENT
Start: 2023-02-02

## 2023-02-02 RX ORDER — INSULIN LISPRO 100 [IU]/ML
INJECTION, SOLUTION INTRAVENOUS; SUBCUTANEOUS
Qty: 15 ML | Refills: 0 | Status: SHIPPED | OUTPATIENT
Start: 2023-02-02

## 2023-02-02 RX ORDER — NICOTINE 10 MG
1 CARTRIDGE (EA) INHALATION AS NEEDED
Qty: 168 EACH | Refills: 3 | Status: SHIPPED | OUTPATIENT
Start: 2023-02-02

## 2023-02-02 RX ORDER — PROCHLORPERAZINE 25 MG/1
1 SUPPOSITORY RECTAL 3 TIMES DAILY
Qty: 1 EACH | Refills: 3 | Status: SHIPPED | OUTPATIENT
Start: 2023-02-02

## 2023-02-02 RX ORDER — LOSARTAN POTASSIUM 25 MG/1
25 TABLET ORAL DAILY
Qty: 30 TABLET | Refills: 3 | Status: SHIPPED | OUTPATIENT
Start: 2023-02-02

## 2023-02-02 RX ORDER — PEN NEEDLE, DIABETIC 31 GX5/16"
NEEDLE, DISPOSABLE MISCELLANEOUS
Qty: 100 EACH | Refills: 3 | Status: SHIPPED | OUTPATIENT
Start: 2023-02-02

## 2023-02-02 RX ORDER — EZETIMIBE 10 MG/1
10 TABLET ORAL DAILY
Qty: 30 TABLET | Refills: 3 | Status: SHIPPED | OUTPATIENT
Start: 2023-02-02

## 2023-02-02 RX ORDER — ZOLPIDEM TARTRATE 10 MG/1
TABLET ORAL
COMMUNITY
Start: 2023-01-10 | End: 2023-04-04

## 2023-02-02 RX ORDER — PEN NEEDLE, DIABETIC 30 GX3/16"
1 NEEDLE, DISPOSABLE MISCELLANEOUS 4 TIMES DAILY
Qty: 120 EACH | Refills: 11 | Status: SHIPPED | OUTPATIENT
Start: 2023-02-02 | End: 2023-04-04 | Stop reason: SDUPTHER

## 2023-02-02 RX ORDER — LANCETS 30 GAUGE
EACH MISCELLANEOUS
Qty: 100 EACH | Refills: 11 | Status: SHIPPED | OUTPATIENT
Start: 2023-02-02

## 2023-02-02 RX ORDER — ERGOCALCIFEROL 1.25 MG/1
50000 CAPSULE ORAL WEEKLY
Qty: 4 CAPSULE | Refills: 3 | Status: SHIPPED | OUTPATIENT
Start: 2023-02-02

## 2023-02-02 RX ORDER — ONDANSETRON 8 MG/1
8 TABLET, ORALLY DISINTEGRATING ORAL EVERY 8 HOURS PRN
Qty: 90 TABLET | Refills: 3 | Status: SHIPPED | OUTPATIENT
Start: 2023-02-02

## 2023-02-02 RX ORDER — ROSUVASTATIN CALCIUM 20 MG/1
20 TABLET, COATED ORAL
Qty: 30 TABLET | Refills: 3 | Status: SHIPPED | OUTPATIENT
Start: 2023-02-02

## 2023-02-02 RX ORDER — DICYCLOMINE HCL 20 MG
20 TABLET ORAL EVERY 6 HOURS
Qty: 120 TABLET | Refills: 3 | Status: SHIPPED | OUTPATIENT
Start: 2023-02-02

## 2023-02-02 RX ORDER — CLOPIDOGREL BISULFATE 75 MG/1
75 TABLET ORAL DAILY
Qty: 30 TABLET | Refills: 3 | Status: SHIPPED | OUTPATIENT
Start: 2023-02-02 | End: 2023-04-04 | Stop reason: SDUPTHER

## 2023-02-02 RX ORDER — OMEPRAZOLE 40 MG/1
40 CAPSULE, DELAYED RELEASE ORAL DAILY
Qty: 30 CAPSULE | Refills: 3 | Status: SHIPPED | OUTPATIENT
Start: 2023-02-02

## 2023-02-02 RX ORDER — LORATADINE 10 MG/1
10 TABLET ORAL DAILY
Qty: 30 TABLET | Refills: 3 | Status: SHIPPED | OUTPATIENT
Start: 2023-02-02

## 2023-02-02 RX ORDER — CARVEDILOL 3.12 MG/1
3.12 TABLET ORAL 2 TIMES DAILY WITH MEALS
Qty: 60 TABLET | Refills: 3 | Status: SHIPPED | OUTPATIENT
Start: 2023-02-02

## 2023-02-02 RX ORDER — PROCHLORPERAZINE 25 MG/1
1 SUPPOSITORY RECTAL AS NEEDED
Qty: 9 EACH | Refills: 3 | Status: SHIPPED | OUTPATIENT
Start: 2023-02-02

## 2023-02-02 RX ORDER — HYDROXYZINE PAMOATE 25 MG/1
25 CAPSULE ORAL 3 TIMES DAILY PRN
Qty: 90 CAPSULE | Refills: 3 | Status: SHIPPED | OUTPATIENT
Start: 2023-02-02 | End: 2023-04-04 | Stop reason: ALTCHOICE

## 2023-02-02 RX ORDER — INSULIN GLARGINE 100 [IU]/ML
INJECTION, SOLUTION SUBCUTANEOUS
Qty: 15 ML | Refills: 1 | Status: SHIPPED | OUTPATIENT
Start: 2023-02-02

## 2023-02-02 RX ORDER — DIAZEPAM 10 MG/1
TABLET ORAL
COMMUNITY
Start: 2023-01-26

## 2023-02-02 RX ORDER — RANOLAZINE 500 MG/1
500 TABLET, EXTENDED RELEASE ORAL 2 TIMES DAILY
Qty: 60 TABLET | Refills: 3 | Status: SHIPPED | OUTPATIENT
Start: 2023-02-02

## 2023-02-02 RX ORDER — GUAIFENESIN 600 MG/1
1200 TABLET, EXTENDED RELEASE ORAL 2 TIMES DAILY
Qty: 60 TABLET | Refills: 0 | Status: SHIPPED | OUTPATIENT
Start: 2023-02-02 | End: 2023-04-04 | Stop reason: SDUPTHER

## 2023-02-02 NOTE — TELEPHONE ENCOUNTER
Charles from [facility] called stating that they do not carry the specific brand for the Pt.s Global Ease Inject Pen Needles 31G X 8 MM misc medication. The Pharmacy does carry the brand BD UltraFine. The Pharmacy would like to know if it is ok to use this brand instead??     You can call back at [phone]

## 2023-02-03 LAB — BACTERIA SPEC AEROBE CULT: NO GROWTH

## 2023-02-04 DIAGNOSIS — R80.9 PROTEINURIA, UNSPECIFIED TYPE: ICD-10-CM

## 2023-02-04 DIAGNOSIS — E11.649 UNCONTROLLED TYPE 2 DIABETES MELLITUS WITH HYPOGLYCEMIA WITHOUT COMA: ICD-10-CM

## 2023-02-04 DIAGNOSIS — N18.2 STAGE 2 CHRONIC KIDNEY DISEASE: ICD-10-CM

## 2023-02-04 DIAGNOSIS — E11.21 DIABETIC NEPHROPATHY ASSOCIATED WITH TYPE 2 DIABETES MELLITUS: Primary | ICD-10-CM

## 2023-02-27 PROBLEM — D72.819 LEUKOPENIA: Status: ACTIVE | Noted: 2023-02-27

## 2023-02-27 PROBLEM — E87.1 HYPONATREMIA: Status: ACTIVE | Noted: 2023-02-27

## 2023-03-02 DIAGNOSIS — R10.84 GENERALIZED ABDOMINAL PAIN: ICD-10-CM

## 2023-03-03 ENCOUNTER — OFFICE VISIT (OUTPATIENT)
Dept: FAMILY MEDICINE CLINIC | Facility: CLINIC | Age: 53
End: 2023-03-03
Payer: MEDICAID

## 2023-03-03 DIAGNOSIS — N18.2 STAGE 2 CHRONIC KIDNEY DISEASE: ICD-10-CM

## 2023-03-03 DIAGNOSIS — Z90.5 S/P NEPHRECTOMY: ICD-10-CM

## 2023-03-03 DIAGNOSIS — J44.9 STAGE 1 MILD COPD BY GOLD CLASSIFICATION: ICD-10-CM

## 2023-03-03 DIAGNOSIS — E55.9 VITAMIN D DEFICIENCY: ICD-10-CM

## 2023-03-03 DIAGNOSIS — F41.1 GAD (GENERALIZED ANXIETY DISORDER): ICD-10-CM

## 2023-03-03 DIAGNOSIS — Z95.5 HISTORY OF HEART ARTERY STENT: ICD-10-CM

## 2023-03-03 DIAGNOSIS — Z72.0 TOBACCO USE: ICD-10-CM

## 2023-03-03 DIAGNOSIS — Z91.199 NONCOMPLIANCE: Primary | ICD-10-CM

## 2023-03-03 DIAGNOSIS — Z79.899 HIGH RISK MEDICATION USE: ICD-10-CM

## 2023-03-03 DIAGNOSIS — Z71.6 TOBACCO ABUSE COUNSELING: ICD-10-CM

## 2023-03-03 DIAGNOSIS — I10 ESSENTIAL HYPERTENSION: ICD-10-CM

## 2023-03-03 DIAGNOSIS — E87.1 HYPONATREMIA: ICD-10-CM

## 2023-03-03 DIAGNOSIS — D72.819 LEUKOPENIA, UNSPECIFIED TYPE: ICD-10-CM

## 2023-03-03 DIAGNOSIS — I47.1 PAROXYSMAL SVT (SUPRAVENTRICULAR TACHYCARDIA): ICD-10-CM

## 2023-03-03 DIAGNOSIS — F33.2 SEVERE EPISODE OF RECURRENT MAJOR DEPRESSIVE DISORDER, WITHOUT PSYCHOTIC FEATURES: ICD-10-CM

## 2023-03-03 DIAGNOSIS — K21.9 GASTROESOPHAGEAL REFLUX DISEASE, UNSPECIFIED WHETHER ESOPHAGITIS PRESENT: ICD-10-CM

## 2023-03-03 DIAGNOSIS — E11.65 UNCONTROLLED TYPE 2 DIABETES MELLITUS WITH HYPERGLYCEMIA: ICD-10-CM

## 2023-03-03 DIAGNOSIS — E10.49 OTHER DIABETIC NEUROLOGICAL COMPLICATION ASSOCIATED WITH TYPE 1 DIABETES MELLITUS: ICD-10-CM

## 2023-03-03 PROCEDURE — 3046F HEMOGLOBIN A1C LEVEL >9.0%: CPT | Performed by: FAMILY MEDICINE

## 2023-03-11 NOTE — PROGRESS NOTES
Subjective:  Yudi West is a 52 y.o. female who presents for       Patient Active Problem List   Diagnosis   • Coronary artery disease involving native coronary artery of native heart without angina pectoris   • Myocardial infarction, old   • Mixed hyperlipidemia   • Stage 1 mild COPD by GOLD classification   • S/p nephrectomy   • Diabetic neuropathy   • Tobacco abuse counseling   • High risk medication use   • Insomnia   • Stage 3 chronic kidney disease   • Claudication of lower extremity   • Severe episode of recurrent major depressive disorder, without psychotic features   • KRISTOFER (generalized anxiety disorder)   • Other constipation   • Generalized abdominal pain   • Weight loss   • Class 1 obesity in adult   • Gastroesophageal reflux disease   • Vitamin D deficiency   • Internal derangement of left shoulder   • Paroxysmal SVT (supraventricular tachycardia)   • Tobacco use   • Uncontrolled type 2 diabetes mellitus with hyperglycemia   • Essential hypertension   • History of heart artery stent   • Snoring   • Noncompliance   • Nicotine dependence with nicotine-induced disorder   • Leukopenia   • Hyponatremia           Current Outpatient Medications:   •  albuterol sulfate HFA (ProAir HFA) 108 (90 Base) MCG/ACT inhaler, Inhale 2 puffs Every 4 (Four) Hours As Needed for Wheezing., Disp: 8.5 g, Rfl: 3  •  Alpha-Lipoic Acid 300 MG capsule, TAKE ONE TO TWO CAPSULES BY MOUTH TWICE DAILY, Disp: , Rfl:   •  aspirin 81 MG EC tablet, Take 1 tablet by mouth 2 (Two) Times a Day With Meals., Disp: 60 tablet, Rfl: 0  •  carvedilol (COREG) 3.125 MG tablet, Take 1 tablet by mouth 2 (Two) Times a Day With Meals., Disp: 60 tablet, Rfl: 3  •  clopidogrel (PLAVIX) 75 MG tablet, Take 1 tablet by mouth Daily., Disp: 30 tablet, Rfl: 3  •  Continuous Blood Gluc Sensor (Dexcom G6 Sensor), 1 each As Needed (glucose control). Every 10 days, Disp: 9 each, Rfl: 3  •  Continuous Blood Gluc Transmit (Dexcom G6 Transmitter) misc,  Inject 1 application under the skin into the appropriate area as directed 3 (Three) Times a Day., Disp: 1 each, Rfl: 3  •  diazePAM (VALIUM) 10 MG tablet, , Disp: , Rfl:   •  dicyclomine (BENTYL) 20 MG tablet, Take 1 tablet by mouth Every 6 (Six) Hours., Disp: 120 tablet, Rfl: 3  •  doxazosin (CARDURA) 1 MG tablet, , Disp: , Rfl:   •  empagliflozin (Jardiance) 25 MG tablet tablet, Take 1 tablet by mouth Daily., Disp: 30 tablet, Rfl: 3  •  ezetimibe (ZETIA) 10 MG tablet, Take 1 tablet by mouth Daily., Disp: 30 tablet, Rfl: 3  •  famotidine (PEPCID) 20 MG tablet, Take 1 tablet by mouth 2 (Two) Times a Day., Disp: 60 tablet, Rfl: 3  •  gabapentin (NEURONTIN) 800 MG tablet, , Disp: , Rfl:   •  Global Ease Inject Pen Needles 31G X 8 MM misc, Use as needed, Disp: 100 each, Rfl: 3  •  glucose blood test strip, Test 3 times daily, Disp: 100 each, Rfl: 11  •  glucose monitor monitoring kit, 1 each As Needed (to check bg)., Disp: 1 each, Rfl: 0  •  guaiFENesin (Mucus Relief) 600 MG 12 hr tablet, Take 2 tablets by mouth 2 (Two) Times a Day. NO ADDITIONAL REFILLS WITHOUT AN APPOINTMENT, Disp: 60 tablet, Rfl: 0  •  HYDROcodone-acetaminophen (NORCO)  MG per tablet, , Disp: , Rfl:   •  hydrOXYzine (ATARAX) 50 MG tablet, , Disp: , Rfl:   •  Insulin Glargine (BASAGLAR KWIKPEN) 100 UNIT/ML injection pen, Take 36 units at bedtime can go up by 2-3 unites every 3 days until am sugars running   02/05/2021 - Patient currently utilizing 50 Units nightly., Disp: 15 mL, Rfl: 1  •  Insulin Lispro, 1 Unit Dial, (HUMALOG) 100 UNIT/ML solution pen-injector, INJECT UP TO 20 UNITS WITH MEALS, Disp: 15 mL, Rfl: 0  •  Lancets misc, Test 3 times daily , E11.65, Disp: 100 each, Rfl: 11  •  lidocaine (LIDODERM) 5 %, APPLY ONE TO TWO PATCHES AS DIRECTED ON LOWER BACK, LEFT UPPER LEG 12 HOURS ON AND 12 HOURS OFF, Disp: , Rfl:   •  lidocaine-prilocaine (EMLA) 2.5-2.5 % cream, , Disp: , Rfl:   •  loratadine (Allergy Relief) 10 MG tablet, Take 1  tablet by mouth Daily., Disp: 30 tablet, Rfl: 3  •  losartan (COZAAR) 25 MG tablet, Take 1 tablet by mouth Daily., Disp: 30 tablet, Rfl: 3  •  nicotine (Nicotrol) 10 MG inhaler, Inhale 1 puff As Needed for Smoking Cessation., Disp: 168 each, Rfl: 3  •  omeprazole (priLOSEC) 40 MG capsule, Take 1 capsule by mouth Daily., Disp: 30 capsule, Rfl: 3  •  ondansetron ODT (ZOFRAN-ODT) 8 MG disintegrating tablet, Place 1 tablet on the tongue Every 8 (Eight) Hours As Needed for Nausea or Vomiting., Disp: 90 tablet, Rfl: 3  •  polyethylene glycol (MIRALAX) 17 GM/SCOOP powder, MIX 17 GRAMS (1 CAPFUL) IN 8 OUNCES OF WATER OR JUICE AND DRINK DAILY, Disp: 510 g, Rfl: 5  •  ranolazine (RANEXA) 500 MG 12 hr tablet, Take 1 tablet by mouth 2 (Two) Times a Day., Disp: 60 tablet, Rfl: 3  •  rosuvastatin (CRESTOR) 20 MG tablet, Take 1 tablet by mouth every night at bedtime., Disp: 30 tablet, Rfl: 3  •  SITagliptin (Januvia) 100 MG tablet, Take 1 tablet by mouth Daily., Disp: 30 tablet, Rfl: 3  •  vitamin D (ERGOCALCIFEROL) 1.25 MG (72272 UT) capsule capsule, Take 1 capsule by mouth 1 (One) Time Per Week., Disp: 4 capsule, Rfl: 3  •  DULoxetine (Cymbalta) 30 MG capsule, Take 1 tablet daily for 1st week.  Then twice a day for the following, Disp: 60 capsule, Rfl: 3\      Pt is 53 yo female with management of her being overweight, DM type 2 HLP, HTN, diabetic neuropathy, insomnia, major depression,KRISTOFER  CAD sp stent x 2   Echocardiogram on 6/3/19 (grade 1 diastolic dysfunction) ,  History of MI, COPD, history of fracture of femur, sp surgery on left femur, sp cholecystectomy, sp hysterectomy, sp left nephrectomy,  History of renal cell carcnoma sp left total knee replacement surgery, sp tonsillectomy , diabetic neuropathy, DELFIN, colonic polyps, GERD with esophagitis, gastritis, hiatal hernia, CKD stage 2.  chronic left shoulder pain(adhesive capsuliits,bursitis)     8/25/22 in office visit for recheck. She has been following up with  Orthopedic for her left shoulder pain and joint issues.. pt recently went to [facility] ER on 74/22 for chets pain and palpitaitons.  Her glucose was at 400 chest x-ray was normal.  She was recommend to stay in hospital for observation but pt declined. She signed AMA.  Her BNP troponin were normal on 7/4/22  Pt has history of noncompliance.  Pt saw Cardiology on 7/18/22 for her chest pain. She stopped taking her medications for 5 months she has been going through stress with losing family members. . Her palpitations was likely due to SVT/atrial tachycria.  Pt has restarted her medications including coreg 3.125 mg PO BID plavix ranexa and crestor along with aspirin. She was started on diltiazem  mg daily.  She was recommended echocardiogram and  Lexican cardiolite stress test. Her stress test on 8/16/22 showed low risk study her last labwork on 5/20/22 showed stable viitamin b12 tsh normal lipid panel showed LDL at 152. hga1c at 12.20. CMP showed glucose >300 GFR at 81.9. CBC showed stable hemoglobin and WBC. Vitamin D was normal at 22.5. pt lost 17 lbs since her last visit.  She has not been checking her sugars daily. She continues to smoke about 1 ppd. She also gets tired during the day and averages about 3 hours of sleep at bedtime.     2/2/23  in office visit for recheck. Pt no showed to several appt including Sleep medicine on 10/27/22 Cardiology on 12/1/22 Orthopedic on 12/7/22 and her Endocrinology appt was rescheduled to 2/8/2. Pt had labwork done on 8/26/22 that showed vitamin B12 normal thyroid studies normal iron profile showed low iron saturation. Ferritin was at 308. Vitamin D was low at 26.4. lipid panel showed LDL at 155. Total cholesterol at 227. hga1c was at 11.50. CMP showed glucose at 290 bUN at 21 sodim at 134 GFR at 68.3 from 81.9. pt had CT of chest on 8/30/22 that showed no pulmonary nodules does have mild centrilobular emphysematous changes and right upper lobe apical segment small linear foci  of lung scarring and evidence of old granulomatous disease. Pt did see Orthopedic on 1/18/23 for her left shoulder pain/adhesive capsulitis and bursitis. Pt saw Interventional Radiologist on 1/25/23 for steroid injection. BP on lower side today. She is on coreg 3.125 mg PO bid cardizem 120 mg daily and losartan 25 mg daily.  She went back to smoking tobacco after quitting and trying ecigarettes  She has not been feeling well for past 3 weeks. She states that she thinks she may have shingles her upper abdomen has been having. She has sharp pains throoughout upper abdomen but no rash.   She has not been taking her medications. She is also on high risk medications including Norco, Valium, Neurontin and ambie. She also has been having dysuria for past few weeks     4/4/23 in office visit for recheck . Pt missed her appt with Endocrinology on 2/8/23. She has appt with OB/GYN on 3/2/23 and GI on 3/13/23. Pt had labwork on 2/2/23 that showed vitmain B12 normal vitamin D was low at 24.2 lipid panel showed LDL at 131 hga1c was at 10.70. CMP showed glucose at 235. CR at 1.04 sodium at 132 chloride at 97.  GFR at 64.1 CBC showed  WBC at 15.96 from 10.82 hemoglobin at 14.1 and platelet count normal. Pt was ordered mammogram and US of abdomen that has yet to be completed . Pt has appt with Gastroenteorlogy on 3/13/23 her sugars have improved. No chest pain no dizziness. She has cut back on smoking to about 1 ppd.  Her main concern is neuropathy despite taking neurontin 800 mg PO BID she is also having hair loss. She also has right sided chest pain related to her previous shingles. She is now off ambien, vaylar and       Abdominal Pain  This is a recurrent problem. The current episode started more than 1 month ago. The onset quality is gradual. The problem occurs constantly. The problem has been unchanged. The pain is at a severity of 4/10. The pain is moderate. The quality of the pain is aching. Associated symptoms include  arthralgias. Pertinent negatives include no diarrhea, fever, headaches, nausea or vomiting. Nothing aggravates the pain. The pain is relieved by nothing. She has tried nothing for the symptoms. There is no history of abdominal surgery, colon cancer, Crohn's disease, gallstones, GERD, irritable bowel syndrome, pancreatitis, PUD or ulcerative colitis.   Chronic Kidney Disease  This is a chronic problem. The current episode started more than 1 year ago. The problem occurs constantly. The problem has been unchanged. Associated symptoms include arthralgias and fatigue. Pertinent negatives include no abdominal pain, anorexia, change in bowel habit, chest pain, chills, congestion, coughing, diaphoresis, fever, headaches, joint swelling, myalgias, nausea, neck pain, numbness, sore throat, swollen glands, urinary symptoms, vertigo, visual change, vomiting or weakness. Nothing aggravates the symptoms. She has tried nothing for the symptoms. The treatment provided no relief.   Fatigue  This is a recurrent problem. The current episode started more than 1 year ago. The problem occurs constantly. The problem has been unchanged. Associated symptoms include fatigue. Pertinent negatives include no abdominal pain, anorexia, arthralgias, change in bowel habit, chest pain, chills, congestion, coughing, diaphoresis, fever, headaches, joint swelling, myalgias, nausea, neck pain, numbness, rash, sore throat, swollen glands, urinary symptoms, vertigo, visual change, vomiting or weakness. Nothing aggravates the symptoms. She has tried nothing for the symptoms. The treatment provided no relief.   Anxiety  Presents for follow-up visit. Symptoms include depressed mood, excessive worry, insomnia, irritability, nervous/anxious behavior and shortness of breath. Patient reports no chest pain, compulsions, confusion, decreased concentration, dizziness, dry mouth, feeling of choking, hyperventilation, impotence, malaise, muscle  tension, nausea, obsessions, palpitations, panic or restlessness. The quality of sleep is fair. Nighttime awakenings: none.   Diabetes   She presents for her followup diabetic visit. She has type 2 diabetes mellitus. Her disease course has been waxing and waning  Hypoglycemia symptoms include tremors. Pertinent negatives for hypoglycemia include no confusion, dizziness, headaches, hunger, mood changes, nervousness/anxiousness, pallor, seizures, sleepiness, speech difficulty or sweats. Associated symptoms include fatigue, polyuria and weakness. Pertinent negatives for diabetes include no blurred vision, no chest pain, no foot paresthesias and no weight loss. Pertinent negatives for hypoglycemia complications include no blackouts, no hospitalization, no nocturnal hypoglycemia, no required assistance and no required glucagon injection. Symptoms are stable. Pertinent negatives for diabetic complications include no CVA, impotence or retinopathy. Risk factors for coronary artery disease include diabetes mellitus, dyslipidemia, sedentary lifestyle and tobacco exposure. She is compliant with treatment all of the time. Her weight is stable. She is following a generally unhealthy diet. She does not see a podiatrist.Eye exam is not current.    Hypertension   This is a chronic problem. The current episode started more than 1 year ago. The problem has been waxing and waning since onset. The problem is uncontrolled. Associated symptoms include shortness of breath. Pertinent negatives include no anxiety, blurred vision, chest pain, headaches, malaise/fatigue, palpitations, PND or sweats. Risk factors for coronary artery disease include diabetes mellitus, dyslipidemia, sedentary lifestyle and smoking/tobacco exposure. Past treatments include beta blockers. Current antihypertension treatment includes beta blockers. The current treatment provides no improvement. There is no history of angina, kidney disease, CAD/MI, CVA, heart  failure, left ventricular hypertrophy or retinopathy. There is no history of chronic renal disease, coarctation of the aorta, hyperaldosteronism, hypercortisolism, hyperparathyroidism, a hypertension causing med, pheochromocytoma, renovascular disease, sleep apnea or a thyroid problem.   Depression   Visit Type: followup  Onset of symptoms: at an unknown time  Patient presents with the following symptoms: compulsions, decreased concentration, depressed mood, excessive worry and shortness of breath.  Patient is not experiencing: anhedonia, chest pain, choking sensation, confusion, dizziness, dry mouth, fatigue, feelings of hopelessness, feelings of worthlessness, hypersomnia, hyperventilation, impotence, insomnia, irritability, malaise, memory impairment, muscle tension, nausea, nervousness/anxiety, obsessions, palpitations, panic, psychomotor agitation, psychomotor retardation, restlessness, suicidal ideas, suicidal planning, thoughts of death, weight gain and weight loss.  Patient has a history of: depression  No history of: anemia, anxiety/panic attacks, arrhythmia, asthma, bipolar disorder, CAD, CHF, chronic lung disease, fibromyalgia, hyperthyroidism, suicide attempt, mental illness and substance abuse  Treatment tried: SSRI, wellbutrin     Review of Systems  Review of Systems   Constitutional: Positive for activity change, fatigue and unexpected weight change. Negative for appetite change, chills, diaphoresis and fever.   HENT: Negative for congestion, postnasal drip, rhinorrhea, sinus pressure, sinus pain, sneezing, sore throat, trouble swallowing and voice change.    Respiratory: Positive for cough and shortness of breath. Negative for choking, chest tightness, wheezing and stridor.    Cardiovascular: Negative for chest pain.   Gastrointestinal: Positive for abdominal pain. Negative for diarrhea, nausea and vomiting.   Musculoskeletal: Positive for arthralgias.   Neurological: Positive for weakness and  numbness. Negative for headaches.   Psychiatric/Behavioral: The patient is nervous/anxious.         Depressed mood        Patient Active Problem List   Diagnosis   • Coronary artery disease involving native coronary artery of native heart without angina pectoris   • Myocardial infarction, old   • Mixed hyperlipidemia   • Stage 1 mild COPD by GOLD classification   • S/p nephrectomy   • Diabetic neuropathy   • Tobacco abuse counseling   • High risk medication use   • Insomnia   • Stage 3 chronic kidney disease   • Claudication of lower extremity   • Severe episode of recurrent major depressive disorder, without psychotic features   • KRISTOFER (generalized anxiety disorder)   • Other constipation   • Generalized abdominal pain   • Weight loss   • Class 1 obesity in adult   • Gastroesophageal reflux disease   • Vitamin D deficiency   • Internal derangement of left shoulder   • Paroxysmal SVT (supraventricular tachycardia)   • Tobacco use   • Uncontrolled type 2 diabetes mellitus with hyperglycemia   • Essential hypertension   • History of heart artery stent   • Snoring   • Noncompliance   • Nicotine dependence with nicotine-induced disorder   • Leukopenia   • Hyponatremia     Past Surgical History:   Procedure Laterality Date   • CARDIAC SURGERY     • CHOLECYSTECTOMY     • COLONOSCOPY N/A 1/28/2021    Procedure: COLONOSCOPY;  Surgeon: Juwan Wilkes MD;  Location: [facility] ENDOSCOPY;  Service: Gastroenterology;  Laterality: N/A;   • CORONARY STENT PLACEMENT     • ENDOSCOPY N/A 1/28/2021    Procedure: ESOPHAGOGASTRODUODENOSCOPY;  Surgeon: Juwan Wilkes MD;  Location: [facility] ENDOSCOPY;  Service: Gastroenterology;  Laterality: N/A;   • FEMUR IM NAILING RETROGRADE Left 11/3/2019    Procedure: FEMUR INTRAMEDULLARY NAILING RETROGRADE;  Surgeon: Howie Campoverde MD;  Location: [facility] OR;  Service: Orthopedics   • GALLBLADDER SURGERY     • HARDWARE REMOVAL Left 12/4/2019    Procedure: HARDWARE REMOVAL LEFT FEMUR         "(C-ARM#3);  Surgeon: Howie Campoverde MD;  Location: [facility];  Service: Orthopedics   • HYSTERECTOMY     • JOINT REPLACEMENT     • NEPHRECTOMY Left    • REPLACEMENT TOTAL KNEE Left    • TONSILLECTOMY     • TUBAL ABDOMINAL LIGATION     • UPPER GASTROINTESTINAL ENDOSCOPY  01/28/2021    Per Dr. Juwan Wilkes M.D., [place], [place]     Social History     Socioeconomic History   • Marital status:    Tobacco Use   • Smoking status: Every Day     Packs/day: 2.00     Years: 37.00     Pack years: 74.00     Types: Cigarettes   • Smokeless tobacco: Never   • Tobacco comments:     Have slowed down alot   Vaping Use   • Vaping Use: Never used   • Passive vaping exposure: Yes (Son)   Substance and Sexual Activity   • Alcohol use: Not Currently     Alcohol/week: 0.0 standard drinks     Comment: passed use for yrs including cases and 2 fifths daily; quit 20 yrs ago   • Drug use: Not Currently     Types: \"Crack\" cocaine, Methamphetamines   • Sexual activity: Yes     Partners: Male     Birth control/protection: None     Family History   Problem Relation Age of Onset   • Heart disease Mother    • Hypertension Mother    • Cancer Mother    • Diabetes Mother    • Alcohol abuse Mother    • Heart disease Father    • Hypertension Father    • Alcohol abuse Father    • Alcohol abuse Sister    • Drug abuse Sister    • Depression Sister    • Anxiety disorder Sister    • Drug abuse Brother    • Alcohol abuse Brother    • Suicide Attempts Brother      Lab on 02/02/2023   Component Date Value Ref Range Status   • WBC 02/02/2023 15.96 (H)  3.40 - 10.80 10*3/mm3 Final   • RBC 02/02/2023 4.81  3.77 - 5.28 10*6/mm3 Final   • Hemoglobin 02/02/2023 14.1  12.0 - 15.9 g/dL Final   • Hematocrit 02/02/2023 42.0  34.0 - 46.6 % Final   • MCV 02/02/2023 87.3  79.0 - 97.0 fL Final   • MCH 02/02/2023 29.3  26.6 - 33.0 pg Final   • MCHC 02/02/2023 33.6  31.5 - 35.7 g/dL Final   • RDW 02/02/2023 11.7 (L)  12.3 - 15.4 % Final   • RDW-SD 02/02/2023 " 37.1  37.0 - 54.0 fl Final   • MPV 02/02/2023 10.5  6.0 - 12.0 fL Final   • Platelets 02/02/2023 322  140 - 450 10*3/mm3 Final   • Neutrophil % 02/02/2023 66.2  42.7 - 76.0 % Final   • Lymphocyte % 02/02/2023 23.7  19.6 - 45.3 % Final   • Monocyte % 02/02/2023 7.3  5.0 - 12.0 % Final   • Eosinophil % 02/02/2023 1.7  0.3 - 6.2 % Final   • Basophil % 02/02/2023 0.5  0.0 - 1.5 % Final   • Immature Grans % 02/02/2023 0.6 (H)  0.0 - 0.5 % Final   • Neutrophils, Absolute 02/02/2023 10.56 (H)  1.70 - 7.00 10*3/mm3 Final   • Lymphocytes, Absolute 02/02/2023 3.79 (H)  0.70 - 3.10 10*3/mm3 Final   • Monocytes, Absolute 02/02/2023 1.17 (H)  0.10 - 0.90 10*3/mm3 Final   • Eosinophils, Absolute 02/02/2023 0.27  0.00 - 0.40 10*3/mm3 Final   • Basophils, Absolute 02/02/2023 0.08  0.00 - 0.20 10*3/mm3 Final   • Immature Grans, Absolute 02/02/2023 0.09 (H)  0.00 - 0.05 10*3/mm3 Final   • nRBC 02/02/2023 0.1  0.0 - 0.2 /100 WBC Final   • Glucose 02/02/2023 235 (H)  65 - 99 mg/dL Final   • BUN 02/02/2023 13  6 - 20 mg/dL Final   • Creatinine 02/02/2023 1.05 (H)  0.57 - 1.00 mg/dL Final   • Sodium 02/02/2023 132 (L)  136 - 145 mmol/L Final   • Potassium 02/02/2023 4.6  3.5 - 5.2 mmol/L Final   • Chloride 02/02/2023 97 (L)  98 - 107 mmol/L Final   • CO2 02/02/2023 26.0  22.0 - 29.0 mmol/L Final   • Calcium 02/02/2023 9.0  8.6 - 10.5 mg/dL Final   • Total Protein 02/02/2023 7.1  6.0 - 8.5 g/dL Final   • Albumin 02/02/2023 4.1  3.5 - 5.2 g/dL Final   • ALT (SGPT) 02/02/2023 7  1 - 33 U/L Final   • AST (SGOT) 02/02/2023 12  1 - 32 U/L Final   • Alkaline Phosphatase 02/02/2023 94  39 - 117 U/L Final   • Total Bilirubin 02/02/2023 0.4  0.0 - 1.2 mg/dL Final   • Globulin 02/02/2023 3.0  gm/dL Final   • A/G Ratio 02/02/2023 1.4  g/dL Final   • BUN/Creatinine Ratio 02/02/2023 12.4  7.0 - 25.0 Final   • Anion Gap 02/02/2023 9.0  5.0 - 15.0 mmol/L Final   • eGFR 02/02/2023 64.1  >60.0 mL/min/1.73 Final   • Hemoglobin A1C 02/02/2023 10.70 (H)   4.80 - 5.60 % Final   • Total Cholesterol 02/02/2023 199  0 - 200 mg/dL Final   • Triglycerides 02/02/2023 104  0 - 150 mg/dL Final   • HDL Cholesterol 02/02/2023 49  40 - 60 mg/dL Final   • LDL Cholesterol  02/02/2023 131 (H)  0 - 100 mg/dL Final   • VLDL Cholesterol 02/02/2023 19  5 - 40 mg/dL Final   • LDL/HDL Ratio 02/02/2023 2.64   Final   • 25 Hydroxy, Vitamin D 02/02/2023 24.2 (L)  30.0 - 100.0 ng/ml Final   • Vitamin B-12 02/02/2023 546  211 - 946 pg/mL Final   • Microalbumin/Creatinine Ratio 02/02/2023 152.1  mg/g Final   • Creatinine, Urine 02/02/2023 83.5  mg/dL Final   • Microalbumin, Urine 02/02/2023 12.7  mg/dL Final   • Urine Culture 02/02/2023 No growth   Final   • Color, UA 02/02/2023 Yellow  Yellow, Straw Final   • Appearance, UA 02/02/2023 Clear  Clear Final   • pH, UA 02/02/2023 5.5  5.0 - 8.0 Final   • Specific Gravity, UA 02/02/2023 1.015  1.005 - 1.030 Final   • Glucose, UA 02/02/2023 500 mg/dL (2+) (A)  Negative Final   • Ketones, UA 02/02/2023 Negative  Negative Final   • Bilirubin, UA 02/02/2023 Negative  Negative Final   • Blood, UA 02/02/2023 Negative  Negative Final   • Protein, UA 02/02/2023 30 mg/dL (1+) (A)  Negative Final   • Leuk Esterase, UA 02/02/2023 Negative  Negative Final   • Nitrite, UA 02/02/2023 Negative  Negative Final   • Urobilinogen, UA 02/02/2023 0.2 E.U./dL  0.2 - 1.0 E.U./dL Final   • RBC, UA 02/02/2023 0-2  None Seen, 0-2 /HPF Final   • WBC, UA 02/02/2023 0-2  None Seen, 0-2 /HPF Final   • Bacteria, UA 02/02/2023 None Seen  None Seen /HPF Final   • Squamous Epithelial Cells, UA 02/02/2023 0-2  None Seen, 0-2 /HPF Final   • Hyaline Casts, UA 02/02/2023 0-2  None Seen /LPF Final   • Methodology 02/02/2023 Automated Microscopy   Final      FL Guide For Pain Meds Inj  Narrative: Procedure: RF MAJOR JOINT INJECTION OR ASPIRATION     COMPARISON: MRI left shoulder dated 5/12/2022.    HISTORY: adhesive capsulitis, M75.52 Bursitis of left shoulder  M75.02 Adhesive capsulitis  "of left shoulder    Fluoroscopy time was 0.9 minutes.  Total # of films = one fluoroscopic cine loop     TECHNIQUE/FINDINGS:   Fluoroscopy was performed of the left shoulder with  intra-articular injection.    The risks and benefits of the procedure were discussed with the  patient. Informed consent was obtained. The patient was placed  supine on the fluoroscopic table and the left shoulder was  prepped and draped in a sterile fashion.     Approximately 5 cc of ropivacaine were administered for local  anesthesia. A 22 gauge spinal needle was used to access the  acetabular joint under CT guidance.  Once contrast was confirmed in the joint, a total of 2 mL  ropivacaine, 80 mg Kenalog in 3 cc, and iodinated contrast was  administered.    Impression: CONCLUSION: Successful left shoulder injection as described  above.    Electronically signed by:  Aiden Thakur MD  1/25/2023 11:39 AM  [facility] Workstation: [id number]    [unfilled]  Immunization History   Administered Date(s) Administered   • COVID-19 (PFIZER) PURPLE CAP 08/06/2021, 08/06/2021   • Flu Vaccine Split Quad 09/30/2017   • FluLaval/Fluzone >6mos 09/30/2017, 09/28/2020, 10/05/2021   • Influenza Quad Vaccine (Inpatient) 10/07/2016, 10/07/2016   • Pneumococcal Polysaccharide (PPSV23) 02/24/2010, 02/24/2010       The following portions of the patient's history were reviewed and updated as appropriate: allergies, current medications, past family history, past medical history, past social history, past surgical history and problem list.        Physical Exam  /68 (BP Location: Left arm, Patient Position: Sitting, Cuff Size: Adult)   Pulse 88   Temp 98.3 °F (36.8 °C)   Ht 165.1 cm (65\")   Wt 68 kg (150 lb)   SpO2 94%   BMI 24.96 kg/m²     /68 (BP Location: Left arm, Patient Position: Sitting, Cuff Size: Adult)   Pulse 88   Temp 98.3 °F (36.8 °C)   Ht 165.1 cm (65\")   Wt 68 kg (150 lb)   SpO2 94%   BMI 24.96 kg/m²       Physical Exam  Vitals and " nursing note reviewed.   Constitutional:       Appearance: She is well-developed. She is not diaphoretic.   HENT:      Head: Normocephalic and atraumatic.      Right Ear: External ear normal.   Eyes:      Conjunctiva/sclera: Conjunctivae normal.      Pupils: Pupils are equal, round, and reactive to light.   Cardiovascular:      Rate and Rhythm: Normal rate and regular rhythm.      Heart sounds: Normal heart sounds. No murmur heard.  Pulmonary:      Effort: Pulmonary effort is normal. No respiratory distress.      Breath sounds: Normal breath sounds.      Comments: Decreased breath sounds   Abdominal:      General: Bowel sounds are normal. There is no distension.      Palpations: Abdomen is soft.      Tenderness: There is no abdominal tenderness.   Musculoskeletal:         General: Tenderness present. No deformity. Normal range of motion.      Cervical back: Normal range of motion and neck supple.   Skin:     General: Skin is warm.      Coloration: Skin is not pale.      Findings: No erythema or rash.   Neurological:      Mental Status: She is alert and oriented to person, place, and time.      Cranial Nerves: No cranial nerve deficit.   Psychiatric:         Behavior: Behavior normal.         [unfilled]   Diagnosis Plan   1. Urinary tract infection without hematuria, site unspecified  Urinalysis With Microscopic - Urine, Clean Catch    Urine Culture - Urine, Urine, Clean Catch      2. Tobacco use        3. Tobacco abuse counseling        4. Uncontrolled type 2 diabetes mellitus with hyperglycemia        5. Vitamin D deficiency        6. Paroxysmal SVT (supraventricular tachycardia)        7. Severe episode of recurrent major depressive disorder, without psychotic features        8. Noncompliance        9. Mixed hyperlipidemia        10. History of heart artery stent        11. High risk medication use        12. Gastroesophageal reflux disease, unspecified whether esophagitis present        13. KRISTOFER (generalized  anxiety disorder)        14. Essential hypertension        15. Coronary artery disease involving native coronary artery of native heart without angina pectoris        16. 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 Stage 2 chronic kidney disease        18. Chest pain, unspecified type  XR Chest PA & Lateral           -went over labowrk   -recommend mammogram screening -schedule at imaging center   -recommend pap smear - refer to OB/GYN. She will need to call for an appt  -recommend Tdap/shingles vaccination   -recommend diabetic eye exam  - referred to Dr. Manjarrez   -biotin for hair loss   -urinary issues - will get UA and urine culture. Consider oxybutynin.    -dsypnea - chest x-ray today   -hyponatremia - due to uncontrolled sugars  -leukocytosis - likely reactive. Continue to monitor   -high risk medication use i'm concerned about pt taking Norco, valium, ambien,and neurontin.   -insomnia/daytime sleepiness - referred to sleep medicine   -HTN -   on  coreg 3.25 mg PO BID on cardizem 120 mg daily. On losartan 25 mg daily.    -CKD stage 2   - Nephrology following. Continue to monitor.  -left shoulder pain/subacromial bursitis of left shoulder/adhesive capsuliits   - Orthopedic following. Recently received steriod injection.      -HLP - on crestor  40 mg PO qh. Recommend heart health diet recommend repatha injections every 2 weeks on zetia 10 mg PO q daily.   -diabetic neuropathy - on neurontin 400 mg PO TID start on cymbalta 30 mg PO BID. Consider Neurology referral   -allergic rhinitis -  zyrtec 10 mg daily.  -chronic pain - pt sees Pain Managmeent on  Norco .5/325 mg every 6 hours PRN on neurontin 800 mg pO TI   -insomnia - on ambien  -tobacco user -counseled quit smoking >5 minutes. Recommend 1 800 QUIT now   -sp  Left nephrectomy/history of renal cell carcinoma-  Consider  Oncology referral. Will get US of abdomen. Consider CT of abdomen if normal   -GERD with  esophagitis,colonic polyps/gastritis/IBS  - on prilosec 20 mg PO BID on pepcid GI following  on bentyl 20 mg every 6 hours.will refer back to Gastroenterology for endoscopy   -CAD sp stent /grade 1 diastolic dysfunction - Cardiology following on aspirin 81 mg PO BID, plavix 75 mg PO q daily. Coreg 6.25 mg PO BID, crestor  40 mg PO qhs, ranexa 500 mg every 12 hours.  Recent stress test normal recommend repatha injections every 2 weeks. She will need to call for an appt   -DM type 2   on basaglar 50  units at beditme  2-3 unties every 3 days until morning sugars at goal.was on trulicity 3.0  mg subq weekly.  on januvia 100 mg daily.  jardiance 25 mg daily. Endocrinology following on humalog before meals along with sliding scale  She will need to call for an appt   -COPD/chronic bronchitis - on albuterol inhaler. adivsed to quit smoking.Referred to Pulmonlogy for PFT. Breo stopped.   Stressed importance of smoking cessation.   -major depression/KRISTOFER/PTSD  -Mental Health following offremeron 7.5 mg daily. Of vraylar 1.5 mg daily. on klonopin 0.5 mg  Advised pt to discuss with her counselor about taking Klonopin and Norco together since they can increase mortality  on vistaril 25 mg every 8 hours as needed   -advised pt to be safe and call with questions and concerns  -advised pt to go to ER or call 911 if symptoms worrisome or severe  -advised pt to followup with specialist  And referrals  -advsied pt to be safe during COVID-19 pandemic  I spent 35  minutes caring for Yudi on this date of service. This time includes time spent by me in the following activities: preparing for the visit, reviewing tests, obtaining and/or reviewing a separately obtained history, performing a medically appropriate examination and/or evaluation, counseling and educating the patient/family/caregiver, ordering medications, tests, or procedures, referring and communicating with other health care professionals, documenting information in the  medical record, independently interpreting results and communicating that information with the patient/family/caregiver and care coordination.   -recheck in 2 months         This document has been electronically signed by Quintin Flores MD on April 4, 2023 11:36 CDT        Answers for HPI/ROS submitted by the patient on 3/29/2023  Please describe your symptoms.: Follow up  Have you had these symptoms before?: No  How long have you been having these symptoms?: Greater than 2 weeks  Please list any medications you are currently taking for this condition.: [place]  Please describe any probable cause for these symptoms. : Alot of pain  What is the primary reason for your visit?: Other

## 2023-04-04 ENCOUNTER — OFFICE VISIT (OUTPATIENT)
Dept: FAMILY MEDICINE CLINIC | Facility: CLINIC | Age: 53
End: 2023-04-04
Payer: MEDICAID

## 2023-04-04 ENCOUNTER — LAB (OUTPATIENT)
Dept: LAB | Facility: HOSPITAL | Age: 53
End: 2023-04-04
Payer: MEDICAID

## 2023-04-04 VITALS
TEMPERATURE: 98.3 F | DIASTOLIC BLOOD PRESSURE: 68 MMHG | WEIGHT: 150 LBS | OXYGEN SATURATION: 94 % | HEART RATE: 88 BPM | BODY MASS INDEX: 24.99 KG/M2 | HEIGHT: 65 IN | SYSTOLIC BLOOD PRESSURE: 104 MMHG

## 2023-04-04 DIAGNOSIS — N18.2 STAGE 2 CHRONIC KIDNEY DISEASE: ICD-10-CM

## 2023-04-04 DIAGNOSIS — F41.1 GAD (GENERALIZED ANXIETY DISORDER): ICD-10-CM

## 2023-04-04 DIAGNOSIS — Z72.0 TOBACCO USE: ICD-10-CM

## 2023-04-04 DIAGNOSIS — F33.2 SEVERE EPISODE OF RECURRENT MAJOR DEPRESSIVE DISORDER, WITHOUT PSYCHOTIC FEATURES: ICD-10-CM

## 2023-04-04 DIAGNOSIS — E55.9 VITAMIN D DEFICIENCY: ICD-10-CM

## 2023-04-04 DIAGNOSIS — Z91.199 NONCOMPLIANCE: ICD-10-CM

## 2023-04-04 DIAGNOSIS — I10 ESSENTIAL HYPERTENSION: ICD-10-CM

## 2023-04-04 DIAGNOSIS — Z79.899 HIGH RISK MEDICATION USE: ICD-10-CM

## 2023-04-04 DIAGNOSIS — I25.10 CORONARY ARTERY DISEASE INVOLVING NATIVE CORONARY ARTERY OF NATIVE HEART WITHOUT ANGINA PECTORIS: ICD-10-CM

## 2023-04-04 DIAGNOSIS — E78.2 MIXED HYPERLIPIDEMIA: ICD-10-CM

## 2023-04-04 DIAGNOSIS — E10.49 OTHER DIABETIC NEUROLOGICAL COMPLICATION ASSOCIATED WITH TYPE 1 DIABETES MELLITUS: ICD-10-CM

## 2023-04-04 DIAGNOSIS — I47.1 PAROXYSMAL SVT (SUPRAVENTRICULAR TACHYCARDIA): ICD-10-CM

## 2023-04-04 DIAGNOSIS — E11.65 UNCONTROLLED TYPE 2 DIABETES MELLITUS WITH HYPERGLYCEMIA: ICD-10-CM

## 2023-04-04 DIAGNOSIS — R07.9 CHEST PAIN, UNSPECIFIED TYPE: ICD-10-CM

## 2023-04-04 DIAGNOSIS — Z71.6 TOBACCO ABUSE COUNSELING: ICD-10-CM

## 2023-04-04 DIAGNOSIS — N39.0 URINARY TRACT INFECTION WITHOUT HEMATURIA, SITE UNSPECIFIED: Primary | ICD-10-CM

## 2023-04-04 DIAGNOSIS — Z95.5 HISTORY OF HEART ARTERY STENT: ICD-10-CM

## 2023-04-04 DIAGNOSIS — N39.0 URINARY TRACT INFECTION WITHOUT HEMATURIA, SITE UNSPECIFIED: ICD-10-CM

## 2023-04-04 DIAGNOSIS — K21.9 GASTROESOPHAGEAL REFLUX DISEASE, UNSPECIFIED WHETHER ESOPHAGITIS PRESENT: ICD-10-CM

## 2023-04-04 PROCEDURE — 87086 URINE CULTURE/COLONY COUNT: CPT

## 2023-04-04 PROCEDURE — 81001 URINALYSIS AUTO W/SCOPE: CPT

## 2023-04-04 RX ORDER — LIDOCAINE AND PRILOCAINE 25; 25 MG/G; MG/G
CREAM TOPICAL
COMMUNITY
Start: 2023-03-28

## 2023-04-04 RX ORDER — MIRTAZAPINE 15 MG/1
TABLET, FILM COATED ORAL
COMMUNITY
Start: 2023-03-13 | End: 2023-04-04

## 2023-04-04 RX ORDER — CLOPIDOGREL BISULFATE 75 MG/1
75 TABLET ORAL DAILY
Qty: 30 TABLET | Refills: 3 | Status: SHIPPED | OUTPATIENT
Start: 2023-04-04

## 2023-04-04 RX ORDER — ASPIRIN 81 MG/1
81 TABLET ORAL 2 TIMES DAILY WITH MEALS
Qty: 60 TABLET | Refills: 0 | Status: SHIPPED | OUTPATIENT
Start: 2023-04-04

## 2023-04-04 RX ORDER — GUAIFENESIN 600 MG/1
1200 TABLET, EXTENDED RELEASE ORAL 2 TIMES DAILY
Qty: 60 TABLET | Refills: 0 | Status: SHIPPED | OUTPATIENT
Start: 2023-04-04

## 2023-04-04 RX ORDER — DULOXETIN HYDROCHLORIDE 30 MG/1
CAPSULE, DELAYED RELEASE ORAL
Qty: 60 CAPSULE | Refills: 3 | Status: SHIPPED | OUTPATIENT
Start: 2023-04-04

## 2023-04-04 RX ORDER — HYDROXYZINE 50 MG/1
TABLET, FILM COATED ORAL
COMMUNITY
Start: 2023-03-13

## 2023-04-04 NOTE — PATIENT INSTRUCTIONS
Please call OB/GYN for an appt for pap smear     Michael call Endocrinology for an appt regading your uncontrolled diabetes    Please make appt for Cardiologist regarding your heart issues     Make sure you are not taking mirtzepine/remeron.  And vraylar. Start on cymbalta/duloextine 30 mg daily for 1st week then twice a day the following weeks    Biotin OTC for hair and nails. Female minoxidil     Get urine studies today

## 2023-04-05 LAB
BACTERIA UR QL AUTO: ABNORMAL /HPF
BILIRUB UR QL STRIP: NEGATIVE
CLARITY UR: CLEAR
COLOR UR: YELLOW
GLUCOSE UR STRIP-MCNC: NEGATIVE MG/DL
HGB UR QL STRIP.AUTO: NEGATIVE
HYALINE CASTS UR QL AUTO: ABNORMAL /LPF
KETONES UR QL STRIP: NEGATIVE
LEUKOCYTE ESTERASE UR QL STRIP.AUTO: ABNORMAL
NITRITE UR QL STRIP: NEGATIVE
PH UR STRIP.AUTO: 5.5 [PH] (ref 5–8)
PROT UR QL STRIP: NEGATIVE
RBC # UR STRIP: ABNORMAL /HPF
REF LAB TEST METHOD: ABNORMAL
SP GR UR STRIP: 1.01 (ref 1–1.03)
SQUAMOUS #/AREA URNS HPF: ABNORMAL /HPF
UROBILINOGEN UR QL STRIP: ABNORMAL
WBC # UR STRIP: ABNORMAL /HPF

## 2023-04-06 ENCOUNTER — TELEPHONE (OUTPATIENT)
Dept: FAMILY MEDICINE CLINIC | Facility: CLINIC | Age: 53
End: 2023-04-06
Payer: MEDICAID

## 2023-04-06 LAB — BACTERIA SPEC AEROBE CULT: NO GROWTH

## 2023-04-06 RX ORDER — NITROFURANTOIN 25; 75 MG/1; MG/1
100 CAPSULE ORAL 2 TIMES DAILY
Qty: 14 CAPSULE | Refills: 0 | Status: SHIPPED | OUTPATIENT
Start: 2023-04-06 | End: 2023-04-13

## 2023-04-06 NOTE — TELEPHONE ENCOUNTER
----- Message from Quintin Flores MD sent at 4/5/2023  9:18 PM CDT -----  Pt has a few WBC and leukocyte esterase in urine. Possibly a UTI recommend macrobid 100 mg PO BID for 7 days if pt not allergic give 14 pills and no refills. Will update with urine culture

## 2023-04-20 ENCOUNTER — OFFICE VISIT (OUTPATIENT)
Dept: ORTHOPEDIC SURGERY | Facility: CLINIC | Age: 53
End: 2023-04-20
Payer: MEDICAID

## 2023-04-20 VITALS — BODY MASS INDEX: 24.16 KG/M2 | HEIGHT: 65 IN | WEIGHT: 145 LBS

## 2023-04-20 DIAGNOSIS — G89.29 CHRONIC LEFT SHOULDER PAIN: ICD-10-CM

## 2023-04-20 DIAGNOSIS — M25.512 CHRONIC LEFT SHOULDER PAIN: ICD-10-CM

## 2023-04-20 DIAGNOSIS — M75.02 ADHESIVE CAPSULITIS OF LEFT SHOULDER: ICD-10-CM

## 2023-04-20 DIAGNOSIS — M75.52 SUBACROMIAL BURSITIS OF LEFT SHOULDER JOINT: Primary | ICD-10-CM

## 2023-04-20 PROCEDURE — 1160F RVW MEDS BY RX/DR IN RCRD: CPT | Performed by: NURSE PRACTITIONER

## 2023-04-20 PROCEDURE — 99214 OFFICE O/P EST MOD 30 MIN: CPT | Performed by: NURSE PRACTITIONER

## 2023-04-20 PROCEDURE — 1159F MED LIST DOCD IN RCRD: CPT | Performed by: NURSE PRACTITIONER

## 2023-04-20 NOTE — PROGRESS NOTES
"Yudi West is a 52 y.o. female returns for     Chief Complaint   Patient presents with   • Left Shoulder - Follow-up, Pain       HISTORY OF PRESENT ILLNESS:    Mrs. West is a 52-year-old female who presents today requesting repeat glenohumeral joint injection.  Patient's last injection was given at [facility] in January.  She tolerated injection well and reports injection continues to provide her some relief.  She has been seen in office by myself, Dr. Gregorio, and Dr. Campoverde.  She has been made multiple PT referrals in the past but has not completed formal physical therapy.  She does have a family member who is a physical therapist who gave her home exercise program which she reports doing faithfully every day.  Her most recent hemoglobin A1c is 10.7 which is down from 11 previously.    She had an MRI around a year ago which showed:    IMPRESSION:  *  Chronic tendinosis changes of the subscapularis tendon.  *  Mild subacromial/subdeltoid and moderate subcoracoid bursal  effusion.  *  Small glenohumeral joint effusion.  *  Mild acromioclavicular joint degenerative changes with small  AC joint effusion.    She continues to report significantly decreased range of motion.  She does not use NSAIDs due to current use of blood thinners.     CONCURRENT MEDICAL HISTORY:    The following portions of the patient's history were reviewed and updated as appropriate: allergies, current medications, past family history, past medical history, past social history, past surgical history and problem list.     ROS  No fevers or chills.  No chest pain or shortness of air.  No GI or  disturbances.  Left shoulder pain.    PHYSICAL EXAMINATION:       Ht 165.1 cm (65\")   Wt 65.8 kg (145 lb)   BMI 24.13 kg/m²     Physical Exam  Vitals and nursing note reviewed.   Constitutional:       General: She is not in acute distress.     Appearance: She is well-developed. She is not toxic-appearing or diaphoretic.   HENT:      Head: " Normocephalic.   Eyes:      General: No scleral icterus.  Pulmonary:      Effort: Pulmonary effort is normal. No respiratory distress.   Skin:     General: Skin is warm and dry.   Neurological:      Mental Status: She is alert and oriented to person, place, and time.   Psychiatric:         Behavior: Behavior normal.         Thought Content: Thought content normal.         Judgment: Judgment normal.         GAIT:     []  Normal  []  Antalgic    Assistive device: []  None  []  Walker     []  Crutches  []  Cane     []  Wheelchair  []  Stretcher    Left Shoulder Exam     Range of Motion   Active abduction: 80 (Passive range of motion 90, stopped due to pain)   Extension: 10   External rotation: abnormal   Forward flexion: 90 (passive 110, stopped due to pain)   Internal rotation 90 degrees: abnormal     Other   Erythema: absent  Sensation: normal  Pulse: present     Comments:  No evidence of infection              EXAM:    MR Left Upper Extremity Without Intravenous Contrast, Shoulder     FACILITY:    [facility]     REFERRING:    GERRI ROME     CLINICAL HISTORY:    51 years, Female; chronic left shoulder pain., M75.52 Bursitis  of left shoulder M24.812 Other specific joint derangements of  left shoulder, not elsewhere classified     TECHNIQUE:    Multiplanar magnetic resonance images of the left shoulder  without intravenous contrast.     COMPARISON:    Left shoulder x-rays 8/5/2020     FINDINGS:   TENDONS:    Supraspinatus:  Unremarkable.    Infraspinatus:  Unremarkable.    Subscapularis:  There is thickening and signal change within  the subscapularis tendon consistent with chronic tendinosis  changes.    Teres minor:  Unremarkable.    Biceps brachii, long head:  Unremarkable.      LIGAMENTS:    Glenohumeral:  Unremarkable.       Muscles:  Unremarkable.    Fluid:  There is evidence of a small subacromial-subdeltoid  bursal effusion.        There is a moderate subcoracoid bursal effusion.        A  small glenohumeral joint effusion is evident.        There are mild degenerative changes at the  acromioclavicular joint with evidence of a small AC joint  effusion.    Cartilage:  Unremarkable.    Glenoid labrum:  Unremarkable.  No tear.    Bones/joints:  Unremarkable.     IMPRESSION:  *  Chronic tendinosis changes of the subscapularis tendon.  *  Mild subacromial/subdeltoid and moderate subcoracoid bursal  effusion.  *  Small glenohumeral joint effusion.  *  Mild acromioclavicular joint degenerative changes with small  AC joint effusion.     Electronically signed by:  Jeremias Harper DO  5/13/2022 11:38 AM CDT  Workstation: [id number]    Three view left shoulder     HISTORY: Left shoulder pain     AP films with the humerus in internal and external rotation and  scapular Y view were obtained.     COMPARISON: August 5, 2020     FINDINGS:   Hypertrophic change acromioclavicular joint.  No fracture or dislocation.  No other osseous or articular abnormality.     Several scattered calcified granulomata in the left lung.     IMPRESSION:  CONCLUSION:  Hypertrophic change acromioclavicular joint.     [id number]     Electronically signed by:  Juve Rico MD  4/29/2022 7:56 PM CDT  Workstation: [id number]          ASSESSMENT:    Diagnoses and all orders for this visit:    Subacromial bursitis of left shoulder joint    Adhesive capsulitis of left shoulder    Chronic left shoulder pain          PLAN      Patient continues to have pain and limitations despite HEP, activity modification, and glenohumeral joint injections.  After considering available treatment options including risk, benefits, alternatives, patient desires to proceed with glenohumeral joint injection and if this does not give her improvement or lasting relief she would like to make an appointment to see a different orthopedic surgeon.  Joint injections ordered.  Patient to return as needed.  Patient instructed to continue HEP.    Time spent of a minimum of 30 minutes  including the face to face evaluation, reviewing of medical history and prior medial records, reviewing of diagnostic studies, ordering additional tests, documentation, patient education and coordination of care.       EMR Dragon/Transciption Disclaimer: Some of this note may be an electronic transcription/translation of spoken language to printed text.  The electronic translation of spoken language may permit erroneous, or at times, nonsensical words or phrases to be inadvertently transcribed. Although I have reviewed the note for such errors, some may still exist.       This document has been electronically signed by Yamilka CARRENO on April 20, 2023 12:47 CDT

## 2023-04-26 PROBLEM — N18.2 STAGE 2 CHRONIC KIDNEY DISEASE: Status: ACTIVE | Noted: 2023-04-26

## 2023-04-26 PROBLEM — E11.40 TYPE 2 DIABETES MELLITUS WITH DIABETIC NEUROPATHY, WITH LONG-TERM CURRENT USE OF INSULIN: Status: ACTIVE | Noted: 2023-04-26

## 2023-04-26 PROBLEM — Z79.4 TYPE 2 DIABETES MELLITUS WITH DIABETIC NEUROPATHY, WITH LONG-TERM CURRENT USE OF INSULIN: Status: ACTIVE | Noted: 2023-04-26

## 2023-04-27 ENCOUNTER — OFFICE VISIT (OUTPATIENT)
Dept: CARDIOLOGY | Facility: CLINIC | Age: 53
End: 2023-04-27
Payer: MEDICAID

## 2023-04-27 VITALS
HEIGHT: 65 IN | WEIGHT: 145 LBS | TEMPERATURE: 98.2 F | OXYGEN SATURATION: 96 % | DIASTOLIC BLOOD PRESSURE: 66 MMHG | BODY MASS INDEX: 24.16 KG/M2 | SYSTOLIC BLOOD PRESSURE: 110 MMHG | HEART RATE: 116 BPM

## 2023-04-27 DIAGNOSIS — Z72.0 TOBACCO USE: ICD-10-CM

## 2023-04-27 DIAGNOSIS — E78.2 MIXED HYPERLIPIDEMIA: ICD-10-CM

## 2023-04-27 DIAGNOSIS — I47.1 PAROXYSMAL SVT (SUPRAVENTRICULAR TACHYCARDIA): ICD-10-CM

## 2023-04-27 DIAGNOSIS — I10 ESSENTIAL HYPERTENSION: Primary | ICD-10-CM

## 2023-04-27 DIAGNOSIS — E11.65 UNCONTROLLED TYPE 2 DIABETES MELLITUS WITH HYPERGLYCEMIA: ICD-10-CM

## 2023-04-27 DIAGNOSIS — I25.10 CORONARY ARTERY DISEASE INVOLVING NATIVE CORONARY ARTERY OF NATIVE HEART WITHOUT ANGINA PECTORIS: ICD-10-CM

## 2023-04-27 RX ORDER — ZOLPIDEM TARTRATE 10 MG/1
TABLET ORAL
COMMUNITY
Start: 2023-04-21

## 2023-04-27 RX ORDER — NALOXONE HYDROCHLORIDE 4 MG/.1ML
SPRAY NASAL
COMMUNITY
Start: 2023-04-26

## 2023-04-27 RX ORDER — HYDROXYZINE PAMOATE 25 MG/1
CAPSULE ORAL
COMMUNITY
Start: 2023-04-26

## 2023-04-27 RX ORDER — MIRTAZAPINE 30 MG/1
TABLET, FILM COATED ORAL
COMMUNITY
Start: 2023-04-26

## 2023-04-27 RX ORDER — BREXPIPRAZOLE 0.5 MG/1
TABLET ORAL
COMMUNITY
Start: 2023-04-26

## 2023-04-27 NOTE — PROGRESS NOTES
Yudi West  52 y.o. female    1. Essential hypertension    2. Coronary artery disease involving native coronary artery of native heart without angina pectoris    3. Paroxysmal SVT (supraventricular tachycardia)    4. Uncontrolled type 2 diabetes mellitus with hyperglycemia    5. Mixed hyperlipidemia    6. Tobacco use        History of Present Illness:  Yudi West is a 52-year-old female with multiple medical issues including coronary artery disease status post PCI to RCA in May 2015, hypertension, diabetes mellitus, hyperlipidemia, depression, CKD with history of renal cell carcinoma status post left nephrectomy, DJD status post left knee surgery, obstructive sleep apnea, GERD/hiatal hernia, history of hypercalcemia, tobacco abuse.     She had been depressed and apparently went off all her medication for more than 5 months.  She presented to [facility] ER on 7/4/2022 with intermittent episodes of palpitation she has been going on for about a month.  In the ER she was noted to have tachyarrhythmia most likely secondary to atrial tachycardia/SVT.  She converted to sinus rhythm.  She was advised hospital admission but she signed AMA secondary to family situations.  Her records were reviewed.  Troponins, BNP were within normal limits.  Hemoglobin A1c was more than 12 and LDL was markedly elevated at 152.  Glucose was 411.  Hospital records were reviewed in detail.    She presented with acute inferior wall Myocardial Infarction in May 2015 and underwent cardiac catheterization which showed the following findings:  1.    Critical lesion noted in the right coronary artery which was the        infarct vessel and successful PCI with balloon angioplasty and        subsequent placement of a 3.5 x 23 mm Xience Xpedition stent and        reduction of stenosis to 0%.  2.    Critical lesion noted in one of the small subbranches of the ramus        intermedius vessel. This will be treated  medically.  3.    Noncritical disease noted in the left anterior descending coronary        artery and left circumflex coronary artery.  4.    Overall normal contractility of the left ventricle with an        ejection fraction of 50% to 55% with mild inferolateral        hypokinesis.     Lexiscan Cardiolite stress test in August 2022 showed:  • Findings consistent with an equivocal ECG stress test.  • Left ventricular ejection fraction is normal. (Calculated EF = 63%).  • Myocardial perfusion imaging indicates a normal myocardial perfusion study with no evidence of ischemia.  • Impressions are consistent with a low risk study.    The patient apparently developed palpitations last night after she had a sandwich with heart rate up to 120 to 130 bpm.  She did not seek medical help and has had a fast heart rate through the night and since she had this appointment in our office today decided not to go to the ER.  She did have some degree of chest pain as well during the night.  Patient has had many similar episode in the past.  She is has mild dizziness but no syncope.  She claims compliance with medications.  She does follow-up with primary care at this time.    Her heart rate was 116 bpm and blood pressure 110/66 mmHg.  No bronchospasm or rales were audible.     EKG showed most likely atrial tachycardia/SVT with heart rate of 116 bpm.  Narrow complex tachycardia nonspecific ST-T changes.    Allergies   Allergen Reactions   • Sulfa Antibiotics Itching         Past Medical History:   Diagnosis Date   • ADHD (attention deficit hyperactivity disorder)    • Allergic    • Anxiety and depression    • Arthritis    • Asthma    • Cancer of kidney     left   • Chronic kidney disease    • COPD (chronic obstructive pulmonary disease)    • Coronary artery disease     1 stent.  is followed by jaden   • Diabetes mellitus    • GERD (gastroesophageal reflux disease)    • Hyperlipidemia    • Hypertension    • Myocardial infarction     • PTSD (post-traumatic stress disorder)    • Pulmonary embolism    • Sleep apnea    • Substance abuse    • Suicide attempt          Past Surgical History:   Procedure Laterality Date   • CARDIAC SURGERY     • CHOLECYSTECTOMY     • COLONOSCOPY N/A 1/28/2021    Procedure: COLONOSCOPY;  Surgeon: Juwan Wilkes MD;  Location: [facility] ENDOSCOPY;  Service: Gastroenterology;  Laterality: N/A;   • CORONARY STENT PLACEMENT     • ENDOSCOPY N/A 1/28/2021    Procedure: ESOPHAGOGASTRODUODENOSCOPY;  Surgeon: Juwan Wilkes MD;  Location: [facility] ENDOSCOPY;  Service: Gastroenterology;  Laterality: N/A;   • FEMUR IM NAILING RETROGRADE Left 11/3/2019    Procedure: FEMUR INTRAMEDULLARY NAILING RETROGRADE;  Surgeon: Howie Campoverde MD;  Location: [facility] OR;  Service: Orthopedics   • GALLBLADDER SURGERY     • HARDWARE REMOVAL Left 12/4/2019    Procedure: HARDWARE REMOVAL LEFT FEMUR        (C-ARM#3);  Surgeon: Howie Campoverde MD;  Location: [facility] OR;  Service: Orthopedics   • HYSTERECTOMY     • JOINT REPLACEMENT     • NEPHRECTOMY Left    • REPLACEMENT TOTAL KNEE Left    • TONSILLECTOMY     • TUBAL ABDOMINAL LIGATION     • UPPER GASTROINTESTINAL ENDOSCOPY  01/28/2021    Per Dr. Juwan Wilkes M.D., [place], [place]         Family History   Problem Relation Age of Onset   • Heart disease Mother    • Hypertension Mother    • Cancer Mother    • Diabetes Mother    • Alcohol abuse Mother    • Heart disease Father    • Hypertension Father    • Alcohol abuse Father    • Alcohol abuse Sister    • Drug abuse Sister    • Depression Sister    • Anxiety disorder Sister    • Drug abuse Brother    • Alcohol abuse Brother    • Suicide Attempts Brother          Social History     Socioeconomic History   • Marital status:    Tobacco Use   • Smoking status: Every Day     Packs/day: 2.00     Years: 37.00     Pack years: 74.00     Types: Cigarettes   • Smokeless tobacco: Never   • Tobacco comments:     Have slowed down alot  "  Vaping Use   • Vaping Use: Never used   • Passive vaping exposure: Yes (Son)   Substance and Sexual Activity   • Alcohol use: Not Currently     Alcohol/week: 0.0 standard drinks     Comment: passed use for yrs including cases and 2 fifths daily; quit 20 yrs ago   • Drug use: Not Currently     Types: \"Crack\" cocaine, Methamphetamines   • Sexual activity: Yes     Partners: Male     Birth control/protection: None         Current Outpatient Medications   Medication Sig Dispense Refill   • albuterol sulfate HFA (ProAir HFA) 108 (90 Base) MCG/ACT inhaler Inhale 2 puffs Every 4 (Four) Hours As Needed for Wheezing. 8.5 g 3   • Alpha-Lipoic Acid 300 MG capsule TAKE ONE TO TWO CAPSULES BY MOUTH TWICE DAILY     • aspirin 81 MG EC tablet Take 1 tablet by mouth 2 (Two) Times a Day With Meals. 60 tablet 0   • carvedilol (COREG) 3.125 MG tablet Take 1 tablet by mouth 2 (Two) Times a Day With Meals. 60 tablet 3   • clopidogrel (PLAVIX) 75 MG tablet Take 1 tablet by mouth Daily. 30 tablet 3   • Continuous Blood Gluc Sensor (Dexcom G6 Sensor) 1 each As Needed (glucose control). Every 10 days 9 each 3   • Continuous Blood Gluc Transmit (Dexcom G6 Transmitter) misc Inject 1 application under the skin into the appropriate area as directed 3 (Three) Times a Day. 1 each 3   • diazePAM (VALIUM) 10 MG tablet      • doxazosin (CARDURA) 1 MG tablet      • DULoxetine (Cymbalta) 30 MG capsule Take 1 tablet daily for 1st week.  Then twice a day for the following 60 capsule 3   • empagliflozin (Jardiance) 25 MG tablet tablet Take 1 tablet by mouth Daily. 30 tablet 3   • ezetimibe (ZETIA) 10 MG tablet Take 1 tablet by mouth Daily. 30 tablet 3   • famotidine (PEPCID) 20 MG tablet Take 1 tablet by mouth 2 (Two) Times a Day. 60 tablet 3   • gabapentin (NEURONTIN) 800 MG tablet      • Global Ease Inject Pen Needles 31G X 8 MM misc Use as needed 100 each 3   • glucose blood test strip Test 3 times daily 100 each 11   • glucose monitor monitoring kit " 1 each As Needed (to check bg). 1 each 0   • guaiFENesin (Mucus Relief) 600 MG 12 hr tablet Take 2 tablets by mouth 2 (Two) Times a Day. NO ADDITIONAL REFILLS WITHOUT AN APPOINTMENT 60 tablet 0   • HYDROcodone-acetaminophen (NORCO)  MG per tablet      • hydrOXYzine pamoate (VISTARIL) 25 MG capsule      • Insulin Glargine (BASAGLAR KWIKPEN) 100 UNIT/ML injection pen Take 36 units at bedtime can go up by 2-3 unites every 3 days until am sugars running     02/05/2021 - Patient currently utilizing 50 Units nightly. 15 mL 1   • Insulin Lispro, 1 Unit Dial, (HUMALOG) 100 UNIT/ML solution pen-injector INJECT UP TO 20 UNITS WITH MEALS 15 mL 0   • Lancets misc Test 3 times daily , E11.65 100 each 11   • lidocaine (LIDODERM) 5 % APPLY ONE TO TWO PATCHES AS DIRECTED ON LOWER BACK, LEFT UPPER LEG 12 HOURS ON AND 12 HOURS OFF     • lidocaine-prilocaine (EMLA) 2.5-2.5 % cream      • loratadine (Allergy Relief) 10 MG tablet Take 1 tablet by mouth Daily. 30 tablet 3   • losartan (COZAAR) 25 MG tablet Take 1 tablet by mouth Daily. 30 tablet 3   • mirtazapine (REMERON) 30 MG tablet      • naloxone (NARCAN) 4 MG/0.1ML nasal spray      • nicotine (Nicotrol) 10 MG inhaler Inhale 1 puff As Needed for Smoking Cessation. 168 each 3   • omeprazole (priLOSEC) 40 MG capsule Take 1 capsule by mouth Daily. 30 capsule 3   • ondansetron ODT (ZOFRAN-ODT) 8 MG disintegrating tablet Place 1 tablet on the tongue Every 8 (Eight) Hours As Needed for Nausea or Vomiting. 90 tablet 3   • polyethylene glycol (MIRALAX) 17 GM/SCOOP powder MIX 17 GRAMS (1 CAPFUL) IN 8 OUNCES OF WATER OR JUICE AND DRINK DAILY 510 g 5   • ranolazine (RANEXA) 500 MG 12 hr tablet Take 1 tablet by mouth 2 (Two) Times a Day. 60 tablet 3   • Rexulti 0.5 MG tablet      • rosuvastatin (CRESTOR) 20 MG tablet Take 1 tablet by mouth every night at bedtime. 30 tablet 3   • SITagliptin (Januvia) 100 MG tablet Take 1 tablet by mouth Daily. 30 tablet 3   • vitamin D  "(ERGOCALCIFEROL) 1.25 MG (52216 UT) capsule capsule Take 1 capsule by mouth 1 (One) Time Per Week. 4 capsule 3   • zolpidem (AMBIEN) 10 MG tablet        No current facility-administered medications for this visit.         OBJECTIVE    /66 (BP Location: Left arm, Patient Position: Sitting, Cuff Size: Adult)   Temp 98.2 °F (36.8 °C)   Ht 165.1 cm (65\")   Wt 65.8 kg (145 lb)   SpO2 96%   BMI 24.13 kg/m²         Review of Systems : The following systems are reviewed and changes noted as indicated in the history of present illness and below    Constitutional:  Denies recent weight loss, weight gain, fever or chills     HENT:  Denies any hearing loss, epistaxis, hoarseness, or difficulty speaking.     Eyes: Wears eyeglasses or contact lenses     Respiratory:  Dyspnea with exertion,no cough, wheezing, or hemoptysis.     Cardiovascular: See HPI.  Patient has palpitations with increased heart rate since last night.  Intermittent chest pain.  Dizziness.    Gastrointestinal:  Denies change in bowel habits, dyspepsia, ulcer disease, hematochezia, or melena.     Endocrine: Diabetes has not been under good control    Genitourinary: History of nephrectomy in the past      Musculoskeletal: DJD.  History of surgery left femur    Neurological:  Denies any history of recurrent headaches, strokes, TIA, or seizure disorder.     Hematological: Denies any food allergies, seasonal allergies, bleeding disorders, or lymphadenopathy.     Psychiatric/Behavioral: history of depression/ anxiety, no substance abuse, or change in cognitive function.     Physical Exam : The following systems are reviewed and following changes noted    Constitutional: Cooperative, alert and oriented. Feels fatigued/restless secondary to palpitation    HENT:   Head: Normocephalic, normal hair patterns, no masses or tenderness.  Ears, Nose, and Throat: No gross abnormalities. No pallor or cyanosis.   Eyes: EOMS intact, PERRL, conjunctivae and lids " unremarkable. Fundoscopic exam and visual fields not performed.   Neck: No palpable masses or adenopathy, no thyromegaly, no JVD, carotid pulses are full and equal bilaterally and without  Bruits.     Cardiovascular: Regular rhythm, S1 and S2 normal, no S3 or S4.  No murmurs, gallops, or rubs detected. Tachycardia noted with heart rate more than 120 bpm    Pulmonary/Chest: Chest: normal symmetry,  normal respiratory excursion, no intercostal retraction, no use of accessory muscles.            Pulmonary: Normal breath sounds. No rales or ronchi.    Abdominal: Abdomen soft, bowel sounds normoactive, no masses, no hepatosplenomegaly, non-tender, no bruits.     Musculoskeletal: No deformities, clubbing, cyanosis, erythema, or edema observed.     Neurological: No gross motor or sensory deficits noted, affect appropriate, oriented to time, person, place.     Skin: Warm and dry to the touch, no apparent skin lesions or masses noted.     Psychiatric: She has a normal mood and affect. Her behavior is normal. Judgment and thought content normal.         Procedures      Lab Results   Component Value Date    WBC 15.96 (H) 02/02/2023    HGB 14.1 02/02/2023    HCT 42.0 02/02/2023    MCV 87.3 02/02/2023     02/02/2023     Lab Results   Component Value Date    GLUCOSE 235 (H) 02/02/2023    BUN 13 02/02/2023    CREATININE 1.05 (H) 02/02/2023    EGFRIFNONA 48 (L) 10/05/2021    BCR 12.4 02/02/2023    CO2 26.0 02/02/2023    CALCIUM 9.0 02/02/2023    ALBUMIN 4.1 02/02/2023    AST 12 02/02/2023    ALT 7 02/02/2023     Lab Results   Component Value Date    CHOL 199 02/02/2023    CHOL 227 (H) 08/26/2022    CHOL 213 (H) 05/20/2022     Lab Results   Component Value Date    TRIG 104 02/02/2023    TRIG 119 08/26/2022    TRIG 90 05/20/2022     Lab Results   Component Value Date    HDL 49 02/02/2023    HDL 51 08/26/2022    HDL 45 05/20/2022     No components found for: LDLCALC  Lab Results   Component Value Date     (H) 02/02/2023      (H) 08/26/2022     (H) 05/20/2022     No results found for: HDLLDLRATIO  No components found for: CHOLHDL  Lab Results   Component Value Date    HGBA1C 10.70 (H) 02/02/2023     Lab Results   Component Value Date    TSH 1.530 08/26/2022           ASSESSMENT AND PLAN  Yudi West has multiple medical issues as discussed in detail in the history of present illness and has evidence of tachyarrhythmia most likely secondary to atrial tachycardia that started last night.  Her symptoms have lasted for more than 12 hours.  Her heart rate was 116 bpm and blood pressure 110/66 mmHg.  No signs of congestive heart failure was noted.  She does have intermittent chest pain.  Her current electrolyte status/glucose not known.  Her last hemoglobin A1c was 10.7 back in February 2023.    I explained the situation to her and I have recommended that she go to the emergency room for further management of tachyarrhythmia most likely secondary to atrial tachycardia/SVT.  There is no evidence of acute myocardial injury.  Given a complex medical history appropriate lab testing will be required.    I did offer to call an ambulance but the patient and her  indicated that they would take her to the ER right away.  Since her blood pressure is stable at this time I believe this would be reasonable.  I spoke with the ER physician at [facility] and explained the situation to him.      About 55 minutes was spent in the assessment and evaluation of this patient.    Diagnoses and all orders for this visit:    1. Essential hypertension (Primary)  -     ECG 12 Lead    2. Coronary artery disease involving native coronary artery of native heart without angina pectoris    3. Paroxysmal SVT (supraventricular tachycardia)    4. Uncontrolled type 2 diabetes mellitus with hyperglycemia    5. Mixed hyperlipidemia    6. Tobacco use        Patient's Body mass index is 24.13 kg/m². BMI is above normal parameters.  Recommendations include: exercise counseling and nutrition counseling.      I advised Yudi of the risks of continuing to use tobacco, and I provided her with tobacco cessation educational materials in the After Visit Summary.     During this visit, I spent 3 minutes counseling the patient regarding tobacco cessation.      Lewis Johnson MD  4/27/2023  12:45 CDT

## 2023-04-28 LAB
QT INTERVAL: 330 MS
QTC INTERVAL: 458 MS

## 2023-05-11 ENCOUNTER — HOSPITAL ENCOUNTER (OUTPATIENT)
Dept: GENERAL RADIOLOGY | Facility: HOSPITAL | Age: 53
Discharge: HOME | End: 2023-05-11
Payer: MEDICAID

## 2023-05-11 VITALS
HEIGHT: 65 IN | OXYGEN SATURATION: 98 % | HEART RATE: 79 BPM | RESPIRATION RATE: 20 BRPM | SYSTOLIC BLOOD PRESSURE: 137 MMHG | TEMPERATURE: 98.5 F | DIASTOLIC BLOOD PRESSURE: 82 MMHG | WEIGHT: 152 LBS | BODY MASS INDEX: 25.33 KG/M2

## 2023-05-11 DIAGNOSIS — M25.512 CHRONIC LEFT SHOULDER PAIN: ICD-10-CM

## 2023-05-11 DIAGNOSIS — G89.29 CHRONIC LEFT SHOULDER PAIN: ICD-10-CM

## 2023-05-11 DIAGNOSIS — M75.02 ADHESIVE CAPSULITIS OF LEFT SHOULDER: ICD-10-CM

## 2023-05-11 PROCEDURE — 77002 NEEDLE LOCALIZATION BY XRAY: CPT

## 2023-05-11 PROCEDURE — 25510000001 IOPAMIDOL 61 % SOLUTION: Performed by: NURSE PRACTITIONER

## 2023-05-11 RX ORDER — ASPIRIN 81 MG/1
81 TABLET ORAL 2 TIMES DAILY WITH MEALS
Qty: 60 TABLET | Refills: 0 | Status: CANCELLED | OUTPATIENT
Start: 2023-05-11

## 2023-05-11 RX ORDER — DULOXETIN HYDROCHLORIDE 30 MG/1
30 CAPSULE, DELAYED RELEASE ORAL 2 TIMES DAILY
Qty: 60 CAPSULE | Refills: 3 | Status: SHIPPED | OUTPATIENT
Start: 2023-05-11

## 2023-05-11 RX ORDER — CARVEDILOL 3.12 MG/1
3.12 TABLET ORAL 2 TIMES DAILY WITH MEALS
Qty: 60 TABLET | Refills: 3 | Status: CANCELLED | OUTPATIENT
Start: 2023-05-11

## 2023-05-11 RX ORDER — CLOPIDOGREL BISULFATE 75 MG/1
75 TABLET ORAL DAILY
Qty: 30 TABLET | Refills: 3 | Status: CANCELLED | OUTPATIENT
Start: 2023-05-11

## 2023-05-11 RX ORDER — ALBUTEROL SULFATE 90 UG/1
2 AEROSOL, METERED RESPIRATORY (INHALATION) EVERY 4 HOURS PRN
Qty: 8.5 G | Refills: 3 | Status: CANCELLED | OUTPATIENT
Start: 2023-05-11

## 2023-05-11 RX ADMIN — IOPAMIDOL 5 ML: 612 INJECTION, SOLUTION INTRAVENOUS at 13:45

## 2023-05-11 NOTE — TELEPHONE ENCOUNTER
Patient requested for her Cymbalta medication to be sent to [facility] with her scripts as [facility] closed.  Call back number: [phone]

## 2023-05-19 RX ORDER — LOSARTAN POTASSIUM 25 MG/1
TABLET ORAL
Qty: 30 TABLET | Refills: 1 | Status: SHIPPED | OUTPATIENT
Start: 2023-05-19

## 2023-05-19 RX ORDER — HYDROXYZINE PAMOATE 25 MG/1
CAPSULE ORAL
Qty: 90 CAPSULE | Refills: 1 | Status: SHIPPED | OUTPATIENT
Start: 2023-05-19

## 2023-05-19 RX ORDER — RANOLAZINE 500 MG/1
TABLET, EXTENDED RELEASE ORAL
Qty: 60 TABLET | Refills: 1 | Status: SHIPPED | OUTPATIENT
Start: 2023-05-19

## 2023-05-19 RX ORDER — DICYCLOMINE HCL 20 MG
TABLET ORAL
Qty: 120 TABLET | Refills: 1 | Status: SHIPPED | OUTPATIENT
Start: 2023-05-19

## 2023-05-19 RX ORDER — CARVEDILOL 3.12 MG/1
TABLET ORAL
Qty: 60 TABLET | Refills: 1 | Status: SHIPPED | OUTPATIENT
Start: 2023-05-19

## 2023-05-19 RX ORDER — OMEPRAZOLE 40 MG/1
CAPSULE, DELAYED RELEASE ORAL
Qty: 30 CAPSULE | Refills: 1 | Status: SHIPPED | OUTPATIENT
Start: 2023-05-19

## 2023-05-19 RX ORDER — EZETIMIBE 10 MG/1
TABLET ORAL
Qty: 30 TABLET | Refills: 1 | Status: SHIPPED | OUTPATIENT
Start: 2023-05-19

## 2023-05-19 RX ORDER — ROSUVASTATIN CALCIUM 20 MG/1
TABLET, COATED ORAL
Qty: 30 TABLET | Refills: 1 | Status: SHIPPED | OUTPATIENT
Start: 2023-05-19

## 2023-05-19 RX ORDER — ONDANSETRON 8 MG/1
TABLET, ORALLY DISINTEGRATING ORAL
Qty: 90 TABLET | Refills: 0 | Status: SHIPPED | OUTPATIENT
Start: 2023-05-19

## 2023-05-19 RX ORDER — ERGOCALCIFEROL 1.25 MG/1
CAPSULE ORAL
Qty: 4 CAPSULE | Refills: 1 | Status: SHIPPED | OUTPATIENT
Start: 2023-05-19

## 2023-05-22 RX ORDER — INSULIN GLARGINE 100 [IU]/ML
INJECTION, SOLUTION SUBCUTANEOUS
Qty: 15 ML | Refills: 1 | Status: SHIPPED | OUTPATIENT
Start: 2023-05-22

## 2023-05-30 RX ORDER — ALBUTEROL SULFATE 90 UG/1
AEROSOL, METERED RESPIRATORY (INHALATION)
Qty: 18 G | Refills: 1 | Status: SHIPPED | OUTPATIENT
Start: 2023-05-30

## 2023-07-17 PROBLEM — M75.102 NONTRAUMATIC TEAR OF LEFT ROTATOR CUFF: Status: ACTIVE | Noted: 2023-07-17

## 2023-07-17 PROBLEM — M75.02 ADHESIVE CAPSULITIS OF LEFT SHOULDER: Status: ACTIVE | Noted: 2023-07-17

## 2023-07-17 PROBLEM — M19.012 ARTHRITIS OF LEFT ACROMIOCLAVICULAR JOINT: Status: ACTIVE | Noted: 2023-07-17

## 2023-07-17 PROBLEM — M25.512 LEFT SHOULDER PAIN: Status: ACTIVE | Noted: 2023-07-17

## 2023-07-17 PROBLEM — M75.52 SUBACROMIAL BURSITIS OF LEFT SHOULDER JOINT: Status: ACTIVE | Noted: 2023-07-17

## 2023-07-17 PROBLEM — M75.22 TENDINITIS OF LONG HEAD OF BICEPS BRACHII OF LEFT SHOULDER: Status: ACTIVE | Noted: 2023-07-17

## 2023-07-24 ENCOUNTER — PRIOR AUTHORIZATION (OUTPATIENT)
Dept: FAMILY MEDICINE CLINIC | Facility: CLINIC | Age: 53
End: 2023-07-24
Payer: MEDICAID

## 2023-07-24 NOTE — TELEPHONE ENCOUNTER
The request has been approved. The authorization is effective from 07/24/2023 to 07/23/2024, as long as the member is enrolled in their current health plan. The request was approved as submitted. A written notification letter will follow with additional details.

## 2023-07-24 NOTE — TELEPHONE ENCOUNTER
Approvedon June 21  The request has been approved. The authorization is effective from 06/21/2023 to 06/20/2024, as long as the member is enrolled in their current health plan. The request was approved as submitted. A written notification letter will follow with additional details.

## 2023-07-25 RX ORDER — ONDANSETRON 8 MG/1
TABLET, ORALLY DISINTEGRATING ORAL
Qty: 90 TABLET | Refills: 0 | Status: SHIPPED | OUTPATIENT
Start: 2023-07-25

## 2023-08-07 ENCOUNTER — HOSPITAL ENCOUNTER (OUTPATIENT)
Dept: MRI IMAGING | Facility: HOSPITAL | Age: 53
Discharge: HOME | End: 2023-08-07
Admitting: SPECIALIST/TECHNOLOGIST, OTHER
Payer: MEDICAID

## 2023-08-07 DIAGNOSIS — M75.52 SUBACROMIAL BURSITIS OF LEFT SHOULDER JOINT: ICD-10-CM

## 2023-08-07 PROCEDURE — 73221 MRI JOINT UPR EXTREM W/O DYE: CPT

## 2023-08-17 RX ORDER — FAMOTIDINE 20 MG/1
TABLET, FILM COATED ORAL
Qty: 60 TABLET | Refills: 3 | Status: SHIPPED | OUTPATIENT
Start: 2023-08-17

## 2023-08-28 ENCOUNTER — OFFICE VISIT (OUTPATIENT)
Dept: ORTHOPEDIC SURGERY | Facility: CLINIC | Age: 53
End: 2023-08-28
Payer: MEDICAID

## 2023-08-28 VITALS — BODY MASS INDEX: 27.51 KG/M2 | HEIGHT: 65 IN | WEIGHT: 165.1 LBS

## 2023-08-28 DIAGNOSIS — M75.22 TENDINITIS OF LONG HEAD OF BICEPS BRACHII OF LEFT SHOULDER: ICD-10-CM

## 2023-08-28 DIAGNOSIS — M24.812 INTERNAL DERANGEMENT OF LEFT SHOULDER: ICD-10-CM

## 2023-08-28 DIAGNOSIS — G89.29 CHRONIC LEFT SHOULDER PAIN: ICD-10-CM

## 2023-08-28 DIAGNOSIS — M75.42 SUBACROMIAL IMPINGEMENT OF LEFT SHOULDER: ICD-10-CM

## 2023-08-28 DIAGNOSIS — S43.432A DEGENERATIVE SUPERIOR LABRAL ANTERIOR-TO-POSTERIOR (SLAP) TEAR OF LEFT SHOULDER: ICD-10-CM

## 2023-08-28 DIAGNOSIS — M25.512 CHRONIC LEFT SHOULDER PAIN: ICD-10-CM

## 2023-08-28 DIAGNOSIS — M25.512 LEFT SHOULDER PAIN, UNSPECIFIED CHRONICITY: ICD-10-CM

## 2023-08-28 DIAGNOSIS — M75.02 ADHESIVE CAPSULITIS OF LEFT SHOULDER: ICD-10-CM

## 2023-08-28 DIAGNOSIS — M19.012 ARTHRITIS OF LEFT ACROMIOCLAVICULAR JOINT: ICD-10-CM

## 2023-08-28 DIAGNOSIS — M75.102 NONTRAUMATIC TEAR OF LEFT ROTATOR CUFF, UNSPECIFIED TEAR EXTENT: Primary | ICD-10-CM

## 2023-08-28 NOTE — PROGRESS NOTES
Yudi West is a 52 y.o. female returns for     Chief Complaint   Patient presents with    Left Shoulder - Follow-up    Results     MRI       HISTORY OF PRESENT ILLNESS:  Mrs. West is a 52-year-old female who presents today for the first time to see me regarding her chronic left shoulder discomfort.  Patient was being seen and followed up by my colleague WAI Michael who referred her to me for further management.  Patient was treated with subacromial steroid injection in January 2023 followed by a repeat glenohumeral joint injection on 04/15/2023. She reports injection continues to provide her some temporary relief. She has been seen in office by, Dr. Gregorio, and Dr. Campoverde in the past. She has been made multiple PT referrals in the past but has not completed formal physical therapy. She does have a family member who is a physical therapist who gave her home exercise program which she reports doing faithfully every day. Her most recent hemoglobin A1c is 9.60  which is down from 10.7 previously.  And her blood sugar level is at 2 today 69 mg percent.  Patient denies any new trauma.  However, patient started having tingling and numbness in both upper extremities along with her left total discomfort.  Left shoulder pain has been measuring on a scale of 6/10 at rest and aggravating to 8-10/10 on exertion with overhead activity with mechanical nature of the pain that is moderate to severe and becomes constant on lifting weights.  Patient also noted weakness in her left shoulder along with localized tenderness in the left shoulder region close to the biceps groove and pain associated with clicking.  Patient is a known diabetic and has always been with high HbA1c levels being 11 and is under regular follow-up with her PCP Dr. Flores.    She had an MRI around a year ago which showed  *  Chronic tendinosis changes of the subscapularis tendon.  *  Mild subacromial/subdeltoid and moderate subcoracoid  "bursal  effusion.  *  Small glenohumeral joint effusion.  *  Mild acromioclavicular joint degenerative changes with small  AC joint effusion.    She continues to report significantly decreased range of motion.She does not use NSAIDs due to current use of blood thinners.  Patient is here today for review with persisting left shoulder discomfort.  No new injuries are no new symptomatology noted.  Apparently, patient's  has been diagnosed with lung and bladder cancer in June this year and he is scheduled to undergo chemoradiation after surgery.  Essentially she is otherwise occupied with her 's health condition and  her being homebound.      CONCURRENT MEDICAL HISTORY:  The following portions of the patient's history were reviewed and updated as appropriate: allergies, current medications, past family history, past medical history, past social history, past surgical history and problem list.     ROS  No fevers or chills.  No chest pain or shortness of air.  No GI or  disturbances.  Left shoulder pain.    PHYSICAL EXAMINATION:       Ht 165.1 cm (65\")   Wt 74.9 kg (165 lb 1.6 oz)   BMI 27.47 kg/m²     Physical Exam  Vitals and nursing note reviewed.   Constitutional:       General: She is not in acute distress.     Appearance: She is well-developed. She is not toxic-appearing or diaphoretic.   HENT:      Head: Normocephalic.   Eyes:      General: No scleral icterus.  Pulmonary:      Effort: Pulmonary effort is normal. No respiratory distress.   Skin:     General: Skin is warm and dry.   Neurological:      Mental Status: She is alert and oriented to person, place, and time.   Psychiatric:         Behavior: Behavior normal.         Thought Content: Thought content normal.         Judgment: Judgment normal.       GAIT:     [x]  Normal  []  Antalgic    Assistive device: [x]  None  []  Walker     []  Crutches  []  Cane     []  Wheelchair  []  Stretcher    Left Shoulder Exam     Range of Motion   Active " abduction:  80 (Passive range of motion 90, stopped due to pain)   Extension:  10   External rotation:  abnormal   Forward flexion:  90 (passive 110, stopped due to pain)   Internal rotation 90 degrees:  abnormal     Other   Erythema: absent  Sensation: normal  Pulse: present     Comments:  No evidence of infection        Weakness of the rotator cuff with drop arm feature noted particularly the supraspinatus region along with Gansevoort/bearhug test/Gerbers liftoff positive for subscap weakness.  Romero shear stress test O'Evaristo test positive for labral tear.  Neer/Lang positive for subacromial impingement   Valdez's positive.  No sign of DVT/compartment soft nontender.    EXAM:    MR Left Upper Extremity Without Intravenous Contrast, Shoulder     FACILITY:    [facility]     REFERRING:    GERRI ROME     CLINICAL HISTORY:    51 years, Female; chronic left shoulder pain., M75.52 Bursitis  of left shoulder M24.812 Other specific joint derangements of  left shoulder, not elsewhere classified     TECHNIQUE:    Multiplanar magnetic resonance images of the left shoulder  without intravenous contrast.     COMPARISON:    Left shoulder x-rays 8/5/2020     FINDINGS:   TENDONS:    Supraspinatus:  Unremarkable.    Infraspinatus:  Unremarkable.    Subscapularis:  There is thickening and signal change within  the subscapularis tendon consistent with chronic tendinosis  changes.    Teres minor:  Unremarkable.    Biceps brachii, long head:  Unremarkable.      LIGAMENTS:    Glenohumeral:  Unremarkable.       Muscles:  Unremarkable.    Fluid:  There is evidence of a small subacromial-subdeltoid  bursal effusion.        There is a moderate subcoracoid bursal effusion.        A small glenohumeral joint effusion is evident.        There are mild degenerative changes at the  acromioclavicular joint with evidence of a small AC joint  effusion.    Cartilage:  Unremarkable.    Glenoid labrum:  Unremarkable.  No tear.     Bones/joints:  Unremarkable.     IMPRESSION:  *  Chronic tendinosis changes of the subscapularis tendon.  *  Mild subacromial/subdeltoid and moderate subcoracoid bursal  effusion.  *  Small glenohumeral joint effusion.  *  Mild acromioclavicular joint degenerative changes with small  AC joint effusion.     Electronically signed by:  Jeremias Harper DO  5/13/2022 11:38 AM CDT  Workstation: [id number]    Three view left shoulder     HISTORY: Left shoulder pain     AP films with the humerus in internal and external rotation and  scapular Y view were obtained.     COMPARISON: August 5, 2020     FINDINGS:   Hypertrophic change acromioclavicular joint.  No fracture or dislocation.  No other osseous or articular abnormality.     Several scattered calcified granulomata in the left lung.     IMPRESSION:  CONCLUSION:  Hypertrophic change acromioclavicular joint.     [id number]     Electronically signed by:  Juve Rico MD  4/29/2022 7:56 PM CDT  Workstation: [id number]          ASSESSMENT:  Mrs. West is a 52-year-old female who presents today for the first time to see me regarding her chronic left shoulder discomfort.  Patient was being seen and followed up by my colleague WAI Michael who referred her to me for further management.  Patient was treated with subacromial steroid injection in January 2023 followed by a repeat glenohumeral joint injection on 04/15/2023. She reports injection continues to provide her some temporary relief. She has been seen in office by, Dr. Gregorio, and Dr. Campoverde in the past. She has been made multiple PT referrals in the past but has not completed formal physical therapy. She does have a family member who is a physical therapist who gave her home exercise program which she reports doing faithfully every day. Her most recent hemoglobin A1c is 9.60  which is down from 10.7 previously.  And her blood sugar level is at 2 today 69 mg percent.  Patient denies any new trauma.  However, patient started  having tingling and numbness in both upper extremities along with her left total discomfort.  Left shoulder pain has been measuring on a scale of 6/10 at rest and aggravating to 8-10/10 on exertion with overhead activity with mechanical nature of the pain that is moderate to severe and becomes constant on lifting weights.  Patient also noted weakness in her left shoulder along with localized tenderness in the left shoulder region close to the biceps groove and pain associated with clicking.  Patient is a known diabetic and has always been with high HbA1c levels being 11 and is under regular follow-up with her PCP Dr. Flores. She continues to report significantly decreased range of motion.She does not use NSAIDs due to current use of blood thinners.  Patient is here today for review with persisting left shoulder discomfort.  No new injuries are no new symptomatology noted.  Apparently, patient's  has been diagnosed with lung and bladder cancer in June this year and he is scheduled to undergo chemoradiation after surgery.  Essentially she is otherwise occupied with her 's health condition and her being homebound providing care for her .      Diagnoses and all orders for this visit:    Nontraumatic tear of left rotator cuff, unspecified tear extent    Left shoulder pain, unspecified chronicity    Adhesive capsulitis of left shoulder    Internal derangement of left shoulder    Arthritis of left acromioclavicular joint    Tendinitis of long head of biceps brachii of left shoulder    Chronic left shoulder pain    Degenerative superior labral anterior-to-posterior (SLAP) tear of left shoulder    Subacromial impingement of left shoulder      Patient's MRI from 07/05/2023 indicatedDiffuse rotator cuff tendinosis with full-thickness tear anterior to mid supraspinatus tendon at the myotendinous junction.  Moderate/severe atrophy of the supraspinatus muscle.   Mild acromioclavicular joint  osteoarthritis.  In addition to the reported findings, I believe there is subscapularis tendon tear close to the footprint along with a degenerative tear of the superior labrum and moderate AC joint arthritis with subacromial impingement.    PLAN  Patient is again encouraged and strictly advised to monitor her sugar levels and maintain good glycemic control with HbA1c levels close to 7.  However, patient mentioned to us that her sugar levels and HbA1c levels have always been high despite all control measures and she gets frequent hypoglycemic spells indicating brittle nature of her diabetes.  Patient continues to have pain and limitations despite HEP, activity modification, and glenohumeral joint injections.  During the previous visit, after considering available treatment options including risk, benefits, alternatives, patient desire to proceed with glenohumeral joint injection and if this does not give her improvement or lasting relief she would like to either undergoing definitive surgical management in the form of arthroscopy mentioned to see if that would help.  However, considering the rotator cuff status; intra-articular steroid versus subacromial steroid is unlikely to give relief with the structural derangement and also she is likely to have risk of infection and her blood glucose levels being altered by the steroid injection hands discouraged.  Once her home situation and her 's health settles down and when she is comfortable provided her HbA1c levels return to around 7 we would like to consider for arthroscopy on her left shoulder if she agrees with the same.  Patient to return as needed.  Patient instructed to continue HEP, ice and heat application along with local pain gel application and placement for pain relief.  Would like to review the patient in 3 mos to discuss further treatment plans and we will repeat her plain x-rays to her left shoulder 3+ views at that time.  Time spent of a minimum  of 45 minutes including the face to face evaluation, reviewing of medical history and prior medial records, reviewing of diagnostic studies, ordering additional tests, documentation, patient education and coordination of care.   EMR Dragon/Transciption Disclaimer: Some of this note may be an electronic transcription/translation of spoken language to printed text.  The electronic translation of spoken language may permit erroneous, or at times, nonsensical words or phrases to be inadvertently transcribed. Although I have reviewed the note for such errors, some may still exist.     Pepito Quezada MD September 4, 2023 20:45 CDT

## 2023-09-04 PROBLEM — M75.42 SUBACROMIAL IMPINGEMENT OF LEFT SHOULDER: Status: ACTIVE | Noted: 2023-09-04

## 2023-09-04 PROBLEM — S43.432A DEGENERATIVE SUPERIOR LABRAL ANTERIOR-TO-POSTERIOR (SLAP) TEAR OF LEFT SHOULDER: Status: ACTIVE | Noted: 2023-09-04

## 2023-09-12 RX ORDER — ERGOCALCIFEROL 1.25 MG/1
CAPSULE ORAL
Qty: 4 CAPSULE | Refills: 1 | Status: SHIPPED | OUTPATIENT
Start: 2023-09-12

## 2023-09-13 DIAGNOSIS — Z12.2 SCREENING FOR LUNG CANCER: Primary | ICD-10-CM

## 2023-09-14 RX ORDER — RANOLAZINE 500 MG/1
TABLET, EXTENDED RELEASE ORAL
Qty: 60 TABLET | Refills: 1 | Status: SHIPPED | OUTPATIENT
Start: 2023-09-14

## 2023-09-14 RX ORDER — DICYCLOMINE HCL 20 MG
TABLET ORAL
Qty: 120 TABLET | Refills: 1 | Status: SHIPPED | OUTPATIENT
Start: 2023-09-14

## 2023-09-14 RX ORDER — HYDROXYZINE PAMOATE 25 MG/1
CAPSULE ORAL
Qty: 90 CAPSULE | Refills: 1 | Status: SHIPPED | OUTPATIENT
Start: 2023-09-14

## 2023-09-14 RX ORDER — OMEPRAZOLE 40 MG/1
CAPSULE, DELAYED RELEASE ORAL
Qty: 30 CAPSULE | Refills: 1 | Status: SHIPPED | OUTPATIENT
Start: 2023-09-14

## 2023-09-14 RX ORDER — LOSARTAN POTASSIUM 25 MG/1
TABLET ORAL
Qty: 30 TABLET | Refills: 1 | Status: SHIPPED | OUTPATIENT
Start: 2023-09-14

## 2023-09-14 RX ORDER — CARVEDILOL 3.12 MG/1
TABLET ORAL
Qty: 60 TABLET | Refills: 1 | Status: SHIPPED | OUTPATIENT
Start: 2023-09-14

## 2023-09-14 RX ORDER — EZETIMIBE 10 MG/1
TABLET ORAL
Qty: 30 TABLET | Refills: 1 | Status: SHIPPED | OUTPATIENT
Start: 2023-09-14

## 2023-09-14 RX ORDER — ROSUVASTATIN CALCIUM 20 MG/1
TABLET, COATED ORAL
Qty: 30 TABLET | Refills: 1 | Status: SHIPPED | OUTPATIENT
Start: 2023-09-14

## 2023-09-14 NOTE — TELEPHONE ENCOUNTER
Proton Pump Inhibitors Protocol [id number]/14/2023 08:05 AM   Protocol Details Not on Clopidogrel (Plavix) or if on, refill is for Pantoprazole (Protonix)

## 2023-09-27 RX ORDER — SITAGLIPTIN 100 MG/1
TABLET, FILM COATED ORAL
Qty: 30 TABLET | Refills: 0 | Status: SHIPPED | OUTPATIENT
Start: 2023-09-27

## 2023-09-27 RX ORDER — ONDANSETRON 8 MG/1
TABLET, ORALLY DISINTEGRATING ORAL
Qty: 90 TABLET | Refills: 0 | Status: SHIPPED | OUTPATIENT
Start: 2023-09-27

## 2023-10-01 RX ORDER — DULOXETIN HYDROCHLORIDE 30 MG/1
CAPSULE, DELAYED RELEASE ORAL
Qty: 60 CAPSULE | Refills: 0 | Status: SHIPPED | OUTPATIENT
Start: 2023-10-01

## 2025-01-21 ENCOUNTER — TELEMEDICINE (OUTPATIENT)
Dept: PSYCHIATRY | Facility: CLINIC | Age: 55
End: 2025-01-21
Payer: MEDICAID

## 2025-01-21 ENCOUNTER — PRIOR AUTHORIZATION (OUTPATIENT)
Dept: PSYCHIATRY | Facility: CLINIC | Age: 55
End: 2025-01-21

## 2025-01-21 DIAGNOSIS — G47.00 INSOMNIA, UNSPECIFIED TYPE: ICD-10-CM

## 2025-01-21 DIAGNOSIS — F41.1 GENERALIZED ANXIETY DISORDER: ICD-10-CM

## 2025-01-21 DIAGNOSIS — F33.2 SEVERE EPISODE OF RECURRENT MAJOR DEPRESSIVE DISORDER, WITHOUT PSYCHOTIC FEATURES: Primary | ICD-10-CM

## 2025-01-21 PROBLEM — E11.9 DIABETES MELLITUS: Status: ACTIVE | Noted: 2017-08-14

## 2025-01-21 PROBLEM — E11.21 DIABETIC NEPHROPATHY ASSOCIATED WITH TYPE 2 DIABETES MELLITUS: Status: ACTIVE | Noted: 2020-07-02

## 2025-01-21 PROBLEM — N18.2 STAGE 2 CHRONIC KIDNEY DISEASE: Status: ACTIVE | Noted: 2020-12-02

## 2025-01-21 PROBLEM — E11.65 TYPE 2 DIABETES MELLITUS WITH HYPERGLYCEMIA: Status: ACTIVE | Noted: 2020-07-02

## 2025-01-21 RX ORDER — ATORVASTATIN CALCIUM 40 MG/1
40 TABLET, FILM COATED ORAL DAILY
COMMUNITY

## 2025-01-21 RX ORDER — PANTOPRAZOLE SODIUM 40 MG/1
40 TABLET, DELAYED RELEASE ORAL DAILY
COMMUNITY
Start: 2024-10-01 | End: 2025-10-02

## 2025-01-21 RX ORDER — FINERENONE 10 MG/1
TABLET, FILM COATED ORAL
COMMUNITY
Start: 2025-01-06

## 2025-01-21 RX ORDER — METOPROLOL SUCCINATE 25 MG/1
25 TABLET, EXTENDED RELEASE ORAL 2 TIMES DAILY
COMMUNITY

## 2025-01-21 RX ORDER — OXYBUTYNIN CHLORIDE 5 MG/1
TABLET ORAL
COMMUNITY
Start: 2024-11-26

## 2025-01-21 RX ORDER — BREXPIPRAZOLE 1 MG/1
1 TABLET ORAL NIGHTLY
Qty: 30 TABLET | Refills: 2 | Status: SHIPPED | OUTPATIENT
Start: 2025-01-21

## 2025-01-21 RX ORDER — NITROFURANTOIN 25; 75 MG/1; MG/1
CAPSULE ORAL
COMMUNITY
Start: 2025-01-06

## 2025-01-21 RX ORDER — TIRZEPATIDE 2.5 MG/.5ML
2.5 INJECTION, SOLUTION SUBCUTANEOUS WEEKLY
COMMUNITY
Start: 2024-11-26

## 2025-01-21 RX ORDER — VILAZODONE HYDROCHLORIDE 10 MG/1
10 TABLET ORAL DAILY
Qty: 30 TABLET | Refills: 0 | Status: SHIPPED | OUTPATIENT
Start: 2025-01-21

## 2025-01-21 RX ORDER — FLUCONAZOLE 150 MG/1
TABLET ORAL
COMMUNITY
Start: 2024-12-21

## 2025-01-21 RX ORDER — SOLIFENACIN SUCCINATE 10 MG/1
TABLET, FILM COATED ORAL
COMMUNITY
Start: 2024-12-07

## 2025-01-21 NOTE — PROGRESS NOTES
This provider is located at [facility], [address] [place] [address].The Patient is seen remotely at home, [address] [facility] [place] [address] via [employer]. Patient is being seen via telehealth and confirm that they are in a secure environment for this session. The patient's condition being diagnosed/treated is appropriate for telemedicine. The provider identified himself/herself: herself as well as her credentials.   The patient gave consent to be seen remotely, and when consent is given they understand that the consent allows for patient identifiable information to be sent to a third party as needed.   They may refuse to be seen remotely at any time. The electronic data is encrypted and password protected, and the patient has been advised of the potential risks to privacy not withstanding such measures.    You have chosen to receive care through a telehealth visit.  Do you consent to use a video/audio connection for your medical care today? Yes. Patient verified Name, , and address.       Chief Complaint  Depression and anxiety    Subjective          Yudi Alisha West presents to [facility] for seeing me for the first time in over 4 years again for medication management.    History of Present Illness  Patient presents today after not seeing me since 2021.  Patient states that in  she had a lot going on as her nephew passed away of a heart attack and her brother-in-law passed.  She notes between family and friends she lost 12 people that year.  Patient states things have just been hard from day-to-day.  She notes she cannot recall if anything happened in  but notes in  she lost 2 of her sisters and her son went back to [facility] for drugs.  She states that her son and his girlfriend are currently living with them which can be stressful.  She also notes that her  got a second DUI and is currently with an ankle  monitor and going to be on house arrest.  She states that she had cataract surgery and carpal tunnel surgery on her left arm last year.  She notes her diabetes have been out of control.  States her appetite has been significantly decreased as she may drink soda and a couple crackers but not eat for 2 to 3 days.  Encouraged her that is not good and to at least try some protein drinks and speak with a nutritionist.  Updated medication and history.  The patient reports depressive symptoms including depressed mood, crying spells, insomnia, decreased appetite, feelings of guilt, feelings of hopelessness, feelings of helplessness, feelings of worthlessness, low energy, difficulty concentrating, and suicidal ideation, and have caused impairment in important areas of functioning.  Depression rated 8/10 with 10 being the worst.   The patient reports the following symptoms of anxiety: constant anxiety/worry, restlessness/on edge, difficulty concentrating, mind goes blank, irritability, sleep disturbance, and anxiety causes distress/impairment in important areas of functioning and have caused impairment in important areas of functioning.  Patient notes that she has been on Rexulti for roughly 2 to 3 years but does not feel as if it is helping right now.  She states that she is still only getting 2 to 3 hours of sleep at night despite being on Ambien.  Patient wanted to know if I could prescribe Valium or Klonopin or Xanax.  Informed her I cannot with her past history and her medical history.  Also informed her that those are temporarily fixes as they do not actually help with the anxiety long-term and she can develop dependence and it would not be safe for her to have these in her home.  Patient was agreeable and verbalized understanding.  Denies any hypomanic or manic episodes.  Admits to SI but adamantly denies any plan or intent.  Denies any auditory or visual hallucinations.  Denies any HI.    Patient has previously failed  "and tried from previous records Paxil, Zoloft, Lexapro, Celexa, Abilify, Wellbutrin, BuSpar, Effexor, Minipress, Seroquel, Lamictal, hydroxyzine and Remeron.  Currently on duloxetine for nerve pain.    Social History     Socioeconomic History    Marital status:    Tobacco Use    Smoking status: Former     Current packs/day: 2.00     Average packs/day: 2.0 packs/day for 37.0 years (74.0 ttl pk-yrs)     Types: Cigarettes    Smokeless tobacco: Never    Tobacco comments:     Have slowed down alot   Vaping Use    Vaping status: Never Used    Passive vaping exposure: Yes (Son)   Substance and Sexual Activity    Alcohol use: Not Currently     Comment: passed use for yrs including cases and 2 fifths daily; quit 20 yrs ago    Drug use: Not Currently     Types: \"Crack\" cocaine, Methamphetamines    Sexual activity: Yes     Partners: Male     Birth control/protection: None     Past Medical History:   Diagnosis Date    ADHD (attention deficit hyperactivity disorder)     Allergic     Anxiety and depression     Arthritis     Asthma     Bipolar disorder     Borderline personality disorder     Cancer of kidney     left    Chronic kidney disease     Chronic pain disorder     COPD (chronic obstructive pulmonary disease)     Coronary artery disease     1 stent.  is followed by jaden    Diabetes mellitus     GERD (gastroesophageal reflux disease)     Hyperlipidemia     Hypertension     Myocardial infarction     PTSD (post-traumatic stress disorder)     Pulmonary embolism     Sleep apnea     Substance abuse     Suicide attempt            Objective   Vital Signs:   There were no vitals taken for this visit.  Due to the remote nature of this encounter (virtual encounter), vitals were unable to be obtained.  Height stated at 165 inches.  Weight stated at 65 pounds.      PHQ-9 Score:   PHQ-9 Total Score:(Patient-Rptd) 18     PHQ-9 Depression Screening  Little interest or pleasure in doing things? (Patient-Rptd) Over half   Feeling " down, depressed, or hopeless? (Patient-Rptd) Almost all   PHQ-2 Total Score (Patient-Rptd) 5   Trouble falling or staying asleep, or sleeping too much? (Patient-Rptd) Almost all   Feeling tired or having little energy? (Patient-Rptd) Almost all   Poor appetite or overeating? (Patient-Rptd) Almost all   Feeling bad about yourself - or that you are a failure or have let yourself or your family down? (Patient-Rptd) Over half   Trouble concentrating on things, such as reading the newspaper or watching television? (Patient-Rptd) Several days   Moving or speaking so slowly that other people could have noticed? Or the opposite - being so fidgety or restless that you have been moving around a lot more than usual? (Patient-Rptd) Not at all   Thoughts that you would be better off dead, or of hurting yourself in some way? (Patient-Rptd) Several days   PHQ-9 Total Score (Patient-Rptd) 18   If you checked off any problems, how difficult have these problems made it for you to do your work, take care of things at home, or get along with other people? (Patient-Rptd) Somewhat difficult         PHQ-9 Total Score: (Patient-Rptd) 18     KRISTOFER-7  Feeling nervous, anxious or on edge: (Patient-Rptd) More than half the days  Not being able to stop or control worrying: (Patient-Rptd) Nearly every day  Worrying too much about different things: (Patient-Rptd) Nearly every day  Trouble Relaxing: (Patient-Rptd) More than half the days  Being so restless that it is hard to sit still: (Patient-Rptd) Nearly every day  Feeling afraid as if something awful might happen: (Patient-Rptd) Several days  Becoming easily annoyed or irritable: (Patient-Rptd) Nearly every day  KRISTOFER 7 Total Score: (Patient-Rptd) 17  If you checked any problems, how difficult have these problems made it for you to do your work, take care of things at home, or get along with other people: (Patient-Rptd) Somewhat difficult      Patient screened positive for depression based on a  PHQ-9 score of 18 on 1/16/2025. Follow-up recommendations include: Prescribed antidepressant medication treatment.        Mental Status Exam:   Hygiene:   good  Cooperation:  Cooperative  Eye Contact:  Good  Psychomotor Behavior:  Restless  Affect:  Appropriate  Mood: depressed and anxious  Speech:  Normal  Thought Process:  Goal directed  Thought Content:  Normal  Suicidal:  Suicidal Ideation no plan or intent   Homicidal:  None  Hallucinations:  None  Delusion:  None  Memory:  Intact  Orientation:  Person, Place, Time, and Situation  Reliability:  fair  Insight:  Fair  Judgement:  Fair  Impulse Control:  Fair  Physical/Medical Issues:  Yes see extensive medical hx      Current Medications:   Current Outpatient Medications   Medication Sig Dispense Refill    fluconazole (DIFLUCAN) 150 MG tablet       Kerendia 10 MG tablet       Mounjaro 2.5 MG/0.5ML solution auto-injector Inject 2.5 mg under the skin into the appropriate area as directed 1 (One) Time Per Week.      nitrofurantoin, macrocrystal-monohydrate, (MACROBID) 100 MG capsule       oxybutynin (DITROPAN) 5 MG tablet Take 1 tablet 3 times a day by oral route for 90 days.      pantoprazole (PROTONIX) 40 MG EC tablet Take 1 tablet by mouth Daily.      solifenacin (VESICARE) 10 MG tablet       Alpha-Lipoic Acid 300 MG capsule TAKE ONE TO TWO CAPSULES BY MOUTH TWICE DAILY      aspirin 81 MG EC tablet Take 1 tablet by mouth 2 (Two) Times a Day With Meals. 60 tablet 0    atorvastatin (Lipitor) 40 MG tablet Take 1 tablet by mouth Daily.      Brexpiprazole (Rexulti) 1 MG tablet Take 1 tablet by mouth Every Night. 30 tablet 2    carvedilol (COREG) 3.125 MG tablet TAKE ONE TABLET BY MOUTH TWICE DAILY WITH MEAL 60 tablet 1    clopidogrel (PLAVIX) 75 MG tablet Take 1 tablet by mouth Daily. 30 tablet 3    Continuous Blood Gluc Sensor (Dexcom G6 Sensor) USE AS DIRECTED CHANGE EVERY 10 DAYS 3 each 2    Continuous Blood Gluc Transmit (Dexcom G6 Transmitter) misc Inject 1  application under the skin into the appropriate area as directed 3 (Three) Times a Day. 1 each 3    dicyclomine (BENTYL) 20 MG tablet TAKE ONE TABLET BY MOUTH EVERY 6 HOURS 120 tablet 1    doxazosin (CARDURA) 1 MG tablet       DULoxetine (CYMBALTA) 30 MG capsule TAKE ONE CAPSULE BY MOUTH TWICE DAILY 60 capsule 0    empagliflozin (Jardiance) 25 MG tablet tablet Take 1 tablet by mouth Daily. 30 tablet 3    ezetimibe (ZETIA) 10 MG tablet TAKE ONE TABLET BY MOUTH EVERY DAY 30 tablet 1    gabapentin (NEURONTIN) 800 MG tablet       Global Ease Inject Pen Needles 31G X 8 MM misc Use as needed 100 each 3    glucose blood test strip Test 3 times daily 100 each 11    glucose monitor monitoring kit 1 each As Needed (to check bg). 1 each 0    guaiFENesin (Mucus Relief) 600 MG 12 hr tablet Take 2 tablets by mouth 2 (Two) Times a Day. NO ADDITIONAL REFILLS WITHOUT AN APPOINTMENT 60 tablet 0    hydrOXYzine pamoate (VISTARIL) 25 MG capsule TAKE ONE CAPSULE BY MOUTH THREE TIMES DAILY AS NEEDED FOR ITCHING AND FOR ANXIETY 90 capsule 1    Insulin Glargine (BASAGLAR KWIKPEN) 100 UNIT/ML injection pen Take 36 units at bedtime can go up by 2-3 unites every 3 days until am sugars running     02/05/2021 - Patient currently utilizing 50 Units nightly. 15 mL 1    Insulin Lispro, 1 Unit Dial, (HUMALOG) 100 UNIT/ML solution pen-injector INJECT UP TO 20 UNITS WITH MEALS 15 mL 0    Lancets misc Test 3 times daily , E11.65 100 each 11    lidocaine (LIDODERM) 5 % APPLY ONE TO TWO PATCHES AS DIRECTED ON LOWER BACK, LEFT UPPER LEG 12 HOURS ON AND 12 HOURS OFF      lidocaine-prilocaine (EMLA) 2.5-2.5 % cream       loratadine (Allergy Relief) 10 MG tablet Take 1 tablet by mouth Daily. 30 tablet 3    losartan (COZAAR) 25 MG tablet TAKE ONE TABLET BY MOUTH EVERY DAY 30 tablet 1    metoprolol succinate XL (TOPROL-XL) 25 MG 24 hr tablet Take 1 tablet by mouth 2 (Two) Times a Day.      naloxone (NARCAN) 4 MG/0.1ML nasal spray       nicotine  (Nicotrol) 10 MG inhaler Inhale 1 puff As Needed for Smoking Cessation. 168 each 3    ondansetron ODT (ZOFRAN-ODT) 8 MG disintegrating tablet PLACE ONE TABLET ON THE TONGUE AND ALLOW TO DISSOLVE EVERY 8 HOURS AS NEEDED FOR FOR NAUSEA AND VOMITING 90 tablet 0    polyethylene glycol (MIRALAX) 17 GM/SCOOP powder MIX 17 GRAMS (1 CAPFUL) IN 8 OUNCES OF WATER OR JUICE AND DRINK DAILY 510 g 5    ranolazine (RANEXA) 500 MG 12 hr tablet TAKE ONE TABLET BY MOUTH TWICE DAILY 60 tablet 1    rosuvastatin (CRESTOR) 20 MG tablet TAKE ONE TABLET BY MOUTH AT BEDTIME 30 tablet 1    SITagliptin (Januvia) 100 MG tablet TAKE ONE TABLET BY MOUTH EVERY DAY 30 tablet 0    Ventolin  (90 Base) MCG/ACT inhaler INHALE TWO PUFFS BY MOUTH EVERY 4 HOURS AS NEEDED WHEEZING 18 g 1    vilazodone (VIIBRYD) 10 MG tablet tablet Take 1 tablet by mouth Daily. 30 tablet 0    vitamin D (ERGOCALCIFEROL) 1.25 MG (45898 UT) capsule capsule TAKE ONE CAPSULE BY MOUTH WEEKLY 4 capsule 1    zolpidem (AMBIEN) 10 MG tablet        No current facility-administered medications for this visit.       Physical Exam  Nursing note reviewed.   Constitutional:       Appearance: Normal appearance.   Neurological:      Mental Status: She is alert.   Psychiatric:         Attention and Perception: Attention and perception normal.         Mood and Affect: Mood is anxious and depressed.         Speech: Speech normal.         Behavior: Behavior is agitated. Behavior is cooperative.         Cognition and Memory: Cognition and memory normal.        Result Review :     The following data was reviewed by: WAI Florez on 01/21/2025:  Common labs          4/18/2024    13:14 7/25/2024    17:12 12/19/2024    12:11   Common Labs   WBC 5.7     8.3     12.6       Hemoglobin 13.0     12.5     13.4       Hematocrit 38.2     37.2     39.4       Platelets 226     261     269       Total Cholesterol 137     162     125       Triglycerides 78     118     90       HDL Cholesterol  49     50     43       LDL Cholesterol  72     88     64       Hemoglobin A1C 8.8     8.5     8.9          Details          This result is from an external source.               CBC          4/18/2024    13:14 7/25/2024    17:12 12/19/2024    12:11   CBC   WBC 5.7     8.3     12.6       RBC 4.54     4.23     4.52       Hemoglobin 13.0     12.5     13.4       Hematocrit 38.2     37.2     39.4       MCV 84.1     87.9     87.2       MCH 28.6     29.6     29.6       MCHC 34.0     33.6     34.0       RDW 11.9     11.8     11.8       Platelets 226     261     269          Details          This result is from an external source.             CBC w/diff          4/18/2024    13:14 7/25/2024    17:12 12/19/2024    12:11   CBC w/Diff   WBC 5.7     8.3     12.6       RBC 4.54     4.23     4.52       Hemoglobin 13.0     12.5     13.4       Hematocrit 38.2     37.2     39.4       MCV 84.1     87.9     87.2       MCH 28.6     29.6     29.6       MCHC 34.0     33.6     34.0       RDW 11.9     11.8     11.8       Platelets 226     261     269       Neutrophil Rel % 49.8     46.1     72.8       Immature Granulocyte Rel % 1.1     0.6     0.3       Lymphocyte Rel % 35.1     37.0     17.7       Monocyte Rel % 7.7     7.8     5.7       Eosinophil Rel % 5.1     7.0     2.9       Basophil Rel % 1.2     1.5     0.6          Details          This result is from an external source.             Lipid Panel          4/18/2024    13:14 7/25/2024    17:12 12/19/2024    12:11   Lipid Panel   Total Cholesterol 137     162     125       Triglycerides 78     118     90       HDL Cholesterol 49     50     43       VLDL Cholesterol 16     24     18       LDL Cholesterol  72     88     64       LDL/HDL Ratio 1.47     1.76     1.49          Details          This result is from an external source.                     Most Recent A1C          12/19/2024    12:11   HGBA1C Most Recent   Hemoglobin A1C 8.9          Details          This result is from an  external source.               A1C Last 3 Results          4/18/2024    13:14 7/25/2024    17:12 12/19/2024    12:11   HGBA1C Last 3 Results   Hemoglobin A1C 8.8     8.5     8.9          Details          This result is from an external source.               HgB          4/18/2024    13:14 7/25/2024    17:12 12/19/2024    12:11   HGB   Hemoglobin 13.0     12.5     13.4          Details          This result is from an external source.                   Data reviewed : PCP and inpatient notes         Assessment and Plan    Problem List Items Addressed This Visit       Insomnia    Severe episode of recurrent major depressive disorder, without psychotic features - Primary    Relevant Medications    Brexpiprazole (Rexulti) 1 MG tablet    vilazodone (VIIBRYD) 10 MG tablet tablet     Other Visit Diagnoses       Generalized anxiety disorder        Relevant Medications    Brexpiprazole (Rexulti) 1 MG tablet    vilazodone (VIIBRYD) 10 MG tablet tablet            Encounter Diagnoses   Name Primary?    Severe episode of recurrent major depressive disorder, without psychotic features Yes    Generalized anxiety disorder     Insomnia, unspecified type           TREATMENT PLAN/GOALS: Continue supportive psychotherapy efforts and medications as indicated. Treatment and medication options discussed during today's visit. Patient ackowledged and verbally consented to continue with current treatment plan and was educated on the importance of compliance with treatment and follow-up appointments.    MEDICATION ISSUES:  We discussed risks, benefits, and side effects of the above medications and the patient was agreeable with the plan. Patient was educated on the importance of compliance with treatment and follow-up appointments.  Patient is agreeable to call the office with any worsening of symptoms or onset of side effects. Patient is agreeable to call 911 or go to the nearest ER should he/she begin having SI/HI. Lengthy discussion with  patient on the possible side effects of antipsychotic medications including increased cholesterol, increased blood sugar, and possibility of weight gain.  Also discussed the need to monitor lab work associated with this.  The risk of muscle movement disorders with this class of medication was also discussed.  Patient was educated extensively regarding the combination of duloxetine and Viibryd and educated about serotonin syndrome and any significant symptoms or concerns to go to the nearest ER and/or contact the clinic she verbalized understanding.    -Begin Viibryd 10 mg daily for depression and anxiety symptoms.  Highly encouraged patient if she had any worsening symptoms or concerns to contact the clinic she verbalized understanding.  -Increase Rexulti to 1 mg at night for worsening depression.  Highly encouraged patient if she had any worsening symptoms or concerns to contact the clinic or go to the nearest ER she verbalized understanding.    -Patient has failed and tried paroxetine, sertraline, Lexapro, citalopram, bupropion, buspirone, venlafaxine, Minipress, quetiapine, aripiprazole, lamotrigine, mirtazapine and hydroxyzine.       Counseled patient regarding multimodal approach with healthy nutrition, healthy sleep, regular physical activity, social activities, counseling, and medications.      Coping skills reviewed and encouraged positive framing of thoughts     Assisted patient in processing above session content; acknowledged and normalized patient’s thoughts, feelings, and concerns.  Applied  positive coping skills and behavior management in session.  Allowed patient to freely discuss issues without interruption or judgment. Provided safe, confidential environment to facilitate the development of positive therapeutic relationship and encourage open, honest communication. Assisted patient in identifying risk factors which would indicate the need for higher level of care including thoughts to harm self or  others and/or self-harming behavior and encouraged patient to contact this office, call 911, or present to the nearest emergency room should any of these events occur. Discussed crisis intervention services and means to access.     MEDS ORDERED DURING VISIT:  New Medications Ordered This Visit   Medications    Brexpiprazole (Rexulti) 1 MG tablet     Sig: Take 1 tablet by mouth Every Night.     Dispense:  30 tablet     Refill:  2    vilazodone (VIIBRYD) 10 MG tablet tablet     Sig: Take 1 tablet by mouth Daily.     Dispense:  30 tablet     Refill:  0           Follow Up   Return in about 4 weeks (around 2/18/2025), or if symptoms worsen or fail to improve, for Recheck.    Patient was given instructions and counseling regarding her condition or for health maintenance advice. Please see specific information pulled into the AVS if appropriate.     Some of the data in this electronic note has been brought forward from a previous encounter, any necessary changes have been made, it has been reviewed by this APRN, and it is accurate.  This patient will not be seen for the in person visits due to the following exception(s): It would be a hardship for this patient to see a provider in person. and the patient has transportation barriers to seeing a provider in person.     It is my professional opinion that that patient is at risk for disengagement with care that has been effective in managing his/her illness.       This document has been electronically signed by WAI Florez  January 21, 2025 09:34 EST    Part of this note may be an electronic transcription/translation of spoken language to printed text using the Dragon Dictation System.

## 2025-02-18 ENCOUNTER — TELEMEDICINE (OUTPATIENT)
Dept: PSYCHIATRY | Facility: CLINIC | Age: 55
End: 2025-02-18
Payer: MEDICAID

## 2025-02-18 DIAGNOSIS — F41.1 GENERALIZED ANXIETY DISORDER: Chronic | ICD-10-CM

## 2025-02-18 DIAGNOSIS — F33.2 SEVERE EPISODE OF RECURRENT MAJOR DEPRESSIVE DISORDER, WITHOUT PSYCHOTIC FEATURES: Primary | Chronic | ICD-10-CM

## 2025-02-18 DIAGNOSIS — G47.00 INSOMNIA, UNSPECIFIED TYPE: ICD-10-CM

## 2025-02-18 PROBLEM — G56.03 CARPAL TUNNEL SYNDROME, BILATERAL UPPER LIMBS: Status: ACTIVE | Noted: 2023-12-11

## 2025-02-18 PROBLEM — M79.642 BILATERAL HAND PAIN: Status: ACTIVE | Noted: 2023-12-11

## 2025-02-18 PROBLEM — F17.200 NICOTINE DEPENDENCE: Status: ACTIVE | Noted: 2020-08-03

## 2025-02-18 PROBLEM — F32.A DEPRESSIVE DISORDER: Status: ACTIVE | Noted: 2018-03-12

## 2025-02-18 PROBLEM — M65.312 TRIGGER FINGER OF LEFT THUMB: Status: ACTIVE | Noted: 2023-12-11

## 2025-02-18 PROBLEM — M65.331 ACQUIRED TRIGGER FINGER OF RIGHT MIDDLE FINGER: Status: ACTIVE | Noted: 2023-12-11

## 2025-02-18 PROBLEM — R20.0 NUMBNESS AND TINGLING IN BOTH HANDS: Status: ACTIVE | Noted: 2023-12-11

## 2025-02-18 PROBLEM — M79.641 BILATERAL HAND PAIN: Status: ACTIVE | Noted: 2023-12-11

## 2025-02-18 PROBLEM — G56.23 CUBITAL TUNNEL SYNDROME OF BOTH UPPER EXTREMITIES: Status: ACTIVE | Noted: 2023-12-11

## 2025-02-18 PROBLEM — Z98.890 OTHER SPECIFIED POSTPROCEDURAL STATES: Status: ACTIVE | Noted: 2024-01-03

## 2025-02-18 PROBLEM — Z85.528 HISTORY OF RENAL CELL CARCINOMA: Status: ACTIVE | Noted: 2024-07-19

## 2025-02-18 PROBLEM — R20.2 NUMBNESS AND TINGLING IN BOTH HANDS: Status: ACTIVE | Noted: 2023-12-11

## 2025-02-18 PROBLEM — F41.9 ANXIETY: Status: ACTIVE | Noted: 2018-03-12

## 2025-02-18 RX ORDER — AMOXICILLIN 875 MG/1
TABLET, COATED ORAL
COMMUNITY
Start: 2025-02-17

## 2025-02-18 RX ORDER — BREXPIPRAZOLE 1 MG/1
1 TABLET ORAL NIGHTLY
Qty: 30 TABLET | Refills: 2 | Status: SHIPPED | OUTPATIENT
Start: 2025-02-18

## 2025-02-18 RX ORDER — VILAZODONE HYDROCHLORIDE 10 MG/1
10 TABLET ORAL DAILY
Qty: 30 TABLET | Refills: 1 | Status: SHIPPED | OUTPATIENT
Start: 2025-02-18

## 2025-02-18 RX ORDER — HYDROXYZINE HYDROCHLORIDE 50 MG/1
50 TABLET, FILM COATED ORAL 3 TIMES DAILY PRN
COMMUNITY
Start: 2025-01-20

## 2025-02-18 RX ORDER — HYDROCODONE BITARTRATE AND ACETAMINOPHEN 10; 325 MG/1; MG/1
TABLET ORAL
COMMUNITY
Start: 2025-02-17

## 2025-02-18 RX ORDER — MIRTAZAPINE 30 MG/1
TABLET, FILM COATED ORAL
COMMUNITY
Start: 2025-02-05

## 2025-02-18 NOTE — PROGRESS NOTES
-- DO NOT REPLY / DO NOT REPLY ALL --  -- Message is from the [facility]--    Patient is requesting a medication refill - the medication was not listed on the patient's active medication list. [facility] pharmacy has a refill request and does not have it on the list. Please confirm med refill and notify [facility].    Prescribing Provider: Dr. Grove     RX Name:  Warsarin  Dose:  5 MG  Quantity:  60  Instruction(s):      Duration: 60 days    Could not locate pharmacy in [facility].  Pharmacy name: [facility] phone: [phone]    Caller Information       Type Contact Phone    08/28/2020 02:28 PM CDT Phone (Incoming) [id number] - [place], [place] - [address] [place] [address] (Pharmacy) [phone]          Alternative phone number: none    Turnaround time given to caller:   \"This message will be sent to [state Provider's name]. The clinical team will fulfill your request as soon as they review your message.\"   This provider is located at [facility], [address] [place] [address].The Patient is seen remotely at home, [address] [place] [address] via [employer]. Patient is being seen via telehealth and confirm that they are in a secure environment for this session. The patient's condition being diagnosed/treated is appropriate for telemedicine. The provider identified himself/herself: herself as well as her credentials.   The patient gave consent to be seen remotely, and when consent is given they understand that the consent allows for patient identifiable information to be sent to a third party as needed.   They may refuse to be seen remotely at any time. The electronic data is encrypted and password protected, and the patient has been advised of the potential risks to privacy not withstanding such measures.    You have chosen to receive care through a telehealth visit.  Do you consent to use a video/audio connection for your medical care today? Yes. Patient verified Name, , and address. No changes noted      Chief Complaint  Depression and anxiety    Subjective    Yudi West presents to [facility] medication management.    History of Present Illness  Patient presents today reporting that she is slowly getting over COVID from last week.  She states she is currently on amoxicillin.  Patient notes that she still has depression symptoms and feeling hopeless and helpless but it has been better.  She states her son living with her has not helped as he is hard to deal with.  She notes she still has suicidal ideation but it comes and goes and no plan or intent.  Despite the patient being on sleep aid she still reports only getting 3 to 4 hours at night.  Patient is currently still fighting and has not changed any lifestyle or eating habits as she states she still only eats about once per day.  Reports her anxiety is a little better as  she states it is not as bad but still present.  Denies any panic attacks.  Rates depression 4-5 anxiety 5-6 on a scale of 0-10 with 10 being the worst.  Discussed increasing the medication or if it was more situational and COVID.  Patient stated that she feels she wants to recover from COVID before changing the medication and see how she does next month agreed with patient.  Denies any side effects to medications.  Denies any auditory or visual hallucinations.  Denies any HI.  Admits to SI but adamantly denies any plan or intent.    Patient has previously failed and tried from previous records Paxil, Zoloft, Lexapro, Celexa, Abilify, Wellbutrin, BuSpar, Effexor, Minipress, Seroquel, Lamictal, hydroxyzine and Remeron.  When asking patient if she was still on the duloxetine for nerve pain she stated no she was not taking it.      Objective   Vital Signs:   There were no vitals taken for this visit.  Due to the remote nature of this encounter (virtual encounter), vitals were unable to be obtained.  Height stated at 165 inches.  Weight stated at 65 pounds.      PHQ-9 Score:   PHQ-9 Total Score:(Patient-Rptd) 12     PHQ-9 Depression Screening  Little interest or pleasure in doing things? (Patient-Rptd) Several days   Feeling down, depressed, or hopeless? (Patient-Rptd) Several days   PHQ-2 Total Score (Patient-Rptd) 2   Trouble falling or staying asleep, or sleeping too much? (Patient-Rptd) Over half   Feeling tired or having little energy? (Patient-Rptd) Almost all   Poor appetite or overeating? (Patient-Rptd) Over half   Feeling bad about yourself - or that you are a failure or have let yourself or your family down? (Patient-Rptd) Several days   Trouble concentrating on things, such as reading the newspaper or watching television? (Patient-Rptd) Several days   Moving or speaking so slowly that other people could have noticed? Or the opposite - being so fidgety or restless that you have been moving around a lot more  than usual? (Patient-Rptd) Not at all   Thoughts that you would be better off dead, or of hurting yourself in some way? (Patient-Rptd) Several days   PHQ-9 Total Score (Patient-Rptd) 12   If you checked off any problems, how difficult have these problems made it for you to do your work, take care of things at home, or get along with other people? (Patient-Rptd) Somewhat difficult         PHQ-9 Total Score: (Patient-Rptd) 12     KRISTOFER-7  Feeling nervous, anxious or on edge: (Patient-Rptd) More than half the days  Not being able to stop or control worrying: (Patient-Rptd) Nearly every day  Worrying too much about different things: (Patient-Rptd) Nearly every day  Trouble Relaxing: (Patient-Rptd) Nearly every day  Being so restless that it is hard to sit still: (Patient-Rptd) Nearly every day  Feeling afraid as if something awful might happen: (Patient-Rptd) Several days  Becoming easily annoyed or irritable: (Patient-Rptd) More than half the days  KRISTOFER 7 Total Score: (Patient-Rptd) 17  If you checked any problems, how difficult have these problems made it for you to do your work, take care of things at home, or get along with other people: (Patient-Rptd) Somewhat difficult      Patient screened positive for depression based on a PHQ-9 score of 12 on 2/16/2025. Follow-up recommendations include: Prescribed antidepressant medication treatment.        Mental Status Exam:   Hygiene:   good  Cooperation:  Cooperative  Eye Contact:  Good  Psychomotor Behavior:  Restless  Affect:  Appropriate  Mood: depressed and anxious  Speech:  Normal  Thought Process:  Goal directed  Thought Content:  Normal  Suicidal:  Suicidal Ideation no plan or intent   Homicidal:  None  Hallucinations:  None  Delusion:  None  Memory:  Intact  Orientation:  Person, Place, Time, and Situation  Reliability:  fair  Insight:  Fair  Judgement:  Fair  Impulse Control:  Fair  Physical/Medical Issues:  Yes see extensive medical hx      Current Medications:    Current Outpatient Medications   Medication Sig Dispense Refill    Alpha-Lipoic Acid 300 MG capsule TAKE ONE TO TWO CAPSULES BY MOUTH TWICE DAILY      amoxicillin (AMOXIL) 875 MG tablet       aspirin 81 MG EC tablet Take 1 tablet by mouth 2 (Two) Times a Day With Meals. 60 tablet 0    atorvastatin (Lipitor) 40 MG tablet Take 1 tablet by mouth Daily.      Brexpiprazole (Rexulti) 1 MG tablet Take 1 tablet by mouth Every Night. 30 tablet 2    carvedilol (COREG) 3.125 MG tablet TAKE ONE TABLET BY MOUTH TWICE DAILY WITH MEAL 60 tablet 1    clopidogrel (PLAVIX) 75 MG tablet Take 1 tablet by mouth Daily. 30 tablet 3    Continuous Blood Gluc Sensor (Dexcom G6 Sensor) USE AS DIRECTED CHANGE EVERY 10 DAYS 3 each 2    Continuous Blood Gluc Transmit (Dexcom G6 Transmitter) misc Inject 1 application under the skin into the appropriate area as directed 3 (Three) Times a Day. 1 each 3    dicyclomine (BENTYL) 20 MG tablet TAKE ONE TABLET BY MOUTH EVERY 6 HOURS 120 tablet 1    doxazosin (CARDURA) 1 MG tablet       empagliflozin (Jardiance) 25 MG tablet tablet Take 1 tablet by mouth Daily. 30 tablet 3    ezetimibe (ZETIA) 10 MG tablet TAKE ONE TABLET BY MOUTH EVERY DAY 30 tablet 1    fluconazole (DIFLUCAN) 150 MG tablet       gabapentin (NEURONTIN) 800 MG tablet       Global Ease Inject Pen Needles 31G X 8 MM misc Use as needed 100 each 3    glucose blood test strip Test 3 times daily 100 each 11    glucose monitor monitoring kit 1 each As Needed (to check bg). 1 each 0    guaiFENesin (Mucus Relief) 600 MG 12 hr tablet Take 2 tablets by mouth 2 (Two) Times a Day. NO ADDITIONAL REFILLS WITHOUT AN APPOINTMENT 60 tablet 0    HYDROcodone-acetaminophen (NORCO)  MG per tablet       hydrOXYzine (ATARAX) 50 MG tablet Take 1 tablet by mouth 3 (Three) Times a Day As Needed. for anxiety      hydrOXYzine pamoate (VISTARIL) 25 MG capsule TAKE ONE CAPSULE BY MOUTH THREE TIMES DAILY AS NEEDED FOR ITCHING AND FOR ANXIETY 90 capsule 1     Insulin Glargine (BASAGLAR KWIKPEN) 100 UNIT/ML injection pen Take 36 units at bedtime can go up by 2-3 unites every 3 days until am sugars running     02/05/2021 - Patient currently utilizing 50 Units nightly. 15 mL 1    Insulin Lispro, 1 Unit Dial, (HUMALOG) 100 UNIT/ML solution pen-injector INJECT UP TO 20 UNITS WITH MEALS 15 mL 0    Kerendia 10 MG tablet       Lancets misc Test 3 times daily , E11.65 100 each 11    lidocaine (LIDODERM) 5 % APPLY ONE TO TWO PATCHES AS DIRECTED ON LOWER BACK, LEFT UPPER LEG 12 HOURS ON AND 12 HOURS OFF      loratadine (Allergy Relief) 10 MG tablet Take 1 tablet by mouth Daily. 30 tablet 3    losartan (COZAAR) 25 MG tablet TAKE ONE TABLET BY MOUTH EVERY DAY 30 tablet 1    mirtazapine (REMERON) 30 MG tablet       Mounjaro 2.5 MG/0.5ML solution auto-injector Inject 2.5 mg under the skin into the appropriate area as directed 1 (One) Time Per Week.      naloxone (NARCAN) 4 MG/0.1ML nasal spray       nicotine (Nicotrol) 10 MG inhaler Inhale 1 puff As Needed for Smoking Cessation. 168 each 3    nitrofurantoin, macrocrystal-monohydrate, (MACROBID) 100 MG capsule       ondansetron ODT (ZOFRAN-ODT) 8 MG disintegrating tablet PLACE ONE TABLET ON THE TONGUE AND ALLOW TO DISSOLVE EVERY 8 HOURS AS NEEDED FOR FOR NAUSEA AND VOMITING 90 tablet 0    oxybutynin (DITROPAN) 5 MG tablet Take 1 tablet 3 times a day by oral route for 90 days.      pantoprazole (PROTONIX) 40 MG EC tablet Take 1 tablet by mouth Daily.      polyethylene glycol (MIRALAX) 17 GM/SCOOP powder MIX 17 GRAMS (1 CAPFUL) IN 8 OUNCES OF WATER OR JUICE AND DRINK DAILY 510 g 5    rosuvastatin (CRESTOR) 20 MG tablet TAKE ONE TABLET BY MOUTH AT BEDTIME 30 tablet 1    SITagliptin (Januvia) 100 MG tablet TAKE ONE TABLET BY MOUTH EVERY DAY 30 tablet 0    solifenacin (VESICARE) 10 MG tablet       Ventolin  (90 Base) MCG/ACT inhaler INHALE TWO PUFFS BY MOUTH EVERY 4 HOURS AS NEEDED WHEEZING 18 g 1    vilazodone (VIIBRYD) 10 MG  tablet tablet Take 1 tablet by mouth Daily. 30 tablet 1    vitamin D (ERGOCALCIFEROL) 1.25 MG (93907 UT) capsule capsule TAKE ONE CAPSULE BY MOUTH WEEKLY 4 capsule 1    zolpidem (AMBIEN) 10 MG tablet       DULoxetine (CYMBALTA) 30 MG capsule TAKE ONE CAPSULE BY MOUTH TWICE DAILY (Patient not taking: Reported on 2/18/2025) 60 capsule 0    lidocaine-prilocaine (EMLA) 2.5-2.5 % cream       metoprolol succinate XL (TOPROL-XL) 25 MG 24 hr tablet Take 1 tablet by mouth 2 (Two) Times a Day.      ranolazine (RANEXA) 500 MG 12 hr tablet TAKE ONE TABLET BY MOUTH TWICE DAILY 60 tablet 1     No current facility-administered medications for this visit.       Physical Exam  Nursing note reviewed.   Constitutional:       Appearance: Normal appearance.   Neurological:      Mental Status: She is alert.   Psychiatric:         Attention and Perception: Attention and perception normal.         Mood and Affect: Mood normal. Mood is anxious and depressed.         Speech: Speech normal.         Behavior: Behavior normal. Behavior is not agitated. Behavior is cooperative.         Cognition and Memory: Cognition and memory normal.        Result Review :     The following data was reviewed by: WAI Florez on 01/21/2025:  Common labs          4/18/2024    13:14 7/25/2024    17:12 12/19/2024    12:11   Common Labs   WBC 5.7     8.3     12.6       Hemoglobin 13.0     12.5     13.4       Hematocrit 38.2     37.2     39.4       Platelets 226     261     269       Total Cholesterol 137     162     125       Triglycerides 78     118     90       HDL Cholesterol 49     50     43       LDL Cholesterol  72     88     64       Hemoglobin A1C 8.8     8.5     8.9          Details          This result is from an external source.               CBC          4/18/2024    13:14 7/25/2024    17:12 12/19/2024    12:11   CBC   WBC 5.7     8.3     12.6       RBC 4.54     4.23     4.52       Hemoglobin 13.0     12.5     13.4       Hematocrit 38.2      37.2     39.4       MCV 84.1     87.9     87.2       MCH 28.6     29.6     29.6       MCHC 34.0     33.6     34.0       RDW 11.9     11.8     11.8       Platelets 226     261     269          Details          This result is from an external source.             CBC w/diff          4/18/2024    13:14 7/25/2024    17:12 12/19/2024    12:11   CBC w/Diff   WBC 5.7     8.3     12.6       RBC 4.54     4.23     4.52       Hemoglobin 13.0     12.5     13.4       Hematocrit 38.2     37.2     39.4       MCV 84.1     87.9     87.2       MCH 28.6     29.6     29.6       MCHC 34.0     33.6     34.0       RDW 11.9     11.8     11.8       Platelets 226     261     269       Neutrophil Rel % 49.8     46.1     72.8       Immature Granulocyte Rel % 1.1     0.6     0.3       Lymphocyte Rel % 35.1     37.0     17.7       Monocyte Rel % 7.7     7.8     5.7       Eosinophil Rel % 5.1     7.0     2.9       Basophil Rel % 1.2     1.5     0.6          Details          This result is from an external source.             Lipid Panel          4/18/2024    13:14 7/25/2024    17:12 12/19/2024    12:11   Lipid Panel   Total Cholesterol 137     162     125       Triglycerides 78     118     90       HDL Cholesterol 49     50     43       VLDL Cholesterol 16     24     18       LDL Cholesterol  72     88     64       LDL/HDL Ratio 1.47     1.76     1.49          Details          This result is from an external source.                     Most Recent A1C          12/19/2024    12:11   HGBA1C Most Recent   Hemoglobin A1C 8.9          Details          This result is from an external source.               A1C Last 3 Results          4/18/2024    13:14 7/25/2024    17:12 12/19/2024    12:11   HGBA1C Last 3 Results   Hemoglobin A1C 8.8     8.5     8.9          Details          This result is from an external source.               HgB          4/18/2024    13:14 7/25/2024    17:12 12/19/2024    12:11   HGB   Hemoglobin 13.0     12.5     13.4           Details          This result is from an external source.                   Data reviewed : PCP and inpatient notes         Assessment and Plan    Problem List Items Addressed This Visit       Insomnia    Severe episode of recurrent major depressive disorder, without psychotic features - Primary    Relevant Medications    hydrOXYzine (ATARAX) 50 MG tablet    mirtazapine (REMERON) 30 MG tablet    Brexpiprazole (Rexulti) 1 MG tablet    vilazodone (VIIBRYD) 10 MG tablet tablet    Generalized anxiety disorder    Relevant Medications    hydrOXYzine (ATARAX) 50 MG tablet    mirtazapine (REMERON) 30 MG tablet    Brexpiprazole (Rexulti) 1 MG tablet    vilazodone (VIIBRYD) 10 MG tablet tablet         Encounter Diagnoses   Name Primary?    Severe episode of recurrent major depressive disorder, without psychotic features Yes    Generalized anxiety disorder     Insomnia, unspecified type             TREATMENT PLAN/GOALS: Continue supportive psychotherapy efforts and medications as indicated. Treatment and medication options discussed during today's visit. Patient ackowledged and verbally consented to continue with current treatment plan and was educated on the importance of compliance with treatment and follow-up appointments.    MEDICATION ISSUES:  We discussed risks, benefits, and side effects of the above medications and the patient was agreeable with the plan. Patient was educated on the importance of compliance with treatment and follow-up appointments.  Patient is agreeable to call the office with any worsening of symptoms or onset of side effects. Patient is agreeable to call 911 or go to the nearest ER should he/she begin having SI/HI. Lengthy discussion with patient on the possible side effects of antipsychotic medications including increased cholesterol, increased blood sugar, and possibility of weight gain.  Also discussed the need to monitor lab work associated with this.  The risk of muscle movement disorders with  this class of medication was also discussed.  Patient was educated extensively regarding the combination of duloxetine and Viibryd and educated about serotonin syndrome and any significant symptoms or concerns to go to the nearest ER and/or contact the clinic she verbalized understanding.    -Continue Viibryd 10 mg daily for depression and anxiety symptoms.  Highly encouraged patient if she had any worsening symptoms or concerns to contact the clinic she verbalized understanding.  -Continue Rexulti  1 mg at night for worsening depression.  Highly encouraged patient if she had any worsening symptoms or concerns to contact the clinic or go to the nearest ER she verbalized understanding.    -Patient has failed and tried paroxetine, sertraline, Lexapro, citalopram, bupropion, buspirone, venlafaxine, Minipress, quetiapine, aripiprazole, lamotrigine, mirtazapine and hydroxyzine.       Counseled patient regarding multimodal approach with healthy nutrition, healthy sleep, regular physical activity, social activities, counseling, and medications.      Coping skills reviewed and encouraged positive framing of thoughts     Assisted patient in processing above session content; acknowledged and normalized patient’s thoughts, feelings, and concerns.  Applied  positive coping skills and behavior management in session.  Allowed patient to freely discuss issues without interruption or judgment. Provided safe, confidential environment to facilitate the development of positive therapeutic relationship and encourage open, honest communication. Assisted patient in identifying risk factors which would indicate the need for higher level of care including thoughts to harm self or others and/or self-harming behavior and encouraged patient to contact this office, call 911, or present to the nearest emergency room should any of these events occur. Discussed crisis intervention services and means to access.     MEDS ORDERED DURING VISIT:  New  Medications Ordered This Visit   Medications    Brexpiprazole (Rexulti) 1 MG tablet     Sig: Take 1 tablet by mouth Every Night.     Dispense:  30 tablet     Refill:  2    vilazodone (VIIBRYD) 10 MG tablet tablet     Sig: Take 1 tablet by mouth Daily.     Dispense:  30 tablet     Refill:  1           Follow Up   Return in about 4 weeks (around 3/18/2025), or if symptoms worsen or fail to improve, for Recheck.    Patient was given instructions and counseling regarding her condition or for health maintenance advice. Please see specific information pulled into the AVS if appropriate.     Some of the data in this electronic note has been brought forward from a previous encounter, any necessary changes have been made, it has been reviewed by this APRN, and it is accurate.  This patient will not be seen for the in person visits due to the following exception(s): It would be a hardship for this patient to see a provider in person. and the patient has transportation barriers to seeing a provider in person.     It is my professional opinion that that patient is at risk for disengagement with care that has been effective in managing his/her illness.       This document has been electronically signed by WAI Florez  February 18, 2025 10:17 EST    Part of this note may be an electronic transcription/translation of spoken language to printed text using the Dragon Dictation System.

## 2025-03-18 ENCOUNTER — TELEMEDICINE (OUTPATIENT)
Dept: PSYCHIATRY | Facility: CLINIC | Age: 55
End: 2025-03-18
Payer: MEDICAID

## 2025-03-18 DIAGNOSIS — F33.2 SEVERE EPISODE OF RECURRENT MAJOR DEPRESSIVE DISORDER, WITHOUT PSYCHOTIC FEATURES: Primary | ICD-10-CM

## 2025-03-18 DIAGNOSIS — G47.00 INSOMNIA, UNSPECIFIED TYPE: ICD-10-CM

## 2025-03-18 DIAGNOSIS — F41.1 GENERALIZED ANXIETY DISORDER: ICD-10-CM

## 2025-03-18 RX ORDER — ZOLPIDEM TARTRATE 5 MG/1
TABLET ORAL
COMMUNITY
Start: 2025-02-27

## 2025-03-18 RX ORDER — FAMOTIDINE 20 MG/1
TABLET, FILM COATED ORAL
COMMUNITY
Start: 2025-03-07

## 2025-03-18 RX ORDER — VILAZODONE HYDROCHLORIDE 10 MG/1
10 TABLET ORAL DAILY
Qty: 30 TABLET | Refills: 1 | Status: SHIPPED | OUTPATIENT
Start: 2025-03-18

## 2025-03-18 NOTE — PROGRESS NOTES
This provider is located at [facility], [address] [place] [address].The Patient is seen remotely at home, [id number] [facility] [place] [address] via [employer]. Patient is being seen via telehealth and confirm that they are in a secure environment for this session. The patient's condition being diagnosed/treated is appropriate for telemedicine. The provider identified himself/herself: herself as well as her credentials.   The patient gave consent to be seen remotely, and when consent is given they understand that the consent allows for patient identifiable information to be sent to a third party as needed.   They may refuse to be seen remotely at any time. The electronic data is encrypted and password protected, and the patient has been advised of the potential risks to privacy not withstanding such measures.    You have chosen to receive care through a telehealth visit.  Do you consent to use a video/audio connection for your medical care today? Yes. Patient verified Name, , and address.  No changes noted      Chief Complaint  Depression and anxiety    Subjective    Yudi West presents to [facility] medication management.    History of Present Illness  Patient states things have been very difficult lately.  She notes her 36-year-old son and girlfriend are living with her and she is supporting him.  Patient feels that if it was not for that situation her mood would be much better.  Patient states that she feels the medication has been helpful for her mood and she is in a happier place however when she goes home she just wants to cry as she is very worried and on edge as that is making her miserable.  Patient states her son can also be very hostile and will cuss at her on a regular basis.  Patient notes she is also worried about her significant other as she has seen a decline in his overall health.  She states her other son may be  getting a place for her to stay at.  She states she had to kick her son out as he has nowhere to go but it has been physically and mentally exhausting her.  Patient feels it is just the situation as she would be doing much better if it was not for this.  Denies any side effects to medications.  Patient reports her appetite is okay.  Patient feels she would sleep better but she is sleeping on the couch worried about who is going to come in or out.  Denies any auditory or visual hallucinations.  Denies any HI.  Admits to SI but adamantly denies any plan or intent as she states she just cannot stand her living situation.    Patient has previously failed and tried from previous records Paxil, Zoloft, Lexapro, Celexa, Abilify, Wellbutrin, BuSpar, Effexor, Minipress, Seroquel, Lamictal, hydroxyzine and Remeron.  When asking patient if she was still on the duloxetine for nerve pain she stated no she was not taking it.      Objective   Vital Signs:   There were no vitals taken for this visit.  Due to the remote nature of this encounter (virtual encounter), vitals were unable to be obtained.  Height stated at 165 inches.  Weight stated at 65 pounds.      PHQ-9 Score:   PHQ-9 Total Score:(Patient-Rptd) 15     PHQ-9 Depression Screening  Little interest or pleasure in doing things? (Patient-Rptd) Over half   Feeling down, depressed, or hopeless? (Patient-Rptd) Over half   PHQ-2 Total Score (Patient-Rptd) 4   Trouble falling or staying asleep, or sleeping too much? (Patient-Rptd) Over half   Feeling tired or having little energy? (Patient-Rptd) Almost all   Poor appetite or overeating? (Patient-Rptd) Over half   Feeling bad about yourself - or that you are a failure or have let yourself or your family down? (Patient-Rptd) Over half   Trouble concentrating on things, such as reading the newspaper or watching television? (Patient-Rptd) Several days   Moving or speaking so slowly that other people could have noticed? Or the  opposite - being so fidgety or restless that you have been moving around a lot more than usual? (Patient-Rptd) Not at all   Thoughts that you would be better off dead, or of hurting yourself in some way? (Patient-Rptd) Several days   PHQ-9 Total Score (Patient-Rptd) 15   If you checked off any problems, how difficult have these problems made it for you to do your work, take care of things at home, or get along with other people? (Patient-Rptd) Somewhat difficult         PHQ-9 Total Score: (Patient-Rptd) 15     KRISTOFER-7  Feeling nervous, anxious or on edge: (Patient-Rptd) More than half the days  Not being able to stop or control worrying: (Patient-Rptd) More than half the days  Worrying too much about different things: (Patient-Rptd) Nearly every day  Trouble Relaxing: (Patient-Rptd) Nearly every day  Being so restless that it is hard to sit still: (Patient-Rptd) More than half the days  Feeling afraid as if something awful might happen: (Patient-Rptd) More than half the days  Becoming easily annoyed or irritable: (Patient-Rptd) Nearly every day  KRISTOFER 7 Total Score: (Patient-Rptd) 17  If you checked any problems, how difficult have these problems made it for you to do your work, take care of things at home, or get along with other people: (Patient-Rptd) Not difficult at all      Patient screened positive for depression based on a PHQ-9 score of 15 on 3/17/2025. Follow-up recommendations include: Prescribed antidepressant medication treatment.        Mental Status Exam:   Hygiene:   good  Cooperation:  Cooperative  Eye Contact:  Good  Psychomotor Behavior:  Restless  Affect:  Appropriate  Mood: depressed and anxious  Speech:  Normal  Thought Process:  Goal directed  Thought Content:  Normal  Suicidal:  Suicidal Ideation no plan or intent   Homicidal:  None  Hallucinations:  None  Delusion:  None  Memory:  Intact  Orientation:  Person, Place, Time, and Situation  Reliability:  fair  Insight:  Fair  Judgement:   Fair  Impulse Control:  Fair  Physical/Medical Issues:  Yes see extensive medical hx      Current Medications:   Current Outpatient Medications   Medication Sig Dispense Refill    famotidine (PEPCID) 20 MG tablet       vilazodone (VIIBRYD) 10 MG tablet tablet Take 1 tablet by mouth Daily. 30 tablet 1    zolpidem (AMBIEN) 5 MG tablet take one tablet by mouth nightly as needed      Alpha-Lipoic Acid 300 MG capsule TAKE ONE TO TWO CAPSULES BY MOUTH TWICE DAILY      amoxicillin (AMOXIL) 875 MG tablet       aspirin 81 MG EC tablet Take 1 tablet by mouth 2 (Two) Times a Day With Meals. 60 tablet 0    atorvastatin (Lipitor) 40 MG tablet Take 1 tablet by mouth Daily.      Brexpiprazole (Rexulti) 1 MG tablet Take 1 tablet by mouth Every Night. 30 tablet 2    carvedilol (COREG) 3.125 MG tablet TAKE ONE TABLET BY MOUTH TWICE DAILY WITH MEAL 60 tablet 1    clopidogrel (PLAVIX) 75 MG tablet Take 1 tablet by mouth Daily. 30 tablet 3    Continuous Blood Gluc Sensor (Dexcom G6 Sensor) USE AS DIRECTED CHANGE EVERY 10 DAYS 3 each 2    Continuous Blood Gluc Transmit (Dexcom G6 Transmitter) misc Inject 1 application under the skin into the appropriate area as directed 3 (Three) Times a Day. 1 each 3    dicyclomine (BENTYL) 20 MG tablet TAKE ONE TABLET BY MOUTH EVERY 6 HOURS 120 tablet 1    doxazosin (CARDURA) 1 MG tablet       DULoxetine (CYMBALTA) 30 MG capsule TAKE ONE CAPSULE BY MOUTH TWICE DAILY (Patient not taking: Reported on 2/18/2025) 60 capsule 0    empagliflozin (Jardiance) 25 MG tablet tablet Take 1 tablet by mouth Daily. 30 tablet 3    ezetimibe (ZETIA) 10 MG tablet TAKE ONE TABLET BY MOUTH EVERY DAY 30 tablet 1    fluconazole (DIFLUCAN) 150 MG tablet       gabapentin (NEURONTIN) 800 MG tablet       Global Ease Inject Pen Needles 31G X 8 MM misc Use as needed 100 each 3    glucose blood test strip Test 3 times daily 100 each 11    glucose monitor monitoring kit 1 each As Needed (to check bg). 1 each 0    guaiFENesin (Mucus  Relief) 600 MG 12 hr tablet Take 2 tablets by mouth 2 (Two) Times a Day. NO ADDITIONAL REFILLS WITHOUT AN APPOINTMENT 60 tablet 0    HYDROcodone-acetaminophen (NORCO)  MG per tablet       hydrOXYzine (ATARAX) 50 MG tablet Take 1 tablet by mouth 3 (Three) Times a Day As Needed. for anxiety      hydrOXYzine pamoate (VISTARIL) 25 MG capsule TAKE ONE CAPSULE BY MOUTH THREE TIMES DAILY AS NEEDED FOR ITCHING AND FOR ANXIETY 90 capsule 1    Insulin Glargine (BASAGLAR KWIKPEN) 100 UNIT/ML injection pen Take 36 units at bedtime can go up by 2-3 unites every 3 days until am sugars running     02/05/2021 - Patient currently utilizing 50 Units nightly. 15 mL 1    Insulin Lispro, 1 Unit Dial, (HUMALOG) 100 UNIT/ML solution pen-injector INJECT UP TO 20 UNITS WITH MEALS 15 mL 0    Kerendia 10 MG tablet       Lancets misc Test 3 times daily , E11.65 100 each 11    lidocaine (LIDODERM) 5 % APPLY ONE TO TWO PATCHES AS DIRECTED ON LOWER BACK, LEFT UPPER LEG 12 HOURS ON AND 12 HOURS OFF      lidocaine-prilocaine (EMLA) 2.5-2.5 % cream       loratadine (Allergy Relief) 10 MG tablet Take 1 tablet by mouth Daily. 30 tablet 3    losartan (COZAAR) 25 MG tablet TAKE ONE TABLET BY MOUTH EVERY DAY 30 tablet 1    metoprolol succinate XL (TOPROL-XL) 25 MG 24 hr tablet Take 1 tablet by mouth 2 (Two) Times a Day.      mirtazapine (REMERON) 30 MG tablet       Mounjaro 2.5 MG/0.5ML solution auto-injector Inject 2.5 mg under the skin into the appropriate area as directed 1 (One) Time Per Week.      naloxone (NARCAN) 4 MG/0.1ML nasal spray       nicotine (Nicotrol) 10 MG inhaler Inhale 1 puff As Needed for Smoking Cessation. 168 each 3    nitrofurantoin, macrocrystal-monohydrate, (MACROBID) 100 MG capsule       ondansetron ODT (ZOFRAN-ODT) 8 MG disintegrating tablet PLACE ONE TABLET ON THE TONGUE AND ALLOW TO DISSOLVE EVERY 8 HOURS AS NEEDED FOR FOR NAUSEA AND VOMITING 90 tablet 0    oxybutynin (DITROPAN) 5 MG tablet Take 1 tablet 3 times  a day by oral route for 90 days.      pantoprazole (PROTONIX) 40 MG EC tablet Take 1 tablet by mouth Daily.      polyethylene glycol (MIRALAX) 17 GM/SCOOP powder MIX 17 GRAMS (1 CAPFUL) IN 8 OUNCES OF WATER OR JUICE AND DRINK DAILY 510 g 5    ranolazine (RANEXA) 500 MG 12 hr tablet TAKE ONE TABLET BY MOUTH TWICE DAILY 60 tablet 1    rosuvastatin (CRESTOR) 20 MG tablet TAKE ONE TABLET BY MOUTH AT BEDTIME 30 tablet 1    SITagliptin (Januvia) 100 MG tablet TAKE ONE TABLET BY MOUTH EVERY DAY 30 tablet 0    solifenacin (VESICARE) 10 MG tablet       Ventolin  (90 Base) MCG/ACT inhaler INHALE TWO PUFFS BY MOUTH EVERY 4 HOURS AS NEEDED WHEEZING 18 g 1    vitamin D (ERGOCALCIFEROL) 1.25 MG (04374 UT) capsule capsule TAKE ONE CAPSULE BY MOUTH WEEKLY 4 capsule 1    zolpidem (AMBIEN) 10 MG tablet        No current facility-administered medications for this visit.       Physical Exam  Nursing note reviewed.   Constitutional:       Appearance: Normal appearance.   Neurological:      Mental Status: She is alert.   Psychiatric:         Attention and Perception: Attention and perception normal.         Mood and Affect: Mood normal. Mood is anxious and depressed.         Speech: Speech normal.         Behavior: Behavior normal. Behavior is not agitated. Behavior is cooperative.         Cognition and Memory: Cognition and memory normal.        Result Review :     The following data was reviewed by: WAI Florez on 01/21/2025:  Common labs          4/18/2024    13:14 7/25/2024    17:12 12/19/2024    12:11   Common Labs   WBC 5.7     8.3     12.6       Hemoglobin 13.0     12.5     13.4       Hematocrit 38.2     37.2     39.4       Platelets 226     261     269       Total Cholesterol 137     162     125       Triglycerides 78     118     90       HDL Cholesterol 49     50     43       LDL Cholesterol  72     88     64       Hemoglobin A1C 8.8     8.5     8.9          Details          This result is from an external  source.               CBC          4/18/2024    13:14 7/25/2024    17:12 12/19/2024    12:11   CBC   WBC 5.7     8.3     12.6       RBC 4.54     4.23     4.52       Hemoglobin 13.0     12.5     13.4       Hematocrit 38.2     37.2     39.4       MCV 84.1     87.9     87.2       MCH 28.6     29.6     29.6       MCHC 34.0     33.6     34.0       RDW 11.9     11.8     11.8       Platelets 226     261     269          Details          This result is from an external source.             CBC w/diff          4/18/2024    13:14 7/25/2024    17:12 12/19/2024    12:11   CBC w/Diff   WBC 5.7     8.3     12.6       RBC 4.54     4.23     4.52       Hemoglobin 13.0     12.5     13.4       Hematocrit 38.2     37.2     39.4       MCV 84.1     87.9     87.2       MCH 28.6     29.6     29.6       MCHC 34.0     33.6     34.0       RDW 11.9     11.8     11.8       Platelets 226     261     269       Neutrophil Rel % 49.8     46.1     72.8       Immature Granulocyte Rel % 1.1     0.6     0.3       Lymphocyte Rel % 35.1     37.0     17.7       Monocyte Rel % 7.7     7.8     5.7       Eosinophil Rel % 5.1     7.0     2.9       Basophil Rel % 1.2     1.5     0.6          Details          This result is from an external source.             Lipid Panel          4/18/2024    13:14 7/25/2024    17:12 12/19/2024    12:11   Lipid Panel   Total Cholesterol 137     162     125       Triglycerides 78     118     90       HDL Cholesterol 49     50     43       VLDL Cholesterol 16     24     18       LDL Cholesterol  72     88     64       LDL/HDL Ratio 1.47     1.76     1.49          Details          This result is from an external source.                     Most Recent A1C          12/19/2024    12:11   HGBA1C Most Recent   Hemoglobin A1C 8.9          Details          This result is from an external source.               A1C Last 3 Results          4/18/2024    13:14 7/25/2024    17:12 12/19/2024    12:11   HGBA1C Last 3 Results   Hemoglobin A1C  8.8     8.5     8.9          Details          This result is from an external source.               HgB          4/18/2024    13:14 7/25/2024    17:12 12/19/2024    12:11   HGB   Hemoglobin 13.0     12.5     13.4          Details          This result is from an external source.                   Data reviewed : PCP and inpatient notes         Assessment and Plan    Problem List Items Addressed This Visit       Insomnia    Severe episode of recurrent major depressive disorder, without psychotic features - Primary    Relevant Medications    zolpidem (AMBIEN) 5 MG tablet    vilazodone (VIIBRYD) 10 MG tablet tablet    Generalized anxiety disorder    Relevant Medications    zolpidem (AMBIEN) 5 MG tablet    vilazodone (VIIBRYD) 10 MG tablet tablet           Encounter Diagnoses   Name Primary?    Severe episode of recurrent major depressive disorder, without psychotic features Yes    Generalized anxiety disorder     Insomnia, unspecified type         I have reviewed the patient and the results are consistent with the previously documented exam by myself.  I have reviewed the following portions of the patient's ROS and PFSH and confirmed them as accurate.  The HPI has been updated, chief of complaint, ROS and subjective have been reviewed and up-to-date.  The following portions of the patient's notes were reviewed, confirmed and/or updated this visit as appropriate: History of present illness/Interval history, physical examination, assessment and plan, allergies, current medications, past family medical history, past medical history, past social history, past surgical history and problem list.        TREATMENT PLAN/GOALS: Continue supportive psychotherapy efforts and medications as indicated. Treatment and medication options discussed during today's visit. Patient ackowledged and verbally consented to continue with current treatment plan and was educated on the importance of compliance with treatment and follow-up  appointments.    MEDICATION ISSUES:  We discussed risks, benefits, and side effects of the above medications and the patient was agreeable with the plan. Patient was educated on the importance of compliance with treatment and follow-up appointments.  Patient is agreeable to call the office with any worsening of symptoms or onset of side effects. Patient is agreeable to call 911 or go to the nearest ER should he/she begin having SI/HI. Lengthy discussion with patient on the possible side effects of antipsychotic medications including increased cholesterol, increased blood sugar, and possibility of weight gain.  Also discussed the need to monitor lab work associated with this.  The risk of muscle movement disorders with this class of medication was also discussed.  Patient was educated extensively regarding the combination of duloxetine and Viibryd and educated about serotonin syndrome and any significant symptoms or concerns to go to the nearest ER and/or contact the clinic she verbalized understanding.    -Continue Viibryd 10 mg daily for depression and anxiety symptoms.  Highly encouraged patient if she had any worsening symptoms or concerns to contact the clinic she verbalized understanding.  -Continue Rexulti  1 mg at night for worsening depression.  Highly encouraged patient if she had any worsening symptoms or concerns to contact the clinic or go to the nearest ER she verbalized understanding.t  -Since the patient rants encouraged her to talk to her landlord about them being there as that may be against the renters agreement as that is not a safe environment for her to be in.  Encouraged the patient that she has to focus on her physical and mental health at this time she was agreeable and verbalized understanding.      -Patient has failed and tried paroxetine, sertraline, Lexapro, citalopram, bupropion, buspirone, venlafaxine, Minipress, quetiapine, aripiprazole, lamotrigine, mirtazapine and hydroxyzine.        Counseled patient regarding multimodal approach with healthy nutrition, healthy sleep, regular physical activity, social activities, counseling, and medications.      Coping skills reviewed and encouraged positive framing of thoughts     Assisted patient in processing above session content; acknowledged and normalized patient’s thoughts, feelings, and concerns.  Applied  positive coping skills and behavior management in session.  Allowed patient to freely discuss issues without interruption or judgment. Provided safe, confidential environment to facilitate the development of positive therapeutic relationship and encourage open, honest communication. Assisted patient in identifying risk factors which would indicate the need for higher level of care including thoughts to harm self or others and/or self-harming behavior and encouraged patient to contact this office, call 911, or present to the nearest emergency room should any of these events occur. Discussed crisis intervention services and means to access.     MEDS ORDERED DURING VISIT:  New Medications Ordered This Visit   Medications    vilazodone (VIIBRYD) 10 MG tablet tablet     Sig: Take 1 tablet by mouth Daily.     Dispense:  30 tablet     Refill:  1           Follow Up   Return in about 4 weeks (around 4/15/2025), or if symptoms worsen or fail to improve, for Recheck.    Patient was given instructions and counseling regarding her condition or for health maintenance advice. Please see specific information pulled into the AVS if appropriate.     Some of the data in this electronic note has been brought forward from a previous encounter, any necessary changes have been made, it has been reviewed by this APRN, and it is accurate.  This patient will not be seen for the in person visits due to the following exception(s): It would be a hardship for this patient to see a provider in person. and the patient has transportation barriers to seeing a provider in person.      It is my professional opinion that that patient is at risk for disengagement with care that has been effective in managing his/her illness.       This document has been electronically signed by WAI Florez  March 18, 2025 11:25 EDT    Part of this note may be an electronic transcription/translation of spoken language to printed text using the Dragon Dictation System.

## 2025-03-31 DIAGNOSIS — F33.2 MAJOR DEPRESSIVE DISORDER, RECURRENT SEVERE WITHOUT PSYCHOTIC FEATURES: ICD-10-CM

## 2025-03-31 RX ORDER — MIRTAZAPINE 30 MG/1
30 TABLET, FILM COATED ORAL
Qty: 30 TABLET | Refills: 5 | OUTPATIENT
Start: 2025-03-31

## 2025-04-02 RX ORDER — MIRTAZAPINE 30 MG/1
TABLET, FILM COATED ORAL
OUTPATIENT
Start: 2025-04-02

## 2025-04-02 NOTE — TELEPHONE ENCOUNTER
Per chart review, this patient reportely has this prescribed by their PCP. I also do not see it as a documented medication in the last note with the patient's primary provider. I will defer to the primary provider at this time on if she would like to refill this medication. If the patient is in need of this medication now, I would recommend talking with their PCP who previously prescribed this for them.

## 2025-04-02 NOTE — TELEPHONE ENCOUNTER
Spoke with Marcos Bermudez at [facility] he has been made aware of the covering providers message. Pharmacist stated that he will reach out to the PCP

## 2025-04-02 NOTE — TELEPHONE ENCOUNTER
Pharmacist Aidan called stating that the patient doesn't have any refills on the  Mirtazapine 30 mg. Pharmacist request refill. Provider out office

## 2025-04-15 ENCOUNTER — TELEMEDICINE (OUTPATIENT)
Dept: PSYCHIATRY | Facility: CLINIC | Age: 55
End: 2025-04-15
Payer: MEDICAID

## 2025-04-15 DIAGNOSIS — F41.1 GENERALIZED ANXIETY DISORDER: ICD-10-CM

## 2025-04-15 DIAGNOSIS — G47.00 INSOMNIA, UNSPECIFIED TYPE: ICD-10-CM

## 2025-04-15 DIAGNOSIS — F33.2 SEVERE EPISODE OF RECURRENT MAJOR DEPRESSIVE DISORDER, WITHOUT PSYCHOTIC FEATURES: Primary | ICD-10-CM

## 2025-04-15 RX ORDER — VILAZODONE HYDROCHLORIDE 20 MG/1
10 TABLET ORAL DAILY
Qty: 30 TABLET | Refills: 1 | Status: SHIPPED | OUTPATIENT
Start: 2025-04-15 | End: 2025-04-16 | Stop reason: SDUPTHER

## 2025-04-15 RX ORDER — BREXPIPRAZOLE 1 MG/1
1 TABLET ORAL NIGHTLY
Qty: 30 TABLET | Refills: 2 | Status: SHIPPED | OUTPATIENT
Start: 2025-04-15

## 2025-04-15 NOTE — PROGRESS NOTES
This provider is located at [facility], [address] [place] [address].The Patient is seen remotely at home, [id number] [facility] [place] [address] via [employer]. Patient is being seen via telehealth and confirm that they are in a secure environment for this session. The patient's condition being diagnosed/treated is appropriate for telemedicine. The provider identified himself/herself: herself as well as her credentials.   The patient gave consent to be seen remotely, and when consent is given they understand that the consent allows for patient identifiable information to be sent to a third party as needed.   They may refuse to be seen remotely at any time. The electronic data is encrypted and password protected, and the patient has been advised of the potential risks to privacy not withstanding such measures.    You have chosen to receive care through a telehealth visit.  Do you consent to use a video/audio connection for your medical care today? Yes. Patient verified Name, , and address.  No changes noted      Chief Complaint  Depression and anxiety    Subjective    Yudi West presents to [facility] medication management.    History of Present Illness  Patient presents today reporting that her and her  have moved into a trailer park and her son and his girlfriend are not there.  Patient states that she felt bad but she realized he has to do it on his own and did pay for this months bills.  Reports her mood has been a lot better however notes that she still gets mad and hard to be around herself roughly 2-3 times per week.  Patient states during those times where she is very irritable and agitated she will talk about suicidal ideation but no actual intent as it happens more out of anger.  Patient states her anxiety is still present but denies any panic attacks.  Reports appetite is the same as she just eats 1 time a day and  "notes she mostly drinks \"pop\".  Patient rates her depression as 7 and anxiety 7-8 on a scale of 0-10 with 10 being the worst.  Reports she is still only sleeping 3 to 4 hours at night.  Denies any side effects to medications.  Denies any auditory or visual hallucinations or any HI.  Denies any current SI and denies any plan or intent.  Patient was being prescribed Remeron by her PCP for sleep after reviewing notes will monitor.    Patient has previously failed and tried from previous records Paxil, Zoloft, Lexapro, Celexa, Abilify, Wellbutrin, BuSpar, Effexor, Minipress, Seroquel, Lamictal, hydroxyzine and Remeron.  When asking patient if she was still on the duloxetine for nerve pain she stated no she was not taking it.      Objective   Vital Signs:   There were no vitals taken for this visit.  Due to the remote nature of this encounter (virtual encounter), vitals were unable to be obtained.  Height stated at 65 inches.  Weight stated at 165 pounds.      PHQ-9 Score:   PHQ-9 Total Score:(Patient-Rptd) 13     PHQ-9 Depression Screening  Little interest or pleasure in doing things? (Patient-Rptd) Several days   Feeling down, depressed, or hopeless? (Patient-Rptd) Several days   PHQ-2 Total Score (Patient-Rptd) 2   Trouble falling or staying asleep, or sleeping too much? (Patient-Rptd) Almost all   Feeling tired or having little energy? (Patient-Rptd) Almost all   Poor appetite or overeating? (Patient-Rptd) Several days   Feeling bad about yourself - or that you are a failure or have let yourself or your family down? (Patient-Rptd) Almost all   Trouble concentrating on things, such as reading the newspaper or watching television? (Patient-Rptd) Not at all   Moving or speaking so slowly that other people could have noticed? Or the opposite - being so fidgety or restless that you have been moving around a lot more than usual? (Patient-Rptd) Not at all   Thoughts that you would be better off dead, or of hurting yourself " in some way? (Patient-Rptd) Several days   PHQ-9 Total Score (Patient-Rptd) 13   If you checked off any problems, how difficult have these problems made it for you to do your work, take care of things at home, or get along with other people? (Patient-Rptd) Somewhat difficult         PHQ-9 Total Score: (Patient-Rptd) 13     KRISTOFER-7  Feeling nervous, anxious or on edge: (Patient-Rptd) More than half the days  Not being able to stop or control worrying: (Patient-Rptd) Nearly every day  Worrying too much about different things: (Patient-Rptd) Several days  Trouble Relaxing: (Patient-Rptd) More than half the days  Being so restless that it is hard to sit still: (Patient-Rptd) Several days  Feeling afraid as if something awful might happen: (Patient-Rptd) More than half the days  Becoming easily annoyed or irritable: (Patient-Rptd) Nearly every day  KRISTOFER 7 Total Score: (Patient-Rptd) 14  If you checked any problems, how difficult have these problems made it for you to do your work, take care of things at home, or get along with other people: (Patient-Rptd) Somewhat difficult      Patient screened positive for depression based on a PHQ-9 score of 13 on 4/8/2025. Follow-up recommendations include: Prescribed antidepressant medication treatment.        Mental Status Exam:   Hygiene:   good  Cooperation:  Cooperative  Eye Contact:  Good  Psychomotor Behavior:  Restless  Affect:  Appropriate  Mood: depressed, anxious, and fluctates  Speech:  Normal  Thought Process:  Goal directed  Thought Content:  Normal  Suicidal:  Suicidal Ideation no plan or intent   Homicidal:  None  Hallucinations:  None  Delusion:  None  Memory:  Intact  Orientation:  Person, Place, Time, and Situation  Reliability:  fair  Insight:  Fair  Judgement:  Fair  Impulse Control:  Fair  Physical/Medical Issues:  Yes see extensive medical hx      Current Medications:   Current Outpatient Medications   Medication Sig Dispense Refill    Brexpiprazole (Rexulti) 1 MG  tablet Take 1 tablet by mouth Every Night. 30 tablet 2    vilazodone (VIIBRYD) 20 MG tablet tablet Take 0.5 tablets by mouth Daily. 30 tablet 1    Alpha-Lipoic Acid 300 MG capsule TAKE ONE TO TWO CAPSULES BY MOUTH TWICE DAILY      amoxicillin (AMOXIL) 875 MG tablet       aspirin 81 MG EC tablet Take 1 tablet by mouth 2 (Two) Times a Day With Meals. 60 tablet 0    atorvastatin (Lipitor) 40 MG tablet Take 1 tablet by mouth Daily.      carvedilol (COREG) 3.125 MG tablet TAKE ONE TABLET BY MOUTH TWICE DAILY WITH MEAL 60 tablet 1    clopidogrel (PLAVIX) 75 MG tablet Take 1 tablet by mouth Daily. 30 tablet 3    Continuous Blood Gluc Sensor (Dexcom G6 Sensor) USE AS DIRECTED CHANGE EVERY 10 DAYS 3 each 2    Continuous Blood Gluc Transmit (Dexcom G6 Transmitter) misc Inject 1 application under the skin into the appropriate area as directed 3 (Three) Times a Day. 1 each 3    dicyclomine (BENTYL) 20 MG tablet TAKE ONE TABLET BY MOUTH EVERY 6 HOURS 120 tablet 1    doxazosin (CARDURA) 1 MG tablet       empagliflozin (Jardiance) 25 MG tablet tablet Take 1 tablet by mouth Daily. 30 tablet 3    ezetimibe (ZETIA) 10 MG tablet TAKE ONE TABLET BY MOUTH EVERY DAY 30 tablet 1    famotidine (PEPCID) 20 MG tablet       fluconazole (DIFLUCAN) 150 MG tablet       gabapentin (NEURONTIN) 800 MG tablet       Global Ease Inject Pen Needles 31G X 8 MM misc Use as needed 100 each 3    glucose blood test strip Test 3 times daily 100 each 11    glucose monitor monitoring kit 1 each As Needed (to check bg). 1 each 0    guaiFENesin (Mucus Relief) 600 MG 12 hr tablet Take 2 tablets by mouth 2 (Two) Times a Day. NO ADDITIONAL REFILLS WITHOUT AN APPOINTMENT 60 tablet 0    HYDROcodone-acetaminophen (NORCO)  MG per tablet       hydrOXYzine (ATARAX) 50 MG tablet Take 1 tablet by mouth 3 (Three) Times a Day As Needed. for anxiety      hydrOXYzine pamoate (VISTARIL) 25 MG capsule TAKE ONE CAPSULE BY MOUTH THREE TIMES DAILY AS NEEDED FOR ITCHING AND  FOR ANXIETY 90 capsule 1    Insulin Glargine (BASAGLAR KWIKPEN) 100 UNIT/ML injection pen Take 36 units at bedtime can go up by 2-3 unites every 3 days until am sugars running     02/05/2021 - Patient currently utilizing 50 Units nightly. 15 mL 1    Insulin Lispro, 1 Unit Dial, (HUMALOG) 100 UNIT/ML solution pen-injector INJECT UP TO 20 UNITS WITH MEALS 15 mL 0    Kerendia 10 MG tablet       Lancets misc Test 3 times daily , E11.65 100 each 11    lidocaine (LIDODERM) 5 % APPLY ONE TO TWO PATCHES AS DIRECTED ON LOWER BACK, LEFT UPPER LEG 12 HOURS ON AND 12 HOURS OFF      lidocaine-prilocaine (EMLA) 2.5-2.5 % cream       loratadine (Allergy Relief) 10 MG tablet Take 1 tablet by mouth Daily. 30 tablet 3    losartan (COZAAR) 25 MG tablet TAKE ONE TABLET BY MOUTH EVERY DAY 30 tablet 1    metoprolol succinate XL (TOPROL-XL) 25 MG 24 hr tablet Take 1 tablet by mouth 2 (Two) Times a Day.      mirtazapine (REMERON) 30 MG tablet       Mounjaro 2.5 MG/0.5ML solution auto-injector Inject 2.5 mg under the skin into the appropriate area as directed 1 (One) Time Per Week.      naloxone (NARCAN) 4 MG/0.1ML nasal spray       nicotine (Nicotrol) 10 MG inhaler Inhale 1 puff As Needed for Smoking Cessation. 168 each 3    nitrofurantoin, macrocrystal-monohydrate, (MACROBID) 100 MG capsule       ondansetron ODT (ZOFRAN-ODT) 8 MG disintegrating tablet PLACE ONE TABLET ON THE TONGUE AND ALLOW TO DISSOLVE EVERY 8 HOURS AS NEEDED FOR FOR NAUSEA AND VOMITING 90 tablet 0    oxybutynin (DITROPAN) 5 MG tablet Take 1 tablet 3 times a day by oral route for 90 days.      pantoprazole (PROTONIX) 40 MG EC tablet Take 1 tablet by mouth Daily.      polyethylene glycol (MIRALAX) 17 GM/SCOOP powder MIX 17 GRAMS (1 CAPFUL) IN 8 OUNCES OF WATER OR JUICE AND DRINK DAILY 510 g 5    ranolazine (RANEXA) 500 MG 12 hr tablet TAKE ONE TABLET BY MOUTH TWICE DAILY 60 tablet 1    rosuvastatin (CRESTOR) 20 MG tablet TAKE ONE TABLET BY MOUTH AT BEDTIME 30 tablet  1    SITagliptin (Januvia) 100 MG tablet TAKE ONE TABLET BY MOUTH EVERY DAY 30 tablet 0    solifenacin (VESICARE) 10 MG tablet       Ventolin  (90 Base) MCG/ACT inhaler INHALE TWO PUFFS BY MOUTH EVERY 4 HOURS AS NEEDED WHEEZING 18 g 1    vitamin D (ERGOCALCIFEROL) 1.25 MG (39271 UT) capsule capsule TAKE ONE CAPSULE BY MOUTH WEEKLY 4 capsule 1    zolpidem (AMBIEN) 10 MG tablet       zolpidem (AMBIEN) 5 MG tablet take one tablet by mouth nightly as needed       No current facility-administered medications for this visit.       Physical Exam  Nursing note reviewed.   Constitutional:       Appearance: Normal appearance.   Neurological:      Mental Status: She is alert.   Psychiatric:         Attention and Perception: Attention and perception normal.         Mood and Affect: Mood normal. Mood is anxious and depressed.         Speech: Speech normal.         Behavior: Behavior is agitated. Behavior is cooperative.         Cognition and Memory: Cognition and memory normal.        Result Review :     The following data was reviewed by: WAI Florez on 01/21/2025:  Common labs          7/25/2024    17:12 12/19/2024    12:11   Common Labs   WBC 8.3     12.6       Hemoglobin 12.5     13.4       Hematocrit 37.2     39.4       Platelets 261     269       Total Cholesterol 162     125       Triglycerides 118     90       HDL Cholesterol 50     43       LDL Cholesterol  88     64       Hemoglobin A1C 8.5     8.9          Details          This result is from an external source.               CBC          7/25/2024    17:12 12/19/2024    12:11   CBC   WBC 8.3     12.6       RBC 4.23     4.52       Hemoglobin 12.5     13.4       Hematocrit 37.2     39.4       MCV 87.9     87.2       MCH 29.6     29.6       MCHC 33.6     34.0       RDW 11.8     11.8       Platelets 261     269          Details          This result is from an external source.             CBC w/diff          4/18/2024    13:14 7/25/2024    17:12  12/19/2024    12:11   CBC w/Diff   WBC 5.7     8.3     12.6       RBC 4.54     4.23     4.52       Hemoglobin 13.0     12.5     13.4       Hematocrit 38.2     37.2     39.4       MCV 84.1     87.9     87.2       MCH 28.6     29.6     29.6       MCHC 34.0     33.6     34.0       RDW 11.9     11.8     11.8       Platelets 226     261     269       Neutrophil Rel % 49.8     46.1     72.8       Immature Granulocyte Rel % 1.1     0.6     0.3       Lymphocyte Rel % 35.1     37.0     17.7       Monocyte Rel % 7.7     7.8     5.7       Eosinophil Rel % 5.1     7.0     2.9       Basophil Rel % 1.2     1.5     0.6          Details          This result is from an external source.             Lipid Panel          7/25/2024    17:12 12/19/2024    12:11   Lipid Panel   Total Cholesterol 162     125       Triglycerides 118     90       HDL Cholesterol 50     43       VLDL Cholesterol 24     18       LDL Cholesterol  88     64       LDL/HDL Ratio 1.76     1.49          Details          This result is from an external source.                     Most Recent A1C          12/19/2024    12:11   HGBA1C Most Recent   Hemoglobin A1C 8.9          Details          This result is from an external source.               A1C Last 3 Results          7/25/2024    17:12 12/19/2024    12:11   HGBA1C Last 3 Results   Hemoglobin A1C 8.5     8.9          Details          This result is from an external source.               HgB          7/25/2024    17:12 12/19/2024    12:11   HGB   Hemoglobin 12.5     13.4          Details          This result is from an external source.                   Data reviewed : PCP and inpatient notes         Assessment and Plan    Problem List Items Addressed This Visit       Insomnia    Severe episode of recurrent major depressive disorder, without psychotic features - Primary    Relevant Medications    vilazodone (VIIBRYD) 20 MG tablet tablet    Brexpiprazole (Rexulti) 1 MG tablet    Generalized anxiety disorder     Relevant Medications    vilazodone (VIIBRYD) 20 MG tablet tablet    Brexpiprazole (Rexulti) 1 MG tablet             Encounter Diagnoses   Name Primary?    Severe episode of recurrent major depressive disorder, without psychotic features Yes    Generalized anxiety disorder     Insomnia, unspecified type           I have reviewed the patient and the results are consistent with the previously documented exam by myself.  I have reviewed the following portions of the patient's ROS and PFSH and confirmed them as accurate.  The HPI has been updated, chief of complaint, ROS and subjective have been reviewed and up-to-date.  The following portions of the patient's notes were reviewed, confirmed and/or updated this visit as appropriate: History of present illness/Interval history, physical examination, assessment and plan, allergies, current medications, past family medical history, past medical history, past social history, past surgical history and problem list.        TREATMENT PLAN/GOALS: Continue supportive psychotherapy efforts and medications as indicated. Treatment and medication options discussed during today's visit. Patient ackowledged and verbally consented to continue with current treatment plan and was educated on the importance of compliance with treatment and follow-up appointments.    MEDICATION ISSUES:  We discussed risks, benefits, and side effects of the above medications and the patient was agreeable with the plan. Patient was educated on the importance of compliance with treatment and follow-up appointments.  Patient is agreeable to call the office with any worsening of symptoms or onset of side effects. Patient is agreeable to call 911 or go to the nearest ER should he/she begin having SI/HI. Lengthy discussion with patient on the possible side effects of antipsychotic medications including increased cholesterol, increased blood sugar, and possibility of weight gain.  Also discussed the need to monitor  lab work associated with this.  The risk of muscle movement disorders with this class of medication was also discussed.  Patient was educated extensively regarding the combination of duloxetine and Viibryd and educated about serotonin syndrome and any significant symptoms or concerns to go to the nearest ER and/or contact the clinic she verbalized understanding.    -Increase Viibryd to 20 mg daily for depression and anxiety symptoms.  Highly encouraged patient if she had any worsening symptoms or concerns to contact the clinic she verbalized understanding.  -Continue Rexulti  1 mg at night for worsening depression.  Highly encouraged patient if she had any worsening symptoms or concerns to contact the clinic or go to the nearest ER she verbalized understanding.  -Instructed patient since Remeron was just used for sleep and is not affective we will try and break in half and do 15 mg until the next visit.  Encouraged patient that the lower doses are more effective for sleep and will monitor her depression but if any worsening symptoms or concerns to contact the clinic she verbalized understanding.        -Patient has failed and tried paroxetine, sertraline, Lexapro, citalopram, bupropion, buspirone, venlafaxine, Minipress, quetiapine, aripiprazole, lamotrigine, mirtazapine and hydroxyzine.       Counseled patient regarding multimodal approach with healthy nutrition, healthy sleep, regular physical activity, social activities, counseling, and medications.      Coping skills reviewed and encouraged positive framing of thoughts     Assisted patient in processing above session content; acknowledged and normalized patient’s thoughts, feelings, and concerns.  Applied  positive coping skills and behavior management in session.  Allowed patient to freely discuss issues without interruption or judgment. Provided safe, confidential environment to facilitate the development of positive therapeutic relationship and encourage  open, honest communication. Assisted patient in identifying risk factors which would indicate the need for higher level of care including thoughts to harm self or others and/or self-harming behavior and encouraged patient to contact this office, call 911, or present to the nearest emergency room should any of these events occur. Discussed crisis intervention services and means to access.     MEDS ORDERED DURING VISIT:  New Medications Ordered This Visit   Medications    vilazodone (VIIBRYD) 20 MG tablet tablet     Sig: Take 0.5 tablets by mouth Daily.     Dispense:  30 tablet     Refill:  1    Brexpiprazole (Rexulti) 1 MG tablet     Sig: Take 1 tablet by mouth Every Night.     Dispense:  30 tablet     Refill:  2           Follow Up   Return in about 4 weeks (around 5/13/2025), or if symptoms worsen or fail to improve, for Recheck.    Patient was given instructions and counseling regarding her condition or for health maintenance advice. Please see specific information pulled into the AVS if appropriate.     Some of the data in this electronic note has been brought forward from a previous encounter, any necessary changes have been made, it has been reviewed by this APRN, and it is accurate.  This patient will not be seen for the in person visits due to the following exception(s): It would be a hardship for this patient to see a provider in person. and the patient has transportation barriers to seeing a provider in person.     It is my professional opinion that that patient is at risk for disengagement with care that has been effective in managing his/her illness.       This document has been electronically signed by WAI Florez  April 15, 2025 12:15 EDT    Part of this note may be an electronic transcription/translation of spoken language to printed text using the Dragon Dictation System.

## 2025-04-16 ENCOUNTER — TELEPHONE (OUTPATIENT)
Dept: PSYCHIATRY | Facility: CLINIC | Age: 55
End: 2025-04-16
Payer: MEDICAID

## 2025-04-16 DIAGNOSIS — F33.2 SEVERE EPISODE OF RECURRENT MAJOR DEPRESSIVE DISORDER, WITHOUT PSYCHOTIC FEATURES: ICD-10-CM

## 2025-04-16 DIAGNOSIS — F41.1 GENERALIZED ANXIETY DISORDER: ICD-10-CM

## 2025-04-16 RX ORDER — VILAZODONE HYDROCHLORIDE 20 MG/1
20 TABLET ORAL DAILY
Qty: 30 TABLET | Refills: 1 | Status: SHIPPED | OUTPATIENT
Start: 2025-04-16

## 2025-04-16 NOTE — TELEPHONE ENCOUNTER
I have spoke with Aidan from the pharmacy. He is wanting to clarify the rx on vilazodone, he states patient is currently already taking a 10 mg tablet and wants to know if it is okay for her to stay on it instead of the 20 mg tablet and cut it in half.     Please advise

## 2025-04-16 NOTE — TELEPHONE ENCOUNTER
I've sent in a new order. It was supposed to be 20mg tablet daily. The system kept it at 10mg. Please let them know and thank you as well.

## 2025-05-22 ENCOUNTER — TELEPHONE (OUTPATIENT)
Dept: PSYCHIATRY | Facility: CLINIC | Age: 55
End: 2025-05-22

## 2025-05-30 DIAGNOSIS — F41.1 GENERALIZED ANXIETY DISORDER: ICD-10-CM

## 2025-05-30 DIAGNOSIS — F33.2 SEVERE EPISODE OF RECURRENT MAJOR DEPRESSIVE DISORDER, WITHOUT PSYCHOTIC FEATURES: ICD-10-CM

## 2025-06-02 RX ORDER — VILAZODONE HYDROCHLORIDE 20 MG/1
20 TABLET ORAL DAILY
Qty: 30 TABLET | Refills: 1 | Status: SHIPPED | OUTPATIENT
Start: 2025-06-02

## 2025-07-29 DIAGNOSIS — F41.1 GENERALIZED ANXIETY DISORDER: ICD-10-CM

## 2025-07-29 DIAGNOSIS — F33.2 SEVERE EPISODE OF RECURRENT MAJOR DEPRESSIVE DISORDER, WITHOUT PSYCHOTIC FEATURES: ICD-10-CM

## 2025-07-29 RX ORDER — VILAZODONE HYDROCHLORIDE 20 MG/1
20 TABLET ORAL DAILY
Qty: 30 TABLET | Refills: 0 | Status: SHIPPED | OUTPATIENT
Start: 2025-07-29

## 2025-08-21 ENCOUNTER — TELEMEDICINE (OUTPATIENT)
Dept: PSYCHIATRY | Facility: CLINIC | Age: 55
End: 2025-08-21
Payer: MEDICAID

## 2025-08-21 DIAGNOSIS — F41.1 GENERALIZED ANXIETY DISORDER: ICD-10-CM

## 2025-08-21 DIAGNOSIS — G47.00 INSOMNIA, UNSPECIFIED TYPE: ICD-10-CM

## 2025-08-21 DIAGNOSIS — F33.2 SEVERE EPISODE OF RECURRENT MAJOR DEPRESSIVE DISORDER, WITHOUT PSYCHOTIC FEATURES: Primary | Chronic | ICD-10-CM

## 2025-08-21 PROBLEM — C64.9 CLEAR CELL CARCINOMA OF KIDNEY: Status: ACTIVE | Noted: 2025-03-20

## 2025-08-21 PROBLEM — N32.81 OVERACTIVE BLADDER: Status: ACTIVE | Noted: 2025-03-20

## 2025-08-21 PROBLEM — K04.7 DENTAL INFECTION: Status: ACTIVE | Noted: 2025-07-16

## 2025-08-21 PROBLEM — F17.210 CIGARETTE SMOKER: Status: ACTIVE | Noted: 2020-06-17

## 2025-08-21 PROBLEM — K59.00 CONSTIPATION: Status: ACTIVE | Noted: 2020-10-06

## 2025-08-21 PROBLEM — Z78.9 ELECTRONIC CIGARETTE USE: Status: ACTIVE | Noted: 2025-07-16

## 2025-08-21 PROBLEM — N30.80 CYSTITIS CYSTICA: Status: ACTIVE | Noted: 2025-03-20

## 2025-08-21 PROBLEM — I21.9 MYOCARDIAL INFARCT: Status: ACTIVE | Noted: 2017-08-14

## 2025-08-21 PROBLEM — N39.41 URGE INCONTINENCE OF URINE: Status: ACTIVE | Noted: 2025-03-20

## 2025-08-21 PROBLEM — R31.0 FRANK HEMATURIA: Status: ACTIVE | Noted: 2025-03-20

## 2025-08-21 RX ORDER — SYRINGE AND NEEDLE,INSULIN,1ML 28GX1/2"
SYRINGE, EMPTY DISPOSABLE MISCELLANEOUS
COMMUNITY
Start: 2025-07-30

## 2025-08-21 RX ORDER — BUSPIRONE HYDROCHLORIDE 5 MG/1
5 TABLET ORAL 3 TIMES DAILY
Qty: 90 TABLET | Refills: 0 | Status: SHIPPED | OUTPATIENT
Start: 2025-08-21

## 2025-08-21 RX ORDER — BREXPIPRAZOLE 1 MG/1
1 TABLET ORAL NIGHTLY
Qty: 30 TABLET | Refills: 2 | Status: SHIPPED | OUTPATIENT
Start: 2025-08-21

## 2025-08-21 RX ORDER — EVOLOCUMAB 140 MG/ML
INJECTION, SOLUTION SUBCUTANEOUS
COMMUNITY

## 2025-08-21 RX ORDER — LINACLOTIDE 72 UG/1
CAPSULE, GELATIN COATED ORAL
COMMUNITY

## 2025-08-21 RX ORDER — BUDESONIDE AND FORMOTEROL FUMARATE DIHYDRATE 160; 4.5 UG/1; UG/1
2 AEROSOL RESPIRATORY (INHALATION)
COMMUNITY
Start: 2025-08-13 | End: 2025-09-13

## 2025-08-21 RX ORDER — METOPROLOL TARTRATE 25 MG/1
25 TABLET, FILM COATED ORAL
COMMUNITY
Start: 2025-08-05 | End: 2026-08-06

## 2025-08-21 RX ORDER — VILAZODONE HYDROCHLORIDE 40 MG/1
40 TABLET ORAL DAILY
Qty: 30 TABLET | Refills: 2 | Status: SHIPPED | OUTPATIENT
Start: 2025-08-21

## (undated) DEVICE — GLV SURG SENSICARE PI LF PF 7.5 GRN STRL

## (undated) DEVICE — PREP SOL DYNA-HEX CHG LIQ 4% BT 16OZ

## (undated) DEVICE — GLV SURG TRIUMPH ORTHO W/ALOE PF LTX 7.0

## (undated) DEVICE — SPNG GZ WOVN 4X4IN 12PLY 10/BX STRL

## (undated) DEVICE — SINGLE-USE BIOPSY FORCEPS: Brand: RADIAL JAW 4

## (undated) DEVICE — GW 3.2X290MM

## (undated) DEVICE — DRAPE,U/ SHT,SPLIT,PLAS,STERIL: Brand: MEDLINE

## (undated) DEVICE — UNDRPD BREATH 23X36 BG/10

## (undated) DEVICE — BIT DRL 3FLUT QC NDL PT 4.2X145MM STRL

## (undated) DEVICE — SYR LL TP 10ML STRL

## (undated) DEVICE — GOWN,AURORA,NOREINF,RAGLAN,XL,STERILE: Brand: MEDLINE

## (undated) DEVICE — BANDAGE,GAUZE,BULKEE II,4.5"X4.1YD,STRL: Brand: MEDLINE

## (undated) DEVICE — PK EXTRM LF 60

## (undated) DEVICE — UNDYED BRAIDED (POLYGLACTIN 910), SYNTHETIC ABSORBABLE SUTURE: Brand: COATED VICRYL

## (undated) DEVICE — STERILE POLYISOPRENE POWDER-FREE SURGICAL GLOVES WITH EMOLLIENT COATING: Brand: PROTEXIS

## (undated) DEVICE — BIT DRL 3FLUT QC CALIB 4.2X330X100MM STRL

## (undated) DEVICE — STPLR SKIN VISISTAT WD 35CT

## (undated) DEVICE — GAMMEX® NON-LATEX SIZE 8, STERILE NEOPRENE POWDER-FREE SURGICAL GLOVE: Brand: GAMMEX

## (undated) DEVICE — SUT VIC 3/0 SH 27IN J416H

## (undated) DEVICE — SUT ETHIB 0/0 CT1 30IN X424H

## (undated) DEVICE — GOWN,NON-REINFORCED,4XL: Brand: MEDLINE

## (undated) DEVICE — SHEET,DRAPE,53X77,STERILE: Brand: MEDLINE

## (undated) DEVICE — DRP SURG U/DRP INVISISHIELD PA 48X71IN

## (undated) DEVICE — Device

## (undated) DEVICE — CANN SMPL SOFTECH BIFLO ETCO2 A/M 7FT

## (undated) DEVICE — GLV SURG TRIUMPH LT PF LTX 7.5 STRL

## (undated) DEVICE — TRAP SXN POLYP QUICKCATCH LF

## (undated) DEVICE — NDL SPINE 18G 31/2IN PNK

## (undated) DEVICE — SOL IRR NACL 0.9PCT BT 1000ML

## (undated) DEVICE — GLV SURG SENSICARE POLYISPRN W/ALOE PF LF 6.5 GRN STRL

## (undated) DEVICE — GLV SURG SENSICARE MICRO PF LF 7.5 STRL

## (undated) DEVICE — GAUZE,SPONGE,FLUFF,6"X6.75",STRL,5/TRAY: Brand: MEDLINE

## (undated) DEVICE — BNDG ELAS CO-FLEX SLF ADHR 4IN5YD LF STRL

## (undated) DEVICE — BNDG ELAS ELITE V/CLOSE 3IN 5YD LF STRL

## (undated) DEVICE — MARKR SKIN W/RULR AND LBL

## (undated) DEVICE — BNDG ELAS ELITE V/CLOSE 6IN 5YD LF STRL

## (undated) DEVICE — CVR SURG EQUIP BND RECTG 36X28

## (undated) DEVICE — ELECTRD BLD EZ CLN MOD 2.5IN

## (undated) DEVICE — SKIN AFFIX SURG ADHESIVE 72/CS 0.55ML: Brand: MEDLINE

## (undated) DEVICE — DRSNG GZ CURAD XEROFORM NONADHS 5X9IN STRL

## (undated) DEVICE — 3M™ IOBAN™ 2 ANTIMICROBIAL INCISE DRAPE 6651EZ: Brand: IOBAN™ 2

## (undated) DEVICE — BITEBLOCK ENDO W/STRAP 60F A/ LF DISP

## (undated) DEVICE — CVR FLUOROSCOPE C/ARM W/TP 36X28IN

## (undated) DEVICE — SUT VIC 0 CT1 36IN J946H

## (undated) DEVICE — Device: Brand: DISPOSABLE ELECTROSURGICAL SNARE

## (undated) DEVICE — NDL HYPO SFTY GLD 22G 1 1/2IN